# Patient Record
Sex: MALE | Race: WHITE | NOT HISPANIC OR LATINO | Employment: OTHER | URBAN - METROPOLITAN AREA
[De-identification: names, ages, dates, MRNs, and addresses within clinical notes are randomized per-mention and may not be internally consistent; named-entity substitution may affect disease eponyms.]

---

## 2020-05-22 ENCOUNTER — APPOINTMENT (OUTPATIENT)
Dept: CT IMAGING | Facility: HOSPITAL | Age: 65
End: 2020-05-22

## 2020-05-22 ENCOUNTER — HOSPITAL ENCOUNTER (INPATIENT)
Facility: HOSPITAL | Age: 65
LOS: 13 days | Discharge: HOME | End: 2020-06-04
Attending: EMERGENCY MEDICINE | Admitting: INTERNAL MEDICINE

## 2020-05-22 DIAGNOSIS — N28.9 RENAL INSUFFICIENCY: ICD-10-CM

## 2020-05-22 DIAGNOSIS — D64.9 ANEMIA, UNSPECIFIED TYPE: Primary | ICD-10-CM

## 2020-05-22 DIAGNOSIS — N17.9 AKI (ACUTE KIDNEY INJURY) (HCC): ICD-10-CM

## 2020-05-22 DIAGNOSIS — C90.00 MULTIPLE MYELOMA NOT HAVING ACHIEVED REMISSION (HCC): ICD-10-CM

## 2020-05-22 DIAGNOSIS — Z85.528 HISTORY OF RENAL CELL CANCER: ICD-10-CM

## 2020-05-22 PROBLEM — Z90.5 H/O PARTIAL NEPHRECTOMY: Status: ACTIVE | Noted: 2020-05-22

## 2020-05-22 PROBLEM — E11.9 DIABETES MELLITUS: Status: ACTIVE | Noted: 2020-05-22

## 2020-05-22 PROBLEM — N40.0 ENLARGED PROSTATE: Status: ACTIVE | Noted: 2020-05-22

## 2020-05-22 PROBLEM — N18.9 CKD (CHRONIC KIDNEY DISEASE): Status: ACTIVE | Noted: 2020-05-22

## 2020-05-22 PROBLEM — R77.8 ELEVATED TROPONIN: Status: ACTIVE | Noted: 2020-05-22

## 2020-05-22 PROBLEM — R79.89 ELEVATED TROPONIN: Status: ACTIVE | Noted: 2020-05-22

## 2020-05-22 PROBLEM — I10 HYPERTENSION: Status: ACTIVE | Noted: 2020-05-22

## 2020-05-22 LAB
ABO GROUP BLD: NORMAL
ALBUMIN SERPL-MCNC: 2.8 G/DL (ref 3.5–5.2)
ALBUMIN/GLOB SERPL: 0.4 G/DL
ALP SERPL-CCNC: 59 U/L (ref 39–117)
ALT SERPL W P-5'-P-CCNC: <5 U/L (ref 1–41)
ANION GAP SERPL CALCULATED.3IONS-SCNC: 7.3 MMOL/L (ref 5–15)
APTT PPP: 34.2 SECONDS (ref 22.7–35.4)
AST SERPL-CCNC: 8 U/L (ref 1–40)
BASOPHILS # BLD AUTO: 0.02 10*3/MM3 (ref 0–0.2)
BASOPHILS NFR BLD AUTO: 0.4 % (ref 0–1.5)
BILIRUB SERPL-MCNC: 0.3 MG/DL (ref 0.2–1.2)
BLD GP AB SCN SERPL QL: NEGATIVE
BUN BLD-MCNC: 44 MG/DL (ref 8–23)
BUN/CREAT SERPL: 14.4 (ref 7–25)
CALCIUM SPEC-SCNC: 9.1 MG/DL (ref 8.6–10.5)
CHLORIDE SERPL-SCNC: 104 MMOL/L (ref 98–107)
CO2 SERPL-SCNC: 21.7 MMOL/L (ref 22–29)
CREAT BLD-MCNC: 3.05 MG/DL (ref 0.76–1.27)
DEPRECATED RDW RBC AUTO: 49.6 FL (ref 37–54)
EOSINOPHIL # BLD AUTO: 0.06 10*3/MM3 (ref 0–0.4)
EOSINOPHIL NFR BLD AUTO: 1.1 % (ref 0.3–6.2)
ERYTHROCYTE [DISTWIDTH] IN BLOOD BY AUTOMATED COUNT: 15.3 % (ref 12.3–15.4)
FERRITIN SERPL-MCNC: 528 NG/ML (ref 30–400)
GFR SERPL CREATININE-BSD FRML MDRD: 21 ML/MIN/1.73
GLOBULIN UR ELPH-MCNC: 7.9 GM/DL
GLUCOSE BLD-MCNC: 121 MG/DL (ref 65–99)
GLUCOSE BLDC GLUCOMTR-MCNC: 122 MG/DL (ref 70–130)
HCT VFR BLD AUTO: 16.8 % (ref 37.5–51)
HEMOCCULT STL QL: NEGATIVE
HGB BLD-MCNC: 5.6 G/DL (ref 13–17.7)
IMM GRANULOCYTES # BLD AUTO: 0.05 10*3/MM3 (ref 0–0.05)
IMM GRANULOCYTES NFR BLD AUTO: 0.9 % (ref 0–0.5)
INR PPP: 1.16 (ref 0.9–1.1)
IRON 24H UR-MRATE: 68 MCG/DL (ref 59–158)
IRON 24H UR-MRATE: 68 MCG/DL (ref 59–158)
IRON SATN MFR SERPL: 29 % (ref 20–50)
LYMPHOCYTES # BLD AUTO: 1.83 10*3/MM3 (ref 0.7–3.1)
LYMPHOCYTES NFR BLD AUTO: 32.8 % (ref 19.6–45.3)
MCH RBC QN AUTO: 30.8 PG (ref 26.6–33)
MCHC RBC AUTO-ENTMCNC: 33.3 G/DL (ref 31.5–35.7)
MCV RBC AUTO: 92.3 FL (ref 79–97)
MONOCYTES # BLD AUTO: 0.43 10*3/MM3 (ref 0.1–0.9)
MONOCYTES NFR BLD AUTO: 7.7 % (ref 5–12)
NEUTROPHILS # BLD AUTO: 3.19 10*3/MM3 (ref 1.7–7)
NEUTROPHILS NFR BLD AUTO: 57.1 % (ref 42.7–76)
NRBC BLD AUTO-RTO: 0 /100 WBC (ref 0–0.2)
NT-PROBNP SERPL-MCNC: 524.7 PG/ML (ref 5–900)
PLATELET # BLD AUTO: 199 10*3/MM3 (ref 140–450)
PMV BLD AUTO: 9.4 FL (ref 6–12)
POTASSIUM BLD-SCNC: 4.1 MMOL/L (ref 3.5–5.2)
PROT SERPL-MCNC: 10.7 G/DL (ref 6–8.5)
PROTHROMBIN TIME: 14.6 SECONDS (ref 11.7–14.2)
RBC # BLD AUTO: 1.82 10*6/MM3 (ref 4.14–5.8)
RETICS # AUTO: 0.03 10*6/MM3 (ref 0.02–0.13)
RETICS/RBC NFR AUTO: 1.61 % (ref 0.7–1.9)
RH BLD: POSITIVE
SODIUM BLD-SCNC: 133 MMOL/L (ref 136–145)
T&S EXPIRATION DATE: NORMAL
TIBC SERPL-MCNC: 231 MCG/DL (ref 298–536)
TRANSFERRIN SERPL-MCNC: 155 MG/DL (ref 200–360)
TROPONIN T SERPL-MCNC: 0.07 NG/ML (ref 0–0.03)
WBC NRBC COR # BLD: 5.58 10*3/MM3 (ref 3.4–10.8)

## 2020-05-22 PROCEDURE — 85610 PROTHROMBIN TIME: CPT | Performed by: EMERGENCY MEDICINE

## 2020-05-22 PROCEDURE — 82436 ASSAY OF URINE CHLORIDE: CPT | Performed by: INTERNAL MEDICINE

## 2020-05-22 PROCEDURE — 86901 BLOOD TYPING SEROLOGIC RH(D): CPT | Performed by: EMERGENCY MEDICINE

## 2020-05-22 PROCEDURE — 85730 THROMBOPLASTIN TIME PARTIAL: CPT | Performed by: EMERGENCY MEDICINE

## 2020-05-22 PROCEDURE — P9016 RBC LEUKOCYTES REDUCED: HCPCS

## 2020-05-22 PROCEDURE — 85045 AUTOMATED RETICULOCYTE COUNT: CPT | Performed by: INTERNAL MEDICINE

## 2020-05-22 PROCEDURE — 83540 ASSAY OF IRON: CPT | Performed by: INTERNAL MEDICINE

## 2020-05-22 PROCEDURE — 99285 EMERGENCY DEPT VISIT HI MDM: CPT

## 2020-05-22 PROCEDURE — 86850 RBC ANTIBODY SCREEN: CPT | Performed by: EMERGENCY MEDICINE

## 2020-05-22 PROCEDURE — 71250 CT THORAX DX C-: CPT

## 2020-05-22 PROCEDURE — 86923 COMPATIBILITY TEST ELECTRIC: CPT

## 2020-05-22 PROCEDURE — 86900 BLOOD TYPING SEROLOGIC ABO: CPT

## 2020-05-22 PROCEDURE — 36430 TRANSFUSION BLD/BLD COMPNT: CPT

## 2020-05-22 PROCEDURE — 93010 ELECTROCARDIOGRAM REPORT: CPT | Performed by: INTERNAL MEDICINE

## 2020-05-22 PROCEDURE — 80053 COMPREHEN METABOLIC PANEL: CPT | Performed by: EMERGENCY MEDICINE

## 2020-05-22 PROCEDURE — 84466 ASSAY OF TRANSFERRIN: CPT | Performed by: INTERNAL MEDICINE

## 2020-05-22 PROCEDURE — 81001 URINALYSIS AUTO W/SCOPE: CPT | Performed by: INTERNAL MEDICINE

## 2020-05-22 PROCEDURE — 85025 COMPLETE CBC W/AUTO DIFF WBC: CPT | Performed by: EMERGENCY MEDICINE

## 2020-05-22 PROCEDURE — 82728 ASSAY OF FERRITIN: CPT | Performed by: INTERNAL MEDICINE

## 2020-05-22 PROCEDURE — 83880 ASSAY OF NATRIURETIC PEPTIDE: CPT | Performed by: EMERGENCY MEDICINE

## 2020-05-22 PROCEDURE — 82962 GLUCOSE BLOOD TEST: CPT

## 2020-05-22 PROCEDURE — 74176 CT ABD & PELVIS W/O CONTRAST: CPT

## 2020-05-22 PROCEDURE — 93005 ELECTROCARDIOGRAM TRACING: CPT | Performed by: EMERGENCY MEDICINE

## 2020-05-22 PROCEDURE — 84300 ASSAY OF URINE SODIUM: CPT | Performed by: INTERNAL MEDICINE

## 2020-05-22 PROCEDURE — 82272 OCCULT BLD FECES 1-3 TESTS: CPT | Performed by: INTERNAL MEDICINE

## 2020-05-22 PROCEDURE — 86900 BLOOD TYPING SEROLOGIC ABO: CPT | Performed by: EMERGENCY MEDICINE

## 2020-05-22 PROCEDURE — 84484 ASSAY OF TROPONIN QUANT: CPT | Performed by: EMERGENCY MEDICINE

## 2020-05-22 RX ORDER — TAMSULOSIN HYDROCHLORIDE 0.4 MG/1
0.4 CAPSULE ORAL NIGHTLY
Status: DISCONTINUED | OUTPATIENT
Start: 2020-05-22 | End: 2020-06-04 | Stop reason: HOSPADM

## 2020-05-22 RX ORDER — SODIUM CHLORIDE 0.9 % (FLUSH) 0.9 %
10 SYRINGE (ML) INJECTION EVERY 12 HOURS SCHEDULED
Status: DISCONTINUED | OUTPATIENT
Start: 2020-05-22 | End: 2020-06-04 | Stop reason: HOSPADM

## 2020-05-22 RX ORDER — ACETAMINOPHEN 650 MG/1
650 SUPPOSITORY RECTAL EVERY 4 HOURS PRN
Status: DISCONTINUED | OUTPATIENT
Start: 2020-05-22 | End: 2020-06-04 | Stop reason: HOSPADM

## 2020-05-22 RX ORDER — DEXTROSE MONOHYDRATE 25 G/50ML
25 INJECTION, SOLUTION INTRAVENOUS
Status: DISCONTINUED | OUTPATIENT
Start: 2020-05-22 | End: 2020-06-04 | Stop reason: HOSPADM

## 2020-05-22 RX ORDER — ATORVASTATIN CALCIUM 20 MG/1
20 TABLET, FILM COATED ORAL DAILY
Status: DISCONTINUED | OUTPATIENT
Start: 2020-05-22 | End: 2020-06-04 | Stop reason: HOSPADM

## 2020-05-22 RX ORDER — SODIUM CHLORIDE 0.9 % (FLUSH) 0.9 %
10 SYRINGE (ML) INJECTION AS NEEDED
Status: DISCONTINUED | OUTPATIENT
Start: 2020-05-22 | End: 2020-06-04 | Stop reason: HOSPADM

## 2020-05-22 RX ORDER — FINASTERIDE 5 MG/1
5 TABLET, FILM COATED ORAL DAILY
Status: DISCONTINUED | OUTPATIENT
Start: 2020-05-22 | End: 2020-06-04 | Stop reason: HOSPADM

## 2020-05-22 RX ORDER — ACETAMINOPHEN 160 MG/5ML
650 SOLUTION ORAL EVERY 4 HOURS PRN
Status: DISCONTINUED | OUTPATIENT
Start: 2020-05-22 | End: 2020-06-04 | Stop reason: HOSPADM

## 2020-05-22 RX ORDER — NICOTINE POLACRILEX 4 MG
15 LOZENGE BUCCAL
Status: DISCONTINUED | OUTPATIENT
Start: 2020-05-22 | End: 2020-06-04 | Stop reason: HOSPADM

## 2020-05-22 RX ORDER — NITROGLYCERIN 0.4 MG/1
0.4 TABLET SUBLINGUAL
Status: DISCONTINUED | OUTPATIENT
Start: 2020-05-22 | End: 2020-06-04 | Stop reason: HOSPADM

## 2020-05-22 RX ORDER — ACETAMINOPHEN 325 MG/1
650 TABLET ORAL EVERY 4 HOURS PRN
Status: DISCONTINUED | OUTPATIENT
Start: 2020-05-22 | End: 2020-06-04 | Stop reason: HOSPADM

## 2020-05-22 RX ORDER — SODIUM CHLORIDE 9 MG/ML
125 INJECTION, SOLUTION INTRAVENOUS CONTINUOUS
Status: DISCONTINUED | OUTPATIENT
Start: 2020-05-22 | End: 2020-05-23

## 2020-05-22 RX ADMIN — ATORVASTATIN CALCIUM 20 MG: 20 TABLET, FILM COATED ORAL at 21:06

## 2020-05-22 RX ADMIN — SODIUM CHLORIDE 125 ML/HR: 9 INJECTION, SOLUTION INTRAVENOUS at 15:24

## 2020-05-22 RX ADMIN — SODIUM CHLORIDE, PRESERVATIVE FREE 10 ML: 5 INJECTION INTRAVENOUS at 21:03

## 2020-05-22 RX ADMIN — FINASTERIDE 5 MG: 5 TABLET, FILM COATED ORAL at 21:03

## 2020-05-22 RX ADMIN — TAMSULOSIN HYDROCHLORIDE 0.4 MG: 0.4 CAPSULE ORAL at 21:03

## 2020-05-23 ENCOUNTER — APPOINTMENT (OUTPATIENT)
Dept: CT IMAGING | Facility: HOSPITAL | Age: 65
End: 2020-05-23

## 2020-05-23 ENCOUNTER — APPOINTMENT (OUTPATIENT)
Dept: ULTRASOUND IMAGING | Facility: HOSPITAL | Age: 65
End: 2020-05-23

## 2020-05-23 LAB
ALBUMIN SERPL-MCNC: 2.5 G/DL (ref 3.5–5.2)
ALBUMIN/GLOB SERPL: 0.3 G/DL
ALP SERPL-CCNC: 53 U/L (ref 39–117)
ALT SERPL W P-5'-P-CCNC: <5 U/L (ref 1–41)
ANION GAP SERPL CALCULATED.3IONS-SCNC: 7.3 MMOL/L (ref 5–15)
AST SERPL-CCNC: 7 U/L (ref 1–40)
BACTERIA UR QL AUTO: ABNORMAL /HPF
BACTERIA UR QL AUTO: ABNORMAL /HPF
BASOPHILS # BLD AUTO: 0.02 10*3/MM3 (ref 0–0.2)
BASOPHILS NFR BLD AUTO: 0.4 % (ref 0–1.5)
BH BB BLOOD EXPIRATION DATE: NORMAL
BH BB BLOOD TYPE BARCODE: 6200
BH BB DISPENSE STATUS: NORMAL
BH BB PRODUCT CODE: NORMAL
BH BB UNIT NUMBER: NORMAL
BILIRUB SERPL-MCNC: 0.9 MG/DL (ref 0.2–1.2)
BILIRUB UR QL STRIP: NEGATIVE
BILIRUB UR QL STRIP: NEGATIVE
BUN BLD-MCNC: 41 MG/DL (ref 8–23)
BUN/CREAT SERPL: 14.2 (ref 7–25)
CALCIUM SPEC-SCNC: 8.9 MG/DL (ref 8.6–10.5)
CHLORIDE SERPL-SCNC: 106 MMOL/L (ref 98–107)
CHLORIDE UR-SCNC: 60 MMOL/L
CHLORIDE UR-SCNC: 81 MMOL/L
CLARITY UR: CLEAR
CLARITY UR: CLEAR
CO2 SERPL-SCNC: 21.7 MMOL/L (ref 22–29)
COLOR UR: YELLOW
COLOR UR: YELLOW
CREAT BLD-MCNC: 2.89 MG/DL (ref 0.76–1.27)
CREAT UR-MCNC: 38.6 MG/DL
CROSSMATCH INTERPRETATION: NORMAL
DEPRECATED RDW RBC AUTO: 47.8 FL (ref 37–54)
EOSINOPHIL # BLD AUTO: 0.06 10*3/MM3 (ref 0–0.4)
EOSINOPHIL NFR BLD AUTO: 1.3 % (ref 0.3–6.2)
EOSINOPHIL SPEC QL MICRO: 0 % EOS/100 CELLS (ref 0–0)
ERYTHROCYTE [DISTWIDTH] IN BLOOD BY AUTOMATED COUNT: 14.4 % (ref 12.3–15.4)
GFR SERPL CREATININE-BSD FRML MDRD: 22 ML/MIN/1.73
GLOBULIN UR ELPH-MCNC: 7.9 GM/DL
GLUCOSE BLD-MCNC: 141 MG/DL (ref 65–99)
GLUCOSE BLDC GLUCOMTR-MCNC: 116 MG/DL (ref 70–130)
GLUCOSE BLDC GLUCOMTR-MCNC: 138 MG/DL (ref 70–130)
GLUCOSE BLDC GLUCOMTR-MCNC: 146 MG/DL (ref 70–130)
GLUCOSE BLDC GLUCOMTR-MCNC: 173 MG/DL (ref 70–130)
GLUCOSE UR STRIP-MCNC: NEGATIVE MG/DL
GLUCOSE UR STRIP-MCNC: NEGATIVE MG/DL
HBA1C MFR BLD: 6.6 % (ref 4.8–5.6)
HCT VFR BLD AUTO: 23.9 % (ref 37.5–51)
HGB BLD-MCNC: 8 G/DL (ref 13–17.7)
HGB UR QL STRIP.AUTO: ABNORMAL
HGB UR QL STRIP.AUTO: ABNORMAL
HYALINE CASTS UR QL AUTO: ABNORMAL /LPF
HYALINE CASTS UR QL AUTO: ABNORMAL /LPF
IMM GRANULOCYTES # BLD AUTO: 0.03 10*3/MM3 (ref 0–0.05)
IMM GRANULOCYTES NFR BLD AUTO: 0.6 % (ref 0–0.5)
KETONES UR QL STRIP: NEGATIVE
KETONES UR QL STRIP: NEGATIVE
LEUKOCYTE ESTERASE UR QL STRIP.AUTO: NEGATIVE
LEUKOCYTE ESTERASE UR QL STRIP.AUTO: NEGATIVE
LYMPHOCYTES # BLD AUTO: 1.88 10*3/MM3 (ref 0.7–3.1)
LYMPHOCYTES NFR BLD AUTO: 39.3 % (ref 19.6–45.3)
MCH RBC QN AUTO: 30.7 PG (ref 26.6–33)
MCHC RBC AUTO-ENTMCNC: 33.5 G/DL (ref 31.5–35.7)
MCV RBC AUTO: 91.6 FL (ref 79–97)
MONOCYTES # BLD AUTO: 0.37 10*3/MM3 (ref 0.1–0.9)
MONOCYTES NFR BLD AUTO: 7.7 % (ref 5–12)
NEUTROPHILS # BLD AUTO: 2.42 10*3/MM3 (ref 1.7–7)
NEUTROPHILS NFR BLD AUTO: 50.7 % (ref 42.7–76)
NITRITE UR QL STRIP: NEGATIVE
NITRITE UR QL STRIP: NEGATIVE
NRBC BLD AUTO-RTO: 0 /100 WBC (ref 0–0.2)
PH UR STRIP.AUTO: <=5 [PH] (ref 5–8)
PH UR STRIP.AUTO: <=5 [PH] (ref 5–8)
PLATELET # BLD AUTO: 162 10*3/MM3 (ref 140–450)
PMV BLD AUTO: 9.2 FL (ref 6–12)
POTASSIUM BLD-SCNC: 4.1 MMOL/L (ref 3.5–5.2)
PROT SERPL-MCNC: 10.4 G/DL (ref 6–8.5)
PROT UR QL STRIP: ABNORMAL
PROT UR QL STRIP: ABNORMAL
PROT UR-MCNC: 268 MG/DL
RBC # BLD AUTO: 2.61 10*6/MM3 (ref 4.14–5.8)
RBC # UR: ABNORMAL /HPF
RBC # UR: ABNORMAL /HPF
REF LAB TEST METHOD: ABNORMAL
REF LAB TEST METHOD: ABNORMAL
SODIUM BLD-SCNC: 135 MMOL/L (ref 136–145)
SODIUM UR-SCNC: 69 MMOL/L
SODIUM UR-SCNC: 85 MMOL/L
SP GR UR STRIP: 1.01 (ref 1–1.03)
SP GR UR STRIP: 1.01 (ref 1–1.03)
SQUAMOUS #/AREA URNS HPF: ABNORMAL /HPF
SQUAMOUS #/AREA URNS HPF: ABNORMAL /HPF
TROPONIN T SERPL-MCNC: 0.06 NG/ML (ref 0–0.03)
UNIT  ABO: NORMAL
UNIT  RH: NORMAL
UROBILINOGEN UR QL STRIP: ABNORMAL
UROBILINOGEN UR QL STRIP: ABNORMAL
VIT B12 BLD-MCNC: 1040 PG/ML (ref 211–946)
WBC NRBC COR # BLD: 4.78 10*3/MM3 (ref 3.4–10.8)
WBC UR QL AUTO: ABNORMAL /HPF
WBC UR QL AUTO: ABNORMAL /HPF

## 2020-05-23 PROCEDURE — 88237 TISSUE CULTURE BONE MARROW: CPT

## 2020-05-23 PROCEDURE — 25010000002 EPOETIN ALFA PER 1000 UNITS: Performed by: INTERNAL MEDICINE

## 2020-05-23 PROCEDURE — 25010000003 LIDOCAINE 1 % SOLUTION: Performed by: RADIOLOGY

## 2020-05-23 PROCEDURE — 81001 URINALYSIS AUTO W/SCOPE: CPT | Performed by: INTERNAL MEDICINE

## 2020-05-23 PROCEDURE — 87205 SMEAR GRAM STAIN: CPT | Performed by: INTERNAL MEDICINE

## 2020-05-23 PROCEDURE — 76775 US EXAM ABDO BACK WALL LIM: CPT

## 2020-05-23 PROCEDURE — 82570 ASSAY OF URINE CREATININE: CPT | Performed by: INTERNAL MEDICINE

## 2020-05-23 PROCEDURE — 83883 ASSAY NEPHELOMETRY NOT SPEC: CPT | Performed by: INTERNAL MEDICINE

## 2020-05-23 PROCEDURE — 80053 COMPREHEN METABOLIC PANEL: CPT | Performed by: INTERNAL MEDICINE

## 2020-05-23 PROCEDURE — 82962 GLUCOSE BLOOD TEST: CPT

## 2020-05-23 PROCEDURE — 88313 SPECIAL STAINS GROUP 2: CPT

## 2020-05-23 PROCEDURE — 88264 CHROMOSOME ANALYSIS 20-25: CPT

## 2020-05-23 PROCEDURE — 88182 CELL MARKER STUDY: CPT

## 2020-05-23 PROCEDURE — 88313 SPECIAL STAINS GROUP 2: CPT | Performed by: HOSPITALIST

## 2020-05-23 PROCEDURE — 85097 BONE MARROW INTERPRETATION: CPT | Performed by: HOSPITALIST

## 2020-05-23 PROCEDURE — 84484 ASSAY OF TROPONIN QUANT: CPT | Performed by: INTERNAL MEDICINE

## 2020-05-23 PROCEDURE — 88311 DECALCIFY TISSUE: CPT | Performed by: HOSPITALIST

## 2020-05-23 PROCEDURE — 88185 FLOWCYTOMETRY/TC ADD-ON: CPT

## 2020-05-23 PROCEDURE — 88342 IMHCHEM/IMCYTCHM 1ST ANTB: CPT | Performed by: HOSPITALIST

## 2020-05-23 PROCEDURE — 85025 COMPLETE CBC W/AUTO DIFF WBC: CPT | Performed by: INTERNAL MEDICINE

## 2020-05-23 PROCEDURE — 82784 ASSAY IGA/IGD/IGG/IGM EACH: CPT | Performed by: INTERNAL MEDICINE

## 2020-05-23 PROCEDURE — 88184 FLOWCYTOMETRY/ TC 1 MARKER: CPT

## 2020-05-23 PROCEDURE — 84300 ASSAY OF URINE SODIUM: CPT | Performed by: INTERNAL MEDICINE

## 2020-05-23 PROCEDURE — 83036 HEMOGLOBIN GLYCOSYLATED A1C: CPT | Performed by: INTERNAL MEDICINE

## 2020-05-23 PROCEDURE — 88305 TISSUE EXAM BY PATHOLOGIST: CPT | Performed by: HOSPITALIST

## 2020-05-23 PROCEDURE — 84156 ASSAY OF PROTEIN URINE: CPT | Performed by: INTERNAL MEDICINE

## 2020-05-23 PROCEDURE — 88300 SURGICAL PATH GROSS: CPT | Performed by: HOSPITALIST

## 2020-05-23 PROCEDURE — 82668 ASSAY OF ERYTHROPOIETIN: CPT | Performed by: INTERNAL MEDICINE

## 2020-05-23 PROCEDURE — 07DR3ZX EXTRACTION OF ILIAC BONE MARROW, PERCUTANEOUS APPROACH, DIAGNOSTIC: ICD-10-PCS | Performed by: INTERNAL MEDICINE

## 2020-05-23 PROCEDURE — 88323 CONSLTJ&REPRT MATRL PREP SLD: CPT

## 2020-05-23 PROCEDURE — 82436 ASSAY OF URINE CHLORIDE: CPT | Performed by: INTERNAL MEDICINE

## 2020-05-23 PROCEDURE — 88341 IMHCHEM/IMCYTCHM EA ADD ANTB: CPT | Performed by: HOSPITALIST

## 2020-05-23 PROCEDURE — 77012 CT SCAN FOR NEEDLE BIOPSY: CPT

## 2020-05-23 PROCEDURE — 84165 PROTEIN E-PHORESIS SERUM: CPT | Performed by: INTERNAL MEDICINE

## 2020-05-23 PROCEDURE — 86334 IMMUNOFIX E-PHORESIS SERUM: CPT | Performed by: INTERNAL MEDICINE

## 2020-05-23 PROCEDURE — 99223 1ST HOSP IP/OBS HIGH 75: CPT | Performed by: INTERNAL MEDICINE

## 2020-05-23 PROCEDURE — 82607 VITAMIN B-12: CPT | Performed by: INTERNAL MEDICINE

## 2020-05-23 RX ORDER — SODIUM CHLORIDE 9 MG/ML
125 INJECTION, SOLUTION INTRAVENOUS CONTINUOUS
Status: DISCONTINUED | OUTPATIENT
Start: 2020-05-23 | End: 2020-05-24

## 2020-05-23 RX ORDER — LIDOCAINE HYDROCHLORIDE 10 MG/ML
20 INJECTION, SOLUTION INFILTRATION; PERINEURAL ONCE
Status: COMPLETED | OUTPATIENT
Start: 2020-05-23 | End: 2020-05-23

## 2020-05-23 RX ADMIN — LIDOCAINE HYDROCHLORIDE 20 ML: 10 INJECTION, SOLUTION INFILTRATION; PERINEURAL at 14:57

## 2020-05-23 RX ADMIN — FINASTERIDE 5 MG: 5 TABLET, FILM COATED ORAL at 11:46

## 2020-05-23 RX ADMIN — SODIUM CHLORIDE, PRESERVATIVE FREE 10 ML: 5 INJECTION INTRAVENOUS at 08:27

## 2020-05-23 RX ADMIN — SODIUM CHLORIDE 125 ML/HR: 9 INJECTION, SOLUTION INTRAVENOUS at 13:26

## 2020-05-23 RX ADMIN — SODIUM CHLORIDE, PRESERVATIVE FREE 10 ML: 5 INJECTION INTRAVENOUS at 20:37

## 2020-05-23 RX ADMIN — TAMSULOSIN HYDROCHLORIDE 0.4 MG: 0.4 CAPSULE ORAL at 20:37

## 2020-05-23 RX ADMIN — ERYTHROPOIETIN 20000 UNITS: 20000 INJECTION, SOLUTION INTRAVENOUS; SUBCUTANEOUS at 17:03

## 2020-05-23 RX ADMIN — ATORVASTATIN CALCIUM 20 MG: 20 TABLET, FILM COATED ORAL at 11:46

## 2020-05-24 LAB
ALBUMIN SERPL-MCNC: 2.4 G/DL (ref 3.5–5.2)
ANION GAP SERPL CALCULATED.3IONS-SCNC: 6.3 MMOL/L (ref 5–15)
BASOPHILS # BLD AUTO: 0.02 10*3/MM3 (ref 0–0.2)
BASOPHILS NFR BLD AUTO: 0.4 % (ref 0–1.5)
BUN BLD-MCNC: 36 MG/DL (ref 8–23)
BUN/CREAT SERPL: 11 (ref 7–25)
CALCIUM SPEC-SCNC: 8.8 MG/DL (ref 8.6–10.5)
CHLORIDE SERPL-SCNC: 106 MMOL/L (ref 98–107)
CO2 SERPL-SCNC: 21.7 MMOL/L (ref 22–29)
CREAT BLD-MCNC: 3.26 MG/DL (ref 0.76–1.27)
DEPRECATED RDW RBC AUTO: 47.6 FL (ref 37–54)
EOSINOPHIL # BLD AUTO: 0.05 10*3/MM3 (ref 0–0.4)
EOSINOPHIL NFR BLD AUTO: 1 % (ref 0.3–6.2)
ERYTHROCYTE [DISTWIDTH] IN BLOOD BY AUTOMATED COUNT: 14.2 % (ref 12.3–15.4)
GFR SERPL CREATININE-BSD FRML MDRD: 19 ML/MIN/1.73
GLUCOSE BLD-MCNC: 133 MG/DL (ref 65–99)
GLUCOSE BLDC GLUCOMTR-MCNC: 133 MG/DL (ref 70–130)
GLUCOSE BLDC GLUCOMTR-MCNC: 145 MG/DL (ref 70–130)
GLUCOSE BLDC GLUCOMTR-MCNC: 150 MG/DL (ref 70–130)
GLUCOSE BLDC GLUCOMTR-MCNC: 166 MG/DL (ref 70–130)
HCT VFR BLD AUTO: 22.9 % (ref 37.5–51)
HGB BLD-MCNC: 7.8 G/DL (ref 13–17.7)
IMM GRANULOCYTES # BLD AUTO: 0.02 10*3/MM3 (ref 0–0.05)
IMM GRANULOCYTES NFR BLD AUTO: 0.4 % (ref 0–0.5)
LYMPHOCYTES # BLD AUTO: 1.53 10*3/MM3 (ref 0.7–3.1)
LYMPHOCYTES NFR BLD AUTO: 31.5 % (ref 19.6–45.3)
MCH RBC QN AUTO: 31 PG (ref 26.6–33)
MCHC RBC AUTO-ENTMCNC: 34.1 G/DL (ref 31.5–35.7)
MCV RBC AUTO: 90.9 FL (ref 79–97)
MONOCYTES # BLD AUTO: 0.4 10*3/MM3 (ref 0.1–0.9)
MONOCYTES NFR BLD AUTO: 8.2 % (ref 5–12)
NEUTROPHILS # BLD AUTO: 2.83 10*3/MM3 (ref 1.7–7)
NEUTROPHILS NFR BLD AUTO: 58.5 % (ref 42.7–76)
NRBC BLD AUTO-RTO: 0 /100 WBC (ref 0–0.2)
PHOSPHATE SERPL-MCNC: 4.1 MG/DL (ref 2.5–4.5)
PLATELET # BLD AUTO: 149 10*3/MM3 (ref 140–450)
PMV BLD AUTO: 9.4 FL (ref 6–12)
POTASSIUM BLD-SCNC: 3.6 MMOL/L (ref 3.5–5.2)
RBC # BLD AUTO: 2.52 10*6/MM3 (ref 4.14–5.8)
SODIUM BLD-SCNC: 134 MMOL/L (ref 136–145)
URATE SERPL-MCNC: 7.4 MG/DL (ref 3.4–7)
WBC NRBC COR # BLD: 4.85 10*3/MM3 (ref 3.4–10.8)

## 2020-05-24 PROCEDURE — 84156 ASSAY OF PROTEIN URINE: CPT | Performed by: INTERNAL MEDICINE

## 2020-05-24 PROCEDURE — 84166 PROTEIN E-PHORESIS/URINE/CSF: CPT | Performed by: INTERNAL MEDICINE

## 2020-05-24 PROCEDURE — P9016 RBC LEUKOCYTES REDUCED: HCPCS

## 2020-05-24 PROCEDURE — 63710000001 INSULIN LISPRO (HUMAN) PER 5 UNITS: Performed by: HOSPITALIST

## 2020-05-24 PROCEDURE — 99232 SBSQ HOSP IP/OBS MODERATE 35: CPT | Performed by: INTERNAL MEDICINE

## 2020-05-24 PROCEDURE — 86335 IMMUNFIX E-PHORSIS/URINE/CSF: CPT | Performed by: INTERNAL MEDICINE

## 2020-05-24 PROCEDURE — 84550 ASSAY OF BLOOD/URIC ACID: CPT | Performed by: INTERNAL MEDICINE

## 2020-05-24 PROCEDURE — 80069 RENAL FUNCTION PANEL: CPT | Performed by: INTERNAL MEDICINE

## 2020-05-24 PROCEDURE — 82962 GLUCOSE BLOOD TEST: CPT

## 2020-05-24 PROCEDURE — 85025 COMPLETE CBC W/AUTO DIFF WBC: CPT | Performed by: INTERNAL MEDICINE

## 2020-05-24 PROCEDURE — 25010000002 DIPHENHYDRAMINE PER 50 MG: Performed by: INTERNAL MEDICINE

## 2020-05-24 PROCEDURE — 36430 TRANSFUSION BLD/BLD COMPNT: CPT

## 2020-05-24 PROCEDURE — 83883 ASSAY NEPHELOMETRY NOT SPEC: CPT | Performed by: INTERNAL MEDICINE

## 2020-05-24 PROCEDURE — 81050 URINALYSIS VOLUME MEASURE: CPT | Performed by: INTERNAL MEDICINE

## 2020-05-24 PROCEDURE — 86900 BLOOD TYPING SEROLOGIC ABO: CPT

## 2020-05-24 RX ORDER — DIPHENHYDRAMINE HYDROCHLORIDE 50 MG/ML
25 INJECTION INTRAMUSCULAR; INTRAVENOUS ONCE
Status: COMPLETED | OUTPATIENT
Start: 2020-05-24 | End: 2020-05-24

## 2020-05-24 RX ORDER — ACETAMINOPHEN 325 MG/1
650 TABLET ORAL ONCE
Status: COMPLETED | OUTPATIENT
Start: 2020-05-24 | End: 2020-05-24

## 2020-05-24 RX ORDER — ALLOPURINOL 100 MG/1
100 TABLET ORAL DAILY
Status: DISCONTINUED | OUTPATIENT
Start: 2020-05-24 | End: 2020-06-04 | Stop reason: HOSPADM

## 2020-05-24 RX ADMIN — ATORVASTATIN CALCIUM 20 MG: 20 TABLET, FILM COATED ORAL at 09:13

## 2020-05-24 RX ADMIN — ALLOPURINOL 100 MG: 100 TABLET ORAL at 10:59

## 2020-05-24 RX ADMIN — SODIUM CHLORIDE, PRESERVATIVE FREE 10 ML: 5 INJECTION INTRAVENOUS at 20:02

## 2020-05-24 RX ADMIN — FINASTERIDE 5 MG: 5 TABLET, FILM COATED ORAL at 09:13

## 2020-05-24 RX ADMIN — INSULIN LISPRO 2 UNITS: 100 INJECTION, SOLUTION INTRAVENOUS; SUBCUTANEOUS at 12:38

## 2020-05-24 RX ADMIN — TAMSULOSIN HYDROCHLORIDE 0.4 MG: 0.4 CAPSULE ORAL at 20:02

## 2020-05-24 RX ADMIN — INSULIN LISPRO 2 UNITS: 100 INJECTION, SOLUTION INTRAVENOUS; SUBCUTANEOUS at 18:35

## 2020-05-24 RX ADMIN — ACETAMINOPHEN 650 MG: 325 TABLET, FILM COATED ORAL at 10:59

## 2020-05-24 RX ADMIN — SODIUM CHLORIDE, PRESERVATIVE FREE 10 ML: 5 INJECTION INTRAVENOUS at 09:14

## 2020-05-24 RX ADMIN — SODIUM CHLORIDE 125 ML/HR: 9 INJECTION, SOLUTION INTRAVENOUS at 09:14

## 2020-05-24 RX ADMIN — DIPHENHYDRAMINE HYDROCHLORIDE 25 MG: 50 INJECTION, SOLUTION INTRAMUSCULAR; INTRAVENOUS at 11:00

## 2020-05-25 LAB
ALBUMIN SERPL-MCNC: 2.5 G/DL (ref 3.5–5.2)
ALBUMIN/GLOB SERPL: 0.3 G/DL
ALP SERPL-CCNC: 56 U/L (ref 39–117)
ALT SERPL W P-5'-P-CCNC: <5 U/L (ref 1–41)
ANION GAP SERPL CALCULATED.3IONS-SCNC: 6.3 MMOL/L (ref 5–15)
AST SERPL-CCNC: 8 U/L (ref 1–40)
BASOPHILS # BLD AUTO: 0.03 10*3/MM3 (ref 0–0.2)
BASOPHILS NFR BLD AUTO: 0.6 % (ref 0–1.5)
BH BB BLOOD EXPIRATION DATE: NORMAL
BH BB BLOOD TYPE BARCODE: 6200
BH BB DISPENSE STATUS: NORMAL
BH BB PRODUCT CODE: NORMAL
BH BB UNIT NUMBER: NORMAL
BILIRUB SERPL-MCNC: 0.7 MG/DL (ref 0.2–1.2)
BUN BLD-MCNC: 36 MG/DL (ref 8–23)
BUN/CREAT SERPL: 12.2 (ref 7–25)
CALCIUM SPEC-SCNC: 8.9 MG/DL (ref 8.6–10.5)
CHLORIDE SERPL-SCNC: 103 MMOL/L (ref 98–107)
CO2 SERPL-SCNC: 24.7 MMOL/L (ref 22–29)
CREAT BLD-MCNC: 2.94 MG/DL (ref 0.76–1.27)
CROSSMATCH INTERPRETATION: NORMAL
DEPRECATED RDW RBC AUTO: 48.1 FL (ref 37–54)
EOSINOPHIL # BLD AUTO: 0.07 10*3/MM3 (ref 0–0.4)
EOSINOPHIL NFR BLD AUTO: 1.5 % (ref 0.3–6.2)
ERYTHROCYTE [DISTWIDTH] IN BLOOD BY AUTOMATED COUNT: 14.7 % (ref 12.3–15.4)
GFR SERPL CREATININE-BSD FRML MDRD: 22 ML/MIN/1.73
GLOBULIN UR ELPH-MCNC: 7.7 GM/DL
GLUCOSE BLD-MCNC: 109 MG/DL (ref 65–99)
GLUCOSE BLDC GLUCOMTR-MCNC: 138 MG/DL (ref 70–130)
GLUCOSE BLDC GLUCOMTR-MCNC: 165 MG/DL (ref 70–130)
GLUCOSE BLDC GLUCOMTR-MCNC: 168 MG/DL (ref 70–130)
GLUCOSE BLDC GLUCOMTR-MCNC: 188 MG/DL (ref 70–130)
HCT VFR BLD AUTO: 25.4 % (ref 37.5–51)
HGB BLD-MCNC: 8.8 G/DL (ref 13–17.7)
IMM GRANULOCYTES # BLD AUTO: 0.04 10*3/MM3 (ref 0–0.05)
IMM GRANULOCYTES NFR BLD AUTO: 0.8 % (ref 0–0.5)
LYMPHOCYTES # BLD AUTO: 1.87 10*3/MM3 (ref 0.7–3.1)
LYMPHOCYTES NFR BLD AUTO: 39.3 % (ref 19.6–45.3)
MCH RBC QN AUTO: 31 PG (ref 26.6–33)
MCHC RBC AUTO-ENTMCNC: 34.6 G/DL (ref 31.5–35.7)
MCV RBC AUTO: 89.4 FL (ref 79–97)
MONOCYTES # BLD AUTO: 0.34 10*3/MM3 (ref 0.1–0.9)
MONOCYTES NFR BLD AUTO: 7.1 % (ref 5–12)
NEUTROPHILS # BLD AUTO: 2.41 10*3/MM3 (ref 1.7–7)
NEUTROPHILS NFR BLD AUTO: 50.7 % (ref 42.7–76)
NRBC BLD AUTO-RTO: 0 /100 WBC (ref 0–0.2)
PHOSPHATE SERPL-MCNC: 4.4 MG/DL (ref 2.5–4.5)
PLATELET # BLD AUTO: 159 10*3/MM3 (ref 140–450)
PMV BLD AUTO: 9.4 FL (ref 6–12)
POTASSIUM BLD-SCNC: 3.6 MMOL/L (ref 3.5–5.2)
PROT SERPL-MCNC: 10.2 G/DL (ref 6–8.5)
RBC # BLD AUTO: 2.84 10*6/MM3 (ref 4.14–5.8)
SODIUM BLD-SCNC: 134 MMOL/L (ref 136–145)
UNIT  ABO: NORMAL
UNIT  RH: NORMAL
WBC NRBC COR # BLD: 4.76 10*3/MM3 (ref 3.4–10.8)

## 2020-05-25 PROCEDURE — 84100 ASSAY OF PHOSPHORUS: CPT | Performed by: INTERNAL MEDICINE

## 2020-05-25 PROCEDURE — 63710000001 INSULIN LISPRO (HUMAN) PER 5 UNITS: Performed by: HOSPITALIST

## 2020-05-25 PROCEDURE — 82962 GLUCOSE BLOOD TEST: CPT

## 2020-05-25 PROCEDURE — 80053 COMPREHEN METABOLIC PANEL: CPT | Performed by: INTERNAL MEDICINE

## 2020-05-25 PROCEDURE — 85025 COMPLETE CBC W/AUTO DIFF WBC: CPT | Performed by: INTERNAL MEDICINE

## 2020-05-25 PROCEDURE — 99233 SBSQ HOSP IP/OBS HIGH 50: CPT | Performed by: INTERNAL MEDICINE

## 2020-05-25 RX ORDER — AMLODIPINE BESYLATE 5 MG/1
5 TABLET ORAL
Status: DISCONTINUED | OUTPATIENT
Start: 2020-05-25 | End: 2020-05-30

## 2020-05-25 RX ADMIN — INSULIN LISPRO 2 UNITS: 100 INJECTION, SOLUTION INTRAVENOUS; SUBCUTANEOUS at 11:58

## 2020-05-25 RX ADMIN — SODIUM CHLORIDE, PRESERVATIVE FREE 10 ML: 5 INJECTION INTRAVENOUS at 21:05

## 2020-05-25 RX ADMIN — ALLOPURINOL 100 MG: 100 TABLET ORAL at 09:17

## 2020-05-25 RX ADMIN — TAMSULOSIN HYDROCHLORIDE 0.4 MG: 0.4 CAPSULE ORAL at 21:04

## 2020-05-25 RX ADMIN — FINASTERIDE 5 MG: 5 TABLET, FILM COATED ORAL at 09:17

## 2020-05-25 RX ADMIN — ATORVASTATIN CALCIUM 20 MG: 20 TABLET, FILM COATED ORAL at 09:17

## 2020-05-25 RX ADMIN — INSULIN LISPRO 2 UNITS: 100 INJECTION, SOLUTION INTRAVENOUS; SUBCUTANEOUS at 21:04

## 2020-05-25 RX ADMIN — SODIUM CHLORIDE, PRESERVATIVE FREE 10 ML: 5 INJECTION INTRAVENOUS at 09:18

## 2020-05-25 RX ADMIN — INSULIN LISPRO 2 UNITS: 100 INJECTION, SOLUTION INTRAVENOUS; SUBCUTANEOUS at 17:16

## 2020-05-25 RX ADMIN — AMLODIPINE BESYLATE 5 MG: 5 TABLET ORAL at 11:58

## 2020-05-26 ENCOUNTER — TELEPHONE (OUTPATIENT)
Dept: ONCOLOGY | Facility: CLINIC | Age: 65
End: 2020-05-26

## 2020-05-26 ENCOUNTER — APPOINTMENT (OUTPATIENT)
Dept: GENERAL RADIOLOGY | Facility: HOSPITAL | Age: 65
End: 2020-05-26

## 2020-05-26 PROBLEM — C90.00 MULTIPLE MYELOMA: Status: ACTIVE | Noted: 2020-05-26

## 2020-05-26 LAB
ALBUMIN SERPL-MCNC: 2.4 G/DL (ref 3.5–5.2)
ALBUMIN SERPL-MCNC: 3 G/DL (ref 2.9–4.4)
ALBUMIN/GLOB SERPL: 0.4 {RATIO} (ref 0.7–1.7)
ALPHA1 GLOB FLD ELPH-MCNC: 0.2 G/DL (ref 0–0.4)
ALPHA2 GLOB SERPL ELPH-MCNC: 1 G/DL (ref 0.4–1)
ANION GAP SERPL CALCULATED.3IONS-SCNC: 5.3 MMOL/L (ref 5–15)
B-GLOBULIN SERPL ELPH-MCNC: 1 G/DL (ref 0.7–1.3)
BASOPHILS # BLD AUTO: 0.03 10*3/MM3 (ref 0–0.2)
BASOPHILS NFR BLD AUTO: 0.6 % (ref 0–1.5)
BUN BLD-MCNC: 36 MG/DL (ref 8–23)
BUN/CREAT SERPL: 12.4 (ref 7–25)
CALCIUM SPEC-SCNC: 9 MG/DL (ref 8.6–10.5)
CHLORIDE SERPL-SCNC: 105 MMOL/L (ref 98–107)
CO2 SERPL-SCNC: 21.7 MMOL/L (ref 22–29)
CREAT BLD-MCNC: 2.91 MG/DL (ref 0.76–1.27)
DEPRECATED RDW RBC AUTO: 46.1 FL (ref 37–54)
EOSINOPHIL # BLD AUTO: 0.06 10*3/MM3 (ref 0–0.4)
EOSINOPHIL NFR BLD AUTO: 1.2 % (ref 0.3–6.2)
ERYTHROCYTE [DISTWIDTH] IN BLOOD BY AUTOMATED COUNT: 14.1 % (ref 12.3–15.4)
ETHNIC BACKGROUND STATED: 16.6 MIU/ML (ref 2.6–18.5)
FOLATE SERPL-MCNC: 5.06 NG/ML (ref 4.78–24.2)
GAMMA GLOB SERPL ELPH-MCNC: 5.2 G/DL (ref 0.4–1.8)
GFR SERPL CREATININE-BSD FRML MDRD: 22 ML/MIN/1.73
GLOBULIN SER CALC-MCNC: 7.4 G/DL (ref 2.2–3.9)
GLUCOSE BLD-MCNC: 118 MG/DL (ref 65–99)
GLUCOSE BLDC GLUCOMTR-MCNC: 128 MG/DL (ref 70–130)
GLUCOSE BLDC GLUCOMTR-MCNC: 157 MG/DL (ref 70–130)
GLUCOSE BLDC GLUCOMTR-MCNC: 169 MG/DL (ref 70–130)
GLUCOSE BLDC GLUCOMTR-MCNC: 174 MG/DL (ref 70–130)
HCT VFR BLD AUTO: 26.1 % (ref 37.5–51)
HGB BLD-MCNC: 8.9 G/DL (ref 13–17.7)
IGA SERPL-MCNC: 19 MG/DL (ref 61–437)
IGG SERPL-MCNC: 6629 MG/DL (ref 603–1613)
IGM SERPL-MCNC: 12 MG/DL (ref 20–172)
IMM GRANULOCYTES # BLD AUTO: 0.03 10*3/MM3 (ref 0–0.05)
IMM GRANULOCYTES NFR BLD AUTO: 0.6 % (ref 0–0.5)
LYMPHOCYTES # BLD AUTO: 1.75 10*3/MM3 (ref 0.7–3.1)
LYMPHOCYTES NFR BLD AUTO: 34.2 % (ref 19.6–45.3)
Lab: ABNORMAL
M-SPIKE: 5.1 G/DL
MCH RBC QN AUTO: 30.4 PG (ref 26.6–33)
MCHC RBC AUTO-ENTMCNC: 34.1 G/DL (ref 31.5–35.7)
MCV RBC AUTO: 89.1 FL (ref 79–97)
MONOCYTES # BLD AUTO: 0.45 10*3/MM3 (ref 0.1–0.9)
MONOCYTES NFR BLD AUTO: 8.8 % (ref 5–12)
NEUTROPHILS # BLD AUTO: 2.8 10*3/MM3 (ref 1.7–7)
NEUTROPHILS NFR BLD AUTO: 54.6 % (ref 42.7–76)
NRBC BLD AUTO-RTO: 0 /100 WBC (ref 0–0.2)
PHOSPHATE SERPL-MCNC: 4 MG/DL (ref 2.5–4.5)
PLATELET # BLD AUTO: 150 10*3/MM3 (ref 140–450)
PMV BLD AUTO: 8.7 FL (ref 6–12)
POTASSIUM BLD-SCNC: 3.6 MMOL/L (ref 3.5–5.2)
PROT PATTERN SERPL ELPH-IMP: ABNORMAL
PROT PATTERN SERPL IFE-IMP: ABNORMAL
PROT SERPL-MCNC: 10.4 G/DL (ref 6–8.5)
RBC # BLD AUTO: 2.93 10*6/MM3 (ref 4.14–5.8)
SODIUM BLD-SCNC: 132 MMOL/L (ref 136–145)
WBC NRBC COR # BLD: 5.12 10*3/MM3 (ref 3.4–10.8)

## 2020-05-26 PROCEDURE — 77075 RADEX OSSEOUS SURVEY COMPL: CPT

## 2020-05-26 PROCEDURE — 63710000001 INSULIN LISPRO (HUMAN) PER 5 UNITS: Performed by: HOSPITALIST

## 2020-05-26 PROCEDURE — 82746 ASSAY OF FOLIC ACID SERUM: CPT | Performed by: INTERNAL MEDICINE

## 2020-05-26 PROCEDURE — 85025 COMPLETE CBC W/AUTO DIFF WBC: CPT | Performed by: INTERNAL MEDICINE

## 2020-05-26 PROCEDURE — 80069 RENAL FUNCTION PANEL: CPT | Performed by: INTERNAL MEDICINE

## 2020-05-26 PROCEDURE — 99233 SBSQ HOSP IP/OBS HIGH 50: CPT | Performed by: INTERNAL MEDICINE

## 2020-05-26 PROCEDURE — 82962 GLUCOSE BLOOD TEST: CPT

## 2020-05-26 RX ORDER — ECHINACEA PURPUREA EXTRACT 125 MG
2 TABLET ORAL AS NEEDED
Status: DISCONTINUED | OUTPATIENT
Start: 2020-05-26 | End: 2020-06-04 | Stop reason: HOSPADM

## 2020-05-26 RX ADMIN — FINASTERIDE 5 MG: 5 TABLET, FILM COATED ORAL at 09:05

## 2020-05-26 RX ADMIN — ALLOPURINOL 100 MG: 100 TABLET ORAL at 09:05

## 2020-05-26 RX ADMIN — INSULIN LISPRO 2 UNITS: 100 INJECTION, SOLUTION INTRAVENOUS; SUBCUTANEOUS at 17:51

## 2020-05-26 RX ADMIN — INSULIN LISPRO 2 UNITS: 100 INJECTION, SOLUTION INTRAVENOUS; SUBCUTANEOUS at 11:43

## 2020-05-26 RX ADMIN — SALINE NASAL SPRAY 2 SPRAY: 1.5 SOLUTION NASAL at 22:26

## 2020-05-26 RX ADMIN — TAMSULOSIN HYDROCHLORIDE 0.4 MG: 0.4 CAPSULE ORAL at 22:26

## 2020-05-26 RX ADMIN — INSULIN LISPRO 2 UNITS: 100 INJECTION, SOLUTION INTRAVENOUS; SUBCUTANEOUS at 22:26

## 2020-05-26 RX ADMIN — SODIUM CHLORIDE, PRESERVATIVE FREE 10 ML: 5 INJECTION INTRAVENOUS at 22:08

## 2020-05-26 RX ADMIN — SODIUM CHLORIDE, PRESERVATIVE FREE 10 ML: 5 INJECTION INTRAVENOUS at 09:07

## 2020-05-26 RX ADMIN — ATORVASTATIN CALCIUM 20 MG: 20 TABLET, FILM COATED ORAL at 09:05

## 2020-05-26 RX ADMIN — AMLODIPINE BESYLATE 5 MG: 5 TABLET ORAL at 09:05

## 2020-05-26 NOTE — TELEPHONE ENCOUNTER
----- Message from Lopez Ortiz MD sent at 5/24/2020 10:19 AM EDT -----  Please schedule a 2 unit [facility] follow-up appointment with Dr. Ortiz or Dr. geronimo in 1 to 2 weeks.  This could be either at the [facility] or [place] office.  Patient will need labs the day of appointment including CBC and CMP.    Thanks

## 2020-05-27 LAB
ALBUMIN SERPL-MCNC: 2.2 G/DL (ref 3.5–5.2)
ANION GAP SERPL CALCULATED.3IONS-SCNC: 8.9 MMOL/L (ref 5–15)
BASOPHILS # BLD AUTO: 0.03 10*3/MM3 (ref 0–0.2)
BASOPHILS NFR BLD AUTO: 0.6 % (ref 0–1.5)
BUN BLD-MCNC: 52 MG/DL (ref 8–23)
BUN/CREAT SERPL: 16.9 (ref 7–25)
CALCIUM SPEC-SCNC: 8.5 MG/DL (ref 8.6–10.5)
CHLORIDE SERPL-SCNC: 103 MMOL/L (ref 98–107)
CO2 SERPL-SCNC: 20.1 MMOL/L (ref 22–29)
CREAT BLD-MCNC: 3.07 MG/DL (ref 0.76–1.27)
DEPRECATED RDW RBC AUTO: 45.9 FL (ref 37–54)
EOSINOPHIL # BLD AUTO: 0.07 10*3/MM3 (ref 0–0.4)
EOSINOPHIL NFR BLD AUTO: 1.5 % (ref 0.3–6.2)
ERYTHROCYTE [DISTWIDTH] IN BLOOD BY AUTOMATED COUNT: 14.3 % (ref 12.3–15.4)
GFR SERPL CREATININE-BSD FRML MDRD: 21 ML/MIN/1.73
GLUCOSE BLD-MCNC: 126 MG/DL (ref 65–99)
GLUCOSE BLDC GLUCOMTR-MCNC: 131 MG/DL (ref 70–130)
GLUCOSE BLDC GLUCOMTR-MCNC: 169 MG/DL (ref 70–130)
GLUCOSE BLDC GLUCOMTR-MCNC: 195 MG/DL (ref 70–130)
GLUCOSE BLDC GLUCOMTR-MCNC: 196 MG/DL (ref 70–130)
HCT VFR BLD AUTO: 26.7 % (ref 37.5–51)
HGB BLD-MCNC: 9.1 G/DL (ref 13–17.7)
IMM GRANULOCYTES # BLD AUTO: 0.05 10*3/MM3 (ref 0–0.05)
IMM GRANULOCYTES NFR BLD AUTO: 1 % (ref 0–0.5)
KAPPA LC SERPL-MCNC: 23.8 MG/L (ref 3.3–19.4)
KAPPA LC/LAMBDA SER: 0 {RATIO} (ref 0.26–1.65)
LAMBDA LC FREE SERPL-MCNC: 6403.8 MG/L (ref 5.7–26.3)
LYMPHOCYTES # BLD AUTO: 1.75 10*3/MM3 (ref 0.7–3.1)
LYMPHOCYTES NFR BLD AUTO: 36.3 % (ref 19.6–45.3)
MCH RBC QN AUTO: 30.5 PG (ref 26.6–33)
MCHC RBC AUTO-ENTMCNC: 34.1 G/DL (ref 31.5–35.7)
MCV RBC AUTO: 89.6 FL (ref 79–97)
MONOCYTES # BLD AUTO: 0.48 10*3/MM3 (ref 0.1–0.9)
MONOCYTES NFR BLD AUTO: 10 % (ref 5–12)
NEUTROPHILS # BLD AUTO: 2.44 10*3/MM3 (ref 1.7–7)
NEUTROPHILS NFR BLD AUTO: 50.6 % (ref 42.7–76)
NRBC BLD AUTO-RTO: 0.2 /100 WBC (ref 0–0.2)
PHOSPHATE SERPL-MCNC: 3.8 MG/DL (ref 2.5–4.5)
PLATELET # BLD AUTO: 162 10*3/MM3 (ref 140–450)
PMV BLD AUTO: 9.3 FL (ref 6–12)
POTASSIUM BLD-SCNC: 3.7 MMOL/L (ref 3.5–5.2)
RBC # BLD AUTO: 2.98 10*6/MM3 (ref 4.14–5.8)
SODIUM BLD-SCNC: 132 MMOL/L (ref 136–145)
WBC NRBC COR # BLD: 4.82 10*3/MM3 (ref 3.4–10.8)

## 2020-05-27 PROCEDURE — 99233 SBSQ HOSP IP/OBS HIGH 50: CPT | Performed by: INTERNAL MEDICINE

## 2020-05-27 PROCEDURE — 63710000001 INSULIN LISPRO (HUMAN) PER 5 UNITS: Performed by: HOSPITALIST

## 2020-05-27 PROCEDURE — 80069 RENAL FUNCTION PANEL: CPT | Performed by: INTERNAL MEDICINE

## 2020-05-27 PROCEDURE — 85025 COMPLETE CBC W/AUTO DIFF WBC: CPT | Performed by: INTERNAL MEDICINE

## 2020-05-27 PROCEDURE — 82962 GLUCOSE BLOOD TEST: CPT

## 2020-05-27 RX ADMIN — ATORVASTATIN CALCIUM 20 MG: 20 TABLET, FILM COATED ORAL at 09:46

## 2020-05-27 RX ADMIN — FINASTERIDE 5 MG: 5 TABLET, FILM COATED ORAL at 09:47

## 2020-05-27 RX ADMIN — SODIUM CHLORIDE, PRESERVATIVE FREE 10 ML: 5 INJECTION INTRAVENOUS at 20:22

## 2020-05-27 RX ADMIN — INSULIN LISPRO 2 UNITS: 100 INJECTION, SOLUTION INTRAVENOUS; SUBCUTANEOUS at 20:31

## 2020-05-27 RX ADMIN — AMLODIPINE BESYLATE 5 MG: 5 TABLET ORAL at 09:46

## 2020-05-27 RX ADMIN — INSULIN LISPRO 2 UNITS: 100 INJECTION, SOLUTION INTRAVENOUS; SUBCUTANEOUS at 12:31

## 2020-05-27 RX ADMIN — ALLOPURINOL 100 MG: 100 TABLET ORAL at 09:46

## 2020-05-27 RX ADMIN — SODIUM CHLORIDE, PRESERVATIVE FREE 10 ML: 5 INJECTION INTRAVENOUS at 09:46

## 2020-05-27 RX ADMIN — INSULIN LISPRO 2 UNITS: 100 INJECTION, SOLUTION INTRAVENOUS; SUBCUTANEOUS at 17:37

## 2020-05-27 RX ADMIN — TAMSULOSIN HYDROCHLORIDE 0.4 MG: 0.4 CAPSULE ORAL at 21:35

## 2020-05-28 ENCOUNTER — DOCUMENTATION (OUTPATIENT)
Dept: ONCOLOGY | Facility: CLINIC | Age: 65
End: 2020-05-28

## 2020-05-28 ENCOUNTER — APPOINTMENT (OUTPATIENT)
Dept: GENERAL RADIOLOGY | Facility: HOSPITAL | Age: 65
End: 2020-05-28

## 2020-05-28 LAB
ALBUMIN 24H MFR UR ELPH: 30.7 %
ALBUMIN 24H MFR UR ELPH: 31.4 %
ALBUMIN SERPL-MCNC: 2.4 G/DL (ref 3.5–5.2)
ALPHA1 GLOB 24H MFR UR ELPH: 0.8 %
ALPHA1 GLOB 24H MFR UR ELPH: 1.1 %
ALPHA2 GLOB 24H MFR UR ELPH: 3 %
ALPHA2 GLOB 24H MFR UR ELPH: 3.7 %
ANION GAP SERPL CALCULATED.3IONS-SCNC: 8.5 MMOL/L (ref 5–15)
B-GLOBULIN MFR UR ELPH: 38.3 %
B-GLOBULIN MFR UR ELPH: 38.3 %
B2 MICROGLOB SERPL-MCNC: 16.3 MG/L (ref 0.8–2.2)
BASOPHILS # BLD AUTO: 0.03 10*3/MM3 (ref 0–0.2)
BASOPHILS NFR BLD AUTO: 0.6 % (ref 0–1.5)
BUN BLD-MCNC: 53 MG/DL (ref 8–23)
BUN/CREAT SERPL: 12.1 (ref 7–25)
CALCIUM SPEC-SCNC: 8.6 MG/DL (ref 8.6–10.5)
CHLORIDE SERPL-SCNC: 101 MMOL/L (ref 98–107)
CO2 SERPL-SCNC: 20.5 MMOL/L (ref 22–29)
CREAT BLD-MCNC: 4.39 MG/DL (ref 0.76–1.27)
DEPRECATED RDW RBC AUTO: 47.4 FL (ref 37–54)
EOSINOPHIL # BLD AUTO: 0.1 10*3/MM3 (ref 0–0.4)
EOSINOPHIL NFR BLD AUTO: 2.1 % (ref 0.3–6.2)
ERYTHROCYTE [DISTWIDTH] IN BLOOD BY AUTOMATED COUNT: 14.3 % (ref 12.3–15.4)
FREE KAPPA LT CHAINS, 24HR: 211.82 MG/24 HR
FREE LAMBDA LT CHAINS, 24 HR: ABNORMAL MG/24 HR
GAMMA GLOB 24H MFR UR ELPH: 26.1 %
GAMMA GLOB 24H MFR UR ELPH: 26.5 %
GFR SERPL CREATININE-BSD FRML MDRD: 14 ML/MIN/1.73
GFR SERPL CREATININE-BSD FRML MDRD: ABNORMAL ML/MIN/{1.73_M2}
GLUCOSE BLD-MCNC: 120 MG/DL (ref 65–99)
GLUCOSE BLDC GLUCOMTR-MCNC: 116 MG/DL (ref 70–130)
GLUCOSE BLDC GLUCOMTR-MCNC: 137 MG/DL (ref 70–130)
GLUCOSE BLDC GLUCOMTR-MCNC: 182 MG/DL (ref 70–130)
GLUCOSE BLDC GLUCOMTR-MCNC: 273 MG/DL (ref 70–130)
HCT VFR BLD AUTO: 25.7 % (ref 37.5–51)
HGB BLD-MCNC: 8.7 G/DL (ref 13–17.7)
HIV 1 & 2 AB SER-IMP: ABNORMAL
IMM GRANULOCYTES # BLD AUTO: 0.05 10*3/MM3 (ref 0–0.05)
IMM GRANULOCYTES NFR BLD AUTO: 1.1 % (ref 0–0.5)
INTERPRETATION UR IFE-IMP: ABNORMAL
KAPPA LC UR-MCNC: 58.84 MG/L (ref 0.63–113.79)
KAPPA LC/LAMBDA UR: 0.01 {RATIO} (ref 1.03–31.76)
LAMBDA LC UR-MCNC: 8426.56 MG/L (ref 0.47–11.77)
LYMPHOCYTES # BLD AUTO: 1.4 10*3/MM3 (ref 0.7–3.1)
LYMPHOCYTES NFR BLD AUTO: 29.5 % (ref 19.6–45.3)
Lab: ABNORMAL
M PROTEIN 24H MFR UR ELPH: ABNORMAL %
M PROTEIN MFR UR ELPH: ABNORMAL %
MCH RBC QN AUTO: 30.6 PG (ref 26.6–33)
MCHC RBC AUTO-ENTMCNC: 33.9 G/DL (ref 31.5–35.7)
MCV RBC AUTO: 90.5 FL (ref 79–97)
MONOCYTES # BLD AUTO: 0.5 10*3/MM3 (ref 0.1–0.9)
MONOCYTES NFR BLD AUTO: 10.5 % (ref 5–12)
NEUTROPHILS # BLD AUTO: 2.67 10*3/MM3 (ref 1.7–7)
NEUTROPHILS NFR BLD AUTO: 56.2 % (ref 42.7–76)
NRBC BLD AUTO-RTO: 0 /100 WBC (ref 0–0.2)
PHOSPHATE SERPL-MCNC: 4.3 MG/DL (ref 2.5–4.5)
PLATELET # BLD AUTO: 176 10*3/MM3 (ref 140–450)
PMV BLD AUTO: 9.4 FL (ref 6–12)
POTASSIUM BLD-SCNC: 3.8 MMOL/L (ref 3.5–5.2)
PROT 24H UR-MRATE: ABNORMAL MG/24 HR (ref 30–150)
PROT 24H UR-MRATE: ABNORMAL MG/24 HR (ref 30–150)
PROT UR-MCNC: 354.8 MG/DL
PROT UR-MCNC: 357.1 MG/DL
RBC # BLD AUTO: 2.84 10*6/MM3 (ref 4.14–5.8)
SODIUM BLD-SCNC: 130 MMOL/L (ref 136–145)
WBC NRBC COR # BLD: 4.75 10*3/MM3 (ref 3.4–10.8)

## 2020-05-28 PROCEDURE — 25010000002 HEPARIN (PORCINE) PER 1000 UNITS: Performed by: SURGERY

## 2020-05-28 PROCEDURE — 25010000002 ALBUMIN HUMAN 5% PER 50 ML: Performed by: INTERNAL MEDICINE

## 2020-05-28 PROCEDURE — P9041 ALBUMIN (HUMAN),5%, 50ML: HCPCS | Performed by: INTERNAL MEDICINE

## 2020-05-28 PROCEDURE — 82962 GLUCOSE BLOOD TEST: CPT

## 2020-05-28 PROCEDURE — 85025 COMPLETE CBC W/AUTO DIFF WBC: CPT | Performed by: INTERNAL MEDICINE

## 2020-05-28 PROCEDURE — 6A551Z3 PHERESIS OF PLASMA, MULTIPLE: ICD-10-PCS | Performed by: INTERNAL MEDICINE

## 2020-05-28 PROCEDURE — 63710000001 CYCLOPHOSPHAMIDE PER 25 MG: Performed by: INTERNAL MEDICINE

## 2020-05-28 PROCEDURE — 63710000001 INSULIN LISPRO (HUMAN) PER 5 UNITS: Performed by: HOSPITALIST

## 2020-05-28 PROCEDURE — 05HM33Z INSERTION OF INFUSION DEVICE INTO RIGHT INTERNAL JUGULAR VEIN, PERCUTANEOUS APPROACH: ICD-10-PCS | Performed by: SURGERY

## 2020-05-28 PROCEDURE — 25010000002 BORTEZOMIB PER 0.1 MG: Performed by: INTERNAL MEDICINE

## 2020-05-28 PROCEDURE — C1751 CATH, INF, PER/CENT/MIDLINE: HCPCS

## 2020-05-28 PROCEDURE — 82232 ASSAY OF BETA-2 PROTEIN: CPT | Performed by: INTERNAL MEDICINE

## 2020-05-28 PROCEDURE — B543ZZA ULTRASONOGRAPHY OF RIGHT JUGULAR VEINS, GUIDANCE: ICD-10-PCS | Performed by: SURGERY

## 2020-05-28 PROCEDURE — 80069 RENAL FUNCTION PANEL: CPT | Performed by: INTERNAL MEDICINE

## 2020-05-28 PROCEDURE — 25010000002 CALCIUM GLUCONATE PER 10 ML: Performed by: INTERNAL MEDICINE

## 2020-05-28 PROCEDURE — 99233 SBSQ HOSP IP/OBS HIGH 50: CPT | Performed by: INTERNAL MEDICINE

## 2020-05-28 PROCEDURE — 36514 APHERESIS PLASMA: CPT

## 2020-05-28 PROCEDURE — 63710000001 DEXAMETHASONE PER 0.25 MG: Performed by: INTERNAL MEDICINE

## 2020-05-28 RX ORDER — SULFAMETHOXAZOLE AND TRIMETHOPRIM 800; 160 MG/1; MG/1
1 TABLET ORAL 3 TIMES WEEKLY
Qty: 12 TABLET | Refills: 7 | Status: SHIPPED | OUTPATIENT
Start: 2020-05-29 | End: 2020-06-04 | Stop reason: SDUPTHER

## 2020-05-28 RX ORDER — CYCLOPHOSPHAMIDE 50 MG/1
650 CAPSULE ORAL ONCE
Status: COMPLETED | OUTPATIENT
Start: 2020-05-28 | End: 2020-05-28

## 2020-05-28 RX ORDER — BORTEZOMIB 3.5 MG/1
1.3 INJECTION, POWDER, LYOPHILIZED, FOR SOLUTION INTRAVENOUS; SUBCUTANEOUS ONCE
Status: COMPLETED | OUTPATIENT
Start: 2020-05-28 | End: 2020-05-28

## 2020-05-28 RX ORDER — HEPARIN SODIUM 1000 [USP'U]/ML
3000 INJECTION, SOLUTION INTRAVENOUS; SUBCUTANEOUS ONCE
Status: COMPLETED | OUTPATIENT
Start: 2020-05-28 | End: 2020-05-28

## 2020-05-28 RX ORDER — BORTEZOMIB 3.5 MG/1
1.5 INJECTION, POWDER, LYOPHILIZED, FOR SOLUTION INTRAVENOUS; SUBCUTANEOUS ONCE
Status: CANCELLED | OUTPATIENT
Start: 2020-05-28

## 2020-05-28 RX ORDER — DEXAMETHASONE 4 MG/1
40 TABLET ORAL WEEKLY
Qty: 40 TABLET | Refills: 1 | Status: SHIPPED | OUTPATIENT
Start: 2020-05-28 | End: 2020-06-04 | Stop reason: SDUPTHER

## 2020-05-28 RX ORDER — ONDANSETRON 4 MG/1
4 TABLET, FILM COATED ORAL EVERY 6 HOURS PRN
Status: DISCONTINUED | OUTPATIENT
Start: 2020-05-28 | End: 2020-06-04 | Stop reason: HOSPADM

## 2020-05-28 RX ORDER — ACYCLOVIR 400 MG/1
400 TABLET ORAL 2 TIMES DAILY
Qty: 60 TABLET | Refills: 7 | Status: SHIPPED | OUTPATIENT
Start: 2020-05-28 | End: 2020-06-04 | Stop reason: SDUPTHER

## 2020-05-28 RX ORDER — ANTICOAGULANT CITRATE DEXTROSE SOLUTION FORMULA A 12.25; 11; 3.65 G/500ML; G/500ML; G/500ML
1000 SOLUTION INTRAVENOUS DAILY
Status: COMPLETED | OUTPATIENT
Start: 2020-05-28 | End: 2020-05-30

## 2020-05-28 RX ORDER — PANTOPRAZOLE SODIUM 40 MG/1
40 TABLET, DELAYED RELEASE ORAL
Status: DISCONTINUED | OUTPATIENT
Start: 2020-05-28 | End: 2020-06-04 | Stop reason: HOSPADM

## 2020-05-28 RX ORDER — ONDANSETRON HYDROCHLORIDE 8 MG/1
8 TABLET, FILM COATED ORAL 3 TIMES DAILY PRN
Qty: 30 TABLET | Refills: 5 | Status: SHIPPED | OUTPATIENT
Start: 2020-05-28 | End: 2020-06-04 | Stop reason: SDUPTHER

## 2020-05-28 RX ORDER — BORTEZOMIB 3.5 MG/1
1.3 INJECTION, POWDER, LYOPHILIZED, FOR SOLUTION INTRAVENOUS; SUBCUTANEOUS ONCE
Status: CANCELLED | OUTPATIENT
Start: 2020-06-07

## 2020-05-28 RX ORDER — CYCLOPHOSPHAMIDE 50 MG/1
650 CAPSULE ORAL WEEKLY
Qty: 52 CAPSULE | Refills: 6 | Status: SHIPPED | OUTPATIENT
Start: 2020-05-28 | End: 2020-06-25 | Stop reason: SDUPTHER

## 2020-05-28 RX ORDER — ALBUMIN, HUMAN INJ 5% 5 %
6000 SOLUTION INTRAVENOUS DAILY
Status: COMPLETED | OUTPATIENT
Start: 2020-05-28 | End: 2020-05-30

## 2020-05-28 RX ORDER — SULFAMETHOXAZOLE AND TRIMETHOPRIM 800; 160 MG/1; MG/1
1 TABLET ORAL 3 TIMES WEEKLY
Status: DISCONTINUED | OUTPATIENT
Start: 2020-05-29 | End: 2020-06-01

## 2020-05-28 RX ORDER — ACYCLOVIR 400 MG/1
400 TABLET ORAL 2 TIMES DAILY
Status: DISCONTINUED | OUTPATIENT
Start: 2020-05-28 | End: 2020-06-03

## 2020-05-28 RX ADMIN — AMLODIPINE BESYLATE 5 MG: 5 TABLET ORAL at 13:50

## 2020-05-28 RX ADMIN — SODIUM CHLORIDE, PRESERVATIVE FREE 10 ML: 5 INJECTION INTRAVENOUS at 20:14

## 2020-05-28 RX ADMIN — ACYCLOVIR 400 MG: 400 TABLET ORAL at 20:13

## 2020-05-28 RX ADMIN — INSULIN LISPRO 2 UNITS: 100 INJECTION, SOLUTION INTRAVENOUS; SUBCUTANEOUS at 17:29

## 2020-05-28 RX ADMIN — CALCIUM GLUCONATE 1 G: 98 INJECTION, SOLUTION INTRAVENOUS at 12:11

## 2020-05-28 RX ADMIN — SODIUM CHLORIDE, PRESERVATIVE FREE 10 ML: 5 INJECTION INTRAVENOUS at 13:52

## 2020-05-28 RX ADMIN — ACYCLOVIR 400 MG: 400 TABLET ORAL at 14:50

## 2020-05-28 RX ADMIN — DEXAMETHASONE 40 MG: 6 TABLET ORAL at 14:50

## 2020-05-28 RX ADMIN — CALCIUM GLUCONATE 1 G: 98 INJECTION, SOLUTION INTRAVENOUS at 11:09

## 2020-05-28 RX ADMIN — TAMSULOSIN HYDROCHLORIDE 0.4 MG: 0.4 CAPSULE ORAL at 20:13

## 2020-05-28 RX ADMIN — ALLOPURINOL 100 MG: 100 TABLET ORAL at 13:50

## 2020-05-28 RX ADMIN — CYCLOPHOSPHAMIDE 650 MG: 50 CAPSULE ORAL at 15:40

## 2020-05-28 RX ADMIN — HEPARIN SODIUM 3000 UNITS: 1000 INJECTION, SOLUTION INTRAVENOUS; SUBCUTANEOUS at 10:30

## 2020-05-28 RX ADMIN — PANTOPRAZOLE SODIUM 40 MG: 40 TABLET, DELAYED RELEASE ORAL at 14:50

## 2020-05-28 RX ADMIN — BORTEZOMIB 3 MG: 3.5 INJECTION, POWDER, LYOPHILIZED, FOR SOLUTION INTRAVENOUS; SUBCUTANEOUS at 15:40

## 2020-05-28 RX ADMIN — FINASTERIDE 5 MG: 5 TABLET, FILM COATED ORAL at 13:50

## 2020-05-28 RX ADMIN — ANTICOAGULANT CITRATE DEXTROSE SOLUTION FORMULA A 1000 ML: 12.25; 11; 3.65 SOLUTION INTRAVENOUS at 11:08

## 2020-05-28 RX ADMIN — ATORVASTATIN CALCIUM 20 MG: 20 TABLET, FILM COATED ORAL at 13:56

## 2020-05-28 RX ADMIN — INSULIN LISPRO 4 UNITS: 100 INJECTION, SOLUTION INTRAVENOUS; SUBCUTANEOUS at 20:14

## 2020-05-28 RX ADMIN — ALBUMIN HUMAN 6000 ML: 0.05 INJECTION, SOLUTION INTRAVENOUS at 11:10

## 2020-05-28 NOTE — PROGRESS NOTES
Staff message rec from Dr Chang-pt will need oral Cytoxan. See below.    Alvaro Chang MD sent to Reny Feng   Cc: Donna Cano, [facility]             Pt needs oral cytoxan 650 mg PO once weekly      Pt is currently in the hospital. I have escribed the rx to [facility] for them to process and hopefully use meds-to-beds.

## 2020-05-29 ENCOUNTER — DOCUMENTATION (OUTPATIENT)
Dept: ONCOLOGY | Facility: CLINIC | Age: 65
End: 2020-05-29

## 2020-05-29 LAB
ALBUMIN SERPL-MCNC: 3.6 G/DL (ref 3.5–5.2)
ALBUMIN/GLOB SERPL: 0.8 G/DL
ALP SERPL-CCNC: 33 U/L (ref 39–117)
ALT SERPL W P-5'-P-CCNC: <5 U/L (ref 1–41)
ANION GAP SERPL CALCULATED.3IONS-SCNC: 11.9 MMOL/L (ref 5–15)
AST SERPL-CCNC: 5 U/L (ref 1–40)
BILIRUB SERPL-MCNC: 0.8 MG/DL (ref 0.2–1.2)
BUN BLD-MCNC: 60 MG/DL (ref 8–23)
BUN/CREAT SERPL: 11.9 (ref 7–25)
CALCIUM SPEC-SCNC: 8.5 MG/DL (ref 8.6–10.5)
CHLORIDE SERPL-SCNC: 100 MMOL/L (ref 98–107)
CO2 SERPL-SCNC: 21.1 MMOL/L (ref 22–29)
CREAT BLD-MCNC: 5.03 MG/DL (ref 0.76–1.27)
DEPRECATED RDW RBC AUTO: 47.4 FL (ref 37–54)
ERYTHROCYTE [DISTWIDTH] IN BLOOD BY AUTOMATED COUNT: 14.5 % (ref 12.3–15.4)
GFR SERPL CREATININE-BSD FRML MDRD: 12 ML/MIN/1.73
GFR SERPL CREATININE-BSD FRML MDRD: ABNORMAL ML/MIN/{1.73_M2}
GLOBULIN UR ELPH-MCNC: 4.6 GM/DL
GLUCOSE BLD-MCNC: 301 MG/DL (ref 65–99)
GLUCOSE BLDC GLUCOMTR-MCNC: 318 MG/DL (ref 70–130)
GLUCOSE BLDC GLUCOMTR-MCNC: 321 MG/DL (ref 70–130)
GLUCOSE BLDC GLUCOMTR-MCNC: 332 MG/DL (ref 70–130)
HCT VFR BLD AUTO: 26.6 % (ref 37.5–51)
HGB BLD-MCNC: 9.1 G/DL (ref 13–17.7)
LYMPHOCYTES # BLD MANUAL: 0.92 10*3/MM3 (ref 0.7–3.1)
LYMPHOCYTES NFR BLD MANUAL: 15 % (ref 19.6–45.3)
LYMPHOCYTES NFR BLD MANUAL: 4 % (ref 5–12)
MAGNESIUM SERPL-MCNC: 1.6 MG/DL (ref 1.6–2.4)
MCH RBC QN AUTO: 31.1 PG (ref 26.6–33)
MCHC RBC AUTO-ENTMCNC: 34.2 G/DL (ref 31.5–35.7)
MCV RBC AUTO: 90.8 FL (ref 79–97)
MONOCYTES # BLD AUTO: 0.25 10*3/MM3 (ref 0.1–0.9)
NEUTROPHILS # BLD AUTO: 4.97 10*3/MM3 (ref 1.7–7)
NEUTROPHILS NFR BLD MANUAL: 81 % (ref 42.7–76)
PHOSPHATE SERPL-MCNC: 4.5 MG/DL (ref 2.5–4.5)
PLAT MORPH BLD: NORMAL
PLATELET # BLD AUTO: 175 10*3/MM3 (ref 140–450)
PMV BLD AUTO: 9.5 FL (ref 6–12)
POTASSIUM BLD-SCNC: 4 MMOL/L (ref 3.5–5.2)
PROT SERPL-MCNC: 8.2 G/DL (ref 6–8.5)
RBC # BLD AUTO: 2.93 10*6/MM3 (ref 4.14–5.8)
RBC MORPH BLD: NORMAL
SODIUM BLD-SCNC: 133 MMOL/L (ref 136–145)
URATE SERPL-MCNC: 7 MG/DL (ref 3.4–7)
WBC MORPH BLD: NORMAL
WBC NRBC COR # BLD: 6.13 10*3/MM3 (ref 3.4–10.8)

## 2020-05-29 PROCEDURE — 84550 ASSAY OF BLOOD/URIC ACID: CPT | Performed by: INTERNAL MEDICINE

## 2020-05-29 PROCEDURE — 25010000002 CALCIUM GLUCONATE PER 10 ML: Performed by: INTERNAL MEDICINE

## 2020-05-29 PROCEDURE — 99233 SBSQ HOSP IP/OBS HIGH 50: CPT | Performed by: INTERNAL MEDICINE

## 2020-05-29 PROCEDURE — 63710000001 INSULIN LISPRO (HUMAN) PER 5 UNITS: Performed by: SURGERY

## 2020-05-29 PROCEDURE — P9041 ALBUMIN (HUMAN),5%, 50ML: HCPCS | Performed by: INTERNAL MEDICINE

## 2020-05-29 PROCEDURE — 80053 COMPREHEN METABOLIC PANEL: CPT | Performed by: INTERNAL MEDICINE

## 2020-05-29 PROCEDURE — 63710000001 INSULIN LISPRO (HUMAN) PER 5 UNITS: Performed by: HOSPITALIST

## 2020-05-29 PROCEDURE — 36514 APHERESIS PLASMA: CPT

## 2020-05-29 PROCEDURE — 85007 BL SMEAR W/DIFF WBC COUNT: CPT | Performed by: INTERNAL MEDICINE

## 2020-05-29 PROCEDURE — 83735 ASSAY OF MAGNESIUM: CPT | Performed by: INTERNAL MEDICINE

## 2020-05-29 PROCEDURE — 25010000002 ALBUMIN HUMAN 5% PER 50 ML: Performed by: INTERNAL MEDICINE

## 2020-05-29 PROCEDURE — 82962 GLUCOSE BLOOD TEST: CPT

## 2020-05-29 PROCEDURE — 84100 ASSAY OF PHOSPHORUS: CPT | Performed by: INTERNAL MEDICINE

## 2020-05-29 PROCEDURE — 63710000001 DEXAMETHASONE PER 0.25 MG: Performed by: INTERNAL MEDICINE

## 2020-05-29 PROCEDURE — 85025 COMPLETE CBC W/AUTO DIFF WBC: CPT | Performed by: INTERNAL MEDICINE

## 2020-05-29 RX ORDER — TEMAZEPAM 15 MG/1
15 CAPSULE ORAL NIGHTLY PRN
Status: DISCONTINUED | OUTPATIENT
Start: 2020-05-29 | End: 2020-05-29

## 2020-05-29 RX ORDER — HEPARIN SODIUM 1000 [USP'U]/ML
10000 INJECTION, SOLUTION INTRAVENOUS; SUBCUTANEOUS
Status: ACTIVE | OUTPATIENT
Start: 2020-05-29 | End: 2020-05-29

## 2020-05-29 RX ORDER — TRAZODONE HYDROCHLORIDE 50 MG/1
50 TABLET ORAL NIGHTLY
Status: DISCONTINUED | OUTPATIENT
Start: 2020-05-29 | End: 2020-06-04 | Stop reason: HOSPADM

## 2020-05-29 RX ADMIN — TRAZODONE HYDROCHLORIDE 50 MG: 50 TABLET ORAL at 22:43

## 2020-05-29 RX ADMIN — FINASTERIDE 5 MG: 5 TABLET, FILM COATED ORAL at 08:56

## 2020-05-29 RX ADMIN — SODIUM CHLORIDE, PRESERVATIVE FREE 10 ML: 5 INJECTION INTRAVENOUS at 22:43

## 2020-05-29 RX ADMIN — SODIUM CHLORIDE, PRESERVATIVE FREE 10 ML: 5 INJECTION INTRAVENOUS at 08:57

## 2020-05-29 RX ADMIN — INSULIN LISPRO 5 UNITS: 100 INJECTION, SOLUTION INTRAVENOUS; SUBCUTANEOUS at 12:07

## 2020-05-29 RX ADMIN — SULFAMETHOXAZOLE AND TRIMETHOPRIM 160 MG: 800; 160 TABLET ORAL at 08:56

## 2020-05-29 RX ADMIN — ATORVASTATIN CALCIUM 20 MG: 20 TABLET, FILM COATED ORAL at 08:56

## 2020-05-29 RX ADMIN — TAMSULOSIN HYDROCHLORIDE 0.4 MG: 0.4 CAPSULE ORAL at 22:43

## 2020-05-29 RX ADMIN — AMLODIPINE BESYLATE 5 MG: 5 TABLET ORAL at 08:56

## 2020-05-29 RX ADMIN — ALLOPURINOL 100 MG: 100 TABLET ORAL at 08:56

## 2020-05-29 RX ADMIN — ACYCLOVIR 400 MG: 400 TABLET ORAL at 08:56

## 2020-05-29 RX ADMIN — ALBUMIN HUMAN 6000 ML: 0.05 INJECTION, SOLUTION INTRAVENOUS at 15:30

## 2020-05-29 RX ADMIN — ANTICOAGULANT CITRATE DEXTROSE SOLUTION FORMULA A 1000 ML: 12.25; 11; 3.65 SOLUTION INTRAVENOUS at 15:30

## 2020-05-29 RX ADMIN — INSULIN LISPRO 10 UNITS: 100 INJECTION, SOLUTION INTRAVENOUS; SUBCUTANEOUS at 18:19

## 2020-05-29 RX ADMIN — INSULIN LISPRO 5 UNITS: 100 INJECTION, SOLUTION INTRAVENOUS; SUBCUTANEOUS at 08:56

## 2020-05-29 RX ADMIN — PANTOPRAZOLE SODIUM 40 MG: 40 TABLET, DELAYED RELEASE ORAL at 05:03

## 2020-05-29 RX ADMIN — CALCIUM GLUCONATE 6 G: 98 INJECTION, SOLUTION INTRAVENOUS at 15:30

## 2020-05-29 RX ADMIN — DEXAMETHASONE 40 MG: 6 TABLET ORAL at 08:56

## 2020-05-29 NOTE — PROGRESS NOTES
Pts home supply of oral Cytoxan dispensed today from [facility]. Pt still in hospital and should have medication in hand.

## 2020-05-30 LAB
ALBUMIN SERPL-MCNC: 4 G/DL (ref 3.5–5.2)
ANION GAP SERPL CALCULATED.3IONS-SCNC: 16.1 MMOL/L (ref 5–15)
BASOPHILS # BLD AUTO: 0.01 10*3/MM3 (ref 0–0.2)
BASOPHILS NFR BLD AUTO: 0.1 % (ref 0–1.5)
BUN BLD-MCNC: 70 MG/DL (ref 8–23)
BUN/CREAT SERPL: 12.3 (ref 7–25)
CALCIUM SPEC-SCNC: 8.6 MG/DL (ref 8.6–10.5)
CHLORIDE SERPL-SCNC: 98 MMOL/L (ref 98–107)
CO2 SERPL-SCNC: 21.9 MMOL/L (ref 22–29)
CREAT BLD-MCNC: 5.67 MG/DL (ref 0.76–1.27)
DEPRECATED RDW RBC AUTO: 45.8 FL (ref 37–54)
EOSINOPHIL # BLD AUTO: 0.01 10*3/MM3 (ref 0–0.4)
EOSINOPHIL NFR BLD AUTO: 0.1 % (ref 0.3–6.2)
ERYTHROCYTE [DISTWIDTH] IN BLOOD BY AUTOMATED COUNT: 14.1 % (ref 12.3–15.4)
GFR SERPL CREATININE-BSD FRML MDRD: 10 ML/MIN/1.73
GFR SERPL CREATININE-BSD FRML MDRD: ABNORMAL ML/MIN/{1.73_M2}
GLUCOSE BLD-MCNC: 331 MG/DL (ref 65–99)
GLUCOSE BLDC GLUCOMTR-MCNC: 314 MG/DL (ref 70–130)
GLUCOSE BLDC GLUCOMTR-MCNC: 342 MG/DL (ref 70–130)
GLUCOSE BLDC GLUCOMTR-MCNC: 367 MG/DL (ref 70–130)
HCT VFR BLD AUTO: 25.9 % (ref 37.5–51)
HGB BLD-MCNC: 9.1 G/DL (ref 13–17.7)
IMM GRANULOCYTES # BLD AUTO: 0.13 10*3/MM3 (ref 0–0.05)
IMM GRANULOCYTES NFR BLD AUTO: 1.8 % (ref 0–0.5)
LYMPHOCYTES # BLD AUTO: 0.66 10*3/MM3 (ref 0.7–3.1)
LYMPHOCYTES NFR BLD AUTO: 9 % (ref 19.6–45.3)
MAGNESIUM SERPL-MCNC: 1.7 MG/DL (ref 1.6–2.4)
MCH RBC QN AUTO: 31.4 PG (ref 26.6–33)
MCHC RBC AUTO-ENTMCNC: 35.1 G/DL (ref 31.5–35.7)
MCV RBC AUTO: 89.3 FL (ref 79–97)
MONOCYTES # BLD AUTO: 0.45 10*3/MM3 (ref 0.1–0.9)
MONOCYTES NFR BLD AUTO: 6.1 % (ref 5–12)
NEUTROPHILS # BLD AUTO: 6.11 10*3/MM3 (ref 1.7–7)
NEUTROPHILS NFR BLD AUTO: 82.9 % (ref 42.7–76)
NRBC BLD AUTO-RTO: 0 /100 WBC (ref 0–0.2)
PHOSPHATE SERPL-MCNC: 6.6 MG/DL (ref 2.5–4.5)
PLATELET # BLD AUTO: 154 10*3/MM3 (ref 140–450)
PMV BLD AUTO: 9.9 FL (ref 6–12)
POTASSIUM BLD-SCNC: 4.4 MMOL/L (ref 3.5–5.2)
RBC # BLD AUTO: 2.9 10*6/MM3 (ref 4.14–5.8)
SODIUM BLD-SCNC: 136 MMOL/L (ref 136–145)
WBC NRBC COR # BLD: 7.37 10*3/MM3 (ref 3.4–10.8)

## 2020-05-30 PROCEDURE — 25010000002 CALCIUM GLUCONATE PER 10 ML: Performed by: INTERNAL MEDICINE

## 2020-05-30 PROCEDURE — P9041 ALBUMIN (HUMAN),5%, 50ML: HCPCS | Performed by: INTERNAL MEDICINE

## 2020-05-30 PROCEDURE — 25010000002 DIPHENHYDRAMINE PER 50 MG: Performed by: NURSE PRACTITIONER

## 2020-05-30 PROCEDURE — 25010000002 DIPHENHYDRAMINE PER 50 MG: Performed by: SURGERY

## 2020-05-30 PROCEDURE — 63710000001 INSULIN LISPRO (HUMAN) PER 5 UNITS: Performed by: HOSPITALIST

## 2020-05-30 PROCEDURE — 25010000002 HEPARIN (PORCINE) PER 1000 UNITS: Performed by: INTERNAL MEDICINE

## 2020-05-30 PROCEDURE — 63710000001 DEXAMETHASONE PER 0.25 MG: Performed by: INTERNAL MEDICINE

## 2020-05-30 PROCEDURE — 63710000001 INSULIN LISPRO (HUMAN) PER 5 UNITS: Performed by: SURGERY

## 2020-05-30 PROCEDURE — 99232 SBSQ HOSP IP/OBS MODERATE 35: CPT | Performed by: INTERNAL MEDICINE

## 2020-05-30 PROCEDURE — 80069 RENAL FUNCTION PANEL: CPT | Performed by: INTERNAL MEDICINE

## 2020-05-30 PROCEDURE — 83735 ASSAY OF MAGNESIUM: CPT | Performed by: INTERNAL MEDICINE

## 2020-05-30 PROCEDURE — 25010000002 HEPARIN (PORCINE) PER 1000 UNITS: Performed by: SURGERY

## 2020-05-30 PROCEDURE — 85025 COMPLETE CBC W/AUTO DIFF WBC: CPT | Performed by: INTERNAL MEDICINE

## 2020-05-30 PROCEDURE — 82962 GLUCOSE BLOOD TEST: CPT

## 2020-05-30 PROCEDURE — 63710000001 INSULIN GLARGINE PER 5 UNITS: Performed by: HOSPITALIST

## 2020-05-30 PROCEDURE — 25010000002 ALBUMIN HUMAN 5% PER 50 ML: Performed by: INTERNAL MEDICINE

## 2020-05-30 PROCEDURE — 36514 APHERESIS PLASMA: CPT

## 2020-05-30 RX ORDER — INSULIN GLARGINE 100 [IU]/ML
10 INJECTION, SOLUTION SUBCUTANEOUS NIGHTLY
Status: DISCONTINUED | OUTPATIENT
Start: 2020-05-30 | End: 2020-05-31

## 2020-05-30 RX ORDER — HEPARIN SODIUM 5000 [USP'U]/ML
5000 INJECTION, SOLUTION INTRAVENOUS; SUBCUTANEOUS EVERY 12 HOURS SCHEDULED
Status: DISCONTINUED | OUTPATIENT
Start: 2020-05-30 | End: 2020-06-04 | Stop reason: HOSPADM

## 2020-05-30 RX ORDER — DIPHENHYDRAMINE HYDROCHLORIDE 50 MG/ML
25 INJECTION INTRAMUSCULAR; INTRAVENOUS EVERY 6 HOURS PRN
Status: DISCONTINUED | OUTPATIENT
Start: 2020-05-30 | End: 2020-06-04 | Stop reason: HOSPADM

## 2020-05-30 RX ADMIN — INSULIN GLARGINE 10 UNITS: 100 INJECTION, SOLUTION SUBCUTANEOUS at 21:04

## 2020-05-30 RX ADMIN — ANTICOAGULANT CITRATE DEXTROSE SOLUTION FORMULA A 1000 ML: 12.25; 11; 3.65 SOLUTION INTRAVENOUS at 12:12

## 2020-05-30 RX ADMIN — INSULIN LISPRO 10 UNITS: 100 INJECTION, SOLUTION INTRAVENOUS; SUBCUTANEOUS at 08:28

## 2020-05-30 RX ADMIN — CALCIUM GLUCONATE 6 G: 98 INJECTION, SOLUTION INTRAVENOUS at 12:11

## 2020-05-30 RX ADMIN — CALCIUM ACETATE 667 MG: 667 CAPSULE ORAL at 17:38

## 2020-05-30 RX ADMIN — ACYCLOVIR 400 MG: 400 TABLET ORAL at 21:03

## 2020-05-30 RX ADMIN — ATORVASTATIN CALCIUM 20 MG: 20 TABLET, FILM COATED ORAL at 08:28

## 2020-05-30 RX ADMIN — PANTOPRAZOLE SODIUM 40 MG: 40 TABLET, DELAYED RELEASE ORAL at 04:57

## 2020-05-30 RX ADMIN — DEXAMETHASONE 40 MG: 6 TABLET ORAL at 11:13

## 2020-05-30 RX ADMIN — SODIUM CHLORIDE, PRESERVATIVE FREE 10 ML: 5 INJECTION INTRAVENOUS at 08:28

## 2020-05-30 RX ADMIN — ACYCLOVIR 400 MG: 400 TABLET ORAL at 08:28

## 2020-05-30 RX ADMIN — SODIUM CHLORIDE, PRESERVATIVE FREE 10 ML: 5 INJECTION INTRAVENOUS at 21:04

## 2020-05-30 RX ADMIN — DIPHENHYDRAMINE HYDROCHLORIDE 25 MG: 50 INJECTION, SOLUTION INTRAMUSCULAR; INTRAVENOUS at 21:53

## 2020-05-30 RX ADMIN — ACYCLOVIR 400 MG: 400 TABLET ORAL at 02:15

## 2020-05-30 RX ADMIN — CALCIUM ACETATE 667 MG: 667 CAPSULE ORAL at 11:13

## 2020-05-30 RX ADMIN — HEPARIN SODIUM 5000 UNITS: 5000 INJECTION INTRAVENOUS; SUBCUTANEOUS at 21:03

## 2020-05-30 RX ADMIN — HEPARIN SODIUM 5000 UNITS: 5000 INJECTION INTRAVENOUS; SUBCUTANEOUS at 12:32

## 2020-05-30 RX ADMIN — INSULIN LISPRO 10 UNITS: 100 INJECTION, SOLUTION INTRAVENOUS; SUBCUTANEOUS at 12:32

## 2020-05-30 RX ADMIN — ALBUMIN HUMAN 6000 ML: 0.05 INJECTION, SOLUTION INTRAVENOUS at 12:10

## 2020-05-30 RX ADMIN — TRAZODONE HYDROCHLORIDE 50 MG: 50 TABLET ORAL at 21:03

## 2020-05-30 RX ADMIN — SODIUM CHLORIDE, PRESERVATIVE FREE 10 ML: 5 INJECTION INTRAVENOUS at 21:09

## 2020-05-30 RX ADMIN — TAMSULOSIN HYDROCHLORIDE 0.4 MG: 0.4 CAPSULE ORAL at 21:03

## 2020-05-30 RX ADMIN — INSULIN LISPRO 12 UNITS: 100 INJECTION, SOLUTION INTRAVENOUS; SUBCUTANEOUS at 17:38

## 2020-05-30 RX ADMIN — ALLOPURINOL 100 MG: 100 TABLET ORAL at 08:28

## 2020-05-30 RX ADMIN — FINASTERIDE 5 MG: 5 TABLET, FILM COATED ORAL at 08:28

## 2020-05-30 RX ADMIN — AMLODIPINE BESYLATE 5 MG: 5 TABLET ORAL at 08:27

## 2020-05-31 ENCOUNTER — APPOINTMENT (OUTPATIENT)
Dept: ONCOLOGY | Facility: HOSPITAL | Age: 65
End: 2020-05-31

## 2020-05-31 PROBLEM — E83.42 HYPOMAGNESEMIA: Status: ACTIVE | Noted: 2020-05-31

## 2020-05-31 LAB
ACANTHOCYTES BLD QL SMEAR: ABNORMAL
ALBUMIN SERPL-MCNC: 3.9 G/DL (ref 3.5–5.2)
ANION GAP SERPL CALCULATED.3IONS-SCNC: 13.9 MMOL/L (ref 5–15)
BUN BLD-MCNC: 68 MG/DL (ref 8–23)
BUN/CREAT SERPL: 13.3 (ref 7–25)
CALCIUM SPEC-SCNC: 7.2 MG/DL (ref 8.6–10.5)
CHLORIDE SERPL-SCNC: 104 MMOL/L (ref 98–107)
CO2 SERPL-SCNC: 19.1 MMOL/L (ref 22–29)
CREAT BLD-MCNC: 5.12 MG/DL (ref 0.76–1.27)
DEPRECATED RDW RBC AUTO: 47 FL (ref 37–54)
ERYTHROCYTE [DISTWIDTH] IN BLOOD BY AUTOMATED COUNT: 14.4 % (ref 12.3–15.4)
GFR SERPL CREATININE-BSD FRML MDRD: 11 ML/MIN/1.73
GFR SERPL CREATININE-BSD FRML MDRD: ABNORMAL ML/MIN/{1.73_M2}
GLUCOSE BLD-MCNC: 308 MG/DL (ref 65–99)
GLUCOSE BLDC GLUCOMTR-MCNC: 367 MG/DL (ref 70–130)
GLUCOSE BLDC GLUCOMTR-MCNC: 383 MG/DL (ref 70–130)
GLUCOSE BLDC GLUCOMTR-MCNC: 417 MG/DL (ref 70–130)
HCT VFR BLD AUTO: 27.2 % (ref 37.5–51)
HGB BLD-MCNC: 9.1 G/DL (ref 13–17.7)
HYPOCHROMIA BLD QL: ABNORMAL
LYMPHOCYTES # BLD MANUAL: 0.36 10*3/MM3 (ref 0.7–3.1)
LYMPHOCYTES NFR BLD MANUAL: 2 % (ref 5–12)
LYMPHOCYTES NFR BLD MANUAL: 6.1 % (ref 19.6–45.3)
MAGNESIUM SERPL-MCNC: 1.5 MG/DL (ref 1.6–2.4)
MCH RBC QN AUTO: 30.1 PG (ref 26.6–33)
MCHC RBC AUTO-ENTMCNC: 33.5 G/DL (ref 31.5–35.7)
MCV RBC AUTO: 90.1 FL (ref 79–97)
MONOCYTES # BLD AUTO: 0.12 10*3/MM3 (ref 0.1–0.9)
NEUTROPHILS # BLD AUTO: 5.35 10*3/MM3 (ref 1.7–7)
NEUTROPHILS NFR BLD MANUAL: 91.9 % (ref 42.7–76)
OVALOCYTES BLD QL SMEAR: ABNORMAL
PHOSPHATE SERPL-MCNC: 5.8 MG/DL (ref 2.5–4.5)
PLAT MORPH BLD: NORMAL
PLATELET # BLD AUTO: 130 10*3/MM3 (ref 140–450)
PMV BLD AUTO: 10.2 FL (ref 6–12)
POTASSIUM BLD-SCNC: 3.7 MMOL/L (ref 3.5–5.2)
RBC # BLD AUTO: 3.02 10*6/MM3 (ref 4.14–5.8)
SODIUM BLD-SCNC: 137 MMOL/L (ref 136–145)
WBC MORPH BLD: NORMAL
WBC NRBC COR # BLD: 5.82 10*3/MM3 (ref 3.4–10.8)

## 2020-05-31 PROCEDURE — 25010000002 MAGNESIUM SULFATE 2 GM/50ML SOLUTION: Performed by: HOSPITALIST

## 2020-05-31 PROCEDURE — 85025 COMPLETE CBC W/AUTO DIFF WBC: CPT | Performed by: INTERNAL MEDICINE

## 2020-05-31 PROCEDURE — 85007 BL SMEAR W/DIFF WBC COUNT: CPT | Performed by: INTERNAL MEDICINE

## 2020-05-31 PROCEDURE — 63710000001 INSULIN LISPRO (HUMAN) PER 5 UNITS: Performed by: HOSPITALIST

## 2020-05-31 PROCEDURE — 80069 RENAL FUNCTION PANEL: CPT | Performed by: INTERNAL MEDICINE

## 2020-05-31 PROCEDURE — 63710000001 INSULIN GLARGINE PER 5 UNITS: Performed by: SURGERY

## 2020-05-31 PROCEDURE — 63710000001 DEXAMETHASONE PER 0.25 MG: Performed by: INTERNAL MEDICINE

## 2020-05-31 PROCEDURE — 25010000002 HEPARIN (PORCINE) PER 1000 UNITS: Performed by: SURGERY

## 2020-05-31 PROCEDURE — 25010000002 HEPARIN (PORCINE) PER 1000 UNITS: Performed by: INTERNAL MEDICINE

## 2020-05-31 PROCEDURE — 25010000002 BORTEZOMIB PER 0.1 MG: Performed by: INTERNAL MEDICINE

## 2020-05-31 PROCEDURE — 82962 GLUCOSE BLOOD TEST: CPT

## 2020-05-31 PROCEDURE — 63710000001 INSULIN GLARGINE PER 5 UNITS: Performed by: HOSPITALIST

## 2020-05-31 PROCEDURE — 83735 ASSAY OF MAGNESIUM: CPT | Performed by: INTERNAL MEDICINE

## 2020-05-31 PROCEDURE — 63710000001 INSULIN LISPRO (HUMAN) PER 5 UNITS: Performed by: SURGERY

## 2020-05-31 PROCEDURE — 99232 SBSQ HOSP IP/OBS MODERATE 35: CPT | Performed by: INTERNAL MEDICINE

## 2020-05-31 RX ORDER — MAGNESIUM SULFATE HEPTAHYDRATE 40 MG/ML
2 INJECTION, SOLUTION INTRAVENOUS ONCE
Status: COMPLETED | OUTPATIENT
Start: 2020-05-31 | End: 2020-05-31

## 2020-05-31 RX ORDER — INSULIN GLARGINE 100 [IU]/ML
15 INJECTION, SOLUTION SUBCUTANEOUS NIGHTLY
Status: DISCONTINUED | OUTPATIENT
Start: 2020-05-31 | End: 2020-06-04 | Stop reason: HOSPADM

## 2020-05-31 RX ORDER — BORTEZOMIB 3.5 MG/1
1.3 INJECTION, POWDER, LYOPHILIZED, FOR SOLUTION INTRAVENOUS; SUBCUTANEOUS ONCE
Status: COMPLETED | OUTPATIENT
Start: 2020-05-31 | End: 2020-05-31

## 2020-05-31 RX ADMIN — FINASTERIDE 5 MG: 5 TABLET, FILM COATED ORAL at 08:45

## 2020-05-31 RX ADMIN — PANTOPRAZOLE SODIUM 40 MG: 40 TABLET, DELAYED RELEASE ORAL at 05:11

## 2020-05-31 RX ADMIN — INSULIN GLARGINE 15 UNITS: 100 INJECTION, SOLUTION SUBCUTANEOUS at 20:21

## 2020-05-31 RX ADMIN — ACYCLOVIR 400 MG: 400 TABLET ORAL at 20:25

## 2020-05-31 RX ADMIN — CALCIUM ACETATE 667 MG: 667 CAPSULE ORAL at 08:45

## 2020-05-31 RX ADMIN — INSULIN LISPRO 12 UNITS: 100 INJECTION, SOLUTION INTRAVENOUS; SUBCUTANEOUS at 12:20

## 2020-05-31 RX ADMIN — TRAZODONE HYDROCHLORIDE 50 MG: 50 TABLET ORAL at 20:25

## 2020-05-31 RX ADMIN — ALLOPURINOL 100 MG: 100 TABLET ORAL at 08:45

## 2020-05-31 RX ADMIN — TAMSULOSIN HYDROCHLORIDE 0.4 MG: 0.4 CAPSULE ORAL at 20:25

## 2020-05-31 RX ADMIN — ACYCLOVIR 400 MG: 400 TABLET ORAL at 08:45

## 2020-05-31 RX ADMIN — CALCIUM ACETATE 667 MG: 667 CAPSULE ORAL at 12:20

## 2020-05-31 RX ADMIN — SODIUM CHLORIDE, PRESERVATIVE FREE 10 ML: 5 INJECTION INTRAVENOUS at 08:49

## 2020-05-31 RX ADMIN — DEXAMETHASONE 40 MG: 6 TABLET ORAL at 08:46

## 2020-05-31 RX ADMIN — HEPARIN SODIUM 5000 UNITS: 5000 INJECTION INTRAVENOUS; SUBCUTANEOUS at 08:45

## 2020-05-31 RX ADMIN — ATORVASTATIN CALCIUM 20 MG: 20 TABLET, FILM COATED ORAL at 08:45

## 2020-05-31 RX ADMIN — INSULIN LISPRO 12 UNITS: 100 INJECTION, SOLUTION INTRAVENOUS; SUBCUTANEOUS at 17:31

## 2020-05-31 RX ADMIN — MAGNESIUM SULFATE HEPTAHYDRATE 2 G: 40 INJECTION, SOLUTION INTRAVENOUS at 12:20

## 2020-05-31 RX ADMIN — INSULIN LISPRO 14 UNITS: 100 INJECTION, SOLUTION INTRAVENOUS; SUBCUTANEOUS at 07:54

## 2020-05-31 RX ADMIN — CALCIUM ACETATE 667 MG: 667 CAPSULE ORAL at 17:31

## 2020-05-31 RX ADMIN — BORTEZOMIB 3 MG: 3.5 INJECTION, POWDER, LYOPHILIZED, FOR SOLUTION INTRAVENOUS; SUBCUTANEOUS at 15:39

## 2020-05-31 RX ADMIN — SODIUM CHLORIDE, PRESERVATIVE FREE 10 ML: 5 INJECTION INTRAVENOUS at 20:25

## 2020-06-01 LAB
ALBUMIN SERPL-MCNC: 4.1 G/DL (ref 3.5–5.2)
ALBUMIN/GLOB SERPL: 2.2 G/DL
ALP SERPL-CCNC: 16 U/L (ref 39–117)
ALT SERPL W P-5'-P-CCNC: 6 U/L (ref 1–41)
ANION GAP SERPL CALCULATED.3IONS-SCNC: 14.4 MMOL/L (ref 5–15)
AST SERPL-CCNC: 8 U/L (ref 1–40)
BASOPHILS # BLD AUTO: 0 10*3/MM3 (ref 0–0.2)
BASOPHILS NFR BLD AUTO: 0 % (ref 0–1.5)
BILIRUB SERPL-MCNC: 0.3 MG/DL (ref 0.2–1.2)
BUN BLD-MCNC: 95 MG/DL (ref 8–23)
BUN/CREAT SERPL: 14.9 (ref 7–25)
CALCIUM SPEC-SCNC: 8.5 MG/DL (ref 8.6–10.5)
CHLORIDE SERPL-SCNC: 98 MMOL/L (ref 98–107)
CO2 SERPL-SCNC: 21.6 MMOL/L (ref 22–29)
CREAT BLD-MCNC: 6.36 MG/DL (ref 0.76–1.27)
DEPRECATED RDW RBC AUTO: 46.5 FL (ref 37–54)
EOSINOPHIL # BLD AUTO: 0 10*3/MM3 (ref 0–0.4)
EOSINOPHIL NFR BLD AUTO: 0 % (ref 0.3–6.2)
ERYTHROCYTE [DISTWIDTH] IN BLOOD BY AUTOMATED COUNT: 14.2 % (ref 12.3–15.4)
GFR SERPL CREATININE-BSD FRML MDRD: 9 ML/MIN/1.73
GFR SERPL CREATININE-BSD FRML MDRD: ABNORMAL ML/MIN/{1.73_M2}
GLOBULIN UR ELPH-MCNC: 1.9 GM/DL
GLUCOSE BLD-MCNC: 297 MG/DL (ref 65–99)
GLUCOSE BLDC GLUCOMTR-MCNC: 277 MG/DL (ref 70–130)
GLUCOSE BLDC GLUCOMTR-MCNC: 308 MG/DL (ref 70–130)
GLUCOSE BLDC GLUCOMTR-MCNC: 312 MG/DL (ref 70–130)
GLUCOSE BLDC GLUCOMTR-MCNC: 333 MG/DL (ref 70–130)
HBV SURFACE AB SER RIA-ACNC: NORMAL
HBV SURFACE AG SERPL QL IA: NORMAL
HCT VFR BLD AUTO: 25.3 % (ref 37.5–51)
HGB BLD-MCNC: 8.6 G/DL (ref 13–17.7)
IMM GRANULOCYTES # BLD AUTO: 0.05 10*3/MM3 (ref 0–0.05)
IMM GRANULOCYTES NFR BLD AUTO: 1.2 % (ref 0–0.5)
LYMPHOCYTES # BLD AUTO: 0.34 10*3/MM3 (ref 0.7–3.1)
LYMPHOCYTES NFR BLD AUTO: 7.9 % (ref 19.6–45.3)
MAGNESIUM SERPL-MCNC: 2 MG/DL (ref 1.6–2.4)
MCH RBC QN AUTO: 30.7 PG (ref 26.6–33)
MCHC RBC AUTO-ENTMCNC: 34 G/DL (ref 31.5–35.7)
MCV RBC AUTO: 90.4 FL (ref 79–97)
MONOCYTES # BLD AUTO: 0.3 10*3/MM3 (ref 0.1–0.9)
MONOCYTES NFR BLD AUTO: 7 % (ref 5–12)
NEUTROPHILS # BLD AUTO: 3.61 10*3/MM3 (ref 1.7–7)
NEUTROPHILS NFR BLD AUTO: 83.9 % (ref 42.7–76)
NRBC BLD AUTO-RTO: 0 /100 WBC (ref 0–0.2)
PHOSPHATE SERPL-MCNC: 6.5 MG/DL (ref 2.5–4.5)
PLATELET # BLD AUTO: 106 10*3/MM3 (ref 140–450)
PMV BLD AUTO: 9.6 FL (ref 6–12)
POTASSIUM BLD-SCNC: 4.6 MMOL/L (ref 3.5–5.2)
PROT SERPL-MCNC: 6 G/DL (ref 6–8.5)
RBC # BLD AUTO: 2.8 10*6/MM3 (ref 4.14–5.8)
SARS-COV-2 RNA RESP QL NAA+PROBE: NOT DETECTED
SODIUM BLD-SCNC: 134 MMOL/L (ref 136–145)
URATE SERPL-MCNC: 8.9 MG/DL (ref 3.4–7)
WBC NRBC COR # BLD: 4.3 10*3/MM3 (ref 3.4–10.8)

## 2020-06-01 PROCEDURE — 80053 COMPREHEN METABOLIC PANEL: CPT | Performed by: HOSPITALIST

## 2020-06-01 PROCEDURE — 63710000001 INSULIN GLARGINE PER 5 UNITS: Performed by: SURGERY

## 2020-06-01 PROCEDURE — 82962 GLUCOSE BLOOD TEST: CPT

## 2020-06-01 PROCEDURE — 83735 ASSAY OF MAGNESIUM: CPT | Performed by: INTERNAL MEDICINE

## 2020-06-01 PROCEDURE — 63710000001 INSULIN LISPRO (HUMAN) PER 5 UNITS: Performed by: SURGERY

## 2020-06-01 PROCEDURE — 25010000002 HEPARIN (PORCINE) PER 1000 UNITS: Performed by: SURGERY

## 2020-06-01 PROCEDURE — 85025 COMPLETE CBC W/AUTO DIFF WBC: CPT | Performed by: INTERNAL MEDICINE

## 2020-06-01 PROCEDURE — 87635 SARS-COV-2 COVID-19 AMP PRB: CPT | Performed by: SURGERY

## 2020-06-01 PROCEDURE — 63710000001 INSULIN LISPRO (HUMAN) PER 5 UNITS: Performed by: HOSPITALIST

## 2020-06-01 PROCEDURE — 84550 ASSAY OF BLOOD/URIC ACID: CPT | Performed by: INTERNAL MEDICINE

## 2020-06-01 PROCEDURE — 87340 HEPATITIS B SURFACE AG IA: CPT | Performed by: INTERNAL MEDICINE

## 2020-06-01 PROCEDURE — 84100 ASSAY OF PHOSPHORUS: CPT | Performed by: INTERNAL MEDICINE

## 2020-06-01 PROCEDURE — 25010000002 HEPARIN (PORCINE) PER 1000 UNITS: Performed by: INTERNAL MEDICINE

## 2020-06-01 PROCEDURE — 99232 SBSQ HOSP IP/OBS MODERATE 35: CPT | Performed by: INTERNAL MEDICINE

## 2020-06-01 PROCEDURE — 86706 HEP B SURFACE ANTIBODY: CPT | Performed by: INTERNAL MEDICINE

## 2020-06-01 PROCEDURE — 63710000001 INSULIN GLARGINE PER 5 UNITS: Performed by: HOSPITALIST

## 2020-06-01 RX ORDER — CEFAZOLIN SODIUM 2 G/100ML
2 INJECTION, SOLUTION INTRAVENOUS
Status: COMPLETED | OUTPATIENT
Start: 2020-06-01 | End: 2020-06-02

## 2020-06-01 RX ADMIN — TAMSULOSIN HYDROCHLORIDE 0.4 MG: 0.4 CAPSULE ORAL at 20:00

## 2020-06-01 RX ADMIN — ACYCLOVIR 400 MG: 400 TABLET ORAL at 08:43

## 2020-06-01 RX ADMIN — FINASTERIDE 5 MG: 5 TABLET, FILM COATED ORAL at 08:43

## 2020-06-01 RX ADMIN — CALCIUM ACETATE 1334 MG: 667 CAPSULE ORAL at 12:32

## 2020-06-01 RX ADMIN — CALCIUM ACETATE 667 MG: 667 CAPSULE ORAL at 08:43

## 2020-06-01 RX ADMIN — ATORVASTATIN CALCIUM 20 MG: 20 TABLET, FILM COATED ORAL at 08:43

## 2020-06-01 RX ADMIN — SODIUM CHLORIDE, PRESERVATIVE FREE 10 ML: 5 INJECTION INTRAVENOUS at 08:44

## 2020-06-01 RX ADMIN — ACYCLOVIR 400 MG: 400 TABLET ORAL at 20:00

## 2020-06-01 RX ADMIN — TRAZODONE HYDROCHLORIDE 50 MG: 50 TABLET ORAL at 20:00

## 2020-06-01 RX ADMIN — HEPARIN SODIUM 5000 UNITS: 5000 INJECTION INTRAVENOUS; SUBCUTANEOUS at 20:00

## 2020-06-01 RX ADMIN — INSULIN GLARGINE 15 UNITS: 100 INJECTION, SOLUTION SUBCUTANEOUS at 19:59

## 2020-06-01 RX ADMIN — INSULIN LISPRO 10 UNITS: 100 INJECTION, SOLUTION INTRAVENOUS; SUBCUTANEOUS at 12:33

## 2020-06-01 RX ADMIN — SODIUM CHLORIDE, PRESERVATIVE FREE 10 ML: 5 INJECTION INTRAVENOUS at 22:23

## 2020-06-01 RX ADMIN — CALCIUM ACETATE 1334 MG: 667 CAPSULE ORAL at 17:13

## 2020-06-01 RX ADMIN — INSULIN LISPRO 8 UNITS: 100 INJECTION, SOLUTION INTRAVENOUS; SUBCUTANEOUS at 08:42

## 2020-06-01 RX ADMIN — INSULIN LISPRO 10 UNITS: 100 INJECTION, SOLUTION INTRAVENOUS; SUBCUTANEOUS at 17:13

## 2020-06-01 RX ADMIN — ALLOPURINOL 100 MG: 100 TABLET ORAL at 08:43

## 2020-06-01 RX ADMIN — SULFAMETHOXAZOLE AND TRIMETHOPRIM 160 MG: 800; 160 TABLET ORAL at 08:43

## 2020-06-01 RX ADMIN — PANTOPRAZOLE SODIUM 40 MG: 40 TABLET, DELAYED RELEASE ORAL at 06:14

## 2020-06-01 RX ADMIN — HEPARIN SODIUM 5000 UNITS: 5000 INJECTION INTRAVENOUS; SUBCUTANEOUS at 08:43

## 2020-06-01 NOTE — PROGRESS NOTES
"Adult Nutrition  Assessment/PES    Patient Name:  Contreras Albert  YOB: 1955  MRN: [id number]  Admit Date:  5/22/2020    Assessment Date:  6/1/2020  Nutrition assessment. EMR reviewed. Patient admitted with multiple myeloma.  Spoke with patient via room phone regarding care. Feels better. Tolerating diet.  Provided nutrition therapy. Offered nutrition education in regards to CKD and DM. Patient reported he knows what to do, he just needs to follow it. Understands the importance of nutrition.  RD to continue to follow/monitor as needed.   Reason for Assessment     Row Name 06/01/20 0924          Reason for Assessment    Reason For Assessment  per organizational policy length of stay     Diagnosis   multiple myeloma; anemia, DM, CKD, HTN           Anthropometrics     Row Name 06/01/20 0925 06/01/20 0439       Anthropometrics    Height  193 cm (75.98\")      Weight    96.8 kg (213 lb 4.8 oz)       Admit Weight    Admit Weight  96.8 kg (213 lb 6.5 oz)         Ideal Body Weight (IBW)    Ideal Body Weight (IBW) (kg)  93.2      % of Ideal Body Weight Assessment   115%         Body Mass Index (BMI)    BMI Assessment  BMI 25-29.9: overweight BMI-25.9          Labs/Tests/Procedures/Meds     Row Name 06/01/20 0925          Labs/Procedures/Meds    Lab Results Reviewed  reviewed, pertinent     Lab Results Comments  Glu 277-417, Na, Creat, BUN, Phos, platelets        Diagnostic Tests/Procedures    Diagnostic Test/Procedure Reviewed  reviewed, pertinent        Medications    Pertinent Medications Reviewed  reviewed, pertinent     Pertinent Medications Comments  phoslo, insulin, PPI         Physical Findings     Row Name 06/01/20 0926          Physical Findings    Overall Physical Appearance  -- B=20         Estimated/Assessed Needs     Row Name 06/01/20 0925          Calculation Measurements    Height  193 cm (75.98\")         Nutrition Prescription Ordered     Row Name 06/01/20 0926          Nutrition Prescription PO    " Common Modifiers  Consistent Carbohydrate;Renal         Evaluation of Received Nutrient/Fluid Intake     Row Name 06/01/20 0927          PO Evaluation    Number of Meals  3     % PO Intake  100               Problem/Interventions:  Problem 1     Row Name 06/01/20 0927          Nutrition Diagnoses Problem 1    Problem 1  Nutrition Appropriate for Condition at this Time               Intervention Goal     Row Name 06/01/20 0927          Intervention Goal    General  Maintain nutrition;Reduce/improve symptoms;Meet nutritional needs for age/condition     PO  Tolerate PO;Maintain intake     Weight  Maintain weight         Nutrition Intervention     Row Name 06/01/20 0927          Nutrition Intervention    RD/Tech Action  Care plan reviewd;Follow Tx progress;Interview for preference;Encourage intake           Education/Evaluation     Row Name 06/01/20 0928          Education    Education  Education offered and refused        Monitor/Evaluation    Monitor  Per protocol           Electronically signed by:  Pita Lock RD  06/01/20 09:28

## 2020-06-01 NOTE — PROGRESS NOTES
Subjective     CHIEF COMPLAINT:     IgG multiple myeloma  Anemia    HISTORY OF PRESENT ILLNESS:      Patient felt poorly for several months and presented with severe anemia hemoglobin 5.6 and acute kidney failure creatinine 3.05.  Evaluation shows that significantly elevated total protein 10.4 with gammaglobulin 5.2, M spike 5.1 IgG lambda.  The free lambda light chains are dramatically elevated 6403 mg/L.  The serum calcium is not elevated and a skeletal survey showed diffuse osteoporosis and demineralization but no clear lytic lesions.  CT chest, abdomen, pelvis 5/22/2020 showed diffuse demineralization and mottled appearance of the bone marrow and mild splenomegaly.  Bone marrow biopsy on 5/23/2020 showed sheets of plasma cells representing 90% of the marrow with plasma cell dyscrasia.     The patient feels much stronger since receiving blood transfusion.  He remains somewhat anxious regarding diagnosis and treatment plan.  Nephrology has recommended 3 days of plasmapheresis given the significantly elevated light chains and renal failure.     The patient initiated plasmapheresis on 5/28/2020 along with Velcade/Cytoxan/dexamethasone.     The patient completed 3 days of plasmapheresis 5/30/2020.      Patient reports feeling better today.  Fatigue has improved.  He walked in the hallway x5 laps yesterday.       REVIEW OF SYSTEMS:  GENERAL: Fatigue.   HEME/LYMPH: easy bruisability.  RESPIRATORY:  Negative for shortness of breath.   GI:  Negative for abdominal pain.   MSK: Muscle weakness      SCHEDULED MEDS:    acyclovir 400 mg Oral BID   allopurinol 100 mg Oral Daily   atorvastatin 20 mg Oral Daily   calcium acetate 667 mg Oral TID With Meals   finasteride 5 mg Oral Daily   heparin (porcine) 5,000 Units Subcutaneous Q12H   insulin glargine 15 Units Subcutaneous Nightly   insulin lispro 0-14 Units Subcutaneous TID AC   pantoprazole 40 mg Oral Q AM   sodium chloride 10 mL Intravenous Q12H    sulfamethoxazole-trimethoprim 1 tablet Oral Once per day on Mon Wed Fri   tamsulosin 0.4 mg Oral Nightly   traZODone 50 mg Oral Nightly     INFUSIONS:     PRN MEDS:  •  acetaminophen **OR** acetaminophen **OR** acetaminophen  •  dextrose  •  dextrose  •  diphenhydrAMINE  •  glucagon (human recombinant)  •  nitroglycerin  •  ondansetron  •  [COMPLETED] Insert peripheral IV **AND** sodium chloride  •  sodium chloride  •  sodium chloride       Objective   VITAL SIGNS:  Vitals:    05/31/20 1613 05/31/20 1955 06/01/20 0439 06/01/20 0700   BP: 153/78 149/81 141/75 146/80   BP Location: Left arm Left arm Left arm Left arm   Patient Position: Lying Lying Lying Lying   Pulse: 78 85 72 72   Resp: 16 18 18 18   Temp: 96.9 °F (36.1 °C) 97.1 °F (36.2 °C) 97.1 °F (36.2 °C) 98 °F (36.7 °C)   TempSrc: Oral Oral Oral Temporal   SpO2: 98% 97% 97% 97%   Weight:   96.8 kg (213 lb 4.8 oz)    Height:           Wt Readings from Last 3 Encounters:   06/01/20 96.8 kg (213 lb 4.8 oz)   05/18/16 120 kg (264 lb 9 oz)   05/11/16 123 kg (271 lb)       PHYSICAL EXAMINATION:  GENERAL:  The patient appears in fair general condition, not in acute distress.  SKIN: Warm and dry. No skin rashes.  Old ecchymosis over legs.   HEAD:  Normocephalic.  EYES:  No Jaundice. Pallor. Pupils equal. EOMI.  NECK:  Supple. No Thyromegaly. No Masses.  Site of catheter is well-healed.  LYMPHATICS:  No cervical or supraclavicular lymphadenopathy.  CHEST: Normal respiratory effort. Lungs clear to auscultation.   CARDIAC: Tachycardic.  Normal S1 & S2. No murmurs. No edema.  ABDOMEN:  Soft. No tenderness. No Hepatomegaly. No Splenomegaly. No masses.  EXTREMITIES:  No clubbing. No Calf tenderness.  NEUROLOGICAL:  No Focal neurological deficits.        RESULT REVIEW:   Results from last 7 days   Lab Units 06/01/20  0350 05/31/20  0511 05/30/20  0456 05/29/20  0503 05/28/20  0740   WBC 10*3/mm3 4.30 5.82 7.37 6.13 4.75   NEUTROS ABS 10*3/mm3 3.61 5.35 6.11 4.97 2.67    LYMPHS ABS 10*3/mm3  --  0.36*  --  0.92  --    HEMOGLOBIN g/dL 8.6* 9.1* 9.1* 9.1* 8.7*   HEMATOCRIT % 25.3* 27.2* 25.9* 26.6* 25.7*   PLATELETS 10*3/mm3 106* 130* 154 175 176     Results from last 7 days   Lab Units 06/01/20  0350 05/31/20  0511 05/30/20  0456 05/29/20  0503   SODIUM mmol/L 134* 137 136 133*   POTASSIUM mmol/L 4.6 3.7 4.4 4.0   CHLORIDE mmol/L 98 104 98 100   CO2 mmol/L 21.6* 19.1* 21.9* 21.1*   BUN mg/dL 95* 68* 70* 60*   CREATININE mg/dL 6.36* 5.12* 5.67* 5.03*   CALCIUM mg/dL 8.5* 7.2* 8.6 8.5*   ALBUMIN g/dL 4.10 3.90 4.00 3.60   BILIRUBIN mg/dL 0.3  --   --  0.8   ALK PHOS U/L 16*  --   --  33*   ALT (SGPT) U/L 6  --   --  <5   AST (SGOT) U/L 8  --   --  5   MAGNESIUM mg/dL 2.0 1.5* 1.7 1.6             Assessment/Plan   1.  IgG lambda multiple myeloma.  · Bone marrow 5/23/2020 hypercellular marrow with 80% involvement of plasma cell myeloma; FISH studies pending  · Bone survey 5/26/2020: Diffuse osteoporosis and demineralization.  However, no discrete lytic lesions.  · SPEP 5/23/2020: M spike 5.1.  IgG 6629 lambda.  Light chain ratio 0 with free lambda light chains 6400.  Beta-2 microglobulin 16.3.  24-hour urine 12.7 g protein per 24-hour with monoclonal lambda free light chain 33.9%, monoclonal IgG lambda 23.5%  · 24-hour urine testing from 5/24/2020 free lambda light chains 8.4 g/L  · Considering renal failure, initiated CyBorD 5/28/20.  Given the significantly elevated light chains with the first cycle of treatment plan to give Velcade days 1, 4, 8, 11; Cytoxan 300 PO mg/m² weekly and dexamethasone 40 mg p.o. days 1, 2, 3, 4, 8 and 15.    · After cycle 1 treatment can likely be altered to weekly dosing of each drug.  · Plan is for referral to [facility] at discharge to discuss autologous stem cell transplant.      2.  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  · B12 folate and iron studies are normal.  · Status post PRBC transfusion on  5/24/2020.  · Hemoglobin 8.6 today.    3.  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  · Patient completed 3 sessions of pheresis on 5/30/2020.  · Creatinine was 5.12 on 5/31/2020  · Creatinine is at 6.36 today.    4.  Tumor lysis prophylaxis.  · Patient is on allopurinol 100 mg daily.    5.  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  · Status post right partial nephrectomy on 5/18/2016.    6.  DVT prophylaxis.  · Patient is on heparin.    PLAN:  - Await bone marrow FISH panel  - Discontinue Bactrim given current kidney function  - Await input by nephrology.      The patient and all the medical problems are new to this examiner today.     .  Red Del Valle MD  06/01/20

## 2020-06-01 NOTE — PLAN OF CARE
Problem: Patient Care Overview  Goal: Plan of Care Review  Outcome: Ongoing (interventions implemented as appropriate)  Flowsheets (Taken 6/1/2020 4297)  Progress: no change  Plan of Care Reviewed With: patient  Outcome Summary: A&Ox4. Up adlib with cane. No c/o pain, n/v/d. RIJ site c/d/i. DM educator seen patient today. Patient will need permanent hemodialysis cath, to be done tomorrow and then possible diaylsis. AC/HS, blood sugars within the 300s throughout the day. VSS. Will continue to monitor.

## 2020-06-01 NOTE — PLAN OF CARE
Problem: Patient Care Overview  Goal: Plan of Care Review  Outcome: Ongoing (interventions implemented as appropriate)  Flowsheets  Taken 6/1/2020 0515  Progress: no change  Outcome Summary: VSS.  A&Ox4.  Up with cane. R IJ site c/d/i.  Velcade given yesterday.  No c/o pain or nausea.  DM educator to see patient today for lantus education.  DC based on lab results.  Cont to monitor.  Taken 5/31/2020 2025  Plan of Care Reviewed With: patient

## 2020-06-01 NOTE — CONSULTS
"Diabetes Education  Assessment/Teaching    Patient Name:  Contreras Albert  YOB: 1955  MRN: [id number]  Admit Date:  5/22/2020      Assessment Date:  6/1/2020    Most Recent Value   General Information    Referral From:  MD zeng   Height  193 cm (75.98\")   Height Method  Stated   Weight  96.8 kg (213 lb 4.8 oz)   Weight Method  Standing scale   Pregnancy Assessment   Diabetes History   What type of diabetes do you have?  Type 2   Length of Diabetes Diagnosis  1 - 5 years [Pt states 4 years]   Current DM knowledge  good   Have you had diabetes education/teaching in the past?  yes   When and where was your diabetes education?  Pt states 8 months ago at    Do you test your blood sugar at home?  no [But had been in the past]   Education Preferences   Barriers to Learning  other (comment) [None noted]   Nutrition Information   Assessment Topics   Taking Medication - Assessment  Needs education   Monitoring - Assessment  Needs education   DM Goals   Taking Medication - Goal  0-7 days from discharge   Reducing Risk - Goal  30-90 days from discharge   Monitoring - Goal  0-7 days from discharge   Contact Plan  Follow-up medical care            Most Recent Value   DM Education Needs   Meter  Needs meter   Meter Type  Freestyle [Lite]   Medication  Insulin, Actions [Lantus and Novolog sliding scale]   Reducing Risks  A1C testing [A1c 6.6%]   Healthy Eating  RD consult   Healthy Coping  Appropriate   Discharge Plan  Home   Motivation  Engaged   Teaching Method  Discussion, Explanation   Patient Response  Verbalized understanding            Other Comments:  Pt  was initially diagnosed with DM 4 years ago.  Pt states in the past was on Lantus and Novolog via pen method and followed by Sylvia Knox.  Pt  was taken off his medications and most recently was diet controlled with A1c 6.6%.      Re-enforced BGM, site selection, Lantus and Novolog sliding scale.  Pt verbalize understanding.  "       Electronically signed by:  Abbe Nava RN  06/01/20 10:08

## 2020-06-01 NOTE — PROGRESS NOTES
Name: Contreras Albert ADMIT: 2020   : 1955  PCP: Nahum Portillo MD    MRN: [id number] LOS: 10 days   AGE/SEX: 64 y.o. male  ROOM: [facility]/   Subjective   Chief Complaint   Patient presents with   • Abnormal Lab   • Dizziness   • Weakness - Generalized      No CP SOA NVD.    Objective   Vital Signs  Temp:  [96.9 °F (36.1 °C)-98 °F (36.7 °C)] 98 °F (36.7 °C)  Heart Rate:  [72-85] 77  Resp:  [16-18] 18  BP: (138-153)/(75-81) 138/79  SpO2:  [97 %-98 %] 97 %  on   ;   Device (Oxygen Therapy): room air  Body mass index is 25.97 kg/m².    Physical Exam   Constitutional: He appears well-developed. No distress.   HENT:   Head: Atraumatic.   Nose: Nose normal.   Eyes: Conjunctivae and EOM are normal.   Neck: Neck supple. No tracheal deviation present.   Cardiovascular: Normal rate, regular rhythm and intact distal pulses.   Pulmonary/Chest: Effort normal. He has no wheezes. He has no rales.   Abdominal: Soft. He exhibits no distension. There is no tenderness. There is no rebound and no guarding.   Musculoskeletal: Normal range of motion. He exhibits no edema.   Neurological: He is alert. No cranial nerve deficit.   Skin: Skin is warm and dry. He is not diaphoretic.   Psychiatric: He has a normal mood and affect. His behavior is normal.   Nursing note and vitals reviewed.      Results Review:       I reviewed the patient's new clinical results.      Results from last 7 days   Lab Units 20  0350 20  0511 20  0456 20  0503   WBC 10*3/mm3 4.30 5.82 7.37 6.13   HEMOGLOBIN g/dL 8.6* 9.1* 9.1* 9.1*   PLATELETS 10*3/mm3 106* 130* 154 175     Results from last 7 days   Lab Units 20  0350 20  0511 20  0456 20  0503   SODIUM mmol/L 134* 137 136 133*   POTASSIUM mmol/L 4.6 3.7 4.4 4.0   CHLORIDE mmol/L 98 104 98 100   CO2 mmol/L 21.6* 19.1* 21.9* 21.1*   BUN mg/dL 95* 68* 70* 60*   CREATININE mg/dL 6.36* 5.12* 5.67* 5.03*   GLUCOSE mg/dL 297* 308* 331* 301*   Estimated  Creatinine Clearance: 16.5 mL/min (A) (by C-G formula based on SCr of 6.36 mg/dL (H)).  Results from last 7 days   Lab Units 06/01/20  0350 05/31/20  0511 05/30/20  0456 05/29/20  0503   CALCIUM mg/dL 8.5* 7.2* 8.6 8.5*   ALBUMIN g/dL 4.10 3.90 4.00 3.60   MAGNESIUM mg/dL 2.0 1.5* 1.7 1.6   PHOSPHORUS mg/dL 6.5* 5.8* 6.6* 4.5         acyclovir 400 mg Oral BID   allopurinol 100 mg Oral Daily   atorvastatin 20 mg Oral Daily   calcium acetate 1,334 mg Oral TID With Meals   [START ON 6/3/2020] epoetin valerie 10,000 Units Subcutaneous Once per day on Mon Wed Fri   finasteride 5 mg Oral Daily   heparin (porcine) 5,000 Units Subcutaneous Q12H   insulin glargine 15 Units Subcutaneous Nightly   insulin lispro 0-14 Units Subcutaneous TID AC   pantoprazole 40 mg Oral Q AM   sodium chloride 10 mL Intravenous Q12H   tamsulosin 0.4 mg Oral Nightly   traZODone 50 mg Oral Nightly      Diet Regular; Consistent Carbohydrate, Renal    Assessment/Plan      Active Hospital Problems    Diagnosis  POA   • **Multiple myeloma (CMS/HCC) [C90.00]  Yes   • Hypomagnesemia [E83.42]  No   • Anemia [D64.9]  Yes   • Diabetes mellitus (CMS/HCC) [E11.9]  Yes   • Enlarged prostate [N40.0]  Yes   • NATHANIEL (acute kidney injury) (CMS/HCC) [N17.9]  Yes   • CKD (chronic kidney disease) [N18.9]  Yes   • Hypertension [I10]  Yes   • History of renal cell cancer [Z85.528]  Not Applicable   • H/O partial nephrectomy - right [Z90.5]  Not Applicable   • Elevated troponin [R79.89]  Yes      Resolved Hospital Problems   No resolved problems to display.       · NATHANIEL on CKD: Light chain nephropathy. Worse Cr today. Not acutely volume overloaded. Had dc of shiley cath after completion of plasmapheresis treatments. Plan for HD cath placement tomorrow. Nephrology and Vascular surgery following.  · IgG Randolph MM: Hypercellular marrow. Planned referral to [facility] for consideration of autologous stem cell transplant. Oncology following.  · Anemia 2/2 malignancy and CKD  · BPH:  Urology following.  · DM2: Continue insulin  · HTN  · Insomnia: Improved with current regimen  · Disposition: Home/TBD    Allan Landry MD  [facility]  06/01/20  13:01    Dictated portions using Dragon dictation software.

## 2020-06-01 NOTE — PROGRESS NOTES
"   LOS: 10 days     Chief Complaint/ Reason for encounter: Acute renal failure, possible myeloma kidney  Chief Complaint   Patient presents with   • Abnormal Lab   • Dizziness   • Weakness - Generalized         Subjective   Patient is doing well today with no new complaints  He states he is feels better than he has in several months  Good appetite with no nausea or vomiting  No shortness of breath chest pain or edema  Voiding well with no dysuria  Tolerated plasmapheresis x3 treatments well, Shiley was discontinued yesterday after his last treatment  Bone marrow biopsy consistent with multiple myeloma      Medical history reviewed:  Abnormal Lab     Dizziness     Weakness - Generalized         Subjective    History taken from: Patient and chart    Vital Signs  Temp:  [96.9 °F (36.1 °C)-98 °F (36.7 °C)] 98 °F (36.7 °C)  Heart Rate:  [72-85] 77  Resp:  [16-18] 18  BP: (138-153)/(75-84) 138/79       Wt Readings from Last 1 Encounters:   06/01/20 0439 96.8 kg (213 lb 4.8 oz)   05/31/20 0506 96.4 kg (212 lb 9.6 oz)   05/30/20 0503 96.5 kg (212 lb 11.2 oz)   05/29/20 0611 94.9 kg (209 lb 3.2 oz)   05/28/20 0615 98.4 kg (216 lb 14.4 oz)   05/25/20 0623 101 kg (223 lb 9.6 oz)   05/24/20 0500 102 kg (223 lb 14.4 oz)   05/22/20 1350 105 kg (231 lb)       Objective:  Vital signs: (most recent): Blood pressure 138/79, pulse 77, temperature 98 °F (36.7 °C), temperature source Temporal, resp. rate 18, height 193 cm (75.98\"), weight 96.8 kg (213 lb 4.8 oz), SpO2 97 %.              Objective:  General Appearance:  Comfortable, mildly ill appearing, in no acute distress and not in pain.  Awake, alert, oriented  HEENT: Mucous membranes moist, no injury, oropharynx clear  Lungs:  Normal effort and normal respiratory rate.  Breath sounds clear to auscultation.  No  respiratory distress.  No rales, decreased breath sounds or rhonchi.    Heart: Normal rate.  Regular rhythm.  S1 normal.  No murmur.   Abdomen: Abdomen is soft.  Bowel " sounds are normal, no abdominal tenderness.  There is no rebound or guarding  Extremities: Normal range of motion.  Trace edema of bilateral lower extremities, distal pulses intact  Neurological: No focal motor or sensory deficits, pupils reactive  Skin:  Warm and dry.  No rash or cyanosis.       Results Review:    Intake/Output:     Intake/Output Summary (Last 24 hours) at 6/1/2020 1129  Last data filed at 6/1/2020 1100  Gross per 24 hour   Intake 560 ml   Output 2525 ml   Net -1965 ml         DATA:  Radiology and Labs:  The following labs independently reviewed by me. Additional labs ordered for tomorrow a.m.  Interval notes, chart personally reviewed by me.   Discussed with patient and family member at bedside    Risk/ complexity of medical care/ medical decision making high risk, worsening renal failure, worsening anemia, requiring transfusion.  Long discussion today about worsening renal failure, need for dialysis initiation.  Discussed plans for surgical catheter placement and dialysis initiation tomorrow, he is agreeable        Labs:   Recent Results (from the past 24 hour(s))   POC Glucose Once    Collection Time: 05/31/20 12:04 PM   Result Value Ref Range    Glucose 383 (H) 70 - 130 mg/dL   POC Glucose Once    Collection Time: 05/31/20  5:26 PM   Result Value Ref Range    Glucose 367 (H) 70 - 130 mg/dL   Magnesium    Collection Time: 06/01/20  3:50 AM   Result Value Ref Range    Magnesium 2.0 1.6 - 2.4 mg/dL   Comprehensive Metabolic Panel    Collection Time: 06/01/20  3:50 AM   Result Value Ref Range    Glucose 297 (H) 65 - 99 mg/dL    BUN 95 (H) 8 - 23 mg/dL    Creatinine 6.36 (H) 0.76 - 1.27 mg/dL    Sodium 134 (L) 136 - 145 mmol/L    Potassium 4.6 3.5 - 5.2 mmol/L    Chloride 98 98 - 107 mmol/L    CO2 21.6 (L) 22.0 - 29.0 mmol/L    Calcium 8.5 (L) 8.6 - 10.5 mg/dL    Total Protein 6.0 6.0 - 8.5 g/dL    Albumin 4.10 3.50 - 5.20 g/dL    ALT (SGPT) 6 1 - 41 U/L    AST (SGOT) 8 1 - 40 U/L    Alkaline  Phosphatase 16 (L) 39 - 117 U/L    Total Bilirubin 0.3 0.2 - 1.2 mg/dL    eGFR Non African Amer 9 (L) >60 mL/min/1.73    eGFR  African Amer      Globulin 1.9 gm/dL    A/G Ratio 2.2 g/dL    BUN/Creatinine Ratio 14.9 7.0 - 25.0    Anion Gap 14.4 5.0 - 15.0 mmol/L   CBC Auto Differential    Collection Time: 06/01/20  3:50 AM   Result Value Ref Range    WBC 4.30 3.40 - 10.80 10*3/mm3    RBC 2.80 (L) 4.14 - 5.80 10*6/mm3    Hemoglobin 8.6 (L) 13.0 - 17.7 g/dL    Hematocrit 25.3 (L) 37.5 - 51.0 %    MCV 90.4 79.0 - 97.0 fL    MCH 30.7 26.6 - 33.0 pg    MCHC 34.0 31.5 - 35.7 g/dL    RDW 14.2 12.3 - 15.4 %    RDW-SD 46.5 37.0 - 54.0 fl    MPV 9.6 6.0 - 12.0 fL    Platelets 106 (L) 140 - 450 10*3/mm3    Neutrophil % 83.9 (H) 42.7 - 76.0 %    Lymphocyte % 7.9 (L) 19.6 - 45.3 %    Monocyte % 7.0 5.0 - 12.0 %    Eosinophil % 0.0 (L) 0.3 - 6.2 %    Basophil % 0.0 0.0 - 1.5 %    Immature Grans % 1.2 (H) 0.0 - 0.5 %    Neutrophils, Absolute 3.61 1.70 - 7.00 10*3/mm3    Lymphocytes, Absolute 0.34 (L) 0.70 - 3.10 10*3/mm3    Monocytes, Absolute 0.30 0.10 - 0.90 10*3/mm3    Eosinophils, Absolute 0.00 0.00 - 0.40 10*3/mm3    Basophils, Absolute 0.00 0.00 - 0.20 10*3/mm3    Immature Grans, Absolute 0.05 0.00 - 0.05 10*3/mm3    nRBC 0.0 0.0 - 0.2 /100 WBC   Phosphorus    Collection Time: 06/01/20  3:50 AM   Result Value Ref Range    Phosphorus 6.5 (H) 2.5 - 4.5 mg/dL   POC Glucose Once    Collection Time: 06/01/20  7:32 AM   Result Value Ref Range    Glucose 277 (H) 70 - 130 mg/dL       Radiology:  Imaging Results (Last 24 Hours)     ** No results found for the last 24 hours. **             Medications have been reviewed:  Current Facility-Administered Medications   Medication Dose Route Frequency Provider Last Rate Last Dose   • acetaminophen (TYLENOL) tablet 650 mg  650 mg Oral Q4H PRN Gabriella Alegria MD        Or   • acetaminophen (TYLENOL) 160 MG/5ML solution 650 mg  650 mg Oral Q4H PRN Gabriella Alegria MD        Or   • acetaminophen  (TYLENOL) suppository 650 mg  650 mg Rectal Q4H PRN Gabriella Alegria MD       • acyclovir (ZOVIRAX) tablet 400 mg  400 mg Oral BID Alvaro Chang MD   400 mg at 06/01/20 0843   • allopurinol (ZYLOPRIM) tablet 100 mg  100 mg Oral Daily Lopez Ortiz MD   100 mg at 06/01/20 0843   • atorvastatin (LIPITOR) tablet 20 mg  20 mg Oral Daily Gabriella Alegria MD   20 mg at 06/01/20 0843   • calcium acetate (PHOSLO) capsule 667 mg  667 mg Oral TID With Meals Sumanth Marks MD   667 mg at 06/01/20 0843   • dextrose (D50W) 25 g/ 50mL Intravenous Solution 25 g  25 g Intravenous Q15 Min PRN Gabriella Alegria MD       • dextrose (GLUTOSE) oral gel 15 g  15 g Oral Q15 Min PRN Gabriella Alegria MD       • diphenhydrAMINE (BENADRYL) injection 25 mg  25 mg Intravenous Q6H PRN Katelin Millard APRN   25 mg at 05/30/20 2153   • finasteride (PROSCAR) tablet 5 mg  5 mg Oral Daily Gabriella Alegria MD   5 mg at 06/01/20 0843   • glucagon (human recombinant) (GLUCAGEN DIAGNOSTIC) injection 1 mg  1 mg Subcutaneous Q15 Min PRN Gabriella Alegria MD       • heparin (porcine) 5000 UNIT/ML injection 5,000 Units  5,000 Units Subcutaneous Q12H Alvaro Chang MD   5,000 Units at 06/01/20 0843   • insulin glargine (LANTUS) injection 15 Units  15 Units Subcutaneous Nightly Contreras Moreno MD   15 Units at 05/31/20 2021   • insulin lispro (humaLOG) injection 0-14 Units  0-14 Units Subcutaneous TID AC Contreras Moreno MD   8 Units at 06/01/20 0842   • nitroglycerin (NITROSTAT) SL tablet 0.4 mg  0.4 mg Sublingual Q5 Min PRN Gabriella Alegria MD       • ondansetron (ZOFRAN) tablet 4 mg  4 mg Oral Q6H PRN Alvaro Chang MD       • pantoprazole (PROTONIX) EC tablet 40 mg  40 mg Oral Q AM Alvaro Chang MD   40 mg at 06/01/20 0614   • sodium chloride 0.9 % flush 10 mL  10 mL Intravenous PRN Gabriella Alegria MD       • sodium chloride 0.9 % flush 10 mL  10 mL Intravenous Q12H Gabriella Alegria MD   10 mL at 06/01/20 0844   • sodium chloride 0.9 % flush 10 mL  10 mL  Intravenous PRN Gabriella Alegria MD       • sodium chloride nasal spray 2 spray  2 spray Each Nare PRN Contreras Moreno MD   2 spray at 05/26/20 2226   • tamsulosin (FLOMAX) 24 hr capsule 0.4 mg  0.4 mg Oral Nightly Gabriella Alegria MD   0.4 mg at 05/31/20 2025   • traZODone (DESYREL) tablet 50 mg  50 mg Oral Nightly Contreras Moreno MD   50 mg at 05/31/20 2025       ASSESSMENT:  acute renal failure, due to myeloma kidney/ light chain disease, needs pheresis and initiation of chemotherapy  Probable chronic kidney disease, large kidneys on ultrasound which may be due to plasma cell/immunoglobulin infiltration related to myeloma  Mild hyponatremia, stable  Multiple myeloma, IgG lambda  Proteinuria, nephrotic range per quantification but urine creatinine was low which may alter results  Elevated serum total protein concerning for myeloma    Anemia, severe requiring transfusion, no obvious blood loss, transfusion/Epogen per hematology    Diabetes mellitus (CMS/HCC)    Enlarged prostate    NATHANIEL (acute kidney injury) (CMS/HCC)    CKD (chronic kidney disease)    Hypertension    History of renal cell cancer, s/p partial nephrectomy - right    Elevated troponin  Light chain deposition disease        PLAN:   His renal function continues to worsen, starting to become fairly azotemic but no significant uremic symptoms at this time  Discussed need for dialysis initiation and patient is agreeable  We will consult vascular surgery for a tunneled catheter placement tomorrow morning with HD to follow  Short HD tomorrow then additional, longer treatment on Wednesday  Total protein improved significantly after plasmapheresis  Suspect he has significant light chain deposition disease causing significant renal impairment  Will likely need outpatient dialysis, will arrange with CCP  Chemotherapy per oncology  Transfusions as needed per oncology service  Repeat labs in a.m.  Increase phosphorus binder dose, replace electrolytes as needed  Add  Epogen with dialysis starting on Wednesday      Norm Hutchison MD   [facility]   Office phone number: [phone]  Answering service phone number: [phone]    06/01/20  11:29    Dictation performed using Dragon dictation software

## 2020-06-02 ENCOUNTER — APPOINTMENT (OUTPATIENT)
Dept: GENERAL RADIOLOGY | Facility: HOSPITAL | Age: 65
End: 2020-06-02

## 2020-06-02 ENCOUNTER — ANESTHESIA (OUTPATIENT)
Dept: PERIOP | Facility: HOSPITAL | Age: 65
End: 2020-06-02

## 2020-06-02 ENCOUNTER — ANESTHESIA EVENT (OUTPATIENT)
Dept: PERIOP | Facility: HOSPITAL | Age: 65
End: 2020-06-02

## 2020-06-02 LAB
ALBUMIN SERPL-MCNC: 3.6 G/DL (ref 3.5–5.2)
ANION GAP SERPL CALCULATED.3IONS-SCNC: 11.1 MMOL/L (ref 5–15)
BASOPHILS # BLD AUTO: 0 10*3/MM3 (ref 0–0.2)
BASOPHILS NFR BLD AUTO: 0 % (ref 0–1.5)
BUN BLD-MCNC: 97 MG/DL (ref 8–23)
BUN/CREAT SERPL: 16.8 (ref 7–25)
CALCIUM SPEC-SCNC: 8.5 MG/DL (ref 8.6–10.5)
CHLORIDE SERPL-SCNC: 98 MMOL/L (ref 98–107)
CO2 SERPL-SCNC: 20.9 MMOL/L (ref 22–29)
CREAT BLD-MCNC: 5.79 MG/DL (ref 0.76–1.27)
DEPRECATED RDW RBC AUTO: 46.4 FL (ref 37–54)
EOSINOPHIL # BLD AUTO: 0.01 10*3/MM3 (ref 0–0.4)
EOSINOPHIL NFR BLD AUTO: 0.3 % (ref 0.3–6.2)
ERYTHROCYTE [DISTWIDTH] IN BLOOD BY AUTOMATED COUNT: 14.5 % (ref 12.3–15.4)
GFR SERPL CREATININE-BSD FRML MDRD: 10 ML/MIN/1.73
GFR SERPL CREATININE-BSD FRML MDRD: ABNORMAL ML/MIN/{1.73_M2}
GLUCOSE BLD-MCNC: 157 MG/DL (ref 65–99)
GLUCOSE BLDC GLUCOMTR-MCNC: 120 MG/DL (ref 70–130)
GLUCOSE BLDC GLUCOMTR-MCNC: 170 MG/DL (ref 70–130)
GLUCOSE BLDC GLUCOMTR-MCNC: 183 MG/DL (ref 70–130)
HCT VFR BLD AUTO: 27 % (ref 37.5–51)
HGB BLD-MCNC: 9.1 G/DL (ref 13–17.7)
HOLD SPECIMEN: NORMAL
IMM GRANULOCYTES # BLD AUTO: 0.02 10*3/MM3 (ref 0–0.05)
IMM GRANULOCYTES NFR BLD AUTO: 0.6 % (ref 0–0.5)
LYMPHOCYTES # BLD AUTO: 1.15 10*3/MM3 (ref 0.7–3.1)
LYMPHOCYTES NFR BLD AUTO: 33.1 % (ref 19.6–45.3)
MAGNESIUM SERPL-MCNC: 1.9 MG/DL (ref 1.6–2.4)
MCH RBC QN AUTO: 30 PG (ref 26.6–33)
MCHC RBC AUTO-ENTMCNC: 33.7 G/DL (ref 31.5–35.7)
MCV RBC AUTO: 89.1 FL (ref 79–97)
MONOCYTES # BLD AUTO: 0.3 10*3/MM3 (ref 0.1–0.9)
MONOCYTES NFR BLD AUTO: 8.6 % (ref 5–12)
NEUTROPHILS # BLD AUTO: 1.99 10*3/MM3 (ref 1.7–7)
NEUTROPHILS NFR BLD AUTO: 57.4 % (ref 42.7–76)
NRBC BLD AUTO-RTO: 0 /100 WBC (ref 0–0.2)
PHOSPHATE SERPL-MCNC: 5.1 MG/DL (ref 2.5–4.5)
PLATELET # BLD AUTO: 98 10*3/MM3 (ref 140–450)
PMV BLD AUTO: 9.9 FL (ref 6–12)
POTASSIUM BLD-SCNC: 3.9 MMOL/L (ref 3.5–5.2)
RBC # BLD AUTO: 3.03 10*6/MM3 (ref 4.14–5.8)
SODIUM BLD-SCNC: 130 MMOL/L (ref 136–145)
URATE SERPL-MCNC: 8 MG/DL (ref 3.4–7)
WBC NRBC COR # BLD: 3.47 10*3/MM3 (ref 3.4–10.8)

## 2020-06-02 PROCEDURE — 25010000002 HEPARIN (PORCINE) PER 1000 UNITS: Performed by: SURGERY

## 2020-06-02 PROCEDURE — 63710000001 INSULIN LISPRO (HUMAN) PER 5 UNITS: Performed by: SURGERY

## 2020-06-02 PROCEDURE — 25010000002 PROPOFOL 10 MG/ML EMULSION: Performed by: NURSE ANESTHETIST, CERTIFIED REGISTERED

## 2020-06-02 PROCEDURE — 05HM33Z INSERTION OF INFUSION DEVICE INTO RIGHT INTERNAL JUGULAR VEIN, PERCUTANEOUS APPROACH: ICD-10-PCS | Performed by: SURGERY

## 2020-06-02 PROCEDURE — 25010000003 CEFAZOLIN IN DEXTROSE 2-4 GM/100ML-% SOLUTION: Performed by: SURGERY

## 2020-06-02 PROCEDURE — 84550 ASSAY OF BLOOD/URIC ACID: CPT | Performed by: SURGERY

## 2020-06-02 PROCEDURE — 5A1D70Z PERFORMANCE OF URINARY FILTRATION, INTERMITTENT, LESS THAN 6 HOURS PER DAY: ICD-10-PCS | Performed by: INTERNAL MEDICINE

## 2020-06-02 PROCEDURE — 99232 SBSQ HOSP IP/OBS MODERATE 35: CPT | Performed by: INTERNAL MEDICINE

## 2020-06-02 PROCEDURE — 63710000001 INSULIN GLARGINE PER 5 UNITS: Performed by: SURGERY

## 2020-06-02 PROCEDURE — 83735 ASSAY OF MAGNESIUM: CPT | Performed by: SURGERY

## 2020-06-02 PROCEDURE — C1750 CATH, HEMODIALYSIS,LONG-TERM: HCPCS | Performed by: SURGERY

## 2020-06-02 PROCEDURE — 82962 GLUCOSE BLOOD TEST: CPT

## 2020-06-02 PROCEDURE — 25010000002 HEPARIN (PORCINE) PER 1000 UNITS: Performed by: INTERNAL MEDICINE

## 2020-06-02 PROCEDURE — 80069 RENAL FUNCTION PANEL: CPT | Performed by: SURGERY

## 2020-06-02 PROCEDURE — 85025 COMPLETE CBC W/AUTO DIFF WBC: CPT | Performed by: INTERNAL MEDICINE

## 2020-06-02 PROCEDURE — 76000 FLUOROSCOPY <1 HR PHYS/QHP: CPT

## 2020-06-02 PROCEDURE — 25010000003 LIDOCAINE 1 % SOLUTION 20 ML VIAL: Performed by: SURGERY

## 2020-06-02 PROCEDURE — 25010000002 MIDAZOLAM PER 1 MG: Performed by: ANESTHESIOLOGY

## 2020-06-02 PROCEDURE — 0JH63XZ INSERTION OF TUNNELED VASCULAR ACCESS DEVICE INTO CHEST SUBCUTANEOUS TISSUE AND FASCIA, PERCUTANEOUS APPROACH: ICD-10-PCS | Performed by: SURGERY

## 2020-06-02 RX ORDER — PROPOFOL 10 MG/ML
VIAL (ML) INTRAVENOUS AS NEEDED
Status: DISCONTINUED | OUTPATIENT
Start: 2020-06-02 | End: 2020-06-02 | Stop reason: SURG

## 2020-06-02 RX ORDER — MIDAZOLAM HYDROCHLORIDE 1 MG/ML
2 INJECTION INTRAMUSCULAR; INTRAVENOUS
Status: DISCONTINUED | OUTPATIENT
Start: 2020-06-02 | End: 2020-06-02 | Stop reason: HOSPADM

## 2020-06-02 RX ORDER — FENTANYL CITRATE 50 UG/ML
50 INJECTION, SOLUTION INTRAMUSCULAR; INTRAVENOUS
Status: DISCONTINUED | OUTPATIENT
Start: 2020-06-02 | End: 2020-06-02 | Stop reason: HOSPADM

## 2020-06-02 RX ORDER — PROMETHAZINE HYDROCHLORIDE 25 MG/1
25 TABLET ORAL ONCE AS NEEDED
Status: DISCONTINUED | OUTPATIENT
Start: 2020-06-02 | End: 2020-06-02 | Stop reason: HOSPADM

## 2020-06-02 RX ORDER — FLUMAZENIL 0.1 MG/ML
0.2 INJECTION INTRAVENOUS AS NEEDED
Status: DISCONTINUED | OUTPATIENT
Start: 2020-06-02 | End: 2020-06-02 | Stop reason: HOSPADM

## 2020-06-02 RX ORDER — DIPHENHYDRAMINE HCL 25 MG
25 CAPSULE ORAL
Status: DISCONTINUED | OUTPATIENT
Start: 2020-06-02 | End: 2020-06-02 | Stop reason: HOSPADM

## 2020-06-02 RX ORDER — SODIUM CHLORIDE 9 MG/ML
INJECTION, SOLUTION INTRAVENOUS CONTINUOUS PRN
Status: DISCONTINUED | OUTPATIENT
Start: 2020-06-02 | End: 2020-06-02 | Stop reason: SURG

## 2020-06-02 RX ORDER — HYDRALAZINE HYDROCHLORIDE 20 MG/ML
5 INJECTION INTRAMUSCULAR; INTRAVENOUS
Status: DISCONTINUED | OUTPATIENT
Start: 2020-06-02 | End: 2020-06-02 | Stop reason: HOSPADM

## 2020-06-02 RX ORDER — HYDROMORPHONE HYDROCHLORIDE 1 MG/ML
0.5 INJECTION, SOLUTION INTRAMUSCULAR; INTRAVENOUS; SUBCUTANEOUS
Status: DISCONTINUED | OUTPATIENT
Start: 2020-06-02 | End: 2020-06-02 | Stop reason: HOSPADM

## 2020-06-02 RX ORDER — SODIUM CHLORIDE 0.9 % (FLUSH) 0.9 %
3-10 SYRINGE (ML) INJECTION AS NEEDED
Status: DISCONTINUED | OUTPATIENT
Start: 2020-06-02 | End: 2020-06-02 | Stop reason: HOSPADM

## 2020-06-02 RX ORDER — NALOXONE HCL 0.4 MG/ML
0.2 VIAL (ML) INJECTION AS NEEDED
Status: DISCONTINUED | OUTPATIENT
Start: 2020-06-02 | End: 2020-06-02 | Stop reason: HOSPADM

## 2020-06-02 RX ORDER — MIDAZOLAM HYDROCHLORIDE 1 MG/ML
1 INJECTION INTRAMUSCULAR; INTRAVENOUS
Status: DISCONTINUED | OUTPATIENT
Start: 2020-06-02 | End: 2020-06-02 | Stop reason: HOSPADM

## 2020-06-02 RX ORDER — ONDANSETRON 2 MG/ML
4 INJECTION INTRAMUSCULAR; INTRAVENOUS ONCE AS NEEDED
Status: DISCONTINUED | OUTPATIENT
Start: 2020-06-02 | End: 2020-06-02 | Stop reason: HOSPADM

## 2020-06-02 RX ORDER — ACETAMINOPHEN 325 MG/1
650 TABLET ORAL ONCE AS NEEDED
Status: DISCONTINUED | OUTPATIENT
Start: 2020-06-02 | End: 2020-06-02 | Stop reason: HOSPADM

## 2020-06-02 RX ORDER — HYDROCODONE BITARTRATE AND ACETAMINOPHEN 7.5; 325 MG/1; MG/1
1 TABLET ORAL ONCE AS NEEDED
Status: DISCONTINUED | OUTPATIENT
Start: 2020-06-02 | End: 2020-06-02 | Stop reason: HOSPADM

## 2020-06-02 RX ORDER — LABETALOL HYDROCHLORIDE 5 MG/ML
5 INJECTION, SOLUTION INTRAVENOUS
Status: DISCONTINUED | OUTPATIENT
Start: 2020-06-02 | End: 2020-06-02 | Stop reason: HOSPADM

## 2020-06-02 RX ORDER — DIPHENHYDRAMINE HYDROCHLORIDE 50 MG/ML
12.5 INJECTION INTRAMUSCULAR; INTRAVENOUS
Status: DISCONTINUED | OUTPATIENT
Start: 2020-06-02 | End: 2020-06-02 | Stop reason: HOSPADM

## 2020-06-02 RX ORDER — HEPARIN SODIUM 1000 [USP'U]/ML
4000 INJECTION, SOLUTION INTRAVENOUS; SUBCUTANEOUS ONCE
Status: COMPLETED | OUTPATIENT
Start: 2020-06-02 | End: 2020-06-02

## 2020-06-02 RX ORDER — SODIUM CHLORIDE, SODIUM LACTATE, POTASSIUM CHLORIDE, CALCIUM CHLORIDE 600; 310; 30; 20 MG/100ML; MG/100ML; MG/100ML; MG/100ML
9 INJECTION, SOLUTION INTRAVENOUS CONTINUOUS
Status: DISCONTINUED | OUTPATIENT
Start: 2020-06-02 | End: 2020-06-02

## 2020-06-02 RX ORDER — EPHEDRINE SULFATE 50 MG/ML
5 INJECTION, SOLUTION INTRAVENOUS ONCE AS NEEDED
Status: DISCONTINUED | OUTPATIENT
Start: 2020-06-02 | End: 2020-06-02 | Stop reason: HOSPADM

## 2020-06-02 RX ORDER — HEPARIN SODIUM 1000 [USP'U]/ML
INJECTION, SOLUTION INTRAVENOUS; SUBCUTANEOUS AS NEEDED
Status: DISCONTINUED | OUTPATIENT
Start: 2020-06-02 | End: 2020-06-02 | Stop reason: HOSPADM

## 2020-06-02 RX ORDER — LIDOCAINE HYDROCHLORIDE 10 MG/ML
0.5 INJECTION, SOLUTION EPIDURAL; INFILTRATION; INTRACAUDAL; PERINEURAL ONCE AS NEEDED
Status: DISCONTINUED | OUTPATIENT
Start: 2020-06-02 | End: 2020-06-02 | Stop reason: HOSPADM

## 2020-06-02 RX ORDER — FAMOTIDINE 10 MG/ML
20 INJECTION, SOLUTION INTRAVENOUS ONCE
Status: COMPLETED | OUTPATIENT
Start: 2020-06-02 | End: 2020-06-02

## 2020-06-02 RX ORDER — PROMETHAZINE HYDROCHLORIDE 25 MG/ML
12.5 INJECTION, SOLUTION INTRAMUSCULAR; INTRAVENOUS ONCE AS NEEDED
Status: DISCONTINUED | OUTPATIENT
Start: 2020-06-02 | End: 2020-06-02 | Stop reason: HOSPADM

## 2020-06-02 RX ORDER — SODIUM CHLORIDE 9 MG/ML
9 INJECTION, SOLUTION INTRAVENOUS ONCE
Status: DISCONTINUED | OUTPATIENT
Start: 2020-06-02 | End: 2020-06-02

## 2020-06-02 RX ORDER — PROMETHAZINE HYDROCHLORIDE 25 MG/1
25 SUPPOSITORY RECTAL ONCE AS NEEDED
Status: DISCONTINUED | OUTPATIENT
Start: 2020-06-02 | End: 2020-06-02 | Stop reason: HOSPADM

## 2020-06-02 RX ORDER — SODIUM CHLORIDE 0.9 % (FLUSH) 0.9 %
3 SYRINGE (ML) INJECTION EVERY 12 HOURS SCHEDULED
Status: DISCONTINUED | OUTPATIENT
Start: 2020-06-02 | End: 2020-06-02 | Stop reason: HOSPADM

## 2020-06-02 RX ORDER — TRAMADOL HYDROCHLORIDE 50 MG/1
50 TABLET ORAL EVERY 8 HOURS PRN
Status: DISCONTINUED | OUTPATIENT
Start: 2020-06-02 | End: 2020-06-04 | Stop reason: HOSPADM

## 2020-06-02 RX ORDER — OXYCODONE AND ACETAMINOPHEN 7.5; 325 MG/1; MG/1
1 TABLET ORAL ONCE AS NEEDED
Status: DISCONTINUED | OUTPATIENT
Start: 2020-06-02 | End: 2020-06-02 | Stop reason: HOSPADM

## 2020-06-02 RX ORDER — PROMETHAZINE HYDROCHLORIDE 25 MG/ML
6.25 INJECTION, SOLUTION INTRAMUSCULAR; INTRAVENOUS
Status: DISCONTINUED | OUTPATIENT
Start: 2020-06-02 | End: 2020-06-02 | Stop reason: HOSPADM

## 2020-06-02 RX ORDER — PROPOFOL 10 MG/ML
VIAL (ML) INTRAVENOUS CONTINUOUS PRN
Status: DISCONTINUED | OUTPATIENT
Start: 2020-06-02 | End: 2020-06-02 | Stop reason: SURG

## 2020-06-02 RX ORDER — LIDOCAINE HYDROCHLORIDE 20 MG/ML
INJECTION, SOLUTION INFILTRATION; PERINEURAL AS NEEDED
Status: DISCONTINUED | OUTPATIENT
Start: 2020-06-02 | End: 2020-06-02 | Stop reason: SURG

## 2020-06-02 RX ADMIN — TRAZODONE HYDROCHLORIDE 50 MG: 50 TABLET ORAL at 20:01

## 2020-06-02 RX ADMIN — HEPARIN SODIUM 4000 UNITS: 1000 INJECTION, SOLUTION INTRAVENOUS; SUBCUTANEOUS at 13:00

## 2020-06-02 RX ADMIN — MIDAZOLAM 1 MG: 1 INJECTION INTRAMUSCULAR; INTRAVENOUS at 06:53

## 2020-06-02 RX ADMIN — ACYCLOVIR 400 MG: 400 TABLET ORAL at 20:01

## 2020-06-02 RX ADMIN — CALCIUM ACETATE 1334 MG: 667 CAPSULE ORAL at 17:46

## 2020-06-02 RX ADMIN — FAMOTIDINE 20 MG: 10 INJECTION, SOLUTION INTRAVENOUS at 06:53

## 2020-06-02 RX ADMIN — HEPARIN SODIUM 5000 UNITS: 5000 INJECTION INTRAVENOUS; SUBCUTANEOUS at 20:01

## 2020-06-02 RX ADMIN — SODIUM CHLORIDE, PRESERVATIVE FREE 10 ML: 5 INJECTION INTRAVENOUS at 23:05

## 2020-06-02 RX ADMIN — PROPOFOL 70 MG: 10 INJECTION, EMULSION INTRAVENOUS at 07:35

## 2020-06-02 RX ADMIN — TAMSULOSIN HYDROCHLORIDE 0.4 MG: 0.4 CAPSULE ORAL at 20:01

## 2020-06-02 RX ADMIN — CALCIUM ACETATE 1334 MG: 667 CAPSULE ORAL at 13:58

## 2020-06-02 RX ADMIN — MIDAZOLAM 1 MG: 1 INJECTION INTRAMUSCULAR; INTRAVENOUS at 07:22

## 2020-06-02 RX ADMIN — SODIUM CHLORIDE: 9 INJECTION, SOLUTION INTRAVENOUS at 07:30

## 2020-06-02 RX ADMIN — CEFAZOLIN SODIUM 2 G: 2 INJECTION, SOLUTION INTRAVENOUS at 07:35

## 2020-06-02 RX ADMIN — INSULIN LISPRO 3 UNITS: 100 INJECTION, SOLUTION INTRAVENOUS; SUBCUTANEOUS at 17:45

## 2020-06-02 RX ADMIN — INSULIN GLARGINE 15 UNITS: 100 INJECTION, SOLUTION SUBCUTANEOUS at 20:01

## 2020-06-02 RX ADMIN — LIDOCAINE HYDROCHLORIDE 100 MG: 20 INJECTION, SOLUTION INFILTRATION; PERINEURAL at 07:35

## 2020-06-02 RX ADMIN — PROPOFOL 100 MCG/KG/MIN: 10 INJECTION, EMULSION INTRAVENOUS at 07:35

## 2020-06-02 NOTE — PLAN OF CARE
Problem: Patient Care Overview  Goal: Plan of Care Review  Outcome: Ongoing (interventions implemented as appropriate)  Flowsheets (Taken 6/2/2020 1700)  Outcome Summary: pt got hemodialysis cath placed today. first round of dialysis copleted as well. pt denies pain. dressing changed due to being saturated. walked in [place].

## 2020-06-02 NOTE — OP NOTE
Date of Admission:  5/22/2020  Today's Date:  06/02/20  Sumanth Hernandez MD  Psychiatric    Preoperative Diagnosis:   1.  Acute kidney injury  2.  Multiple myeloma    Postoperative Diagnosis:   1.  Acute kidney injury  2.  Multiple myeloma    Procedure Performed:   1.  Ultrasound-guided access to the right internal jugular vein  2.  Placement of 23 cm Palindrome catheter    Surgeon:   Sumanth Hernandez MD    Assistant:    Cayla JOHNSON , Provided critical assistance in exposure, retraction, and suction that overall decrease blood loss and operative time.    Anesthesia:   MAC with local    Estimated Blood Loss:   Minimal    Findings:    Catheter in good position.  Aspirates dark venous blood and flushes easily.    Implants:    23 cm Palindrome catheter in the right internal jugular vein    Staff:   Circulator: Vanessa Hill RN  Radiology Technologist: Donna Sapp  Scrub Person: Lawanda Green  Assistant: Cayla Gray CSA    Specimen:   none    Complications:   none    Dispo:   to PACU    Indication for procedure:   64 y.o. male with acute kidney injury associated with multiple myeloma.  He has not yet experienced recovery of renal function despite several dialysis and pheresis treatments.  He submits now for placement of a tunneled dialysis catheter.    Description of procedure:   The patient received Kefzol IV.  He was transferred to the operating room and positioned supine in the operating table.  IV sedation was administered by member the anesthesia team.  The patient's neck and chest were shaven and then washed carefully with Hibiclens.  The neck and chest were prepared with ChloraPrep and draped in a sterile fashion.  Under ultrasound guidance, the right internal jugular vein was interrogated.  The overlying skin was infiltrated with lidocaine and the vein was cannulated on the first pass.  Dark venous blood was aspirated.  A guidewire was advanced centrally under  fluoroscopic guidance, and its position in the superior vena cava was confirmed.  A subcutaneous tunnel was proposed infiltrated and created and a 23 cm palindrome catheter was positioned in the subcutaneous tunnel.  A dilator and sheath were passed over the wire, and the wire and dilator were removed.  The catheter was passed through the sheath and the sheath was removed.  Dark venous blood was aspirated from both catheter lumens without difficulty, and both were flushed first with heparinized saline and then with locking heparin.  The catheter hub was fixed to the skin with nylon sutures.  The skin puncture site was reapproximated with a Vicryl suture.  Dermabond was applied to the puncture site.  A sterile dressing was applied.  Final fluoroscopic survey documented satisfactory catheter position and careful evaluation of the lung field under magnification imaging demonstrated no evidence of pneumothorax.  The patient tolerated the procedure well and there were no immediate complications.  Sponge, needle and instrument counts were correct.  He was taken to the recovery area in stable condition.    Sumanth Hernandez MD  06/02/20     Active Hospital Problems    Diagnosis  POA   • **Multiple myeloma (CMS/HCC) [C90.00]  Yes   • Hypomagnesemia [E83.42]  No   • Anemia [D64.9]  Yes   • Diabetes mellitus (CMS/HCC) [E11.9]  Yes   • Enlarged prostate [N40.0]  Yes   • NATHANIEL (acute kidney injury) (CMS/HCC) [N17.9]  Yes   • CKD (chronic kidney disease) [N18.9]  Yes   • Hypertension [I10]  Yes   • History of renal cell cancer [Z85.528]  Not Applicable   • H/O partial nephrectomy - right [Z90.5]  Not Applicable   • Elevated troponin [R79.89]  Yes      Resolved Hospital Problems   No resolved problems to display.

## 2020-06-02 NOTE — ANESTHESIA PREPROCEDURE EVALUATION
Anesthesia Evaluation     no history of anesthetic complications:  NPO Solid Status: > 6 hours  NPO Liquid Status: > 2 hours           Airway   Mallampati: III  TM distance: >3 FB  Neck ROM: full  No difficulty expected  Dental - normal exam     Pulmonary - normal exam   (-) not a smoker  Cardiovascular - normal exam    ECG reviewed    (+) hypertension,   (-) angina      Neuro/Psych  GI/Hepatic/Renal/Endo    (+)   renal disease (no dialysis yet), diabetes mellitus,     Musculoskeletal     Abdominal    Substance History      OB/GYN          Other   blood dyscrasia (Hb 8.6 ) anemia,   history of cancer (multiple myeloma) active                    Anesthesia Plan    ASA 3     MAC       Anesthetic plan, all risks, benefits, and alternatives have been provided, discussed and informed consent has been obtained with: patient.

## 2020-06-02 NOTE — PROGRESS NOTES
"   LOS: 11 days     Chief Complaint/ Reason for encounter: Acute renal failure, possible myeloma kidney  Chief Complaint   Patient presents with   • Abnormal Lab   • Dizziness   • Weakness - Generalized         Subjective   Patient is doing well today with no new complaints  Seen in PACU, right chest tunneled catheter in place  Good appetite with no nausea or vomiting  No shortness of breath chest pain or edema  Voiding well with no dysuria  Tolerated plasmapheresis x3 treatments well  Bone marrow biopsy consistent with multiple myeloma      Medical history reviewed:  Abnormal Lab     Dizziness     Weakness - Generalized         Subjective    History taken from: Patient and chart    Vital Signs  Temp:  [97.7 °F (36.5 °C)-98.3 °F (36.8 °C)] 97.7 °F (36.5 °C)  Heart Rate:  [73-83] 80  Resp:  [12-22] 16  BP: (126-156)/(79-91) 156/90       Wt Readings from Last 1 Encounters:   06/01/20 0439 96.8 kg (213 lb 4.8 oz)   05/31/20 0506 96.4 kg (212 lb 9.6 oz)   05/30/20 0503 96.5 kg (212 lb 11.2 oz)   05/29/20 0611 94.9 kg (209 lb 3.2 oz)   05/28/20 0615 98.4 kg (216 lb 14.4 oz)   05/25/20 0623 101 kg (223 lb 9.6 oz)   05/24/20 0500 102 kg (223 lb 14.4 oz)   05/22/20 1350 105 kg (231 lb)       Objective:  Vital signs: (most recent): Blood pressure 156/90, pulse 80, temperature 97.7 °F (36.5 °C), temperature source Oral, resp. rate 16, height 193 cm (75.98\"), weight 96.8 kg (213 lb 4.8 oz), SpO2 97 %.              Objective:  General Appearance:  Comfortable, mildly ill appearing, in no acute distress and not in pain.  Awake, alert, oriented  HEENT: Mucous membranes moist, no injury, oropharynx clear  Lungs:  Normal effort and normal respiratory rate.  Breath sounds clear to auscultation.  No  respiratory distress.  No rales, decreased breath sounds or rhonchi.    Heart: Normal rate.  Regular rhythm.  S1 normal.  No murmur.   Abdomen: Abdomen is soft.  Bowel sounds are normal, no abdominal tenderness.  There is no rebound or " guarding  Extremities: Normal range of motion.  Trace edema of bilateral lower extremities, distal pulses intact  Neurological: No focal motor or sensory deficits, pupils reactive  Skin:  Warm and dry.  No rash or cyanosis.       Results Review:    Intake/Output:     Intake/Output Summary (Last 24 hours) at 6/2/2020 0913  Last data filed at 6/1/2020 1500  Gross per 24 hour   Intake 680 ml   Output --   Net 680 ml         DATA:  Radiology and Labs:  The following labs independently reviewed by me. Additional labs ordered for tomorrow a.m.  Interval notes, chart personally reviewed by me.   Discussed with patient and family member at bedside    Risk/ complexity of medical care/ medical decision making high risk, worsening renal failure, worsening anemia, requiring transfusion.  Long discussion today about worsening renal failure, need for dialysis initiation.  Discussed plans for surgical catheter placement and dialysis initiation tomorrow, he is agreeable        Labs:   Recent Results (from the past 24 hour(s))   POC Glucose Once    Collection Time: 06/01/20 11:27 AM   Result Value Ref Range    Glucose 308 (H) 70 - 130 mg/dL   Hepatitis B Surface Antibody    Collection Time: 06/01/20 11:58 AM   Result Value Ref Range    Hep B S Ab Non-Reactive Non-Reactive   Hepatitis B Surface Antigen    Collection Time: 06/01/20 11:58 AM   Result Value Ref Range    Hepatitis B Surface Ag Non-Reactive Non-Reactive   Hep B Confirmation Tube    Collection Time: 06/01/20 11:58 AM   Result Value Ref Range    Extra Tube Hold for add-ons.    Uric Acid    Collection Time: 06/01/20 11:58 AM   Result Value Ref Range    Uric Acid 8.9 (H) 3.4 - 7.0 mg/dL   POC Glucose Once    Collection Time: 06/01/20  4:25 PM   Result Value Ref Range    Glucose 333 (H) 70 - 130 mg/dL   COVID-19,[facility] IN-HOUSE, NP SWAB IN TRANSPORT MEDIA 8-12 HR TAT - Swab, Nasopharynx    Collection Time: 06/01/20  7:58 PM   Result Value Ref Range    COVID19 Not Detected Not  Detected - Ref. Range   POC Glucose Once    Collection Time: 06/01/20  9:35 PM   Result Value Ref Range    Glucose 312 (H) 70 - 130 mg/dL   POC Glucose Once    Collection Time: 06/02/20  6:00 AM   Result Value Ref Range    Glucose 183 (H) 70 - 130 mg/dL       Radiology:  Imaging Results (Last 24 Hours)     Procedure Component Value Units Date/Time    XR Chest Post CVA Port [[id number]] Collected:  06/02/20 0840     Updated:  06/02/20 0844    Narrative:       ONE VIEW PORTABLE CHEST AT 8:15 AM     HISTORY: Recent vascular catheter placement     FINDINGS: There is been recent insertion of a large gauge right jugular  catheter which ends in the SVC. There is no pneumothorax. The lungs are  clear and the heart size is normal.     This report was finalized on 6/2/2020 8:41 AM by Dr. Myron Sosa M.D.       IR PICC W Fluoro Surgery Only [[id number]] Resulted:  06/02/20 0805     Updated:  06/02/20 0805    Narrative:       This procedure was auto-finalized with no dictation required.             Medications have been reviewed:  Current Facility-Administered Medications   Medication Dose Route Frequency Provider Last Rate Last Dose   • [MAR Hold] acetaminophen (TYLENOL) tablet 650 mg  650 mg Oral Q4H PRN Gabriella Alegria MD        Or   • [MAR Hold] acetaminophen (TYLENOL) 160 MG/5ML solution 650 mg  650 mg Oral Q4H PRN Gabriella Alegria MD        Or   • [MAR Hold] acetaminophen (TYLENOL) suppository 650 mg  650 mg Rectal Q4H PRN Gabriella Alegria MD       • acetaminophen (TYLENOL) tablet 650 mg  650 mg Oral Once PRN Jasvir Payne CRNA        Or   • acetaminophen (TYLENOL) suppository 650 mg  650 mg Rectal Once PRN Jasvir Payne CRNA       • [MAR Hold] acyclovir (ZOVIRAX) tablet 400 mg  400 mg Oral BID Alvaro Chang MD   400 mg at 06/01/20 2000   • [MAR Hold] allopurinol (ZYLOPRIM) tablet 100 mg  100 mg Oral Daily Lopez Ortiz MD   100 mg at 06/01/20 0843   • [MAR Hold] atorvastatin (LIPITOR) tablet 20 mg  20 mg Oral Daily  Gabriella Alegria MD   20 mg at 06/01/20 0843   • [MAR Hold] calcium acetate (PHOSLO) capsule 1,334 mg  1,334 mg Oral TID With Meals Norm Hutchison MD   1,334 mg at 06/01/20 1713   • [MAR Hold] dextrose (D50W) 25 g/ 50mL Intravenous Solution 25 g  25 g Intravenous Q15 Min PRN Gabriella Alegria MD       • [MAR Hold] dextrose (GLUTOSE) oral gel 15 g  15 g Oral Q15 Min PRN Gabriella Alegria MD       • diphenhydrAMINE (BENADRYL) capsule 25 mg  25 mg Oral Q30 Min PRN Jasvir Payne CRNA       • diphenhydrAMINE (BENADRYL) injection 12.5 mg  12.5 mg Intravenous Q15 Min PRN Jasvir Payne CRNA       • [MAR Hold] diphenhydrAMINE (BENADRYL) injection 25 mg  25 mg Intravenous Q6H PRN Katelin Millard APRN   25 mg at 05/30/20 2153   • ePHEDrine injection 5 mg  5 mg Intravenous Once PRN Jasvir Payne CRNA       • [MAR Hold] epoetin valerie (EPOGEN,PROCRIT) injection 10,000 Units  10,000 Units Subcutaneous Once per day on Mon Wed Fri Norm Hutchison MD       • fentaNYL citrate (PF) (SUBLIMAZE) injection 50 mcg  50 mcg Intravenous Q5 Min PRN Jasvir Payne CRNA       • [MAR Hold] finasteride (PROSCAR) tablet 5 mg  5 mg Oral Daily Gabriella Alegria MD   5 mg at 06/01/20 0843   • flumazenil (ROMAZICON) injection 0.2 mg  0.2 mg Intravenous PRN Jasvir Payne CRNA       • [MAR Hold] glucagon (human recombinant) (GLUCAGEN DIAGNOSTIC) injection 1 mg  1 mg Subcutaneous Q15 Min PRN Gabriella Alegria MD       • [MAR Hold] heparin (porcine) 5000 UNIT/ML injection 5,000 Units  5,000 Units Subcutaneous Q12H Alvaro Chang MD   5,000 Units at 06/01/20 2000   • hydrALAZINE (APRESOLINE) injection 5 mg  5 mg Intravenous Q10 Min PRN Jasvir Payne CRNA       • HYDROcodone-acetaminophen (NORCO) 7.5-325 MG per tablet 1 tablet  1 tablet Oral Once PRN Jasvir Payne CRNA       • HYDROmorphone (DILAUDID) injection 0.5 mg  0.5 mg Intravenous Q5 Min PRN Jasvir Payne CRNA       • [MAR Hold] insulin glargine (LANTUS) injection 15 Units  15 Units  Subcutaneous Nightly Contreras Moreno MD   15 Units at 06/01/20 1959   • [MAR Hold] insulin lispro (humaLOG) injection 0-14 Units  0-14 Units Subcutaneous TID AC Contreras Moreno MD   10 Units at 06/01/20 1713   • labetalol (NORMODYNE,TRANDATE) injection 5 mg  5 mg Intravenous Q5 Min PRN Jasvir Payne CRNA       • lactated ringers infusion  9 mL/hr Intravenous Continuous Mike Olivas MD       • naloxone (NARCAN) injection 0.2 mg  0.2 mg Intravenous PRN Jasvir Payne CRNA       • [MAR Hold] nitroglycerin (NITROSTAT) SL tablet 0.4 mg  0.4 mg Sublingual Q5 Min PRN Gabriella Alegria MD       • ondansetron (ZOFRAN) injection 4 mg  4 mg Intravenous Once PRN Jasvir Payne CRNA       • [MAR Hold] ondansetron (ZOFRAN) tablet 4 mg  4 mg Oral Q6H PRN Alvaro Chang MD       • oxyCODONE-acetaminophen (PERCOCET) 7.5-325 MG per tablet 1 tablet  1 tablet Oral Once PRN Jasvir Payne CRNA       • [MAR Hold] pantoprazole (PROTONIX) EC tablet 40 mg  40 mg Oral Q AM Alvaro Chang MD   40 mg at 06/01/20 0614   • promethazine (PHENERGAN) injection 6.25 mg  6.25 mg Intravenous Q10 Min PRN Jasvir Payne CRNA        Or   • promethazine (PHENERGAN) injection 12.5 mg  12.5 mg Intramuscular Once PRN Jasvir Payne CRNA        Or   • promethazine (PHENERGAN) suppository 25 mg  25 mg Rectal Once PRN Jasvir Payne CRNA        Or   • promethazine (PHENERGAN) tablet 25 mg  25 mg Oral Once PRN Jasvir Payne CRNA       • [MAR Hold] sodium chloride 0.9 % flush 10 mL  10 mL Intravenous PRN Gabriella Alegria MD       • [MAR Hold] sodium chloride 0.9 % flush 10 mL  10 mL Intravenous Q12H Gabriella Alegria MD   10 mL at 06/01/20 2223   • [MAR Hold] sodium chloride 0.9 % flush 10 mL  10 mL Intravenous PRN Gabriella Alegria MD       • [MAR Hold] sodium chloride nasal spray 2 spray  2 spray Each Nare PRN Contreras Moreno MD   2 spray at 05/26/20 2226   • [MAR Hold] tamsulosin (FLOMAX) 24 hr capsule 0.4 mg  0.4 mg Oral Nightly Gabriella Alegria MD    0.4 mg at 06/01/20 2000   • [MAR Hold] traZODone (DESYREL) tablet 50 mg  50 mg Oral Nightly Contreras Moreno MD   50 mg at 06/01/20 2000       ASSESSMENT:  acute renal failure, due to myeloma kidney/ light chain disease, needs pheresis and initiation of chemotherapy, tunnel cath in place, HD initiation today  Probable chronic kidney disease, large kidneys on ultrasound which may be due to plasma cell/immunoglobulin infiltration related to myeloma  Mild hyponatremia, stable  Multiple myeloma, IgG lambda  Proteinuria, nephrotic range per quantification but urine creatinine was low which may alter results  Elevated serum total protein concerning for myeloma    Anemia, severe requiring transfusion, no obvious blood loss, transfusion/Epogen per hematology    Diabetes mellitus (CMS/HCC)    Enlarged prostate    NATHANIEL (acute kidney injury) (CMS/HCC)    CKD (chronic kidney disease)    Hypertension    History of renal cell cancer, s/p partial nephrectomy - right    Elevated troponin  Light chain deposition disease        PLAN:   Appreciate vascular assistance with tunneled dialysis catheter placement  Plan dialysis initiation today, short treatment then again tomorrow  Await outpatient dialysis arrangements per CCP  Total protein improved significantly after plasmapheresis  Suspect he has significant light chain deposition disease causing significant renal impairment  Chemotherapy per oncology  Transfusions as needed per oncology service  Repeat labs in a.m.  Epogen with dialysis starting on Wednesday      Norm Hutchison MD   [facility]   Office phone number: [phone]  Answering service phone number: [phone]    06/02/20  09:13    Dictation performed using Dragon dictation software

## 2020-06-02 NOTE — PROGRESS NOTES
Name: Contreras Albert ADMIT: 2020   : 1955  PCP: Nahum Portillo MD    MRN: [id number] LOS: 11 days   AGE/SEX: 64 y.o. male  ROOM: \[facility]\""/   Subjective   Chief Complaint   Patient presents with   • Abnormal Lab   • Dizziness   • Weakness - Generalized      No CP SOA NVD currently. I saw in HD and he was reporting feeling drowsy but easily awakened during my exam.    Objective   Vital Signs  Temp:  [97.7 °F (36.5 °C)-98.3 °F (36.8 °C)] 98.2 °F (36.8 °C)  Heart Rate:  [73-83] 76  Resp:  [12-22] 16  BP: (126-156)/(79-91) 147/82  SpO2:  [97 %-100 %] 98 %  on  Flow (L/min):  [2-3] 2;   Device (Oxygen Therapy): room air  Body mass index is 25.97 kg/m².    Physical Exam   Constitutional: He appears well-developed. No distress.   HENT:   Head: Atraumatic.   Nose: Nose normal.   Eyes: Conjunctivae and EOM are normal.   Neck: Neck supple. No tracheal deviation present.   Cardiovascular: Normal rate, regular rhythm and intact distal pulses.   Pulmonary/Chest: Effort normal. He has no wheezes. He has no rales.   Abdominal: Soft. He exhibits no distension. There is no tenderness. There is no rebound and no guarding.   Musculoskeletal: Normal range of motion. He exhibits no edema.   Neurological: He is alert. No cranial nerve deficit.   Skin: Skin is warm and dry. He is not diaphoretic.   Psychiatric: He has a normal mood and affect. His behavior is normal.   Nursing note and vitals reviewed.      Results Review:       I reviewed the patient's new clinical results.      Results from last 7 days   Lab Units 20  0951 20  0350 20  0511 20  0456   WBC 10*3/mm3 3.47 4.30 5.82 7.37   HEMOGLOBIN g/dL 9.1* 8.6* 9.1* 9.1*   PLATELETS 10*3/mm3 98* 106* 130* 154     Results from last 7 days   Lab Units 20  0951 20  0350 20  0511 20  0456   SODIUM mmol/L 130* 134* 137 136   POTASSIUM mmol/L 3.9 4.6 3.7 4.4   CHLORIDE mmol/L 98 98 104 98   CO2 mmol/L 20.9* 21.6* 19.1* 21.9*   BUN  mg/dL 97* 95* 68* 70*   CREATININE mg/dL 5.79* 6.36* 5.12* 5.67*   GLUCOSE mg/dL 157* 297* 308* 331*   Estimated Creatinine Clearance: 18.1 mL/min (A) (by C-G formula based on SCr of 5.79 mg/dL (H)).  Results from last 7 days   Lab Units 06/02/20  0951 06/01/20  0350 05/31/20  0511 05/30/20  0456   CALCIUM mg/dL 8.5* 8.5* 7.2* 8.6   ALBUMIN g/dL 3.60 4.10 3.90 4.00   MAGNESIUM mg/dL 1.9 2.0 1.5* 1.7   PHOSPHORUS mg/dL 5.1* 6.5* 5.8* 6.6*         acyclovir 400 mg Oral BID   allopurinol 100 mg Oral Daily   atorvastatin 20 mg Oral Daily   calcium acetate 1,334 mg Oral TID With Meals   [START ON 6/3/2020] epoetin valerie 10,000 Units Subcutaneous Once per day on Mon Wed Fri   finasteride 5 mg Oral Daily   heparin (porcine) 5,000 Units Subcutaneous Q12H   heparin (porcine) 4,000 Units Intracatheter Once   insulin glargine 15 Units Subcutaneous Nightly   insulin lispro 0-14 Units Subcutaneous TID AC   pantoprazole 40 mg Oral Q AM   sodium chloride 10 mL Intravenous Q12H   tamsulosin 0.4 mg Oral Nightly   traZODone 50 mg Oral Nightly      Diet Regular; Consistent Carbohydrate, Renal    Assessment/Plan      Active Hospital Problems    Diagnosis  POA   • **Multiple myeloma (CMS/HCC) [C90.00]  Yes   • Hypomagnesemia [E83.42]  No   • Anemia [D64.9]  Yes   • Diabetes mellitus (CMS/HCC) [E11.9]  Yes   • Enlarged prostate [N40.0]  Yes   • NATHANIEL (acute kidney injury) (CMS/HCC) [N17.9]  Yes   • CKD (chronic kidney disease) [N18.9]  Yes   • Hypertension [I10]  Yes   • History of renal cell cancer [Z85.528]  Not Applicable   • H/O partial nephrectomy - right [Z90.5]  Not Applicable   • Elevated troponin [R79.89]  Yes      Resolved Hospital Problems   No resolved problems to display.       · NATHANIEL on CKD: Light chain nephropathy. Tunnel HD cath in place. Undergoing HD now. Sp plasmapheresis course during this stay as well. Nephrology and Vascular surgery following.  · IgG Randolph MM: Hypercellular marrow. Planned referral to [facility] for  consideration of autologous stem cell transplant. Oncology following.  · Anemia 2/2 malignancy and CKD  · BPH: Urology following.  · DM2: Continue insulin  · HTN  · Insomnia: Improved with current regimen  · Disposition: Home/TBD    Allan Landry MD  [facility]  06/02/20  13:07    Dictated portions using Dragon dictation software.

## 2020-06-02 NOTE — ANESTHESIA POSTPROCEDURE EVALUATION
"Patient: Contreras Albert    Procedure Summary     Date:  06/02/20 Room / Location:  [facility] OR [address] [place] [address] MAIN OR    Anesthesia Start:  0730 Anesthesia Stop:  0808    Procedure:  HEMODIALYSIS CATHETER INSERTION (N/A ) Diagnosis:      Surgeon:  Sumanth Hernandez MD Provider:  Mike Olivas MD    Anesthesia Type:  MAC ASA Status:  3          Anesthesia Type: MAC    Vitals  Vitals Value Taken Time   /90 6/2/2020  9:00 AM   Temp 36.5 °C (97.7 °F) 6/2/2020  9:00 AM   Pulse 75 6/2/2020  9:13 AM   Resp 16 6/2/2020  9:00 AM   SpO2 97 % 6/2/2020  9:13 AM   Vitals shown include unvalidated device data.        Post Anesthesia Care and Evaluation    Patient location during evaluation: PHASE II  Patient participation: complete - patient participated  Level of consciousness: awake  Pain management: adequate  Airway patency: patent  Anesthetic complications: No anesthetic complications    Cardiovascular status: acceptable  Respiratory status: acceptable  Hydration status: acceptable    Comments: /90   Pulse 80   Temp 36.5 °C (97.7 °F) (Oral)   Resp 16   Ht 193 cm (75.98\")   Wt 96.8 kg (213 lb 4.8 oz)   SpO2 97%   BMI 25.97 kg/m²         "

## 2020-06-02 NOTE — PROGRESS NOTES
Subjective     CHIEF COMPLAINT:     IgG multiple myeloma  Anemia    HISTORY OF PRESENT ILLNESS:      Patient felt poorly for several months and presented with severe anemia hemoglobin 5.6 and acute kidney failure creatinine 3.05.  Evaluation shows that significantly elevated total protein 10.4 with gammaglobulin 5.2, M spike 5.1 IgG lambda.  The free lambda light chains are dramatically elevated 6403 mg/L.  The serum calcium is not elevated and a skeletal survey showed diffuse osteoporosis and demineralization but no clear lytic lesions.  CT chest, abdomen, pelvis 5/22/2020 showed diffuse demineralization and mottled appearance of the bone marrow and mild splenomegaly.  Bone marrow biopsy on 5/23/2020 showed sheets of plasma cells representing 90% of the marrow with plasma cell dyscrasia.     The patient feels much stronger since receiving blood transfusion.  He remains somewhat anxious regarding diagnosis and treatment plan.  Nephrology has recommended 3 days of plasmapheresis given the significantly elevated light chains and renal failure.     The patient initiated plasmapheresis on 5/28/2020 along with Velcade/Cytoxan/dexamethasone.     The patient completed 3 days of plasmapheresis 5/30/2020.      Due to worsening kidney function and suspected myeloma kidney, he had tunneled dialysis catheter placed and started HD.       REVIEW OF SYSTEMS:  GENERAL: Fatigue  HEME/LYMPH: Easy bruising.  RESPIRATORY:  No SOB.   GI:  Negative for abdominal pain.   MSK: Muscle weakness      SCHEDULED MEDS:    acyclovir 400 mg Oral BID   allopurinol 100 mg Oral Daily   atorvastatin 20 mg Oral Daily   calcium acetate 1,334 mg Oral TID With Meals   [START ON 6/3/2020] epoetin valerie 10,000 Units Subcutaneous Once per day on Mon Wed Fri   finasteride 5 mg Oral Daily   heparin (porcine) 5,000 Units Subcutaneous Q12H   insulin glargine 15 Units Subcutaneous Nightly   insulin lispro 0-14 Units Subcutaneous TID AC   pantoprazole 40 mg Oral Q  AM   sodium chloride 10 mL Intravenous Q12H   tamsulosin 0.4 mg Oral Nightly   traZODone 50 mg Oral Nightly     INFUSIONS:     PRN MEDS:  •  acetaminophen **OR** acetaminophen **OR** acetaminophen  •  dextrose  •  dextrose  •  diphenhydrAMINE  •  glucagon (human recombinant)  •  nitroglycerin  •  ondansetron  •  [COMPLETED] Insert peripheral IV **AND** sodium chloride  •  sodium chloride  •  sodium chloride  •  traMADol       Objective   VITAL SIGNS:  Vitals:    06/02/20 0845 06/02/20 0900 06/02/20 0915 06/02/20 0937   BP: 147/91 156/90 152/88 147/82   BP Location:    Left arm   Patient Position:    Lying   Pulse: 77 80 75 76   Resp: 16 16 16 16   Temp:  97.7 °F (36.5 °C) 97.7 °F (36.5 °C) 98.2 °F (36.8 °C)   TempSrc:  Oral Oral Temporal   SpO2: 98% 97% 97% 98%   Weight:       Height:           Wt Readings from Last 3 Encounters:   06/01/20 96.8 kg (213 lb 4.8 oz)   05/18/16 120 kg (264 lb 9 oz)   05/11/16 123 kg (271 lb)       PHYSICAL EXAMINATION:  GENERAL:  The patient appears in fair general condition, not in acute distress.  SKIN: Warm and dry. No skin rashes.  Old ecchymosis over legs.   HEAD:  Normocephalic.  EYES:  No Jaundice. Pallor. Pupils equal. EOMI.  NECK:  Supple. No Thyromegaly. No Masses.  CHEST: Normal respiratory effort. Tunneled catheter in the right upper chest.   CARDIAC: No edema.  ABDOMEN:  Non-distended.  NEUROLOGICAL:  No Focal neurological deficits.        RESULT REVIEW:   Results from last 7 days   Lab Units 06/02/20  0951 06/01/20  0350 05/31/20  0511 05/30/20  0456 05/29/20  0503   WBC 10*3/mm3 3.47 4.30 5.82 7.37 6.13   NEUTROS ABS 10*3/mm3 1.99 3.61 5.35 6.11 4.97   LYMPHS ABS 10*3/mm3  --   --  0.36*  --  0.92   HEMOGLOBIN g/dL 9.1* 8.6* 9.1* 9.1* 9.1*   HEMATOCRIT % 27.0* 25.3* 27.2* 25.9* 26.6*   PLATELETS 10*3/mm3 98* 106* 130* 154 175     Results from last 7 days   Lab Units 06/02/20  0951 06/01/20  0350 05/31/20  0511  05/29/20  0503   SODIUM mmol/L 130* 134* 137   < > 133*    POTASSIUM mmol/L 3.9 4.6 3.7   < > 4.0   CHLORIDE mmol/L 98 98 104   < > 100   CO2 mmol/L 20.9* 21.6* 19.1*   < > 21.1*   BUN mg/dL 97* 95* 68*   < > 60*   CREATININE mg/dL 5.79* 6.36* 5.12*   < > 5.03*   CALCIUM mg/dL 8.5* 8.5* 7.2*   < > 8.5*   ALBUMIN g/dL 3.60 4.10 3.90   < > 3.60   BILIRUBIN mg/dL  --  0.3  --   --  0.8   ALK PHOS U/L  --  16*  --   --  33*   ALT (SGPT) U/L  --  6  --   --  <5   AST (SGOT) U/L  --  8  --   --  5   MAGNESIUM mg/dL 1.9 2.0 1.5*   < > 1.6    < > = values in this interval not displayed.             Assessment/Plan   1.  IgG lambda multiple myeloma.  · Bone marrow 5/23/2020 hypercellular marrow with 80% involvement of plasma cell myeloma; FISH studies pending  · Bone survey 5/26/2020: Diffuse osteoporosis and demineralization.  However, no discrete lytic lesions.  · SPEP 5/23/2020: M spike 5.1.  IgG 6629 lambda.  Light chain ratio 0 with free lambda light chains 6400.  Beta-2 microglobulin 16.3.  24-hour urine 12.7 g protein per 24-hour with monoclonal lambda free light chain 33.9%, monoclonal IgG lambda 23.5%  · 24-hour urine testing from 5/24/2020 free lambda light chains 8.4 g/L  · Considering renal failure, initiated CyBorD 5/28/20.  Given the significantly elevated light chains with the first cycle of treatment plan to give Velcade days 1, 4, 8, 11; Cytoxan 300 PO mg/m² weekly and dexamethasone 40 mg p.o. days 1, 2, 3, 4, 8 and 15.    · After cycle 1 treatment can likely be altered to weekly dosing of each drug.  · Plan is for referral to [facility] at discharge to discuss autologous stem cell transplant.  · Patient is due for day #8 treatment on 6/4/2020.      2.  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  · B12 folate and iron studies are normal.  · Status post PRBC transfusion on 5/24/2020.  · Hemoglobin was 8.6 and increased to 9.1 today.    3.  Acute kidney injury secondary to multiple myeloma and light chain  nephropathy.  · Showed enlargement of the kidneys concerning for myeloma kidney and light chain deposition disease.  · Patient completed 3 sessions of pheresis on 5/30/2020.  · Creatinine was 5.12 on 5/31/2020  · Creatinine increased to 6.36 on 6/1/2020.  · Patient had HD catheter placed and started on HD.   · Creatinine is 5.79 today.    4.  Tumor lysis prophylaxis.  · Patient is on allopurinol 100 mg daily.  · Uric acid level increased to 8.9 on 6/1/2020 due to the worsening of the kidney function.    · Uric acid is down to 8.0 today.     5.  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  · Status post right partial nephrectomy on 5/18/2016.    6.  DVT prophylaxis.  · Patient is on SQ heparin.    7.  Thrombocytopenia.  Platelet count is gradually declining and this is attributed to the effect of his treatment with Velcade and Cytoxan.  The gradual declines makes HIT much less likely.     PLAN:    - Plan for next treatment with Velcade, Cytoxan and Decardon on 6/4/2020.   - Monitor platelets.       Red Del Valle MD  06/02/20

## 2020-06-02 NOTE — DISCHARGE PLACEMENT REQUEST
"Panchito Lynch (64 y.o. Male)     Date of Birth Social Security Number Address Home Phone MRN    1955  [address] [place] [id number] [phone] [id number]    [facility] Marital Status          Mandaen        Admission Date Admission Type Admitting Provider Attending Provider Department, Room/Bed    5/22/20 Emergency Gabriella Alegria MD Baumann, Patrick D, MD [address], P387/1    Discharge Date Discharge Disposition Discharge Destination                       Attending Provider:  Allan Landry MD    Allergies:  Crestor [Rosuvastatin]    Isolation:  None   Infection:  None   Code Status:  CPR    Ht:  193 cm (75.98\")   Wt:  96.8 kg (213 lb 4.8 oz)    Admission Cmt:  None   Principal Problem:  Multiple myeloma (CMS/HCC) [C90.00]                 Active Insurance as of 5/22/2020     Primary Coverage     Payor Plan Insurance Group Employer/Plan Group    [place]      Payor Plan Address Payor Plan Phone Number Payor Plan Fax Number Effective Dates    [address] [phone]  5/22/2020 - None Entered    [facility] [id number]       Subscriber Name Subscriber Birth Date Member ID       PANCHITO LYNCH 1955 [id number]                 Emergency Contacts      (Rel.) Home Phone Work Phone Mobile Phone    Gabby Lynch (Spouse) [phone] -- --            Emergency Contact Information     Name Relation Home Work Mobile    Gabby Lynch Spouse [phone]            Ins[place] Information                [place] Phone: [phone]    Subscriber: Panchito Lynch Subscriber#: [id number]    Group#:  Precert#:           "

## 2020-06-03 ENCOUNTER — APPOINTMENT (OUTPATIENT)
Dept: LAB | Facility: HOSPITAL | Age: 65
End: 2020-06-03

## 2020-06-03 LAB
ALBUMIN SERPL-MCNC: 3.4 G/DL (ref 3.5–5.2)
ANION GAP SERPL CALCULATED.3IONS-SCNC: 8.5 MMOL/L (ref 5–15)
BASOPHILS # BLD AUTO: 0 10*3/MM3 (ref 0–0.2)
BASOPHILS NFR BLD AUTO: 0 % (ref 0–1.5)
BUN BLD-MCNC: 71 MG/DL (ref 8–23)
BUN/CREAT SERPL: 15.9 (ref 7–25)
CALCIUM SPEC-SCNC: 8.6 MG/DL (ref 8.6–10.5)
CHLORIDE SERPL-SCNC: 103 MMOL/L (ref 98–107)
CO2 SERPL-SCNC: 26.5 MMOL/L (ref 22–29)
CREAT BLD-MCNC: 4.46 MG/DL (ref 0.76–1.27)
DEPRECATED RDW RBC AUTO: 48.4 FL (ref 37–54)
EOSINOPHIL # BLD AUTO: 0.03 10*3/MM3 (ref 0–0.4)
EOSINOPHIL NFR BLD AUTO: 0.8 % (ref 0.3–6.2)
ERYTHROCYTE [DISTWIDTH] IN BLOOD BY AUTOMATED COUNT: 14.6 % (ref 12.3–15.4)
GFR SERPL CREATININE-BSD FRML MDRD: 13 ML/MIN/1.73
GFR SERPL CREATININE-BSD FRML MDRD: ABNORMAL ML/MIN/{1.73_M2}
GLUCOSE BLD-MCNC: 141 MG/DL (ref 65–99)
GLUCOSE BLDC GLUCOMTR-MCNC: 135 MG/DL (ref 70–130)
GLUCOSE BLDC GLUCOMTR-MCNC: 138 MG/DL (ref 70–130)
GLUCOSE BLDC GLUCOMTR-MCNC: 198 MG/DL (ref 70–130)
HCT VFR BLD AUTO: 26.6 % (ref 37.5–51)
HGB BLD-MCNC: 9 G/DL (ref 13–17.7)
IMM GRANULOCYTES # BLD AUTO: 0.02 10*3/MM3 (ref 0–0.05)
IMM GRANULOCYTES NFR BLD AUTO: 0.6 % (ref 0–0.5)
LYMPHOCYTES # BLD AUTO: 1.2 10*3/MM3 (ref 0.7–3.1)
LYMPHOCYTES NFR BLD AUTO: 33.9 % (ref 19.6–45.3)
MAGNESIUM SERPL-MCNC: 1.8 MG/DL (ref 1.6–2.4)
MCH RBC QN AUTO: 30.6 PG (ref 26.6–33)
MCHC RBC AUTO-ENTMCNC: 33.8 G/DL (ref 31.5–35.7)
MCV RBC AUTO: 90.5 FL (ref 79–97)
MONOCYTES # BLD AUTO: 0.29 10*3/MM3 (ref 0.1–0.9)
MONOCYTES NFR BLD AUTO: 8.2 % (ref 5–12)
NEUTROPHILS # BLD AUTO: 2 10*3/MM3 (ref 1.7–7)
NEUTROPHILS NFR BLD AUTO: 56.5 % (ref 42.7–76)
NRBC BLD AUTO-RTO: 0 /100 WBC (ref 0–0.2)
PHOSPHATE SERPL-MCNC: 4.8 MG/DL (ref 2.5–4.5)
PLATELET # BLD AUTO: 106 10*3/MM3 (ref 140–450)
PMV BLD AUTO: 10.1 FL (ref 6–12)
POTASSIUM BLD-SCNC: 4.1 MMOL/L (ref 3.5–5.2)
RBC # BLD AUTO: 2.94 10*6/MM3 (ref 4.14–5.8)
SODIUM BLD-SCNC: 138 MMOL/L (ref 136–145)
URATE SERPL-MCNC: 5.8 MG/DL (ref 3.4–7)
WBC NRBC COR # BLD: 3.54 10*3/MM3 (ref 3.4–10.8)

## 2020-06-03 PROCEDURE — 25010000002 HEPARIN (PORCINE) PER 1000 UNITS: Performed by: INTERNAL MEDICINE

## 2020-06-03 PROCEDURE — 84550 ASSAY OF BLOOD/URIC ACID: CPT | Performed by: SURGERY

## 2020-06-03 PROCEDURE — 25010000002 HEPARIN (PORCINE) PER 1000 UNITS: Performed by: SURGERY

## 2020-06-03 PROCEDURE — 99232 SBSQ HOSP IP/OBS MODERATE 35: CPT | Performed by: INTERNAL MEDICINE

## 2020-06-03 PROCEDURE — 85025 COMPLETE CBC W/AUTO DIFF WBC: CPT | Performed by: SURGERY

## 2020-06-03 PROCEDURE — 63710000001 INSULIN LISPRO (HUMAN) PER 5 UNITS: Performed by: SURGERY

## 2020-06-03 PROCEDURE — 80069 RENAL FUNCTION PANEL: CPT | Performed by: SURGERY

## 2020-06-03 PROCEDURE — 5A1D70Z PERFORMANCE OF URINARY FILTRATION, INTERMITTENT, LESS THAN 6 HOURS PER DAY: ICD-10-PCS | Performed by: INTERNAL MEDICINE

## 2020-06-03 PROCEDURE — 83735 ASSAY OF MAGNESIUM: CPT | Performed by: SURGERY

## 2020-06-03 PROCEDURE — 25010000002 EPOETIN ALFA PER 1000 UNITS: Performed by: SURGERY

## 2020-06-03 PROCEDURE — 82962 GLUCOSE BLOOD TEST: CPT

## 2020-06-03 PROCEDURE — 86704 HEP B CORE ANTIBODY TOTAL: CPT | Performed by: INTERNAL MEDICINE

## 2020-06-03 PROCEDURE — 63710000001 INSULIN GLARGINE PER 5 UNITS: Performed by: SURGERY

## 2020-06-03 RX ORDER — ACYCLOVIR 400 MG/1
200 TABLET ORAL 2 TIMES DAILY
Status: DISCONTINUED | OUTPATIENT
Start: 2020-06-03 | End: 2020-06-04 | Stop reason: HOSPADM

## 2020-06-03 RX ORDER — ALLOPURINOL 100 MG/1
100 TABLET ORAL DAILY
Qty: 30 TABLET | Refills: 0 | Status: SHIPPED | OUTPATIENT
Start: 2020-06-04 | End: 2020-06-04

## 2020-06-03 RX ORDER — BLOOD-GLUCOSE METER
1 KIT MISCELLANEOUS DAILY
Qty: 1 EACH | Refills: 0 | Status: SHIPPED | OUTPATIENT
Start: 2020-06-03 | End: 2020-06-04 | Stop reason: SDUPTHER

## 2020-06-03 RX ORDER — HEPARIN SODIUM 1000 [USP'U]/ML
4000 INJECTION, SOLUTION INTRAVENOUS; SUBCUTANEOUS AS NEEDED
Status: DISCONTINUED | OUTPATIENT
Start: 2020-06-03 | End: 2020-06-03

## 2020-06-03 RX ORDER — HEPARIN SODIUM 1000 [USP'U]/ML
4000 INJECTION, SOLUTION INTRAVENOUS; SUBCUTANEOUS AS NEEDED
Status: DISCONTINUED | OUTPATIENT
Start: 2020-06-03 | End: 2020-06-04 | Stop reason: HOSPADM

## 2020-06-03 RX ORDER — TRAMADOL HYDROCHLORIDE 50 MG/1
50 TABLET ORAL EVERY 12 HOURS PRN
Qty: 6 TABLET | Refills: 0 | Status: SHIPPED | OUTPATIENT
Start: 2020-06-03 | End: 2020-06-04

## 2020-06-03 RX ORDER — LANCETS 28 GAUGE
EACH MISCELLANEOUS
Qty: 100 EACH | Refills: 0 | Status: SHIPPED | OUTPATIENT
Start: 2020-06-03 | End: 2020-06-04 | Stop reason: SDUPTHER

## 2020-06-03 RX ORDER — PANTOPRAZOLE SODIUM 40 MG/1
40 TABLET, DELAYED RELEASE ORAL DAILY
Qty: 30 TABLET | Refills: 0 | Status: SHIPPED | OUTPATIENT
Start: 2020-06-03 | End: 2020-06-04

## 2020-06-03 RX ADMIN — TAMSULOSIN HYDROCHLORIDE 0.4 MG: 0.4 CAPSULE ORAL at 20:06

## 2020-06-03 RX ADMIN — HEPARIN SODIUM 5000 UNITS: 5000 INJECTION INTRAVENOUS; SUBCUTANEOUS at 20:05

## 2020-06-03 RX ADMIN — INSULIN LISPRO 3 UNITS: 100 INJECTION, SOLUTION INTRAVENOUS; SUBCUTANEOUS at 11:42

## 2020-06-03 RX ADMIN — ACYCLOVIR 400 MG: 400 TABLET ORAL at 08:05

## 2020-06-03 RX ADMIN — SODIUM CHLORIDE, PRESERVATIVE FREE 10 ML: 5 INJECTION INTRAVENOUS at 21:00

## 2020-06-03 RX ADMIN — CALCIUM ACETATE 1334 MG: 667 CAPSULE ORAL at 17:54

## 2020-06-03 RX ADMIN — HEPARIN SODIUM 5000 UNITS: 5000 INJECTION INTRAVENOUS; SUBCUTANEOUS at 08:05

## 2020-06-03 RX ADMIN — CALCIUM ACETATE 1334 MG: 667 CAPSULE ORAL at 08:05

## 2020-06-03 RX ADMIN — ERYTHROPOIETIN 10000 UNITS: 10000 INJECTION, SOLUTION INTRAVENOUS; SUBCUTANEOUS at 16:22

## 2020-06-03 RX ADMIN — PANTOPRAZOLE SODIUM 40 MG: 40 TABLET, DELAYED RELEASE ORAL at 06:33

## 2020-06-03 RX ADMIN — ATORVASTATIN CALCIUM 20 MG: 20 TABLET, FILM COATED ORAL at 08:05

## 2020-06-03 RX ADMIN — HEPARIN SODIUM 4000 UNITS: 1000 INJECTION INTRAVENOUS; SUBCUTANEOUS at 16:46

## 2020-06-03 RX ADMIN — CALCIUM ACETATE 1334 MG: 667 CAPSULE ORAL at 11:42

## 2020-06-03 RX ADMIN — ACYCLOVIR 200 MG: 400 TABLET ORAL at 20:06

## 2020-06-03 RX ADMIN — INSULIN GLARGINE 15 UNITS: 100 INJECTION, SOLUTION SUBCUTANEOUS at 20:13

## 2020-06-03 RX ADMIN — SODIUM CHLORIDE, PRESERVATIVE FREE 10 ML: 5 INJECTION INTRAVENOUS at 08:06

## 2020-06-03 RX ADMIN — ALLOPURINOL 100 MG: 100 TABLET ORAL at 08:05

## 2020-06-03 RX ADMIN — FINASTERIDE 5 MG: 5 TABLET, FILM COATED ORAL at 08:05

## 2020-06-03 RX ADMIN — TRAZODONE HYDROCHLORIDE 50 MG: 50 TABLET ORAL at 20:06

## 2020-06-03 NOTE — PLAN OF CARE
Problem: Patient Care Overview  Goal: Plan of Care Review  Outcome: Ongoing (interventions implemented as appropriate)  Flowsheets  Taken 6/3/2020 0500  Progress: no change  Outcome Summary: New hemodialysis cath bleeding, dressing changed. Pt slept well w/ no complaints. Hypotensive this AM. Continuing to monitor.  Taken 6/2/2020 2737  Plan of Care Reviewed With: patient

## 2020-06-03 NOTE — PROGRESS NOTES
Continued Stay Note  [facility]     Patient Name: Contreras Albert  MRN: [id number]  Today's Date: 6/3/2020    Admit Date: 5/22/2020    Discharge Plan     Row Name 06/03/20 1234       Plan    Plan  Plans dc home with assist of spouse. F/U outpatient [facility] dialysis.    Patient/Family in Agreement with Plan  yes    Plan Comments  Spoke with patient and spouse at bedside regarding dc plans/needs.  Plans dc home with assist of spouse. Will receive Velcade SQ and Cytoxan PO outpatient. [facility] dialysis being arranged (M-W-F). Referral called to [facility]. States referral is in process and insurance being verified. Family to transport per private auto. Continue to follow.         Discharge Codes    No documentation.             Amber Rodrigez RN

## 2020-06-03 NOTE — PROGRESS NOTES
Name: Contreras Albert ADMIT: 2020   : 1955  PCP: Nahum Portillo MD    MRN: [id number] LOS: 12 days   AGE/SEX: 64 y.o. male  ROOM: [facility]   Subjective   Chief Complaint   Patient presents with   • Abnormal Lab   • Dizziness   • Weakness - Generalized      In good spirits today. No CP SOA NVD or pain reported.    Objective   Vital Signs  Temp:  [96.7 °F (35.9 °C)-97.8 °F (36.6 °C)] 97.6 °F (36.4 °C)  Heart Rate:  [78-84] 81  Resp:  [16-18] 18  BP: ()/(60-86) 153/79  SpO2:  [97 %-98 %] 97 %  on   ;   Device (Oxygen Therapy): room air  Body mass index is 27.52 kg/m².    Physical Exam   Constitutional: He appears well-developed. No distress.   HENT:   Head: Atraumatic.   Nose: Nose normal.   Eyes: Conjunctivae and EOM are normal.   Neck: Neck supple. No tracheal deviation present.   Cardiovascular: Normal rate, regular rhythm and intact distal pulses.   Pulmonary/Chest: Effort normal. He has no wheezes. He has no rales.   Abdominal: Soft. He exhibits no distension. There is no tenderness. There is no rebound and no guarding.   Musculoskeletal: Normal range of motion. He exhibits no edema.   Neurological: He is alert. No cranial nerve deficit.   Skin: Skin is warm and dry. He is not diaphoretic.   Psychiatric: He has a normal mood and affect. His behavior is normal.   Nursing note and vitals reviewed.      Results Review:       I reviewed the patient's new clinical results.      Results from last 7 days   Lab Units 20  0502 20  0951 20  0350 20  0511   WBC 10*3/mm3 3.54 3.47 4.30 5.82   HEMOGLOBIN g/dL 9.0* 9.1* 8.6* 9.1*   PLATELETS 10*3/mm3 106* 98* 106* 130*     Results from last 7 days   Lab Units 20  0503 20  0951 20  0350 20  0511   SODIUM mmol/L 138 130* 134* 137   POTASSIUM mmol/L 4.1 3.9 4.6 3.7   CHLORIDE mmol/L 103 98 98 104   CO2 mmol/L 26.5 20.9* 21.6* 19.1*   BUN mg/dL 71* 97* 95* 68*   CREATININE mg/dL 4.46* 5.79* 6.36* 5.12*   GLUCOSE  mg/dL 141* 157* 297* 308*   Estimated Creatinine Clearance: 25 mL/min (A) (by C-G formula based on SCr of 4.46 mg/dL (H)).  Results from last 7 days   Lab Units 06/03/20  0503 06/02/20  0951 06/01/20  0350 05/31/20  0511   CALCIUM mg/dL 8.6 8.5* 8.5* 7.2*   ALBUMIN g/dL 3.40* 3.60 4.10 3.90   MAGNESIUM mg/dL 1.8 1.9 2.0 1.5*   PHOSPHORUS mg/dL 4.8* 5.1* 6.5* 5.8*         acyclovir 400 mg Oral BID   allopurinol 100 mg Oral Daily   atorvastatin 20 mg Oral Daily   calcium acetate 1,334 mg Oral TID With Meals   epoetin valerie 10,000 Units Subcutaneous Once per day on Mon Wed Fri   finasteride 5 mg Oral Daily   heparin (porcine) 5,000 Units Subcutaneous Q12H   insulin glargine 15 Units Subcutaneous Nightly   insulin lispro 0-14 Units Subcutaneous TID AC   pantoprazole 40 mg Oral Q AM   sodium chloride 10 mL Intravenous Q12H   tamsulosin 0.4 mg Oral Nightly   traZODone 50 mg Oral Nightly      Diet Regular; Consistent Carbohydrate, Renal    Assessment/Plan      Active Hospital Problems    Diagnosis  POA   • **Multiple myeloma (CMS/HCC) [C90.00]  Yes   • Hypomagnesemia [E83.42]  No   • Anemia [D64.9]  Yes   • Diabetes mellitus (CMS/HCC) [E11.9]  Yes   • Enlarged prostate [N40.0]  Yes   • NATHANIEL (acute kidney injury) (CMS/HCC) [N17.9]  Yes   • CKD (chronic kidney disease) [N18.9]  Yes   • Hypertension [I10]  Yes   • History of renal cell cancer [Z85.528]  Not Applicable   • H/O partial nephrectomy - right [Z90.5]  Not Applicable   • Elevated troponin [R79.89]  Yes      Resolved Hospital Problems   No resolved problems to display.       · NATHANIEL on CKD: Light chain nephropathy. Tunnel cath HD access. Nephrology following.  · IgG Arndolph MM: Hypercellular marrow. Sp plasmapheresis. On Velcade, Cytoxan, Decadron. Planned referral to [facility] for consideration of autologous stem cell transplant. Oncology following.  · Anemia 2/2 malignancy and CKD  · BPH: Urology following.  · DM2: Continue insulin  · HTN  · Insomnia: Improved with current  regimen  · Disposition: Home/will follow up nephrology recommendations. He is cleared for dc from oncology standpoint. Will need outpatient HD set up.    Allan Landry MD  [facility]  06/03/20  11:41    Dictated portions using Dragon dictation software.

## 2020-06-03 NOTE — PROGRESS NOTES
Subjective     CHIEF COMPLAINT:     IgG multiple myeloma  Anemia    INTERVAL HISTORY:     Patient is feeling better today. He is not having pain at the site of the HD catheter.     HISTORY OF PRESENT ILLNESS:    Patient felt poorly for several months and presented with severe anemia hemoglobin 5.6 and acute kidney failure creatinine 3.05.  Evaluation shows that significantly elevated total protein 10.4 with gammaglobulin 5.2, M spike 5.1 IgG lambda.  The free lambda light chains are dramatically elevated 6403 mg/L.  The serum calcium is not elevated and a skeletal survey showed diffuse osteoporosis and demineralization but no clear lytic lesions.  CT chest, abdomen, pelvis 5/22/2020 showed diffuse demineralization and mottled appearance of the bone marrow and mild splenomegaly.  Bone marrow biopsy on 5/23/2020 showed sheets of plasma cells representing 90% of the marrow with plasma cell dyscrasia.     The patient feels much stronger since receiving blood transfusion.  He remains somewhat anxious regarding diagnosis and treatment plan.  Nephrology has recommended 3 days of plasmapheresis given the significantly elevated light chains and renal failure.     The patient initiated plasmapheresis on 5/28/2020 along with Velcade/Cytoxan/dexamethasone.     The patient completed 3 days of plasmapheresis 5/30/2020.      Due to worsening kidney function and suspected myeloma kidney, he had tunneled dialysis catheter placed and started HD.       REVIEW OF SYSTEMS:  GENERAL: Fatigue  RESPIRATORY:  No SOB.   CVS: No chest pain.  MSK: Muscle weakness      SCHEDULED MEDS:    acyclovir 400 mg Oral BID   allopurinol 100 mg Oral Daily   atorvastatin 20 mg Oral Daily   calcium acetate 1,334 mg Oral TID With Meals   epoetin valerie 10,000 Units Subcutaneous Once per day on Mon Wed Fri   finasteride 5 mg Oral Daily   heparin (porcine) 5,000 Units Subcutaneous Q12H   insulin glargine 15 Units Subcutaneous Nightly   insulin lispro 0-14 Units  Subcutaneous TID AC   pantoprazole 40 mg Oral Q AM   sodium chloride 10 mL Intravenous Q12H   tamsulosin 0.4 mg Oral Nightly   traZODone 50 mg Oral Nightly     INFUSIONS:     PRN MEDS:  •  acetaminophen **OR** acetaminophen **OR** acetaminophen  •  dextrose  •  dextrose  •  diphenhydrAMINE  •  glucagon (human recombinant)  •  nitroglycerin  •  ondansetron  •  [COMPLETED] Insert peripheral IV **AND** sodium chloride  •  sodium chloride  •  sodium chloride  •  traMADol       Objective   VITAL SIGNS:  Vitals:    06/1955 06/03/20 0447 06/03/20 0451 06/03/20 0746   BP: 140/76 97/60  140/86   BP Location: Left arm Left arm  Left arm   Patient Position: Lying Sitting  Lying   Pulse: 79 84  78   Resp: 16 18 18   Temp: 96.8 °F (36 °C) 96.7 °F (35.9 °C)  97.4 °F (36.3 °C)   TempSrc: Oral Oral  Oral   SpO2: 98% 97%  97%   Weight:  102 kg (225 lb 14.4 oz) 103 kg (226 lb)    Height:           Wt Readings from Last 3 Encounters:   06/03/20 103 kg (226 lb)   05/18/16 120 kg (264 lb 9 oz)   05/11/16 123 kg (271 lb)       PHYSICAL EXAMINATION:  GENERAL:  The patient appears in fair general condition, not in acute distress.  SKIN: Warm and dry. No skin rashes.  Old ecchymosis over legs.   HEAD:  Normocephalic.  EYES:  No Jaundice. Pallor. Pupils equal. EOMI.  NECK:  Supple. No Thyromegaly. No Masses.  CHEST: Normal respiratory effort. Tunneled catheter in the right upper chest. No swelling or hematoma.   CARDIAC: No edema.  NEUROLOGICAL:  No Focal neurological deficits.        RESULT REVIEW:   Results from last 7 days   Lab Units 06/03/20  0502 06/02/20  0951 06/01/20  0350 05/31/20  0511 05/30/20  0456 05/29/20  0503   WBC 10*3/mm3 3.54 3.47 4.30 5.82 7.37 6.13   NEUTROS ABS 10*3/mm3 2.00 1.99 3.61 5.35 6.11 4.97   LYMPHS ABS 10*3/mm3  --   --   --  0.36*  --  0.92   HEMOGLOBIN g/dL 9.0* 9.1* 8.6* 9.1* 9.1* 9.1*   HEMATOCRIT % 26.6* 27.0* 25.3* 27.2* 25.9* 26.6*   PLATELETS 10*3/mm3 106* 98* 106* 130* 154 175     Results from  last 7 days   Lab Units 06/03/20  0503 06/02/20  0951 06/01/20  0350  05/29/20  0503   SODIUM mmol/L 138 130* 134*   < > 133*   POTASSIUM mmol/L 4.1 3.9 4.6   < > 4.0   CHLORIDE mmol/L 103 98 98   < > 100   CO2 mmol/L 26.5 20.9* 21.6*   < > 21.1*   BUN mg/dL 71* 97* 95*   < > 60*   CREATININE mg/dL 4.46* 5.79* 6.36*   < > 5.03*   CALCIUM mg/dL 8.6 8.5* 8.5*   < > 8.5*   ALBUMIN g/dL 3.40* 3.60 4.10   < > 3.60   BILIRUBIN mg/dL  --   --  0.3  --  0.8   ALK PHOS U/L  --   --  16*  --  33*   ALT (SGPT) U/L  --   --  6  --  <5   AST (SGOT) U/L  --   --  8  --  5   MAGNESIUM mg/dL 1.8 1.9 2.0   < > 1.6    < > = values in this interval not displayed.           Assessment/Plan   1.  IgG lambda multiple myeloma.  · Bone marrow 5/23/2020 hypercellular marrow with 80% involvement of plasma cell myeloma; FISH studies pending  · Bone survey 5/26/2020: Diffuse osteoporosis and demineralization.  However, no discrete lytic lesions.  · SPEP 5/23/2020: M spike 5.1.  IgG 6629 lambda.  Light chain ratio 0 with free lambda light chains 6400.  Beta-2 microglobulin 16.3.  24-hour urine 12.7 g protein per 24-hour with monoclonal lambda free light chain 33.9%, monoclonal IgG lambda 23.5%  · 24-hour urine testing from 5/24/2020 free lambda light chains 8.4 g/L  · Considering renal failure, initiated CyBorD 5/28/20.  Given the significantly elevated light chains with the first cycle of treatment plan to give Velcade days 1, 4, 8, 11; Cytoxan 300 PO mg/m² weekly and dexamethasone 40 mg p.o. days 1, 2, 3, 4, 8 and 15.    · After cycle 1 treatment can likely be altered to weekly dosing of each drug.  · Plan is for referral to [facility] at discharge to discuss autologous stem cell transplant.  · Patient is due for day #8 treatment on 6/4/2020.      2.  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  · B12 folate and iron studies are normal.  · Status post PRBC transfusion on 5/24/2020.  · Hemoglobin is  9.0 today.  · Patient is receiving Procrit with his hemodialysis.    3.  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  · Showed enlargement of the kidneys concerning for myeloma kidney and light chain deposition disease.  · Patient completed 3 sessions of pheresis on 5/30/2020.  · Creatinine was 5.12 on 5/31/2020  · Creatinine increased to 6.36 on 6/1/2020.  · Patient had HD catheter placed and started on HD on 6/2/2020.   · Creatinine is 4.46 today.     4.  Tumor lysis prophylaxis.  · Patient is on allopurinol 100 mg daily.  · Uric acid level increased to 8.9 on 6/1/2020 due to the worsening of the kidney function.    · Uric acid is down to 8.0 today.     5.  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  · Status post right partial nephrectomy on 5/18/2016.    6.  DVT prophylaxis.  · Patient is on SQ heparin.    7.  Thrombocytopenia.   · Platelet count was gradually declining and this is attributed to the effect of his treatment with Velcade and Cytoxan.    · The gradual declines makes HIT much less likely.   · Platelet count improved to 106,000 today.    PLAN:    -  Patient is stable from Heme Onc standpoint. He is ok for discharge today. He is scheduled to see Dr. Ortiz tomorrow and to receive his next treatment with Velcade, Cytoxan and Decadron.  -  Discussed with Dr. Hutchison and with the patient's RN.       Red Del Valle MD  06/03/20

## 2020-06-03 NOTE — PROGRESS NOTES
Patient is postop day 1 placement of tunneled dialysis catheter.  Slight soreness but otherwise feeling well.  No bleeding or hematoma.  We will see again on request.

## 2020-06-04 ENCOUNTER — APPOINTMENT (OUTPATIENT)
Dept: LAB | Facility: HOSPITAL | Age: 65
End: 2020-06-04

## 2020-06-04 ENCOUNTER — APPOINTMENT (OUTPATIENT)
Dept: ONCOLOGY | Facility: HOSPITAL | Age: 65
End: 2020-06-04

## 2020-06-04 VITALS
SYSTOLIC BLOOD PRESSURE: 136 MMHG | BODY MASS INDEX: 27.22 KG/M2 | HEART RATE: 80 BPM | TEMPERATURE: 97.6 F | OXYGEN SATURATION: 99 % | HEIGHT: 76 IN | RESPIRATION RATE: 16 BRPM | DIASTOLIC BLOOD PRESSURE: 78 MMHG | WEIGHT: 223.5 LBS

## 2020-06-04 LAB
ALBUMIN SERPL-MCNC: 3.2 G/DL (ref 3.5–5.2)
ANION GAP SERPL CALCULATED.3IONS-SCNC: 10.2 MMOL/L (ref 5–15)
BASOPHILS # BLD AUTO: 0 10*3/MM3 (ref 0–0.2)
BASOPHILS NFR BLD AUTO: 0 % (ref 0–1.5)
BUN BLD-MCNC: 47 MG/DL (ref 8–23)
BUN/CREAT SERPL: 12.6 (ref 7–25)
CALCIUM SPEC-SCNC: 8.5 MG/DL (ref 8.6–10.5)
CHLORIDE SERPL-SCNC: 99 MMOL/L (ref 98–107)
CO2 SERPL-SCNC: 27.8 MMOL/L (ref 22–29)
CREAT BLD-MCNC: 3.74 MG/DL (ref 0.76–1.27)
DEPRECATED RDW RBC AUTO: 48.3 FL (ref 37–54)
EOSINOPHIL # BLD AUTO: 0.04 10*3/MM3 (ref 0–0.4)
EOSINOPHIL NFR BLD AUTO: 1.7 % (ref 0.3–6.2)
ERYTHROCYTE [DISTWIDTH] IN BLOOD BY AUTOMATED COUNT: 14.5 % (ref 12.3–15.4)
GFR SERPL CREATININE-BSD FRML MDRD: 17 ML/MIN/1.73
GLUCOSE BLD-MCNC: 142 MG/DL (ref 65–99)
GLUCOSE BLDC GLUCOMTR-MCNC: 131 MG/DL (ref 70–130)
GLUCOSE BLDC GLUCOMTR-MCNC: 192 MG/DL (ref 70–130)
HBV CORE AB SER DONR QL IA: NEGATIVE
HCT VFR BLD AUTO: 25.1 % (ref 37.5–51)
HGB BLD-MCNC: 8.4 G/DL (ref 13–17.7)
IMM GRANULOCYTES # BLD AUTO: 0.02 10*3/MM3 (ref 0–0.05)
IMM GRANULOCYTES NFR BLD AUTO: 0.8 % (ref 0–0.5)
LYMPHOCYTES # BLD AUTO: 0.65 10*3/MM3 (ref 0.7–3.1)
LYMPHOCYTES NFR BLD AUTO: 26.9 % (ref 19.6–45.3)
MAGNESIUM SERPL-MCNC: 1.8 MG/DL (ref 1.6–2.4)
MCH RBC QN AUTO: 30.5 PG (ref 26.6–33)
MCHC RBC AUTO-ENTMCNC: 33.5 G/DL (ref 31.5–35.7)
MCV RBC AUTO: 91.3 FL (ref 79–97)
MONOCYTES # BLD AUTO: 0.23 10*3/MM3 (ref 0.1–0.9)
MONOCYTES NFR BLD AUTO: 9.5 % (ref 5–12)
NEUTROPHILS # BLD AUTO: 1.48 10*3/MM3 (ref 1.7–7)
NEUTROPHILS NFR BLD AUTO: 61.1 % (ref 42.7–76)
NRBC BLD AUTO-RTO: 0.4 /100 WBC (ref 0–0.2)
PHOSPHATE SERPL-MCNC: 3.9 MG/DL (ref 2.5–4.5)
PLATELET # BLD AUTO: 89 10*3/MM3 (ref 140–450)
PMV BLD AUTO: 11.2 FL (ref 6–12)
POTASSIUM BLD-SCNC: 3.7 MMOL/L (ref 3.5–5.2)
RBC # BLD AUTO: 2.75 10*6/MM3 (ref 4.14–5.8)
SODIUM BLD-SCNC: 137 MMOL/L (ref 136–145)
URATE SERPL-MCNC: 4.4 MG/DL (ref 3.4–7)
WBC NRBC COR # BLD: 2.42 10*3/MM3 (ref 3.4–10.8)

## 2020-06-04 PROCEDURE — 83735 ASSAY OF MAGNESIUM: CPT | Performed by: SURGERY

## 2020-06-04 PROCEDURE — 99232 SBSQ HOSP IP/OBS MODERATE 35: CPT | Performed by: INTERNAL MEDICINE

## 2020-06-04 PROCEDURE — 63710000001 INSULIN LISPRO (HUMAN) PER 5 UNITS: Performed by: SURGERY

## 2020-06-04 PROCEDURE — 25010000002 HEPARIN (PORCINE) PER 1000 UNITS: Performed by: SURGERY

## 2020-06-04 PROCEDURE — 85025 COMPLETE CBC W/AUTO DIFF WBC: CPT | Performed by: SURGERY

## 2020-06-04 PROCEDURE — 63710000001 DEXAMETHASONE PER 0.25 MG: Performed by: INTERNAL MEDICINE

## 2020-06-04 PROCEDURE — 63710000001 CYCLOPHOSPHAMIDE PER 25 MG: Performed by: INTERNAL MEDICINE

## 2020-06-04 PROCEDURE — 80069 RENAL FUNCTION PANEL: CPT | Performed by: SURGERY

## 2020-06-04 PROCEDURE — 25010000002 BORTEZOMIB PER 0.1 MG: Performed by: INTERNAL MEDICINE

## 2020-06-04 PROCEDURE — 82962 GLUCOSE BLOOD TEST: CPT

## 2020-06-04 PROCEDURE — 84550 ASSAY OF BLOOD/URIC ACID: CPT | Performed by: SURGERY

## 2020-06-04 RX ORDER — TRAMADOL HYDROCHLORIDE 50 MG/1
50 TABLET ORAL EVERY 12 HOURS PRN
Qty: 6 TABLET | Refills: 0 | Status: SHIPPED | OUTPATIENT
Start: 2020-06-04 | End: 2020-07-16 | Stop reason: SDUPTHER

## 2020-06-04 RX ORDER — PANTOPRAZOLE SODIUM 40 MG/1
40 TABLET, DELAYED RELEASE ORAL DAILY
Qty: 30 TABLET | Refills: 0 | Status: SHIPPED | OUTPATIENT
Start: 2020-06-04 | End: 2021-01-15

## 2020-06-04 RX ORDER — ACYCLOVIR 400 MG/1
400 TABLET ORAL 2 TIMES DAILY
Qty: 60 TABLET | Refills: 7 | Status: SHIPPED | OUTPATIENT
Start: 2020-06-04 | End: 2021-01-15

## 2020-06-04 RX ORDER — CALCIUM ACETATE 667 MG/1
1334 TABLET ORAL
Qty: 180 TABLET | Refills: 0 | Status: SHIPPED | OUTPATIENT
Start: 2020-06-04 | End: 2021-01-15

## 2020-06-04 RX ORDER — LANCETS 28 GAUGE
EACH MISCELLANEOUS
Qty: 100 EACH | Refills: 0 | Status: SHIPPED | OUTPATIENT
Start: 2020-06-04 | End: 2022-11-29 | Stop reason: SDUPTHER

## 2020-06-04 RX ORDER — ALLOPURINOL 100 MG/1
100 TABLET ORAL DAILY
Qty: 30 TABLET | Refills: 0 | Status: SHIPPED | OUTPATIENT
Start: 2020-06-04 | End: 2020-07-23

## 2020-06-04 RX ORDER — DEXAMETHASONE 4 MG/1
40 TABLET ORAL WEEKLY
Qty: 40 TABLET | Refills: 1 | Status: SHIPPED | OUTPATIENT
Start: 2020-06-04 | End: 2020-06-25 | Stop reason: SDUPTHER

## 2020-06-04 RX ORDER — CYCLOPHOSPHAMIDE 50 MG/1
650 CAPSULE ORAL ONCE
Status: COMPLETED | OUTPATIENT
Start: 2020-06-04 | End: 2020-06-04

## 2020-06-04 RX ORDER — SULFAMETHOXAZOLE AND TRIMETHOPRIM 800; 160 MG/1; MG/1
1 TABLET ORAL 3 TIMES WEEKLY
Qty: 12 TABLET | Refills: 7 | Status: SHIPPED | OUTPATIENT
Start: 2020-06-05 | End: 2021-01-15

## 2020-06-04 RX ORDER — BLOOD-GLUCOSE METER
1 KIT MISCELLANEOUS DAILY
Qty: 1 EACH | Refills: 0 | Status: SHIPPED | OUTPATIENT
Start: 2020-06-04

## 2020-06-04 RX ORDER — BORTEZOMIB 3.5 MG/1
1.3 INJECTION, POWDER, LYOPHILIZED, FOR SOLUTION INTRAVENOUS; SUBCUTANEOUS ONCE
Status: COMPLETED | OUTPATIENT
Start: 2020-06-04 | End: 2020-06-04

## 2020-06-04 RX ORDER — ONDANSETRON HYDROCHLORIDE 8 MG/1
8 TABLET, FILM COATED ORAL 3 TIMES DAILY PRN
Qty: 30 TABLET | Refills: 5 | Status: SHIPPED | OUTPATIENT
Start: 2020-06-04

## 2020-06-04 RX ADMIN — CALCIUM ACETATE 1334 MG: 667 CAPSULE ORAL at 08:44

## 2020-06-04 RX ADMIN — FINASTERIDE 5 MG: 5 TABLET, FILM COATED ORAL at 08:43

## 2020-06-04 RX ADMIN — CALCIUM ACETATE 1334 MG: 667 CAPSULE ORAL at 11:51

## 2020-06-04 RX ADMIN — BORTEZOMIB 3 MG: 3.5 INJECTION, POWDER, LYOPHILIZED, FOR SOLUTION INTRAVENOUS; SUBCUTANEOUS at 12:10

## 2020-06-04 RX ADMIN — ATORVASTATIN CALCIUM 20 MG: 20 TABLET, FILM COATED ORAL at 08:43

## 2020-06-04 RX ADMIN — DEXAMETHASONE 40 MG: 6 TABLET ORAL at 10:34

## 2020-06-04 RX ADMIN — ACYCLOVIR 200 MG: 400 TABLET ORAL at 08:43

## 2020-06-04 RX ADMIN — SODIUM CHLORIDE, PRESERVATIVE FREE 10 ML: 5 INJECTION INTRAVENOUS at 08:44

## 2020-06-04 RX ADMIN — HEPARIN SODIUM 5000 UNITS: 5000 INJECTION INTRAVENOUS; SUBCUTANEOUS at 08:44

## 2020-06-04 RX ADMIN — ALLOPURINOL 100 MG: 100 TABLET ORAL at 08:43

## 2020-06-04 RX ADMIN — CYCLOPHOSPHAMIDE 650 MG: 50 CAPSULE ORAL at 11:48

## 2020-06-04 RX ADMIN — PANTOPRAZOLE SODIUM 40 MG: 40 TABLET, DELAYED RELEASE ORAL at 05:54

## 2020-06-04 RX ADMIN — INSULIN LISPRO 3 UNITS: 100 INJECTION, SOLUTION INTRAVENOUS; SUBCUTANEOUS at 12:00

## 2020-06-04 NOTE — PLAN OF CARE
Problem: Patient Care Overview  Goal: Plan of Care Review  Outcome: Ongoing (interventions implemented as appropriate)  Flowsheets  Taken 6/4/2020 0647  Progress: no change  Taken 6/3/2020 2003  Plan of Care Reviewed With: patient  Note:   No complaints during night, VSS, ready to go home.  Is concerned about CBC visit scheduled today and getting chemo.

## 2020-06-04 NOTE — PROGRESS NOTES
Case Management Discharge Note      Final Note: Home with assist of spouse. [facility] dialysis arranged at [place] location M-W-F @ 0600. However, first appointment is 6/6/2020 @ 0600 (Saturday) then regular scheduled time the following week. Medications and diabetes supplies delivered to bedside per [facility] retail pharmacy. No additional needs noted. Transport per private auto.    Provided Post Acute Provider List?: Yes  Post Acute Provider List: Home Health, Nursing Home  Provided Post Acute Provider Quality & Resource List?: N/A  N/A Quality & Resource List Comment: Patient does not anticipate any needs at this time  Delivered To: Patient  Method of Delivery: In person    Destination      No service has been selected for the patient.      Durable Medical Equipment      No service has been selected for the patient.      Dialysis/Infusion      Service Provider Request Status Selected Services Address Phone Number Fax Number    [facility] [place] [facility] [address] [facility] [id number] [phone]      Home Medical Care      No service has been selected for the patient.      Therapy      No service has been selected for the patient.      Community Resources      No service has been selected for the patient.             Final Discharge Disposition Code: 01 - home or self-care

## 2020-06-04 NOTE — DISCHARGE SUMMARY
Date of Admission: 5/22/2020  Date of Discharge:  6/4/2020  Primary Care Physician: Nahum Portillo MD     Discharge Diagnosis:  Active Hospital Problems    Diagnosis  POA   • **Multiple myeloma (CMS/HCC) [C90.00]  Yes   • Hypomagnesemia [E83.42]  No   • Anemia [D64.9]  Yes   • Diabetes mellitus (CMS/HCC) [E11.9]  Yes   • Enlarged prostate [N40.0]  Yes   • NATHANIEL (acute kidney injury) (CMS/HCC) [N17.9]  Yes   • CKD (chronic kidney disease) [N18.9]  Yes   • Hypertension [I10]  Yes   • History of renal cell cancer [Z85.528]  Not Applicable   • H/O partial nephrectomy - right [Z90.5]  Not Applicable   • Elevated troponin [R79.89]  Yes      Resolved Hospital Problems   No resolved problems to display.       Presenting Problem/History of Present Illness:  Renal insufficiency [N28.9]  History of renal cell cancer [Z85.528]  Anemia, unspecified type [D64.9]     Patient is a 54-year-old male who has complicated past medical history including diabetes, hypertension, enlarged prostate and history of recurrent urinary tract infections, history of right kidney renal cell carcinoma for which he underwent partial nephrectomy in 2016 has been doing reasonably well until issues with his insurance and because of the effect of latest clinically coronavirus he has been unable to take care of himself with proper medications and follow-up with physicians.  Patient has been taking care of his diabetes with diet and has lost significant amount of weight to control his blood pressure and blood sugar.  For about a year he has been noticing increasing weakness and as mentioned above because of insurance issues he could not get to have himself evaluated for this progressive weakness that is present all the time and also him.  Inability to do things physically.  About a few days ago he did reach a point where he decided whether he has insurance or not he needs to do something about it.  He went to [facility] and had  blood work done and was called that his blood work was very low and abnormal and needs to have it repeated again repeated 2 days ago and was showing hemoglobin of 6.3.  Patient was referred to the emergency room and recommended admission to [facility] but she decided to come to [facility] for further evaluation.  CBC and other blood work here revealed hemoglobin of 5.6, abnormal creatinine of 3.05 with last reported creatinine that he had in our system in 2016 was within normal limits, elevated troponin of 0.068 and total serum protein of 10.7.  Patient is being admitted for further evaluation and management of severe anemia and acute on chronic renal failure.  Patient admits to generalized body aches but denies any specific joint aches and pain or back pain.  As mentioned above since he did not have health insurance he has been managing his diabetes and high blood pressure with intentional weight loss and diet and has lost 115 pounds.  Currently patient's functional capacity is very limited minimal activity makes him out of breath with minimal activity.    Hospital Course:  The patient is a 64 y.o. 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  He was admitted and underwent evaluation by hematology and nephrology services.  He received plasmapheresis initially and had some improvement in his renal function but after completion of this it declined again.  Vascular surgery was reconsulted and he now has tunnel cath for HD access.  He has been initiated on hemodialysis.  And he is undergoing chemotherapy with Velcade Cytoxan and Decadron.  He is going to receive that today prior to discharge.  He is going to follow-up with the [facility] for continuation of his chemotherapy and also have referral to [facility] for consideration of autologous stem cell transplant.    He  has type 2 diabetes and due to his renal failure cannot take metformin anymore.  He has been transitioned to long-acting insulin therapy.    Exam Today:  Constitutional: He appears well-developed. No distress.   HENT:   Head: Atraumatic.   Nose: Nose normal.   Eyes: Conjunctivae and EOM are normal.   Neck: Neck supple. No tracheal deviation present.   Cardiovascular: Normal rate, regular rhythm and intact distal pulses.   Pulmonary/Chest: Effort normal. He has no wheezes. He has no rales.   Abdominal: Soft. He exhibits no distension. There is no tenderness. There is no rebound and no guarding.   Musculoskeletal: Normal range of motion. He exhibits no edema.   Neurological: He is alert. No cranial nerve deficit.   Skin: Skin is warm and dry. He is not diaphoretic.   Psychiatric: He has a normal mood and affect. His behavior is normal.     Results:  Tissue Pathology Exam: [id number]   Order: [id number]   Status:  Edited Result - FINAL   Visible to patient:  No (Not Released) Next appt:  06/09/2020 at 01:30 PM in Lab (LAB CHAIR 5 TIFFANY SUBRAMANIAN)   Specimen Information: 1: Iliac Crest, Right - Biopsy; Tissue    2: Iliac Crest, Right - Aspirate; Tissue    3: Iliac Crest, Right - Aspirate; Tissue        Component    Addendum   Please see the completely scanned Chromosome Analysis from [facility] below.      Addendum electronically signed by Marcus Shankar MD on 6/4/2020 at 0842   Case Report   Surgical Pathology Report                         Case: [id number]                                   Authorizing Provider:  Saul Morrow MD       Collected:           05/23/2020 03:03 PM           Ordering Location:     [facility]  Received:            05/23/2020 03:30 PM                                  5 [facility]                                                                       Pathologist:           Marcus Shankar MD                                                          Specimens:   1) - Iliac Crest, Right -  Biopsy, R iliac bone marrow bx                                             2) - Iliac Crest, Right - Aspirate, 5 smears and cell block                                          3) - Iliac Crest, Right - Aspirate, 2 green tops for cytogenetics and 1 purple top                   tube sent  to [facility] for flow cytometry                                                   Clinical Information    R/O myeloma   Final Diagnosis   1-3 Bone marrow aspirate, biopsy and flow cytometry: Bone marrow with involvement by plasma cell myeloma     SEE CONSULT BELOW   Electronically signed by Marcus Shankar MD on 5/29/2020 at 1059   Preliminary Diagnosis   1. Bone Marrow, Right Iliac Crest, Biopsy: Hematopoietic marrow and bone with               A. Sheets of plasma cells with rare additional background hematopoietic elements.( plasma cells 90% by MUM1p and )     2. Bone Marrow, Right Iliac Crest, Aspirate (Segura Giemsa and H&E Clot Section): Hematopoietic marrow with                A. Plasma cell dyscrasia (84% plasma cells on enumeration).               B. Rare megakaryocytes, neutrophils and red cell precursors.     3. Bone Marrow, Right Iliac Crest, Aspirate, Flow Cytometry:               A. Immunophenotyping reveals a monoclonal plasma cell population approximating 11.5%     Comment: There are numerous plasma cells present.  The features are those of multiple myeloma.  The case will be submitted to Dr. Beavers for his opinion.           CT Chest Abdomen and Pelvis  1. Diffuse osseous demineralization and mottled appearance of the marrow  space is suspicious for malignancy such as multiple myeloma. There is  mild splenomegaly.  2. There is no lymphadenopathy or evidence for metastatic disease within  the chest, abdomen, or pelvis.  3. Extensive prostatomegaly.    US Renal  Markedly enlarged prostate gland. Otherwise unremarkable  renal ultrasound.    Bone Survey  21 images were obtained. There is diffuse osteoporosis  and  demineralization as also described on the recent CT scans of the chest  and abdomen and pelvis performed 4 days ago. However, no discrete lytic  lesions are identified. There is no evidence of compression fracture in  the spine. The exam is otherwise unremarkable.    Procedures Performed:  Procedure(s):  HEMODIALYSIS CATHETER INSERTION       Consults:   Consults     Date and Time Order Name Status Description    6/1/2020 1127 Inpatient Vascular Surgery Consult Completed     5/28/2020 0030 Inpatient Vascular Surgery Consult Completed     5/22/2020 1936 Inpatient Urology Consult Completed     5/22/2020 1934 Inpatient Nephrology Consult Completed     5/22/2020 1934 Inpatient Hematology & Oncology Consult Completed     5/22/2020 1446 [facility] (on-call MD unless specified) Details Completed            Discharge Disposition:  Home or Self Care    Discharge Medications:     Discharge Medications      New Medications      Instructions Start Date   acyclovir 400 MG tablet  Commonly known as:  ZOVIRAX   400 mg, Oral, 2 Times Daily      allopurinol 100 MG tablet  Commonly known as:  ZYLOPRIM   100 mg, Oral, Daily      calcium acetate 667 MG capsule  Commonly known as:  PHOSLO   1,334 mg, Oral, 3 Times Daily With Meals      cyclophosphamide 50 MG capsule   650 mg, Oral, Weekly      dexamethasone 4 MG tablet  Commonly known as:  DECADRON   40 mg, Oral, Weekly      FreeStyle Freedom Lite w/Device kit   1 kit, Does not apply, Daily      freestyle lancets   Use once then discard to test blood sugar each morning      glucose blood test strip  Commonly known as:  FREESTYLE LITE   Use daily to test blood sugar      Insulin Glargine 100 UNIT/ML injection pen  Commonly known as:  LANTUS SOLOSTAR   15 Units, Subcutaneous, Nightly      Insulin Pen Needle 32G X 4 MM misc   1 application, Does not apply, Daily      ondansetron 8 MG tablet  Commonly known as:  ZOFRAN   8 mg, Oral, 3 Times Daily PRN      pantoprazole 40 MG EC tablet  Commonly  known as:  PROTONIX   40 mg, Oral, Daily      sulfamethoxazole-trimethoprim 800-160 MG per tablet  Commonly known as:  BACTRIM DS,SEPTRA DS   160 mg, Oral, 3 Times Weekly, Take 1 tablet on Mondays, Wednesdays and Fridays.   Start Date:  June 5, 2020     traMADol 50 MG tablet  Commonly known as:  ULTRAM   50 mg, Oral, Every 12 Hours PRN         Continue These Medications      Instructions Start Date   atorvastatin 20 MG tablet  Commonly known as:  LIPITOR   20 mg, Oral, Daily      finasteride 5 MG tablet  Commonly known as:  PROSCAR   5 mg, Oral, Daily      lisinopril 10 MG tablet  Commonly known as:  PRINIVIL,ZESTRIL   10 mg, Oral, Daily      tamsulosin 0.4 MG capsule 24 hr capsule  Commonly known as:  FLOMAX   1 capsule, Oral, Nightly         Stop These Medications    metFORMIN 500 MG tablet  Commonly known as:  GLUCOPHAGE            Discharge Diet:   Diet Instructions     Diet: Consistent Carbohydrate, Renal      Discharge Diet:   Consistent Carbohydrate  Renal             Activity at Discharge:   Activity Instructions     Activity as Tolerated            Follow-up Appointments:  Additional Instructions for the Follow-ups that You Need to Schedule     CBC and Differential   May 28, 2020      Manual Differential:  No         Comprehensive metabolic panel   May 28, 2020      CBC and Differential   May 31, 2020      Manual Differential:  No         CBC and Differential   Jun 04, 2020      Manual Differential:  No         CBC and Differential   Jun 07, 2020      Manual Differential:  No            Contact information for follow-up providers     Nahum Portillo MD Follow up.    Specialty:  Family Medicine  Contact information:  [address] [address] [address] [phone]             Norm Hutchison MD Follow up.    Specialty:  Nephrology  Contact information:  [address] [place], [place] [id number] [phone]             Lopez Ortiz MD Follow up on 6/4/2020.    Specialties:   Hematology and Oncology, Oncology, Hematology  Contact information:  [address]  [facility] [address]  [place] [id number] [phone]                   Contact information for after-discharge care     Dialysis/Infusion     MEGAN STOUT .    Service:  Dialysis  Contact information:  [address] [facility] [address] [phone]                             Test Results Pending at Discharge:       Allan Landry MD  06/04/20  11:43    Time Spent on Discharge Activities: >30 minutes    Dictated portions using Dragon dictation software.

## 2020-06-04 NOTE — PROGRESS NOTES
Subjective     CHIEF COMPLAINT:     IgG multiple myeloma  Anemia    INTERVAL HISTORY:     Patient is feeling better today.  He was somewhat disappointed that he was not able to be discharged yesterday.    Plans are being made for him to be discharged and start outpatient HD tomorrow 6/5/2020.      HISTORY OF PRESENT ILLNESS:    Patient felt poorly for several months and presented with severe anemia hemoglobin 5.6 and acute kidney failure creatinine 3.05.  Evaluation shows that significantly elevated total protein 10.4 with gammaglobulin 5.2, M spike 5.1 IgG lambda.  The free lambda light chains are dramatically elevated 6403 mg/L.  The serum calcium is not elevated and a skeletal survey showed diffuse osteoporosis and demineralization but no clear lytic lesions.  CT chest, abdomen, pelvis 5/22/2020 showed diffuse demineralization and mottled appearance of the bone marrow and mild splenomegaly.  Bone marrow biopsy on 5/23/2020 showed sheets of plasma cells representing 90% of the marrow with plasma cell dyscrasia.     The patient feels much stronger since receiving blood transfusion.  He remains somewhat anxious regarding diagnosis and treatment plan.  Nephrology has recommended 3 days of plasmapheresis given the significantly elevated light chains and renal failure.     The patient initiated plasmapheresis on 5/28/2020 along with Velcade/Cytoxan/dexamethasone.     The patient completed 3 days of plasmapheresis 5/30/2020.      Due to worsening kidney function and suspected myeloma kidney, he had tunneled dialysis catheter placed and started HD on 6/2/2020.      REVIEW OF SYSTEMS:  GENERAL: Fatigue has improved.  RESPIRATORY:  No SOB.   CVS: No chest pain.  MSK: Muscle weakness      SCHEDULED MEDS:    acyclovir 200 mg Oral BID   allopurinol 100 mg Oral Daily   atorvastatin 20 mg Oral Daily   calcium acetate 1,334 mg Oral TID With Meals   epoetin valerie 10,000 Units Subcutaneous Once per day on Mon Wed Fri   finasteride  5 mg Oral Daily   heparin (porcine) 5,000 Units Subcutaneous Q12H   insulin glargine 15 Units Subcutaneous Nightly   insulin lispro 0-14 Units Subcutaneous TID AC   pantoprazole 40 mg Oral Q AM   sodium chloride 10 mL Intravenous Q12H   tamsulosin 0.4 mg Oral Nightly   traZODone 50 mg Oral Nightly     INFUSIONS:     PRN MEDS:  •  acetaminophen **OR** acetaminophen **OR** acetaminophen  •  dextrose  •  dextrose  •  diphenhydrAMINE  •  glucagon (human recombinant)  •  heparin (porcine)  •  nitroglycerin  •  ondansetron  •  [COMPLETED] Insert peripheral IV **AND** sodium chloride  •  sodium chloride  •  sodium chloride  •  traMADol       Objective   VITAL SIGNS:  Vitals:    06/03/20 1730 06/03/20 1953 06/04/20 0428 06/04/20 0751   BP: 113/71 138/80 123/68 139/81   BP Location: Left arm Left arm Left arm Left arm   Patient Position: Sitting Lying Lying Lying   Pulse: 95 84 83 81   Resp: 16 18 18 18   Temp: 98.3 °F (36.8 °C) 98.4 °F (36.9 °C) 97.8 °F (36.6 °C) 97.9 °F (36.6 °C)   TempSrc: Oral Oral Oral Oral   SpO2: 98% 100% 98% 98%   Weight:   101 kg (223 lb 8 oz)    Height:           Wt Readings from Last 3 Encounters:   06/04/20 101 kg (223 lb 8 oz)   05/18/16 120 kg (264 lb 9 oz)   05/11/16 123 kg (271 lb)       PHYSICAL EXAMINATION:  GENERAL:  The patient appears in good general condition, not in acute distress.  SKIN:  No skin rashes.  Old ecchymosis over legs.   HEAD:  Normocephalic.  EYES:  No Jaundice. Pallor. Pupils equal. EOMI.  NECK:  Supple. No Thyromegaly. No Masses.  CHEST: Normal respiratory effort.   CARDIAC: No edema.  ABD: Nondistended.  NEUROLOGICAL:  No Focal neurological deficits.        RESULT REVIEW:   Results from last 7 days   Lab Units 06/04/20  0554 06/03/20  0502 06/02/20  0951 06/01/20  0350 05/31/20  0511  05/29/20  0503   WBC 10*3/mm3 2.42* 3.54 3.47 4.30 5.82   < > 6.13   NEUTROS ABS 10*3/mm3 1.48* 2.00 1.99 3.61 5.35   < > 4.97   LYMPHS ABS 10*3/mm3  --   --   --   --  0.36*  --  0.92    HEMOGLOBIN g/dL 8.4* 9.0* 9.1* 8.6* 9.1*   < > 9.1*   HEMATOCRIT % 25.1* 26.6* 27.0* 25.3* 27.2*   < > 26.6*   PLATELETS 10*3/mm3 89* 106* 98* 106* 130*   < > 175    < > = values in this interval not displayed.     Results from last 7 days   Lab Units 06/04/20  0554 06/03/20  0503 06/02/20  0951 06/01/20  0350  05/29/20  0503   SODIUM mmol/L 137 138 130* 134*   < > 133*   POTASSIUM mmol/L 3.7 4.1 3.9 4.6   < > 4.0   CHLORIDE mmol/L 99 103 98 98   < > 100   CO2 mmol/L 27.8 26.5 20.9* 21.6*   < > 21.1*   BUN mg/dL 47* 71* 97* 95*   < > 60*   CREATININE mg/dL 3.74* 4.46* 5.79* 6.36*   < > 5.03*   CALCIUM mg/dL 8.5* 8.6 8.5* 8.5*   < > 8.5*   ALBUMIN g/dL 3.20* 3.40* 3.60 4.10   < > 3.60   BILIRUBIN mg/dL  --   --   --  0.3  --  0.8   ALK PHOS U/L  --   --   --  16*  --  33*   ALT (SGPT) U/L  --   --   --  6  --  <5   AST (SGOT) U/L  --   --   --  8  --  5   MAGNESIUM mg/dL 1.8 1.8 1.9 2.0   < > 1.6    < > = values in this interval not displayed.           Assessment/Plan   1.  IgG lambda multiple myeloma.  · Bone marrow 5/23/2020 hypercellular marrow with 80% involvement of plasma cell myeloma; FISH studies pending  · Bone survey 5/26/2020: Diffuse osteoporosis and demineralization.  However, no discrete lytic lesions.  · SPEP 5/23/2020: M spike 5.1.  IgG 6629 lambda.  Light chain ratio 0 with free lambda light chains 6400.  Beta-2 microglobulin 16.3.  24-hour urine 12.7 g protein per 24-hour with monoclonal lambda free light chain 33.9%, monoclonal IgG lambda 23.5%  · 24-hour urine testing from 5/24/2020 free lambda light chains 8.4 g/L  · Considering renal failure, initiated CyBorD 5/28/20.  Given the significantly elevated light chains with the first cycle of treatment plan to give Velcade days 1, 4, 8, 11; Cytoxan 300 PO mg/m² weekly and dexamethasone 40 mg p.o. days 1, 2, 3, 4, 8 and 15.    · After cycle 1 treatment can likely be altered to weekly dosing of each drug.  · Plan is for referral to [facility] at discharge to discuss autologous stem cell transplant.  · Patient is due for day #8 of treatment today.   · Despite the development of thrombocytopenia and neutropenia, I recommend proceeding with treatment given that his bone marrow was packed with plasma cell myeloma.      2.  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  · B12 folate and iron studies are normal.  · Status post PRBC transfusion on 5/24/2020.  · Patient is receiving Procrit with his hemodialysis.  · Hemoglobin decreased to 8.4 today.    3.  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  · Showed enlargement of the kidneys concerning for myeloma kidney and light chain deposition disease.  · Patient completed 3 sessions of pheresis on 5/30/2020.  · Creatinine was 5.12 on 5/31/2020  · Creatinine increased to 6.36 on 6/1/2020.  · Patient had HD catheter placed and started on HD on 6/2/2020.   · Patient is known to have HD on Monday Wednesday Friday.    4.  Tumor lysis prophylaxis.  · Patient is on allopurinol 100 mg daily.  · Uric acid level increased to 8.9 on 6/1/2020 due to the worsening of the kidney function.    · Uric acid is down to 8.0 on 6/3/2020.     5.  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  · Status post right partial nephrectomy on 5/18/2016.    6.  DVT prophylaxis.  · Patient is on SQ heparin.    7.  Thrombocytopenia.    · Platelet count was gradually declining and this is attributed to the effect of his treatment with Velcade and Cytoxan.    · The gradual declines makes HIT much less likely.   · Platelet count improved to 106,000 on 6/3/2020.  · Platelet count decreased to 89,000 today.    PLAN:    - Proceed today with day #8 treatment with Velcade, oral Cytoxan and oral dexamethasone.  - Day #11 will be due on 6/7/2020.  We will delay it to Tuesday 6/9/2020 (due to his [facility] hemodialysis schedule).  We will schedule him to be seen at our  office and to receive Velcade on that day.    Discussed with the patient's RN.    Red Del Valle MD  06/04/20

## 2020-06-05 ENCOUNTER — READMISSION MANAGEMENT (OUTPATIENT)
Dept: CALL CENTER | Facility: HOSPITAL | Age: 65
End: 2020-06-05

## 2020-06-05 NOTE — OUTREACH NOTE
Prep Survey      Responses   [facility] facility patient discharged from?  [place]   Is LACE score < 7 ?  No   Eligibility  Readm Mgmt   Discharge diagnosis  Multiple myeloma,  severe anemia and acute on chronic renal failure   COVID-19 Test Status  Negative   Does the patient have one of the following disease processes/diagnoses(primary or secondary)?  Other   Does the patient have Home health ordered?  No   Is there a DME ordered?  No   General alerts for this patient  [facility] dialysis  MWF 0600   Prep survey completed?  Yes          Lupe Rodney RN

## 2020-06-08 ENCOUNTER — READMISSION MANAGEMENT (OUTPATIENT)
Dept: CALL CENTER | Facility: HOSPITAL | Age: 65
End: 2020-06-08

## 2020-06-08 NOTE — OUTREACH NOTE
Medical Week 1 Survey      Responses   [facility]   COVID-19 Test Status  Negative   Does the patient have one of the following disease processes/diagnoses(primary or secondary)?  Other   Is there a successful TCM telephone encounter documented?  No   Week 1 attempt successful?  Yes   Call start time  1433   Call end time  1437   Medication alerts for this patient  problems with mediacation   Meds reviewed with patient/caregiver?  Yes   Is the patient having any side effects they believe may be caused by any medication additions or changes?  No   Does the patient have all medications ordered at discharge?  No   What is keeping the patient from filling the prescriptions?  Patient desires to consult PCP   Medication comments  will be speaking with the provider tomorrow   Comments regarding appointments  appointment to judie   Has home health visited the patient within 72 hours of discharge?  N/A   Pulse Ox monitoring  None   Did the patient receive a copy of their discharge instructions?  Yes   Nursing interventions  Reviewed instructions with patient   What is the patient's perception of their health status since discharge?  Improving   Is the patient/caregiver able to teach back signs and symptoms related to disease process for when to call PCP?  Yes   Is the patient/caregiver able to teach back signs and symptoms related to disease process for when to call 911?  Yes   Is the patient/caregiver able to teach back the hierarchy of who to call/visit for symptoms/problems? PCP, Specialist, Home health nurse, Urgent Care, ED, 911  Yes   Week 1 call completed?  Yes   Wrap up additional comments  dialysis  today, able to stand which was something he could not do . trouble with medication and will be speaking with the PCp  tomorrow at his appointment          Liseth Mckeon RN

## 2020-06-09 ENCOUNTER — INFUSION (OUTPATIENT)
Dept: ONCOLOGY | Facility: HOSPITAL | Age: 65
End: 2020-06-09

## 2020-06-09 ENCOUNTER — LAB (OUTPATIENT)
Dept: LAB | Facility: HOSPITAL | Age: 65
End: 2020-06-09

## 2020-06-09 ENCOUNTER — MEDICATION THERAPY MANAGEMENT (OUTPATIENT)
Dept: PHARMACY | Facility: HOSPITAL | Age: 65
End: 2020-06-09

## 2020-06-09 ENCOUNTER — OFFICE VISIT (OUTPATIENT)
Dept: ONCOLOGY | Facility: CLINIC | Age: 65
End: 2020-06-09

## 2020-06-09 VITALS
HEIGHT: 76 IN | TEMPERATURE: 97.2 F | DIASTOLIC BLOOD PRESSURE: 82 MMHG | RESPIRATION RATE: 21 BRPM | OXYGEN SATURATION: 98 % | HEART RATE: 86 BPM | BODY MASS INDEX: 28.02 KG/M2 | SYSTOLIC BLOOD PRESSURE: 156 MMHG | WEIGHT: 230.1 LBS

## 2020-06-09 DIAGNOSIS — C90.00 MULTIPLE MYELOMA NOT HAVING ACHIEVED REMISSION (HCC): ICD-10-CM

## 2020-06-09 DIAGNOSIS — D64.9 ANEMIA, UNSPECIFIED TYPE: Primary | ICD-10-CM

## 2020-06-09 DIAGNOSIS — I10 ESSENTIAL HYPERTENSION: ICD-10-CM

## 2020-06-09 DIAGNOSIS — C90.00 MULTIPLE MYELOMA NOT HAVING ACHIEVED REMISSION (HCC): Primary | ICD-10-CM

## 2020-06-09 LAB
BASOPHILS # BLD AUTO: 0.01 10*3/MM3 (ref 0–0.2)
BASOPHILS NFR BLD AUTO: 0.2 % (ref 0–1.5)
DEPRECATED RDW RBC AUTO: 46.9 FL (ref 37–54)
EOSINOPHIL # BLD AUTO: 0.01 10*3/MM3 (ref 0–0.4)
EOSINOPHIL NFR BLD AUTO: 0.2 % (ref 0.3–6.2)
ERYTHROCYTE [DISTWIDTH] IN BLOOD BY AUTOMATED COUNT: 14.3 % (ref 12.3–15.4)
HCT VFR BLD AUTO: 27.1 % (ref 37.5–51)
HGB BLD-MCNC: 9.3 G/DL (ref 13–17.7)
IMM GRANULOCYTES # BLD AUTO: 0.02 10*3/MM3 (ref 0–0.05)
IMM GRANULOCYTES NFR BLD AUTO: 0.5 % (ref 0–0.5)
LYMPHOCYTES # BLD AUTO: 0.25 10*3/MM3 (ref 0.7–3.1)
LYMPHOCYTES NFR BLD AUTO: 6.2 % (ref 19.6–45.3)
MCH RBC QN AUTO: 31.5 PG (ref 26.6–33)
MCHC RBC AUTO-ENTMCNC: 34.3 G/DL (ref 31.5–35.7)
MCV RBC AUTO: 91.9 FL (ref 79–97)
MONOCYTES # BLD AUTO: 0.12 10*3/MM3 (ref 0.1–0.9)
MONOCYTES NFR BLD AUTO: 3 % (ref 5–12)
NEUTROPHILS # BLD AUTO: 3.61 10*3/MM3 (ref 1.7–7)
NEUTROPHILS NFR BLD AUTO: 89.9 % (ref 42.7–76)
NRBC BLD AUTO-RTO: 0 /100 WBC (ref 0–0.2)
PLATELET # BLD AUTO: 145 10*3/MM3 (ref 140–450)
PMV BLD AUTO: 10.8 FL (ref 6–12)
RBC # BLD AUTO: 2.95 10*6/MM3 (ref 4.14–5.8)
WBC NRBC COR # BLD: 4.02 10*3/MM3 (ref 3.4–10.8)

## 2020-06-09 PROCEDURE — 96401 CHEMO ANTI-NEOPL SQ/IM: CPT

## 2020-06-09 PROCEDURE — 85025 COMPLETE CBC W/AUTO DIFF WBC: CPT

## 2020-06-09 PROCEDURE — 25010000002 BORTEZOMIB PER 0.1 MG: Performed by: INTERNAL MEDICINE

## 2020-06-09 PROCEDURE — 99215 OFFICE O/P EST HI 40 MIN: CPT | Performed by: NURSE PRACTITIONER

## 2020-06-09 PROCEDURE — 36415 COLL VENOUS BLD VENIPUNCTURE: CPT

## 2020-06-09 RX ORDER — BORTEZOMIB 3.5 MG/1
1.5 INJECTION, POWDER, LYOPHILIZED, FOR SOLUTION INTRAVENOUS; SUBCUTANEOUS ONCE
Status: CANCELLED | OUTPATIENT
Start: 2020-06-18

## 2020-06-09 RX ORDER — AMLODIPINE BESYLATE 2.5 MG/1
2.5 TABLET ORAL DAILY
Qty: 30 TABLET | Refills: 1 | Status: SHIPPED | OUTPATIENT
Start: 2020-06-09 | End: 2020-06-25 | Stop reason: SDUPTHER

## 2020-06-09 RX ORDER — BORTEZOMIB 3.5 MG/1
1.3 INJECTION, POWDER, LYOPHILIZED, FOR SOLUTION INTRAVENOUS; SUBCUTANEOUS ONCE
Status: COMPLETED | OUTPATIENT
Start: 2020-06-09 | End: 2020-06-09

## 2020-06-09 RX ADMIN — BORTEZOMIB 3 MG: 3.5 INJECTION, POWDER, LYOPHILIZED, FOR SOLUTION INTRAVENOUS; SUBCUTANEOUS at 15:14

## 2020-06-09 NOTE — PROGRESS NOTES
[facility] in-person follow up- ThaddeusMichaelRADHA    Contreras is doing well and doesn't have any issues or complaints with his medication regimen at this time. Initial education was provided in the hospital. He has concerns with his discharge medication list and he is unsure if he is supposed to still be on lisinopril and atorvastatin- I will plan to follow up with him on this. Medication administration and adherence seem appropriate; Contreras verbalizes understanding of regimen. Consents and CCA were signed today. He has no additional questions at this time.

## 2020-06-09 NOTE — PROGRESS NOTES
Subjective .     REASONS FOR FOLLOWUP:    1. IgG Multiple Myeloma  2. Anemia    HISTORY OF PRESENT ILLNESS:  The patient is a 64 y.o. year old male who is here for follow-up with the above-mentioned history.    History of Present Illness   Mr. Albert is seen back today in follow-up after recent hospitalization where he was diagnosed with IgA multiple myeloma with resulting acute kidney failure.  Hemoglobin at the time of admission was 5.6.  He required repeated blood transfusion as well as 3 days of plasmapheresis beginning 5/28/2020.  He then began therapy with Velcade/Cytoxan/dexamethasone on 5/28/2020.  Due to worsening renal function he also began hemodialysis on 6/2/2020.    As he is reviewed back today he is due for cycle 1 day 11 of therapy.  He took both his dexamethasone 40 mg weekly dose as well as Cytoxan 650 mg weekly dose this morning.  We discussed plan of care going forward, specifically that Velcade will shift to once a week dosing after today.    He is very conversive and his wife notes that the steroids have been making him quite chatty.  He states overall he is feeling better and stronger.  He is ambulating with a cane presently.  He notes 1 of his biggest issues is retinopathy which is something he has been dealing with prior to recent myeloma diagnosis.  He has been receiving injections to both eyes per ophthalmology, due again next Tuesday.  He notes that the oral steroids he is now taking weekly do cause some temporary blurred vision.  He is requesting that going forward we move his treatments to Thursdays.  He also notes some mild diarrhea the night after he receives Velcade but nothing that is intolerable.    The patient notes that prior to hospital admission he was on lisinopril for hypertension per primary care.  During hospitalization he saw Dr. Hutchison, nephrology who discontinued lisinopril stating it was no longer appropriate in the setting of renal failure.  Nevertheless the patient  was discharged home on this medication but thankfully did not take it.  He is asking what he should take now.      He does also note that as expected following oral dexamethasone his blood sugars will shoot up to sometimes 300-400.  On these days he is taking insulin.  He notes that otherwise he is maintaining his blood sugar in a safe range, under 150 with diet.  He was previously taking metformin which was discontinued during hospitalization.    He denies other concerns at this time.    Past Medical History:   Diagnosis Date   • Anemia    • Diabetes mellitus (CMS/HCC)    • Enlarged prostate    • High cholesterol    • Hypertension    • Renal mass        ONCOLOGIC HISTORY:    Patient felt poorly for several months and presented 5/22/2020 with severe anemia hemoglobin 5.6 and acute kidney failure creatinine 3.05. Evaluation shows that significantly elevated total protein 10.4 with gammaglobulin 5.2, M spike 5.1 IgG lambda.  The free lambda light chains are dramatically elevated 6403 mg/L.  The serum calcium is not elevated and a skeletal survey showed diffuse osteoporosis and demineralization but no clear lytic lesions.  CT chest, abdomen, pelvis 5/22/2020 showed diffuse demineralization and mottled appearance of the bone marrow and mild splenomegaly.  Bone marrow biopsy on 5/23/2020 showed sheets of plasma cells representing 90% of the marrow with plasma cell dyscrasia.     The patient feeling much stronger since receiving blood transfusion.  He remains somewhat anxious regarding diagnosis and treatment plan.  Nephrology has recommended 3 days of plasmapheresis given the significantly elevated light chains and renal failure.     The patient initiated plasmapheresis on 5/28/2020 along with Velcade/Cytoxan/dexamethasone.     The patient completed 3 days of plasmapheresis 5/30/2020.       Due to worsening kidney function and suspected myeloma kidney, he had tunneled dialysis catheter placed and started HD on  6/2/2020.    Current Outpatient Medications on File Prior to Visit   Medication Sig Dispense Refill   • acyclovir (ZOVIRAX) 400 MG tablet Take 1 tablet by mouth 2 (Two) Times a Day. 60 tablet 7   • allopurinol (ZYLOPRIM) 100 MG tablet Take 1 tablet by mouth Daily. 30 tablet 0   • Blood Glucose Monitoring Suppl (FREESTYLE FREEDOM LITE) w/Device kit 1 kit Daily. Indications: Type 2 Diabetes 1 each 0   • calcium acetate (PHOSLO) 667 MG tablet Take 2 tablets by mouth 3 (Three) Times a Day With Meals. 180 tablet 0   • cyclophosphamide 50 MG capsule Take 13 capsules by mouth 1 (One) Time Per Week. 52 capsule 6   • dexamethasone (DECADRON) 4 MG tablet Take 10 tablets by mouth 1 (One) Time Per Week. 40 tablet 1   • finasteride (PROSCAR) 5 MG tablet Take 5 mg by mouth daily.     • glucose blood (FREESTYLE LITE) test strip Use daily to test blood sugar 100 each 0   • Insulin Glargine (LANTUS SOLOSTAR) 100 UNIT/ML injection pen Inject 15 Units under the skin into the appropriate area as directed Every Night. 15 mL 0   • Insulin Pen Needle 32G X 4 MM misc 1 application Daily. 100 each 0   • Lancets (FREESTYLE) lancets Use once then discard to test blood sugar each morning 100 each 0   • pantoprazole (PROTONIX) 40 MG EC tablet Take 1 tablet by mouth Daily. 30 tablet 0   • sulfamethoxazole-trimethoprim (BACTRIM DS,SEPTRA DS) 800-160 MG per tablet Take 1 tablet by mouth 3 (Three) Times a Week. Take 1 tablet on Mondays, Wednesdays and Fridays. 12 tablet 7   • tamsulosin (FLOMAX) 0.4 MG capsule 24 hr capsule Take 1 capsule by mouth every night.     • traMADol (ULTRAM) 50 MG tablet Take 1 tablet by mouth Every 12 (Twelve) Hours As Needed for Moderate Pain  or Severe Pain . 6 tablet 0   • lisinopril (PRINIVIL,ZESTRIL) 10 MG tablet Take 10 mg by mouth daily.     • ondansetron (ZOFRAN) 8 MG tablet Take 1 tablet by mouth 3 (Three) Times a Day As Needed for Nausea or Vomiting. 30 tablet 5   • [DISCONTINUED] atorvastatin (LIPITOR) 20 MG  "tablet Take 20 mg by mouth daily.       No current facility-administered medications on file prior to visit.        ALLERGIES:     Allergies   Allergen Reactions   • Crestor [Rosuvastatin] GI Intolerance       Social History     Socioeconomic History   • Marital status:      Spouse name: Not on file   • Number of children: Not on file   • Years of education: Not on file   • Highest education level: Not on file   Tobacco Use   • Smoking status: Never Smoker   • Smokeless tobacco: Never Used   Substance and Sexual Activity   • Alcohol use: No   • Drug use: No         Cancer-related family history is not on file.     Review of Systems   Constitutional: Positive for fatigue.   HENT: Negative.    Respiratory: Negative.    Cardiovascular: Negative.    Gastrointestinal: Positive for diarrhea. Anal bleeding: mild on night of Velcade.   Genitourinary: Negative.    Musculoskeletal: Positive for gait problem (ambulating with cane).   Neurological: Positive for weakness (improving).   Hematological: Negative.    Psychiatric/Behavioral: Negative.      A comprehensive 14 point review of systems was performed and was negative except as mentioned.    Objective      Vitals:    06/09/20 1417   BP: 156/82   Pulse: 86   Resp: 21   Temp: 97.2 °F (36.2 °C)   SpO2: 98%   Weight: 104 kg (230 lb 1.6 oz)   Height: 193 cm (75.98\")  Comment: new ht w/shoes   PainSc: 0-No pain     Current Status 6/9/2020   ECOG score 0       Physical Exam   Constitutional: He is oriented to person, place, and time. He appears well-developed and well-nourished. No distress.   HENT:   Head: Normocephalic and atraumatic.   Mouth/Throat: No oropharyngeal exudate.   Eyes: Pupils are equal, round, and reactive to light. Conjunctivae and EOM are normal.   Neck: Normal range of motion. Neck supple.   Cardiovascular: Normal rate and regular rhythm.   No murmur heard.  Pulmonary/Chest: Effort normal and breath sounds normal. No respiratory distress.   HD catheter " right chest   Abdominal: Soft. Bowel sounds are normal. He exhibits no distension.   Musculoskeletal: He exhibits edema (trace bilateral, L>R).   Neurological: He is alert and oriented to person, place, and time.   Skin: Skin is warm and dry. No rash noted. He is not diaphoretic. No erythema.   Psychiatric: He has a normal mood and affect. His behavior is normal.         RECENT LABS:  Results from last 7 days   Lab Units 06/09/20  1353 06/04/20  0554 06/03/20  0502   WBC 10*3/mm3 4.02 2.42* 3.54   NEUTROS ABS 10*3/mm3 3.61 1.48* 2.00   HEMOGLOBIN g/dL 9.3* 8.4* 9.0*   HEMATOCRIT % 27.1* 25.1* 26.6*   PLATELETS 10*3/mm3 145 89* 106*     Results from last 7 days   Lab Units 06/04/20  0554 06/03/20  0503   SODIUM mmol/L 137 138   POTASSIUM mmol/L 3.7 4.1   CHLORIDE mmol/L 99 103   CO2 mmol/L 27.8 26.5   BUN mg/dL 47* 71*   CREATININE mg/dL 3.74* 4.46*   CALCIUM mg/dL 8.5* 8.6   ALBUMIN g/dL 3.20* 3.40*   GLUCOSE mg/dL 142* 141*   MAGNESIUM mg/dL 1.8 1.8             Assessment/Plan    1.  IgG lambda multiple myeloma.  · Bone marrow 5/23/2020 hypercellular marrow with 80% involvement of plasma cell myeloma; FISH studies pending  · Bone survey 5/26/2020: Diffuse osteoporosis and demineralization. However, no discrete lytic lesions.  · SPEP 5/23/2020: M spike 5.1.  IgG 6629 lambda.  Light chain ratio 0 with free lambda light chains 6400.  Beta-2 microglobulin 16.3.  24-hour urine 12.7 g protein per 24-hour with monoclonal lambda free light chain 33.9%, monoclonal IgG lambda 23.5%  · 24-hour urine testing from 5/24/2020 free lambda light chains 8.4 g/L  · Considering renal failure, initiated CyBorD 5/28/20.  Given the significantly elevated light chains with the first cycle of treatment plan to give Velcade days 1, 4, 8, 11; Cytoxan 300 PO mg/m² weekly and dexamethasone 40 mg p.o. days 1, 2, 3, 4, 8 and 15.    · Plan is for referral to [facility] at discharge to discuss autologous stem cell  transplant.  · Patient reviewed back today, 6/9/2020, due for cycle 1 day 11 therapy.  Tolerating well overall.  After review with Dr. Del Valle we will transition to weekly Velcade therapy as detailed above.  Patient requesting to shift treatment to Thursdays going forward.  He will continue therefore weekly Velcade days 1, 8, 15, 22 along with oral dexamethasone 40 mg weekly and Cytoxan 650 mg total dose weekly.     2.  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  · B12 folate and iron studies are normal.  · Status post PRBC transfusion on 5/24/2020.  · Patient is receiving Procrit with his hemodialysis.  · Hemoglobin has improved somewhat today to 9.3.       3.  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  · Showed enlargement of the kidneys concerning for myeloma kidney and light chain deposition disease.  · Patient completed 3 sessions of pheresis on 5/30/2020.  · Creatinine was 5.12 on 5/31/2020  · Creatinine increased to 6.36 on 6/1/2020.  · Patient had HD catheter placed and started on HD on 6/2/2020.   · Patient receiving HD on Monday Wednesday Friday.     4.  Tumor lysis prophylaxis.  · Patient is on allopurinol 100 mg daily.  · Uric acid level increased to 8.9 on 6/1/2020 due to the worsening of the kidney function.    · Uric acid normalized to 4.4 as of 6/4/2020.        5.  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  · Status post right partial nephrectomy on 5/18/2016.     6.  Thrombocytopenia.    · Platelet count gradually declining and attributed to the effect of his treatment with Velcade and Cytoxan.    · The gradual declines makes HIT much less likely.   · Platelet count improved today at 145.    7.  Retinopathy.  · Patient under the care of ophthalmology undergoing bilateral injections to the eyes, due again Tuesday, 6/16/2020.  Patient requesting to move future therapy appointments in our office to Thursday  therefore.    8.  Diabetes mellitus.  · Patient previously on metformin, discontinued during hospitalization.  · Patient states he is now trying to control his blood sugar mainly with diet.  Blood sugars are of course exacerbated by weekly dexamethasone.  He knows blood sugars will increase to between 300-400 following the dose of Dex.  He requires insulin to bring this down.  He notes the rest of the week his blood sugar stays more under 150 typically.    9.  Hypertension.  · Prior to hospitalization patient was taking lisinopril as prescribed per primary care.  · Now with renal failure requiring HD, as instructed per Dr. Hutchison, nephrology, patient is no longer to take lisinopril.  He was discharged home with instructions to take this but thankfully understood not to.  He therefore is not on any blood pressure medication.  Upon review of the chart he was being given Norvasc 5 mg daily in the hospital.  Systolic blood pressures ranging anywhere from 130-150 upon chart review.  I discussed this with Dr. Del Valle.  We will start the patient back on Norvasc 2.5 mg daily and have asked the patient to make follow-up with nephrology for management of hypertension going forward.    PLAN:  1. Proceed with cycle 1, day 11 Velcade 1.3 mg/m².  2. Transition to weekly therapy going forward as outlined above.  He will return therefore as requested next Thursday, 6/18/2020 to continue with weekly Velcade/Cytoxan/dexamethasone. Velcade will transition to 1.5 mg/m² dosing.  He is taking both Cytoxan and Dex at home.  3. Continue management of diabetes and exacerbation of blood sugars with Dex as outlined.  4. Continue HD Monday, Wednesday, Friday.  5. Prescription sent in for Norvasc 2.5 mg daily.  Patient also given instructions to call Dr. Hutchison, nephrology to make follow-up for management of hypertension going forward.  6. Patient will return to see Dr. Del Valle formally in 2 weeks when he is due for cycle 2-day 8 Velcade.               Cc:  No ref. provider found

## 2020-06-11 ENCOUNTER — MEDICATION THERAPY MANAGEMENT (OUTPATIENT)
Dept: PHARMACY | Facility: HOSPITAL | Age: 65
End: 2020-06-11

## 2020-06-11 NOTE — PROGRESS NOTES
MTM follow up- Pavel    I spoke to Contreras re discharge medication questions. Contreras was instructed to continue atorvastatin at discharge; however, he does not have a current prescription for this. His discharge summaries were sent to his old PCP. I called Contreras's current PCP just to make them aware that he was recently discharged from the hospital and they said they would reach out to obtain discharge summaries.

## 2020-06-12 ENCOUNTER — MEDICATION THERAPY MANAGEMENT (OUTPATIENT)
Dept: PHARMACY | Facility: HOSPITAL | Age: 65
End: 2020-06-12

## 2020-06-12 NOTE — PROGRESS NOTES
[facility] telephone follow up- Pavel    Contreras is doing well this morning. I let him know that I spoke with Dr. Pittman's office and they would reach out to obtain his discharge summaries. Contreras is feeling well and has no new issues or concerns.

## 2020-06-16 ENCOUNTER — READMISSION MANAGEMENT (OUTPATIENT)
Dept: CALL CENTER | Facility: HOSPITAL | Age: 65
End: 2020-06-16

## 2020-06-16 NOTE — OUTREACH NOTE
Medical Week 2 Survey      Responses   [facility]   COVID-19 Test Status  Negative   Does the patient have one of the following disease processes/diagnoses(primary or secondary)?  Other   Week 2 attempt successful?  Yes   Call start time  1511   Discharge diagnosis  Multiple myeloma,  severe anemia and acute on chronic renal failure   Call end time  1521   Medication alerts for this patient  medications reconciled   Meds reviewed with patient/caregiver?  Yes   Is the patient having any side effects they believe may be caused by any medication additions or changes?  No   Does the patient have all medications ordered at discharge?  Yes   Is the patient taking all medications as directed (includes completed medication regime)?  Yes   Comments regarding appointments  had appointment with opthamologist from [facility] today, 6/16/20   Does the patient have a primary care provider?   Yes   Does the patient have an appointment with their PCP within 7 days of discharge?  Yes   Has the patient kept scheduled appointments due by today?  Yes   Psychosocial issues?  No   Comments  pt tolerating chemotherapy well   Did the patient receive a copy of their discharge instructions?  Yes   Nursing interventions  Reviewed instructions with patient   What is the patient's perception of their health status since discharge?  Improving   Is the patient/caregiver able to teach back signs and symptoms related to disease process for when to call PCP?  Yes   Is the patient/caregiver able to teach back signs and symptoms related to disease process for when to call 911?  Yes   Is the patient/caregiver able to teach back the hierarchy of who to call/visit for symptoms/problems? PCP, Specialist, Home health nurse, Urgent Care, ED, 911  Yes   Additional teach back comments  pt states is amazed and relieved at how well is feeling, dialysis going well and are indications that it can be d/c'd at some  point, and has been tolerating all aggressive treatments well, states still coordinating meds with BP's which has been low normal, glucoses has been manageable, even with with spikes caused by steroids which are taken due to chemotherapy   Week 2 Call Completed?  Yes          Cari Ca RN

## 2020-06-18 ENCOUNTER — LAB (OUTPATIENT)
Dept: LAB | Facility: HOSPITAL | Age: 65
End: 2020-06-18

## 2020-06-18 ENCOUNTER — INFUSION (OUTPATIENT)
Dept: ONCOLOGY | Facility: HOSPITAL | Age: 65
End: 2020-06-18

## 2020-06-18 VITALS
HEART RATE: 109 BPM | DIASTOLIC BLOOD PRESSURE: 82 MMHG | TEMPERATURE: 99.1 F | OXYGEN SATURATION: 95 % | WEIGHT: 230.6 LBS | SYSTOLIC BLOOD PRESSURE: 151 MMHG | BODY MASS INDEX: 28.08 KG/M2

## 2020-06-18 DIAGNOSIS — C90.00 MULTIPLE MYELOMA NOT HAVING ACHIEVED REMISSION (HCC): ICD-10-CM

## 2020-06-18 DIAGNOSIS — C90.00 MULTIPLE MYELOMA NOT HAVING ACHIEVED REMISSION (HCC): Primary | ICD-10-CM

## 2020-06-18 LAB
ALBUMIN SERPL-MCNC: 3.7 G/DL (ref 3.5–5.2)
ALBUMIN/GLOB SERPL: 0.7 G/DL (ref 1.1–2.4)
ALP SERPL-CCNC: 93 U/L (ref 38–116)
ALT SERPL W P-5'-P-CCNC: 13 U/L (ref 0–41)
ANION GAP SERPL CALCULATED.3IONS-SCNC: 10.2 MMOL/L (ref 5–15)
AST SERPL-CCNC: 12 U/L (ref 0–40)
BASOPHILS # BLD AUTO: 0.02 10*3/MM3 (ref 0–0.2)
BASOPHILS NFR BLD AUTO: 0.4 % (ref 0–1.5)
BILIRUB SERPL-MCNC: 0.5 MG/DL (ref 0.2–1.2)
BUN BLD-MCNC: 37 MG/DL (ref 6–20)
BUN/CREAT SERPL: 14.7 (ref 7.3–30)
CALCIUM SPEC-SCNC: 9.7 MG/DL (ref 8.5–10.2)
CHLORIDE SERPL-SCNC: 99 MMOL/L (ref 98–107)
CO2 SERPL-SCNC: 23.8 MMOL/L (ref 22–29)
CREAT BLD-MCNC: 2.51 MG/DL (ref 0.7–1.3)
DEPRECATED RDW RBC AUTO: 50.2 FL (ref 37–54)
EOSINOPHIL # BLD AUTO: 0.02 10*3/MM3 (ref 0–0.4)
EOSINOPHIL NFR BLD AUTO: 0.4 % (ref 0.3–6.2)
ERYTHROCYTE [DISTWIDTH] IN BLOOD BY AUTOMATED COUNT: 14.8 % (ref 12.3–15.4)
GFR SERPL CREATININE-BSD FRML MDRD: 26 ML/MIN/1.73
GLOBULIN UR ELPH-MCNC: 5 GM/DL (ref 1.8–3.5)
GLUCOSE BLD-MCNC: 242 MG/DL (ref 74–124)
HCT VFR BLD AUTO: 28.3 % (ref 37.5–51)
HGB BLD-MCNC: 9.2 G/DL (ref 13–17.7)
IMM GRANULOCYTES # BLD AUTO: 0.03 10*3/MM3 (ref 0–0.05)
IMM GRANULOCYTES NFR BLD AUTO: 0.6 % (ref 0–0.5)
LYMPHOCYTES # BLD AUTO: 0.25 10*3/MM3 (ref 0.7–3.1)
LYMPHOCYTES NFR BLD AUTO: 4.8 % (ref 19.6–45.3)
MCH RBC QN AUTO: 30.6 PG (ref 26.6–33)
MCHC RBC AUTO-ENTMCNC: 32.5 G/DL (ref 31.5–35.7)
MCV RBC AUTO: 94 FL (ref 79–97)
MONOCYTES # BLD AUTO: 0.13 10*3/MM3 (ref 0.1–0.9)
MONOCYTES NFR BLD AUTO: 2.5 % (ref 5–12)
NEUTROPHILS # BLD AUTO: 4.79 10*3/MM3 (ref 1.7–7)
NEUTROPHILS NFR BLD AUTO: 91.3 % (ref 42.7–76)
NRBC BLD AUTO-RTO: 0 /100 WBC (ref 0–0.2)
PLATELET # BLD AUTO: 213 10*3/MM3 (ref 140–450)
PMV BLD AUTO: 9.2 FL (ref 6–12)
POTASSIUM BLD-SCNC: 5 MMOL/L (ref 3.5–4.7)
PROT SERPL-MCNC: 8.7 G/DL (ref 6.3–8)
RBC # BLD AUTO: 3.01 10*6/MM3 (ref 4.14–5.8)
SODIUM BLD-SCNC: 133 MMOL/L (ref 134–145)
WBC NRBC COR # BLD: 5.24 10*3/MM3 (ref 3.4–10.8)

## 2020-06-18 PROCEDURE — 96401 CHEMO ANTI-NEOPL SQ/IM: CPT

## 2020-06-18 PROCEDURE — 36415 COLL VENOUS BLD VENIPUNCTURE: CPT

## 2020-06-18 PROCEDURE — 80053 COMPREHEN METABOLIC PANEL: CPT

## 2020-06-18 PROCEDURE — 25010000002 BORTEZOMIB PER 0.1 MG: Performed by: NURSE PRACTITIONER

## 2020-06-18 PROCEDURE — 85025 COMPLETE CBC W/AUTO DIFF WBC: CPT

## 2020-06-18 RX ORDER — BORTEZOMIB 3.5 MG/1
1.5 INJECTION, POWDER, LYOPHILIZED, FOR SOLUTION INTRAVENOUS; SUBCUTANEOUS ONCE
Status: COMPLETED | OUTPATIENT
Start: 2020-06-18 | End: 2020-06-18

## 2020-06-18 RX ADMIN — BORTEZOMIB 3.4 MG: 3.5 INJECTION, POWDER, LYOPHILIZED, FOR SOLUTION INTRAVENOUS; SUBCUTANEOUS at 14:47

## 2020-06-19 ENCOUNTER — MEDICATION THERAPY MANAGEMENT (OUTPATIENT)
Dept: PHARMACY | Facility: HOSPITAL | Age: 65
End: 2020-06-19

## 2020-06-19 NOTE — PROGRESS NOTES
"[facility] telephone follow up re adherence and side effects- Pavel Chairez is doing well this morning. He reports taking his medications on schedule thus far. Contreras's main complaint is the steroid causing him to feel wired and increasing his blood sugar. He states his blood sugar does come back down within 24hrs typically and he will continue to monitor this. I asked Contreras about if he had heard back from his PCP regarding his discharge medications, specifically atorvastatin. Contreras states that he has not heard from them and I have instructed him to call as the office was supposed to be obtaining his discharge summaries. I also asked Contreras if he had started on his amlodipine, and he has not picked it up from [facility]. The medication status in Epic is \"ready to dispense\" so I advised him to  his medication. Contreras has no additional questions or concerns today. I have reviewed his labs.         6/18/2020   WBC 3.40 - 10.80 10*3/mm3 5.24   Neutrophils Absolute 1.70 - 7.00 10*3/mm3 4.79   Hemoglobin 13.0 - 17.7 g/dL 9.2 (A)   Hematocrit 37.5 - 51.0 % 28.3 (A)   Platelets 140 - 450 10*3/mm3 213   Creatinine 0.70 - 1.30 mg/dL 2.51 (A)   eGFR Non African Am >60 mL/min/1.73 26 (A)   BUN 6 - 20 mg/dL 37 (A)   Sodium 134 - 145 mmol/L 133 (A)   Potassium 3.5 - 4.7 mmol/L 5.0 (A)   Glucose 74 - 124 mg/dL 242 (A)   Calcium 8.5 - 10.2 mg/dL 9.7   Albumin 3.50 - 5.20 g/dL 3.70   Total Protein 6.3 - 8.0 g/dL 8.7 (A)   AST (SGOT) 0 - 40 U/L 12   ALT (SGPT) 0 - 41 U/L 13   Alkaline Phosphatase 38 - 116 U/L 93   Total Bilirubin 0.2 - 1.2 mg/dL 0.5       "

## 2020-06-23 ENCOUNTER — READMISSION MANAGEMENT (OUTPATIENT)
Dept: CALL CENTER | Facility: HOSPITAL | Age: 65
End: 2020-06-23

## 2020-06-23 NOTE — OUTREACH NOTE
Medical Week 3 Survey      Responses   [facility] patient discharged from?  [place]   COVID-19 Test Status  Negative   Does the patient have one of the following disease processes/diagnoses(primary or secondary)?  Other   Week 3 attempt successful?  No   Unsuccessful attempts  Attempt 1          Conchita Jiang RN

## 2020-06-24 ENCOUNTER — READMISSION MANAGEMENT (OUTPATIENT)
Dept: CALL CENTER | Facility: HOSPITAL | Age: 65
End: 2020-06-24

## 2020-06-24 NOTE — OUTREACH NOTE
"Medical Week 3 Survey      Responses   [facility] patient discharged from?  [place]   COVID-19 Test Status  Negative   Does the patient have one of the following disease processes/diagnoses(primary or secondary)?  Other   Week 3 attempt successful?  Yes   Call start time  1033   Call end time  1037   Discharge diagnosis  Multiple myeloma,  severe anemia and acute on chronic renal failure   Meds reviewed with patient/caregiver?  Yes   Is the patient taking all medications as directed (includes completed medication regime)?  Yes   Medication comments  Chemo treatment #6 tomorrow, 06/25/2020   Has the patient kept scheduled appointments due by today?  Yes   Pulse Ox monitoring  None   Comments  \"Keeping BS where it is suppose to be, except on Steroid days\"   What is the patient's perception of their health status since discharge?  Improving   Week 3 Call Completed?  Yes          Sandi Dahl RN  "

## 2020-06-25 ENCOUNTER — INFUSION (OUTPATIENT)
Dept: ONCOLOGY | Facility: HOSPITAL | Age: 65
End: 2020-06-25

## 2020-06-25 ENCOUNTER — LAB (OUTPATIENT)
Dept: LAB | Facility: HOSPITAL | Age: 65
End: 2020-06-25

## 2020-06-25 ENCOUNTER — OFFICE VISIT (OUTPATIENT)
Dept: ONCOLOGY | Facility: CLINIC | Age: 65
End: 2020-06-25

## 2020-06-25 VITALS
HEIGHT: 76 IN | TEMPERATURE: 98 F | WEIGHT: 230.8 LBS | RESPIRATION RATE: 18 BRPM | BODY MASS INDEX: 28.11 KG/M2 | OXYGEN SATURATION: 97 % | SYSTOLIC BLOOD PRESSURE: 154 MMHG | HEART RATE: 95 BPM | DIASTOLIC BLOOD PRESSURE: 83 MMHG

## 2020-06-25 DIAGNOSIS — D64.9 ANEMIA, UNSPECIFIED TYPE: ICD-10-CM

## 2020-06-25 DIAGNOSIS — C90.00 MULTIPLE MYELOMA NOT HAVING ACHIEVED REMISSION (HCC): Primary | ICD-10-CM

## 2020-06-25 DIAGNOSIS — N28.9 RENAL INSUFFICIENCY: ICD-10-CM

## 2020-06-25 DIAGNOSIS — I10 ESSENTIAL HYPERTENSION: ICD-10-CM

## 2020-06-25 LAB
ALBUMIN SERPL-MCNC: 3.5 G/DL (ref 3.5–5.2)
ALBUMIN/GLOB SERPL: 0.7 G/DL (ref 1.1–2.4)
ALP SERPL-CCNC: 83 U/L (ref 38–116)
ALT SERPL W P-5'-P-CCNC: 6 U/L (ref 0–41)
ANION GAP SERPL CALCULATED.3IONS-SCNC: 10.5 MMOL/L (ref 5–15)
AST SERPL-CCNC: 10 U/L (ref 0–40)
BASOPHILS # BLD AUTO: 0.03 10*3/MM3 (ref 0–0.2)
BASOPHILS NFR BLD AUTO: 0.5 % (ref 0–1.5)
BILIRUB SERPL-MCNC: 0.3 MG/DL (ref 0.2–1.2)
BUN BLD-MCNC: 39 MG/DL (ref 6–20)
BUN/CREAT SERPL: 17.6 (ref 7.3–30)
CALCIUM SPEC-SCNC: 9.4 MG/DL (ref 8.5–10.2)
CHLORIDE SERPL-SCNC: 98 MMOL/L (ref 98–107)
CO2 SERPL-SCNC: 23.5 MMOL/L (ref 22–29)
CREAT BLD-MCNC: 2.22 MG/DL (ref 0.7–1.3)
DEPRECATED RDW RBC AUTO: 50 FL (ref 37–54)
EOSINOPHIL # BLD AUTO: 0.03 10*3/MM3 (ref 0–0.4)
EOSINOPHIL NFR BLD AUTO: 0.5 % (ref 0.3–6.2)
ERYTHROCYTE [DISTWIDTH] IN BLOOD BY AUTOMATED COUNT: 14.7 % (ref 12.3–15.4)
GFR SERPL CREATININE-BSD FRML MDRD: 30 ML/MIN/1.73
GLOBULIN UR ELPH-MCNC: 5 GM/DL (ref 1.8–3.5)
GLUCOSE BLD-MCNC: 347 MG/DL (ref 74–124)
HCT VFR BLD AUTO: 27.9 % (ref 37.5–51)
HGB BLD-MCNC: 9.1 G/DL (ref 13–17.7)
IMM GRANULOCYTES # BLD AUTO: 0.2 10*3/MM3 (ref 0–0.05)
IMM GRANULOCYTES NFR BLD AUTO: 3.4 % (ref 0–0.5)
LYMPHOCYTES # BLD AUTO: 0.42 10*3/MM3 (ref 0.7–3.1)
LYMPHOCYTES NFR BLD AUTO: 7.2 % (ref 19.6–45.3)
MCH RBC QN AUTO: 30.7 PG (ref 26.6–33)
MCHC RBC AUTO-ENTMCNC: 32.6 G/DL (ref 31.5–35.7)
MCV RBC AUTO: 94.3 FL (ref 79–97)
MONOCYTES # BLD AUTO: 0.14 10*3/MM3 (ref 0.1–0.9)
MONOCYTES NFR BLD AUTO: 2.4 % (ref 5–12)
NEUTROPHILS # BLD AUTO: 5.01 10*3/MM3 (ref 1.7–7)
NEUTROPHILS NFR BLD AUTO: 86 % (ref 42.7–76)
NRBC BLD AUTO-RTO: 0 /100 WBC (ref 0–0.2)
PLATELET # BLD AUTO: 309 10*3/MM3 (ref 140–450)
PMV BLD AUTO: 8.9 FL (ref 6–12)
POTASSIUM BLD-SCNC: 5.2 MMOL/L (ref 3.5–4.7)
PROT SERPL-MCNC: 8.5 G/DL (ref 6.3–8)
RBC # BLD AUTO: 2.96 10*6/MM3 (ref 4.14–5.8)
SODIUM BLD-SCNC: 132 MMOL/L (ref 134–145)
WBC NRBC COR # BLD: 5.83 10*3/MM3 (ref 3.4–10.8)

## 2020-06-25 PROCEDURE — 96401 CHEMO ANTI-NEOPL SQ/IM: CPT

## 2020-06-25 PROCEDURE — 85025 COMPLETE CBC W/AUTO DIFF WBC: CPT

## 2020-06-25 PROCEDURE — 25010000002 BORTEZOMIB PER 0.1 MG: Performed by: INTERNAL MEDICINE

## 2020-06-25 PROCEDURE — 80053 COMPREHEN METABOLIC PANEL: CPT

## 2020-06-25 PROCEDURE — 99214 OFFICE O/P EST MOD 30 MIN: CPT | Performed by: INTERNAL MEDICINE

## 2020-06-25 PROCEDURE — 36415 COLL VENOUS BLD VENIPUNCTURE: CPT

## 2020-06-25 RX ORDER — DEXAMETHASONE 4 MG/1
40 TABLET ORAL WEEKLY
Qty: 40 TABLET | Refills: 1 | Status: SHIPPED | OUTPATIENT
Start: 2020-06-25 | End: 2020-09-25 | Stop reason: SDUPTHER

## 2020-06-25 RX ORDER — CYCLOPHOSPHAMIDE 50 MG/1
650 CAPSULE ORAL WEEKLY
Qty: 52 CAPSULE | Refills: 6 | Status: SHIPPED | OUTPATIENT
Start: 2020-06-25 | End: 2020-08-20 | Stop reason: ALTCHOICE

## 2020-06-25 RX ORDER — BORTEZOMIB 3.5 MG/1
1.5 INJECTION, POWDER, LYOPHILIZED, FOR SOLUTION INTRAVENOUS; SUBCUTANEOUS ONCE
Status: COMPLETED | OUTPATIENT
Start: 2020-06-25 | End: 2020-06-25

## 2020-06-25 RX ORDER — BORTEZOMIB 3.5 MG/1
1.5 INJECTION, POWDER, LYOPHILIZED, FOR SOLUTION INTRAVENOUS; SUBCUTANEOUS ONCE
Status: CANCELLED | OUTPATIENT
Start: 2020-06-25

## 2020-06-25 RX ORDER — BORTEZOMIB 3.5 MG/1
1.5 INJECTION, POWDER, LYOPHILIZED, FOR SOLUTION INTRAVENOUS; SUBCUTANEOUS ONCE
Status: CANCELLED | OUTPATIENT
Start: 2020-07-02

## 2020-06-25 RX ORDER — BORTEZOMIB 3.5 MG/1
1.5 INJECTION, POWDER, LYOPHILIZED, FOR SOLUTION INTRAVENOUS; SUBCUTANEOUS ONCE
Status: CANCELLED | OUTPATIENT
Start: 2020-07-16

## 2020-06-25 RX ORDER — BORTEZOMIB 3.5 MG/1
1.5 INJECTION, POWDER, LYOPHILIZED, FOR SOLUTION INTRAVENOUS; SUBCUTANEOUS ONCE
Status: CANCELLED | OUTPATIENT
Start: 2020-07-09

## 2020-06-25 RX ORDER — AMLODIPINE BESYLATE 2.5 MG/1
2.5 TABLET ORAL DAILY
Qty: 30 TABLET | Refills: 1 | Status: SHIPPED | OUTPATIENT
Start: 2020-06-25 | End: 2020-07-23 | Stop reason: SDUPTHER

## 2020-06-25 RX ADMIN — BORTEZOMIB 3.4 MG: 3.5 INJECTION, POWDER, LYOPHILIZED, FOR SOLUTION INTRAVENOUS; SUBCUTANEOUS at 11:45

## 2020-06-25 NOTE — PROGRESS NOTES
Subjective     CHIEF COMPLAINT:      Chief Complaint   Patient presents with   • Follow-up     no concerns       HISTORY OF PRESENT ILLNESS:     Contreras Albert is a 64 y.o. male patient who returns today for follow up on his multiple myeloma.  He returns today for follow-up reporting that he is feeling better.  He reports improvement in his strength.  He has been able to walk more.  His appetite improved as well.    Patient reports developing generalized bone achiness after receiving each Velcade injection.  No nausea or vomiting.  No numbness in the fingers or toes.    Patient develops hyperglycemia after he takes his steroids.  He takes insulin by sliding scale according to the blood glucose level.    Patient states that his kidney function improved.  The labs obtained on 6/24/2020.  He is not scheduled for additional hemodialysis.  He states that he is being referred to have dialysis catheter removed.    REVIEW OF SYSTEMS:  Review of Systems   Constitutional: Positive for fatigue. Negative for fever and unexpected weight change.   HENT: Negative for nosebleeds and voice change.    Eyes: Negative for visual disturbance.   Respiratory: Negative for cough and shortness of breath.    Cardiovascular: Negative for chest pain and leg swelling.   Gastrointestinal: Negative for abdominal pain, blood in stool, constipation, diarrhea, nausea and vomiting.   Genitourinary: Negative for frequency and hematuria.   Musculoskeletal: Negative for back pain and joint swelling.   Skin: Negative for rash.   Neurological: Negative for dizziness and headaches.   Hematological: Negative for adenopathy. Does not bruise/bleed easily.   Psychiatric/Behavioral: Negative for dysphoric mood. The patient is not nervous/anxious.      I reviewed and verified the CC and ROS obtained by the MA.     Past Medical History:   Diagnosis Date   • Anemia    • Diabetes mellitus (CMS/HCC)    • Enlarged prostate    • High cholesterol    • Hypertension    •  Renal mass        Past Surgical History:   Procedure Laterality Date   • INSERTION HEMODIALYSIS CATHETER N/A 6/2/2020    Procedure: HEMODIALYSIS CATHETER INSERTION;  Surgeon: Sumanth Hernandez MD;  Location: [facility] MAIN OR;  Service: Vascular;  Laterality: N/A;   • NEPHRECTOMY PARTIAL Right 5/18/2016    Procedure: RT NEPHRECTOMY PARTIAL LAPAROSCOPIC WITH DAVINCI ROBOT;  Surgeon: Chad Garcia MD;  Location: [facility] OR;  Service:        Cancer-related family history is not on file.  Social History     Tobacco Use   • Smoking status: Never Smoker   • Smokeless tobacco: Never Used   Substance Use Topics   • Alcohol use: No       MEDICATIONS:    Current Outpatient Medications:   •  acyclovir (ZOVIRAX) 400 MG tablet, Take 1 tablet by mouth 2 (Two) Times a Day., Disp: 60 tablet, Rfl: 7  •  allopurinol (ZYLOPRIM) 100 MG tablet, Take 1 tablet by mouth Daily., Disp: 30 tablet, Rfl: 0  •  amLODIPine (Norvasc) 2.5 MG tablet, Take 1 tablet by mouth Daily., Disp: 30 tablet, Rfl: 1  •  Blood Glucose Monitoring Suppl (FREESTYLE FREEDOM LITE) w/Device kit, 1 kit Daily. Indications: Type 2 Diabetes, Disp: 1 each, Rfl: 0  •  calcium acetate (PHOSLO) 667 MG tablet, Take 2 tablets by mouth 3 (Three) Times a Day With Meals., Disp: 180 tablet, Rfl: 0  •  cyclophosphamide 50 MG capsule, Take 13 capsules by mouth 1 (One) Time Per Week., Disp: 52 capsule, Rfl: 6  •  dexamethasone (DECADRON) 4 MG tablet, Take 10 tablets by mouth 1 (One) Time Per Week., Disp: 40 tablet, Rfl: 1  •  finasteride (PROSCAR) 5 MG tablet, Take 5 mg by mouth daily., Disp: , Rfl:   •  glucose blood (FREESTYLE LITE) test strip, Use daily to test blood sugar, Disp: 100 each, Rfl: 0  •  Insulin Glargine (LANTUS SOLOSTAR) 100 UNIT/ML injection pen, Inject 15 Units under the skin into the appropriate area as directed Every Night., Disp: 15 mL, Rfl: 0  •  Insulin Pen Needle 32G X 4 MM misc, 1 application Daily., Disp: 100 each, Rfl: 0  •  Lancets  "(FREESTYLE) lancets, Use once then discard to test blood sugar each morning, Disp: 100 each, Rfl: 0  •  lisinopril (PRINIVIL,ZESTRIL) 10 MG tablet, Take 10 mg by mouth daily., Disp: , Rfl:   •  ondansetron (ZOFRAN) 8 MG tablet, Take 1 tablet by mouth 3 (Three) Times a Day As Needed for Nausea or Vomiting., Disp: 30 tablet, Rfl: 5  •  pantoprazole (PROTONIX) 40 MG EC tablet, Take 1 tablet by mouth Daily., Disp: 30 tablet, Rfl: 0  •  sulfamethoxazole-trimethoprim (BACTRIM DS,SEPTRA DS) 800-160 MG per tablet, Take 1 tablet by mouth 3 (Three) Times a Week. Take 1 tablet on Mondays, Wednesdays and Fridays., Disp: 12 tablet, Rfl: 7  •  tamsulosin (FLOMAX) 0.4 MG capsule 24 hr capsule, Take 1 capsule by mouth every night., Disp: , Rfl:   •  traMADol (ULTRAM) 50 MG tablet, Take 1 tablet by mouth Every 12 (Twelve) Hours As Needed for Moderate Pain  or Severe Pain ., Disp: 6 tablet, Rfl: 0  No current facility-administered medications for this visit.     ALLERGIES:  Allergies   Allergen Reactions   • Crestor [Rosuvastatin] GI Intolerance         Objective   VITAL SIGNS:     Vitals:    06/25/20 1035   BP: 154/83   Pulse: 95   Resp: 18   Temp: 98 °F (36.7 °C)   SpO2: 97%   Weight: 105 kg (230 lb 12.8 oz)   Height: 193 cm (75.98\")   PainSc: 0-No pain     Body mass index is 28.11 kg/m².     Wt Readings from Last 3 Encounters:   06/25/20 105 kg (230 lb 12.8 oz)   06/18/20 105 kg (230 lb 9.6 oz)   06/09/20 104 kg (230 lb 1.6 oz)       PHYSICAL EXAMINATION:  GENERAL:  The patient appears in good general condition, not in acute distress.  SKIN: No skin rashes. No ecchymosis.  HEAD:  Normocephalic.  EYES:  No Jaundice. Pallor. Pupils equal. EOMI.  NECK:  Supple with Good ROM. No Thyromegaly. No Masses.  LYMPHATICS:  No cervical or supraclavicular lymphadenopathy.  CHEST: Normal respiratory effort. Lungs clear to auscultation.   CARDIAC:  Normal S1 & S2. No murmur. No edema.  ABDOMEN: Nondistended.  EXTREMITIES:  No clubbing. No " deforming arthritis in the hands. No Calf tenderness.  NEUROLOGICAL:  No Focal neurological deficits.       DIAGNOSTIC DATA:     Results from last 7 days   Lab Units 06/25/20  1011 06/18/20  1356   WBC 10*3/mm3 5.83 5.24   NEUTROS ABS 10*3/mm3 5.01 4.79   HEMOGLOBIN g/dL 9.1* 9.2*   HEMATOCRIT % 27.9* 28.3*   PLATELETS 10*3/mm3 309 213     Results from last 7 days   Lab Units 06/25/20  1011 06/18/20  1356   SODIUM mmol/L 132* 133*   POTASSIUM mmol/L 5.2* 5.0*   CHLORIDE mmol/L 98 99   CO2 mmol/L 23.5 23.8   BUN mg/dL 39* 37*   CREATININE mg/dL 2.22* 2.51*   CALCIUM mg/dL 9.4 9.7   ALBUMIN g/dL 3.50 3.70   BILIRUBIN mg/dL 0.3 0.5   ALK PHOS U/L 83 93   ALT (SGPT) U/L 6 13   AST (SGOT) U/L 10 12   GLUCOSE mg/dL 347* 242*         Assessment/Plan   1.  IgG lambda multiple myeloma.  · Bone marrow 5/23/2020 hypercellular marrow with 80% involvement of plasma cell myeloma; FISH studies pending  · Bone survey 5/26/2020: Diffuse osteoporosis and demineralization.  However, no discrete lytic lesions.  · SPEP 5/23/2020: M spike 5.1.  IgG 6629 lambda.  Light chain ratio 0 with free lambda light chains 6400.  Beta-2 microglobulin 16.3.  24-hour urine 12.7 g protein per 24-hour with monoclonal lambda free light chain 33.9%, monoclonal IgG lambda 23.5%  · 24-hour urine testing from 5/24/2020 free lambda light chains 8.4 g/L  · Considering renal failure, initiated CyBorD 5/28/20.  Given the significantly elevated light chains with the first cycle of treatment plan to give Velcade days 1, 4, 8, 11; Cytoxan 300 PO mg/m² weekly and dexamethasone 40 mg p.o. days 1, 2, 3, 4, 8 and 15.    · After cycle 1 treatment can likely be altered to weekly dosing of each drug.  · Plan is for referral to discuss autologous stem cell transplant.  Patient is deciding between [facility] and [facility]  · Patient is tolerating the treatment except for generalized bone achiness after receiving Velcade.  · On 6/18/2020, Velcade  was changed to 1.5 mg/m² weekly continuously along with weekly Decadron and oral Cytoxan.  · We will repeat paraprotein studies today.      2.  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  · B12 folate and iron studies are normal.  · Status post PRBC transfusion on 5/24/2020.  · Patient was receiving Procrit with his hemodialysis.  · Hemoglobin is now in the 9 g range.  · Patient is feeling better with improvement in his energy level.    3.  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  · Showed enlargement of the kidneys concerning for myeloma kidney and light chain deposition disease.  · Patient completed 3 sessions of pheresis on 5/30/2020.  · Creatinine was 5.12 on 5/31/2020  · Creatinine increased to 6.36 on 6/1/2020.  · Patient had HD catheter placed and started on HD on 6/2/2020.   · Patient was informed by nephrology on 6/24/2020 that his kidney function has improved and he is not going to require additional hemodialysis.  · Patient is awaiting removal of the hemodialysis catheter.    4.  Tumor lysis prophylaxis.  · Uric acid level increased to 8.9 on 6/1/2020 due to the worsening of the kidney function.    · Uric acid is down to 8.0 on 6/3/2020.   · Patient is continuing allopurinol 100 mg daily.    5.  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  · Status post right partial nephrectomy on 5/18/2016.    6.  Thrombocytopenia.    · Platelet count was gradually declining and this is attributed to the effect of his treatment with Velcade and Cytoxan.    · The gradual declines makes HIT much less likely.   · Platelet count improved to 106,000 on 6/3/2020.  · Platelet count decreased to 89,000 on 6/4/2020.  · Platelet count further improved and it is now normal.    7.  Essential hypertension.  · Patient was previously on lisinopril.  · Lisinopril was held due to the acute kidney injury.  · We recommended switching to Norvasc 2.5 mg  daily.    8.  Diabetes mellitus.  · Patient has increase in his blood glucose secondary to the weekly dexamethasone.  · Patient is on sliding scale insulin.  Blood glucose is usually elevated the day of and the day following the treatment.    PLAN:    1.  Proceed with Velcade today at 1.5 mg/m² for a total dose of 3.4 mg.  2.  Continue Cytoxan 650 mg p.o. weekly and Decadron 40 mg weekly.  3.  Continue acyclovir, Bactrim and allopurinol.  4.  Return weekly for CBC and Velcade.  5.  We will obtain SPEP [facility] today and repeat in 3 weeks.  6.  I will see him in follow-up in 4 weeks.  He will inform us at that point about his decision regarding the bone marrow transplant center.        Red Del Valle MD  06/25/20

## 2020-06-26 ENCOUNTER — MEDICATION THERAPY MANAGEMENT (OUTPATIENT)
Dept: PHARMACY | Facility: HOSPITAL | Age: 65
End: 2020-06-26

## 2020-06-26 NOTE — PROGRESS NOTES
[facility] Lab Review- [facility]        6/25/2020   WBC 3.40 - 10.80 10*3/mm3 5.83   Neutrophils Absolute 1.70 - 7.00 10*3/mm3 5.01   Hemoglobin 13.0 - 17.7 g/dL 9.1 (A)   Hematocrit 37.5 - 51.0 % 27.9 (A)   Platelets 140 - 450 10*3/mm3 309   Creatinine 0.70 - 1.30 mg/dL 2.22 (A)   eGFR Non African Am >60 mL/min/1.73 30 (A)   BUN 6 - 20 mg/dL 39 (A)   Sodium 134 - 145 mmol/L 132 (A)   Potassium 3.5 - 4.7 mmol/L 5.2 (A)   Glucose 74 - 124 mg/dL 347 (A)   Calcium 8.5 - 10.2 mg/dL 9.4   Albumin 3.50 - 5.20 g/dL 3.50   Total Protein 6.3 - 8.0 g/dL 8.5 (A)   AST (SGOT) 0 - 40 U/L 10   ALT (SGPT) 0 - 41 U/L 6   Alkaline Phosphatase 38 - 116 U/L 83   Total Bilirubin 0.2 - 1.2 mg/dL 0.3     MD dictation noted. Scr is trending down.

## 2020-06-29 LAB
ALBUMIN SERPL-MCNC: 3 G/DL (ref 2.9–4.4)
ALBUMIN/GLOB SERPL: 0.7 {RATIO} (ref 0.7–1.7)
ALPHA1 GLOB FLD ELPH-MCNC: 0.3 G/DL (ref 0–0.4)
ALPHA2 GLOB SERPL ELPH-MCNC: 1.1 G/DL (ref 0.4–1)
B-GLOBULIN SERPL ELPH-MCNC: 0.8 G/DL (ref 0.7–1.3)
GAMMA GLOB SERPL ELPH-MCNC: 2.5 G/DL (ref 0.4–1.8)
GLOBULIN SER CALC-MCNC: 4.7 G/DL (ref 2.2–3.9)
IGA SERPL-MCNC: 14 MG/DL (ref 61–437)
IGG SERPL-MCNC: 2993 MG/DL (ref 603–1613)
IGM SERPL-MCNC: 9 MG/DL (ref 20–172)
INTERPRETATION SERPL IEP-IMP: ABNORMAL
KAPPA LC SERPL-MCNC: 17.4 MG/L (ref 3.3–19.4)
KAPPA LC/LAMBDA SER: 0.01 {RATIO} (ref 0.26–1.65)
LAMBDA LC FREE SERPL-MCNC: 1185.1 MG/L (ref 5.7–26.3)
Lab: ABNORMAL
M-SPIKE: ABNORMAL G/DL
PROT SERPL-MCNC: 7.7 G/DL (ref 6–8.5)

## 2020-07-02 ENCOUNTER — LAB (OUTPATIENT)
Dept: LAB | Facility: HOSPITAL | Age: 65
End: 2020-07-02

## 2020-07-02 ENCOUNTER — INFUSION (OUTPATIENT)
Dept: ONCOLOGY | Facility: HOSPITAL | Age: 65
End: 2020-07-02

## 2020-07-02 VITALS
SYSTOLIC BLOOD PRESSURE: 141 MMHG | TEMPERATURE: 98 F | DIASTOLIC BLOOD PRESSURE: 88 MMHG | OXYGEN SATURATION: 98 % | BODY MASS INDEX: 28.35 KG/M2 | WEIGHT: 232.8 LBS | RESPIRATION RATE: 16 BRPM | HEART RATE: 89 BPM

## 2020-07-02 DIAGNOSIS — C90.00 MULTIPLE MYELOMA NOT HAVING ACHIEVED REMISSION (HCC): Primary | ICD-10-CM

## 2020-07-02 DIAGNOSIS — C90.00 MULTIPLE MYELOMA NOT HAVING ACHIEVED REMISSION (HCC): ICD-10-CM

## 2020-07-02 LAB
BASOPHILS # BLD AUTO: 0.04 10*3/MM3 (ref 0–0.2)
BASOPHILS NFR BLD AUTO: 0.5 % (ref 0–1.5)
DEPRECATED RDW RBC AUTO: 47.8 FL (ref 37–54)
EOSINOPHIL # BLD AUTO: 0.02 10*3/MM3 (ref 0–0.4)
EOSINOPHIL NFR BLD AUTO: 0.2 % (ref 0.3–6.2)
ERYTHROCYTE [DISTWIDTH] IN BLOOD BY AUTOMATED COUNT: 14.7 % (ref 12.3–15.4)
HCT VFR BLD AUTO: 27.7 % (ref 37.5–51)
HGB BLD-MCNC: 9.4 G/DL (ref 13–17.7)
IMM GRANULOCYTES # BLD AUTO: 0.11 10*3/MM3 (ref 0–0.05)
IMM GRANULOCYTES NFR BLD AUTO: 1.3 % (ref 0–0.5)
LYMPHOCYTES # BLD AUTO: 0.52 10*3/MM3 (ref 0.7–3.1)
LYMPHOCYTES NFR BLD AUTO: 5.9 % (ref 19.6–45.3)
MCH RBC QN AUTO: 30.6 PG (ref 26.6–33)
MCHC RBC AUTO-ENTMCNC: 33.9 G/DL (ref 31.5–35.7)
MCV RBC AUTO: 90.2 FL (ref 79–97)
MONOCYTES # BLD AUTO: 0.12 10*3/MM3 (ref 0.1–0.9)
MONOCYTES NFR BLD AUTO: 1.4 % (ref 5–12)
NEUTROPHILS NFR BLD AUTO: 7.98 10*3/MM3 (ref 1.7–7)
NEUTROPHILS NFR BLD AUTO: 90.7 % (ref 42.7–76)
NRBC BLD AUTO-RTO: 0 /100 WBC (ref 0–0.2)
PLATELET # BLD AUTO: 285 10*3/MM3 (ref 140–450)
PMV BLD AUTO: 9.8 FL (ref 6–12)
RBC # BLD AUTO: 3.07 10*6/MM3 (ref 4.14–5.8)
WBC # BLD AUTO: 8.79 10*3/MM3 (ref 3.4–10.8)

## 2020-07-02 PROCEDURE — 96401 CHEMO ANTI-NEOPL SQ/IM: CPT

## 2020-07-02 PROCEDURE — 25010000002 BORTEZOMIB PER 0.1 MG: Performed by: INTERNAL MEDICINE

## 2020-07-02 PROCEDURE — 36415 COLL VENOUS BLD VENIPUNCTURE: CPT

## 2020-07-02 PROCEDURE — 85025 COMPLETE CBC W/AUTO DIFF WBC: CPT

## 2020-07-02 RX ORDER — BORTEZOMIB 3.5 MG/1
1.5 INJECTION, POWDER, LYOPHILIZED, FOR SOLUTION INTRAVENOUS; SUBCUTANEOUS ONCE
Status: COMPLETED | OUTPATIENT
Start: 2020-07-02 | End: 2020-07-02

## 2020-07-02 RX ADMIN — BORTEZOMIB 3.4 MG: 3.5 INJECTION, POWDER, LYOPHILIZED, FOR SOLUTION INTRAVENOUS; SUBCUTANEOUS at 15:07

## 2020-07-02 NOTE — NURSING NOTE
Pt was in for Velcade injection today. Upon exposing his abdomen for the injection this nurse noted a tick just inside the pt's navel. Was able to remove tick but small portion appeared to have been left behind. Pt was advised to monitor the site and that he may attempt to remove the small part left behind after showering which may soften the tissue surrounding it. Pt also advised that triple antibiotic could be applied to the site and that if he should notice any rash or if the area doesn't appear to start healing he should contact the office. Pt v/u.

## 2020-07-03 ENCOUNTER — READMISSION MANAGEMENT (OUTPATIENT)
Dept: CALL CENTER | Facility: HOSPITAL | Age: 65
End: 2020-07-03

## 2020-07-03 NOTE — OUTREACH NOTE
Medical Week 4 Survey      Responses   [facility] patient discharged from?  [place]   COVID-19 Test Status  Negative   Does the patient have one of the following disease processes/diagnoses(primary or secondary)?  Other   Week 4 attempt successful?  Yes   Call start time  1401   Call end time  1403   Discharge diagnosis  Multiple myeloma,  severe anemia and acute on chronic renal failure   Person spoke with today (if not patient) and relationship  Gabby-spouse and patient   Meds reviewed with patient/caregiver?  Yes   Is the patient taking all medications as directed (includes completed medication regime)?  Yes   Has the patient kept scheduled appointments due by today?  Yes   Is the patient still receiving Home Health Services?  N/A   What is the patient's perception of their health status since discharge?  Improving [Dialysis cath was removed on 7/2/20]   If the patient is a current smoker, are they able to teach back resources for cessation?  -- [Nonsmoker]   Week 4 Call Completed?  Yes   Would the patient like one additional call?  No   Graduated  Yes   Did the patient feel the follow up calls were helpful during their recovery period?  Yes   Was the number of calls appropriate?  Yes          Emily Devine RN

## 2020-07-09 ENCOUNTER — LAB (OUTPATIENT)
Dept: LAB | Facility: HOSPITAL | Age: 65
End: 2020-07-09

## 2020-07-09 ENCOUNTER — INFUSION (OUTPATIENT)
Dept: ONCOLOGY | Facility: HOSPITAL | Age: 65
End: 2020-07-09

## 2020-07-09 VITALS
OXYGEN SATURATION: 99 % | TEMPERATURE: 98.9 F | SYSTOLIC BLOOD PRESSURE: 148 MMHG | WEIGHT: 225.8 LBS | BODY MASS INDEX: 27.5 KG/M2 | RESPIRATION RATE: 16 BRPM | HEART RATE: 98 BPM | DIASTOLIC BLOOD PRESSURE: 89 MMHG

## 2020-07-09 DIAGNOSIS — C90.00 MULTIPLE MYELOMA NOT HAVING ACHIEVED REMISSION (HCC): ICD-10-CM

## 2020-07-09 DIAGNOSIS — C90.00 MULTIPLE MYELOMA NOT HAVING ACHIEVED REMISSION (HCC): Primary | ICD-10-CM

## 2020-07-09 LAB
BASOPHILS # BLD AUTO: 0.03 10*3/MM3 (ref 0–0.2)
BASOPHILS NFR BLD AUTO: 0.4 % (ref 0–1.5)
DEPRECATED RDW RBC AUTO: 46.8 FL (ref 37–54)
EOSINOPHIL # BLD AUTO: 0.02 10*3/MM3 (ref 0–0.4)
EOSINOPHIL NFR BLD AUTO: 0.2 % (ref 0.3–6.2)
ERYTHROCYTE [DISTWIDTH] IN BLOOD BY AUTOMATED COUNT: 14.6 % (ref 12.3–15.4)
HCT VFR BLD AUTO: 28.5 % (ref 37.5–51)
HGB BLD-MCNC: 9.7 G/DL (ref 13–17.7)
IMM GRANULOCYTES # BLD AUTO: 0.07 10*3/MM3 (ref 0–0.05)
IMM GRANULOCYTES NFR BLD AUTO: 0.8 % (ref 0–0.5)
LYMPHOCYTES # BLD AUTO: 0.49 10*3/MM3 (ref 0.7–3.1)
LYMPHOCYTES NFR BLD AUTO: 5.8 % (ref 19.6–45.3)
MCH RBC QN AUTO: 30.4 PG (ref 26.6–33)
MCHC RBC AUTO-ENTMCNC: 34 G/DL (ref 31.5–35.7)
MCV RBC AUTO: 89.3 FL (ref 79–97)
MONOCYTES # BLD AUTO: 0.13 10*3/MM3 (ref 0.1–0.9)
MONOCYTES NFR BLD AUTO: 1.5 % (ref 5–12)
NEUTROPHILS NFR BLD AUTO: 7.67 10*3/MM3 (ref 1.7–7)
NEUTROPHILS NFR BLD AUTO: 91.3 % (ref 42.7–76)
NRBC BLD AUTO-RTO: 0 /100 WBC (ref 0–0.2)
PLATELET # BLD AUTO: 254 10*3/MM3 (ref 140–450)
PMV BLD AUTO: 10.4 FL (ref 6–12)
RBC # BLD AUTO: 3.19 10*6/MM3 (ref 4.14–5.8)
WBC # BLD AUTO: 8.41 10*3/MM3 (ref 3.4–10.8)

## 2020-07-09 PROCEDURE — 85025 COMPLETE CBC W/AUTO DIFF WBC: CPT

## 2020-07-09 PROCEDURE — 96401 CHEMO ANTI-NEOPL SQ/IM: CPT

## 2020-07-09 PROCEDURE — 25010000002 BORTEZOMIB PER 0.1 MG: Performed by: INTERNAL MEDICINE

## 2020-07-09 PROCEDURE — 36415 COLL VENOUS BLD VENIPUNCTURE: CPT

## 2020-07-09 RX ORDER — BORTEZOMIB 3.5 MG/1
1.5 INJECTION, POWDER, LYOPHILIZED, FOR SOLUTION INTRAVENOUS; SUBCUTANEOUS ONCE
Status: COMPLETED | OUTPATIENT
Start: 2020-07-09 | End: 2020-07-09

## 2020-07-09 RX ADMIN — BORTEZOMIB 3.4 MG: 3.5 INJECTION, POWDER, LYOPHILIZED, FOR SOLUTION INTRAVENOUS; SUBCUTANEOUS at 15:51

## 2020-07-16 ENCOUNTER — INFUSION (OUTPATIENT)
Dept: ONCOLOGY | Facility: HOSPITAL | Age: 65
End: 2020-07-16

## 2020-07-16 ENCOUNTER — LAB (OUTPATIENT)
Dept: LAB | Facility: HOSPITAL | Age: 65
End: 2020-07-16

## 2020-07-16 VITALS
RESPIRATION RATE: 18 BRPM | SYSTOLIC BLOOD PRESSURE: 158 MMHG | OXYGEN SATURATION: 99 % | WEIGHT: 228.4 LBS | DIASTOLIC BLOOD PRESSURE: 89 MMHG | HEART RATE: 87 BPM | BODY MASS INDEX: 27.81 KG/M2 | TEMPERATURE: 98.1 F

## 2020-07-16 DIAGNOSIS — C90.00 MULTIPLE MYELOMA NOT HAVING ACHIEVED REMISSION (HCC): ICD-10-CM

## 2020-07-16 DIAGNOSIS — C90.00 MULTIPLE MYELOMA NOT HAVING ACHIEVED REMISSION (HCC): Primary | ICD-10-CM

## 2020-07-16 LAB
ALBUMIN SERPL-MCNC: 3.5 G/DL (ref 3.5–5.2)
ALBUMIN/GLOB SERPL: 0.7 G/DL (ref 1.1–2.4)
ALP SERPL-CCNC: 88 U/L (ref 38–116)
ALT SERPL W P-5'-P-CCNC: 14 U/L (ref 0–41)
ANION GAP SERPL CALCULATED.3IONS-SCNC: 10.2 MMOL/L (ref 5–15)
AST SERPL-CCNC: 9 U/L (ref 0–40)
BASOPHILS # BLD AUTO: 0.05 10*3/MM3 (ref 0–0.2)
BASOPHILS NFR BLD AUTO: 0.7 % (ref 0–1.5)
BILIRUB SERPL-MCNC: 0.4 MG/DL (ref 0.2–1.2)
BUN SERPL-MCNC: 47 MG/DL (ref 6–20)
BUN/CREAT SERPL: 19.9 (ref 7.3–30)
CALCIUM SPEC-SCNC: 9.4 MG/DL (ref 8.5–10.2)
CHLORIDE SERPL-SCNC: 103 MMOL/L (ref 98–107)
CO2 SERPL-SCNC: 21.8 MMOL/L (ref 22–29)
CREAT SERPL-MCNC: 2.36 MG/DL (ref 0.7–1.3)
DEPRECATED RDW RBC AUTO: 49.1 FL (ref 37–54)
EOSINOPHIL # BLD AUTO: 0.1 10*3/MM3 (ref 0–0.4)
EOSINOPHIL NFR BLD AUTO: 1.3 % (ref 0.3–6.2)
ERYTHROCYTE [DISTWIDTH] IN BLOOD BY AUTOMATED COUNT: 14.7 % (ref 12.3–15.4)
GFR SERPL CREATININE-BSD FRML MDRD: 28 ML/MIN/1.73
GLOBULIN UR ELPH-MCNC: 4.7 GM/DL (ref 1.8–3.5)
GLUCOSE SERPL-MCNC: 175 MG/DL (ref 74–124)
HCT VFR BLD AUTO: 28.6 % (ref 37.5–51)
HGB BLD-MCNC: 9.2 G/DL (ref 13–17.7)
IMM GRANULOCYTES # BLD AUTO: 0.05 10*3/MM3 (ref 0–0.05)
IMM GRANULOCYTES NFR BLD AUTO: 0.7 % (ref 0–0.5)
LYMPHOCYTES # BLD AUTO: 0.61 10*3/MM3 (ref 0.7–3.1)
LYMPHOCYTES NFR BLD AUTO: 8.1 % (ref 19.6–45.3)
MCH RBC QN AUTO: 29.9 PG (ref 26.6–33)
MCHC RBC AUTO-ENTMCNC: 32.2 G/DL (ref 31.5–35.7)
MCV RBC AUTO: 92.9 FL (ref 79–97)
MONOCYTES # BLD AUTO: 0.19 10*3/MM3 (ref 0.1–0.9)
MONOCYTES NFR BLD AUTO: 2.5 % (ref 5–12)
NEUTROPHILS NFR BLD AUTO: 6.5 10*3/MM3 (ref 1.7–7)
NEUTROPHILS NFR BLD AUTO: 86.7 % (ref 42.7–76)
NRBC BLD AUTO-RTO: 0 /100 WBC (ref 0–0.2)
PLATELET # BLD AUTO: 176 10*3/MM3 (ref 140–450)
PMV BLD AUTO: 9.5 FL (ref 6–12)
POTASSIUM SERPL-SCNC: 5.1 MMOL/L (ref 3.5–4.7)
PROT SERPL-MCNC: 8.2 G/DL (ref 6.3–8)
RBC # BLD AUTO: 3.08 10*6/MM3 (ref 4.14–5.8)
SODIUM SERPL-SCNC: 135 MMOL/L (ref 134–145)
URATE SERPL-MCNC: 4.9 MG/DL (ref 2.8–7.4)
WBC # BLD AUTO: 7.5 10*3/MM3 (ref 3.4–10.8)

## 2020-07-16 PROCEDURE — 85025 COMPLETE CBC W/AUTO DIFF WBC: CPT

## 2020-07-16 PROCEDURE — 25010000002 BORTEZOMIB PER 0.1 MG: Performed by: INTERNAL MEDICINE

## 2020-07-16 PROCEDURE — 80053 COMPREHEN METABOLIC PANEL: CPT

## 2020-07-16 PROCEDURE — 96401 CHEMO ANTI-NEOPL SQ/IM: CPT

## 2020-07-16 PROCEDURE — 84550 ASSAY OF BLOOD/URIC ACID: CPT

## 2020-07-16 PROCEDURE — 36415 COLL VENOUS BLD VENIPUNCTURE: CPT

## 2020-07-16 RX ORDER — BORTEZOMIB 3.5 MG/1
1.5 INJECTION, POWDER, LYOPHILIZED, FOR SOLUTION INTRAVENOUS; SUBCUTANEOUS ONCE
Status: COMPLETED | OUTPATIENT
Start: 2020-07-16 | End: 2020-07-16

## 2020-07-16 RX ORDER — TRAMADOL HYDROCHLORIDE 50 MG/1
50 TABLET ORAL EVERY 12 HOURS PRN
Qty: 30 TABLET | Refills: 0 | Status: SHIPPED | OUTPATIENT
Start: 2020-07-16 | End: 2021-02-19

## 2020-07-16 RX ADMIN — BORTEZOMIB 3.4 MG: 3.5 INJECTION, POWDER, LYOPHILIZED, FOR SOLUTION INTRAVENOUS; SUBCUTANEOUS at 14:00

## 2020-07-16 NOTE — NURSING NOTE
Pt requesting refill of Ultram, states he has joint pain following his velcade injections for 2-3 days.  He was given 6 tabs of Ultram at d/c by A.  Reviewed with verenice Masters to refill Ultram 50mg #30.  Routed to Dr Del Valle for signature

## 2020-07-18 LAB
ALBUMIN SERPL-MCNC: 3.1 G/DL (ref 2.9–4.4)
ALBUMIN/GLOB SERPL: 0.7 {RATIO} (ref 0.7–1.7)
ALPHA1 GLOB FLD ELPH-MCNC: 0.3 G/DL (ref 0–0.4)
ALPHA2 GLOB SERPL ELPH-MCNC: 1 G/DL (ref 0.4–1)
B-GLOBULIN SERPL ELPH-MCNC: 0.8 G/DL (ref 0.7–1.3)
GAMMA GLOB SERPL ELPH-MCNC: 2.6 G/DL (ref 0.4–1.8)
GLOBULIN SER CALC-MCNC: 4.7 G/DL (ref 2.2–3.9)
IGA SERPL-MCNC: 17 MG/DL (ref 61–437)
IGG SERPL-MCNC: 3187 MG/DL (ref 603–1613)
IGM SERPL-MCNC: 11 MG/DL (ref 20–172)
INTERPRETATION SERPL IEP-IMP: ABNORMAL
KAPPA LC SERPL-MCNC: 13.4 MG/L (ref 3.3–19.4)
KAPPA LC/LAMBDA SER: 0.02 {RATIO} (ref 0.26–1.65)
LAMBDA LC FREE SERPL-MCNC: 874 MG/L (ref 5.7–26.3)
Lab: ABNORMAL
M-SPIKE: ABNORMAL G/DL
PROT SERPL-MCNC: 7.8 G/DL (ref 6–8.5)

## 2020-07-20 ENCOUNTER — MEDICATION THERAPY MANAGEMENT (OUTPATIENT)
Dept: PHARMACY | Facility: HOSPITAL | Age: 65
End: 2020-07-20

## 2020-07-20 NOTE — PROGRESS NOTES
MTM telephone follow up re adherence and side effects- Pavel Chairez reports that he is doing well today. He is concerned about running out of his insulin that he was started on in the hospital. He reports that he mostly needs it a few days a week, especially when he takes his steroid. We discussed that he may need to see his PCP for a prescription, but I will also reach out to his oncologist per request. Contreras reports ankle/foot swelling and bone pain as well. He is wearing compression socks everyday and he says he elevates his feet when he is able. I asked him to have oncologist assess at his appointment this Thursday, 7/23. Contreras verbalizes understanding. Medication administration and adherence seem appropriate; he reports no missed doses this since starting his medications. Pharmacy will continue to follow.     Thanks,   Yolande Huitron, PharmD

## 2020-07-20 NOTE — PROGRESS NOTES
MTM telephone follow up- Pavel    No answer today.  direct line [phone].     Thanks,   Yumiko EsparzaD

## 2020-07-23 ENCOUNTER — OFFICE VISIT (OUTPATIENT)
Dept: ONCOLOGY | Facility: CLINIC | Age: 65
End: 2020-07-23

## 2020-07-23 ENCOUNTER — INFUSION (OUTPATIENT)
Dept: ONCOLOGY | Facility: HOSPITAL | Age: 65
End: 2020-07-23

## 2020-07-23 ENCOUNTER — LAB (OUTPATIENT)
Dept: LAB | Facility: HOSPITAL | Age: 65
End: 2020-07-23

## 2020-07-23 VITALS
BODY MASS INDEX: 28.24 KG/M2 | HEART RATE: 92 BPM | TEMPERATURE: 97.7 F | RESPIRATION RATE: 18 BRPM | OXYGEN SATURATION: 100 % | SYSTOLIC BLOOD PRESSURE: 142 MMHG | WEIGHT: 231.9 LBS | HEIGHT: 76 IN | DIASTOLIC BLOOD PRESSURE: 82 MMHG

## 2020-07-23 DIAGNOSIS — C90.00 MULTIPLE MYELOMA NOT HAVING ACHIEVED REMISSION (HCC): Primary | ICD-10-CM

## 2020-07-23 DIAGNOSIS — M79.604 PAIN IN BOTH LOWER EXTREMITIES: ICD-10-CM

## 2020-07-23 DIAGNOSIS — N18.30 ANEMIA DUE TO STAGE 3 CHRONIC KIDNEY DISEASE (HCC): ICD-10-CM

## 2020-07-23 DIAGNOSIS — M79.605 PAIN IN BOTH LOWER EXTREMITIES: ICD-10-CM

## 2020-07-23 DIAGNOSIS — Z85.528 HISTORY OF RENAL CELL CANCER: ICD-10-CM

## 2020-07-23 DIAGNOSIS — N18.30 CHRONIC KIDNEY DISEASE, STAGE 3 (HCC): ICD-10-CM

## 2020-07-23 DIAGNOSIS — I10 ESSENTIAL HYPERTENSION: ICD-10-CM

## 2020-07-23 DIAGNOSIS — D63.1 ANEMIA DUE TO STAGE 3 CHRONIC KIDNEY DISEASE (HCC): ICD-10-CM

## 2020-07-23 LAB
ALBUMIN SERPL-MCNC: 3.6 G/DL (ref 3.5–5.2)
ALBUMIN/GLOB SERPL: 0.8 G/DL (ref 1.1–2.4)
ALP SERPL-CCNC: 84 U/L (ref 38–116)
ALT SERPL W P-5'-P-CCNC: 9 U/L (ref 0–41)
ANION GAP SERPL CALCULATED.3IONS-SCNC: 10.5 MMOL/L (ref 5–15)
AST SERPL-CCNC: 10 U/L (ref 0–40)
BASOPHILS # BLD AUTO: 0.03 10*3/MM3 (ref 0–0.2)
BASOPHILS NFR BLD AUTO: 0.5 % (ref 0–1.5)
BILIRUB SERPL-MCNC: 0.3 MG/DL (ref 0.2–1.2)
BUN SERPL-MCNC: 48 MG/DL (ref 6–20)
BUN/CREAT SERPL: 20.4 (ref 7.3–30)
CALCIUM SPEC-SCNC: 9.2 MG/DL (ref 8.5–10.2)
CHLORIDE SERPL-SCNC: 100 MMOL/L (ref 98–107)
CO2 SERPL-SCNC: 23.5 MMOL/L (ref 22–29)
CREAT SERPL-MCNC: 2.35 MG/DL (ref 0.7–1.3)
DEPRECATED RDW RBC AUTO: 50.6 FL (ref 37–54)
EOSINOPHIL # BLD AUTO: 0.06 10*3/MM3 (ref 0–0.4)
EOSINOPHIL NFR BLD AUTO: 1.1 % (ref 0.3–6.2)
ERYTHROCYTE [DISTWIDTH] IN BLOOD BY AUTOMATED COUNT: 14.8 % (ref 12.3–15.4)
GFR SERPL CREATININE-BSD FRML MDRD: 28 ML/MIN/1.73
GLOBULIN UR ELPH-MCNC: 4.3 GM/DL (ref 1.8–3.5)
GLUCOSE SERPL-MCNC: 248 MG/DL (ref 74–124)
HCT VFR BLD AUTO: 27 % (ref 37.5–51)
HGB BLD-MCNC: 8.9 G/DL (ref 13–17.7)
IMM GRANULOCYTES # BLD AUTO: 0.05 10*3/MM3 (ref 0–0.05)
IMM GRANULOCYTES NFR BLD AUTO: 0.9 % (ref 0–0.5)
LYMPHOCYTES # BLD AUTO: 0.31 10*3/MM3 (ref 0.7–3.1)
LYMPHOCYTES NFR BLD AUTO: 5.6 % (ref 19.6–45.3)
MCH RBC QN AUTO: 30.9 PG (ref 26.6–33)
MCHC RBC AUTO-ENTMCNC: 33 G/DL (ref 31.5–35.7)
MCV RBC AUTO: 93.8 FL (ref 79–97)
MONOCYTES # BLD AUTO: 0.13 10*3/MM3 (ref 0.1–0.9)
MONOCYTES NFR BLD AUTO: 2.3 % (ref 5–12)
NEUTROPHILS NFR BLD AUTO: 4.98 10*3/MM3 (ref 1.7–7)
NEUTROPHILS NFR BLD AUTO: 89.6 % (ref 42.7–76)
NRBC BLD AUTO-RTO: 0 /100 WBC (ref 0–0.2)
PLATELET # BLD AUTO: 147 10*3/MM3 (ref 140–450)
PMV BLD AUTO: 10.2 FL (ref 6–12)
POTASSIUM SERPL-SCNC: 5.2 MMOL/L (ref 3.5–4.7)
PROT SERPL-MCNC: 7.9 G/DL (ref 6.3–8)
RBC # BLD AUTO: 2.88 10*6/MM3 (ref 4.14–5.8)
SODIUM SERPL-SCNC: 134 MMOL/L (ref 134–145)
WBC # BLD AUTO: 5.56 10*3/MM3 (ref 3.4–10.8)

## 2020-07-23 PROCEDURE — 36415 COLL VENOUS BLD VENIPUNCTURE: CPT | Performed by: INTERNAL MEDICINE

## 2020-07-23 PROCEDURE — 83540 ASSAY OF IRON: CPT | Performed by: INTERNAL MEDICINE

## 2020-07-23 PROCEDURE — 82728 ASSAY OF FERRITIN: CPT | Performed by: INTERNAL MEDICINE

## 2020-07-23 PROCEDURE — 99214 OFFICE O/P EST MOD 30 MIN: CPT | Performed by: INTERNAL MEDICINE

## 2020-07-23 PROCEDURE — 96401 CHEMO ANTI-NEOPL SQ/IM: CPT

## 2020-07-23 PROCEDURE — 84466 ASSAY OF TRANSFERRIN: CPT | Performed by: INTERNAL MEDICINE

## 2020-07-23 PROCEDURE — 85025 COMPLETE CBC W/AUTO DIFF WBC: CPT

## 2020-07-23 PROCEDURE — 80053 COMPREHEN METABOLIC PANEL: CPT

## 2020-07-23 PROCEDURE — 25010000002 BORTEZOMIB PER 0.1 MG: Performed by: INTERNAL MEDICINE

## 2020-07-23 RX ORDER — BORTEZOMIB 3.5 MG/1
1.5 INJECTION, POWDER, LYOPHILIZED, FOR SOLUTION INTRAVENOUS; SUBCUTANEOUS ONCE
Status: CANCELLED | OUTPATIENT
Start: 2020-08-13

## 2020-07-23 RX ORDER — BORTEZOMIB 3.5 MG/1
1.5 INJECTION, POWDER, LYOPHILIZED, FOR SOLUTION INTRAVENOUS; SUBCUTANEOUS ONCE
Status: CANCELLED | OUTPATIENT
Start: 2020-08-06

## 2020-07-23 RX ORDER — BORTEZOMIB 3.5 MG/1
1.5 INJECTION, POWDER, LYOPHILIZED, FOR SOLUTION INTRAVENOUS; SUBCUTANEOUS ONCE
Status: COMPLETED | OUTPATIENT
Start: 2020-07-23 | End: 2020-07-23

## 2020-07-23 RX ORDER — BORTEZOMIB 3.5 MG/1
1.5 INJECTION, POWDER, LYOPHILIZED, FOR SOLUTION INTRAVENOUS; SUBCUTANEOUS ONCE
Status: CANCELLED | OUTPATIENT
Start: 2020-07-23

## 2020-07-23 RX ORDER — BORTEZOMIB 3.5 MG/1
1.5 INJECTION, POWDER, LYOPHILIZED, FOR SOLUTION INTRAVENOUS; SUBCUTANEOUS ONCE
Status: CANCELLED | OUTPATIENT
Start: 2020-07-30

## 2020-07-23 RX ORDER — AMLODIPINE BESYLATE 2.5 MG/1
2.5 TABLET ORAL DAILY
Qty: 30 TABLET | Refills: 1 | Status: SHIPPED | OUTPATIENT
Start: 2020-07-23 | End: 2020-09-18 | Stop reason: SDUPTHER

## 2020-07-23 RX ADMIN — BORTEZOMIB 3.4 MG: 3.5 INJECTION, POWDER, LYOPHILIZED, FOR SOLUTION INTRAVENOUS; SUBCUTANEOUS at 15:00

## 2020-07-23 NOTE — PROGRESS NOTES
Subjective     CHIEF COMPLAINT:      Chief Complaint   Patient presents with   • Follow-up     no concerns       HISTORY OF PRESENT ILLNESS:     Contreras Albert is a 64 y.o. male patient who returns today for follow up on his multiple myeloma.  He returns today for follow-up and reports developing fatigue about 3 days after receiving his treatment.  The fatigue lasts for 2 days.  He is not having nausea vomiting secondary to treatment.  He denies constipation.    Patient reports pain in the lower extremities been present since the diagnosis of his multiple myeloma.  The pain has not improved.  It bothers him more when he goes to sleep and he has to take tramadol to ease the pain and be able to sleep.    Patient reports lower extremity swelling.  No calf pain.  Although the patient's medication list includes Norvasc, he has not been able to get the Norvasc filled by his pharmacyand he has not started the medicine yet.    REVIEW OF SYSTEMS:  Review of Systems   Constitutional: Negative for fatigue, fever and unexpected weight change.   HENT: Negative for nosebleeds and voice change.    Eyes: Negative for visual disturbance.   Respiratory: Negative for cough and shortness of breath.    Cardiovascular: Positive for leg swelling. Negative for chest pain.   Gastrointestinal: Negative for abdominal pain, blood in stool, constipation, diarrhea, nausea and vomiting.   Genitourinary: Negative for frequency and hematuria.   Musculoskeletal: Positive for joint swelling. Negative for back pain.   Skin: Negative for rash.   Neurological: Negative for dizziness and headaches.   Hematological: Negative for adenopathy. Does not bruise/bleed easily.   Psychiatric/Behavioral: Negative for dysphoric mood. The patient is not nervous/anxious.      I reviewed and verified the CC and ROS obtained by the MA.     Past Medical History:   Diagnosis Date   • Anemia    • Diabetes mellitus (CMS/HCC)    • Enlarged prostate    • High cholesterol    •  Hypertension    • Renal mass        Past Surgical History:   Procedure Laterality Date   • INSERTION HEMODIALYSIS CATHETER N/A 6/2/2020    Procedure: HEMODIALYSIS CATHETER INSERTION;  Surgeon: Sumanth Hernandez MD;  Location: [facility] MAIN OR;  Service: Vascular;  Laterality: N/A;   • NEPHRECTOMY PARTIAL Right 5/18/2016    Procedure: RT NEPHRECTOMY PARTIAL LAPAROSCOPIC WITH DAVINCI ROBOT;  Surgeon: Chad Garcia MD;  Location: [facility] OR;  Service:        Cancer-related family history is not on file.  Social History     Tobacco Use   • Smoking status: Never Smoker   • Smokeless tobacco: Never Used   Substance Use Topics   • Alcohol use: No       MEDICATIONS:    Current Outpatient Medications:   •  acyclovir (ZOVIRAX) 400 MG tablet, Take 1 tablet by mouth 2 (Two) Times a Day., Disp: 60 tablet, Rfl: 7  •  allopurinol (ZYLOPRIM) 100 MG tablet, Take 1 tablet by mouth Daily., Disp: 30 tablet, Rfl: 0  •  amLODIPine (Norvasc) 2.5 MG tablet, Take 1 tablet by mouth Daily., Disp: 30 tablet, Rfl: 1  •  Blood Glucose Monitoring Suppl (FREESTYLE FREEDOM LITE) w/Device kit, 1 kit Daily. Indications: Type 2 Diabetes, Disp: 1 each, Rfl: 0  •  calcium acetate (PHOSLO) 667 MG tablet, Take 2 tablets by mouth 3 (Three) Times a Day With Meals., Disp: 180 tablet, Rfl: 0  •  cyclophosphamide 50 MG capsule, Take 13 capsules by mouth 1 (One) Time Per Week., Disp: 52 capsule, Rfl: 6  •  dexamethasone (DECADRON) 4 MG tablet, Take 10 tablets by mouth 1 (One) Time Per Week., Disp: 40 tablet, Rfl: 1  •  finasteride (PROSCAR) 5 MG tablet, Take 5 mg by mouth daily., Disp: , Rfl:   •  glucose blood (FREESTYLE LITE) test strip, Use daily to test blood sugar, Disp: 100 each, Rfl: 0  •  Insulin Glargine (LANTUS SOLOSTAR) 100 UNIT/ML injection pen, Inject 15 Units under the skin into the appropriate area as directed Every Night., Disp: 15 mL, Rfl: 0  •  Insulin Pen Needle 32G X 4 MM misc, 1 application Daily., Disp: 100 each, Rfl: 0  •   "Lancets (FREESTYLE) lancets, Use once then discard to test blood sugar each morning, Disp: 100 each, Rfl: 0  •  ondansetron (ZOFRAN) 8 MG tablet, Take 1 tablet by mouth 3 (Three) Times a Day As Needed for Nausea or Vomiting., Disp: 30 tablet, Rfl: 5  •  pantoprazole (PROTONIX) 40 MG EC tablet, Take 1 tablet by mouth Daily., Disp: 30 tablet, Rfl: 0  •  sulfamethoxazole-trimethoprim (BACTRIM DS,SEPTRA DS) 800-160 MG per tablet, Take 1 tablet by mouth 3 (Three) Times a Week. Take 1 tablet on Mondays, Wednesdays and Fridays., Disp: 12 tablet, Rfl: 7  •  tamsulosin (FLOMAX) 0.4 MG capsule 24 hr capsule, Take 1 capsule by mouth every night., Disp: , Rfl:   •  traMADol (ULTRAM) 50 MG tablet, Take 1 tablet by mouth Every 12 (Twelve) Hours As Needed for Moderate Pain  or Severe Pain ., Disp: 30 tablet, Rfl: 0    ALLERGIES:  Allergies   Allergen Reactions   • Crestor [Rosuvastatin] GI Intolerance         Objective   VITAL SIGNS:     Vitals:    07/23/20 1349   BP: 142/82   Pulse: 92   Resp: 18   Temp: 97.7 °F (36.5 °C)   TempSrc: Temporal   SpO2: 100%   Weight: 105 kg (231 lb 14.4 oz)   Height: 193 cm (75.98\")   PainSc: 0-No pain     Body mass index is 28.24 kg/m².     Wt Readings from Last 3 Encounters:   07/23/20 105 kg (231 lb 14.4 oz)   07/16/20 104 kg (228 lb 6.4 oz)   07/09/20 102 kg (225 lb 12.8 oz)       PHYSICAL EXAMINATION:  GENERAL:  The patient appears in good general condition, not in acute distress.  SKIN: No skin rashes. No ecchymosis.  HEAD:  Normocephalic.  EYES:  No Jaundice. Pallor. Pupils equal. EOMI.  NECK:  Supple with Good ROM. No Thyromegaly. No Masses.  LYMPHATICS:  No cervical or supraclavicular lymphadenopathy.  CHEST: Normal respiratory effort. Lungs clear to auscultation.   CARDIAC:  Normal S1 & S2. No murmur.  Trace distal leg edema.  ABDOMEN:  Soft. No tenderness. No Hepatomegaly. No Splenomegaly. No masses.  EXTREMITIES:  No clubbing. No deforming arthritis in the hands. No Calf " tenderness.  NEUROLOGICAL:  No Focal neurological deficits.       DIAGNOSTIC DATA:     Results from last 7 days   Lab Units 07/23/20  1337   WBC 10*3/mm3 5.56   NEUTROS ABS 10*3/mm3 4.98   HEMOGLOBIN g/dL 8.9*   HEMATOCRIT % 27.0*   PLATELETS 10*3/mm3 147     Results from last 7 days   Lab Units 07/23/20  1337   SODIUM mmol/L 134   POTASSIUM mmol/L 5.2*   CHLORIDE mmol/L 100   CO2 mmol/L 23.5   BUN mg/dL 48*   CREATININE mg/dL 2.35*   CALCIUM mg/dL 9.2   ALBUMIN g/dL 3.60   BILIRUBIN mg/dL 0.3   ALK PHOS U/L 84   ALT (SGPT) U/L 9   AST (SGOT) U/L 10   GLUCOSE mg/dL 248*     Component      Latest Ref Rng & Units 5/23/2020 6/25/2020 7/16/2020   IgG      603 - 1613 mg/dL 6629 (H) 2993 (H) 3187 (H)   IgA      61 - 437 mg/dL 19 (L) 14 (L) 17 (L)   IgM      20 - 172 mg/dL 12 (L) 9 (L) 11 (L)   Total Protein      6.0 - 8.5 g/dL 10.4 (H) 7.7 7.8   Albumin      2.9 - 4.4 g/dL 3.0 3.0 3.1   Alpha-1-Globulin      0.0 - 0.4 g/dL 0.2 0.3 0.3   Alpha-2-Globulin      0.4 - 1.0 g/dL 1.0 1.1 (H) 1.0   Beta Globulin      0.7 - 1.3 g/dL 1.0 0.8 0.8   Gamma Globulin      0.4 - 1.8 g/dL 5.2 (H) 2.5 (H) 2.6 (H)   M-Jurgen      Not Observed g/dL 5.1 (H) Comment: Comment:   Globulin      2.2 - 3.9 g/dL 7.4 (H) 4.7 (H) 4.7 (H)   A/G Ratio      0.7 - 1.7 0.4 (L) 0.7 0.7   Immunofixation Reflex, Serum        Comment Comment   Please note       Comment Comment Comment   Kappa FLC      3.3 - 19.4 mg/L 23.8 (H) 17.4 13.4   Free Lambda Light Chains      5.7 - 26.3 mg/L 6403.8 (H) 1185.1 (H) 874.0 (H)   Kappa/Lambda Ratio      0.26 - 1.65 0.00 (L) 0.01 (L) 0.02 (L)       Assessment/Plan   1.  IgG lambda multiple myeloma.  · Bone marrow 5/23/2020 hypercellular marrow with 80% involvement of plasma cell myeloma; FISH studies pending  · Bone survey 5/26/2020: Diffuse osteoporosis and demineralization.  However, no discrete lytic lesions.  · SPEP 5/23/2020: M spike 5.1.  IgG 6629 lambda.  Light chain ratio 0 with free lambda light chains 6400.  Beta-2  microglobulin 16.3.  24-hour urine 12.7 g protein per 24-hour with monoclonal lambda free light chain 33.9%, monoclonal IgG lambda 23.5%  · 24-hour urine testing from 5/24/2020 free lambda light chains 8.4 g/L  · Considering renal failure, initiated CyBorD 5/28/20.  Given the significantly elevated light chains with the first cycle of treatment plan to give Velcade days 1, 4, 8, 11; Cytoxan 300 PO mg/m² weekly and dexamethasone 40 mg p.o. days 1, 2, 3, 4, 8 and 15.    · After cycle 1 treatment can likely be altered to weekly dosing of each drug.  · Plan is for referral to discuss autologous stem cell transplant.  Patient is deciding between [facility] and [facility]  · Patient is tolerating the treatment except for generalized bone achiness after receiving Velcade.  · On 6/18/2020, Velcade was changed to 1.5 mg/m² weekly continuously along with weekly Decadron and oral Cytoxan.  · M protein decreased to 2.4 and free light chain improved to 874 on 7/16/2020 indicating response to therapy.  · Patient is tolerating treatment except for fatigue and anemia.    2.  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  · B12 folate and iron studies are normal.  · Status post PRBC transfusion on 5/24/2020.  · Patient was receiving Procrit with his hemodialysis.  · Hemoglobin improved to the 9 g range.  However, it decreased to 8.9 today.    3.  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  · Showed enlargement of the kidneys concerning for myeloma kidney and light chain deposition disease.  · Patient completed 3 sessions of pheresis on 5/30/2020.  · Creatinine was 5.12 on 5/31/2020  · Creatinine increased to 6.36 on 6/1/2020.  · Patient had HD catheter placed and started on HD on 6/2/2020.   · Kidney function improved in late June 2020 and did not require additional hemodialysis.    4.  Tumor lysis prophylaxis.  · Uric acid level increased to  8.9 on 6/1/2020 due to the worsening of the kidney function.    · Uric acid decreased to 8.0 on 6/3/2020.   · Patient is continuing allopurinol 100 mg daily.  · Uric acid decreased to 4.9 on 7/16/2020.    5.  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  · Status post right partial nephrectomy on 5/18/2016.    6.  Thrombocytopenia.    · Platelet count was gradually declining and this is attributed to the effect of his treatment with Velcade and Cytoxan.    · The gradual declines makes HIT much less likely.   · Platelet count improved to 106,000 on 6/3/2020.  · Platelet count decreased to 89,000 on 6/4/2020.  · Platelets improved to 147,000 today.    7.  Essential hypertension.  · Patient was previously on lisinopril.  · Lisinopril was held due to the acute kidney injury.  · We recommended switching to Norvasc 2.5 mg daily.  Patient however was not able to have the medicine filled yet.  · Patient reports intermittent swelling in the lower extremities.  He was advised to check with his nephrologist regarding this.    8.  Diabetes mellitus.  · Patient has increase in his blood glucose secondary to the weekly dexamethasone.  · Patient is on sliding scale insulin.  Blood glucose increases on days 2 and 3 after treatment.  He asked for refill on his insulin until he is able to see his PCP.    PLAN:    1.  Continue treatment with Velcade, Cytoxan and Decadron.  He will receive Velcade today at 1.5 mg/m² for a total dose of 3.4 mg.  2.  Continue Cytoxan 650 mg weekly and Decadron 40 mg weekly.  Given the current level of the M protein, I recommended continuing full dose Decadron for now.  3.  Continue Ultram as needed for pain.  If the pain does not improve in the coming weeks, this may be indicative of neuropathic pain and we would consider adding Neurontin.  4.  Obtain ferritin iron panel to determine if he has developed iron deficiency.  5.  We will refer the patient to [facility] for bone  marrow transplantation.  6.  Discontinue allopurinol.  7.  Return weekly for CBC and Velcade injection.  He will see the nurse practitioner in 2 weeks.  CMP, uric acid and paraprotein studies will be repeated in 3 weeks.  I will see him in follow-up in 4 weeks.        Red Del Valle MD  07/23/20

## 2020-07-24 ENCOUNTER — TELEPHONE (OUTPATIENT)
Dept: ONCOLOGY | Facility: CLINIC | Age: 65
End: 2020-07-24

## 2020-07-24 ENCOUNTER — MEDICATION THERAPY MANAGEMENT (OUTPATIENT)
Dept: PHARMACY | Facility: HOSPITAL | Age: 65
End: 2020-07-24

## 2020-07-24 LAB
FERRITIN SERPL-MCNC: 835 NG/ML (ref 30–400)
IRON 24H UR-MRATE: 70 MCG/DL (ref 59–158)
IRON SATN MFR SERPL: 26 % (ref 14–48)
TIBC SERPL-MCNC: 272 MCG/DL (ref 249–505)
TRANSFERRIN SERPL-MCNC: 194 MG/DL (ref 200–360)

## 2020-07-24 NOTE — PROGRESS NOTES
[facility] Lab Review- [facility]      7/23/2020   WBC 3.40 - 10.80 10*3/mm3 5.56   Neutrophils Absolute 1.70 - 7.00 10*3/mm3 4.98   Hemoglobin 13.0 - 17.7 g/dL 8.9 (A)   Hematocrit 37.5 - 51.0 % 27.0 (A)   Platelets 140 - 450 10*3/mm3 147   Creatinine 0.70 - 1.30 mg/dL 2.35 (A)   eGFR Non African Am >60 mL/min/1.73 28 (A)   BUN 6 - 20 mg/dL 48 (A)   Sodium 134 - 145 mmol/L 134   Potassium 3.5 - 4.7 mmol/L 5.2 (A)   Glucose 74 - 124 mg/dL 248 (A)   Calcium 8.5 - 10.2 mg/dL 9.2   Albumin 3.50 - 5.20 g/dL 3.60   Total Protein 6.3 - 8.0 g/dL 7.9   AST (SGOT) 0 - 40 U/L 10   ALT (SGPT) 0 - 41 U/L 9   Alkaline Phosphatase 38 - 116 U/L 84   Total Bilirubin 0.2 - 1.2 mg/dL 0.3     MD dictation noted. Pharmacy will continue to follow.    Thank you,  Yolande Huitron, PharmD

## 2020-07-24 NOTE — TELEPHONE ENCOUNTER
Left message for the patient to call the office back.    ----- Message from Kate Hall RN sent at 7/24/2020  9:19 AM EDT -----  Regarding: Ivon      ----- Message -----  From: Red Del Valle MD  Sent: 7/24/2020   9:01 AM EDT  To: [facility]    Please inform patient that his uric acid level is normal and ask him to stop Allopurinol. Iron level is normal.    Thank you

## 2020-07-27 ENCOUNTER — TELEPHONE (OUTPATIENT)
Dept: ONCOLOGY | Facility: CLINIC | Age: 65
End: 2020-07-27

## 2020-07-27 NOTE — TELEPHONE ENCOUNTER
I contacted the patient. See note below.  Pt v/u.  ----- Message from Kate Hall RN sent at 7/24/2020  9:19 AM EDT -----  Regarding: Ivon      ----- Message -----  From: Red Del Valle MD  Sent: 7/24/2020   9:01 AM EDT  To: [facility] Clinical Pool    Please inform patient that his uric acid level is normal and ask him to stop Allopurinol. Iron level is normal.    Thank you

## 2020-07-30 ENCOUNTER — INFUSION (OUTPATIENT)
Dept: ONCOLOGY | Facility: HOSPITAL | Age: 65
End: 2020-07-30

## 2020-07-30 ENCOUNTER — LAB (OUTPATIENT)
Dept: LAB | Facility: HOSPITAL | Age: 65
End: 2020-07-30

## 2020-07-30 VITALS
HEART RATE: 83 BPM | TEMPERATURE: 98.2 F | RESPIRATION RATE: 18 BRPM | WEIGHT: 233.6 LBS | OXYGEN SATURATION: 99 % | SYSTOLIC BLOOD PRESSURE: 151 MMHG | BODY MASS INDEX: 28.45 KG/M2 | DIASTOLIC BLOOD PRESSURE: 83 MMHG

## 2020-07-30 DIAGNOSIS — C90.00 MULTIPLE MYELOMA NOT HAVING ACHIEVED REMISSION (HCC): Primary | ICD-10-CM

## 2020-07-30 DIAGNOSIS — C90.00 MULTIPLE MYELOMA NOT HAVING ACHIEVED REMISSION (HCC): ICD-10-CM

## 2020-07-30 LAB
BASOPHILS # BLD AUTO: 0.03 10*3/MM3 (ref 0–0.2)
BASOPHILS NFR BLD AUTO: 0.6 % (ref 0–1.5)
DEPRECATED RDW RBC AUTO: 49.1 FL (ref 37–54)
EOSINOPHIL # BLD AUTO: 0.04 10*3/MM3 (ref 0–0.4)
EOSINOPHIL NFR BLD AUTO: 0.8 % (ref 0.3–6.2)
ERYTHROCYTE [DISTWIDTH] IN BLOOD BY AUTOMATED COUNT: 14.7 % (ref 12.3–15.4)
HCT VFR BLD AUTO: 27.8 % (ref 37.5–51)
HGB BLD-MCNC: 9.4 G/DL (ref 13–17.7)
IMM GRANULOCYTES # BLD AUTO: 0.17 10*3/MM3 (ref 0–0.05)
IMM GRANULOCYTES NFR BLD AUTO: 3.5 % (ref 0–0.5)
LYMPHOCYTES # BLD AUTO: 0.51 10*3/MM3 (ref 0.7–3.1)
LYMPHOCYTES NFR BLD AUTO: 10.6 % (ref 19.6–45.3)
MCH RBC QN AUTO: 31.1 PG (ref 26.6–33)
MCHC RBC AUTO-ENTMCNC: 33.8 G/DL (ref 31.5–35.7)
MCV RBC AUTO: 92.1 FL (ref 79–97)
MONOCYTES # BLD AUTO: 0.15 10*3/MM3 (ref 0.1–0.9)
MONOCYTES NFR BLD AUTO: 3.1 % (ref 5–12)
NEUTROPHILS NFR BLD AUTO: 3.91 10*3/MM3 (ref 1.7–7)
NEUTROPHILS NFR BLD AUTO: 81.4 % (ref 42.7–76)
NRBC BLD AUTO-RTO: 0 /100 WBC (ref 0–0.2)
PLATELET # BLD AUTO: 151 10*3/MM3 (ref 140–450)
PMV BLD AUTO: 9.8 FL (ref 6–12)
RBC # BLD AUTO: 3.02 10*6/MM3 (ref 4.14–5.8)
WBC # BLD AUTO: 4.81 10*3/MM3 (ref 3.4–10.8)

## 2020-07-30 PROCEDURE — 25010000002 BORTEZOMIB PER 0.1 MG: Performed by: INTERNAL MEDICINE

## 2020-07-30 PROCEDURE — 36415 COLL VENOUS BLD VENIPUNCTURE: CPT

## 2020-07-30 PROCEDURE — 96401 CHEMO ANTI-NEOPL SQ/IM: CPT

## 2020-07-30 PROCEDURE — 85025 COMPLETE CBC W/AUTO DIFF WBC: CPT

## 2020-07-30 RX ORDER — BORTEZOMIB 3.5 MG/1
1.5 INJECTION, POWDER, LYOPHILIZED, FOR SOLUTION INTRAVENOUS; SUBCUTANEOUS ONCE
Status: COMPLETED | OUTPATIENT
Start: 2020-07-30 | End: 2020-07-30

## 2020-07-30 RX ADMIN — BORTEZOMIB 3.4 MG: 3.5 INJECTION, POWDER, LYOPHILIZED, FOR SOLUTION INTRAVENOUS; SUBCUTANEOUS at 15:19

## 2020-08-06 ENCOUNTER — OFFICE VISIT (OUTPATIENT)
Dept: ONCOLOGY | Facility: CLINIC | Age: 65
End: 2020-08-06

## 2020-08-06 ENCOUNTER — LAB (OUTPATIENT)
Dept: LAB | Facility: HOSPITAL | Age: 65
End: 2020-08-06

## 2020-08-06 ENCOUNTER — INFUSION (OUTPATIENT)
Dept: ONCOLOGY | Facility: HOSPITAL | Age: 65
End: 2020-08-06

## 2020-08-06 VITALS
DIASTOLIC BLOOD PRESSURE: 75 MMHG | RESPIRATION RATE: 18 BRPM | WEIGHT: 228.3 LBS | BODY MASS INDEX: 27.8 KG/M2 | HEART RATE: 97 BPM | HEIGHT: 76 IN | SYSTOLIC BLOOD PRESSURE: 127 MMHG | OXYGEN SATURATION: 99 % | TEMPERATURE: 98.2 F

## 2020-08-06 DIAGNOSIS — D64.9 ANEMIA, UNSPECIFIED TYPE: ICD-10-CM

## 2020-08-06 DIAGNOSIS — C90.00 MULTIPLE MYELOMA NOT HAVING ACHIEVED REMISSION (HCC): ICD-10-CM

## 2020-08-06 DIAGNOSIS — C90.00 MULTIPLE MYELOMA NOT HAVING ACHIEVED REMISSION (HCC): Primary | ICD-10-CM

## 2020-08-06 DIAGNOSIS — K59.01 SLOW TRANSIT CONSTIPATION: ICD-10-CM

## 2020-08-06 LAB
BASOPHILS # BLD AUTO: 0.03 10*3/MM3 (ref 0–0.2)
BASOPHILS NFR BLD AUTO: 0.6 % (ref 0–1.5)
DEPRECATED RDW RBC AUTO: 48.1 FL (ref 37–54)
EOSINOPHIL # BLD AUTO: 0.02 10*3/MM3 (ref 0–0.4)
EOSINOPHIL NFR BLD AUTO: 0.4 % (ref 0.3–6.2)
ERYTHROCYTE [DISTWIDTH] IN BLOOD BY AUTOMATED COUNT: 15 % (ref 12.3–15.4)
HCT VFR BLD AUTO: 27.2 % (ref 37.5–51)
HGB BLD-MCNC: 9.4 G/DL (ref 13–17.7)
IMM GRANULOCYTES # BLD AUTO: 0.06 10*3/MM3 (ref 0–0.05)
IMM GRANULOCYTES NFR BLD AUTO: 1.2 % (ref 0–0.5)
LYMPHOCYTES # BLD AUTO: 0.54 10*3/MM3 (ref 0.7–3.1)
LYMPHOCYTES NFR BLD AUTO: 11 % (ref 19.6–45.3)
MCH RBC QN AUTO: 31 PG (ref 26.6–33)
MCHC RBC AUTO-ENTMCNC: 34.6 G/DL (ref 31.5–35.7)
MCV RBC AUTO: 89.8 FL (ref 79–97)
MONOCYTES # BLD AUTO: 0.12 10*3/MM3 (ref 0.1–0.9)
MONOCYTES NFR BLD AUTO: 2.4 % (ref 5–12)
NEUTROPHILS NFR BLD AUTO: 4.13 10*3/MM3 (ref 1.7–7)
NEUTROPHILS NFR BLD AUTO: 84.4 % (ref 42.7–76)
NRBC BLD AUTO-RTO: 0 /100 WBC (ref 0–0.2)
PLATELET # BLD AUTO: 171 10*3/MM3 (ref 140–450)
PMV BLD AUTO: 10.7 FL (ref 6–12)
RBC # BLD AUTO: 3.03 10*6/MM3 (ref 4.14–5.8)
WBC # BLD AUTO: 4.9 10*3/MM3 (ref 3.4–10.8)

## 2020-08-06 PROCEDURE — 96401 CHEMO ANTI-NEOPL SQ/IM: CPT

## 2020-08-06 PROCEDURE — 25010000002 BORTEZOMIB PER 0.1 MG: Performed by: INTERNAL MEDICINE

## 2020-08-06 PROCEDURE — 85025 COMPLETE CBC W/AUTO DIFF WBC: CPT

## 2020-08-06 PROCEDURE — 36415 COLL VENOUS BLD VENIPUNCTURE: CPT

## 2020-08-06 PROCEDURE — 99214 OFFICE O/P EST MOD 30 MIN: CPT | Performed by: NURSE PRACTITIONER

## 2020-08-06 RX ORDER — BORTEZOMIB 3.5 MG/1
1.5 INJECTION, POWDER, LYOPHILIZED, FOR SOLUTION INTRAVENOUS; SUBCUTANEOUS ONCE
Status: COMPLETED | OUTPATIENT
Start: 2020-08-06 | End: 2020-08-06

## 2020-08-06 RX ADMIN — BORTEZOMIB 3.4 MG: 3.5 INJECTION, POWDER, LYOPHILIZED, FOR SOLUTION INTRAVENOUS; SUBCUTANEOUS at 15:30

## 2020-08-06 NOTE — PROGRESS NOTES
Subjective     CHIEF COMPLAINT:      Chief Complaint   Patient presents with   • Follow-up     Multiple Myeloma.       HISTORY OF PRESENT ILLNESS:     Contreras Albert is a 65 y.o. male with the above-mentioned history, who returns the office today in anticipation of Velcade which she receives weekly along with Cytoxan dexamethasone.  Overall, he continues to tolerate therapy very well.  He does note over the past few weeks he has developed worsening constipation.  He is currently taking MiraLAX twice daily along with a stool softener.  He reports he had a very hard bowel movement yesterday.  Thankfully, there was no blood in his stool.  He is drinking plenty of fluids.  He did recently follow-up with his nephrologist who recommended a diuretic with Lasix due to his lower extremity swelling.  Thankfully, his swelling is much improved.  He reports his energy is adequate.     He continues to utilize sliding scale insulin for 48 hours following dexamethasone administration due to hyperglycemia.  He reports the days surrounding his steroid, sugars as high as 300, though it then improves to 150 the remainder of the week.  When his blood sugar is elevated he does report more issues with his retinopathy particularly foggy vision.  Again this improves when his sugars improves.  He denies fevers or chills, signs or symptoms of infection.  He denies worsening neuropathy.  He denies any new pain.    I have reviewed the patient's medical history in detail and updated the computerized patient record.    REVIEW OF SYSTEMS:  Review of Systems   Constitutional: Negative for fatigue, fever and unexpected weight change.   HENT: Negative for nosebleeds and voice change.    Eyes: Negative for visual disturbance.   Respiratory: Negative for cough and shortness of breath.    Cardiovascular: Positive for leg swelling (improved). Negative for chest pain.   Gastrointestinal: Positive for constipation. Negative for abdominal pain, blood in stool,  diarrhea, nausea and vomiting.   Genitourinary: Negative for frequency and hematuria.   Musculoskeletal: Negative for back pain and joint swelling.   Skin: Negative for rash.   Neurological: Negative for dizziness and headaches.   Hematological: Negative for adenopathy. Does not bruise/bleed easily.   Psychiatric/Behavioral: Negative for dysphoric mood. The patient is not nervous/anxious.    Review of systems 08/06/2020  unchanged from previous office visit except as updated.      Past Medical History:   Diagnosis Date   • Anemia    • Chronic renal insufficiency    • Clear cell carcinoma of kidney, right (CMS/HCC)     Stage I   • Diabetes mellitus (CMS/HCC)    • Enlarged prostate    • High cholesterol    • Hypertension    • Multiple myeloma (CMS/HCC)    • Renal mass        Past Surgical History:   Procedure Laterality Date   • INSERTION HEMODIALYSIS CATHETER N/A 6/2/2020    Procedure: HEMODIALYSIS CATHETER INSERTION;  Surgeon: Sumanth Hernandez MD;  Location: [facility];  Service: Vascular;  Laterality: N/A;   • NEPHRECTOMY PARTIAL Right 5/18/2016    Procedure: RT NEPHRECTOMY PARTIAL LAPAROSCOPIC WITH DAVINCI ROBOT;  Surgeon: Chad Garcia MD;  Location: [facility];  Service:        Cancer-related family history is not on file.  Social History     Tobacco Use   • Smoking status: Never Smoker   • Smokeless tobacco: Never Used   Substance Use Topics   • Alcohol use: No       MEDICATIONS:    Current Outpatient Medications:   •  acyclovir (ZOVIRAX) 400 MG tablet, Take 1 tablet by mouth 2 (Two) Times a Day., Disp: 60 tablet, Rfl: 7  •  amLODIPine (Norvasc) 2.5 MG tablet, Take 1 tablet by mouth Daily., Disp: 30 tablet, Rfl: 1  •  Blood Glucose Monitoring Suppl (FREESTYLE FREEDOM LITE) w/Device kit, 1 kit Daily. Indications: Type 2 Diabetes, Disp: 1 each, Rfl: 0  •  calcium acetate (PHOSLO) 667 MG tablet, Take 2 tablets by mouth 3 (Three) Times a Day With Meals., Disp: 180 tablet, Rfl: 0  •   "cyclophosphamide 50 MG capsule, Take 13 capsules by mouth 1 (One) Time Per Week., Disp: 52 capsule, Rfl: 6  •  dexamethasone (DECADRON) 4 MG tablet, Take 10 tablets by mouth 1 (One) Time Per Week., Disp: 40 tablet, Rfl: 1  •  finasteride (PROSCAR) 5 MG tablet, Take 5 mg by mouth daily., Disp: , Rfl:   •  furosemide (LASIX) 40 MG tablet, Take 1 tablet by mouth Daily., Disp: 30 tablet, Rfl: 3  •  glucose blood (FREESTYLE LITE) test strip, Use daily to test blood sugar, Disp: 100 each, Rfl: 0  •  Insulin Glargine (LANTUS SOLOSTAR) 100 UNIT/ML injection pen, Inject 15 Units under the skin into the appropriate area as directed Every Night., Disp: 15 mL, Rfl: 0  •  Insulin Pen Needle 32G X 4 MM misc, 1 application Daily., Disp: 100 each, Rfl: 0  •  Lancets (FREESTYLE) lancets, Use once then discard to test blood sugar each morning, Disp: 100 each, Rfl: 0  •  ondansetron (ZOFRAN) 8 MG tablet, Take 1 tablet by mouth 3 (Three) Times a Day As Needed for Nausea or Vomiting., Disp: 30 tablet, Rfl: 5  •  pantoprazole (PROTONIX) 40 MG EC tablet, Take 1 tablet by mouth Daily., Disp: 30 tablet, Rfl: 0  •  sulfamethoxazole-trimethoprim (BACTRIM DS,SEPTRA DS) 800-160 MG per tablet, Take 1 tablet by mouth 3 (Three) Times a Week. Take 1 tablet on Mondays, Wednesdays and Fridays., Disp: 12 tablet, Rfl: 7  •  tamsulosin (FLOMAX) 0.4 MG capsule 24 hr capsule, Take 1 capsule by mouth every night., Disp: , Rfl:   •  traMADol (ULTRAM) 50 MG tablet, Take 1 tablet by mouth Every 12 (Twelve) Hours As Needed for Moderate Pain  or Severe Pain ., Disp: 30 tablet, Rfl: 0  No current facility-administered medications for this visit.     ALLERGIES:  Allergies   Allergen Reactions   • Crestor [Rosuvastatin] GI Intolerance         Objective   VITAL SIGNS:     Vitals:    08/06/20 1441   BP: 127/75   Pulse: 97   Resp: 18   Temp: 98.2 °F (36.8 °C)   TempSrc: Temporal   SpO2: 99%   Weight: 104 kg (228 lb 4.8 oz)   Height: 193 cm (75.98\")   PainSc: 0-No " pain     Body mass index is 27.8 kg/m².     Wt Readings from Last 3 Encounters:   08/06/20 104 kg (228 lb 4.8 oz)   07/30/20 106 kg (233 lb 9.6 oz)   07/23/20 105 kg (231 lb 14.4 oz)       PHYSICAL EXAMINATION:  GENERAL:  The patient appears in good general condition, not in acute distress.  SKIN: No skin rashes. No ecchymosis.  HEAD:  Normocephalic.  EYES:  No Jaundice. Pallor. Pupils equal. EOMI.  NECK:  Supple with Good ROM. No Thyromegaly. No Masses.  CHEST: Normal respiratory effort. Lungs clear to auscultation.   CARDIAC:  Normal S1 & S2. No murmur.  No edema on exam  ABDOMEN:  Soft. No tenderness. No Hepatomegaly. No Splenomegaly. No masses.  EXTREMITIES:  No clubbing. No deforming arthritis in the hands. No Calf tenderness.  NEUROLOGICAL:  No Focal neurological deficits.  Physical exam 08/06/2020  unchanged from previous office visit except as updated.       DIAGNOSTIC DATA:     Results from last 7 days   Lab Units 08/06/20  1431   WBC 10*3/mm3 4.90   NEUTROS ABS 10*3/mm3 4.13   HEMOGLOBIN g/dL 9.4*   HEMATOCRIT % 27.2*   PLATELETS 10*3/mm3 171         Component      Latest Ref Rng & Units 5/23/2020 6/25/2020 7/16/2020   IgG      603 - 1613 mg/dL 6629 (H) 2993 (H) 3187 (H)   IgA      61 - 437 mg/dL 19 (L) 14 (L) 17 (L)   IgM      20 - 172 mg/dL 12 (L) 9 (L) 11 (L)   Total Protein      6.0 - 8.5 g/dL 10.4 (H) 7.7 7.8   Albumin      2.9 - 4.4 g/dL 3.0 3.0 3.1   Alpha-1-Globulin      0.0 - 0.4 g/dL 0.2 0.3 0.3   Alpha-2-Globulin      0.4 - 1.0 g/dL 1.0 1.1 (H) 1.0   Beta Globulin      0.7 - 1.3 g/dL 1.0 0.8 0.8   Gamma Globulin      0.4 - 1.8 g/dL 5.2 (H) 2.5 (H) 2.6 (H)   M-Jurgen      Not Observed g/dL 5.1 (H) Comment: Comment:   Globulin      2.2 - 3.9 g/dL 7.4 (H) 4.7 (H) 4.7 (H)   A/G Ratio      0.7 - 1.7 0.4 (L) 0.7 0.7   Immunofixation Reflex, Serum        Comment Comment   Please note       Comment Comment Comment   Kappa FLC      3.3 - 19.4 mg/L 23.8 (H) 17.4 13.4   Free Lambda Light Chains      5.7 -  26.3 mg/L 6403.8 (H) 1185.1 (H) 874.0 (H)   Kappa/Lambda Ratio      0.26 - 1.65 0.00 (L) 0.01 (L) 0.02 (L)       Assessment/Plan   1.  IgG lambda multiple myeloma.  · Bone marrow 5/23/2020 hypercellular marrow with 80% involvement of plasma cell myeloma; FISH studies pending  · Bone survey 5/26/2020: Diffuse osteoporosis and demineralization.  However, no discrete lytic lesions.  · SPEP 5/23/2020: M spike 5.1.  IgG 6629 lambda.  Light chain ratio 0 with free lambda light chains 6400.  Beta-2 microglobulin 16.3.  24-hour urine 12.7 g protein per 24-hour with monoclonal lambda free light chain 33.9%, monoclonal IgG lambda 23.5%  · 24-hour urine testing from 5/24/2020 free lambda light chains 8.4 g/L  · Considering renal failure, initiated CyBorD 5/28/20.  Given the significantly elevated light chains with the first cycle of treatment plan to give Velcade days 1, 4, 8, 11; Cytoxan 300 PO mg/m² weekly and dexamethasone 40 mg p.o. days 1, 2, 3, 4, 8 and 15.    · After cycle 1 treatment can likely be altered to weekly dosing of each drug.  · Plan is for referral to discuss autologous stem cell transplant.  Patient is deciding between [facility] and [facility]  · Patient is tolerating the treatment except for generalized bone achiness after receiving Velcade.  · On 6/18/2020, Velcade was changed to 1.5 mg/m² weekly continuously along with weekly Decadron and oral Cytoxan.  · M protein decreased to 2.4 and free light chain improved to 874 on 7/16/2020 indicating response to therapy.  · Continue weekly Velcade, dexamethasone, Cytoxan.  Repeat paraprotein studies in 1 week    2.  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  · B12 folate and iron studies are normal.  · Status post PRBC transfusion on 5/24/2020.  · Patient was receiving Procrit with his hemodialysis.  · Hemoglobin improved to the 9 g range.    · 7/23/2020, ferritin 835, iron saturation 26%, TIBC  272  · Hemoglobin stable at 9.4 today, 8/6/2020    3.  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  · Showed enlargement of the kidneys concerning for myeloma kidney and light chain deposition disease.  · Patient completed 3 sessions of pheresis on 5/30/2020.  · Creatinine was 5.12 on 5/31/2020  · Creatinine increased to 6.36 on 6/1/2020.  · Patient had HD catheter placed and started on HD on 6/2/2020.   · Kidney function improved in late June 2020 and did not require additional hemodialysis.    4.  Tumor lysis prophylaxis.  · Uric acid level increased to 8.9 on 6/1/2020 due to the worsening of the kidney function.    · Uric acid decreased to 8.0 on 6/3/2020.   · Patient is continuing allopurinol 100 mg daily.  · Uric acid decreased to 4.9 on 7/16/2020.    5.  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  · Status post right partial nephrectomy on 5/18/2016.    6.  Thrombocytopenia.    · Platelet count was gradually declining and this is attributed to the effect of his treatment with Velcade and Cytoxan.    · The gradual declines makes HIT much less likely.   · Platelet count improved to 106,000 on 6/3/2020.  · Platelet count decreased to 89,000 on 6/4/2020.  · Platelets improved today at 171,000    7.  Essential hypertension.  · Patient was previously on lisinopril.  · Lisinopril was held due to the acute kidney injury.  · We recommended switching to Norvasc 2.5 mg daily.  Patient however was not able to have the medicine filled yet.  · Patient reports intermittent swelling in the lower extremities.  He did follow-up with his nephrologist who recommended Lasix with improvement in his lower extremity swelling    8.  Diabetes mellitus.  · Patient has increase in his blood glucose secondary to the weekly dexamethasone.  · Patient is on sliding scale insulin.  Blood glucose increases on days 2 and 3 after treatment.  He asked for refill on his insulin until he is able  to see his PCP.    9.  Constipation secondary to Velcade  · Currently uncontrolled with Colace and MiraLAX  · We discussed today discontinuing Colace and beginning Senokot S2 tablets 1-2 times daily along with MiraLAX  · He can also utilize milk of magnesium as needed or prune juice.    PLAN:    1. Continue treatment with Velcade, Cytoxan and Decadron.  He will receive Velcade today at 1.5 mg/m² for a total dose of 3.4 mg.  2. Senokot-S 2 tablets 1-2 times daily along with MiraLAX 1-2 times daily.  If the patient's bowels are not responsive, he can add milk of magnesium as needed  3. Continue Cytoxan 650 mg weekly  4. Continue dexamethasone 40 mg weekly  5. Continue sliding-scale insulin surrounding dexamethasone administration  6. Continue Ultram as needed  7. Return in 1 week for CBC, CMP, uric acid, paraprotein studies and weekly Velcade  8. MD follow-up with Dr. Del Valle in 2 weeks for review of paraprotein studies and determination of further plan of care  9. The patient has been referred to [facility] for bone marrow transplantation    The patient is on a high risk medication requiring close monitoring.    Beti Yun, APRN  08/06/20

## 2020-08-07 ENCOUNTER — MEDICATION THERAPY MANAGEMENT (OUTPATIENT)
Dept: PHARMACY | Facility: HOSPITAL | Age: 65
End: 2020-08-07

## 2020-08-07 NOTE — PROGRESS NOTES
[facility] Lab Review      8/6/2020   WBC 3.40 - 10.80 10*3/mm3 4.90   Neutrophils Absolute 1.70 - 7.00 10*3/mm3 4.13   Hemoglobin 13.0 - 17.7 g/dL 9.4 (A)   Hematocrit 37.5 - 51.0 % 27.2 (A)   Platelets 140 - 450 10*3/mm3 171     APRN dictation noted. Pharmacy will continue to follow.    Thanks,  Yolande Huitron, PharmD

## 2020-08-13 ENCOUNTER — INFUSION (OUTPATIENT)
Dept: ONCOLOGY | Facility: HOSPITAL | Age: 65
End: 2020-08-13

## 2020-08-13 ENCOUNTER — LAB (OUTPATIENT)
Dept: LAB | Facility: HOSPITAL | Age: 65
End: 2020-08-13

## 2020-08-13 VITALS
OXYGEN SATURATION: 100 % | WEIGHT: 234 LBS | SYSTOLIC BLOOD PRESSURE: 133 MMHG | DIASTOLIC BLOOD PRESSURE: 72 MMHG | TEMPERATURE: 98.7 F | RESPIRATION RATE: 16 BRPM | BODY MASS INDEX: 28.5 KG/M2 | HEART RATE: 98 BPM

## 2020-08-13 DIAGNOSIS — C90.00 MULTIPLE MYELOMA NOT HAVING ACHIEVED REMISSION (HCC): ICD-10-CM

## 2020-08-13 DIAGNOSIS — C90.00 MULTIPLE MYELOMA NOT HAVING ACHIEVED REMISSION (HCC): Primary | ICD-10-CM

## 2020-08-13 LAB
ALBUMIN SERPL-MCNC: 3.6 G/DL (ref 3.5–5.2)
ALBUMIN/GLOB SERPL: 0.8 G/DL (ref 1.1–2.4)
ALP SERPL-CCNC: 85 U/L (ref 38–116)
ALT SERPL W P-5'-P-CCNC: 14 U/L (ref 0–41)
ANION GAP SERPL CALCULATED.3IONS-SCNC: 10 MMOL/L (ref 5–15)
AST SERPL-CCNC: 11 U/L (ref 0–40)
BASOPHILS # BLD AUTO: 0.02 10*3/MM3 (ref 0–0.2)
BASOPHILS NFR BLD AUTO: 0.3 % (ref 0–1.5)
BILIRUB SERPL-MCNC: 0.4 MG/DL (ref 0.2–1.2)
BUN SERPL-MCNC: 48 MG/DL (ref 6–20)
BUN/CREAT SERPL: 20.1 (ref 7.3–30)
CALCIUM SPEC-SCNC: 9.1 MG/DL (ref 8.5–10.2)
CHLORIDE SERPL-SCNC: 101 MMOL/L (ref 98–107)
CO2 SERPL-SCNC: 23 MMOL/L (ref 22–29)
CREAT SERPL-MCNC: 2.39 MG/DL (ref 0.7–1.3)
DEPRECATED RDW RBC AUTO: 51.3 FL (ref 37–54)
EOSINOPHIL # BLD AUTO: 0.02 10*3/MM3 (ref 0–0.4)
EOSINOPHIL NFR BLD AUTO: 0.3 % (ref 0.3–6.2)
ERYTHROCYTE [DISTWIDTH] IN BLOOD BY AUTOMATED COUNT: 14.7 % (ref 12.3–15.4)
GFR SERPL CREATININE-BSD FRML MDRD: 28 ML/MIN/1.73
GLOBULIN UR ELPH-MCNC: 4.3 GM/DL (ref 1.8–3.5)
GLUCOSE SERPL-MCNC: 284 MG/DL (ref 74–124)
HCT VFR BLD AUTO: 26.4 % (ref 37.5–51)
HGB BLD-MCNC: 8.9 G/DL (ref 13–17.7)
IMM GRANULOCYTES # BLD AUTO: 0.06 10*3/MM3 (ref 0–0.05)
IMM GRANULOCYTES NFR BLD AUTO: 1 % (ref 0–0.5)
LYMPHOCYTES # BLD AUTO: 0.32 10*3/MM3 (ref 0.7–3.1)
LYMPHOCYTES NFR BLD AUTO: 5.2 % (ref 19.6–45.3)
MCH RBC QN AUTO: 32 PG (ref 26.6–33)
MCHC RBC AUTO-ENTMCNC: 33.7 G/DL (ref 31.5–35.7)
MCV RBC AUTO: 95 FL (ref 79–97)
MONOCYTES # BLD AUTO: 0.1 10*3/MM3 (ref 0.1–0.9)
MONOCYTES NFR BLD AUTO: 1.6 % (ref 5–12)
NEUTROPHILS NFR BLD AUTO: 5.62 10*3/MM3 (ref 1.7–7)
NEUTROPHILS NFR BLD AUTO: 91.6 % (ref 42.7–76)
NRBC BLD AUTO-RTO: 0 /100 WBC (ref 0–0.2)
PLATELET # BLD AUTO: 174 10*3/MM3 (ref 140–450)
PMV BLD AUTO: 9.3 FL (ref 6–12)
POTASSIUM SERPL-SCNC: 4.5 MMOL/L (ref 3.5–4.7)
PROT SERPL-MCNC: 7.9 G/DL (ref 6.3–8)
RBC # BLD AUTO: 2.78 10*6/MM3 (ref 4.14–5.8)
SODIUM SERPL-SCNC: 134 MMOL/L (ref 134–145)
URATE SERPL-MCNC: 6.1 MG/DL (ref 2.8–7.4)
WBC # BLD AUTO: 6.14 10*3/MM3 (ref 3.4–10.8)

## 2020-08-13 PROCEDURE — 80053 COMPREHEN METABOLIC PANEL: CPT

## 2020-08-13 PROCEDURE — 36415 COLL VENOUS BLD VENIPUNCTURE: CPT

## 2020-08-13 PROCEDURE — 96401 CHEMO ANTI-NEOPL SQ/IM: CPT

## 2020-08-13 PROCEDURE — 84550 ASSAY OF BLOOD/URIC ACID: CPT

## 2020-08-13 PROCEDURE — 85025 COMPLETE CBC W/AUTO DIFF WBC: CPT

## 2020-08-13 PROCEDURE — 25010000002 BORTEZOMIB PER 0.1 MG: Performed by: INTERNAL MEDICINE

## 2020-08-13 RX ORDER — BORTEZOMIB 3.5 MG/1
1.5 INJECTION, POWDER, LYOPHILIZED, FOR SOLUTION INTRAVENOUS; SUBCUTANEOUS ONCE
Status: COMPLETED | OUTPATIENT
Start: 2020-08-13 | End: 2020-08-13

## 2020-08-13 RX ADMIN — BORTEZOMIB 3.4 MG: 3.5 INJECTION, POWDER, LYOPHILIZED, FOR SOLUTION INTRAVENOUS; SUBCUTANEOUS at 14:45

## 2020-08-14 ENCOUNTER — MEDICATION THERAPY MANAGEMENT (OUTPATIENT)
Dept: PHARMACY | Facility: HOSPITAL | Age: 65
End: 2020-08-14

## 2020-08-14 LAB
ALBUMIN SERPL-MCNC: 3.1 G/DL (ref 2.9–4.4)
ALBUMIN/GLOB SERPL: 0.8 {RATIO} (ref 0.7–1.7)
ALPHA1 GLOB FLD ELPH-MCNC: 0.2 G/DL (ref 0–0.4)
ALPHA2 GLOB SERPL ELPH-MCNC: 0.9 G/DL (ref 0.4–1)
B-GLOBULIN SERPL ELPH-MCNC: 0.8 G/DL (ref 0.7–1.3)
GAMMA GLOB SERPL ELPH-MCNC: 2.1 G/DL (ref 0.4–1.8)
GLOBULIN SER CALC-MCNC: 4.1 G/DL (ref 2.2–3.9)
IGA SERPL-MCNC: 18 MG/DL (ref 61–437)
IGG SERPL-MCNC: 2273 MG/DL (ref 603–1613)
IGM SERPL-MCNC: 17 MG/DL (ref 20–172)
INTERPRETATION SERPL IEP-IMP: ABNORMAL
KAPPA LC SERPL-MCNC: 15.8 MG/L (ref 3.3–19.4)
KAPPA LC/LAMBDA SER: 0.02 {RATIO} (ref 0.26–1.65)
LAMBDA LC FREE SERPL-MCNC: 736.1 MG/L (ref 5.7–26.3)
Lab: ABNORMAL
M-SPIKE: ABNORMAL G/DL
PROT SERPL-MCNC: 7.2 G/DL (ref 6–8.5)

## 2020-08-14 NOTE — PROGRESS NOTES
[facility] Lab Review- [facility]      8/13/2020   WBC 3.40 - 10.80 10*3/mm3 6.14   Neutrophils Absolute 1.70 - 7.00 10*3/mm3 5.62   Hemoglobin 13.0 - 17.7 g/dL 8.9 (A)   Hematocrit 37.5 - 51.0 % 26.4 (A)   Platelets 140 - 450 10*3/mm3 174   Creatinine 0.70 - 1.30 mg/dL 2.39 (A)   eGFR Non African Am >60 mL/min/1.73 28 (A)   BUN 6 - 20 mg/dL 48 (A)   Sodium 134 - 145 mmol/L 134   Potassium 3.5 - 4.7 mmol/L 4.5   Glucose 74 - 124 mg/dL 284 (A)   Calcium 8.5 - 10.2 mg/dL 9.1   Albumin 3.50 - 5.20 g/dL 3.60   Total Protein 6.3 - 8.0 g/dL 7.9   AST (SGOT) 0 - 40 U/L 11   ALT (SGPT) 0 - 41 U/L 14   Alkaline Phosphatase 38 - 116 U/L 85   Total Bilirubin 0.2 - 1.2 mg/dL 0.4     Pharmacy will continue to follow.    Thanks,  Yolande Huitron, PharmD

## 2020-08-20 ENCOUNTER — INFUSION (OUTPATIENT)
Dept: ONCOLOGY | Facility: HOSPITAL | Age: 65
End: 2020-08-20

## 2020-08-20 ENCOUNTER — DOCUMENTATION (OUTPATIENT)
Dept: ONCOLOGY | Facility: CLINIC | Age: 65
End: 2020-08-20

## 2020-08-20 ENCOUNTER — OFFICE VISIT (OUTPATIENT)
Dept: ONCOLOGY | Facility: CLINIC | Age: 65
End: 2020-08-20

## 2020-08-20 VITALS
HEIGHT: 76 IN | HEART RATE: 93 BPM | BODY MASS INDEX: 28.04 KG/M2 | TEMPERATURE: 98.4 F | DIASTOLIC BLOOD PRESSURE: 73 MMHG | OXYGEN SATURATION: 99 % | RESPIRATION RATE: 16 BRPM | WEIGHT: 230.3 LBS | SYSTOLIC BLOOD PRESSURE: 117 MMHG

## 2020-08-20 DIAGNOSIS — C90.00 MULTIPLE MYELOMA NOT HAVING ACHIEVED REMISSION (HCC): ICD-10-CM

## 2020-08-20 DIAGNOSIS — D69.6 THROMBOCYTOPENIA (HCC): ICD-10-CM

## 2020-08-20 DIAGNOSIS — Z79.899 ENCOUNTER FOR LONG-TERM CURRENT USE OF HIGH RISK MEDICATION: ICD-10-CM

## 2020-08-20 DIAGNOSIS — D63.1 ANEMIA DUE TO STAGE 3 CHRONIC KIDNEY DISEASE (HCC): ICD-10-CM

## 2020-08-20 DIAGNOSIS — N18.30 ANEMIA IN STAGE 3 CHRONIC KIDNEY DISEASE (HCC): Primary | ICD-10-CM

## 2020-08-20 DIAGNOSIS — D63.1 ANEMIA IN STAGE 3 CHRONIC KIDNEY DISEASE (HCC): Primary | ICD-10-CM

## 2020-08-20 DIAGNOSIS — N18.30 CHRONIC KIDNEY DISEASE, STAGE 3 (HCC): ICD-10-CM

## 2020-08-20 DIAGNOSIS — N18.30 ANEMIA DUE TO STAGE 3 CHRONIC KIDNEY DISEASE (HCC): ICD-10-CM

## 2020-08-20 DIAGNOSIS — C90.00 MULTIPLE MYELOMA NOT HAVING ACHIEVED REMISSION (HCC): Primary | ICD-10-CM

## 2020-08-20 DIAGNOSIS — M79.2 NEUROPATHIC PAIN: ICD-10-CM

## 2020-08-20 LAB
ALBUMIN SERPL-MCNC: 3.5 G/DL (ref 3.5–5.2)
ALBUMIN/GLOB SERPL: 0.8 G/DL (ref 1.1–2.4)
ALP SERPL-CCNC: 67 U/L (ref 38–116)
ALT SERPL W P-5'-P-CCNC: 6 U/L (ref 0–41)
ANION GAP SERPL CALCULATED.3IONS-SCNC: 12.7 MMOL/L (ref 5–15)
AST SERPL-CCNC: 10 U/L (ref 0–40)
BASOPHILS # BLD AUTO: 0.03 10*3/MM3 (ref 0–0.2)
BASOPHILS NFR BLD AUTO: 0.7 % (ref 0–1.5)
BILIRUB SERPL-MCNC: 0.5 MG/DL (ref 0.2–1.2)
BUN SERPL-MCNC: 51 MG/DL (ref 6–20)
BUN/CREAT SERPL: 19.2 (ref 7.3–30)
CALCIUM SPEC-SCNC: 9.2 MG/DL (ref 8.5–10.2)
CHLORIDE SERPL-SCNC: 97 MMOL/L (ref 98–107)
CO2 SERPL-SCNC: 24.3 MMOL/L (ref 22–29)
CREAT SERPL-MCNC: 2.66 MG/DL (ref 0.7–1.3)
DEPRECATED RDW RBC AUTO: 51 FL (ref 37–54)
EOSINOPHIL # BLD AUTO: 0.04 10*3/MM3 (ref 0–0.4)
EOSINOPHIL NFR BLD AUTO: 0.9 % (ref 0.3–6.2)
ERYTHROCYTE [DISTWIDTH] IN BLOOD BY AUTOMATED COUNT: 14.9 % (ref 12.3–15.4)
GFR SERPL CREATININE-BSD FRML MDRD: 24 ML/MIN/1.73
GLOBULIN UR ELPH-MCNC: 4.2 GM/DL (ref 1.8–3.5)
GLUCOSE SERPL-MCNC: 265 MG/DL (ref 74–124)
HCT VFR BLD AUTO: 25.9 % (ref 37.5–51)
HGB BLD-MCNC: 8.6 G/DL (ref 13–17.7)
IMM GRANULOCYTES # BLD AUTO: 0.03 10*3/MM3 (ref 0–0.05)
IMM GRANULOCYTES NFR BLD AUTO: 0.7 % (ref 0–0.5)
LYMPHOCYTES # BLD AUTO: 0.49 10*3/MM3 (ref 0.7–3.1)
LYMPHOCYTES NFR BLD AUTO: 10.6 % (ref 19.6–45.3)
MCH RBC QN AUTO: 31.4 PG (ref 26.6–33)
MCHC RBC AUTO-ENTMCNC: 33.2 G/DL (ref 31.5–35.7)
MCV RBC AUTO: 94.5 FL (ref 79–97)
MONOCYTES # BLD AUTO: 0.18 10*3/MM3 (ref 0.1–0.9)
MONOCYTES NFR BLD AUTO: 3.9 % (ref 5–12)
NEUTROPHILS NFR BLD AUTO: 3.84 10*3/MM3 (ref 1.7–7)
NEUTROPHILS NFR BLD AUTO: 83.2 % (ref 42.7–76)
NRBC BLD AUTO-RTO: 0 /100 WBC (ref 0–0.2)
PLATELET # BLD AUTO: 165 10*3/MM3 (ref 140–450)
PMV BLD AUTO: 9.6 FL (ref 6–12)
POTASSIUM SERPL-SCNC: 4.9 MMOL/L (ref 3.5–4.7)
PROT SERPL-MCNC: 7.7 G/DL (ref 6.3–8)
RBC # BLD AUTO: 2.74 10*6/MM3 (ref 4.14–5.8)
SODIUM SERPL-SCNC: 134 MMOL/L (ref 134–145)
WBC # BLD AUTO: 4.61 10*3/MM3 (ref 3.4–10.8)

## 2020-08-20 PROCEDURE — 25010000002 EPOETIN ALFA PER 1000 UNITS: Performed by: INTERNAL MEDICINE

## 2020-08-20 PROCEDURE — 80053 COMPREHEN METABOLIC PANEL: CPT

## 2020-08-20 PROCEDURE — 85025 COMPLETE CBC W/AUTO DIFF WBC: CPT

## 2020-08-20 PROCEDURE — 96372 THER/PROPH/DIAG INJ SC/IM: CPT

## 2020-08-20 PROCEDURE — 36415 COLL VENOUS BLD VENIPUNCTURE: CPT

## 2020-08-20 PROCEDURE — 99215 OFFICE O/P EST HI 40 MIN: CPT | Performed by: INTERNAL MEDICINE

## 2020-08-20 RX ORDER — METHYLPREDNISOLONE SODIUM SUCCINATE 125 MG/2ML
60 INJECTION, POWDER, LYOPHILIZED, FOR SOLUTION INTRAMUSCULAR; INTRAVENOUS ONCE
Status: CANCELLED | OUTPATIENT
Start: 2020-09-10

## 2020-08-20 RX ORDER — SODIUM CHLORIDE 9 MG/ML
250 INJECTION, SOLUTION INTRAVENOUS ONCE
Status: CANCELLED | OUTPATIENT
Start: 2020-09-17

## 2020-08-20 RX ORDER — MEPERIDINE HYDROCHLORIDE 50 MG/ML
25 INJECTION INTRAMUSCULAR; INTRAVENOUS; SUBCUTANEOUS
Status: CANCELLED | OUTPATIENT
Start: 2020-09-17

## 2020-08-20 RX ORDER — METHYLPREDNISOLONE SODIUM SUCCINATE 125 MG/2ML
60 INJECTION, POWDER, LYOPHILIZED, FOR SOLUTION INTRAMUSCULAR; INTRAVENOUS ONCE
Status: CANCELLED | OUTPATIENT
Start: 2020-09-17

## 2020-08-20 RX ORDER — MEPERIDINE HYDROCHLORIDE 50 MG/ML
25 INJECTION INTRAMUSCULAR; INTRAVENOUS; SUBCUTANEOUS
Status: CANCELLED | OUTPATIENT
Start: 2020-09-10

## 2020-08-20 RX ORDER — ACETAMINOPHEN 500 MG
1000 TABLET ORAL ONCE
Status: CANCELLED | OUTPATIENT
Start: 2020-09-03

## 2020-08-20 RX ORDER — ACETAMINOPHEN 500 MG
1000 TABLET ORAL ONCE
Status: CANCELLED | OUTPATIENT
Start: 2020-09-17

## 2020-08-20 RX ORDER — METHYLPREDNISOLONE SODIUM SUCCINATE 125 MG/2ML
100 INJECTION, POWDER, LYOPHILIZED, FOR SOLUTION INTRAMUSCULAR; INTRAVENOUS ONCE
Status: CANCELLED | OUTPATIENT
Start: 2020-08-27

## 2020-08-20 RX ORDER — FAMOTIDINE 10 MG/ML
20 INJECTION, SOLUTION INTRAVENOUS AS NEEDED
Status: CANCELLED | OUTPATIENT
Start: 2020-09-17

## 2020-08-20 RX ORDER — FAMOTIDINE 10 MG/ML
20 INJECTION, SOLUTION INTRAVENOUS AS NEEDED
Status: CANCELLED | OUTPATIENT
Start: 2020-09-03

## 2020-08-20 RX ORDER — FAMOTIDINE 10 MG/ML
20 INJECTION, SOLUTION INTRAVENOUS AS NEEDED
Status: CANCELLED | OUTPATIENT
Start: 2020-08-27

## 2020-08-20 RX ORDER — DIPHENHYDRAMINE HYDROCHLORIDE 50 MG/ML
50 INJECTION INTRAMUSCULAR; INTRAVENOUS AS NEEDED
Status: CANCELLED | OUTPATIENT
Start: 2020-09-17

## 2020-08-20 RX ORDER — MONTELUKAST SODIUM 10 MG/1
10 TABLET ORAL ONCE
Status: CANCELLED | OUTPATIENT
Start: 2020-08-27

## 2020-08-20 RX ORDER — MEPERIDINE HYDROCHLORIDE 50 MG/ML
25 INJECTION INTRAMUSCULAR; INTRAVENOUS; SUBCUTANEOUS
Status: CANCELLED | OUTPATIENT
Start: 2020-08-27

## 2020-08-20 RX ORDER — SODIUM CHLORIDE 9 MG/ML
250 INJECTION, SOLUTION INTRAVENOUS ONCE
Status: CANCELLED | OUTPATIENT
Start: 2020-09-10

## 2020-08-20 RX ORDER — DIPHENHYDRAMINE HYDROCHLORIDE 50 MG/ML
50 INJECTION INTRAMUSCULAR; INTRAVENOUS AS NEEDED
Status: CANCELLED | OUTPATIENT
Start: 2020-08-27

## 2020-08-20 RX ORDER — FAMOTIDINE 10 MG/ML
20 INJECTION, SOLUTION INTRAVENOUS AS NEEDED
Status: CANCELLED | OUTPATIENT
Start: 2020-09-10

## 2020-08-20 RX ORDER — DIPHENHYDRAMINE HYDROCHLORIDE 50 MG/ML
50 INJECTION INTRAMUSCULAR; INTRAVENOUS AS NEEDED
Status: CANCELLED | OUTPATIENT
Start: 2020-09-10

## 2020-08-20 RX ORDER — ACETAMINOPHEN 500 MG
1000 TABLET ORAL ONCE
Status: CANCELLED | OUTPATIENT
Start: 2020-08-27

## 2020-08-20 RX ORDER — GABAPENTIN 300 MG/1
300 CAPSULE ORAL 3 TIMES DAILY
Qty: 90 CAPSULE | Refills: 2 | Status: ON HOLD | OUTPATIENT
Start: 2020-08-20 | End: 2022-09-28 | Stop reason: SDUPTHER

## 2020-08-20 RX ORDER — DIPHENHYDRAMINE HYDROCHLORIDE 50 MG/ML
50 INJECTION INTRAMUSCULAR; INTRAVENOUS AS NEEDED
Status: CANCELLED | OUTPATIENT
Start: 2020-09-03

## 2020-08-20 RX ORDER — ASPIRIN 81 MG/1
81 TABLET ORAL DAILY
Start: 2020-08-20 | End: 2021-01-15

## 2020-08-20 RX ORDER — SODIUM CHLORIDE 9 MG/ML
250 INJECTION, SOLUTION INTRAVENOUS ONCE
Status: CANCELLED | OUTPATIENT
Start: 2020-08-27

## 2020-08-20 RX ORDER — ACETAMINOPHEN 500 MG
1000 TABLET ORAL ONCE
Status: CANCELLED | OUTPATIENT
Start: 2020-09-10

## 2020-08-20 RX ORDER — MEPERIDINE HYDROCHLORIDE 50 MG/ML
25 INJECTION INTRAMUSCULAR; INTRAVENOUS; SUBCUTANEOUS
Status: CANCELLED | OUTPATIENT
Start: 2020-09-03

## 2020-08-20 RX ORDER — SODIUM CHLORIDE 9 MG/ML
250 INJECTION, SOLUTION INTRAVENOUS ONCE
Status: CANCELLED | OUTPATIENT
Start: 2020-09-03

## 2020-08-20 RX ORDER — METHYLPREDNISOLONE SODIUM SUCCINATE 125 MG/2ML
100 INJECTION, POWDER, LYOPHILIZED, FOR SOLUTION INTRAMUSCULAR; INTRAVENOUS ONCE
Status: CANCELLED | OUTPATIENT
Start: 2020-09-03

## 2020-08-20 RX ADMIN — ERYTHROPOIETIN 20000 UNITS: 20000 INJECTION, SOLUTION INTRAVENOUS; SUBCUTANEOUS at 12:30

## 2020-08-20 NOTE — NURSING NOTE
Velcade held today per , switching tx to SQ Darzalex next week. Procrit only today. msg sent to scheduling to get pt added on for chemo ed appnt for marlene. Pt sent to scheduling.

## 2020-08-20 NOTE — PROGRESS NOTES
Staff message rec from Dr eDl Valle-he would like to start pt on Revlimid 10 mg 21 out of 28 days.    I attempted to submit a PA to [facility] Medicaid through [employer] but the system could not locate him. I contacted [employer] and was able to do the PA verbally. Decision will be made within 72 hours.    Red Del Valle MD sent to Reny Feng.             I want to start the patient on Revlimid 10 mg daily x 21 out of a 28 day cycle.     Thank you

## 2020-08-20 NOTE — PROGRESS NOTES
Subjective     CHIEF COMPLAINT:      Chief Complaint   Patient presents with   • Follow-up     no concerns       HISTORY OF PRESENT ILLNESS:     Contreras Albert is a 65 y.o. male patient who returns today for follow up on his multiple myeloma.  He states that he felt poorly after the last cycle of therapy.  After he receives the Velcade injection, he starts 2-3 days later having progressive fatigue, pain and weakness in the lower extremities.  This was more profound over the past week.  He felt that his legs have become weak as well.    Patient was seen by Dr. Alvares at [facility].      REVIEW OF SYSTEMS:  Review of Systems   Constitutional: Positive for fatigue. Negative for fever and unexpected weight change.   HENT: Negative for nosebleeds and voice change.    Eyes: Negative for visual disturbance.   Respiratory: Negative for cough and shortness of breath.    Cardiovascular: Negative for chest pain and leg swelling.   Gastrointestinal: Negative for abdominal pain, blood in stool, constipation, diarrhea, nausea and vomiting.   Genitourinary: Negative for frequency and hematuria.   Musculoskeletal: Negative for back pain and joint swelling.        Pain in both lower extremities   Skin: Negative for rash.   Neurological: Positive for weakness and numbness. Negative for dizziness and headaches.   Hematological: Negative for adenopathy. Does not bruise/bleed easily.   Psychiatric/Behavioral: Negative for dysphoric mood. The patient is not nervous/anxious.      I reviewed and verified the CC and ROS obtained by the MA.     Past Medical History:   Diagnosis Date   • Anemia    • Chronic renal insufficiency    • Clear cell carcinoma of kidney, right (CMS/HCC)     Stage I   • Diabetes mellitus (CMS/HCC)    • Enlarged prostate    • High cholesterol    • Hypertension    • Multiple myeloma (CMS/HCC)    • Renal mass        Past Surgical History:   Procedure Laterality Date   • INSERTION HEMODIALYSIS CATHETER N/A  6/2/2020    Procedure: HEMODIALYSIS CATHETER INSERTION;  Surgeon: Sumanth Hernandez MD;  Location: [facility] MAIN OR;  Service: Vascular;  Laterality: N/A;   • NEPHRECTOMY PARTIAL Right 5/18/2016    Procedure: RT NEPHRECTOMY PARTIAL LAPAROSCOPIC WITH DAVINCI ROBOT;  Surgeon: Chad Garcia MD;  Location: [facility] MAIN OR;  Service:        Cancer-related family history is not on file.  Social History     Tobacco Use   • Smoking status: Never Smoker   • Smokeless tobacco: Never Used   Substance Use Topics   • Alcohol use: No       MEDICATIONS:    Current Outpatient Medications:   •  acyclovir (ZOVIRAX) 400 MG tablet, Take 1 tablet by mouth 2 (Two) Times a Day., Disp: 60 tablet, Rfl: 7  •  amLODIPine (Norvasc) 2.5 MG tablet, Take 1 tablet by mouth Daily., Disp: 30 tablet, Rfl: 1  •  Blood Glucose Monitoring Suppl (FREESTYLE FREEDOM LITE) w/Device kit, 1 kit Daily. Indications: Type 2 Diabetes, Disp: 1 each, Rfl: 0  •  calcium acetate (PHOSLO) 667 MG tablet, Take 2 tablets by mouth 3 (Three) Times a Day With Meals., Disp: 180 tablet, Rfl: 0  •  cyclophosphamide 50 MG capsule, Take 13 capsules by mouth 1 (One) Time Per Week., Disp: 52 capsule, Rfl: 6  •  dexamethasone (DECADRON) 4 MG tablet, Take 10 tablets by mouth 1 (One) Time Per Week., Disp: 40 tablet, Rfl: 1  •  finasteride (PROSCAR) 5 MG tablet, Take 5 mg by mouth daily., Disp: , Rfl:   •  furosemide (LASIX) 40 MG tablet, Take 1 tablet by mouth Daily., Disp: 30 tablet, Rfl: 3  •  glucose blood (FREESTYLE LITE) test strip, Use daily to test blood sugar, Disp: 100 each, Rfl: 0  •  Insulin Glargine (LANTUS SOLOSTAR) 100 UNIT/ML injection pen, Inject 15 Units under the skin into the appropriate area as directed Every Night., Disp: 15 mL, Rfl: 0  •  Insulin Pen Needle 32G X 4 MM misc, 1 application Daily., Disp: 100 each, Rfl: 0  •  Lancets (FREESTYLE) lancets, Use once then discard to test blood sugar each morning, Disp: 100 each, Rfl: 0  •  ondansetron  "(ZOFRAN) 8 MG tablet, Take 1 tablet by mouth 3 (Three) Times a Day As Needed for Nausea or Vomiting., Disp: 30 tablet, Rfl: 5  •  pantoprazole (PROTONIX) 40 MG EC tablet, Take 1 tablet by mouth Daily., Disp: 30 tablet, Rfl: 0  •  sulfamethoxazole-trimethoprim (BACTRIM DS,SEPTRA DS) 800-160 MG per tablet, Take 1 tablet by mouth 3 (Three) Times a Week. Take 1 tablet on Mondays, Wednesdays and Fridays., Disp: 12 tablet, Rfl: 7  •  tamsulosin (FLOMAX) 0.4 MG capsule 24 hr capsule, Take 1 capsule by mouth every night., Disp: , Rfl:   •  traMADol (ULTRAM) 50 MG tablet, Take 1 tablet by mouth Every 12 (Twelve) Hours As Needed for Moderate Pain  or Severe Pain ., Disp: 30 tablet, Rfl: 0    ALLERGIES:  Allergies   Allergen Reactions   • Crestor [Rosuvastatin] GI Intolerance         Objective   VITAL SIGNS:     Vitals:    08/20/20 1125   BP: 117/73   Pulse: 93   Resp: 16   Temp: 98.4 °F (36.9 °C)   TempSrc: Oral   SpO2: 99%   Weight: 104 kg (230 lb 4.8 oz)   Height: 193 cm (75.98\")   PainSc: 0-No pain     Body mass index is 28.04 kg/m².     Wt Readings from Last 3 Encounters:   08/20/20 104 kg (230 lb 4.8 oz)   08/13/20 106 kg (234 lb)   08/06/20 104 kg (228 lb 4.8 oz)       PHYSICAL EXAMINATION:  GENERAL:  The patient appears weak, not in acute distress.  SKIN: No skin rashes. No ecchymosis.  HEAD:  Normocephalic.  EYES:  No Jaundice. Pallor. Pupils equal. EOMI.  NECK:  Supple with Good ROM. No Thyromegaly. No Masses.  LYMPHATICS:  No cervical or supraclavicular lymphadenopathy.  CHEST: Normal respiratory effort. Lungs clear to auscultation.   CARDIAC:  Normal S1 & S2. No murmur.  Trace edema.  ABDOMEN:  Soft. No tenderness. No Hepatomegaly. No Splenomegaly. No masses.  EXTREMITIES: No Calf tenderness.  NEUROLOGICAL:  No Focal neurological deficits.       DIAGNOSTIC DATA:     Results from last 7 days   Lab Units 08/20/20  1050 08/13/20  1356   WBC 10*3/mm3 4.61 6.14   NEUTROS ABS 10*3/mm3 3.84 5.62   HEMOGLOBIN g/dL 8.6* " 8.9*   HEMATOCRIT % 25.9* 26.4*   PLATELETS 10*3/mm3 165 174     Results from last 7 days   Lab Units 08/20/20  1310   SODIUM mmol/L 134   POTASSIUM mmol/L 4.9*   CHLORIDE mmol/L 97*   CO2 mmol/L 24.3   BUN mg/dL 51*   CREATININE mg/dL 2.66*   CALCIUM mg/dL 9.2   ALBUMIN g/dL 3.50   BILIRUBIN mg/dL 0.5   ALK PHOS U/L 67   ALT (SGPT) U/L 6   AST (SGOT) U/L 10   GLUCOSE mg/dL 265*             Assessment/Plan   1.  IgG lambda multiple myeloma.  · Bone marrow 5/23/2020 hypercellular marrow with 80% involvement of plasma cell myeloma; FISH studies pending  · Bone survey 5/26/2020: Diffuse osteoporosis and demineralization.  However, no discrete lytic lesions.  · SPEP 5/23/2020: M spike 5.1.  IgG 6629 lambda.  Light chain ratio 0 with free lambda light chains 6400.  Beta-2 microglobulin 16.3.  24-hour urine 12.7 g protein per 24-hour with monoclonal lambda free light chain 33.9%, monoclonal IgG lambda 23.5%  · 24-hour urine testing from 5/24/2020 free lambda light chains 8.4 g/L  · Considering renal failure, initiated CyBorD 5/28/20.  Given the significantly elevated light chains with the first cycle of treatment plan to give Velcade days 1, 4, 8, 11; Cytoxan 300 PO mg/m² weekly and dexamethasone 40 mg p.o. days 1, 2, 3, 4, 8 and 15.    · After cycle 1 treatment can likely be altered to weekly dosing of each drug.  · Patient was referred to Dr. Alvares at [facility].  · Patient is tolerating the treatment except for generalized bone achiness after receiving Velcade.  · On 6/18/2020, Velcade was changed to 1.5 mg/m² weekly continuously along with weekly Decadron and oral Cytoxan.  · M protein decreased to 2.4 and free light chain improved to 874 on 7/16/2020 indicating response to therapy.  · M protein decreased to 2.0 and free light chain decreased to 736 on 8/13/2020.   · Although the improvement indicates response, he continues to have significant amount of monoclonal protein despite being on treatment for  3 months.  · In addition, patient is having side effects from Velcade in the form of painful neuropathy.  · I discussed the case with Dr. Alvares.  I recommended stopping Velcade and Cytoxan and changing the treatment to Daratumumab subcutaneous injection weekly along with Revlimid 10 mg daily for 21 out of 28-day cycle and Decadron 40 mg daily.  · I explained the treatment side effects of daratumumab in the form of infusion/injection reaction.  I explained the side effects of Revlimid including possibility of deep vein thrombosis, skin rash, cytopenias and diarrhea.  · Patient agreed to proceed.      2.  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  · B12 folate and iron studies are normal.  · Status post PRBC transfusion on 5/24/2020.  · Patient was receiving Procrit with his hemodialysis.  · After the hemoglobin improved to the 9 g range, it decreased to 8.6.  · Patient is symptomatic and reports fatigue.    3.  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  · Showed enlargement of the kidneys concerning for myeloma kidney and light chain deposition disease.  · Patient completed 3 sessions of pheresis on 5/30/2020.  · Creatinine was 5.12 on 5/31/2020  · Creatinine increased to 6.36 on 6/1/2020.  · Patient had HD catheter placed and started on HD on 6/2/2020.   · Kidney function improved in late June 2020 and did not require additional hemodialysis.  · Creatinine is today at 2.66.    4.  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  · Patient had mild neuropathy since the start of treatment but symptoms worsened over the past month.  · He is having pain that worsens with walking.  · I recommended treatment with Neurontin.  I explained the medication side effects including dizziness or sleepiness.  · I recommended starting at 300 mg daily with gradual dose increase as tolerated.    5.  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  · Status  post right partial nephrectomy on 5/18/2016.    6.  Thrombocytopenia.    · Platelet count was gradually declining and this is attributed to the effect of his treatment with Velcade and Cytoxan.    · The gradual declines makes HIT much less likely.   · Platelet count improved to 106,000 on 6/3/2020.  · Platelet count decreased to 89,000 on 6/4/2020.  · Platelet count is currently in the 160,000-170,000.  · Patient is not having problems with bleeding.      PLAN:    1.  Discontinue Velcade and Cytoxan.  2.  Due to the patient's current symptoms, we will give him 1 week break from treatment.  3.  I recommended starting treatment in 1 week with daratumumab subcutaneously weekly along with Decadron weekly at 40 mg and Revlimid.    4.  Revlimid will be at the reduced dose of 10 mg daily for 21 out of 28-day cycle due to the patient's reduced kidney function.  5.  I asked the patient to start aspirin 81 mg a day for DVT prophylaxis.  6.  We will give Procrit today at 20,000 units and continue this weekly for hemoglobin <10.  7.  We will start the patient on Neurontin.  We will start Neurontin 300 mg daily for 3 days.  If he tolerates the dose, he will increase to twice daily for 3 days.  If he tolerates the dose, he will increase to 3 times a day.  8.  Patient will be seen in 1 week by the nurse practitioner and will start daratumumab on that day.  He will see the nurse petitioner on day #8.  I will see him in follow-up on day #15.            Red Del Valle MD  08/20/20

## 2020-08-21 ENCOUNTER — DOCUMENTATION (OUTPATIENT)
Dept: ONCOLOGY | Facility: CLINIC | Age: 65
End: 2020-08-21

## 2020-08-21 RX ORDER — LENALIDOMIDE 10 MG/1
CAPSULE ORAL
Qty: 21 CAPSULE | Refills: 0 | Status: SHIPPED | OUTPATIENT
Start: 2020-08-21 | End: 2020-09-15

## 2020-08-21 NOTE — PROGRESS NOTES
Revlimid approved until 2/16/2021-[employer]    Revlimid Rx escribed to [facility] for processing.

## 2020-08-24 ENCOUNTER — MEDICATION THERAPY MANAGEMENT (OUTPATIENT)
Dept: PHARMACY | Facility: HOSPITAL | Age: 65
End: 2020-08-24

## 2020-08-24 NOTE — PROGRESS NOTES
Oral Chemotherapy Teaching      Patient Name/:  Contreras Albert   1955  Oral Chemotherapy Regimen:  Darzalex + Revlimid + Dexamethasone  Date Started Medication: 20    Initial Teaching Follow Up Comments     Safety     Storage instructions (away from children; away from heat/cold, sunlight, or moisture), handling - use of gloves (caregivers), washing hands after touching pills, managing waste     “How are you storing your medications?”, reminders on storage, proper handling (caregivers using gloves, washing hands, away from children, managing waste, etc.), disposal of medication with D/C or dosage change    Please store Revlimid and dexamethasone in a safe dry place, out of direct sunlight. Keep out of reach of any children or pets. Contreras will wash his hands before and after administration of Revlimid. If you need help with your medication, caregiver should wear gloves. If you have any medication left over, please bring remainder back to the office for proper disposal.     Adherence      patient and/or caregiver on how to take medication, take with/without food, assess their adherence potential, stress importance of adherence, ways to manage adherence (pill boxes, phone reminders, calendars), what to do if miss a dose   “How are you taking your medication?” “How are you remembering to take your medication?”, “How many doses have you missed?”, determine reasons for non-adherence (not remembering, side effects, etc), ways to improve, overadherence? Remind patient of ways to improve/maintain adherence   Take Revlimid 10 mg capsules- one daily for 21 days on, then 7 days off. Take dexamethasone 40 mg weekly. Come into clinic to receive Darzalex injection as scheduled- weekly for the first 8 treatments, then every other week for 8 treatments, then once monthly thereafter. If you miss a dose of Revlimid, take within 12 hrs; otherwise, skip what you missed and DO NOT double up.     Side Effects/Adverse  Reactions      patient on potential side effects, s/s, ways to manage, when to call MD/seek help     Determine if patient experiencing side effects, ways to manage  We discussed the following side effects:   - low blood counts  - injection reactions  - allergic reactions  -potential for embryofetal toxicity and associated REMS program  - blood clots  - N/V/D  - constipation  -fatigue  - increased blood sugar     Miscellaneous     Food interactions, DDIs, financial issues Determine if patient started any new medications since being placed on oral chemo (analyze for DDI) No significant DDI identified.     Additional Notes:  Education provided telephonically to Contreras prior to therapy initiation. Contreras verbalizes understanding of his medication regimen. He comes in on 8/27/20 and we will have him sign consents and CCA and hand him education materials at this time. Pharmacy will continue to follow.    Thanks,   Yolande Huitron, PharmD

## 2020-08-24 NOTE — PROGRESS NOTES
[facility] telephone encounter- New start Darzalex/Revlimid/Dexamethasone    No answer at scheduled education time.  direct line [phone].    Thanks,  Yolande Huitron, YumikoD

## 2020-08-27 ENCOUNTER — LAB (OUTPATIENT)
Dept: ONCOLOGY | Facility: HOSPITAL | Age: 65
End: 2020-08-27

## 2020-08-27 ENCOUNTER — DOCUMENTATION (OUTPATIENT)
Dept: ONCOLOGY | Facility: CLINIC | Age: 65
End: 2020-08-27

## 2020-08-27 ENCOUNTER — OFFICE VISIT (OUTPATIENT)
Dept: ONCOLOGY | Facility: CLINIC | Age: 65
End: 2020-08-27

## 2020-08-27 ENCOUNTER — INFUSION (OUTPATIENT)
Dept: ONCOLOGY | Facility: HOSPITAL | Age: 65
End: 2020-08-27

## 2020-08-27 VITALS
WEIGHT: 247.3 LBS | OXYGEN SATURATION: 98 % | TEMPERATURE: 98.4 F | HEART RATE: 98 BPM | BODY MASS INDEX: 30.11 KG/M2 | RESPIRATION RATE: 20 BRPM | HEIGHT: 76 IN | DIASTOLIC BLOOD PRESSURE: 80 MMHG | SYSTOLIC BLOOD PRESSURE: 124 MMHG

## 2020-08-27 VITALS — DIASTOLIC BLOOD PRESSURE: 92 MMHG | HEART RATE: 94 BPM | SYSTOLIC BLOOD PRESSURE: 144 MMHG

## 2020-08-27 DIAGNOSIS — C90.00 MULTIPLE MYELOMA NOT HAVING ACHIEVED REMISSION (HCC): ICD-10-CM

## 2020-08-27 DIAGNOSIS — C90.00 MULTIPLE MYELOMA NOT HAVING ACHIEVED REMISSION (HCC): Primary | ICD-10-CM

## 2020-08-27 DIAGNOSIS — N18.30 ANEMIA IN STAGE 3 CHRONIC KIDNEY DISEASE (HCC): ICD-10-CM

## 2020-08-27 DIAGNOSIS — D64.9 ANEMIA, UNSPECIFIED TYPE: ICD-10-CM

## 2020-08-27 DIAGNOSIS — D63.1 ANEMIA IN STAGE 3 CHRONIC KIDNEY DISEASE (HCC): ICD-10-CM

## 2020-08-27 LAB
ABO GROUP BLD: NORMAL
ALBUMIN SERPL-MCNC: 3.3 G/DL (ref 3.5–5.2)
ALBUMIN/GLOB SERPL: 0.8 G/DL (ref 1.1–2.4)
ALP SERPL-CCNC: 76 U/L (ref 38–116)
ALT SERPL W P-5'-P-CCNC: 11 U/L (ref 0–41)
ANION GAP SERPL CALCULATED.3IONS-SCNC: 9.1 MMOL/L (ref 5–15)
AST SERPL-CCNC: 11 U/L (ref 0–40)
BASOPHILS # BLD AUTO: 0.04 10*3/MM3 (ref 0–0.2)
BASOPHILS NFR BLD AUTO: 0.7 % (ref 0–1.5)
BILIRUB SERPL-MCNC: 0.3 MG/DL (ref 0.2–1.2)
BLD GP AB SCN SERPL QL: NEGATIVE
BUN SERPL-MCNC: 51 MG/DL (ref 6–20)
BUN/CREAT SERPL: 21.5 (ref 7.3–30)
CALCIUM SPEC-SCNC: 9 MG/DL (ref 8.5–10.2)
CHLORIDE SERPL-SCNC: 99 MMOL/L (ref 98–107)
CO2 SERPL-SCNC: 23.9 MMOL/L (ref 22–29)
CREAT SERPL-MCNC: 2.37 MG/DL (ref 0.7–1.3)
DEPRECATED RDW RBC AUTO: 52.2 FL (ref 37–54)
EOSINOPHIL # BLD AUTO: 0.04 10*3/MM3 (ref 0–0.4)
EOSINOPHIL NFR BLD AUTO: 0.7 % (ref 0.3–6.2)
ERYTHROCYTE [DISTWIDTH] IN BLOOD BY AUTOMATED COUNT: 15.3 % (ref 12.3–15.4)
GFR SERPL CREATININE-BSD FRML MDRD: 28 ML/MIN/1.73
GLOBULIN UR ELPH-MCNC: 4.1 GM/DL (ref 1.8–3.5)
GLUCOSE BLDC-MCNC: 286 MG/DL (ref 74–124)
GLUCOSE SERPL-MCNC: 312 MG/DL (ref 74–124)
HCT VFR BLD AUTO: 25.4 % (ref 37.5–51)
HGB BLD-MCNC: 8.6 G/DL (ref 13–17.7)
IMM GRANULOCYTES # BLD AUTO: 0.14 10*3/MM3 (ref 0–0.05)
IMM GRANULOCYTES NFR BLD AUTO: 2.5 % (ref 0–0.5)
KELL: NEGATIVE
LYMPHOCYTES # BLD AUTO: 0.44 10*3/MM3 (ref 0.7–3.1)
LYMPHOCYTES NFR BLD AUTO: 7.7 % (ref 19.6–45.3)
Lab: ABNORMAL
MCH RBC QN AUTO: 32.8 PG (ref 26.6–33)
MCHC RBC AUTO-ENTMCNC: 33.9 G/DL (ref 31.5–35.7)
MCV RBC AUTO: 96.9 FL (ref 79–97)
MONOCYTES # BLD AUTO: 0.23 10*3/MM3 (ref 0.1–0.9)
MONOCYTES NFR BLD AUTO: 4 % (ref 5–12)
NEUTROPHILS NFR BLD AUTO: 4.82 10*3/MM3 (ref 1.7–7)
NEUTROPHILS NFR BLD AUTO: 84.4 % (ref 42.7–76)
NRBC BLD AUTO-RTO: 0 /100 WBC (ref 0–0.2)
PLATELET # BLD AUTO: 196 10*3/MM3 (ref 140–450)
PMV BLD AUTO: 9.6 FL (ref 6–12)
POTASSIUM SERPL-SCNC: 4.9 MMOL/L (ref 3.5–4.7)
PROT SERPL-MCNC: 7.4 G/DL (ref 6.3–8)
RBC # BLD AUTO: 2.62 10*6/MM3 (ref 4.14–5.8)
RH BLD: POSITIVE
SODIUM SERPL-SCNC: 132 MMOL/L (ref 134–145)
T&S EXPIRATION DATE: NORMAL
WBC # BLD AUTO: 5.71 10*3/MM3 (ref 3.4–10.8)

## 2020-08-27 PROCEDURE — 96372 THER/PROPH/DIAG INJ SC/IM: CPT

## 2020-08-27 PROCEDURE — 25010000002 DARATUMUMAB-HYALURONIDASE-FIHJ 1800-30000 MG-UT/15ML SOLUTION: Performed by: INTERNAL MEDICINE

## 2020-08-27 PROCEDURE — 86900 BLOOD TYPING SEROLOGIC ABO: CPT | Performed by: INTERNAL MEDICINE

## 2020-08-27 PROCEDURE — 96375 TX/PRO/DX INJ NEW DRUG ADDON: CPT

## 2020-08-27 PROCEDURE — 99214 OFFICE O/P EST MOD 30 MIN: CPT | Performed by: NURSE PRACTITIONER

## 2020-08-27 PROCEDURE — 96374 THER/PROPH/DIAG INJ IV PUSH: CPT

## 2020-08-27 PROCEDURE — 86850 RBC ANTIBODY SCREEN: CPT | Performed by: INTERNAL MEDICINE

## 2020-08-27 PROCEDURE — 80053 COMPREHEN METABOLIC PANEL: CPT

## 2020-08-27 PROCEDURE — 86901 BLOOD TYPING SEROLOGIC RH(D): CPT | Performed by: INTERNAL MEDICINE

## 2020-08-27 PROCEDURE — 85025 COMPLETE CBC W/AUTO DIFF WBC: CPT

## 2020-08-27 PROCEDURE — 36415 COLL VENOUS BLD VENIPUNCTURE: CPT

## 2020-08-27 PROCEDURE — 96401 CHEMO ANTI-NEOPL SQ/IM: CPT

## 2020-08-27 PROCEDURE — 25010000002 METHYLPREDNISOLONE PER 125 MG: Performed by: INTERNAL MEDICINE

## 2020-08-27 PROCEDURE — 25010000002 DIPHENHYDRAMINE PER 50 MG: Performed by: INTERNAL MEDICINE

## 2020-08-27 PROCEDURE — 25010000002 EPOETIN ALFA PER 1000 UNITS: Performed by: INTERNAL MEDICINE

## 2020-08-27 PROCEDURE — C9399 UNCLASSIFIED DRUGS OR BIOLOG: HCPCS | Performed by: INTERNAL MEDICINE

## 2020-08-27 PROCEDURE — 86905 BLOOD TYPING RBC ANTIGENS: CPT | Performed by: INTERNAL MEDICINE

## 2020-08-27 PROCEDURE — 82962 GLUCOSE BLOOD TEST: CPT | Performed by: NURSE PRACTITIONER

## 2020-08-27 RX ORDER — SODIUM CHLORIDE 9 MG/ML
250 INJECTION, SOLUTION INTRAVENOUS ONCE
Status: COMPLETED | OUTPATIENT
Start: 2020-08-27 | End: 2020-08-27

## 2020-08-27 RX ORDER — ACETAMINOPHEN 500 MG
1000 TABLET ORAL ONCE
Status: COMPLETED | OUTPATIENT
Start: 2020-08-27 | End: 2020-08-27

## 2020-08-27 RX ORDER — MONTELUKAST SODIUM 10 MG/1
10 TABLET ORAL ONCE
Status: COMPLETED | OUTPATIENT
Start: 2020-08-27 | End: 2020-08-27

## 2020-08-27 RX ORDER — METHYLPREDNISOLONE SODIUM SUCCINATE 125 MG/2ML
100 INJECTION, POWDER, LYOPHILIZED, FOR SOLUTION INTRAMUSCULAR; INTRAVENOUS ONCE
Status: COMPLETED | OUTPATIENT
Start: 2020-08-27 | End: 2020-08-27

## 2020-08-27 RX ADMIN — DIPHENHYDRAMINE HYDROCHLORIDE 50 MG: 50 INJECTION INTRAMUSCULAR; INTRAVENOUS at 11:32

## 2020-08-27 RX ADMIN — ACETAMINOPHEN 1000 MG: 500 TABLET ORAL at 11:21

## 2020-08-27 RX ADMIN — DARATUMUMAB AND HYALURONIDASE-FIHJ (HUMAN RECOMBINANT) 1800 MG: 1800; 30000 INJECTION SUBCUTANEOUS at 12:42

## 2020-08-27 RX ADMIN — SODIUM CHLORIDE 250 ML: 9 INJECTION, SOLUTION INTRAVENOUS at 11:29

## 2020-08-27 RX ADMIN — MONTELUKAST 10 MG: 10 TABLET, FILM COATED ORAL at 11:21

## 2020-08-27 RX ADMIN — ERYTHROPOIETIN 20000 UNITS: 20000 INJECTION, SOLUTION INTRAVENOUS; SUBCUTANEOUS at 12:42

## 2020-08-27 RX ADMIN — METHYLPREDNISOLONE SODIUM SUCCINATE 100 MG: 125 INJECTION, POWDER, FOR SOLUTION INTRAMUSCULAR; INTRAVENOUS at 11:29

## 2020-08-27 NOTE — PROGRESS NOTES
Subjective     CHIEF COMPLAINT: Multiple myeloma    Chief Complaint   Patient presents with   • Follow-up       HISTORY OF PRESENT ILLNESS:     Contreras Albert is a 65 y.o. male patient turns today for initiation of daratumumab along with Revlimid 10 mg daily for 21 out of 28 days and dexamethasone 40 mg weekly.    Patient began Neurontin following the last office visit.  He initially had poor tolerance but is now taking 300 mg twice daily of the gabapentin and feels like his neuropathy has greatly improved.  He only has noticeable symptoms in his left lower extremity and foot.    He has not yet received his Revlimid.  I will reach out to Reny Jung regarding this today.    He reports his blood sugar has been well controlled however we did discuss that the dexamethasone will cause this to increase.  He reports he does not have a primary care provider and I will get him referred to someone for this purpose.      REVIEW OF SYSTEMS:  Review of Systems   Constitutional: Positive for fatigue. Negative for fever and unexpected weight change.   HENT: Negative for nosebleeds and voice change.    Eyes: Negative for visual disturbance.   Respiratory: Negative for cough and shortness of breath.    Cardiovascular: Negative for chest pain and leg swelling.   Gastrointestinal: Negative for abdominal pain, blood in stool, constipation, diarrhea, nausea and vomiting.   Genitourinary: Negative for frequency and hematuria.   Musculoskeletal: Negative for back pain and joint swelling.        Numbness and pain in his left lower extremity   Skin: Negative for rash.   Neurological: Positive for weakness and numbness (improving iwth gabapentin). Negative for dizziness and headaches.   Hematological: Negative for adenopathy. Does not bruise/bleed easily.   Psychiatric/Behavioral: Negative for dysphoric mood. The patient is not nervous/anxious.          Past Medical History:   Diagnosis Date   • Anemia    • Chronic renal insufficiency    •  Clear cell carcinoma of kidney, right (CMS/HCC)     Stage I   • Diabetes mellitus (CMS/HCC)    • Enlarged prostate    • High cholesterol    • Hypertension    • Multiple myeloma (CMS/HCC)    • Renal mass        Past Surgical History:   Procedure Laterality Date   • INSERTION HEMODIALYSIS CATHETER N/A 6/2/2020    Procedure: HEMODIALYSIS CATHETER INSERTION;  Surgeon: Sumanth Hernandez MD;  Location: [facility] OR;  Service: Vascular;  Laterality: N/A;   • NEPHRECTOMY PARTIAL Right 5/18/2016    Procedure: RT NEPHRECTOMY PARTIAL LAPAROSCOPIC WITH DAVINCI ROBOT;  Surgeon: Chad Garcia MD;  Location: [facility];  Service:        Cancer-related family history is not on file.  Social History     Tobacco Use   • Smoking status: Never Smoker   • Smokeless tobacco: Never Used   Substance Use Topics   • Alcohol use: No       MEDICATIONS:    Current Outpatient Medications:   •  acyclovir (ZOVIRAX) 400 MG tablet, Take 1 tablet by mouth 2 (Two) Times a Day., Disp: 60 tablet, Rfl: 7  •  amLODIPine (Norvasc) 2.5 MG tablet, Take 1 tablet by mouth Daily., Disp: 30 tablet, Rfl: 1  •  aspirin (aspirin) 81 MG EC tablet, Take 1 tablet by mouth Daily., Disp: , Rfl:   •  Blood Glucose Monitoring Suppl (FREESTYLE FREEDOM LITE) w/Device kit, 1 kit Daily. Indications: Type 2 Diabetes, Disp: 1 each, Rfl: 0  •  calcium acetate (PHOSLO) 667 MG tablet, Take 2 tablets by mouth 3 (Three) Times a Day With Meals., Disp: 180 tablet, Rfl: 0  •  dexamethasone (DECADRON) 4 MG tablet, Take 10 tablets by mouth 1 (One) Time Per Week., Disp: 40 tablet, Rfl: 1  •  finasteride (PROSCAR) 5 MG tablet, Take 5 mg by mouth daily., Disp: , Rfl:   •  furosemide (LASIX) 40 MG tablet, Take 1 tablet by mouth Daily., Disp: 30 tablet, Rfl: 3  •  gabapentin (NEURONTIN) 300 MG capsule, Take 1 capsule by mouth 3 (Three) Times a Day., Disp: 90 capsule, Rfl: 2  •  glucose blood (FREESTYLE LITE) test strip, Use daily to test blood sugar, Disp: 100 each, Rfl:  "0  •  Insulin Glargine (LANTUS SOLOSTAR) 100 UNIT/ML injection pen, Inject 15 Units under the skin into the appropriate area as directed Every Night., Disp: 15 mL, Rfl: 0  •  Insulin Pen Needle 32G X 4 MM misc, 1 application Daily., Disp: 100 each, Rfl: 0  •  Lancets (FREESTYLE) lancets, Use once then discard to test blood sugar each morning, Disp: 100 each, Rfl: 0  •  lenalidomide (REVLIMID) 10 MG capsule, Take 1 cap po daily for 21 days on then 7 days off., Disp: 21 capsule, Rfl: 0  •  ondansetron (ZOFRAN) 8 MG tablet, Take 1 tablet by mouth 3 (Three) Times a Day As Needed for Nausea or Vomiting., Disp: 30 tablet, Rfl: 5  •  pantoprazole (PROTONIX) 40 MG EC tablet, Take 1 tablet by mouth Daily., Disp: 30 tablet, Rfl: 0  •  sulfamethoxazole-trimethoprim (BACTRIM DS,SEPTRA DS) 800-160 MG per tablet, Take 1 tablet by mouth 3 (Three) Times a Week. Take 1 tablet on Mondays, Wednesdays and Fridays., Disp: 12 tablet, Rfl: 7  •  tamsulosin (FLOMAX) 0.4 MG capsule 24 hr capsule, Take 1 capsule by mouth every night., Disp: , Rfl:   •  traMADol (ULTRAM) 50 MG tablet, Take 1 tablet by mouth Every 12 (Twelve) Hours As Needed for Moderate Pain  or Severe Pain ., Disp: 30 tablet, Rfl: 0  No current facility-administered medications for this visit.     Facility-Administered Medications Ordered in Other Visits:   •  epoetin valerie (EPOGEN,PROCRIT) injection 20,000 Units, 20,000 Units, Subcutaneous, Weekly, Red Del Valle MD, 20,000 Units at 08/27/20 1242    ALLERGIES:  Allergies   Allergen Reactions   • Crestor [Rosuvastatin] GI Intolerance         Objective   VITAL SIGNS:     Vitals:    08/27/20 1045   BP: 124/80   Pulse: 98   Resp: 20   Temp: 98.4 °F (36.9 °C)   TempSrc: Temporal   SpO2: 98%   Weight: 112 kg (247 lb 4.8 oz)   Height: 193 cm (75.98\")   PainSc: 0-No pain     Body mass index is 30.11 kg/m².     Wt Readings from Last 3 Encounters:   08/27/20 112 kg (247 lb 4.8 oz)   08/20/20 104 kg (230 lb 4.8 oz)   08/13/20 106 " kg (234 lb)       PHYSICAL EXAMINATION:  GENERAL: Well-developed, no acute distress.  He uses a cane for assistance with ambulation.  SKIN: No skin rashes. No ecchymosis.  HEAD:  Normocephalic.  EYES:  No Jaundice. Pallor. Pupils equal. EOMI.  NECK:  Supple with Good ROM.  No Masses.  LYMPHATICS:  No cervical or supraclavicular lymphadenopathy.  CHEST: Normal respiratory effort. Lungs clear to auscultation.   CARDIAC:  Normal S1 & S2. No murmur.    ABDOMEN:  Soft. No tenderness. No Hepatomegaly. No Splenomegaly. No masses.  EXTREMITIES: No Calf tenderness.  Trace edema.  NEUROLOGICAL:  No Focal neurological deficits.       DIAGNOSTIC DATA:     Results from last 7 days   Lab Units 08/27/20  1016   WBC 10*3/mm3 5.71   NEUTROS ABS 10*3/mm3 4.82   HEMOGLOBIN g/dL 8.6*   HEMATOCRIT % 25.4*   PLATELETS 10*3/mm3 196     Results from last 7 days   Lab Units 08/27/20  1016   SODIUM mmol/L 132*   POTASSIUM mmol/L 4.9*   CHLORIDE mmol/L 99   CO2 mmol/L 23.9   BUN mg/dL 51*   CREATININE mg/dL 2.37*   CALCIUM mg/dL 9.0   ALBUMIN g/dL 3.30*   BILIRUBIN mg/dL 0.3   ALK PHOS U/L 76   ALT (SGPT) U/L 11   AST (SGOT) U/L 11   GLUCOSE mg/dL 312*             Assessment/Plan   1.  IgG lambda multiple myeloma.  · Bone marrow 5/23/2020 hypercellular marrow with 80% involvement of plasma cell myeloma; FISH studies pending  · Bone survey 5/26/2020: Diffuse osteoporosis and demineralization.  However, no discrete lytic lesions.  · SPEP 5/23/2020: M spike 5.1.  IgG 6629 lambda.  Light chain ratio 0 with free lambda light chains 6400.  Beta-2 microglobulin 16.3.  24-hour urine 12.7 g protein per 24-hour with monoclonal lambda free light chain 33.9%, monoclonal IgG lambda 23.5%  · 24-hour urine testing from 5/24/2020 free lambda light chains 8.4 g/L  · Considering renal failure, initiated CyBorD 5/28/20.  Given the significantly elevated light chains with the first cycle of treatment plan to give Velcade days 1, 4, 8, 11; Cytoxan 300 PO mg/m²  weekly and dexamethasone 40 mg p.o. days 1, 2, 3, 4, 8 and 15.    · After cycle 1 treatment can likely be altered to weekly dosing of each drug.  · Patient was referred to Dr. Alvares at [facility].  · Patient is tolerating the treatment except for generalized bone achiness after receiving Velcade.  · On 6/18/2020, Velcade was changed to 1.5 mg/m² weekly continuously along with weekly Decadron and oral Cytoxan.  · M protein decreased to 2.4 and free light chain improved to 874 on 7/16/2020 indicating response to therapy.  · M protein decreased to 2.0 and free light chain decreased to 736 on 8/13/2020.   · Although the improvement indicates response, he continues to have significant amount of monoclonal protein despite being on treatment for 3 months.  · In addition, patient is having side effects from Velcade in the form of painful neuropathy.  · I discussed the case with Dr. Alvares.  I recommended stopping Velcade and Cytoxan and changing the treatment to Daratumumab subcutaneous injection weekly along with Revlimid 10 mg daily for 21 out of 28-day cycle and Decadron 40 mg daily.  · I explained the treatment side effects of daratumumab in the form of infusion/injection reaction.  I explained the side effects of Revlimid including possibility of deep vein thrombosis, skin rash, cytopenias and diarrhea.  · Patient agreed to proceed and did undergo education.  · Patient returns today for initiation of daratumumab subcu.  He took a dexamethasone this morning.  He has not yet received his Revlimid and we will assist with this today.      2.  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  · B12 folate and iron studies are normal.  · Status post PRBC transfusion on 5/24/2020.  · Patient was receiving Procrit with his hemodialysis.  · After the hemoglobin improved to the 9 g range, it decreased to 8.6.  · Patient is symptomatic and reports fatigue.  · This is stable this week.    3.   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  · Showed enlargement of the kidneys concerning for myeloma kidney and light chain deposition disease.  · Patient completed 3 sessions of pheresis on 5/30/2020.  · Creatinine was 5.12 on 5/31/2020  · Creatinine increased to 6.36 on 6/1/2020.  · Patient had HD catheter placed and started on HD on 6/2/2020.   · Kidney function improved in late June 2020 and did not require additional hemodialysis.  · Creatinine has improved to 2.37 today.    4.  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  · Patient had mild neuropathy since the start of treatment but symptoms worsened over the past month.  · He is having pain that worsens with walking.  · I recommended treatment with Neurontin.  I explained the medication side effects including dizziness or sleepiness.  · I recommended starting at 300 mg daily with gradual dose increase as tolerated.  · He is currently taking Neurontin 300 mg twice daily improvement in symptoms.    5.  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  · Status post right partial nephrectomy on 5/18/2016.    6.  Thrombocytopenia.    · Platelet count was gradually declining and this is attributed to the effect of his treatment with Velcade and Cytoxan.    · The gradual declines makes HIT much less likely.   · Platelet count improved to 106,000 on 6/3/2020.  · Platelet count decreased to 89,000 on 6/4/2020.  · Platelet count is currently in the 160,000-170,000.  · Patient is not having problems with bleeding.      PLAN:    1.   Begin daratumumab subcutaneously weekly along with Decadron weekly at 40 mg and Revlimid.  Revlimid will be at the reduced dose of 10 mg daily for 21 out of 28-day cycle due to the patient's reduced kidney function.  5.  Continue aspirin 81 mg a day for DVT prophylaxis.  6.  We will give Procrit today at 20,000 units and continue this weekly for hemoglobin <10.  7.  Continue Neurontin 300  mg twice daily.  8.  Follow-up in 1 week with nurse practitioner and day 8 treatment.  9.  Follow-up in 2 weeks Dr. Gaucher for day 15 treatment.  10.  Referral to new primary care provider for blood glucose management            Liv Sherman, IZZY  08/27/20

## 2020-08-27 NOTE — NURSING NOTE
CMP resulted, blood glucose and sodium reviewed w/ Liv NP. Per Liv, will recheck fingerstick glucose 30-60 minutes after Solu-medrol given and review need for insulin. Pt is also to see his PCP regarding sliding scale insulin. Pt states he is looking for a new PCP and it may be a while before he can be seen.    Blood sugar recheck is 286, per Liv NP, no insulin ordered. D/w her pt wanting new PCP. Urgent order placed for new PCP and pt instructed to call our office if he doesn't receive a call within 3 or 4  Business days. Pt v/u.

## 2020-08-27 NOTE — PROGRESS NOTES
Call rec from Liv RIDLEY NP-she states pt has not heard from from pharmacy about delivery.     As of yesterday-M[employer] worked out the issue with getting the claim paid and were going to reach out to pt to schedule. They showed the Revlimid needing a PA and I already obtained an approval.     I contacted [employer] this morning and per Patricia-they reached out to pt yesterday but had to leave a message. They are going to continue to try and reach pt.

## 2020-08-28 ENCOUNTER — DOCUMENTATION (OUTPATIENT)
Dept: ONCOLOGY | Facility: CLINIC | Age: 65
End: 2020-08-28

## 2020-08-28 ENCOUNTER — MEDICATION THERAPY MANAGEMENT (OUTPATIENT)
Dept: PHARMACY | Facility: HOSPITAL | Age: 65
End: 2020-08-28

## 2020-08-28 NOTE — PROGRESS NOTES
MTM follow up- Darzalex/Revlimid/Dexamethasone  Red Del Valle MD Kaufman, Janna [facility]             Yes. We can remove methylprednisolone     Thank you    Previous Messages      ----- Message -----   From: Yolande Huitron [facility]   Sent: 8/27/2020   2:19 PM EDT   To: Red Del Valle MD   Subject: Darzalex/revlimid/dex                             Dr. Del Valle,     Mr. Albert started his new regimen today. His blood sugar was 312 and I was wondering if you would consider omitting methylprednisolone from his premedications since he has oral dexamethasone weekly?     Thanks,   Yolande Huitron, YumikoD

## 2020-08-31 ENCOUNTER — MEDICATION THERAPY MANAGEMENT (OUTPATIENT)
Dept: PHARMACY | Facility: HOSPITAL | Age: 65
End: 2020-08-31

## 2020-08-31 NOTE — PROGRESS NOTES
[facility] telephone follow up- Darzalex/Revlimid/Dexamethasone    Contreras is doing okay. He reports quite a bit of leg swelling today and he is having difficulty walking. He is trying to elevate his feet as much as possible and he reports that his feet are too swollen for compression socks. Contreras states that furosemide is has not been helpful. I suspect this could be due to the steroids as he took dexamethasone 40 mg prior to his appointment on 8/27 and he also received methylprednisolone as premedication. I will send MD an in-basket message regarding patient's swelling. Mr. Albert also states that his blood sugar shot up into the 400's the day following treatment and today they are back in the 130's. Pharmacy will continue to follow.    Thanks,   Yolande Huitron, PharmD

## 2020-09-01 ENCOUNTER — MEDICATION THERAPY MANAGEMENT (OUTPATIENT)
Dept: PHARMACY | Facility: HOSPITAL | Age: 65
End: 2020-09-01

## 2020-09-01 RX ORDER — BUMETANIDE 1 MG/1
1 TABLET ORAL DAILY
Qty: 30 TABLET | Refills: 5 | Status: SHIPPED | OUTPATIENT
Start: 2020-09-01 | End: 2021-01-15

## 2020-09-01 NOTE — PROGRESS NOTES
MTM telephone follow up- Darzalex/Revlimid/Dexamethasone    Red Del Valle MD Kaufman, Janna [facility]             The steroids are likely contributing.  However, he needs the current dose of dexamethasone.  I would recommend switching from Lasix to Bumex 1 mg daily     Thank you    Previous Messages      ----- Message -----   From: Yolande Huitron RPH   Sent: 8/31/2020   2:29 PM EDT   To: Red Del Valle MD   Subject: Swelling                                         Dr. Del Valle,     I spoke to Mr. Albert for MTM follow up and he reports quite a bit of leg swelling today and he is having difficulty walking. He is trying to elevate his feet as much as possible and he reports that his feet are too swollen for compression socks. Contreras states that furosemide is has not been helpful. Do you think this is from steroids? Do you have any recommendations for this patient?     Thank you,   Yolande Huitron, Evan         I spoke to Mr. Albert and informed him that we have sent in Bumetanide to his pharmacy and that he can try this instead of furosemide. Contreras verbalized understanding.    Thanks,  Yolande Huitron, Evan

## 2020-09-02 ENCOUNTER — OFFICE VISIT (OUTPATIENT)
Dept: FAMILY MEDICINE CLINIC | Facility: CLINIC | Age: 65
End: 2020-09-02

## 2020-09-02 VITALS
BODY MASS INDEX: 30.43 KG/M2 | HEART RATE: 88 BPM | DIASTOLIC BLOOD PRESSURE: 66 MMHG | OXYGEN SATURATION: 98 % | WEIGHT: 249.9 LBS | HEIGHT: 76 IN | SYSTOLIC BLOOD PRESSURE: 106 MMHG | TEMPERATURE: 99.2 F

## 2020-09-02 DIAGNOSIS — Z79.4 TYPE 2 DIABETES MELLITUS WITH CHRONIC KIDNEY DISEASE, WITH LONG-TERM CURRENT USE OF INSULIN, UNSPECIFIED CKD STAGE (HCC): Primary | ICD-10-CM

## 2020-09-02 DIAGNOSIS — R68.89 COLD INTOLERANCE: ICD-10-CM

## 2020-09-02 DIAGNOSIS — Z79.4 TYPE 2 DIABETES MELLITUS WITH CHRONIC KIDNEY DISEASE, WITH LONG-TERM CURRENT USE OF INSULIN, UNSPECIFIED CKD STAGE (HCC): ICD-10-CM

## 2020-09-02 DIAGNOSIS — D63.1 ANEMIA IN STAGE 3 CHRONIC KIDNEY DISEASE (HCC): ICD-10-CM

## 2020-09-02 DIAGNOSIS — E11.22 TYPE 2 DIABETES MELLITUS WITH CHRONIC KIDNEY DISEASE, WITH LONG-TERM CURRENT USE OF INSULIN, UNSPECIFIED CKD STAGE (HCC): Primary | ICD-10-CM

## 2020-09-02 DIAGNOSIS — E11.22 TYPE 2 DIABETES MELLITUS WITH CHRONIC KIDNEY DISEASE, WITH LONG-TERM CURRENT USE OF INSULIN, UNSPECIFIED CKD STAGE (HCC): ICD-10-CM

## 2020-09-02 DIAGNOSIS — C90.00 MULTIPLE MYELOMA NOT HAVING ACHIEVED REMISSION (HCC): ICD-10-CM

## 2020-09-02 DIAGNOSIS — I10 ESSENTIAL HYPERTENSION: ICD-10-CM

## 2020-09-02 DIAGNOSIS — N18.30 ANEMIA IN STAGE 3 CHRONIC KIDNEY DISEASE (HCC): ICD-10-CM

## 2020-09-02 PROBLEM — E11.319 DIABETIC RETINOPATHY ASSOCIATED WITH TYPE 2 DIABETES MELLITUS: Status: ACTIVE | Noted: 2020-09-02

## 2020-09-02 PROCEDURE — 99204 OFFICE O/P NEW MOD 45 MIN: CPT | Performed by: NURSE PRACTITIONER

## 2020-09-02 NOTE — PATIENT INSTRUCTIONS
Have labs drawn with chemo tomorrow.  Continue to monitor your blood sugar twice daily before a meal and record results.  Continue to adjust dose of Lantus based on sliding scale.  Continue to monitor your blood pressure periodically and record results.  Continue to work on healthy diet.  Follow up pending lab results.  Follow up in 3 months, or sooner if problem or concerns.

## 2020-09-02 NOTE — PROGRESS NOTES
Subjective   Contreras Albert is a 65 y.o. male.     Chief Complaint   Patient presents with   • Hypertension     follow up est care    • Diabetes       History of Present Illness   New patient, here to establish care; previous PCP Dr. Nahum Portillo, no recent PCP; was seen at [facility] at [facility] in early COVID-19 pandemic, but provider not going to that office any more, wanted PCP closer to home; patient presents for follow up DM2: takes Lantus nightly as needed when blood sugar greater than 150, has sliding scale follows, takes more on days takes steroids; some days will take Lantus twice daily; minimal insulin most days per week; previously on multiple insulins and did not need that much; stopped sodas with artificial sweetners and diabetes better controlled; had been off insulin for period of time with weight loss; takes Insulin on days when gets chemo due to steroids with treatments; blood sugar typically runs 120-130s watching diet, higher when takes steroids; really watches carbs and sugars; has seen dietician in past; has neuropathy in bilateral legs, worse in left, as well as bilateral hands; started with start of chemo; previous numbness in feet had improved after blood sugar better controlled; takes Gabapentin TID and helps; sees ophthalmology, recently diagnosed with diabetic retinopathy and cataracts; chemo affects cataracts.    F/U HTN: takes Amlodipine daily and previously taking Furosemide; changed to Bumex yesterday for swelling related to chemo; 2 days ago, feet so swollen could not get shoes and socks on; does not typically monitor BP; no headaches; some lightheadedness, particularly if room is dark, comes and goes; started about 2 years ago; seems to have times that symptoms worse than others, may have a little cognitive issue at the time of episodes.    Takes Pantoprazole daily as needed; no heartburn or JOLANTA; also has Zofran if needed with chemo; has not needed to take very much; has Rx for Tramadol if  needed, has only taking 3-4 times; does not like taking pain meds; takes Acyclovir twice daily to prevent mouth sores, no problems at this point.     Sees Dr. Vincent at [facility] for prostate problems; in past, started to get bladder infection that would not resolve; had CT scan and found small growth on right kidney about 6-7 years ago; had robotic surgery to remove and UTI symptoms resolved; was good for few years, still having problems with hyperglycemia, was 100 pounds heavier at the time; losing weight made a huge difference; then 5/22/20 kept feeling worse and worse, was stumbling and falling; saw MD at [facility] Dr. Pittman, had labs, labs abnormal, sent to ER; Hgb was less than 6, had several units of blood; felt better immediately after blood transfusion; specialist saw in hospital; had bone marrow biopsy and diagnosed with multiple myeloma; started chemo Velcade, had 16 rounds, then started having bad side effects with severe leg pain and swelling; skipped 1 week, then started on alternative chemo that is injection; has 2nd round tomorrow; saw Dr. Alvares at [facility]; will be having bone marrow/stem cell transplant in November; protein type lab over 6000, now around 600-700; tries to get protein in diet.    Takes Finasteride and Tamsulosin for prostate; takes Bactrim 3 times per week while on chemo to prevent infection.     Myeloma caused kidneys to shut down; has been on dialysis in past; sees renal; has follow up in September; sees oncology Dr. Del Valle; gets chemo every Thursday; takes Revlimid for 21 days, then skips 7 days as well as getting chemo.    The following portions of the patient's history were reviewed and updated as appropriate: allergies, current medications, past family history, past medical history, past social history, past surgical history and problem list.    Current Outpatient Medications on File Prior to Visit   Medication Sig   • acyclovir (ZOVIRAX) 400 MG tablet Take 1  tablet by mouth 2 (Two) Times a Day.   • amLODIPine (Norvasc) 2.5 MG tablet Take 1 tablet by mouth Daily.   • aspirin (aspirin) 81 MG EC tablet Take 1 tablet by mouth Daily.   • Blood Glucose Monitoring Suppl (FREESTYLE FREEDOM LITE) w/Device kit 1 kit Daily. Indications: Type 2 Diabetes   • bumetanide (BUMEX) 1 MG tablet Take 1 tablet by mouth Daily.   • dexamethasone (DECADRON) 4 MG tablet Take 10 tablets by mouth 1 (One) Time Per Week.   • finasteride (PROSCAR) 5 MG tablet Take 5 mg by mouth daily.   • gabapentin (NEURONTIN) 300 MG capsule Take 1 capsule by mouth 3 (Three) Times a Day.   • Insulin Pen Needle 32G X 4 MM misc 1 application Daily.   • Lancets (FREESTYLE) lancets Use once then discard to test blood sugar each morning   • tamsulosin (FLOMAX) 0.4 MG capsule 24 hr capsule Take 1 capsule by mouth every night.   • traMADol (ULTRAM) 50 MG tablet Take 1 tablet by mouth Every 12 (Twelve) Hours As Needed for Moderate Pain  or Severe Pain .   • calcium acetate (PHOSLO) 667 MG tablet Take 2 tablets by mouth 3 (Three) Times a Day With Meals.   • lenalidomide (REVLIMID) 10 MG capsule Take 1 cap po daily for 21 days on then 7 days off.   • ondansetron (ZOFRAN) 8 MG tablet Take 1 tablet by mouth 3 (Three) Times a Day As Needed for Nausea or Vomiting.   • pantoprazole (PROTONIX) 40 MG EC tablet Take 1 tablet by mouth Daily.   • sulfamethoxazole-trimethoprim (BACTRIM DS,SEPTRA DS) 800-160 MG per tablet Take 1 tablet by mouth 3 (Three) Times a Week. Take 1 tablet on Mondays, Wednesdays and Fridays.     No current facility-administered medications on file prior to visit.        Past Medical History:   Diagnosis Date   • Anemia    • Cataract    • Chronic renal insufficiency    • Clear cell carcinoma of kidney, right (CMS/HCC)     Stage I   • Diabetes mellitus (CMS/HCC)    • Enlarged prostate    • High cholesterol    • Hypertension    • Multiple myeloma (CMS/HCC)    • Multiple rib fractures     on right after fall from 2  story building at age of 30 years   • Renal mass        Past Surgical History:   Procedure Laterality Date   • INSERTION HEMODIALYSIS CATHETER N/A 6/2/2020    Procedure: HEMODIALYSIS CATHETER INSERTION;  Surgeon: Sumanth Hernandez MD;  Location: [facility];  Service: Vascular;  Laterality: N/A;   • NEPHRECTOMY PARTIAL Right 5/18/2016    Procedure: RT NEPHRECTOMY PARTIAL LAPAROSCOPIC WITH DAVINCI ROBOT;  Surgeon: Chad Garcia MD;  Location: [facility] OR;  Service:        History reviewed. No pertinent family history.    Social History     Socioeconomic History   • Marital status:      Spouse name: Gabby   • Number of children: Not on file   • Years of education: Not on file   • Highest education level: Not on file   Occupational History     Employer: RETIRED   Tobacco Use   • Smoking status: Never Smoker   • Smokeless tobacco: Never Used   Substance and Sexual Activity   • Alcohol use: No   • Drug use: No   • Sexual activity: Defer       Review of Systems   Constitutional: Positive for fatigue (some days worse than others). Negative for unexpected weight gain and unexpected weight loss. Appetite change: at times.   HENT: Negative for ear pain, sinus pressure (chronic sinus problems, has felt better recently), sore throat and trouble swallowing.    Eyes: Positive for blurred vision (see HPI, sees [facility] eye specialist monthly; has had injections twice).   Respiratory: Negative for cough, chest tightness and shortness of breath. Apnea: some snoring at times.    Cardiovascular: Negative for chest pain and palpitations.   Gastrointestinal: Negative for abdominal pain, blood in stool, diarrhea, GERD and indigestion. Constipation: some with last chemo med, not a problem at this point; has Senna S if needed.   Endocrine: Positive for heat intolerance. Negative for cold intolerance and polydipsia (none since stopped artificial sweetners).   Genitourinary: Negative for dysuria and frequency.  "  Musculoskeletal: Negative for arthralgias (some discomfort in knees, injured left knee years ago; no decreased ROM; left leg weakness at times) and back pain.   Skin: Negative for rash.   Neurological: Negative for syncope.   Hematological: Does not bruise/bleed easily.   Psychiatric/Behavioral: Negative for depressed mood. Sleep disturbance: has trouble sleeping, sometimes sleeps during day, and then does not sleep during night. The patient is not nervous/anxious.        Objective   Vitals:    09/02/20 1123 09/02/20 1245 09/02/20 1246   BP: 140/78 108/68 106/66   BP Location: Left arm Left arm Right arm   Patient Position: Sitting Sitting Sitting   Cuff Size: Adult     Pulse: 88     Temp: 99.2 °F (37.3 °C)     SpO2: 98%     Weight: 113 kg (249 lb 14.4 oz)     Height: 193 cm (76\")       Body mass index is 30.42 kg/m².    Physical Exam   Constitutional: He is oriented to person, place, and time. Vital signs are normal. He appears well-developed and well-nourished. No distress.   HENT:   Head: Normocephalic.   Right Ear: Tympanic membrane and external ear normal.   Left Ear: Tympanic membrane and external ear normal.   Nose: Right sinus exhibits no maxillary sinus tenderness and no frontal sinus tenderness. Left sinus exhibits no maxillary sinus tenderness and no frontal sinus tenderness.   Eyes: Conjunctivae are normal.   Neck: Normal range of motion. Neck supple. Carotid bruit is not present.   Cardiovascular: Normal rate, regular rhythm, normal heart sounds and intact distal pulses.   No murmur heard.  Pulmonary/Chest: Effort normal and breath sounds normal. No respiratory distress.   Abdominal: Soft. Bowel sounds are normal. There is no hepatosplenomegaly. There is no tenderness.   Musculoskeletal: He exhibits edema (trace to 1+ pretibial).   Lymphadenopathy:     He has no cervical adenopathy.   Neurological: He is alert and oriented to person, place, and time. Abnormal gait: guarded gait with cane.   Skin: Skin " is warm and dry. No rash noted. He is not diaphoretic.   Psychiatric: He has a normal mood and affect. His behavior is normal. Judgment and thought content normal. Cognition and memory are normal.   Nursing note and vitals reviewed.      Lab Results   Component Value Date    WBC 5.71 08/27/2020    RBC 2.62 (L) 08/27/2020    HGB 8.6 (L) 08/27/2020    HCT 25.4 (L) 08/27/2020    MCV 96.9 08/27/2020    MCH 32.8 08/27/2020    MCHC 33.9 08/27/2020    RDW 15.3 08/27/2020    RDWSD 52.2 08/27/2020    MPV 9.6 08/27/2020     08/27/2020    NEUTRORELPCT 84.4 (H) 08/27/2020    LYMPHORELPCT 7.7 (L) 08/27/2020    MONORELPCT 4.0 (L) 08/27/2020    EOSRELPCT 0.7 08/27/2020    BASORELPCT 0.7 08/27/2020    AUTOIGPER 2.5 (H) 08/27/2020    NEUTROABS 4.82 08/27/2020    LYMPHSABS 0.44 (L) 08/27/2020    MONOSABS 0.23 08/27/2020    EOSABS 0.04 08/27/2020    BASOSABS 0.04 08/27/2020    AUTOIGNUM 0.14 (H) 08/27/2020    NRBC 0.0 08/27/2020     Lab Results   Component Value Date    GLUCOSE 312 (C) 08/27/2020    BUN 51 (H) 08/27/2020    CREATININE 2.37 (C) 08/27/2020    EGFRIFNONA 28 (L) 08/27/2020    EGFRIFAFRI  06/03/2020      Comment:      <15 Indicative of kidney failure.    BCR 21.5 08/27/2020    K 4.9 (H) 08/27/2020    CO2 23.9 08/27/2020    CALCIUM 9.0 08/27/2020    PROTENTOTREF 7.2 08/13/2020    ALBUMIN 3.30 (L) 08/27/2020    LABIL2 0.8 08/13/2020    AST 11 08/27/2020    ALT 11 08/27/2020      Lab Results   Component Value Date    HGBA1C 6.60 (H) 05/23/2020       Assessment/Plan .  Problem List Items Addressed This Visit     Diabetes mellitus (CMS/[facility]) - Primary    Current Assessment & Plan     Diabetes is improving with treatment.   Continue current treatment regimen.  Diabetes will be reassessed in 3 months.  Continue to adjust dose of Lantus based on sliding scale.         Relevant Medications    Insulin Glargine (LANTUS SOLOSTAR) 100 UNIT/ML injection pen    glucose blood (FREESTYLE LITE) test strip    Other Relevant Orders     Hemoglobin A1c    Lipid Panel With LDL / HDL Ratio    Hypertension    Current Assessment & Plan     Hypertension is stable.  Continue current medications.  Ambulatory blood pressure monitoring.  Blood pressure will be reassessed at the next regular appointment.         Relevant Orders    Lipid Panel With LDL / HDL Ratio    Multiple myeloma not having achieved remission (CMS/HCC)    Current Assessment & Plan     Continue current treatments per oncology.         Anemia in stage 3 chronic kidney disease (CMS/HCC)    Current Assessment & Plan     Follow up as scheduled with renal.         Cold intolerance    Relevant Orders    TSH Rfx On Abnormal To Free T4          Return in about 3 months (around 12/2/2020) for Recheck.or sooner if problems or concern.  Order given for labs to be drawn with chemo treatment tomorrow.

## 2020-09-03 ENCOUNTER — LAB (OUTPATIENT)
Dept: LAB | Facility: HOSPITAL | Age: 65
End: 2020-09-03

## 2020-09-03 ENCOUNTER — INFUSION (OUTPATIENT)
Dept: ONCOLOGY | Facility: HOSPITAL | Age: 65
End: 2020-09-03

## 2020-09-03 ENCOUNTER — OFFICE VISIT (OUTPATIENT)
Dept: ONCOLOGY | Facility: CLINIC | Age: 65
End: 2020-09-03

## 2020-09-03 VITALS
TEMPERATURE: 98.2 F | RESPIRATION RATE: 16 BRPM | DIASTOLIC BLOOD PRESSURE: 78 MMHG | OXYGEN SATURATION: 97 % | BODY MASS INDEX: 30.1 KG/M2 | WEIGHT: 247.2 LBS | SYSTOLIC BLOOD PRESSURE: 145 MMHG | HEART RATE: 98 BPM | HEIGHT: 76 IN

## 2020-09-03 DIAGNOSIS — C90.00 MULTIPLE MYELOMA NOT HAVING ACHIEVED REMISSION (HCC): ICD-10-CM

## 2020-09-03 DIAGNOSIS — C90.00 MULTIPLE MYELOMA NOT HAVING ACHIEVED REMISSION (HCC): Primary | ICD-10-CM

## 2020-09-03 DIAGNOSIS — D63.1 ANEMIA IN STAGE 3 CHRONIC KIDNEY DISEASE (HCC): ICD-10-CM

## 2020-09-03 DIAGNOSIS — N18.30 ANEMIA IN STAGE 3 CHRONIC KIDNEY DISEASE (HCC): ICD-10-CM

## 2020-09-03 PROBLEM — R68.89 COLD INTOLERANCE: Status: ACTIVE | Noted: 2020-09-03

## 2020-09-03 LAB
BASOPHILS # BLD AUTO: 0.01 10*3/MM3 (ref 0–0.2)
BASOPHILS NFR BLD AUTO: 0.2 % (ref 0–1.5)
DEPRECATED RDW RBC AUTO: 55.1 FL (ref 37–54)
EOSINOPHIL # BLD AUTO: 0.04 10*3/MM3 (ref 0–0.4)
EOSINOPHIL NFR BLD AUTO: 0.9 % (ref 0.3–6.2)
ERYTHROCYTE [DISTWIDTH] IN BLOOD BY AUTOMATED COUNT: 15.6 % (ref 12.3–15.4)
HCT VFR BLD AUTO: 26.2 % (ref 37.5–51)
HGB BLD-MCNC: 8.4 G/DL (ref 13–17.7)
IMM GRANULOCYTES # BLD AUTO: 0.05 10*3/MM3 (ref 0–0.05)
IMM GRANULOCYTES NFR BLD AUTO: 1.2 % (ref 0–0.5)
LYMPHOCYTES # BLD AUTO: 0.19 10*3/MM3 (ref 0.7–3.1)
LYMPHOCYTES NFR BLD AUTO: 4.4 % (ref 19.6–45.3)
MCH RBC QN AUTO: 31.2 PG (ref 26.6–33)
MCHC RBC AUTO-ENTMCNC: 32.1 G/DL (ref 31.5–35.7)
MCV RBC AUTO: 97.4 FL (ref 79–97)
MONOCYTES # BLD AUTO: 0.07 10*3/MM3 (ref 0.1–0.9)
MONOCYTES NFR BLD AUTO: 1.6 % (ref 5–12)
NEUTROPHILS NFR BLD AUTO: 3.92 10*3/MM3 (ref 1.7–7)
NEUTROPHILS NFR BLD AUTO: 91.7 % (ref 42.7–76)
NRBC BLD AUTO-RTO: 0 /100 WBC (ref 0–0.2)
PLATELET # BLD AUTO: 149 10*3/MM3 (ref 140–450)
PMV BLD AUTO: 9 FL (ref 6–12)
RBC # BLD AUTO: 2.69 10*6/MM3 (ref 4.14–5.8)
WBC # BLD AUTO: 4.28 10*3/MM3 (ref 3.4–10.8)

## 2020-09-03 PROCEDURE — 99214 OFFICE O/P EST MOD 30 MIN: CPT | Performed by: NURSE PRACTITIONER

## 2020-09-03 PROCEDURE — 85025 COMPLETE CBC W/AUTO DIFF WBC: CPT

## 2020-09-03 PROCEDURE — 25010000002 DARATUMUMAB-HYALURONIDASE-FIHJ 1800-30000 MG-UT/15ML SOLUTION: Performed by: INTERNAL MEDICINE

## 2020-09-03 PROCEDURE — 96375 TX/PRO/DX INJ NEW DRUG ADDON: CPT

## 2020-09-03 PROCEDURE — 96372 THER/PROPH/DIAG INJ SC/IM: CPT

## 2020-09-03 PROCEDURE — 96401 CHEMO ANTI-NEOPL SQ/IM: CPT

## 2020-09-03 PROCEDURE — 36415 COLL VENOUS BLD VENIPUNCTURE: CPT

## 2020-09-03 PROCEDURE — 25010000002 DIPHENHYDRAMINE PER 50 MG: Performed by: INTERNAL MEDICINE

## 2020-09-03 PROCEDURE — C9399 UNCLASSIFIED DRUGS OR BIOLOG: HCPCS | Performed by: INTERNAL MEDICINE

## 2020-09-03 PROCEDURE — 25010000002 EPOETIN ALFA PER 1000 UNITS: Performed by: INTERNAL MEDICINE

## 2020-09-03 RX ORDER — ACETAMINOPHEN 500 MG
1000 TABLET ORAL ONCE
Status: COMPLETED | OUTPATIENT
Start: 2020-09-03 | End: 2020-09-03

## 2020-09-03 RX ORDER — SODIUM CHLORIDE 9 MG/ML
250 INJECTION, SOLUTION INTRAVENOUS ONCE
Status: COMPLETED | OUTPATIENT
Start: 2020-09-03 | End: 2020-09-03

## 2020-09-03 RX ADMIN — SODIUM CHLORIDE 250 ML: 9 INJECTION, SOLUTION INTRAVENOUS at 14:46

## 2020-09-03 RX ADMIN — ERYTHROPOIETIN 20000 UNITS: 20000 INJECTION, SOLUTION INTRAVENOUS; SUBCUTANEOUS at 15:42

## 2020-09-03 RX ADMIN — DIPHENHYDRAMINE HYDROCHLORIDE 50 MG: 50 INJECTION INTRAMUSCULAR; INTRAVENOUS at 14:46

## 2020-09-03 RX ADMIN — ACETAMINOPHEN 1000 MG: 500 TABLET ORAL at 14:49

## 2020-09-03 RX ADMIN — DARATUMUMAB AND HYALURONIDASE-FIHJ (HUMAN RECOMBINANT) 1800 MG: 1800; 30000 INJECTION SUBCUTANEOUS at 15:36

## 2020-09-03 NOTE — PROGRESS NOTES
Subjective     CHIEF COMPLAINT: Multiple myeloma    No chief complaint on file.      HISTORY OF PRESENT ILLNESS:     Contreras Albert is a 65 y.o. male patient turns today for scheduled daratumumab along with Revlimid 10 mg daily for 21 out of 28 days and dexamethasone 40 mg weekly.    He continues to take gabapentin 300 mg twice and feels this has helped his neuropathy somewhat.  He only has noticeable symptoms in his left lower extremity and foot.    Reports feeling reasonably well today.  He denies fever, chills, bleeding, bruising, shortness of breath.  His CBC is stable.  His vital signs are stable.    He has recently established care with a primary care physician, specifically Brianna Thompson nurse practitioner.  She has taken over management of the patient's diabetes.  He is currently on Lantus Solostar.        REVIEW OF SYSTEMS:  Review of Systems   Constitutional: Positive for fatigue. Negative for fever and unexpected weight change.   HENT: Negative for nosebleeds and voice change.    Eyes: Negative for visual disturbance.   Respiratory: Negative for cough and shortness of breath.    Cardiovascular: Negative for chest pain and leg swelling.   Gastrointestinal: Negative for abdominal pain, blood in stool, constipation, diarrhea, nausea and vomiting.   Genitourinary: Negative for frequency and hematuria.   Musculoskeletal: Negative for back pain and joint swelling.        Numbness and pain in his left lower extremity   Skin: Negative for rash.   Neurological: Positive for weakness and numbness (see hpi). Negative for dizziness and headaches.   Hematological: Negative for adenopathy. Does not bruise/bleed easily.   Psychiatric/Behavioral: Negative for dysphoric mood. The patient is not nervous/anxious.          Past Medical History:   Diagnosis Date   • Anemia    • Cataract    • Chronic renal insufficiency    • Clear cell carcinoma of kidney, right (CMS/HCC)     Stage I   • Diabetes mellitus (CMS/[facility])    • Enlarged  prostate    • High cholesterol    • Hypertension    • Multiple myeloma (CMS/HCC)    • Multiple rib fractures     on right after fall from 2 story building at age of 30 years   • Renal mass        Past Surgical History:   Procedure Laterality Date   • INSERTION HEMODIALYSIS CATHETER N/A 6/2/2020    Procedure: HEMODIALYSIS CATHETER INSERTION;  Surgeon: Sumanth Hernandez MD;  Location: [facility] MAIN OR;  Service: Vascular;  Laterality: N/A;   • NEPHRECTOMY PARTIAL Right 5/18/2016    Procedure: RT NEPHRECTOMY PARTIAL LAPAROSCOPIC WITH DAVINCI ROBOT;  Surgeon: Chad Garcia MD;  Location: [facility] OR;  Service:        Cancer-related family history is not on file.  Social History     Tobacco Use   • Smoking status: Never Smoker   • Smokeless tobacco: Never Used   Substance Use Topics   • Alcohol use: No       MEDICATIONS:    Current Outpatient Medications:   •  acyclovir (ZOVIRAX) 400 MG tablet, Take 1 tablet by mouth 2 (Two) Times a Day., Disp: 60 tablet, Rfl: 7  •  amLODIPine (Norvasc) 2.5 MG tablet, Take 1 tablet by mouth Daily., Disp: 30 tablet, Rfl: 1  •  aspirin (aspirin) 81 MG EC tablet, Take 1 tablet by mouth Daily., Disp: , Rfl:   •  Blood Glucose Monitoring Suppl (FREESTYLE FREEDOM LITE) w/Device kit, 1 kit Daily. Indications: Type 2 Diabetes, Disp: 1 each, Rfl: 0  •  bumetanide (BUMEX) 1 MG tablet, Take 1 tablet by mouth Daily., Disp: 30 tablet, Rfl: 5  •  calcium acetate (PHOSLO) 667 MG tablet, Take 2 tablets by mouth 3 (Three) Times a Day With Meals., Disp: 180 tablet, Rfl: 0  •  dexamethasone (DECADRON) 4 MG tablet, Take 10 tablets by mouth 1 (One) Time Per Week., Disp: 40 tablet, Rfl: 1  •  finasteride (PROSCAR) 5 MG tablet, Take 5 mg by mouth daily., Disp: , Rfl:   •  gabapentin (NEURONTIN) 300 MG capsule, Take 1 capsule by mouth 3 (Three) Times a Day., Disp: 90 capsule, Rfl: 2  •  glucose blood (FREESTYLE LITE) test strip, Use daily to test blood sugar, Disp: 100 each, Rfl: 2  •  Insulin  Glargine (LANTUS SOLOSTAR) 100 UNIT/ML injection pen, Inject 15 Units under the skin into the appropriate area as directed Every Night. Adjust dose as per sliding scale., Disp: 15 mL, Rfl: 1  •  Insulin Pen Needle 32G X 4 MM misc, 1 application Daily., Disp: 100 each, Rfl: 0  •  Lancets (FREESTYLE) lancets, Use once then discard to test blood sugar each morning, Disp: 100 each, Rfl: 0  •  lenalidomide (REVLIMID) 10 MG capsule, Take 1 cap po daily for 21 days on then 7 days off., Disp: 21 capsule, Rfl: 0  •  ondansetron (ZOFRAN) 8 MG tablet, Take 1 tablet by mouth 3 (Three) Times a Day As Needed for Nausea or Vomiting., Disp: 30 tablet, Rfl: 5  •  pantoprazole (PROTONIX) 40 MG EC tablet, Take 1 tablet by mouth Daily., Disp: 30 tablet, Rfl: 0  •  sulfamethoxazole-trimethoprim (BACTRIM DS,SEPTRA DS) 800-160 MG per tablet, Take 1 tablet by mouth 3 (Three) Times a Week. Take 1 tablet on Mondays, Wednesdays and Fridays., Disp: 12 tablet, Rfl: 7  •  tamsulosin (FLOMAX) 0.4 MG capsule 24 hr capsule, Take 1 capsule by mouth every night., Disp: , Rfl:   •  traMADol (ULTRAM) 50 MG tablet, Take 1 tablet by mouth Every 12 (Twelve) Hours As Needed for Moderate Pain  or Severe Pain ., Disp: 30 tablet, Rfl: 0    ALLERGIES:  Allergies   Allergen Reactions   • Crestor [Rosuvastatin] GI Intolerance   • Lisinopril Other (See Comments)     Cannot take due to kidney disease         Objective   VITAL SIGNS:     There were no vitals filed for this visit.  There is no height or weight on file to calculate BMI.     Wt Readings from Last 3 Encounters:   09/02/20 113 kg (249 lb 14.4 oz)   08/27/20 112 kg (247 lb 4.8 oz)   08/20/20 104 kg (230 lb 4.8 oz)       PHYSICAL EXAMINATION:  GENERAL: Well-developed, no acute distress.  He uses a cane for assistance with ambulation.  He is wearing a facemask  SKIN: No skin rashes. No ecchymosis.  HEAD:  Normocephalic.  EYES:  No Jaundice. Pallor. Pupils equal. EOMI.  NECK:  Supple with Good ROM.  No  Masses.  LYMPHATICS:  No cervical or supraclavicular lymphadenopathy.  CHEST: Normal respiratory effort. Lungs clear to auscultation.   CARDIAC:  Normal S1 & S2. No murmur.    ABDOMEN:  Soft. No tenderness. No Hepatomegaly. No Splenomegaly. No masses.  EXTREMITIES: No Calf tenderness.  Trace edema.  NEUROLOGICAL:  No Focal neurological deficits.       DIAGNOSTIC DATA:                     Assessment/Plan   1.  IgG lambda multiple myeloma.  · Bone marrow 5/23/2020 hypercellular marrow with 80% involvement of plasma cell myeloma; FISH studies pending  · Bone survey 5/26/2020: Diffuse osteoporosis and demineralization.  However, no discrete lytic lesions.  · SPEP 5/23/2020: M spike 5.1.  IgG 6629 lambda.  Light chain ratio 0 with free lambda light chains 6400.  Beta-2 microglobulin 16.3.  24-hour urine 12.7 g protein per 24-hour with monoclonal lambda free light chain 33.9%, monoclonal IgG lambda 23.5%  · 24-hour urine testing from 5/24/2020 free lambda light chains 8.4 g/L  · Considering renal failure, initiated CyBorD 5/28/20.  Given the significantly elevated light chains with the first cycle of treatment plan to give Velcade days 1, 4, 8, 11; Cytoxan 300 PO mg/m² weekly and dexamethasone 40 mg p.o. days 1, 2, 3, 4, 8 and 15.    · After cycle 1 treatment can likely be altered to weekly dosing of each drug.  · Patient was referred to Dr. Alvares at [facility].  · Patient is tolerating the treatment except for generalized bone achiness after receiving Velcade.  · On 6/18/2020, Velcade was changed to 1.5 mg/m² weekly continuously along with weekly Decadron and oral Cytoxan.  · M protein decreased to 2.4 and free light chain improved to 874 on 7/16/2020 indicating response to therapy.  · M protein decreased to 2.0 and free light chain decreased to 736 on 8/13/2020.   · Although the improvement indicates response, he continues to have significant amount of monoclonal protein despite being on treatment for 3  months.  · In addition, patient is having side effects from Velcade in the form of painful neuropathy.  · I discussed the case with Dr. Alvares.  I recommended stopping Velcade and Cytoxan and changing the treatment to Daratumumab subcutaneous injection weekly along with Revlimid 10 mg daily for 21 out of 28-day cycle and Decadron 40 mg daily.  · I explained the treatment side effects of daratumumab in the form of infusion/injection reaction.  I explained the side effects of Revlimid including possibility of deep vein thrombosis, skin rash, cytopenias and diarrhea.  · Patient agreed to proceed and did undergo education.  · Initiated daratumumab subcu 1800 mg on 8/27/2020 with good tolerance so far.   · 9/3/2020 he is here today for scheduled daratumumab subcu again.  He continues to to take Revlimid and Decadron as prescribed.      2.  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  · B12 folate and iron studies are normal.  · Status post PRBC transfusion on 5/24/2020.  · Patient was receiving Procrit with his hemodialysis.  · After the hemoglobin improved to the 9 g range, it decreased to 8.6.  · Patient is symptomatic and reports fatigue.  · His hemoglobin today is 8.4, he will require Procrit today.    3.  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  · Showed enlargement of the kidneys concerning for myeloma kidney and light chain deposition disease.  · Patient completed 3 sessions of pheresis on 5/30/2020.  · Creatinine was 5.12 on 5/31/2020  · Creatinine increased to 6.36 on 6/1/2020.  · Patient had HD catheter placed and started on HD on 6/2/2020.   · Kidney function improved in late June 2020 and did not require additional hemodialysis.  · Creatinine on 8/27/2020 2.37     4.  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  · Patient had mild neuropathy since the start of treatment but symptoms worsened over the past month.  · He is having pain that  worsens with walking.  · I recommended treatment with Neurontin.  I explained the medication side effects including dizziness or sleepiness.  · I recommended starting at 300 mg daily with gradual dose increase as tolerated.  · He is currently taking Neurontin 300 mg twice daily improvement in symptoms.    5.  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  · Status post right partial nephrectomy on 5/18/2016.    6.  Thrombocytopenia.    · Platelet count was gradually declining and this is attributed to the effect of his treatment with Velcade and Cytoxan.    · The gradual declines makes HIT much less likely.   · Platelet count improved to 106,000 on 6/3/2020.  · Platelet count decreased to 89,000 on 6/4/2020.  · Platelet count is currently in the 160,000-170,000.  · 9/3/2020 platelet count 149,000.  · Patient is not having problems with bleeding.      PLAN:    1.  Proceed with daratumumab subcutaneously weekly along with Decadron weekly at 40 mg and Revlimid.  Revlimid dose is 10 mg daily for 21 out of 28-day cycle due to the patient's reduced kidney function.  2.  Continue aspirin 81 mg a day for DVT prophylaxis.  3.  We will give Procrit today at 20,000 units and continue this weekly for hemoglobin <10.  4.  Continue Neurontin 300 mg twice daily.  5.  Follow-up in 1 week as already scheduled with Dr. Terry  for day 15 treatment.  6.  Follow-up with primary care as scheduled.    I have asked the patient to call the office with any new or worsening symptoms before his next scheduled visit.            Addie Potter, APRN  09/03/20

## 2020-09-03 NOTE — ASSESSMENT & PLAN NOTE
Diabetes is improving with treatment.   Continue current treatment regimen.  Diabetes will be reassessed in 3 months.  Continue to adjust dose of Lantus based on sliding scale.

## 2020-09-04 DIAGNOSIS — Z79.4 TYPE 2 DIABETES MELLITUS WITH CHRONIC KIDNEY DISEASE, WITH LONG-TERM CURRENT USE OF INSULIN, UNSPECIFIED CKD STAGE (HCC): Primary | ICD-10-CM

## 2020-09-04 DIAGNOSIS — E11.22 TYPE 2 DIABETES MELLITUS WITH CHRONIC KIDNEY DISEASE, WITH LONG-TERM CURRENT USE OF INSULIN, UNSPECIFIED CKD STAGE (HCC): Primary | ICD-10-CM

## 2020-09-04 LAB
CHOLEST SERPL-MCNC: 189 MG/DL (ref 100–199)
HBA1C MFR BLD: 7.5 % (ref 4.8–5.6)
HDLC SERPL-MCNC: 28 MG/DL
LDLC SERPL CALC-MCNC: 79 MG/DL (ref 0–99)
LDLC/HDLC SERPL: 2.8 RATIO (ref 0–3.6)
TRIGL SERPL-MCNC: 509 MG/DL (ref 0–149)
TSH SERPL-ACNC: 1.52 UIU/ML (ref 0.45–4.5)
VLDLC SERPL CALC-MCNC: 82 MG/DL (ref 5–40)

## 2020-09-04 RX ORDER — CHLORAL HYDRATE 500 MG
1000 CAPSULE ORAL 2 TIMES DAILY WITH MEALS
Start: 2020-09-04 | End: 2021-01-15

## 2020-09-10 ENCOUNTER — OFFICE VISIT (OUTPATIENT)
Dept: ONCOLOGY | Facility: CLINIC | Age: 65
End: 2020-09-10

## 2020-09-10 ENCOUNTER — INFUSION (OUTPATIENT)
Dept: ONCOLOGY | Facility: HOSPITAL | Age: 65
End: 2020-09-10

## 2020-09-10 ENCOUNTER — LAB (OUTPATIENT)
Dept: LAB | Facility: HOSPITAL | Age: 65
End: 2020-09-10

## 2020-09-10 VITALS
RESPIRATION RATE: 16 BRPM | HEIGHT: 76 IN | OXYGEN SATURATION: 99 % | SYSTOLIC BLOOD PRESSURE: 116 MMHG | DIASTOLIC BLOOD PRESSURE: 74 MMHG | WEIGHT: 248.5 LBS | BODY MASS INDEX: 30.26 KG/M2 | HEART RATE: 83 BPM | TEMPERATURE: 97.7 F

## 2020-09-10 VITALS — HEART RATE: 76 BPM | DIASTOLIC BLOOD PRESSURE: 79 MMHG | SYSTOLIC BLOOD PRESSURE: 144 MMHG

## 2020-09-10 DIAGNOSIS — M79.89 SWELLING OF BOTH LOWER EXTREMITIES: ICD-10-CM

## 2020-09-10 DIAGNOSIS — N18.30 CHRONIC KIDNEY DISEASE, STAGE 3 (HCC): ICD-10-CM

## 2020-09-10 DIAGNOSIS — D69.6 THROMBOCYTOPENIA (HCC): ICD-10-CM

## 2020-09-10 DIAGNOSIS — D63.1 ANEMIA IN STAGE 3 CHRONIC KIDNEY DISEASE (HCC): ICD-10-CM

## 2020-09-10 DIAGNOSIS — C90.00 MULTIPLE MYELOMA NOT HAVING ACHIEVED REMISSION (HCC): ICD-10-CM

## 2020-09-10 DIAGNOSIS — N18.30 ANEMIA IN STAGE 3 CHRONIC KIDNEY DISEASE (HCC): Primary | ICD-10-CM

## 2020-09-10 DIAGNOSIS — Z79.899 ENCOUNTER FOR LONG-TERM CURRENT USE OF HIGH RISK MEDICATION: ICD-10-CM

## 2020-09-10 DIAGNOSIS — D63.1 ANEMIA IN STAGE 3 CHRONIC KIDNEY DISEASE (HCC): Primary | ICD-10-CM

## 2020-09-10 DIAGNOSIS — Z85.528 HISTORY OF RENAL CELL CANCER: ICD-10-CM

## 2020-09-10 DIAGNOSIS — M79.2 NEUROPATHIC PAIN: ICD-10-CM

## 2020-09-10 DIAGNOSIS — C90.00 MULTIPLE MYELOMA NOT HAVING ACHIEVED REMISSION (HCC): Primary | ICD-10-CM

## 2020-09-10 DIAGNOSIS — N18.30 ANEMIA IN STAGE 3 CHRONIC KIDNEY DISEASE (HCC): ICD-10-CM

## 2020-09-10 LAB
BASOPHILS # BLD AUTO: 0.02 10*3/MM3 (ref 0–0.2)
BASOPHILS NFR BLD AUTO: 0.4 % (ref 0–1.5)
DEPRECATED RDW RBC AUTO: 51.5 FL (ref 37–54)
EOSINOPHIL # BLD AUTO: 0.09 10*3/MM3 (ref 0–0.4)
EOSINOPHIL NFR BLD AUTO: 1.7 % (ref 0.3–6.2)
ERYTHROCYTE [DISTWIDTH] IN BLOOD BY AUTOMATED COUNT: 14.9 % (ref 12.3–15.4)
HCT VFR BLD AUTO: 26.2 % (ref 37.5–51)
HGB BLD-MCNC: 9 G/DL (ref 13–17.7)
IMM GRANULOCYTES # BLD AUTO: 0.04 10*3/MM3 (ref 0–0.05)
IMM GRANULOCYTES NFR BLD AUTO: 0.8 % (ref 0–0.5)
LYMPHOCYTES # BLD AUTO: 0.28 10*3/MM3 (ref 0.7–3.1)
LYMPHOCYTES NFR BLD AUTO: 5.4 % (ref 19.6–45.3)
MCH RBC QN AUTO: 32 PG (ref 26.6–33)
MCHC RBC AUTO-ENTMCNC: 34.4 G/DL (ref 31.5–35.7)
MCV RBC AUTO: 93.2 FL (ref 79–97)
MONOCYTES # BLD AUTO: 0.44 10*3/MM3 (ref 0.1–0.9)
MONOCYTES NFR BLD AUTO: 8.5 % (ref 5–12)
NEUTROPHILS NFR BLD AUTO: 4.31 10*3/MM3 (ref 1.7–7)
NEUTROPHILS NFR BLD AUTO: 83.2 % (ref 42.7–76)
NRBC BLD AUTO-RTO: 0 /100 WBC (ref 0–0.2)
PLATELET # BLD AUTO: 131 10*3/MM3 (ref 140–450)
PMV BLD AUTO: 11.3 FL (ref 6–12)
RBC # BLD AUTO: 2.81 10*6/MM3 (ref 4.14–5.8)
WBC # BLD AUTO: 5.18 10*3/MM3 (ref 3.4–10.8)

## 2020-09-10 PROCEDURE — 25010000002 DARATUMUMAB-HYALURONIDASE-FIHJ 1800-30000 MG-UT/15ML SOLUTION: Performed by: INTERNAL MEDICINE

## 2020-09-10 PROCEDURE — 99214 OFFICE O/P EST MOD 30 MIN: CPT | Performed by: INTERNAL MEDICINE

## 2020-09-10 PROCEDURE — 96401 CHEMO ANTI-NEOPL SQ/IM: CPT

## 2020-09-10 PROCEDURE — 25010000002 EPOETIN ALFA PER 1000 UNITS: Performed by: INTERNAL MEDICINE

## 2020-09-10 PROCEDURE — C9399 UNCLASSIFIED DRUGS OR BIOLOG: HCPCS | Performed by: INTERNAL MEDICINE

## 2020-09-10 PROCEDURE — 63710000001 DIPHENHYDRAMINE PER 50 MG: Performed by: INTERNAL MEDICINE

## 2020-09-10 PROCEDURE — 96372 THER/PROPH/DIAG INJ SC/IM: CPT

## 2020-09-10 PROCEDURE — 85025 COMPLETE CBC W/AUTO DIFF WBC: CPT

## 2020-09-10 PROCEDURE — 36415 COLL VENOUS BLD VENIPUNCTURE: CPT

## 2020-09-10 RX ORDER — DIPHENHYDRAMINE HYDROCHLORIDE 50 MG/ML
50 INJECTION INTRAMUSCULAR; INTRAVENOUS AS NEEDED
Status: CANCELLED | OUTPATIENT
Start: 2020-09-24

## 2020-09-10 RX ORDER — DIPHENHYDRAMINE HCL 25 MG
25 CAPSULE ORAL ONCE
Status: COMPLETED | OUTPATIENT
Start: 2020-09-10 | End: 2020-09-10

## 2020-09-10 RX ORDER — FAMOTIDINE 10 MG/ML
20 INJECTION, SOLUTION INTRAVENOUS AS NEEDED
Status: CANCELLED | OUTPATIENT
Start: 2020-09-24

## 2020-09-10 RX ORDER — SODIUM CHLORIDE 9 MG/ML
250 INJECTION, SOLUTION INTRAVENOUS ONCE
Status: DISCONTINUED | OUTPATIENT
Start: 2020-09-10 | End: 2020-09-10 | Stop reason: HOSPADM

## 2020-09-10 RX ORDER — ACETAMINOPHEN 500 MG
1000 TABLET ORAL ONCE
Status: COMPLETED | OUTPATIENT
Start: 2020-09-10 | End: 2020-09-10

## 2020-09-10 RX ORDER — MEPERIDINE HYDROCHLORIDE 50 MG/ML
25 INJECTION INTRAMUSCULAR; INTRAVENOUS; SUBCUTANEOUS
Status: CANCELLED | OUTPATIENT
Start: 2020-09-24

## 2020-09-10 RX ORDER — ACETAMINOPHEN 500 MG
1000 TABLET ORAL ONCE
Status: CANCELLED | OUTPATIENT
Start: 2020-09-24

## 2020-09-10 RX ORDER — SODIUM CHLORIDE 9 MG/ML
250 INJECTION, SOLUTION INTRAVENOUS ONCE
Status: CANCELLED | OUTPATIENT
Start: 2020-09-24

## 2020-09-10 RX ORDER — DIPHENHYDRAMINE HCL 25 MG
25 CAPSULE ORAL ONCE
Status: CANCELLED | OUTPATIENT
Start: 2020-09-24

## 2020-09-10 RX ADMIN — DIPHENHYDRAMINE HYDROCHLORIDE 25 MG: 25 CAPSULE ORAL at 09:46

## 2020-09-10 RX ADMIN — ERYTHROPOIETIN 20000 UNITS: 20000 INJECTION, SOLUTION INTRAVENOUS; SUBCUTANEOUS at 10:07

## 2020-09-10 RX ADMIN — ACETAMINOPHEN 1000 MG: 500 TABLET ORAL at 09:46

## 2020-09-10 RX ADMIN — DARATUMUMAB AND HYALURONIDASE-FIHJ (HUMAN RECOMBINANT) 1800 MG: 1800; 30000 INJECTION SUBCUTANEOUS at 10:27

## 2020-09-10 NOTE — PROGRESS NOTES
Subjective     CHIEF COMPLAINT:      Chief Complaint   Patient presents with   • Follow-up       HISTORY OF PRESENT ILLNESS:     Contreras Albert is a 65 y.o. male patient who returns today for follow up on his multiple myeloma.  He is now on daratumumab subcutaneously and Decadron orally weekly along with Revlimid daily for 21/28-day cycle.  He is tolerating this regimen better than Velcade.  After he was given a week off from treatment, he started noticing improvement in the lower extremity pain and numbness.  He reports that he started to feel his legs and started noticing improvement in the pain in the legs and feet.  He is able to walk with less difficulty.  He is using the cane but he is less dependent on it.    Patient was having problem with swelling of the lower extremities.  It improved after he was switched from Lasix to Bumex.    REVIEW OF SYSTEMS:  Review of Systems   Constitutional: Positive for fatigue. Negative for fever and unexpected weight change.   HENT: Negative for nosebleeds and voice change.    Eyes: Negative for visual disturbance.   Respiratory: Negative for cough and shortness of breath.    Cardiovascular: Positive for leg swelling. Negative for chest pain.   Gastrointestinal: Negative for abdominal pain, blood in stool, constipation, diarrhea, nausea and vomiting.   Genitourinary: Negative for frequency and hematuria.   Musculoskeletal: Negative for back pain and joint swelling.   Skin: Negative for rash.   Neurological: Positive for weakness and numbness. Negative for dizziness and headaches.   Hematological: Negative for adenopathy. Does not bruise/bleed easily.   Psychiatric/Behavioral: Negative for dysphoric mood. The patient is not nervous/anxious.         Past Medical History:   Diagnosis Date   • Anemia    • Cataract    • Chronic renal insufficiency    • Clear cell carcinoma of kidney, right (CMS/HCC)     Stage I   • Diabetes mellitus (CMS/HCC)    • Enlarged prostate    • High  cholesterol    • Hypertension    • Multiple myeloma (CMS/HCC)    • Multiple rib fractures     on right after fall from 2 story building at age of 30 years   • Renal mass        Past Surgical History:   Procedure Laterality Date   • INSERTION HEMODIALYSIS CATHETER N/A 6/2/2020    Procedure: HEMODIALYSIS CATHETER INSERTION;  Surgeon: Sumanth Hernandez MD;  Location: [facility] OR;  Service: Vascular;  Laterality: N/A;   • NEPHRECTOMY PARTIAL Right 5/18/2016    Procedure: RT NEPHRECTOMY PARTIAL LAPAROSCOPIC WITH DAVINCI ROBOT;  Surgeon: Chad Garcia MD;  Location: [facility] OR;  Service:        Cancer-related family history is not on file.  Social History     Tobacco Use   • Smoking status: Never Smoker   • Smokeless tobacco: Never Used   Substance Use Topics   • Alcohol use: No       MEDICATIONS:    Current Outpatient Medications:   •  acyclovir (ZOVIRAX) 400 MG tablet, Take 1 tablet by mouth 2 (Two) Times a Day., Disp: 60 tablet, Rfl: 7  •  amLODIPine (Norvasc) 2.5 MG tablet, Take 1 tablet by mouth Daily., Disp: 30 tablet, Rfl: 1  •  aspirin (aspirin) 81 MG EC tablet, Take 1 tablet by mouth Daily., Disp: , Rfl:   •  Blood Glucose Monitoring Suppl (FREESTYLE FREEDOM LITE) w/Device kit, 1 kit Daily. Indications: Type 2 Diabetes, Disp: 1 each, Rfl: 0  •  bumetanide (BUMEX) 1 MG tablet, Take 1 tablet by mouth Daily., Disp: 30 tablet, Rfl: 5  •  calcium acetate (PHOSLO) 667 MG tablet, Take 2 tablets by mouth 3 (Three) Times a Day With Meals., Disp: 180 tablet, Rfl: 0  •  dexamethasone (DECADRON) 4 MG tablet, Take 10 tablets by mouth 1 (One) Time Per Week., Disp: 40 tablet, Rfl: 1  •  finasteride (PROSCAR) 5 MG tablet, Take 5 mg by mouth daily., Disp: , Rfl:   •  gabapentin (NEURONTIN) 300 MG capsule, Take 1 capsule by mouth 3 (Three) Times a Day., Disp: 90 capsule, Rfl: 2  •  glucose blood (FREESTYLE LITE) test strip, Use daily to test blood sugar, Disp: 100 each, Rfl: 2  •  Insulin Glargine (LANTUS  "SOLOSTAR) 100 UNIT/ML injection pen, Inject 15 Units under the skin into the appropriate area as directed Every Night. Adjust dose as per sliding scale., Disp: 15 mL, Rfl: 1  •  Insulin Pen Needle 32G X 4 MM misc, 1 application Daily., Disp: 100 each, Rfl: 0  •  Lancets (FREESTYLE) lancets, Use once then discard to test blood sugar each morning, Disp: 100 each, Rfl: 0  •  lenalidomide (REVLIMID) 10 MG capsule, Take 1 cap po daily for 21 days on then 7 days off., Disp: 21 capsule, Rfl: 0  •  Omega-3 Fatty Acids (FISH OIL) 1000 MG capsule capsule, Take 1 capsule by mouth 2 (Two) Times a Day With Meals., Disp: , Rfl:   •  ondansetron (ZOFRAN) 8 MG tablet, Take 1 tablet by mouth 3 (Three) Times a Day As Needed for Nausea or Vomiting., Disp: 30 tablet, Rfl: 5  •  pantoprazole (PROTONIX) 40 MG EC tablet, Take 1 tablet by mouth Daily., Disp: 30 tablet, Rfl: 0  •  sulfamethoxazole-trimethoprim (BACTRIM DS,SEPTRA DS) 800-160 MG per tablet, Take 1 tablet by mouth 3 (Three) Times a Week. Take 1 tablet on Mondays, Wednesdays and Fridays., Disp: 12 tablet, Rfl: 7  •  tamsulosin (FLOMAX) 0.4 MG capsule 24 hr capsule, Take 1 capsule by mouth every night., Disp: , Rfl:   •  traMADol (ULTRAM) 50 MG tablet, Take 1 tablet by mouth Every 12 (Twelve) Hours As Needed for Moderate Pain  or Severe Pain ., Disp: 30 tablet, Rfl: 0    ALLERGIES:  Allergies   Allergen Reactions   • Lisinopril Other (See Comments)     Cannot take due to kidney disease   • Crestor [Rosuvastatin] GI Intolerance         Objective   VITAL SIGNS:     Vitals:    09/10/20 0816   BP: 116/74   Pulse: 83   Resp: 16   Temp: 97.7 °F (36.5 °C)   TempSrc: Skin   SpO2: 99%   Weight: 113 kg (248 lb 8 oz)   Height: 193 cm (75.98\")   PainSc: 0-No pain     Body mass index is 30.26 kg/m².     Wt Readings from Last 3 Encounters:   09/10/20 113 kg (248 lb 8 oz)   09/03/20 112 kg (247 lb 3.2 oz)   09/02/20 113 kg (249 lb 14.4 oz)       PHYSICAL EXAMINATION:  GENERAL:  The patient " appears in fair general condition, not in acute distress.  SKIN: No skin rashes. No ecchymosis.  HEAD:  Normocephalic.  EYES:  No Jaundice.  Pallor. Pupils equal. EOMI.  NECK:  Supple with Good ROM. No Thyromegaly. No Masses.  LYMPHATICS:  No cervical or supraclavicular lymphadenopathy.  CHEST: Normal respiratory effort. Lungs clear to auscultation.   CARDIAC:  Normal S1 & S2. No murmur.  Trace edema.  ABDOMEN:  Soft. No tenderness. No Hepatomegaly. No Splenomegaly. No masses.  EXTREMITIES:  No clubbing. No deforming arthritis in the hands. No Calf tenderness.  NEUROLOGICAL:  No Focal neurological deficits.       DIAGNOSTIC DATA:     Results from last 7 days   Lab Units 09/10/20  0813 09/03/20  1349   WBC 10*3/mm3 5.18 4.28   NEUTROS ABS 10*3/mm3 4.31 3.92   HEMOGLOBIN g/dL 9.0* 8.4*   HEMATOCRIT % 26.2* 26.2*   PLATELETS 10*3/mm3 131* 149         Assessment/Plan   1.  IgG lambda multiple myeloma.  · Bone marrow 5/23/2020 hypercellular marrow with 80% involvement of plasma cell myeloma; FISH studies pending  · Bone survey 5/26/2020: Diffuse osteoporosis and demineralization.  However, no discrete lytic lesions.  · SPEP 5/23/2020: M spike 5.1.  IgG 6629 lambda.  Light chain ratio 0 with free lambda light chains 6400.  Beta-2 microglobulin 16.3.  24-hour urine 12.7 g protein per 24-hour with monoclonal lambda free light chain 33.9%, monoclonal IgG lambda 23.5%  · 24-hour urine testing from 5/24/2020 free lambda light chains 8.4 g/L  · Considering renal failure, initiated CyBorD 5/28/20.  Given the significantly elevated light chains with the first cycle of treatment plan to give Velcade days 1, 4, 8, 11; Cytoxan 300 PO mg/m² weekly and dexamethasone 40 mg p.o. days 1, 2, 3, 4, 8 and 15.    · After cycle 1 treatment can likely be altered to weekly dosing of each drug.  · Patient was referred to Dr. Alvares at [facility].  · Patient is tolerating the treatment except for generalized bone achiness after  receiving Velcade.  · On 6/18/2020, Velcade was changed to 1.5 mg/m² weekly continuously along with weekly Decadron and oral Cytoxan.  · M protein decreased to 2.4 and free light chain improved to 874 on 7/16/2020 indicating response to therapy.  · M protein decreased to 2.0 and free light chain decreased to 736 on 8/13/2020.   · Although the improvement indicates response, he continues to have significant amount of monoclonal protein despite being on treatment for 3 months.  · In addition, patient is having side effects from Velcade in the form of painful neuropathy.  · I discussed the case with Dr. Alvares and we recommended stopping Velcade and Cytoxan and changing the treatment to Daratumumab subcutaneous injection weekly along with Revlimid 10 mg daily for 21 out of 28-day cycle and Decadron 40 mg daily.  · Patient was started on daratumumab SQ on 8/27/2020.  · Patient is tolerating daratumumab SQ better than Velcade.      2.  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  · B12 folate and iron studies are normal.  · Status post PRBC transfusion on 5/24/2020.  · Patient previously received Procrit with his hemodialysis.  · Patient is currently receiving Procrit weekly.  · Hemoglobin was 8.4 on 9/3/2020 and improved to 9.0 today.    3.  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  · Showed enlargement of the kidneys concerning for myeloma kidney and light chain deposition disease.  · Patient completed 3 sessions of pheresis on 5/30/2020.  · Creatinine was 5.12 on 5/31/2020  · Creatinine increased to 6.36 on 6/1/2020.  · Patient had HD catheter placed and started on HD on 6/2/2020.   · Kidney function improved in late June 2020 and did not require additional hemodialysis.  · Creatinine is around 2.4-2.6.    4.  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  · Patient had mild neuropathy since the start of treatment but symptoms worsened over the past  month.  · He is having pain that worsens with walking.  · I recommended treatment with Neurontin.  I explained the medication side effects including dizziness or sleepiness.  · Patient was started at 300 mg daily with gradual dose increased as tolerated.  · Neurontin was increased slowly to 300 mg twice daily.  · Patient is now taking Neurontin 300 mg 3 times daily alternating with 300 mg twice daily.  He noticed more side effects if he takes this 3 times daily on a continuous basis.    5.  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  · Status post right partial nephrectomy on 5/18/2016.    6.  Thrombocytopenia.    · Platelet count was gradually declining and this is attributed to the effect of his treatment with Velcade and Cytoxan.    · The gradual declines makes HIT much less likely.   · Platelet count improved to 106,000 on 6/3/2020.  · Platelet count decreased to 89,000 on 6/4/2020.  · Platelet count is currently in the 160,000-170,000.  · Platelet count is down to 131,000 today.  · Patient is not having problems with bleeding.    7.  Lower extremity swelling.    · Patient was previously on Lasix but did not respond.  · He was switched from Lasix to Bumex 1 mg daily on 9/1/2020.  · Patient noticed improvement in the lower extremity swelling after the switch.    PLAN:    1.  Continue daratumumab SQ along with Decadron 40 mg p.o. weekly and Revlimid 10 mg daily for 21/28-day cycle.  2.  Patient received Procrit 20,000 units today.  3.  Continue Bumex 1 mg daily.  4.  Patient will continue his current dose of Neurontin 300 mg twice daily alternating with 3 times daily.  5.  Return in 1 in 2 weeks for CBC and daratumumab injection.  Paraprotein studies will be repeated in 2 weeks.  I will see him in follow-up in 3 weeks.          Red Del Valle MD  09/10/20

## 2020-09-11 ENCOUNTER — MEDICATION THERAPY MANAGEMENT (OUTPATIENT)
Dept: PHARMACY | Facility: HOSPITAL | Age: 65
End: 2020-09-11

## 2020-09-11 NOTE — PROGRESS NOTES
[facility] Lab Review- DPd       9/10/2020  Day 15   WBC 3.40 - 10.80 10*3/mm3 5.18   Neutrophils Absolute 1.70 - 7.00 10*3/mm3 4.31   Hemoglobin 13.0 - 17.7 g/dL 9.0 (A)   Hematocrit 37.5 - 51.0 % 26.2 (A)   Platelets 140 - 450 10*3/mm3 131 (A)     MD dictation noted. Pharmacy will continue to follow.     Thanks,   Yolande Huitron, PharmD

## 2020-09-13 DIAGNOSIS — C90.00 MULTIPLE MYELOMA NOT HAVING ACHIEVED REMISSION (HCC): ICD-10-CM

## 2020-09-15 RX ORDER — LENALIDOMIDE 10 MG/1
CAPSULE ORAL
Qty: 21 CAPSULE | Refills: 0 | Status: SHIPPED | OUTPATIENT
Start: 2020-09-15 | End: 2020-10-01 | Stop reason: SDUPTHER

## 2020-09-15 NOTE — TELEPHONE ENCOUNTER
Revlimid refill request rec electronically from [facility]. Per last office note from Dr Del Valle-pt is to continue 10 mg 21 days out of 28. His first cycle was delivered on 8/28/2020. Request approved.

## 2020-09-16 ENCOUNTER — MEDICATION THERAPY MANAGEMENT (OUTPATIENT)
Dept: PHARMACY | Facility: HOSPITAL | Age: 65
End: 2020-09-16

## 2020-09-16 NOTE — PROGRESS NOTES
MTM telephone follow up- Darzalex/Revlimid/Dex    Contreras is doing well today. His main issue continues to be swelling. He does note some improvement with the switch from furosemide to bumetanide. The swelling is more severe after taking his steroid. He will also try to wear his compression socks and elevate his feet when he can. Medication administration and adherence seem appropriate. Contreras has not had any other recent medication changes. Pharmacy will continue to follow.     Thanks,   Yolande Huitron, PharmD

## 2020-09-17 ENCOUNTER — LAB (OUTPATIENT)
Dept: LAB | Facility: HOSPITAL | Age: 65
End: 2020-09-17

## 2020-09-17 ENCOUNTER — INFUSION (OUTPATIENT)
Dept: ONCOLOGY | Facility: HOSPITAL | Age: 65
End: 2020-09-17

## 2020-09-17 VITALS
WEIGHT: 249.8 LBS | BODY MASS INDEX: 30.42 KG/M2 | DIASTOLIC BLOOD PRESSURE: 80 MMHG | RESPIRATION RATE: 16 BRPM | OXYGEN SATURATION: 96 % | SYSTOLIC BLOOD PRESSURE: 151 MMHG | HEART RATE: 80 BPM | TEMPERATURE: 97.1 F

## 2020-09-17 DIAGNOSIS — D63.1 ANEMIA IN STAGE 3 CHRONIC KIDNEY DISEASE (HCC): ICD-10-CM

## 2020-09-17 DIAGNOSIS — C90.00 MULTIPLE MYELOMA NOT HAVING ACHIEVED REMISSION (HCC): ICD-10-CM

## 2020-09-17 DIAGNOSIS — C90.00 MULTIPLE MYELOMA NOT HAVING ACHIEVED REMISSION (HCC): Primary | ICD-10-CM

## 2020-09-17 DIAGNOSIS — N18.30 ANEMIA IN STAGE 3 CHRONIC KIDNEY DISEASE (HCC): ICD-10-CM

## 2020-09-17 LAB
BASOPHILS # BLD AUTO: 0.04 10*3/MM3 (ref 0–0.2)
BASOPHILS NFR BLD AUTO: 1.1 % (ref 0–1.5)
DEPRECATED RDW RBC AUTO: 48.5 FL (ref 37–54)
EOSINOPHIL # BLD AUTO: 0.12 10*3/MM3 (ref 0–0.4)
EOSINOPHIL NFR BLD AUTO: 3.2 % (ref 0.3–6.2)
ERYTHROCYTE [DISTWIDTH] IN BLOOD BY AUTOMATED COUNT: 14.5 % (ref 12.3–15.4)
HCT VFR BLD AUTO: 25.4 % (ref 37.5–51)
HGB BLD-MCNC: 8.7 G/DL (ref 13–17.7)
IMM GRANULOCYTES # BLD AUTO: 0.06 10*3/MM3 (ref 0–0.05)
IMM GRANULOCYTES NFR BLD AUTO: 1.6 % (ref 0–0.5)
LYMPHOCYTES # BLD AUTO: 0.31 10*3/MM3 (ref 0.7–3.1)
LYMPHOCYTES NFR BLD AUTO: 8.4 % (ref 19.6–45.3)
MCH RBC QN AUTO: 31.2 PG (ref 26.6–33)
MCHC RBC AUTO-ENTMCNC: 34.3 G/DL (ref 31.5–35.7)
MCV RBC AUTO: 91 FL (ref 79–97)
MONOCYTES # BLD AUTO: 0.4 10*3/MM3 (ref 0.1–0.9)
MONOCYTES NFR BLD AUTO: 10.8 % (ref 5–12)
NEUTROPHILS NFR BLD AUTO: 2.77 10*3/MM3 (ref 1.7–7)
NEUTROPHILS NFR BLD AUTO: 74.9 % (ref 42.7–76)
NRBC BLD AUTO-RTO: 0.5 /100 WBC (ref 0–0.2)
PLATELET # BLD AUTO: 103 10*3/MM3 (ref 140–450)
PMV BLD AUTO: 11.3 FL (ref 6–12)
RBC # BLD AUTO: 2.79 10*6/MM3 (ref 4.14–5.8)
WBC # BLD AUTO: 3.7 10*3/MM3 (ref 3.4–10.8)

## 2020-09-17 PROCEDURE — 85025 COMPLETE CBC W/AUTO DIFF WBC: CPT

## 2020-09-17 PROCEDURE — 96401 CHEMO ANTI-NEOPL SQ/IM: CPT

## 2020-09-17 PROCEDURE — C9399 UNCLASSIFIED DRUGS OR BIOLOG: HCPCS | Performed by: INTERNAL MEDICINE

## 2020-09-17 PROCEDURE — 63710000001 DIPHENHYDRAMINE PER 50 MG: Performed by: INTERNAL MEDICINE

## 2020-09-17 PROCEDURE — 96372 THER/PROPH/DIAG INJ SC/IM: CPT

## 2020-09-17 PROCEDURE — 25010000002 EPOETIN ALFA PER 1000 UNITS: Performed by: INTERNAL MEDICINE

## 2020-09-17 PROCEDURE — 36415 COLL VENOUS BLD VENIPUNCTURE: CPT

## 2020-09-17 PROCEDURE — 25010000002 DARATUMUMAB-HYALURONIDASE-FIHJ 1800-30000 MG-UT/15ML SOLUTION: Performed by: INTERNAL MEDICINE

## 2020-09-17 RX ORDER — DIPHENHYDRAMINE HCL 25 MG
25 CAPSULE ORAL ONCE
Status: COMPLETED | OUTPATIENT
Start: 2020-09-17 | End: 2020-09-17

## 2020-09-17 RX ORDER — SODIUM CHLORIDE 9 MG/ML
250 INJECTION, SOLUTION INTRAVENOUS ONCE
Status: DISCONTINUED | OUTPATIENT
Start: 2020-09-17 | End: 2020-09-17 | Stop reason: HOSPADM

## 2020-09-17 RX ORDER — ACETAMINOPHEN 500 MG
1000 TABLET ORAL ONCE
Status: COMPLETED | OUTPATIENT
Start: 2020-09-17 | End: 2020-09-17

## 2020-09-17 RX ADMIN — ACETAMINOPHEN 1000 MG: 500 TABLET ORAL at 09:51

## 2020-09-17 RX ADMIN — ERYTHROPOIETIN 20000 UNITS: 20000 INJECTION, SOLUTION INTRAVENOUS; SUBCUTANEOUS at 10:32

## 2020-09-17 RX ADMIN — DIPHENHYDRAMINE HYDROCHLORIDE 25 MG: 25 CAPSULE ORAL at 09:51

## 2020-09-17 RX ADMIN — DARATUMUMAB AND HYALURONIDASE-FIHJ (HUMAN RECOMBINANT) 1800 MG: 1800; 30000 INJECTION SUBCUTANEOUS at 10:35

## 2020-09-18 RX ORDER — AMLODIPINE BESYLATE 2.5 MG/1
2.5 TABLET ORAL DAILY
Qty: 30 TABLET | Refills: 1 | Status: SHIPPED | OUTPATIENT
Start: 2020-09-18 | End: 2020-11-05 | Stop reason: SDUPTHER

## 2020-09-24 ENCOUNTER — LAB (OUTPATIENT)
Dept: LAB | Facility: HOSPITAL | Age: 65
End: 2020-09-24

## 2020-09-24 ENCOUNTER — TELEPHONE (OUTPATIENT)
Dept: ONCOLOGY | Facility: HOSPITAL | Age: 65
End: 2020-09-24

## 2020-09-24 ENCOUNTER — INFUSION (OUTPATIENT)
Dept: ONCOLOGY | Facility: HOSPITAL | Age: 65
End: 2020-09-24

## 2020-09-24 VITALS
SYSTOLIC BLOOD PRESSURE: 152 MMHG | OXYGEN SATURATION: 99 % | WEIGHT: 245.4 LBS | DIASTOLIC BLOOD PRESSURE: 79 MMHG | TEMPERATURE: 99 F | HEART RATE: 89 BPM | BODY MASS INDEX: 29.88 KG/M2

## 2020-09-24 DIAGNOSIS — C90.00 MULTIPLE MYELOMA NOT HAVING ACHIEVED REMISSION (HCC): Primary | ICD-10-CM

## 2020-09-24 DIAGNOSIS — D63.1 ANEMIA IN STAGE 3 CHRONIC KIDNEY DISEASE (HCC): ICD-10-CM

## 2020-09-24 DIAGNOSIS — N18.30 ANEMIA IN STAGE 3 CHRONIC KIDNEY DISEASE (HCC): ICD-10-CM

## 2020-09-24 DIAGNOSIS — C90.00 MULTIPLE MYELOMA NOT HAVING ACHIEVED REMISSION (HCC): ICD-10-CM

## 2020-09-24 LAB
ALBUMIN SERPL-MCNC: 3.8 G/DL (ref 3.5–5.2)
ALBUMIN/GLOB SERPL: 1.1 G/DL (ref 1.1–2.4)
ALP SERPL-CCNC: 75 U/L (ref 38–116)
ALT SERPL W P-5'-P-CCNC: 20 U/L (ref 0–41)
ANION GAP SERPL CALCULATED.3IONS-SCNC: 10.3 MMOL/L (ref 5–15)
AST SERPL-CCNC: 18 U/L (ref 0–40)
BASOPHILS # BLD AUTO: 0.03 10*3/MM3 (ref 0–0.2)
BASOPHILS NFR BLD AUTO: 0.9 % (ref 0–1.5)
BILIRUB SERPL-MCNC: 0.4 MG/DL (ref 0.2–1.2)
BUN SERPL-MCNC: 41 MG/DL (ref 6–20)
BUN/CREAT SERPL: 15.5 (ref 7.3–30)
CALCIUM SPEC-SCNC: 9.2 MG/DL (ref 8.5–10.2)
CHLORIDE SERPL-SCNC: 103 MMOL/L (ref 98–107)
CO2 SERPL-SCNC: 23.7 MMOL/L (ref 22–29)
CREAT SERPL-MCNC: 2.65 MG/DL (ref 0.7–1.3)
CYTO UR: NORMAL
DEPRECATED RDW RBC AUTO: 49.7 FL (ref 37–54)
DX PRELIMINARY: NORMAL
EOSINOPHIL # BLD AUTO: 0.08 10*3/MM3 (ref 0–0.4)
EOSINOPHIL NFR BLD AUTO: 2.3 % (ref 0.3–6.2)
ERYTHROCYTE [DISTWIDTH] IN BLOOD BY AUTOMATED COUNT: 14.5 % (ref 12.3–15.4)
GFR SERPL CREATININE-BSD FRML MDRD: 25 ML/MIN/1.73
GLOBULIN UR ELPH-MCNC: 3.4 GM/DL (ref 1.8–3.5)
GLUCOSE SERPL-MCNC: 243 MG/DL (ref 74–124)
HCT VFR BLD AUTO: 28.5 % (ref 37.5–51)
HGB BLD-MCNC: 9.2 G/DL (ref 13–17.7)
IMM GRANULOCYTES # BLD AUTO: 0.02 10*3/MM3 (ref 0–0.05)
IMM GRANULOCYTES NFR BLD AUTO: 0.6 % (ref 0–0.5)
LAB AP CASE REPORT: NORMAL
LAB AP CLINICAL INFORMATION: NORMAL
LAB AP SPECIAL STAINS: NORMAL
LYMPHOCYTES # BLD AUTO: 0.31 10*3/MM3 (ref 0.7–3.1)
LYMPHOCYTES NFR BLD AUTO: 8.8 % (ref 19.6–45.3)
Lab: NORMAL
MCH RBC QN AUTO: 30.6 PG (ref 26.6–33)
MCHC RBC AUTO-ENTMCNC: 32.3 G/DL (ref 31.5–35.7)
MCV RBC AUTO: 94.7 FL (ref 79–97)
MONOCYTES # BLD AUTO: 0.19 10*3/MM3 (ref 0.1–0.9)
MONOCYTES NFR BLD AUTO: 5.4 % (ref 5–12)
NEUTROPHILS NFR BLD AUTO: 2.88 10*3/MM3 (ref 1.7–7)
NEUTROPHILS NFR BLD AUTO: 82 % (ref 42.7–76)
NRBC BLD AUTO-RTO: 0 /100 WBC (ref 0–0.2)
PATH REPORT.ADDENDUM SPEC: NORMAL
PATH REPORT.FINAL DX SPEC: NORMAL
PATH REPORT.GROSS SPEC: NORMAL
PLATELET # BLD AUTO: 163 10*3/MM3 (ref 140–450)
PMV BLD AUTO: 9.7 FL (ref 6–12)
POTASSIUM SERPL-SCNC: 4.2 MMOL/L (ref 3.5–4.7)
PROT SERPL-MCNC: 7.2 G/DL (ref 6.3–8)
RBC # BLD AUTO: 3.01 10*6/MM3 (ref 4.14–5.8)
SODIUM SERPL-SCNC: 137 MMOL/L (ref 134–145)
WBC # BLD AUTO: 3.51 10*3/MM3 (ref 3.4–10.8)

## 2020-09-24 PROCEDURE — 25010000002 EPOETIN ALFA PER 1000 UNITS: Performed by: INTERNAL MEDICINE

## 2020-09-24 PROCEDURE — 36415 COLL VENOUS BLD VENIPUNCTURE: CPT

## 2020-09-24 PROCEDURE — 25010000002 DARATUMUMAB-HYALURONIDASE-FIHJ 1800-30000 MG-UT/15ML SOLUTION: Performed by: INTERNAL MEDICINE

## 2020-09-24 PROCEDURE — 80053 COMPREHEN METABOLIC PANEL: CPT

## 2020-09-24 PROCEDURE — 96372 THER/PROPH/DIAG INJ SC/IM: CPT

## 2020-09-24 PROCEDURE — 96401 CHEMO ANTI-NEOPL SQ/IM: CPT

## 2020-09-24 PROCEDURE — 63710000001 DIPHENHYDRAMINE PER 50 MG: Performed by: INTERNAL MEDICINE

## 2020-09-24 PROCEDURE — 96402 CHEMO HORMON ANTINEOPL SQ/IM: CPT

## 2020-09-24 PROCEDURE — C9399 UNCLASSIFIED DRUGS OR BIOLOG: HCPCS | Performed by: INTERNAL MEDICINE

## 2020-09-24 PROCEDURE — 85025 COMPLETE CBC W/AUTO DIFF WBC: CPT

## 2020-09-24 RX ORDER — LENALIDOMIDE 10 MG/1
10 CAPSULE ORAL DAILY
Qty: 21 CAPSULE | Refills: 0
Start: 2020-09-24 | End: 2020-10-07 | Stop reason: SDUPTHER

## 2020-09-24 RX ORDER — SODIUM CHLORIDE 9 MG/ML
250 INJECTION, SOLUTION INTRAVENOUS ONCE
Status: DISCONTINUED | OUTPATIENT
Start: 2020-09-24 | End: 2020-09-24 | Stop reason: HOSPADM

## 2020-09-24 RX ORDER — ACETAMINOPHEN 500 MG
1000 TABLET ORAL ONCE
Status: COMPLETED | OUTPATIENT
Start: 2020-09-24 | End: 2020-09-24

## 2020-09-24 RX ORDER — DIPHENHYDRAMINE HCL 25 MG
25 CAPSULE ORAL ONCE
Status: COMPLETED | OUTPATIENT
Start: 2020-09-24 | End: 2020-09-24

## 2020-09-24 RX ADMIN — DIPHENHYDRAMINE HYDROCHLORIDE 25 MG: 25 CAPSULE ORAL at 10:33

## 2020-09-24 RX ADMIN — ERYTHROPOIETIN 20000 UNITS: 20000 INJECTION, SOLUTION INTRAVENOUS; SUBCUTANEOUS at 10:46

## 2020-09-24 RX ADMIN — DARATUMUMAB AND HYALURONIDASE-FIHJ (HUMAN RECOMBINANT) 1800 MG: 1800; 30000 INJECTION SUBCUTANEOUS at 10:38

## 2020-09-24 RX ADMIN — ACETAMINOPHEN 1000 MG: 500 TABLET ORAL at 10:33

## 2020-09-24 NOTE — NURSING NOTE
Pt needing to be increased to 25,000 units of Procrit per protocol.  Discussed with Debbie SANTORO  She will work with insurance to have him approved for 25,000 next week.  Instructed to give 20,000 units today.  Orders released to pharmacy.  Pt aware and v/u.

## 2020-09-24 NOTE — TELEPHONE ENCOUNTER
PT HAD CALLED LOOKING FOR GOLD WEDDING BAND. THINKS HE LOST IT IN THE LAB. CALLED LISANDRO IN LAB AND SHE HAS LOOKED FOR IT AND HAS ASKED NURSING TO LOOK AS WELL IN THE INFUSION AREA. SO FAR NO ONE HAS SEEN IT. CALLED PT TO LET HIM KNOW WE ARE LOOKING FOR IT AND HE CAN ALWAYS CALL BACK TO CHECK.

## 2020-09-24 NOTE — TELEPHONE ENCOUNTER
----- Message from Haydee Davis sent at 9/24/2020 12:32 PM EDT -----  Regarding: Lost wedding band  Patient lost his gold wedding band. He took his jacket off when getting his lab so he thinks it's there. He then went to the infusion area.     If you can find it, please give him a call back @ [phone]    Thanks!

## 2020-09-25 DIAGNOSIS — C90.00 MULTIPLE MYELOMA NOT HAVING ACHIEVED REMISSION (HCC): ICD-10-CM

## 2020-09-25 LAB
ALBUMIN SERPL ELPH-MCNC: 3.3 G/DL (ref 2.9–4.4)
ALBUMIN/GLOB SERPL: 1 {RATIO} (ref 0.7–1.7)
ALPHA1 GLOB SERPL ELPH-MCNC: 0.2 G/DL (ref 0–0.4)
ALPHA2 GLOB SERPL ELPH-MCNC: 1 G/DL (ref 0.4–1)
B-GLOBULIN SERPL ELPH-MCNC: 0.7 G/DL (ref 0.7–1.3)
GAMMA GLOB SERPL ELPH-MCNC: 1.4 G/DL (ref 0.4–1.8)
GLOBULIN SER-MCNC: 3.4 G/DL (ref 2.2–3.9)
IGA SERPL-MCNC: 29 MG/DL (ref 61–437)
IGG SERPL-MCNC: 1448 MG/DL (ref 603–1613)
IGM SERPL-MCNC: 27 MG/DL (ref 20–172)
INTERPRETATION SERPL IEP-IMP: ABNORMAL
KAPPA LC FREE SER-MCNC: 20.3 MG/L (ref 3.3–19.4)
KAPPA LC FREE/LAMBDA FREE SER: 0.09 {RATIO} (ref 0.26–1.65)
LABORATORY COMMENT REPORT: ABNORMAL
LAMBDA LC FREE SERPL-MCNC: 234.2 MG/L (ref 5.7–26.3)
M PROTEIN SERPL ELPH-MCNC: ABNORMAL G/DL
PROT SERPL-MCNC: 6.7 G/DL (ref 6–8.5)

## 2020-09-25 RX ORDER — DEXAMETHASONE 4 MG/1
40 TABLET ORAL WEEKLY
Qty: 40 TABLET | Refills: 1 | Status: SHIPPED | OUTPATIENT
Start: 2020-09-25 | End: 2020-10-01

## 2020-10-01 ENCOUNTER — LAB (OUTPATIENT)
Dept: LAB | Facility: HOSPITAL | Age: 65
End: 2020-10-01

## 2020-10-01 ENCOUNTER — OFFICE VISIT (OUTPATIENT)
Dept: ONCOLOGY | Facility: CLINIC | Age: 65
End: 2020-10-01

## 2020-10-01 ENCOUNTER — INFUSION (OUTPATIENT)
Dept: ONCOLOGY | Facility: HOSPITAL | Age: 65
End: 2020-10-01

## 2020-10-01 VITALS
DIASTOLIC BLOOD PRESSURE: 71 MMHG | OXYGEN SATURATION: 98 % | HEART RATE: 88 BPM | RESPIRATION RATE: 16 BRPM | WEIGHT: 244.1 LBS | SYSTOLIC BLOOD PRESSURE: 123 MMHG | HEIGHT: 76 IN | BODY MASS INDEX: 29.72 KG/M2 | TEMPERATURE: 98.7 F

## 2020-10-01 DIAGNOSIS — D69.6 THROMBOCYTOPENIA (HCC): ICD-10-CM

## 2020-10-01 DIAGNOSIS — M79.89 SWELLING OF BOTH LOWER EXTREMITIES: ICD-10-CM

## 2020-10-01 DIAGNOSIS — C90.00 MULTIPLE MYELOMA NOT HAVING ACHIEVED REMISSION (HCC): Primary | ICD-10-CM

## 2020-10-01 DIAGNOSIS — K52.1 DIARRHEA DUE TO DRUG: ICD-10-CM

## 2020-10-01 DIAGNOSIS — N18.31 ANEMIA IN STAGE 3A CHRONIC KIDNEY DISEASE (HCC): ICD-10-CM

## 2020-10-01 DIAGNOSIS — M79.2 NEUROPATHIC PAIN: ICD-10-CM

## 2020-10-01 DIAGNOSIS — C90.00 MULTIPLE MYELOMA NOT HAVING ACHIEVED REMISSION (HCC): ICD-10-CM

## 2020-10-01 DIAGNOSIS — D63.1 ANEMIA IN STAGE 3A CHRONIC KIDNEY DISEASE (HCC): ICD-10-CM

## 2020-10-01 DIAGNOSIS — Z79.899 ENCOUNTER FOR LONG-TERM CURRENT USE OF HIGH RISK MEDICATION: ICD-10-CM

## 2020-10-01 LAB
BASOPHILS # BLD AUTO: 0.07 10*3/MM3 (ref 0–0.2)
BASOPHILS NFR BLD AUTO: 1.6 % (ref 0–1.5)
DEPRECATED RDW RBC AUTO: 49.6 FL (ref 37–54)
EOSINOPHIL # BLD AUTO: 0.14 10*3/MM3 (ref 0–0.4)
EOSINOPHIL NFR BLD AUTO: 3.2 % (ref 0.3–6.2)
ERYTHROCYTE [DISTWIDTH] IN BLOOD BY AUTOMATED COUNT: 14.8 % (ref 12.3–15.4)
HCT VFR BLD AUTO: 29 % (ref 37.5–51)
HGB BLD-MCNC: 9.8 G/DL (ref 13–17.7)
IMM GRANULOCYTES # BLD AUTO: 0.07 10*3/MM3 (ref 0–0.05)
IMM GRANULOCYTES NFR BLD AUTO: 1.6 % (ref 0–0.5)
LYMPHOCYTES # BLD AUTO: 0.31 10*3/MM3 (ref 0.7–3.1)
LYMPHOCYTES NFR BLD AUTO: 7 % (ref 19.6–45.3)
MCH RBC QN AUTO: 30.9 PG (ref 26.6–33)
MCHC RBC AUTO-ENTMCNC: 33.8 G/DL (ref 31.5–35.7)
MCV RBC AUTO: 91.5 FL (ref 79–97)
MONOCYTES # BLD AUTO: 0.19 10*3/MM3 (ref 0.1–0.9)
MONOCYTES NFR BLD AUTO: 4.3 % (ref 5–12)
NEUTROPHILS NFR BLD AUTO: 3.64 10*3/MM3 (ref 1.7–7)
NEUTROPHILS NFR BLD AUTO: 82.3 % (ref 42.7–76)
NRBC BLD AUTO-RTO: 0 /100 WBC (ref 0–0.2)
PLATELET # BLD AUTO: 192 10*3/MM3 (ref 140–450)
PMV BLD AUTO: 10.5 FL (ref 6–12)
RBC # BLD AUTO: 3.17 10*6/MM3 (ref 4.14–5.8)
WBC # BLD AUTO: 4.42 10*3/MM3 (ref 3.4–10.8)

## 2020-10-01 PROCEDURE — 25010000002 DARATUMUMAB-HYALURONIDASE-FIHJ 1800-30000 MG-UT/15ML SOLUTION: Performed by: INTERNAL MEDICINE

## 2020-10-01 PROCEDURE — C9062 DARATUMUMAB HYALURONIDASE: HCPCS | Performed by: INTERNAL MEDICINE

## 2020-10-01 PROCEDURE — 96401 CHEMO ANTI-NEOPL SQ/IM: CPT

## 2020-10-01 PROCEDURE — 63710000001 DIPHENHYDRAMINE PER 50 MG: Performed by: INTERNAL MEDICINE

## 2020-10-01 PROCEDURE — 25010000002 EPOETIN ALFA PER 1000 UNITS: Performed by: INTERNAL MEDICINE

## 2020-10-01 PROCEDURE — 96372 THER/PROPH/DIAG INJ SC/IM: CPT

## 2020-10-01 PROCEDURE — 85025 COMPLETE CBC W/AUTO DIFF WBC: CPT

## 2020-10-01 PROCEDURE — 99214 OFFICE O/P EST MOD 30 MIN: CPT | Performed by: INTERNAL MEDICINE

## 2020-10-01 PROCEDURE — 36415 COLL VENOUS BLD VENIPUNCTURE: CPT

## 2020-10-01 RX ORDER — DIPHENHYDRAMINE HCL 25 MG
25 CAPSULE ORAL ONCE
Status: CANCELLED | OUTPATIENT
Start: 2020-10-01

## 2020-10-01 RX ORDER — DIPHENHYDRAMINE HYDROCHLORIDE 50 MG/ML
50 INJECTION INTRAMUSCULAR; INTRAVENOUS AS NEEDED
Status: CANCELLED | OUTPATIENT
Start: 2020-10-08

## 2020-10-01 RX ORDER — FAMOTIDINE 10 MG/ML
20 INJECTION, SOLUTION INTRAVENOUS AS NEEDED
Status: CANCELLED | OUTPATIENT
Start: 2020-10-08

## 2020-10-01 RX ORDER — ACETAMINOPHEN 500 MG
1000 TABLET ORAL ONCE
Status: COMPLETED | OUTPATIENT
Start: 2020-10-01 | End: 2020-10-01

## 2020-10-01 RX ORDER — DIPHENHYDRAMINE HCL 25 MG
25 CAPSULE ORAL ONCE
Status: CANCELLED | OUTPATIENT
Start: 2020-10-15

## 2020-10-01 RX ORDER — SODIUM CHLORIDE 9 MG/ML
250 INJECTION, SOLUTION INTRAVENOUS ONCE
Status: CANCELLED | OUTPATIENT
Start: 2020-10-08

## 2020-10-01 RX ORDER — DIPHENOXYLATE HYDROCHLORIDE AND ATROPINE SULFATE 2.5; .025 MG/1; MG/1
2 TABLET ORAL EVERY 6 HOURS PRN
Qty: 120 TABLET | Refills: 3 | Status: SHIPPED | OUTPATIENT
Start: 2020-10-01 | End: 2020-10-01 | Stop reason: SDUPTHER

## 2020-10-01 RX ORDER — ACETAMINOPHEN 500 MG
1000 TABLET ORAL ONCE
Status: CANCELLED | OUTPATIENT
Start: 2020-10-01

## 2020-10-01 RX ORDER — MEPERIDINE HYDROCHLORIDE 50 MG/ML
25 INJECTION INTRAMUSCULAR; INTRAVENOUS; SUBCUTANEOUS
Status: CANCELLED | OUTPATIENT
Start: 2020-10-01

## 2020-10-01 RX ORDER — DIPHENHYDRAMINE HYDROCHLORIDE 50 MG/ML
50 INJECTION INTRAMUSCULAR; INTRAVENOUS AS NEEDED
Status: CANCELLED | OUTPATIENT
Start: 2020-10-15

## 2020-10-01 RX ORDER — FAMOTIDINE 10 MG/ML
20 INJECTION, SOLUTION INTRAVENOUS AS NEEDED
Status: CANCELLED | OUTPATIENT
Start: 2020-10-15

## 2020-10-01 RX ORDER — SODIUM CHLORIDE 9 MG/ML
250 INJECTION, SOLUTION INTRAVENOUS ONCE
Status: CANCELLED | OUTPATIENT
Start: 2020-10-15

## 2020-10-01 RX ORDER — FAMOTIDINE 10 MG/ML
20 INJECTION, SOLUTION INTRAVENOUS AS NEEDED
Status: CANCELLED | OUTPATIENT
Start: 2020-10-01

## 2020-10-01 RX ORDER — ACETAMINOPHEN 500 MG
1000 TABLET ORAL ONCE
Status: CANCELLED | OUTPATIENT
Start: 2020-10-08

## 2020-10-01 RX ORDER — DIPHENHYDRAMINE HCL 25 MG
25 CAPSULE ORAL ONCE
Status: CANCELLED | OUTPATIENT
Start: 2020-10-08

## 2020-10-01 RX ORDER — MEPERIDINE HYDROCHLORIDE 50 MG/ML
25 INJECTION INTRAMUSCULAR; INTRAVENOUS; SUBCUTANEOUS
Status: CANCELLED | OUTPATIENT
Start: 2020-10-08

## 2020-10-01 RX ORDER — ACETAMINOPHEN 500 MG
1000 TABLET ORAL ONCE
Status: CANCELLED | OUTPATIENT
Start: 2020-10-15

## 2020-10-01 RX ORDER — SODIUM CHLORIDE 9 MG/ML
250 INJECTION, SOLUTION INTRAVENOUS ONCE
Status: CANCELLED | OUTPATIENT
Start: 2020-10-01

## 2020-10-01 RX ORDER — MEPERIDINE HYDROCHLORIDE 50 MG/ML
25 INJECTION INTRAMUSCULAR; INTRAVENOUS; SUBCUTANEOUS
Status: CANCELLED | OUTPATIENT
Start: 2020-10-15

## 2020-10-01 RX ORDER — DIPHENHYDRAMINE HCL 25 MG
25 CAPSULE ORAL ONCE
Status: COMPLETED | OUTPATIENT
Start: 2020-10-01 | End: 2020-10-01

## 2020-10-01 RX ORDER — DEXAMETHASONE 4 MG/1
20 TABLET ORAL WEEKLY
Qty: 40 TABLET | Refills: 1
Start: 2020-10-01 | End: 2021-02-19

## 2020-10-01 RX ORDER — DIPHENOXYLATE HYDROCHLORIDE AND ATROPINE SULFATE 2.5; .025 MG/1; MG/1
2 TABLET ORAL EVERY 6 HOURS PRN
Qty: 120 TABLET | Refills: 3 | Status: SHIPPED | OUTPATIENT
Start: 2020-10-01 | End: 2021-01-15

## 2020-10-01 RX ORDER — SODIUM CHLORIDE 9 MG/ML
250 INJECTION, SOLUTION INTRAVENOUS ONCE
Status: DISCONTINUED | OUTPATIENT
Start: 2020-10-01 | End: 2020-10-01 | Stop reason: HOSPADM

## 2020-10-01 RX ORDER — DIPHENHYDRAMINE HYDROCHLORIDE 50 MG/ML
50 INJECTION INTRAMUSCULAR; INTRAVENOUS AS NEEDED
Status: CANCELLED | OUTPATIENT
Start: 2020-10-01

## 2020-10-01 RX ADMIN — ERYTHROPOIETIN 15000 UNITS: 20000 INJECTION, SOLUTION INTRAVENOUS; SUBCUTANEOUS at 13:07

## 2020-10-01 RX ADMIN — DARATUMUMAB AND HYALURONIDASE-FIHJ (HUMAN RECOMBINANT) 1800 MG: 1800; 30000 INJECTION SUBCUTANEOUS at 13:44

## 2020-10-01 RX ADMIN — DIPHENHYDRAMINE HYDROCHLORIDE 25 MG: 25 CAPSULE ORAL at 12:59

## 2020-10-01 RX ADMIN — ACETAMINOPHEN 1000 MG: 500 TABLET ORAL at 12:59

## 2020-10-01 NOTE — PROGRESS NOTES
Subjective     CHIEF COMPLAINT:      Chief Complaint   Patient presents with   • Follow-up     DISCUSS STEROIDS/VISION AND SWELLING IN FEET AND LEGS       HISTORY OF PRESENT ILLNESS:     Contreras Albert is a 65 y.o. male patient who returns today for follow up on his multiple myeloma.  He returns today for follow-up and reports developing blurred vision after he receives his weekly treatments.  In addition, he is retaining fluid in his legs and feet.  He is having difficulty putting on shoes as a result.    Patient previously had problem with his vision due to retinal disease.  This has improved but he is now having problem related to worsening of her cataracts.    REVIEW OF SYSTEMS:  Review of Systems   Constitutional: Positive for fatigue. Negative for fever and unexpected weight change.   HENT: Negative for nosebleeds and voice change.    Eyes: Negative for visual disturbance.   Respiratory: Negative for cough and shortness of breath.    Cardiovascular: Positive for leg swelling. Negative for chest pain.   Gastrointestinal: Positive for diarrhea. Negative for abdominal pain, blood in stool, constipation, nausea and vomiting.   Genitourinary: Negative for frequency and hematuria.   Musculoskeletal: Negative for back pain and joint swelling.   Skin: Negative for rash.   Neurological: Negative for dizziness and headaches.   Hematological: Negative for adenopathy. Does not bruise/bleed easily.   Psychiatric/Behavioral: Negative for dysphoric mood. The patient is not nervous/anxious.      I reviewed and verified the CC and ROS obtained by the MA.     Past Medical History:   Diagnosis Date   • Anemia    • Cataract    • Chronic renal insufficiency    • Clear cell carcinoma of kidney, right (CMS/HCC)     Stage I   • Diabetes mellitus (CMS/HCC)    • Enlarged prostate    • High cholesterol    • Hypertension    • Multiple myeloma (CMS/HCC)    • Multiple rib fractures     on right after fall from 2 story building at age of 30  years   • Renal mass        Past Surgical History:   Procedure Laterality Date   • INSERTION HEMODIALYSIS CATHETER N/A 6/2/2020    Procedure: HEMODIALYSIS CATHETER INSERTION;  Surgeon: Sumanth Hernandez MD;  Location: [facility] MAIN OR;  Service: Vascular;  Laterality: N/A;   • NEPHRECTOMY PARTIAL Right 5/18/2016    Procedure: RT NEPHRECTOMY PARTIAL LAPAROSCOPIC WITH DAVINCI ROBOT;  Surgeon: Chad Garcia MD;  Location: [facility] OR;  Service:        Cancer-related family history is not on file.  Social History     Tobacco Use   • Smoking status: Never Smoker   • Smokeless tobacco: Never Used   Substance Use Topics   • Alcohol use: No       MEDICATIONS:    Current Outpatient Medications:   •  acyclovir (ZOVIRAX) 400 MG tablet, Take 1 tablet by mouth 2 (Two) Times a Day., Disp: 60 tablet, Rfl: 7  •  amLODIPine (Norvasc) 2.5 MG tablet, Take 1 tablet by mouth Daily., Disp: 30 tablet, Rfl: 1  •  aspirin (aspirin) 81 MG EC tablet, Take 1 tablet by mouth Daily., Disp: , Rfl:   •  Blood Glucose Monitoring Suppl (FREESTYLE FREEDOM LITE) w/Device kit, 1 kit Daily. Indications: Type 2 Diabetes, Disp: 1 each, Rfl: 0  •  bumetanide (BUMEX) 1 MG tablet, Take 1 tablet by mouth Daily., Disp: 30 tablet, Rfl: 5  •  calcium acetate (PHOSLO) 667 MG tablet, Take 2 tablets by mouth 3 (Three) Times a Day With Meals., Disp: 180 tablet, Rfl: 0  •  dexamethasone (DECADRON) 4 MG tablet, Take 10 tablets by mouth 1 (One) Time Per Week., Disp: 40 tablet, Rfl: 1  •  finasteride (PROSCAR) 5 MG tablet, Take 5 mg by mouth daily., Disp: , Rfl:   •  gabapentin (NEURONTIN) 300 MG capsule, Take 1 capsule by mouth 3 (Three) Times a Day., Disp: 90 capsule, Rfl: 2  •  glucose blood (FREESTYLE LITE) test strip, Use daily to test blood sugar, Disp: 100 each, Rfl: 2  •  Insulin Glargine (LANTUS SOLOSTAR) 100 UNIT/ML injection pen, Inject 15 Units under the skin into the appropriate area as directed Every Night. Adjust dose as per sliding scale.,  "Disp: 15 mL, Rfl: 1  •  Insulin Pen Needle 32G X 4 MM misc, 1 application Daily., Disp: 100 each, Rfl: 0  •  Lancets (FREESTYLE) lancets, Use once then discard to test blood sugar each morning, Disp: 100 each, Rfl: 0  •  lenalidomide (Revlimid) 10 MG capsule, TAKE 1 CAPSULE BY MOUTH EVERY DAY FOR 21 DAYS ON THEN 7 DAYS OFF, Disp: 21 capsule, Rfl: 0  •  lenalidomide (REVLIMID) 10 MG capsule, Take 1 capsule by mouth Daily for 21 days. Take daily on days 1-21.  Do not crush, break or chew., Disp: 21 capsule, Rfl: 0  •  Omega-3 Fatty Acids (FISH OIL) 1000 MG capsule capsule, Take 1 capsule by mouth 2 (Two) Times a Day With Meals., Disp: , Rfl:   •  ondansetron (ZOFRAN) 8 MG tablet, Take 1 tablet by mouth 3 (Three) Times a Day As Needed for Nausea or Vomiting., Disp: 30 tablet, Rfl: 5  •  pantoprazole (PROTONIX) 40 MG EC tablet, Take 1 tablet by mouth Daily., Disp: 30 tablet, Rfl: 0  •  sulfamethoxazole-trimethoprim (BACTRIM DS,SEPTRA DS) 800-160 MG per tablet, Take 1 tablet by mouth 3 (Three) Times a Week. Take 1 tablet on Mondays, Wednesdays and Fridays., Disp: 12 tablet, Rfl: 7  •  tamsulosin (FLOMAX) 0.4 MG capsule 24 hr capsule, Take 1 capsule by mouth every night., Disp: , Rfl:   •  traMADol (ULTRAM) 50 MG tablet, Take 1 tablet by mouth Every 12 (Twelve) Hours As Needed for Moderate Pain  or Severe Pain ., Disp: 30 tablet, Rfl: 0    ALLERGIES:  Allergies   Allergen Reactions   • Lisinopril Other (See Comments)     Cannot take due to kidney disease   • Crestor [Rosuvastatin] GI Intolerance         Objective   VITAL SIGNS:     Vitals:    10/01/20 1201   BP: 123/71   Pulse: 88   Resp: 16   Temp: 98.7 °F (37.1 °C)   TempSrc: Oral   SpO2: 98%   Weight: 111 kg (244 lb 1.6 oz)   Height: 193 cm (75.98\")   PainSc: 0-No pain     Body mass index is 29.73 kg/m².     Wt Readings from Last 3 Encounters:   10/01/20 111 kg (244 lb 1.6 oz)   09/24/20 111 kg (245 lb 6.4 oz)   09/17/20 113 kg (249 lb 12.8 oz)       PHYSICAL " EXAMINATION:  GENERAL:  The patient appears in good general condition, not in acute distress.  SKIN: No skin rashes. No ecchymosis.  HEAD:  Normocephalic.  EYES:  No Jaundice. Pallor. Pupils equal. EOMI.  NECK:  Supple with Good ROM. No Thyromegaly. No Masses.  LYMPHATICS:  No cervical or supraclavicular lymphadenopathy.  CHEST: Normal respiratory effort. Lungs clear to auscultation.   CARDIAC:  Normal S1 & S2. No murmur.  +1 edema bilaterally.  ABDOMEN:  Soft. No tenderness. No Hepatomegaly. No Splenomegaly. No masses.  EXTREMITIES:  No clubbing. No deforming arthritis in the hands. No Calf tenderness.  NEUROLOGICAL:  No Focal neurological deficits.       DIAGNOSTIC DATA:     Results from last 7 days   Lab Units 10/01/20  1132   WBC 10*3/mm3 4.42   NEUTROS ABS 10*3/mm3 3.64   HEMOGLOBIN g/dL 9.8*   HEMATOCRIT % 29.0*   PLATELETS 10*3/mm3 192     Component      Latest Ref Rng & Units 6/25/2020 7/16/2020 8/13/2020 9/24/2020   IgG      603 - 1613 mg/dL 2993 (H) 3187 (H) 2273 (H) 1448   IgA      61 - 437 mg/dL 14 (L) 17 (L) 18 (L) 29 (L)   IgM      20 - 172 mg/dL 9 (L) 11 (L) 17 (L) 27   Total Protein      6.0 - 8.5 g/dL 7.7 7.8 7.2 6.7   Albumin      2.9 - 4.4 g/dL 3.0 3.1 3.1 3.3   Alpha-1-Globulin      0.0 - 0.4 g/dL 0.3 0.3 0.2 0.2   Alpha-2-Globulin      0.4 - 1.0 g/dL 1.1 (H) 1.0 0.9 1.0   Beta Globulin      0.7 - 1.3 g/dL 0.8 0.8 0.8 0.7   Gamma Globulin      0.4 - 1.8 g/dL 2.5 (H) 2.6 (H) 2.1 (H) 1.4   M-Jurgen      Not Observed g/dL Comment: Comment:  Comment: Comment:     Globulin      2.2 - 3.9 g/dL 4.7 (H) 4.7 (H) 4.1 (H) 3.4   A/G Ratio      0.7 - 1.7 0.7 0.7 0.8 1.0   Immunofixation Reflex, Serum       Comment Comment Comment Comment   Please note       Comment Comment Comment Comment   Kappa FLC      3.3 - 19.4 mg/L 17.4 13.4 15.8 20.3 (H)   Free Lambda Light Chains      5.7 - 26.3 mg/L 1185.1 (H) 874.0 (H) 736.1 (H) 234.2 (H)   Kappa/Lambda Ratio      0.26 - 1.65 0.01 (L) 0.02 (L) 0.02 (L) 0.09 (L)        Assessment/Plan   1.  IgG lambda multiple myeloma.  · Bone marrow 5/23/2020 hypercellular marrow with 80% involvement of plasma cell myeloma; FISH studies pending  · Bone survey 5/26/2020: Diffuse osteoporosis and demineralization.  However, no discrete lytic lesions.  · SPEP 5/23/2020: M spike 5.1.  IgG 6629 lambda.  Light chain ratio 0 with free lambda light chains 6400.  Beta-2 microglobulin 16.3.  24-hour urine 12.7 g protein per 24-hour with monoclonal lambda free light chain 33.9%, monoclonal IgG lambda 23.5%  · 24-hour urine testing from 5/24/2020 free lambda light chains 8.4 g/L  · Considering renal failure, initiated CyBorD 5/28/20.  Given the significantly elevated light chains with the first cycle of treatment plan to give Velcade days 1, 4, 8, 11; Cytoxan 300 PO mg/m² weekly and dexamethasone 40 mg p.o. days 1, 2, 3, 4, 8 and 15.    · After cycle 1 treatment can likely be altered to weekly dosing of each drug.  · Patient was referred to Dr. Alvares at [facility].  · Patient is tolerating the treatment except for generalized bone achiness after receiving Velcade.  · On 6/18/2020, Velcade was changed to 1.5 mg/m² weekly continuously along with weekly Decadron and oral Cytoxan.  · M protein decreased to 2.4 and free light chain improved to 874 on 7/16/2020 indicating response to therapy.  · M protein decreased to 2.0 and free light chain decreased to 736 on 8/13/2020.   · Although the improvement indicates response, he continues to have significant amount of monoclonal protein despite being on treatment for 3 months.  · In addition, patient is having side effects from Velcade in the form of painful neuropathy.  · I discussed the case with Dr. Alvares and we recommended stopping Velcade and Cytoxan and changing the treatment to Daratumumab subcutaneous injection weekly along with Revlimid 10 mg daily for 21 out of 28-day cycle and Decadron 40 mg daily.  · Patient was started on daratumumab  SQ on 8/27/2020.  · Patient is tolerating daratumumab SQ better than Velcade.  · Patient is having problems related to Decadron.  She is having visual changes likely secondary to development of steroid-induced cataracts.  In addition, neck swelling      2.  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  · B12 folate and iron studies are normal.  · Status post PRBC transfusion on 5/24/2020.  · Patient previously received Procrit with his hemodialysis.  · Patient is currently receiving Procrit weekly.  · Hemoglobin improved to 9.8 today.    3.  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  · Showed enlargement of the kidneys concerning for myeloma kidney and light chain deposition disease.  · Patient completed 3 sessions of pheresis on 5/30/2020.  · Creatinine was 5.12 on 5/31/2020  · Creatinine increased to 6.36 on 6/1/2020.  · Patient had HD catheter placed and started on HD on 6/2/2020.   · Kidney function improved in late June 2020 and did not require additional hemodialysis.  · Creatinine increased to 2.65 on 9/24/2020.    4.  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  · Patient had mild neuropathy since the start of treatment but symptoms worsened over the past month.  · He is having pain that worsens with walking.  · I recommended treatment with Neurontin.  I explained the medication side effects including dizziness or sleepiness.  · Patient was started at 300 mg daily with gradual dose increased as tolerated.  · Neurontin was increased slowly to 300 mg twice daily.  · Patient is now taking Neurontin 300 mg 3 times daily alternating with 300 mg twice daily.   · Patient symptoms are under good control with the current dose.    5.  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  · Status post right partial nephrectomy on 5/18/2016.    6.  Thrombocytopenia.    · Platelet count was gradually declining and this is attributed to the effect of  his treatment with Velcade and Cytoxan.    · The gradual declines makes HIT much less likely.   · Platelet count improved to 106,000 on 6/3/2020.  · Platelet count decreased to 89,000 on 6/4/2020.  · Platelet count is currently in the 160,000-170,000.  · Platelet count improved to 192,000.     7.  Lower extremity swelling.    · Patient was previously on Lasix but did not respond.  · He was switched from Lasix to Bumex 1 mg daily on 9/1/2020.  · Swelling of the legs improved after the switching.    8.  Diarrhea secondary to the treatment.  Secondary to Revlimid.  He usually develops this after he receives daratumumab.  He has been taking Imodium but had to take up to 6 tablets on 1 a day because it was not effective.  I recommended switching to Lomotil.    PLAN:    1.  Continue Daratumumab SQ weekly.  He will receive a dose today.   2.  Reduce Decadron to 20 mg weekly.  3.  Continue Revlimid 10 mg daily for 21/28 day cycle.  4.  We will give Procrit today but we will reduce the dose to 15,000 units SQ.  5.  Start Lomotil 2 tablets every 6 hours PRN for diarrhea.  6.  Return weekly for CBC and daratumumab.  In 3 weeks, I will repeat SPEP GUANAKITO FLC and a CMP.  Ferritin iron panel will be repeated.  7.  I will see him in follow-up in 4 weeks.        Red Del Valle MD  10/01/20

## 2020-10-02 ENCOUNTER — MEDICATION THERAPY MANAGEMENT (OUTPATIENT)
Dept: PHARMACY | Facility: HOSPITAL | Age: 65
End: 2020-10-02

## 2020-10-02 NOTE — PROGRESS NOTES
MTM Lab Review- DPd      10/1/2020  Day 8   WBC 3.40 - 10.80 10*3/mm3 4.42   Neutrophils Absolute 1.70 - 7.00 10*3/mm3 3.64   Hemoglobin 13.0 - 17.7 g/dL 9.8 (A)   Hematocrit 37.5 - 51.0 % 29.0 (A)   Platelets 140 - 450 10*3/mm3 192     MD dictation noted; dexamethasone dose reduced. Pharmacy will continue to follow.     Thanks,   Yolande Huitron, PharmD

## 2020-10-07 DIAGNOSIS — C90.00 MULTIPLE MYELOMA NOT HAVING ACHIEVED REMISSION (HCC): ICD-10-CM

## 2020-10-07 RX ORDER — LENALIDOMIDE 10 MG/1
CAPSULE ORAL
Qty: 21 CAPSULE | Refills: 0 | Status: SHIPPED | OUTPATIENT
Start: 2020-10-07 | End: 2020-10-28 | Stop reason: SDUPTHER

## 2020-10-07 NOTE — TELEPHONE ENCOUNTER
Pt on my calendar to refill Revlimid. Per last office note from Dr Del Valle-pt is to continue 10 mg 21 days out of 28. His last delivery was on 9/23/2020. Rx routed to Dr Del Valle for signature, once signed it will be sent to [facility]    Confirmation rec that Dr Del Valle has approved and signed the Revlimid rx. It was sent to [facility]

## 2020-10-08 ENCOUNTER — INFUSION (OUTPATIENT)
Dept: ONCOLOGY | Facility: HOSPITAL | Age: 65
End: 2020-10-08

## 2020-10-08 ENCOUNTER — LAB (OUTPATIENT)
Dept: LAB | Facility: HOSPITAL | Age: 65
End: 2020-10-08

## 2020-10-08 VITALS
HEART RATE: 92 BPM | TEMPERATURE: 97.5 F | RESPIRATION RATE: 18 BRPM | BODY MASS INDEX: 30.59 KG/M2 | DIASTOLIC BLOOD PRESSURE: 75 MMHG | SYSTOLIC BLOOD PRESSURE: 122 MMHG | OXYGEN SATURATION: 98 % | WEIGHT: 251.2 LBS

## 2020-10-08 DIAGNOSIS — C90.00 MULTIPLE MYELOMA NOT HAVING ACHIEVED REMISSION (HCC): Primary | ICD-10-CM

## 2020-10-08 DIAGNOSIS — C90.00 MULTIPLE MYELOMA NOT HAVING ACHIEVED REMISSION (HCC): ICD-10-CM

## 2020-10-08 DIAGNOSIS — N18.31 ANEMIA IN STAGE 3A CHRONIC KIDNEY DISEASE (HCC): ICD-10-CM

## 2020-10-08 DIAGNOSIS — D63.1 ANEMIA IN STAGE 3A CHRONIC KIDNEY DISEASE (HCC): ICD-10-CM

## 2020-10-08 LAB
BASOPHILS # BLD AUTO: 0.08 10*3/MM3 (ref 0–0.2)
BASOPHILS NFR BLD AUTO: 1.4 % (ref 0–1.5)
DEPRECATED RDW RBC AUTO: 47.7 FL (ref 37–54)
EOSINOPHIL # BLD AUTO: 0.2 10*3/MM3 (ref 0–0.4)
EOSINOPHIL NFR BLD AUTO: 3.5 % (ref 0.3–6.2)
ERYTHROCYTE [DISTWIDTH] IN BLOOD BY AUTOMATED COUNT: 14.6 % (ref 12.3–15.4)
HCT VFR BLD AUTO: 28.2 % (ref 37.5–51)
HGB BLD-MCNC: 9.4 G/DL (ref 13–17.7)
IMM GRANULOCYTES # BLD AUTO: 0.05 10*3/MM3 (ref 0–0.05)
IMM GRANULOCYTES NFR BLD AUTO: 0.9 % (ref 0–0.5)
LYMPHOCYTES # BLD AUTO: 0.38 10*3/MM3 (ref 0.7–3.1)
LYMPHOCYTES NFR BLD AUTO: 6.7 % (ref 19.6–45.3)
MCH RBC QN AUTO: 30 PG (ref 26.6–33)
MCHC RBC AUTO-ENTMCNC: 33.3 G/DL (ref 31.5–35.7)
MCV RBC AUTO: 90.1 FL (ref 79–97)
MONOCYTES # BLD AUTO: 0.22 10*3/MM3 (ref 0.1–0.9)
MONOCYTES NFR BLD AUTO: 3.9 % (ref 5–12)
NEUTROPHILS NFR BLD AUTO: 4.72 10*3/MM3 (ref 1.7–7)
NEUTROPHILS NFR BLD AUTO: 83.6 % (ref 42.7–76)
NRBC BLD AUTO-RTO: 0 /100 WBC (ref 0–0.2)
PLATELET # BLD AUTO: 179 10*3/MM3 (ref 140–450)
PMV BLD AUTO: 12.2 FL (ref 6–12)
RBC # BLD AUTO: 3.13 10*6/MM3 (ref 4.14–5.8)
WBC # BLD AUTO: 5.65 10*3/MM3 (ref 3.4–10.8)

## 2020-10-08 PROCEDURE — 96372 THER/PROPH/DIAG INJ SC/IM: CPT

## 2020-10-08 PROCEDURE — 25010000002 EPOETIN ALFA PER 1000 UNITS: Performed by: INTERNAL MEDICINE

## 2020-10-08 PROCEDURE — 96401 CHEMO ANTI-NEOPL SQ/IM: CPT

## 2020-10-08 PROCEDURE — 36415 COLL VENOUS BLD VENIPUNCTURE: CPT

## 2020-10-08 PROCEDURE — C9062 DARATUMUMAB HYALURONIDASE: HCPCS | Performed by: INTERNAL MEDICINE

## 2020-10-08 PROCEDURE — 63710000001 DIPHENHYDRAMINE PER 50 MG: Performed by: INTERNAL MEDICINE

## 2020-10-08 PROCEDURE — 25010000002 DARATUMUMAB-HYALURONIDASE-FIHJ 1800-30000 MG-UT/15ML SOLUTION: Performed by: INTERNAL MEDICINE

## 2020-10-08 PROCEDURE — 85025 COMPLETE CBC W/AUTO DIFF WBC: CPT

## 2020-10-08 RX ORDER — ACETAMINOPHEN 500 MG
1000 TABLET ORAL ONCE
Status: COMPLETED | OUTPATIENT
Start: 2020-10-08 | End: 2020-10-08

## 2020-10-08 RX ORDER — DIPHENHYDRAMINE HCL 25 MG
25 CAPSULE ORAL ONCE
Status: COMPLETED | OUTPATIENT
Start: 2020-10-08 | End: 2020-10-08

## 2020-10-08 RX ADMIN — DIPHENHYDRAMINE HYDROCHLORIDE 25 MG: 25 CAPSULE ORAL at 14:46

## 2020-10-08 RX ADMIN — DARATUMUMAB AND HYALURONIDASE-FIHJ (HUMAN RECOMBINANT) 1800 MG: 1800; 30000 INJECTION SUBCUTANEOUS at 15:21

## 2020-10-08 RX ADMIN — ACETAMINOPHEN 1000 MG: 500 TABLET ORAL at 14:46

## 2020-10-08 RX ADMIN — ERYTHROPOIETIN 20000 UNITS: 20000 INJECTION, SOLUTION INTRAVENOUS; SUBCUTANEOUS at 14:55

## 2020-10-15 ENCOUNTER — LAB (OUTPATIENT)
Dept: LAB | Facility: HOSPITAL | Age: 65
End: 2020-10-15

## 2020-10-15 ENCOUNTER — TELEPHONE (OUTPATIENT)
Dept: FAMILY MEDICINE CLINIC | Facility: CLINIC | Age: 65
End: 2020-10-15

## 2020-10-15 ENCOUNTER — INFUSION (OUTPATIENT)
Dept: ONCOLOGY | Facility: HOSPITAL | Age: 65
End: 2020-10-15

## 2020-10-15 VITALS
DIASTOLIC BLOOD PRESSURE: 76 MMHG | TEMPERATURE: 97.1 F | RESPIRATION RATE: 16 BRPM | BODY MASS INDEX: 29.76 KG/M2 | HEART RATE: 80 BPM | SYSTOLIC BLOOD PRESSURE: 134 MMHG | OXYGEN SATURATION: 97 % | WEIGHT: 244.4 LBS

## 2020-10-15 DIAGNOSIS — C90.00 MULTIPLE MYELOMA NOT HAVING ACHIEVED REMISSION (HCC): Primary | ICD-10-CM

## 2020-10-15 DIAGNOSIS — C90.00 MULTIPLE MYELOMA NOT HAVING ACHIEVED REMISSION (HCC): ICD-10-CM

## 2020-10-15 DIAGNOSIS — D63.1 ANEMIA IN STAGE 3A CHRONIC KIDNEY DISEASE (HCC): ICD-10-CM

## 2020-10-15 DIAGNOSIS — N18.31 ANEMIA IN STAGE 3A CHRONIC KIDNEY DISEASE (HCC): ICD-10-CM

## 2020-10-15 LAB
BASOPHILS # BLD AUTO: 0.12 10*3/MM3 (ref 0–0.2)
BASOPHILS NFR BLD AUTO: 3 % (ref 0–1.5)
DEPRECATED RDW RBC AUTO: 47 FL (ref 37–54)
EOSINOPHIL # BLD AUTO: 0.55 10*3/MM3 (ref 0–0.4)
EOSINOPHIL NFR BLD AUTO: 13.8 % (ref 0.3–6.2)
ERYTHROCYTE [DISTWIDTH] IN BLOOD BY AUTOMATED COUNT: 14.6 % (ref 12.3–15.4)
HCT VFR BLD AUTO: 28.4 % (ref 37.5–51)
HGB BLD-MCNC: 9.5 G/DL (ref 13–17.7)
IMM GRANULOCYTES # BLD AUTO: 0.06 10*3/MM3 (ref 0–0.05)
IMM GRANULOCYTES NFR BLD AUTO: 1.5 % (ref 0–0.5)
LYMPHOCYTES # BLD AUTO: 0.82 10*3/MM3 (ref 0.7–3.1)
LYMPHOCYTES NFR BLD AUTO: 20.6 % (ref 19.6–45.3)
MCH RBC QN AUTO: 29.9 PG (ref 26.6–33)
MCHC RBC AUTO-ENTMCNC: 33.5 G/DL (ref 31.5–35.7)
MCV RBC AUTO: 89.3 FL (ref 79–97)
MONOCYTES # BLD AUTO: 0.53 10*3/MM3 (ref 0.1–0.9)
MONOCYTES NFR BLD AUTO: 13.3 % (ref 5–12)
NEUTROPHILS NFR BLD AUTO: 1.91 10*3/MM3 (ref 1.7–7)
NEUTROPHILS NFR BLD AUTO: 47.8 % (ref 42.7–76)
NRBC BLD AUTO-RTO: 0 /100 WBC (ref 0–0.2)
PLATELET # BLD AUTO: 171 10*3/MM3 (ref 140–450)
PMV BLD AUTO: 11.9 FL (ref 6–12)
RBC # BLD AUTO: 3.18 10*6/MM3 (ref 4.14–5.8)
WBC # BLD AUTO: 3.99 10*3/MM3 (ref 3.4–10.8)

## 2020-10-15 PROCEDURE — 25010000002 EPOETIN ALFA PER 1000 UNITS: Performed by: INTERNAL MEDICINE

## 2020-10-15 PROCEDURE — 63710000001 DIPHENHYDRAMINE PER 50 MG: Performed by: INTERNAL MEDICINE

## 2020-10-15 PROCEDURE — 96372 THER/PROPH/DIAG INJ SC/IM: CPT

## 2020-10-15 PROCEDURE — 85025 COMPLETE CBC W/AUTO DIFF WBC: CPT

## 2020-10-15 PROCEDURE — 36415 COLL VENOUS BLD VENIPUNCTURE: CPT

## 2020-10-15 PROCEDURE — 96401 CHEMO ANTI-NEOPL SQ/IM: CPT

## 2020-10-15 PROCEDURE — C9062 DARATUMUMAB HYALURONIDASE: HCPCS | Performed by: INTERNAL MEDICINE

## 2020-10-15 PROCEDURE — 25010000002 DARATUMUMAB-HYALURONIDASE-FIHJ 1800-30000 MG-UT/15ML SOLUTION: Performed by: INTERNAL MEDICINE

## 2020-10-15 RX ORDER — ACETAMINOPHEN 500 MG
1000 TABLET ORAL ONCE
Status: COMPLETED | OUTPATIENT
Start: 2020-10-15 | End: 2020-10-15

## 2020-10-15 RX ORDER — DEXAMETHASONE 4 MG/1
TABLET ORAL
Qty: 44 TABLET | Refills: 3 | Status: SHIPPED | OUTPATIENT
Start: 2020-10-15 | End: 2021-01-15

## 2020-10-15 RX ORDER — DIPHENHYDRAMINE HCL 25 MG
25 CAPSULE ORAL ONCE
Status: COMPLETED | OUTPATIENT
Start: 2020-10-15 | End: 2020-10-15

## 2020-10-15 RX ADMIN — DIPHENHYDRAMINE HYDROCHLORIDE 25 MG: 25 CAPSULE ORAL at 13:46

## 2020-10-15 RX ADMIN — ERYTHROPOIETIN 20000 UNITS: 20000 INJECTION, SOLUTION INTRAVENOUS; SUBCUTANEOUS at 14:14

## 2020-10-15 RX ADMIN — DARATUMUMAB AND HYALURONIDASE-FIHJ (HUMAN RECOMBINANT) 1800 MG: 1800; 30000 INJECTION SUBCUTANEOUS at 14:31

## 2020-10-15 RX ADMIN — ACETAMINOPHEN 1000 MG: 500 TABLET ORAL at 13:46

## 2020-10-15 NOTE — TELEPHONE ENCOUNTER
Patient is having a bone marrow transplant done later this month.  He is almost out of Lantus and needs a new prescription, he says his Endocrinologist appointment that we scheduled for him for isn't until February.  He says she changed his other medications some and wants to give un update to her directly if possible.  He said he had her cell phone previously but didn't put it in his phone and has lost the number.  His phone number is [phone].

## 2020-10-16 ENCOUNTER — MEDICATION THERAPY MANAGEMENT (OUTPATIENT)
Dept: PHARMACY | Facility: HOSPITAL | Age: 65
End: 2020-10-16

## 2020-10-16 NOTE — TELEPHONE ENCOUNTER
PATIENT WOULD LIKE MAILE TO CONTACT HIM Monday MORNING. [phone] ABOUT INSULIN REFILL. HE HAS ENOUGH TO GET HIM THROUGH TILL Monday.

## 2020-10-16 NOTE — PROGRESS NOTES
MTM telephone follow up- DPd    Contreras is doing well today. He tells me that he is feeling much better being off velcade and now on a lower dose of steroids. He does have some diarrhea for a couple days following darzalex infusion; this is relieved with Lomotil. Medication administration and adherence seem appropriate; he does not miss any doses. Contreras is on schedule to begin transplant process in about 1 month. Contreras is concerned about getting his insulin refilled; he has contacted his PCP and is waiting a call back Pharmacy will continue to follow.       10/15/2020  Day 22   WBC 3.40 - 10.80 10*3/mm3 3.99   Neutrophils Absolute 1.70 - 7.00 10*3/mm3 1.91   Hemoglobin 13.0 - 17.7 g/dL 9.5 (A)   Hematocrit 37.5 - 51.0 % 28.4 (A)   Platelets 140 - 450 10*3/mm3 171     Thanks,   Yolande Huitron, PharmD

## 2020-10-19 DIAGNOSIS — E11.22 TYPE 2 DIABETES MELLITUS WITH CHRONIC KIDNEY DISEASE, WITH LONG-TERM CURRENT USE OF INSULIN, UNSPECIFIED CKD STAGE (HCC): ICD-10-CM

## 2020-10-19 DIAGNOSIS — Z79.4 TYPE 2 DIABETES MELLITUS WITH CHRONIC KIDNEY DISEASE, WITH LONG-TERM CURRENT USE OF INSULIN, UNSPECIFIED CKD STAGE (HCC): ICD-10-CM

## 2020-10-19 RX ORDER — BLOOD-GLUCOSE METER
KIT MISCELLANEOUS
Qty: 200 EACH | Refills: 2 | Status: SHIPPED | OUTPATIENT
Start: 2020-10-19 | End: 2021-02-19 | Stop reason: SDUPTHER

## 2020-10-19 NOTE — TELEPHONE ENCOUNTER
Spoke with patient over the phone; pt will be having heart and lung testing on 10/30/20 prior to bone marrow transplant on 11/16/20; oncology recently decreased the amount of steroid patient gets with treatments and has helped blood sugars; pt has upcoming appointment with endocrine, but not until February 2021; at this point, blood sugars are much better; pt checks blood sugar twice daily and needs new Rx for test strips; PA is needed for Lantus; will complete PA; sent new Rx for test strips; instructed to follow up if sugars not as well controlled.

## 2020-10-19 NOTE — TELEPHONE ENCOUNTER
CALLED PATIENT, NO ANSWER LEFT VM FOR PATIENT, WE HAVE SAMPLES HERE AT THE [facility] OFFICE THAT HE CAN

## 2020-10-21 RX ORDER — FAMOTIDINE 10 MG/ML
20 INJECTION, SOLUTION INTRAVENOUS AS NEEDED
Status: CANCELLED | OUTPATIENT
Start: 2020-10-22

## 2020-10-21 RX ORDER — DIPHENHYDRAMINE HCL 25 MG
25 CAPSULE ORAL ONCE
Status: CANCELLED | OUTPATIENT
Start: 2020-10-22

## 2020-10-21 RX ORDER — SODIUM CHLORIDE 9 MG/ML
250 INJECTION, SOLUTION INTRAVENOUS ONCE
Status: CANCELLED | OUTPATIENT
Start: 2020-10-22

## 2020-10-21 RX ORDER — ACETAMINOPHEN 500 MG
1000 TABLET ORAL ONCE
Status: CANCELLED | OUTPATIENT
Start: 2020-10-22

## 2020-10-21 RX ORDER — DIPHENHYDRAMINE HYDROCHLORIDE 50 MG/ML
50 INJECTION INTRAMUSCULAR; INTRAVENOUS AS NEEDED
Status: CANCELLED | OUTPATIENT
Start: 2020-10-22

## 2020-10-21 RX ORDER — MEPERIDINE HYDROCHLORIDE 50 MG/ML
25 INJECTION INTRAMUSCULAR; INTRAVENOUS; SUBCUTANEOUS
Status: CANCELLED | OUTPATIENT
Start: 2020-10-22

## 2020-10-22 ENCOUNTER — INFUSION (OUTPATIENT)
Dept: ONCOLOGY | Facility: HOSPITAL | Age: 65
End: 2020-10-22

## 2020-10-22 ENCOUNTER — TELEPHONE (OUTPATIENT)
Dept: FAMILY MEDICINE CLINIC | Facility: CLINIC | Age: 65
End: 2020-10-22

## 2020-10-22 ENCOUNTER — LAB (OUTPATIENT)
Dept: LAB | Facility: HOSPITAL | Age: 65
End: 2020-10-22

## 2020-10-22 VITALS
HEART RATE: 96 BPM | BODY MASS INDEX: 30.49 KG/M2 | TEMPERATURE: 97.1 F | DIASTOLIC BLOOD PRESSURE: 71 MMHG | OXYGEN SATURATION: 96 % | RESPIRATION RATE: 16 BRPM | WEIGHT: 250.4 LBS | SYSTOLIC BLOOD PRESSURE: 119 MMHG

## 2020-10-22 DIAGNOSIS — D63.1 ANEMIA IN STAGE 3A CHRONIC KIDNEY DISEASE (HCC): ICD-10-CM

## 2020-10-22 DIAGNOSIS — C90.00 MULTIPLE MYELOMA NOT HAVING ACHIEVED REMISSION (HCC): Primary | ICD-10-CM

## 2020-10-22 DIAGNOSIS — N18.31 ANEMIA IN STAGE 3A CHRONIC KIDNEY DISEASE (HCC): ICD-10-CM

## 2020-10-22 DIAGNOSIS — C90.00 MULTIPLE MYELOMA NOT HAVING ACHIEVED REMISSION (HCC): ICD-10-CM

## 2020-10-22 LAB
ALBUMIN SERPL-MCNC: 3.9 G/DL (ref 3.5–5.2)
ALBUMIN/GLOB SERPL: 1.4 G/DL (ref 1.1–2.4)
ALP SERPL-CCNC: 79 U/L (ref 38–116)
ALT SERPL W P-5'-P-CCNC: 18 U/L (ref 0–41)
ANION GAP SERPL CALCULATED.3IONS-SCNC: 12.7 MMOL/L (ref 5–15)
AST SERPL-CCNC: 11 U/L (ref 0–40)
B2 MICROGLOB SERPL-MCNC: 13 MG/L (ref 0.8–2.2)
BASOPHILS # BLD AUTO: 0.06 10*3/MM3 (ref 0–0.2)
BASOPHILS NFR BLD AUTO: 1.4 % (ref 0–1.5)
BILIRUB SERPL-MCNC: 0.4 MG/DL (ref 0.2–1.2)
BUN SERPL-MCNC: 53 MG/DL (ref 6–20)
BUN/CREAT SERPL: 18.9 (ref 7.3–30)
CALCIUM SPEC-SCNC: 8.9 MG/DL (ref 8.5–10.2)
CHLORIDE SERPL-SCNC: 102 MMOL/L (ref 98–107)
CO2 SERPL-SCNC: 22.3 MMOL/L (ref 22–29)
CREAT SERPL-MCNC: 2.8 MG/DL (ref 0.7–1.3)
DEPRECATED RDW RBC AUTO: 49.1 FL (ref 37–54)
EOSINOPHIL # BLD AUTO: 0.18 10*3/MM3 (ref 0–0.4)
EOSINOPHIL NFR BLD AUTO: 4.1 % (ref 0.3–6.2)
ERYTHROCYTE [DISTWIDTH] IN BLOOD BY AUTOMATED COUNT: 14.7 % (ref 12.3–15.4)
FERRITIN SERPL-MCNC: 402.9 NG/ML (ref 30–400)
GFR SERPL CREATININE-BSD FRML MDRD: 23 ML/MIN/1.73
GLOBULIN UR ELPH-MCNC: 2.7 GM/DL (ref 1.8–3.5)
GLUCOSE SERPL-MCNC: 291 MG/DL (ref 74–124)
HCT VFR BLD AUTO: 28.3 % (ref 37.5–51)
HGB BLD-MCNC: 9 G/DL (ref 13–17.7)
IMM GRANULOCYTES # BLD AUTO: 0.03 10*3/MM3 (ref 0–0.05)
IMM GRANULOCYTES NFR BLD AUTO: 0.7 % (ref 0–0.5)
IRON 24H UR-MRATE: 55 MCG/DL (ref 59–158)
IRON SATN MFR SERPL: 20 % (ref 14–48)
LYMPHOCYTES # BLD AUTO: 0.42 10*3/MM3 (ref 0.7–3.1)
LYMPHOCYTES NFR BLD AUTO: 9.6 % (ref 19.6–45.3)
MCH RBC QN AUTO: 29.3 PG (ref 26.6–33)
MCHC RBC AUTO-ENTMCNC: 31.8 G/DL (ref 31.5–35.7)
MCV RBC AUTO: 92.2 FL (ref 79–97)
MONOCYTES # BLD AUTO: 0.18 10*3/MM3 (ref 0.1–0.9)
MONOCYTES NFR BLD AUTO: 4.1 % (ref 5–12)
NEUTROPHILS NFR BLD AUTO: 3.5 10*3/MM3 (ref 1.7–7)
NEUTROPHILS NFR BLD AUTO: 80.1 % (ref 42.7–76)
NRBC BLD AUTO-RTO: 0 /100 WBC (ref 0–0.2)
PLATELET # BLD AUTO: 169 10*3/MM3 (ref 140–450)
PMV BLD AUTO: 10.1 FL (ref 6–12)
POTASSIUM SERPL-SCNC: 4.5 MMOL/L (ref 3.5–4.7)
PROT SERPL-MCNC: 6.6 G/DL (ref 6.3–8)
RBC # BLD AUTO: 3.07 10*6/MM3 (ref 4.14–5.8)
SODIUM SERPL-SCNC: 137 MMOL/L (ref 134–145)
TIBC SERPL-MCNC: 280 MCG/DL (ref 249–505)
TRANSFERRIN SERPL-MCNC: 200 MG/DL (ref 200–360)
WBC # BLD AUTO: 4.37 10*3/MM3 (ref 3.4–10.8)

## 2020-10-22 PROCEDURE — 85025 COMPLETE CBC W/AUTO DIFF WBC: CPT

## 2020-10-22 PROCEDURE — C9062 DARATUMUMAB HYALURONIDASE: HCPCS | Performed by: NURSE PRACTITIONER

## 2020-10-22 PROCEDURE — 96372 THER/PROPH/DIAG INJ SC/IM: CPT

## 2020-10-22 PROCEDURE — 25010000002 EPOETIN ALFA PER 1000 UNITS: Performed by: INTERNAL MEDICINE

## 2020-10-22 PROCEDURE — 82728 ASSAY OF FERRITIN: CPT

## 2020-10-22 PROCEDURE — 96401 CHEMO ANTI-NEOPL SQ/IM: CPT

## 2020-10-22 PROCEDURE — 82232 ASSAY OF BETA-2 PROTEIN: CPT | Performed by: INTERNAL MEDICINE

## 2020-10-22 PROCEDURE — 25010000002 DARATUMUMAB-HYALURONIDASE-FIHJ 1800-30000 MG-UT/15ML SOLUTION: Performed by: NURSE PRACTITIONER

## 2020-10-22 PROCEDURE — 63710000001 DIPHENHYDRAMINE PER 50 MG: Performed by: NURSE PRACTITIONER

## 2020-10-22 PROCEDURE — 36415 COLL VENOUS BLD VENIPUNCTURE: CPT

## 2020-10-22 PROCEDURE — 84466 ASSAY OF TRANSFERRIN: CPT

## 2020-10-22 PROCEDURE — 80053 COMPREHEN METABOLIC PANEL: CPT

## 2020-10-22 PROCEDURE — 83540 ASSAY OF IRON: CPT

## 2020-10-22 RX ORDER — DIPHENHYDRAMINE HCL 25 MG
25 CAPSULE ORAL ONCE
Status: COMPLETED | OUTPATIENT
Start: 2020-10-22 | End: 2020-10-22

## 2020-10-22 RX ORDER — SODIUM CHLORIDE 9 MG/ML
250 INJECTION, SOLUTION INTRAVENOUS ONCE
Status: DISCONTINUED | OUTPATIENT
Start: 2020-10-22 | End: 2020-10-22 | Stop reason: HOSPADM

## 2020-10-22 RX ORDER — ACETAMINOPHEN 500 MG
1000 TABLET ORAL ONCE
Status: COMPLETED | OUTPATIENT
Start: 2020-10-22 | End: 2020-10-22

## 2020-10-22 RX ADMIN — ERYTHROPOIETIN 20000 UNITS: 20000 INJECTION, SOLUTION INTRAVENOUS; SUBCUTANEOUS at 14:15

## 2020-10-22 RX ADMIN — DARATUMUMAB AND HYALURONIDASE-FIHJ (HUMAN RECOMBINANT) 1800 MG: 1800; 30000 INJECTION SUBCUTANEOUS at 14:07

## 2020-10-22 RX ADMIN — DIPHENHYDRAMINE HYDROCHLORIDE 25 MG: 25 CAPSULE ORAL at 13:21

## 2020-10-22 RX ADMIN — ACETAMINOPHEN 1000 MG: 500 TABLET ORAL at 13:21

## 2020-10-22 NOTE — TELEPHONE ENCOUNTER
Patient called and states the insurance won't release his Lantus, he says they first said a Doctor discontinued it.  Then they started saying they didn't know why they can't give it tohim.  He doesn't know what to do and wants to know if Brianna could check into it?  The pharmacy is [facility].  His phone number is [phone].

## 2020-10-23 DIAGNOSIS — Z79.4 TYPE 2 DIABETES MELLITUS WITH CHRONIC KIDNEY DISEASE, WITH LONG-TERM CURRENT USE OF INSULIN, UNSPECIFIED CKD STAGE (HCC): Primary | ICD-10-CM

## 2020-10-23 DIAGNOSIS — E11.22 TYPE 2 DIABETES MELLITUS WITH CHRONIC KIDNEY DISEASE, WITH LONG-TERM CURRENT USE OF INSULIN, UNSPECIFIED CKD STAGE (HCC): Primary | ICD-10-CM

## 2020-10-23 RX ORDER — INSULIN GLARGINE 100 [IU]/ML
15 INJECTION, SOLUTION SUBCUTANEOUS NIGHTLY
Qty: 15 ML | Refills: 1 | Status: SHIPPED | OUTPATIENT
Start: 2020-10-23 | End: 2021-01-15 | Stop reason: SDUPTHER

## 2020-10-23 NOTE — TELEPHONE ENCOUNTER
CALLED INSURANCE AND THEY DO NOT COVER THAT MEDICATION FOR HIM, THE ALTERNATIVE IS BASAGLAR  UNITS. AND THIS MEDICATION IS COVERED UNDER HIS INSURANCE  WITH NO COST TO HIM, PLEASE SEND TO PHARMACY

## 2020-10-23 NOTE — TELEPHONE ENCOUNTER
Spoke with patient over the phone; pt notified sent Rx for Basaglar instead of Lantus; has upcoming cardiac testing prior to upcoming bone marrow transplant; decrease in steroids has helped; emotional support provided.

## 2020-10-26 LAB
ALBUMIN SERPL ELPH-MCNC: 3.2 G/DL (ref 2.9–4.4)
ALBUMIN/GLOB SERPL: 1.2 {RATIO} (ref 0.7–1.7)
ALPHA1 GLOB SERPL ELPH-MCNC: 0.2 G/DL (ref 0–0.4)
ALPHA2 GLOB SERPL ELPH-MCNC: 1 G/DL (ref 0.4–1)
B-GLOBULIN SERPL ELPH-MCNC: 0.7 G/DL (ref 0.7–1.3)
GAMMA GLOB SERPL ELPH-MCNC: 0.9 G/DL (ref 0.4–1.8)
GLOBULIN SER-MCNC: 2.8 G/DL (ref 2.2–3.9)
IGA SERPL-MCNC: 26 MG/DL (ref 61–437)
IGG SERPL-MCNC: 929 MG/DL (ref 603–1613)
IGM SERPL-MCNC: 16 MG/DL (ref 20–172)
INTERPRETATION SERPL IEP-IMP: ABNORMAL
KAPPA LC FREE SER-MCNC: 23 MG/L (ref 3.3–19.4)
KAPPA LC FREE/LAMBDA FREE SER: 0.25 {RATIO} (ref 0.26–1.65)
LABORATORY COMMENT REPORT: ABNORMAL
LAMBDA LC FREE SERPL-MCNC: 91.9 MG/L (ref 5.7–26.3)
M PROTEIN SERPL ELPH-MCNC: 0.7 G/DL
PROT SERPL-MCNC: 6 G/DL (ref 6–8.5)

## 2020-10-27 ENCOUNTER — TELEPHONE (OUTPATIENT)
Dept: ONCOLOGY | Facility: CLINIC | Age: 65
End: 2020-10-27

## 2020-10-27 NOTE — TELEPHONE ENCOUNTER
----- Message from Beti Persaud RN sent at 10/27/2020  2:30 PM EDT -----  Pt not due for treatment this week.  He is due next week, please reschedule appts to next week 11/5.    Thank you!  ----- Message -----  From: Red Del Valle MD  Sent: 10/27/2020   2:27 PM EDT  To: Beti Persaud RN    I reviewed his chemotherapy care plan and schedule.  I agree that he is not due for treatment this week since he is now in cycle #3 with Daratumumab given every 2 weeks.    Thank you  ----- Message -----  From: Beti Persaud RN  Sent: 10/27/2020   1:31 PM EDT  To: MD Dr Ivon Whyte, I was just reviewing the infusion schedule for Thursday and I believe he is not due for treatment again until next week 11/5. He just got treated 10/22  He is on Cycle 3 which transitions to every two weeks.  Will you make sure that's correct, and i'll send a message to the appt desk to reschedule to next week.    Thank you!Beti

## 2020-10-28 DIAGNOSIS — C90.00 MULTIPLE MYELOMA NOT HAVING ACHIEVED REMISSION (HCC): ICD-10-CM

## 2020-10-28 RX ORDER — LENALIDOMIDE 10 MG/1
CAPSULE ORAL
Qty: 21 CAPSULE | Refills: 0 | Status: SHIPPED | OUTPATIENT
Start: 2020-10-28 | End: 2021-01-15

## 2020-10-28 NOTE — TELEPHONE ENCOUNTER
Pt is on my calendar to refill Revlimid. Per last office note from Dr Belle-pt is to continue 10 mg 21 days on 7 days off. Pts last delivery was on 10/13/2020.    I have routed the Rx to Dr Del Valle for signature. Once signed it will be escribed to [facility]    Confirmation that Dr Del Valle has signed the Revlimid rx. This was escribed to [facility] [facility]

## 2020-10-29 ENCOUNTER — APPOINTMENT (OUTPATIENT)
Dept: ONCOLOGY | Facility: HOSPITAL | Age: 65
End: 2020-10-29

## 2020-10-29 ENCOUNTER — APPOINTMENT (OUTPATIENT)
Dept: LAB | Facility: HOSPITAL | Age: 65
End: 2020-10-29

## 2020-10-30 PROBLEM — N40.0 BPH (BENIGN PROSTATIC HYPERPLASIA): Status: ACTIVE | Noted: 2020-10-30

## 2020-10-30 PROBLEM — E11.3413 SEVERE NONPROLIFERATIVE DIABETIC RETINOPATHY OF BOTH EYES WITH MACULAR EDEMA ASSOCIATED WITH TYPE 2 DIABETES MELLITUS: Status: ACTIVE | Noted: 2020-09-02

## 2020-10-30 PROBLEM — E78.2 MIXED HYPERLIPIDEMIA: Status: ACTIVE | Noted: 2020-10-30

## 2020-11-04 RX ORDER — AMLODIPINE BESYLATE 2.5 MG/1
2.5 TABLET ORAL DAILY
Qty: 30 TABLET | Refills: 1 | Status: CANCELLED | OUTPATIENT
Start: 2020-11-04

## 2020-11-05 ENCOUNTER — LAB (OUTPATIENT)
Dept: LAB | Facility: HOSPITAL | Age: 65
End: 2020-11-05

## 2020-11-05 ENCOUNTER — OFFICE VISIT (OUTPATIENT)
Dept: ONCOLOGY | Facility: CLINIC | Age: 65
End: 2020-11-05

## 2020-11-05 ENCOUNTER — INFUSION (OUTPATIENT)
Dept: ONCOLOGY | Facility: HOSPITAL | Age: 65
End: 2020-11-05

## 2020-11-05 VITALS
HEIGHT: 76 IN | TEMPERATURE: 97.8 F | DIASTOLIC BLOOD PRESSURE: 77 MMHG | OXYGEN SATURATION: 99 % | BODY MASS INDEX: 31.89 KG/M2 | SYSTOLIC BLOOD PRESSURE: 134 MMHG | WEIGHT: 261.9 LBS | HEART RATE: 80 BPM | RESPIRATION RATE: 18 BRPM

## 2020-11-05 DIAGNOSIS — N18.31 ANEMIA IN STAGE 3A CHRONIC KIDNEY DISEASE (HCC): ICD-10-CM

## 2020-11-05 DIAGNOSIS — M79.2 NEUROPATHIC PAIN: ICD-10-CM

## 2020-11-05 DIAGNOSIS — D69.6 THROMBOCYTOPENIA (HCC): ICD-10-CM

## 2020-11-05 DIAGNOSIS — C90.00 MULTIPLE MYELOMA NOT HAVING ACHIEVED REMISSION (HCC): Primary | ICD-10-CM

## 2020-11-05 DIAGNOSIS — C90.00 MULTIPLE MYELOMA NOT HAVING ACHIEVED REMISSION (HCC): ICD-10-CM

## 2020-11-05 DIAGNOSIS — D63.1 ANEMIA IN STAGE 3A CHRONIC KIDNEY DISEASE (HCC): ICD-10-CM

## 2020-11-05 DIAGNOSIS — Z85.528 HISTORY OF RENAL CELL CANCER: ICD-10-CM

## 2020-11-05 DIAGNOSIS — Z79.899 ENCOUNTER FOR LONG-TERM CURRENT USE OF HIGH RISK MEDICATION: ICD-10-CM

## 2020-11-05 DIAGNOSIS — M79.89 SWELLING OF BOTH LOWER EXTREMITIES: ICD-10-CM

## 2020-11-05 LAB
BASOPHILS # BLD AUTO: 0.1 10*3/MM3 (ref 0–0.2)
BASOPHILS NFR BLD AUTO: 2.2 % (ref 0–1.5)
DEPRECATED RDW RBC AUTO: 48 FL (ref 37–54)
EOSINOPHIL # BLD AUTO: 0.21 10*3/MM3 (ref 0–0.4)
EOSINOPHIL NFR BLD AUTO: 4.7 % (ref 0.3–6.2)
ERYTHROCYTE [DISTWIDTH] IN BLOOD BY AUTOMATED COUNT: 14.9 % (ref 12.3–15.4)
HCT VFR BLD AUTO: 27.2 % (ref 37.5–51)
HGB BLD-MCNC: 9.2 G/DL (ref 13–17.7)
IMM GRANULOCYTES # BLD AUTO: 0.05 10*3/MM3 (ref 0–0.05)
IMM GRANULOCYTES NFR BLD AUTO: 1.1 % (ref 0–0.5)
LYMPHOCYTES # BLD AUTO: 0.79 10*3/MM3 (ref 0.7–3.1)
LYMPHOCYTES NFR BLD AUTO: 17.7 % (ref 19.6–45.3)
MCH RBC QN AUTO: 30.2 PG (ref 26.6–33)
MCHC RBC AUTO-ENTMCNC: 33.8 G/DL (ref 31.5–35.7)
MCV RBC AUTO: 89.2 FL (ref 79–97)
MONOCYTES # BLD AUTO: 0.48 10*3/MM3 (ref 0.1–0.9)
MONOCYTES NFR BLD AUTO: 10.8 % (ref 5–12)
NEUTROPHILS NFR BLD AUTO: 2.83 10*3/MM3 (ref 1.7–7)
NEUTROPHILS NFR BLD AUTO: 63.5 % (ref 42.7–76)
NRBC BLD AUTO-RTO: 0 /100 WBC (ref 0–0.2)
PLATELET # BLD AUTO: 114 10*3/MM3 (ref 140–450)
PMV BLD AUTO: 13.2 FL (ref 6–12)
RBC # BLD AUTO: 3.05 10*6/MM3 (ref 4.14–5.8)
WBC # BLD AUTO: 4.46 10*3/MM3 (ref 3.4–10.8)

## 2020-11-05 PROCEDURE — 36415 COLL VENOUS BLD VENIPUNCTURE: CPT

## 2020-11-05 PROCEDURE — C9062 DARATUMUMAB HYALURONIDASE: HCPCS | Performed by: INTERNAL MEDICINE

## 2020-11-05 PROCEDURE — 63710000001 DIPHENHYDRAMINE PER 50 MG: Performed by: INTERNAL MEDICINE

## 2020-11-05 PROCEDURE — 99214 OFFICE O/P EST MOD 30 MIN: CPT | Performed by: INTERNAL MEDICINE

## 2020-11-05 PROCEDURE — 25010000002 EPOETIN ALFA PER 1000 UNITS: Performed by: INTERNAL MEDICINE

## 2020-11-05 PROCEDURE — 96372 THER/PROPH/DIAG INJ SC/IM: CPT

## 2020-11-05 PROCEDURE — 96401 CHEMO ANTI-NEOPL SQ/IM: CPT

## 2020-11-05 PROCEDURE — 25010000002 DARATUMUMAB-HYALURONIDASE-FIHJ 1800-30000 MG-UT/15ML SOLUTION: Performed by: INTERNAL MEDICINE

## 2020-11-05 PROCEDURE — 85025 COMPLETE CBC W/AUTO DIFF WBC: CPT

## 2020-11-05 RX ORDER — SODIUM CHLORIDE 9 MG/ML
250 INJECTION, SOLUTION INTRAVENOUS ONCE
Status: DISCONTINUED | OUTPATIENT
Start: 2020-11-05 | End: 2020-11-05 | Stop reason: HOSPADM

## 2020-11-05 RX ORDER — MEPERIDINE HYDROCHLORIDE 50 MG/ML
25 INJECTION INTRAMUSCULAR; INTRAVENOUS; SUBCUTANEOUS
Status: CANCELLED | OUTPATIENT
Start: 2020-11-05

## 2020-11-05 RX ORDER — AMLODIPINE BESYLATE 2.5 MG/1
2.5 TABLET ORAL DAILY
Qty: 30 TABLET | Refills: 1 | Status: SHIPPED | OUTPATIENT
Start: 2020-11-05 | End: 2021-02-19

## 2020-11-05 RX ORDER — FAMOTIDINE 10 MG/ML
20 INJECTION, SOLUTION INTRAVENOUS AS NEEDED
Status: CANCELLED | OUTPATIENT
Start: 2020-11-05

## 2020-11-05 RX ORDER — DIPHENHYDRAMINE HCL 25 MG
25 CAPSULE ORAL ONCE
Status: CANCELLED | OUTPATIENT
Start: 2020-11-05

## 2020-11-05 RX ORDER — DIPHENHYDRAMINE HYDROCHLORIDE 50 MG/ML
50 INJECTION INTRAMUSCULAR; INTRAVENOUS AS NEEDED
Status: CANCELLED | OUTPATIENT
Start: 2020-11-05

## 2020-11-05 RX ORDER — ACETAMINOPHEN 500 MG
1000 TABLET ORAL ONCE
Status: CANCELLED | OUTPATIENT
Start: 2020-11-05

## 2020-11-05 RX ORDER — SODIUM CHLORIDE 9 MG/ML
250 INJECTION, SOLUTION INTRAVENOUS ONCE
Status: CANCELLED | OUTPATIENT
Start: 2020-11-05

## 2020-11-05 RX ORDER — ACETAMINOPHEN 500 MG
1000 TABLET ORAL ONCE
Status: COMPLETED | OUTPATIENT
Start: 2020-11-05 | End: 2020-11-05

## 2020-11-05 RX ORDER — DIPHENHYDRAMINE HCL 25 MG
25 CAPSULE ORAL ONCE
Status: COMPLETED | OUTPATIENT
Start: 2020-11-05 | End: 2020-11-05

## 2020-11-05 RX ADMIN — DARATUMUMAB AND HYALURONIDASE-FIHJ (HUMAN RECOMBINANT) 1800 MG: 1800; 30000 INJECTION SUBCUTANEOUS at 10:30

## 2020-11-05 RX ADMIN — DIPHENHYDRAMINE HYDROCHLORIDE 25 MG: 25 CAPSULE ORAL at 09:54

## 2020-11-05 RX ADMIN — ERYTHROPOIETIN 20000 UNITS: 20000 INJECTION, SOLUTION INTRAVENOUS; SUBCUTANEOUS at 10:39

## 2020-11-05 RX ADMIN — ACETAMINOPHEN 1000 MG: 500 TABLET ORAL at 09:54

## 2020-11-05 NOTE — PROGRESS NOTES
Subjective     CHIEF COMPLAINT:      Chief Complaint   Patient presents with   • Follow-up     no concerns       HISTORY OF PRESENT ILLNESS:     Contreras Albert is a 65 y.o. male patient who returns today for follow up on his multiple myeloma.  He is on daratumumab subcutaneous injection currently every 2 weeks along with Revlimid 10 mg daily for 21 out of a 28-day cycle and weekly Decadron.  Since we reduced the dose of Decadron to 20 mg weekly, he noticed improvement in his visual changes and lower extremity swelling.  He has cataracts and vision worsened recently likely secondary to the steroid therapy but improved after the dose reduction.    Patient also reports improvement in the lower extremity swelling.    Patient was seen at the transplant center at [facility].  He is going to have bone marrow biopsy on 11/17/2020 and cardiopulmonary testing on 11/19/2020.  He will have stem cell collection the day after Thanksgiving.    REVIEW OF SYSTEMS:  Review of Systems   Constitutional: Positive for fatigue. Negative for fever and unexpected weight change.   HENT: Negative for nosebleeds and voice change.    Eyes: Positive for visual disturbance.   Respiratory: Negative for cough and shortness of breath.    Cardiovascular: Positive for leg swelling. Negative for chest pain.   Gastrointestinal: Negative for abdominal pain, blood in stool, constipation, diarrhea, nausea and vomiting.   Genitourinary: Negative for frequency and hematuria.   Musculoskeletal: Negative for back pain and joint swelling.   Skin: Negative for rash.   Neurological: Negative for dizziness and headaches.   Hematological: Negative for adenopathy. Does not bruise/bleed easily.   Psychiatric/Behavioral: Negative for dysphoric mood. The patient is not nervous/anxious.      I reviewed and verified the CC and ROS obtained by the MA.     Past Medical History:   Diagnosis Date   • Anemia    • Cataract    • Chronic renal insufficiency    • Clear cell carcinoma of  kidney, right (CMS/HCC)     Stage I   • Diabetes mellitus (CMS/HCC)    • Enlarged prostate    • Hematoma of right lower leg    • High cholesterol    • Hypertension    • Multiple myeloma (CMS/HCC)    • Multiple rib fractures     on right after fall from 2 story building at age of 30 years   • Renal mass        Past Surgical History:   Procedure Laterality Date   • INSERTION HEMODIALYSIS CATHETER N/A 6/2/2020    Procedure: HEMODIALYSIS CATHETER INSERTION;  Surgeon: Sumanth Hernandez MD;  Location: [facility] OR;  Service: Vascular;  Laterality: N/A;   • NEPHRECTOMY PARTIAL Right 5/18/2016    Procedure: RT NEPHRECTOMY PARTIAL LAPAROSCOPIC WITH DAVINCI ROBOT;  Surgeon: Chad Garcia MD;  Location: [facility] OR;  Service:        Cancer-related family history is not on file.  Social History     Tobacco Use   • Smoking status: Never Smoker   • Smokeless tobacco: Never Used   Substance Use Topics   • Alcohol use: No       MEDICATIONS:    Current Outpatient Medications:   •  acyclovir (ZOVIRAX) 400 MG tablet, Take 1 tablet by mouth 2 (Two) Times a Day., Disp: 60 tablet, Rfl: 7  •  amLODIPine (Norvasc) 2.5 MG tablet, Take 1 tablet by mouth Daily., Disp: 30 tablet, Rfl: 1  •  aspirin (aspirin) 81 MG EC tablet, Take 1 tablet by mouth Daily., Disp: , Rfl:   •  Blood Glucose Monitoring Suppl (FREESTYLE FREEDOM LITE) w/Device kit, 1 kit Daily. Indications: Type 2 Diabetes, Disp: 1 each, Rfl: 0  •  bumetanide (BUMEX) 1 MG tablet, Take 1 tablet by mouth Daily., Disp: 30 tablet, Rfl: 5  •  calcium acetate (PHOSLO) 667 MG tablet, Take 2 tablets by mouth 3 (Three) Times a Day With Meals., Disp: 180 tablet, Rfl: 0  •  dexamethasone (DECADRON) 4 MG tablet, Take 5 tablets by mouth 1 (One) Time Per Week., Disp: 40 tablet, Rfl: 1  •  dexamethasone (DECADRON) 4 MG tablet, Take 10 tablets by mouth on days 1, 8,15, 22 and take 1 tablet on days 2, 3, 16, 17.  Take in the morning with food., Disp: 44 tablet, Rfl: 3  •   diphenoxylate-atropine (Lomotil) 2.5-0.025 MG per tablet, Take 2 tablets by mouth Every 6 (Six) Hours As Needed for Diarrhea., Disp: 120 tablet, Rfl: 3  •  finasteride (PROSCAR) 5 MG tablet, Take 5 mg by mouth daily., Disp: , Rfl:   •  gabapentin (NEURONTIN) 300 MG capsule, Take 1 capsule by mouth 3 (Three) Times a Day., Disp: 90 capsule, Rfl: 2  •  glucose blood (FREESTYLE LITE) test strip, Use to test blood sugar twice daily and as needed., Disp: 200 each, Rfl: 2  •  Insulin Glargine (BASAGLAR KWIKPEN) 100 UNIT/ML injection pen, Inject 15 Units under the skin into the appropriate area as directed Every Night. Adjust dose as per sliding scale., Disp: 15 mL, Rfl: 1  •  Insulin Pen Needle 32G X 4 MM misc, 1 application Daily., Disp: 100 each, Rfl: 0  •  Lancets (FREESTYLE) lancets, Use once then discard to test blood sugar each morning, Disp: 100 each, Rfl: 0  •  lenalidomide (REVLIMID) 10 MG capsule, Take 1 cap po daily for 21 days on then 7 days off., Disp: 21 capsule, Rfl: 0  •  Omega-3 Fatty Acids (FISH OIL) 1000 MG capsule capsule, Take 1 capsule by mouth 2 (Two) Times a Day With Meals., Disp: , Rfl:   •  ondansetron (ZOFRAN) 8 MG tablet, Take 1 tablet by mouth 3 (Three) Times a Day As Needed for Nausea or Vomiting., Disp: 30 tablet, Rfl: 5  •  pantoprazole (PROTONIX) 40 MG EC tablet, Take 1 tablet by mouth Daily., Disp: 30 tablet, Rfl: 0  •  sulfamethoxazole-trimethoprim (BACTRIM DS,SEPTRA DS) 800-160 MG per tablet, Take 1 tablet by mouth 3 (Three) Times a Week. Take 1 tablet on Mondays, Wednesdays and Fridays., Disp: 12 tablet, Rfl: 7  •  tamsulosin (FLOMAX) 0.4 MG capsule 24 hr capsule, Take 1 capsule by mouth every night., Disp: , Rfl:   •  traMADol (ULTRAM) 50 MG tablet, Take 1 tablet by mouth Every 12 (Twelve) Hours As Needed for Moderate Pain  or Severe Pain ., Disp: 30 tablet, Rfl: 0    ALLERGIES:  Allergies   Allergen Reactions   • Lisinopril Unknown - High Severity     Tickle in throat, cannot take due  "to kidney disease   • Crestor [Rosuvastatin] GI Intolerance         Objective   VITAL SIGNS:     Vitals:    11/05/20 0857   BP: 134/77   Pulse: 80   Resp: 18   Temp: 97.8 °F (36.6 °C)   TempSrc: Temporal   SpO2: 99%   Weight: 119 kg (261 lb 14.4 oz)   Height: 193 cm (75.98\")   PainSc: 0-No pain     Body mass index is 31.89 kg/m².     Wt Readings from Last 3 Encounters:   11/05/20 119 kg (261 lb 14.4 oz)   10/22/20 114 kg (250 lb 6.4 oz)   10/15/20 111 kg (244 lb 6.4 oz)       PHYSICAL EXAMINATION:  GENERAL:  The patient appears in good general condition, not in acute distress.  SKIN: No skin rashes. No ecchymosis.  HEAD:  Normocephalic.  EYES:  No Jaundice. No Pallor. Pupils equal. EOMI.  NECK:  Supple with Good ROM. No Thyromegaly. No Masses.  LYMPHATICS:  No cervical or supraclavicular lymphadenopathy.  CHEST: Normal respiratory effort. Lungs clear to auscultation.   CARDIAC:  Normal S1 & S2.  Grade 1 systolic murmur.  Trace edema.  ABDOMEN:  Soft. No tenderness. No Hepatomegaly. No Splenomegaly. No masses.  EXTREMITIES:   No Calf tenderness.  NEUROLOGICAL:  No Focal neurological deficits.       DIAGNOSTIC DATA:     Results from last 7 days   Lab Units 11/05/20  0850   WBC 10*3/mm3 4.46   NEUTROS ABS 10*3/mm3 2.83   HEMOGLOBIN g/dL 9.2*   HEMATOCRIT % 27.2*   PLATELETS 10*3/mm3 114*       Component      Latest Ref Rng & Units 7/16/2020 8/13/2020 9/24/2020 10/22/2020   IgG      603 - 1613 mg/dL 3187 (H) 2273 (H) 1448 929   IgA      61 - 437 mg/dL 17 (L) 18 (L) 29 (L) 26 (L)   IgM      20 - 172 mg/dL 11 (L) 17 (L) 27 16 (L)   Total Protein      6.0 - 8.5 g/dL 7.8 7.2 6.7 6.0   Albumin      2.9 - 4.4 g/dL 3.1 3.1 3.3 3.2   Alpha-1-Globulin      0.0 - 0.4 g/dL 0.3 0.2 0.2 0.2   Alpha-2-Globulin      0.4 - 1.0 g/dL 1.0 0.9 1.0 1.0   Beta Globulin      0.7 - 1.3 g/dL 0.8 0.8 0.7 0.7   Gamma Globulin      0.4 - 1.8 g/dL 2.6 (H) 2.1 (H) 1.4 0.9   M-Jurgen      Not Observed g/dL Comment: Comment: Comment: 0.7 (H) "   Globulin      2.2 - 3.9 g/dL 4.7 (H) 4.1 (H) 3.4 2.8   A/G Ratio      0.7 - 1.7 0.7 0.8 1.0 1.2   Immunofixation Reflex, Serum       Comment Comment Comment Comment (A)   Please note       Comment Comment Comment Comment   Kappa FLC      3.3 - 19.4 mg/L 13.4 15.8 20.3 (H) 23.0 (H)   Free Lambda Light Chains      5.7 - 26.3 mg/L 874.0 (H) 736.1 (H) 234.2 (H) 91.9 (H)   Kappa/Lambda Ratio      0.26 - 1.65 0.02 (L) 0.02 (L) 0.09 (L) 0.25 (L)   Beta-2 Microglobulin      0.8 - 2.2 mg/L    13.0 (H)         Assessment/Plan   1.  IgG lambda multiple myeloma.  · Bone marrow 5/23/2020 hypercellular marrow with 80% involvement of plasma cell myeloma; FISH studies pending  · Bone survey 5/26/2020: Diffuse osteoporosis and demineralization.  However, no discrete lytic lesions.  · SPEP 5/23/2020: M spike 5.1.  IgG 6629 lambda.  Light chain ratio 0 with free lambda light chains 6400.  Beta-2 microglobulin 16.3.  24-hour urine 12.7 g protein per 24-hour with monoclonal lambda free light chain 33.9%, monoclonal IgG lambda 23.5%  · 24-hour urine testing from 5/24/2020 free lambda light chains 8.4 g/L  · Considering renal failure, initiated CyBorD 5/28/20.  Given the significantly elevated light chains with the first cycle of treatment plan to give Velcade days 1, 4, 8, 11; Cytoxan 300 PO mg/m² weekly and dexamethasone 40 mg p.o. days 1, 2, 3, 4, 8 and 15.    · After cycle 1 treatment can likely be altered to weekly dosing of each drug.  · Patient was referred to Dr. Alvares at [facility].  · Patient is tolerating the treatment except for generalized bone achiness after receiving Velcade.  · On 6/18/2020, Velcade was changed to 1.5 mg/m² weekly continuously along with weekly Decadron and oral Cytoxan.  · M protein decreased to 2.4 and free light chain improved to 874 on 7/16/2020 indicating response to therapy.  · M protein decreased to 2.0 and free light chain decreased to 736 on 8/13/2020.   · Although the improvement  indicates response, he continues to have significant amount of monoclonal protein despite being on treatment for 3 months.  · In addition, patient is having side effects from Velcade in the form of painful neuropathy.  · I discussed the case with Dr. Alvares and we recommended stopping Velcade and Cytoxan and changing the treatment to Daratumumab subcutaneous injection weekly along with Revlimid 10 mg daily for 21 out of 28-day cycle and Decadron 40 mg daily.  · Patient was started on daratumumab SQ on 8/27/2020.  · Patient is tolerating daratumumab.  He is now receiving it every 14 days.  · M protein decreased to 0.7 g.  Free lambda light chain decreased to 91 mg.  · Patient was seen by the transplant team at [facility].  He will have a bone marrow biopsy on 11/17/2020 and cardiopulmonary testing on 11/19/2020.  Stem cell collection is scheduled for 11/27/2020 to be followed by admission to the hospital and start of chemotherapy treatment.      2.  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  · B12 folate and iron studies are normal.  · Status post PRBC transfusion on 5/24/2020.  · Patient previously received Procrit with his hemodialysis.  · Patient is currently receiving Procrit.  · Hemoglobin is at 9.2 today.    3.  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  · Showed enlargement of the kidneys concerning for myeloma kidney and light chain deposition disease.  · Patient completed 3 sessions of pheresis on 5/30/2020.  · Creatinine was 5.12 on 5/31/2020  · Creatinine increased to 6.36 on 6/1/2020.  · Patient had HD catheter placed and started on HD on 6/2/2020.   · Kidney function improved in late June 2020 and did not require additional hemodialysis.  · Creatinine is at 2.8 currently.    4.  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  · Patient had mild neuropathy since the start of treatment but symptoms worsened over the past month.  · He is having pain  that worsens with walking.  · I recommended treatment with Neurontin.  I explained the medication side effects including dizziness or sleepiness.  · Patient was started at 300 mg daily with gradual dose increased as tolerated.  · Neurontin was increased slowly to 300 mg twice daily.  · Patient is on Neurontin 300 mg 3 times daily alternating with 300 mg twice daily.   · Neuropathy symptoms are stable.    5.  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  · Status post right partial nephrectomy on 5/18/2016.    6.  Thrombocytopenia.    · Platelet count was gradually declining and this is attributed to the effect of his treatment with Velcade and Cytoxan.    · The gradual declines makes HIT much less likely.   · Platelet count improved to 106,000 on 6/3/2020.  · Platelet count decreased to 89,000 on 6/4/2020.  · Platelet count is currently in the 160,000-170,000.  · Platelet count decreased to 114,000 today likely secondary to the effect of Revlimid.    7.  Lower extremity swelling.    · Patient was previously on Lasix but did not respond.  · He was switched from Lasix to Bumex 1 mg daily on 9/1/2020.  · He continues to notice improvement in the lower extremity swelling.      PLAN:    1.  Proceed with daratumumab SQ today.  2.  We will give Procrit today at 20,000 units.  3.  Continue Revlimid 10 mg daily for 21 out of 28-day cycle.  4.  Continue Decadron 20 mg weekly.  5.  Return in 1 week for CBC and Procrit.  6.  Continue Norvasc 2.5 mg daily.  7.  Will not schedule additional daratumumab since he is going to start transplant process in 2 weeks.  8.  We will see him in follow-up after transplant and we will discuss maintenance therapy.          Red Del Valle MD  11/05/20

## 2020-11-06 ENCOUNTER — MEDICATION THERAPY MANAGEMENT (OUTPATIENT)
Dept: PHARMACY | Facility: HOSPITAL | Age: 65
End: 2020-11-06

## 2020-11-06 NOTE — PROGRESS NOTES
[facility] Lab Review- Darzalex/rev/dex      11/5/2020  Day 15   WBC 3.40 - 10.80 10*3/mm3 4.46   Neutrophils Absolute 1.70 - 7.00 10*3/mm3 2.83   Hemoglobin 13.0 - 17.7 g/dL 9.2 (A)   Hematocrit 37.5 - 51.0 % 27.2 (A)   Platelets 140 - 450 10*3/mm3 114 (A)     MD dictation noted. Pharmacy will continue to follow.     Thanks,   Yolande Huitron, PharmD

## 2020-11-12 ENCOUNTER — LAB (OUTPATIENT)
Dept: LAB | Facility: HOSPITAL | Age: 65
End: 2020-11-12

## 2020-11-12 ENCOUNTER — INFUSION (OUTPATIENT)
Dept: ONCOLOGY | Facility: HOSPITAL | Age: 65
End: 2020-11-12

## 2020-11-12 DIAGNOSIS — C90.00 MULTIPLE MYELOMA NOT HAVING ACHIEVED REMISSION (HCC): ICD-10-CM

## 2020-11-12 DIAGNOSIS — D63.1 ANEMIA IN STAGE 3A CHRONIC KIDNEY DISEASE (HCC): ICD-10-CM

## 2020-11-12 DIAGNOSIS — D63.1 ANEMIA IN STAGE 3A CHRONIC KIDNEY DISEASE (HCC): Primary | ICD-10-CM

## 2020-11-12 DIAGNOSIS — N18.31 ANEMIA IN STAGE 3A CHRONIC KIDNEY DISEASE (HCC): ICD-10-CM

## 2020-11-12 DIAGNOSIS — N18.31 ANEMIA IN STAGE 3A CHRONIC KIDNEY DISEASE (HCC): Primary | ICD-10-CM

## 2020-11-12 LAB
BASOPHILS # BLD AUTO: 0.09 10*3/MM3 (ref 0–0.2)
BASOPHILS NFR BLD AUTO: 2.4 % (ref 0–1.5)
DEPRECATED RDW RBC AUTO: 47.8 FL (ref 37–54)
EOSINOPHIL # BLD AUTO: 0.34 10*3/MM3 (ref 0–0.4)
EOSINOPHIL NFR BLD AUTO: 8.9 % (ref 0.3–6.2)
ERYTHROCYTE [DISTWIDTH] IN BLOOD BY AUTOMATED COUNT: 14.9 % (ref 12.3–15.4)
HCT VFR BLD AUTO: 28.8 % (ref 37.5–51)
HGB BLD-MCNC: 9.8 G/DL (ref 13–17.7)
IMM GRANULOCYTES # BLD AUTO: 0.04 10*3/MM3 (ref 0–0.05)
IMM GRANULOCYTES NFR BLD AUTO: 1.1 % (ref 0–0.5)
LYMPHOCYTES # BLD AUTO: 0.74 10*3/MM3 (ref 0.7–3.1)
LYMPHOCYTES NFR BLD AUTO: 19.5 % (ref 19.6–45.3)
MCH RBC QN AUTO: 30.1 PG (ref 26.6–33)
MCHC RBC AUTO-ENTMCNC: 34 G/DL (ref 31.5–35.7)
MCV RBC AUTO: 88.3 FL (ref 79–97)
MONOCYTES # BLD AUTO: 0.55 10*3/MM3 (ref 0.1–0.9)
MONOCYTES NFR BLD AUTO: 14.5 % (ref 5–12)
NEUTROPHILS NFR BLD AUTO: 2.04 10*3/MM3 (ref 1.7–7)
NEUTROPHILS NFR BLD AUTO: 53.6 % (ref 42.7–76)
NRBC BLD AUTO-RTO: 0 /100 WBC (ref 0–0.2)
PLATELET # BLD AUTO: 114 10*3/MM3 (ref 140–450)
PMV BLD AUTO: 12.8 FL (ref 6–12)
RBC # BLD AUTO: 3.26 10*6/MM3 (ref 4.14–5.8)
WBC # BLD AUTO: 3.8 10*3/MM3 (ref 3.4–10.8)

## 2020-11-12 PROCEDURE — 85025 COMPLETE CBC W/AUTO DIFF WBC: CPT

## 2020-11-12 PROCEDURE — 36415 COLL VENOUS BLD VENIPUNCTURE: CPT

## 2020-11-12 PROCEDURE — 25010000002 EPOETIN ALFA PER 1000 UNITS: Performed by: INTERNAL MEDICINE

## 2020-11-12 PROCEDURE — 96372 THER/PROPH/DIAG INJ SC/IM: CPT

## 2020-11-12 RX ADMIN — ERYTHROPOIETIN 15000 UNITS: 20000 INJECTION, SOLUTION INTRAVENOUS; SUBCUTANEOUS at 09:15

## 2020-11-13 ENCOUNTER — MEDICATION THERAPY MANAGEMENT (OUTPATIENT)
Dept: PHARMACY | Facility: HOSPITAL | Age: 65
End: 2020-11-13

## 2020-11-13 NOTE — PROGRESS NOTES
[facility] telephone follow up- Revlimid    No answer today.  direct line [phone].     Thanks,   Yumiko EsparzaD

## 2020-11-18 ENCOUNTER — TELEPHONE (OUTPATIENT)
Dept: ONCOLOGY | Facility: CLINIC | Age: 65
End: 2020-11-18

## 2020-11-18 NOTE — TELEPHONE ENCOUNTER
Gracie singletary from [facility] for refill for patient on  Revlimid 10 mg    [phone] Phone  [phone] Fax

## 2020-11-18 NOTE — TELEPHONE ENCOUNTER
HUB call from Jessica from [facility] forwarded to me. She was calling for a refill for Revlimd.    I was informed by Dr Del Valle that pt is going for stem cell transplant and he will not need the Revlimid.    He is starting the stem cell collection and transplantation. I don't believe they want him at [facility] taking any medicines for the multiple myeloma now.     Thank you     I contacted [facility] back and spoke to Jannette-I let her know the above and she noted pts account.

## 2021-01-15 ENCOUNTER — OFFICE VISIT (OUTPATIENT)
Dept: FAMILY MEDICINE CLINIC | Facility: CLINIC | Age: 66
End: 2021-01-15

## 2021-01-15 VITALS
SYSTOLIC BLOOD PRESSURE: 134 MMHG | BODY MASS INDEX: 33.44 KG/M2 | WEIGHT: 274.6 LBS | HEART RATE: 93 BPM | HEIGHT: 76 IN | OXYGEN SATURATION: 95 % | TEMPERATURE: 97.4 F | DIASTOLIC BLOOD PRESSURE: 82 MMHG

## 2021-01-15 DIAGNOSIS — N18.9 CHRONIC KIDNEY DISEASE, UNSPECIFIED CKD STAGE: ICD-10-CM

## 2021-01-15 DIAGNOSIS — E78.2 MIXED HYPERLIPIDEMIA: ICD-10-CM

## 2021-01-15 DIAGNOSIS — Z85.528 HISTORY OF RENAL CELL CANCER: ICD-10-CM

## 2021-01-15 DIAGNOSIS — E11.22 TYPE 2 DIABETES MELLITUS WITH CHRONIC KIDNEY DISEASE, WITH LONG-TERM CURRENT USE OF INSULIN, UNSPECIFIED CKD STAGE (HCC): Primary | ICD-10-CM

## 2021-01-15 DIAGNOSIS — I10 ESSENTIAL HYPERTENSION: ICD-10-CM

## 2021-01-15 DIAGNOSIS — Z90.5 H/O PARTIAL NEPHRECTOMY: ICD-10-CM

## 2021-01-15 DIAGNOSIS — Z79.4 TYPE 2 DIABETES MELLITUS WITH CHRONIC KIDNEY DISEASE, WITH LONG-TERM CURRENT USE OF INSULIN, UNSPECIFIED CKD STAGE (HCC): Primary | ICD-10-CM

## 2021-01-15 PROBLEM — N28.9 KIDNEY DISORDER: Status: ACTIVE | Noted: 2021-01-15

## 2021-01-15 PROCEDURE — 99214 OFFICE O/P EST MOD 30 MIN: CPT | Performed by: NURSE PRACTITIONER

## 2021-01-15 RX ORDER — DAPSONE 100 MG/1
100 TABLET ORAL DAILY
COMMUNITY
Start: 2021-01-12 | End: 2021-08-20

## 2021-01-15 RX ORDER — ACYCLOVIR 800 MG/1
800 TABLET ORAL DAILY
COMMUNITY
Start: 2020-12-23 | End: 2022-10-17 | Stop reason: SDUPTHER

## 2021-01-15 RX ORDER — INSULIN GLARGINE 100 [IU]/ML
INJECTION, SOLUTION SUBCUTANEOUS
Qty: 15 ML | Refills: 0 | Status: SHIPPED | OUTPATIENT
Start: 2021-01-15 | End: 2021-02-19 | Stop reason: SDUPTHER

## 2021-01-15 NOTE — PROGRESS NOTES
Subjective   Contreras Albert is a 65 y.o. male.     Chief Complaint   Patient presents with   • Hypertension     follow up    • Diabetes       History of Present Illness   Patient presents for follow up HTN: takes Amlodipine 5 mg daily; does not typically monitor BP; no headaches; swelling has improved; elevation of legs and walking helps; no orthostasis; fasting today.    F/U DM2: takes Basaglar twice daily and needs refill; last A1c 7.5%; has been using Basaglar 10 units daily and 15 units nightly since appetite has improved; adjusts does according to sliding scale; blood sugar has been more consistent since has been off Decadron prior to chemo treatments; has upcoming appointment with bubba in February; watches carbs/sugars in diet; has been avoiding artificial sugars and helps; monitors blood sugar, typically runs 110-200s; has been trying to eat more protein for anemia.    F/U multiple myeloma/history of right kidney cancer: had stem cell transplant on 12/9/20; feels like stamina was set back about 2-3 months; has neuropathy and swelling in feet and legs, has improved, but taking a long time; takes Gabapentin and helps; has been able to start walking some; will have to wait 100 days after transplant before can get any vaccines; will have to get all childhood immunizations again later this year; needs to get back to nephrology; has seen Dr. Mendez in past; would like to see Dr. Hutchison instead; had seen Dr. Hutchison when in [facility] in 2020; kidney function seems to be back to baseline at this point; may get back to maintenance chemo treatment; will have a bone marrow biopsy at 100 days after stem cell transplant; sees Dr. Del Valle oncology; will be having repeat CMP and CBC next week.    Sees Dr. Vincent at [facility] for prostate problems; takes Finasteride and Tamsulosin daily.    The following portions of the patient's history were reviewed and updated as appropriate: allergies, current medications, past  family history, past medical history, past social history, past surgical history and problem list.    Current Outpatient Medications on File Prior to Visit   Medication Sig   • acyclovir (ZOVIRAX) 800 MG tablet Take 800 mg by mouth Daily.   • amLODIPine (Norvasc) 2.5 MG tablet Take 1 tablet by mouth Daily. (Patient taking differently: Take 5 mg by mouth Daily.)   • Blood Glucose Monitoring Suppl (FREESTYLE FREEDOM LITE) w/Device kit 1 kit Daily. Indications: Type 2 Diabetes   • dapsone 100 MG tablet Take 100 mg by mouth Daily.   • finasteride (PROSCAR) 5 MG tablet Take 5 mg by mouth daily.   • gabapentin (NEURONTIN) 300 MG capsule Take 1 capsule by mouth 3 (Three) Times a Day.   • glucose blood (FREESTYLE LITE) test strip Use to test blood sugar twice daily and as needed.   • Insulin Pen Needle 32G X 4 MM misc 1 application Daily.   • Lancets (FREESTYLE) lancets Use once then discard to test blood sugar each morning   • ondansetron (ZOFRAN) 8 MG tablet Take 1 tablet by mouth 3 (Three) Times a Day As Needed for Nausea or Vomiting.   • tamsulosin (FLOMAX) 0.4 MG capsule 24 hr capsule Take 1 capsule by mouth every night.   • dexamethasone (DECADRON) 4 MG tablet Take 5 tablets by mouth 1 (One) Time Per Week.   • traMADol (ULTRAM) 50 MG tablet Take 1 tablet by mouth Every 12 (Twelve) Hours As Needed for Moderate Pain  or Severe Pain .     No current facility-administered medications on file prior to visit.        Past Medical History:   Diagnosis Date   • Anemia    • Cataract    • Chronic renal insufficiency    • Clear cell carcinoma of kidney, right (CMS/HCC)     Stage I   • Diabetes mellitus (CMS/HCC)    • Enlarged prostate    • H/O stem cell transplant (CMS/HCC) 12/09/2020   • Hematoma of right lower leg    • High cholesterol    • Hypertension    • Multiple myeloma (CMS/HCC)    • Multiple rib fractures     on right after fall from 2 story building at age of 30 years   • Renal mass        Past Surgical History:    Procedure Laterality Date   • INSERTION HEMODIALYSIS CATHETER N/A 6/2/2020    Procedure: HEMODIALYSIS CATHETER INSERTION;  Surgeon: Sumanth Hernandez MD;  Location: [facility] MAIN OR;  Service: Vascular;  Laterality: N/A;   • NEPHRECTOMY PARTIAL Right 5/18/2016    Procedure: RT NEPHRECTOMY PARTIAL LAPAROSCOPIC WITH DAVINCI ROBOT;  Surgeon: Chad Garcia MD;  Location: [facility] MAIN OR;  Service:        Family History   Problem Relation Age of Onset   • Parkinsonism Mother    • Arrhythmia Father    • Heart failure Father 84   • Diabetes Maternal Uncle        Social History     Socioeconomic History   • Marital status:      Spouse name: Gabby   • Number of children: Not on file   • Years of education: Not on file   • Highest education level: Not on file   Occupational History     Employer: RETIRED   Tobacco Use   • Smoking status: Never Smoker   • Smokeless tobacco: Never Used   Substance and Sexual Activity   • Alcohol use: No   • Drug use: No   • Sexual activity: Defer       Review of Systems   Constitutional: Positive for fatigue. Negative for fever, unexpected weight gain and unexpected weight loss. Appetite change: has improved. Chills: on occasion, was told to discuss with endocrine.   HENT: Negative for ear pain, sinus pressure, sore throat and trouble swallowing.    Eyes: Positive for blurred vision (has early stages of cataracts, sees ophthalmology). Negative for pain.   Respiratory: Negative for cough, chest tightness and shortness of breath.    Cardiovascular: Negative for chest pain and palpitations.   Gastrointestinal: Negative for abdominal pain, blood in stool, constipation, diarrhea, GERD and indigestion.   Endocrine: Positive for cold intolerance.   Genitourinary: Negative for dysuria and frequency.   Skin: Negative for rash.   Neurological: Negative for syncope and light-headedness.   Hematological: Does not bruise/bleed easily.   Psychiatric/Behavioral: Negative for depressed mood.  "The patient is not nervous/anxious.        Objective   Vitals:    01/15/21 1026   BP: 134/82   BP Location: Left arm   Patient Position: Sitting   Cuff Size: Adult   Pulse: 93   Temp: 97.4 °F (36.3 °C)   SpO2: 95%   Weight: 125 kg (274 lb 9.6 oz)   Height: 193 cm (75.98\")     Body mass index is 33.44 kg/m².    Physical Exam  Vitals signs and nursing note reviewed.   Constitutional:       General: He is not in acute distress.     Appearance: He is well-developed and well-groomed. He is not diaphoretic.   HENT:      Head: Normocephalic.      Right Ear: External ear normal. No decreased hearing noted. Right ear middle ear effusion: mild fluid behind TM.      Left Ear: Tympanic membrane and external ear normal. No decreased hearing noted.      Nose: Nose normal.      Right Sinus: No maxillary sinus tenderness or frontal sinus tenderness.      Left Sinus: No maxillary sinus tenderness or frontal sinus tenderness.   Eyes:      Conjunctiva/sclera: Conjunctivae normal.   Neck:      Musculoskeletal: Normal range of motion and neck supple.      Vascular: No carotid bruit.   Cardiovascular:      Rate and Rhythm: Normal rate and regular rhythm.      Pulses:           Dorsalis pedis pulses are 1+ on the right side and 1+ on the left side.        Posterior tibial pulses are 1+ on the right side and 1+ on the left side.      Heart sounds: Normal heart sounds. No murmur.   Pulmonary:      Effort: Pulmonary effort is normal. No respiratory distress.      Breath sounds: Normal breath sounds.   Abdominal:      General: Bowel sounds are normal.      Palpations: Abdomen is soft. There is no hepatomegaly or splenomegaly.      Tenderness: There is no abdominal tenderness.   Musculoskeletal:      Right lower leg: Edema (1-2+ foot/ankle) present.      Left lower leg: Edema (1-2+ foot/ankle) present.        Feet:    Feet:      Right foot:      Protective Sensation: 10 sites tested. 6 sites sensed.      Skin integrity: Dry skin present.      " Left foot:      Protective Sensation: 10 sites tested. 7 sites sensed.      Skin integrity: Dry skin present.      Comments: Diabetic Foot Exam Performed and Monofilament Test Performed    Skin:     General: Skin is warm and dry.   Neurological:      Mental Status: He is alert and oriented to person, place, and time.      Gait: Abnormal gait: mild limping on left, guarded gait with cane    Psychiatric:         Mood and Affect: Mood normal.         Behavior: Behavior normal.         Thought Content: Thought content normal.         Judgment: Judgment normal.         Lab Results   Component Value Date    WBC 3.80 11/12/2020    RBC 3.26 (L) 11/12/2020    HGB 9.8 (L) 11/12/2020    HCT 28.8 (L) 11/12/2020    MCV 88.3 11/12/2020    MCH 30.1 11/12/2020    MCHC 34.0 11/12/2020    RDW 14.9 11/12/2020    RDWSD 47.8 11/12/2020    MPV 12.8 (H) 11/12/2020     (L) 11/12/2020    NEUTRORELPCT 53.6 11/12/2020    LYMPHORELPCT 19.5 (L) 11/12/2020    MONORELPCT 14.5 (H) 11/12/2020    EOSRELPCT 8.9 (H) 11/12/2020    BASORELPCT 2.4 (H) 11/12/2020    AUTOIGPER 1.1 (H) 11/12/2020    NEUTROABS 2.04 11/12/2020    LYMPHSABS 0.74 11/12/2020    MONOSABS 0.55 11/12/2020    EOSABS 0.34 11/12/2020    BASOSABS 0.09 11/12/2020    AUTOIGNUM 0.04 11/12/2020    NRBC 0.0 11/12/2020     Lab Results   Component Value Date    GLUCOSE 291 (H) 10/22/2020    BUN 53 (H) 10/22/2020    CREATININE 2.80 (C) 10/22/2020    EGFRIFNONA 23 (L) 10/22/2020    EGFRIFAFRI  06/03/2020      Comment:      <15 Indicative of kidney failure.    BCR 18.9 10/22/2020    K 4.5 10/22/2020    CO2 22.3 10/22/2020    CALCIUM 8.9 10/22/2020    PROTENTOTREF 6.0 10/22/2020    ALBUMIN 3.90 10/22/2020    ALBUMIN 3.2 10/22/2020    LABIL2 1.2 10/22/2020    AST 11 10/22/2020    ALT 18 10/22/2020      Lab Results   Component Value Date    CHLPL 189 09/03/2020    TRIG 509 (H) 09/03/2020    HDL 28 (L) 09/03/2020    VLDL 82 (H) 09/03/2020    LDL 79 09/03/2020     Lab Results   Component Value  Date    TSH 1.520 09/03/2020     Lab Results   Component Value Date    HGBA1C 7.5 (H) 09/03/2020       Assessment/Plan .  Problem List Items Addressed This Visit     Diabetes mellitus (CMS/[facility]) - Primary    Current Assessment & Plan     Diabetes is improving with treatment.   Continue current treatment regimen.  Discussed foot care.  Diabetes will be reassessed in 3 months.         Relevant Medications    Insulin Glargine (BASAGLAR KWIKPEN) 100 UNIT/ML injection pen    Other Relevant Orders    Hemoglobin A1c    CKD (chronic kidney disease)    Relevant Orders    Ambulatory Referral to Nephrology    Hypertension    Current Assessment & Plan     Hypertension is stable.  Continue current medications.  Ambulatory blood pressure monitoring.  Blood pressure will be reassessed at the next regular appointment.  Continue Amlodipine daily.         History of renal cell cancer    Relevant Orders    Ambulatory Referral to Nephrology    H/O partial nephrectomy - right    Relevant Orders    Ambulatory Referral to Nephrology    Mixed hyperlipidemia    Current Assessment & Plan     Continue to work on healthy diet.         Relevant Orders    Lipid Panel With LDL / HDL Ratio          Return in about 4 months (around 5/15/2021) for Annual physical, Recheck.or sooner if problems or concerns.  Patient has upcoming CMP and CBC next week.

## 2021-01-15 NOTE — PATIENT INSTRUCTIONS
Continue to monitor your blood sugar once daily before a meal and record results.  Continue to monitor your blood pressure periodically and record results.  Continue to work on healthy diet and exercise as tolerated.  Continue to elevate legs when sitting.  Follow up pending lab results.  Follow up in 3-4 months for physical, or sooner if problems or concerns.

## 2021-01-16 LAB
CHOLEST SERPL-MCNC: 173 MG/DL (ref 100–199)
HBA1C MFR BLD: 7.1 % (ref 4.8–5.6)
HDLC SERPL-MCNC: 33 MG/DL
LDLC SERPL CALC-MCNC: 94 MG/DL (ref 0–99)
LDLC/HDLC SERPL: 2.8 RATIO (ref 0–3.6)
TRIGL SERPL-MCNC: 271 MG/DL (ref 0–149)
VLDLC SERPL CALC-MCNC: 46 MG/DL (ref 5–40)

## 2021-01-16 NOTE — ASSESSMENT & PLAN NOTE
Diabetes is improving with treatment.   Continue current treatment regimen.  Discussed foot care.  Diabetes will be reassessed in 3 months.

## 2021-01-16 NOTE — ASSESSMENT & PLAN NOTE
Hypertension is stable.  Continue current medications.  Ambulatory blood pressure monitoring.  Blood pressure will be reassessed at the next regular appointment.  Continue Amlodipine daily.

## 2021-02-12 NOTE — PROGRESS NOTES
Subjective   Contreras Albert is a 65 y.o. male.     New pt ref by Brianna Thompson APRN for dm 2/ testing bs 2-3 x day / last dm eye exam jan 21 with dr Hinson  / last dm foot exam today with dr Haro     Patient is a 65-year-old male who was referred for diabetes consultation by Brianna Thompson NP.    He has known diabetes mellitus since 2001 and started on insulin in 2009.  He is on Basaglar 15 units twice a day.  He checks his blood sugar twice a day.  Fasting glucose 150-180.  Bedtime glucose in the low 200s.  He denies severe hypoglycemic episodes.  He has gained about 60 pounds since May 2020 after he completed his chemotherapy and had stem cell transplant for multiple myeloma.  Hemoglobin A1c done in January 2021 after he received blood transfusion in December 2020 is 7.1%.    His last eye examination was in January 2020.  He has retinopathy and had previous intraocular eye injections on both eyes.  He has not had a retinal laser therapy.  He has chronic kidney disease and has been following Dr. Norm Hutchison.  He has numbness in the feet with stress worsened since he was started on chemotherapy.  He is on gabapentin prescribed by his oncologist Dr. Alvares.    He has hypertension and was taken off amlodipine because of pedal edema.  He has no history of heart attack or stroke.  He denies chest pain or shortness of breath.    He has hyperlipidemia and is on diet alone.  Lipid panel done in January 2021 are as follows: Cholesterol 173.  HDL 33.  LDL 94.  Triglycerides 271.    He has multiple myeloma and had chemotherapy followed by stem cell transplant.  He is off dexamethasone.    He had a previous right partial nephrectomy for cancer.  He has no known recurrence.    He has chronic diarrhea and takes Lomotil and Imodium as needed.  He has history of C. difficile colitis which was treated.  He denies melena or hematochezia.  He has never had a colonoscopy.    The following portions of the patient's history were reviewed and  "updated as appropriate: allergies, current medications, past family history, past medical history, past social history, past surgical history and problem list.    Review of Systems   Respiratory: Negative for shortness of breath.    Cardiovascular: Negative for chest pain and palpitations.   Gastrointestinal: Negative.    Genitourinary: Negative.    Musculoskeletal: Negative for myalgias.   Neurological: Negative for numbness.     Objective      Vitals:    02/19/21 1222   BP: 142/80   BP Location: Right arm   Patient Position: Sitting   Cuff Size: Large Adult   Pulse: 87   SpO2: 98%   Weight: 129 kg (285 lb)   Height: 193 cm (75.98\")     Physical Exam  Constitutional:       General: He is not in acute distress.     Appearance: Normal appearance. He is not ill-appearing, toxic-appearing or diaphoretic.   Eyes:      General:         Right eye: No discharge.         Left eye: No discharge.      Extraocular Movements: Extraocular movements intact.      Conjunctiva/sclera: Conjunctivae normal.   Neck:      Musculoskeletal: No neck rigidity or muscular tenderness.      Vascular: No carotid bruit.   Cardiovascular:      Rate and Rhythm: Normal rate and regular rhythm.      Heart sounds: Normal heart sounds. No murmur. No friction rub. No gallop.    Pulmonary:      Effort: No respiratory distress.      Breath sounds: Normal breath sounds. No stridor. No wheezing, rhonchi or rales.   Chest:      Chest wall: No tenderness.   Abdominal:      General: Bowel sounds are normal. There is no distension.      Palpations: Abdomen is soft. There is no mass.   Musculoskeletal:      Right lower leg: Edema present.      Left lower leg: Edema present.      Comments: No calf tenderness.  No plantar ulcers   Lymphadenopathy:      Cervical: No cervical adenopathy.   Skin:     General: Skin is warm and dry.   Neurological:      General: No focal deficit present.      Mental Status: He is alert and oriented to person, place, and time.      " Comments: Decreased light touch in distal lower extremities   Psychiatric:         Mood and Affect: Mood normal.         Behavior: Behavior normal.       Office Visit on 01/15/2021   Component Date Value Ref Range Status   • Hemoglobin A1C 01/15/2021 7.1* 4.8 - 5.6 % Final    Comment:          Prediabetes: 5.7 - 6.4           Diabetes: >6.4           Glycemic control for adults with diabetes: <7.0     • Total Cholesterol 01/15/2021 173  100 - 199 mg/dL Final   • Triglycerides 01/15/2021 271* 0 - 149 mg/dL Final   • HDL Cholesterol 01/15/2021 33* >39 mg/dL Final   • VLDL Cholesterol [place]ale 01/15/2021 46* 5 - 40 mg/dL Final   • LDL Chol Calc ([facility]) 01/15/2021 94  0 - 99 mg/dL Final   • LDL/HDL RATIO 01/15/2021 2.8  0.0 - 3.6 ratio Final    Comment:                                     LDL/HDL Ratio                                              Men  Women                                1/2 Avg.Risk  1.0    1.5                                    Avg.Risk  3.6    3.2                                 2X Avg.Risk  6.2    5.0                                 3X Avg.Risk  8.0    6.1       Assessment/Plan   Diagnoses and all orders for this visit:    1. Type 2 diabetes mellitus with peripheral neuropathy (CMS/HCC) (Primary)    2. Type 2 diabetes mellitus with complications (CMS/HCC)    3. Essential hypertension    4. Multiple myeloma, remission status unspecified (CMS/[facility])    5. H/O partial nephrectomy - right    6. History of renal cell cancer    7. Mixed hyperlipidemia    8. Type 2 diabetes mellitus with chronic kidney disease, with long-term current use of insulin, unspecified CKD stage (CMS/[facility])  -     Insulin Glargine (BASAGLAR KWIKPEN) 100 UNIT/ML injection pen; 16 units twice a day  Dispense: 5 pen; Refill: 5  -     glucose blood (FREESTYLE LITE) test strip; Use to test blood sugar twice daily and as needed.  Dispense: 200 each; Refill: 5      Increase Basaglar to 16 units twice a day.  Will defer blood pressure control to PCP  and Dr. Hutchison.  Follow-up with oncologist Dr. Alvares  Continue no concentrated sweet low-fat diet.    Copy of my note sent to Brianna Thompson NP, Dr. Norm Hutchison, Dr. Alvares, Dr. Hinson and Dr. Del Valle    RTC 4 mos

## 2021-02-19 ENCOUNTER — OFFICE VISIT (OUTPATIENT)
Dept: ENDOCRINOLOGY | Age: 66
End: 2021-02-19

## 2021-02-19 VITALS
BODY MASS INDEX: 34.7 KG/M2 | HEART RATE: 87 BPM | SYSTOLIC BLOOD PRESSURE: 142 MMHG | WEIGHT: 285 LBS | DIASTOLIC BLOOD PRESSURE: 80 MMHG | HEIGHT: 76 IN | OXYGEN SATURATION: 98 %

## 2021-02-19 DIAGNOSIS — E11.42 TYPE 2 DIABETES MELLITUS WITH PERIPHERAL NEUROPATHY (HCC): Primary | ICD-10-CM

## 2021-02-19 DIAGNOSIS — Z90.5 H/O PARTIAL NEPHRECTOMY: ICD-10-CM

## 2021-02-19 DIAGNOSIS — Z79.4 TYPE 2 DIABETES MELLITUS WITH CHRONIC KIDNEY DISEASE, WITH LONG-TERM CURRENT USE OF INSULIN, UNSPECIFIED CKD STAGE (HCC): ICD-10-CM

## 2021-02-19 DIAGNOSIS — E78.2 MIXED HYPERLIPIDEMIA: ICD-10-CM

## 2021-02-19 DIAGNOSIS — I10 ESSENTIAL HYPERTENSION: ICD-10-CM

## 2021-02-19 DIAGNOSIS — C90.00 MULTIPLE MYELOMA, REMISSION STATUS UNSPECIFIED (HCC): ICD-10-CM

## 2021-02-19 DIAGNOSIS — E11.8 TYPE 2 DIABETES MELLITUS WITH COMPLICATIONS (HCC): ICD-10-CM

## 2021-02-19 DIAGNOSIS — Z85.528 HISTORY OF RENAL CELL CANCER: ICD-10-CM

## 2021-02-19 DIAGNOSIS — E11.22 TYPE 2 DIABETES MELLITUS WITH CHRONIC KIDNEY DISEASE, WITH LONG-TERM CURRENT USE OF INSULIN, UNSPECIFIED CKD STAGE (HCC): ICD-10-CM

## 2021-02-19 PROBLEM — E11.319 TYPE 2 DIABETES MELLITUS WITH RETINOPATHY, WITH LONG-TERM CURRENT USE OF INSULIN: Status: ACTIVE | Noted: 2020-05-22

## 2021-02-19 PROCEDURE — 99244 OFF/OP CNSLTJ NEW/EST MOD 40: CPT | Performed by: INTERNAL MEDICINE

## 2021-02-19 RX ORDER — BLOOD-GLUCOSE METER
KIT MISCELLANEOUS
Qty: 200 EACH | Refills: 5 | Status: SHIPPED | OUTPATIENT
Start: 2021-02-19 | End: 2022-03-04

## 2021-02-19 RX ORDER — DIPHENOXYLATE HYDROCHLORIDE AND ATROPINE SULFATE 2.5; .025 MG/1; MG/1
2 TABLET ORAL 4 TIMES DAILY PRN
COMMUNITY
Start: 2021-01-28 | End: 2022-10-17 | Stop reason: SDUPTHER

## 2021-02-19 RX ORDER — LOPERAMIDE HYDROCHLORIDE 2 MG/1
CAPSULE ORAL
COMMUNITY
Start: 2021-01-21 | End: 2022-11-28 | Stop reason: SDUPTHER

## 2021-02-19 RX ORDER — INSULIN GLARGINE 100 [IU]/ML
INJECTION, SOLUTION SUBCUTANEOUS
Qty: 5 PEN | Refills: 5 | Status: SHIPPED | OUTPATIENT
Start: 2021-02-19 | End: 2021-08-20 | Stop reason: SDUPTHER

## 2021-03-22 ENCOUNTER — BULK ORDERING (OUTPATIENT)
Dept: CASE MANAGEMENT | Facility: OTHER | Age: 66
End: 2021-03-22

## 2021-03-22 DIAGNOSIS — Z23 IMMUNIZATION DUE: ICD-10-CM

## 2021-04-01 ENCOUNTER — TELEPHONE (OUTPATIENT)
Dept: ONCOLOGY | Facility: CLINIC | Age: 66
End: 2021-04-01

## 2021-04-01 NOTE — TELEPHONE ENCOUNTER
Caller: DAVE    Relationship: WITH [facility]    Best call back number: [phone]    What form or medical record are you requesting: NEEDS PATIENT'S LABS FOR HOSPITAL STAY IN MAY, WANTS BETA 2 AND LDH    Who is requesting this form or medical record from you: PATIENT'S PROVIDER    How would you like to receive the form or medical records (pick-up, mail, fax): FAX  If fax, what is the fax number: [phone]    Timeframe paperwork needed: AS SOON AS POSSIBLE    Additional notes: NA

## 2021-04-01 NOTE — TELEPHONE ENCOUNTER
Caller: DAVE    Relationship: [facility]    Best call back number: [phone]    What form or medical record are you requesting: LDH OR LACTATE DEHYDROGENY MAY/JUNE 2020    Who is requesting this form or medical record from you: [facility]    How would you like to receive the form or medical records (pick-up, mail, fax):FAX  If fax, what is the fax number: [phone]

## 2021-05-18 ENCOUNTER — OFFICE VISIT (OUTPATIENT)
Dept: FAMILY MEDICINE CLINIC | Facility: CLINIC | Age: 66
End: 2021-05-18

## 2021-05-18 VITALS
OXYGEN SATURATION: 95 % | SYSTOLIC BLOOD PRESSURE: 140 MMHG | BODY MASS INDEX: 34.7 KG/M2 | HEART RATE: 82 BPM | TEMPERATURE: 98 F | HEIGHT: 76 IN | WEIGHT: 285 LBS | DIASTOLIC BLOOD PRESSURE: 80 MMHG

## 2021-05-18 DIAGNOSIS — D63.1 ANEMIA IN STAGE 3A CHRONIC KIDNEY DISEASE (HCC): ICD-10-CM

## 2021-05-18 DIAGNOSIS — C90.01 MULTIPLE MYELOMA IN REMISSION (HCC): ICD-10-CM

## 2021-05-18 DIAGNOSIS — E11.319 TYPE 2 DIABETES MELLITUS WITH RETINOPATHY, WITH LONG-TERM CURRENT USE OF INSULIN, MACULAR EDEMA PRESENCE UNSPECIFIED, UNSPECIFIED LATERALITY, UNSPECIFIED RETINOPATHY SEVERITY (HCC): ICD-10-CM

## 2021-05-18 DIAGNOSIS — Z79.4 TYPE 2 DIABETES MELLITUS WITH RETINOPATHY, WITH LONG-TERM CURRENT USE OF INSULIN, MACULAR EDEMA PRESENCE UNSPECIFIED, UNSPECIFIED LATERALITY, UNSPECIFIED RETINOPATHY SEVERITY (HCC): ICD-10-CM

## 2021-05-18 DIAGNOSIS — E66.9 CLASS 1 OBESITY WITH SERIOUS COMORBIDITY AND BODY MASS INDEX (BMI) OF 34.0 TO 34.9 IN ADULT, UNSPECIFIED OBESITY TYPE: ICD-10-CM

## 2021-05-18 DIAGNOSIS — N18.31 ANEMIA IN STAGE 3A CHRONIC KIDNEY DISEASE (HCC): ICD-10-CM

## 2021-05-18 DIAGNOSIS — Z00.00 ENCOUNTER FOR ANNUAL PHYSICAL EXAM: Primary | ICD-10-CM

## 2021-05-18 DIAGNOSIS — E78.2 MIXED HYPERLIPIDEMIA: ICD-10-CM

## 2021-05-18 DIAGNOSIS — I10 ESSENTIAL HYPERTENSION: ICD-10-CM

## 2021-05-18 PROBLEM — E66.811 CLASS 1 OBESITY WITH SERIOUS COMORBIDITY AND BODY MASS INDEX (BMI) OF 34.0 TO 34.9 IN ADULT: Status: ACTIVE | Noted: 2021-05-18

## 2021-05-18 PROCEDURE — 99396 PREV VISIT EST AGE 40-64: CPT | Performed by: NURSE PRACTITIONER

## 2021-05-18 RX ORDER — AMLODIPINE BESYLATE 5 MG/1
5 TABLET ORAL DAILY
COMMUNITY
Start: 2021-01-11 | End: 2021-05-18

## 2021-05-18 RX ORDER — BUMETANIDE 1 MG/1
1 TABLET ORAL DAILY
COMMUNITY
Start: 2020-11-27 | End: 2021-11-19

## 2021-05-18 RX ORDER — LENALIDOMIDE 5 MG/1
5 CAPSULE ORAL DAILY
COMMUNITY
Start: 2021-04-01 | End: 2021-11-19 | Stop reason: SINTOL

## 2021-05-18 NOTE — PATIENT INSTRUCTIONS
Continue to monitor your blood sugar once daily before a meal and record results.  Continue to monitor your blood pressure periodically and record results.  Continue to work on healthy diet and exercise.  Follow up pending lab results.  Follow up in 6 months, or sooner if problems or concerns.  Follow up as scheduled with renal.
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
## 2021-05-19 PROBLEM — Z85.528 HISTORY OF MALIGNANT NEOPLASM OF KIDNEY: Status: ACTIVE | Noted: 2020-05-22

## 2021-05-19 PROBLEM — E11.311 DIABETIC MACULAR EDEMA(362.07): Status: ACTIVE | Noted: 2020-05-26

## 2021-05-19 PROBLEM — H52.13 BILATERAL MYOPIA: Status: ACTIVE | Noted: 2021-04-27

## 2021-05-19 PROBLEM — R77.8 ELEVATED TROPONIN: Status: RESOLVED | Noted: 2020-05-22 | Resolved: 2021-05-19

## 2021-05-19 PROBLEM — N17.9 ACUTE RENAL FAILURE SYNDROME: Status: RESOLVED | Noted: 2020-05-21 | Resolved: 2021-05-19

## 2021-05-19 PROBLEM — E11.3413 SEVERE NONPROLIFERATIVE DIABETIC RETINOPATHY OF BOTH EYES WITH MACULAR EDEMA ASSOCIATED WITH TYPE 2 DIABETES MELLITUS: Status: RESOLVED | Noted: 2020-09-02 | Resolved: 2021-05-19

## 2021-05-19 PROBLEM — E11.349 SEVERE NONPROLIFERATIVE DIABETIC RETINOPATHY(362.06): Status: RESOLVED | Noted: 2020-09-02 | Resolved: 2021-05-19

## 2021-05-19 PROBLEM — E11.319 DIABETIC RETINOPATHY ASSOCIATED WITH TYPE 2 DIABETES MELLITUS: Status: ACTIVE | Noted: 2020-09-02

## 2021-05-19 PROBLEM — N17.9 ACUTE RENAL FAILURE SYNDROME: Status: ACTIVE | Noted: 2020-05-21

## 2021-05-19 PROBLEM — H53.002 AMBLYOPIA OF LEFT EYE: Status: ACTIVE | Noted: 2021-04-27

## 2021-05-19 PROBLEM — H35.033 BILATERAL HYPERTENSIVE RETINOPATHY: Status: ACTIVE | Noted: 2021-04-27

## 2021-05-19 PROBLEM — E11.3413 TYPE 2 DIABETES MELLITUS WITH SEVERE NONPROLIFERATIVE DIABETIC RETINOPATHY WITH MACULAR EDEMA, BILATERAL: Status: ACTIVE | Noted: 2021-04-27

## 2021-05-19 PROBLEM — E11.349 SEVERE NONPROLIFERATIVE DIABETIC RETINOPATHY(362.06): Status: ACTIVE | Noted: 2020-09-02

## 2021-05-19 PROBLEM — H25.13 AGE-RELATED NUCLEAR CATARACT OF BOTH EYES: Status: ACTIVE | Noted: 2021-04-27

## 2021-05-19 PROBLEM — R79.89 ELEVATED TROPONIN: Status: RESOLVED | Noted: 2020-05-22 | Resolved: 2021-05-19

## 2021-05-19 PROBLEM — H93.19 TINNITUS: Status: ACTIVE | Noted: 2021-05-14

## 2021-05-19 NOTE — ASSESSMENT & PLAN NOTE
Patient's (Body mass index is 34.71 kg/m².) indicates that they are obese (BMI >30) with obesity-related health conditions that include dyslipidemias . Obesity is stable. BMI is is above average; BMI management plan is completed. We discussed portion control and increasing exercise.

## 2021-05-19 NOTE — ASSESSMENT & PLAN NOTE
Hypertension is stable per home BP readings.  Regular aerobic exercise.  Continue current medications.  Ambulatory blood pressure monitoring.  Blood pressure will be reassessed at the next regular appointment.  Continue Bumex daily.

## 2021-05-19 NOTE — ASSESSMENT & PLAN NOTE
Diabetes is improving with treatment.   Continue current treatment regimen.  Regular aerobic exercise.  Reminded to get yearly retinal exam.  Diabetes will be reassessed in 6 months.  Continue Basaglar twice daily.

## 2021-07-28 DIAGNOSIS — I10 ESSENTIAL HYPERTENSION: ICD-10-CM

## 2021-07-28 DIAGNOSIS — R68.89 ABNORMAL ENDOCRINE LABORATORY TEST FINDING: ICD-10-CM

## 2021-07-28 DIAGNOSIS — E78.2 MIXED HYPERLIPIDEMIA: ICD-10-CM

## 2021-07-28 DIAGNOSIS — E11.42 TYPE 2 DIABETES MELLITUS WITH PERIPHERAL NEUROPATHY (HCC): Primary | ICD-10-CM

## 2021-07-29 DIAGNOSIS — E78.2 MIXED HYPERLIPIDEMIA: ICD-10-CM

## 2021-07-29 DIAGNOSIS — E11.42 TYPE 2 DIABETES MELLITUS WITH PERIPHERAL NEUROPATHY (HCC): Primary | ICD-10-CM

## 2021-07-29 DIAGNOSIS — I10 ESSENTIAL HYPERTENSION: ICD-10-CM

## 2021-07-29 DIAGNOSIS — R68.89 ABNORMAL ENDOCRINE LABORATORY TEST FINDING: ICD-10-CM

## 2021-08-11 NOTE — PROGRESS NOTES
Subjective   Contreras Albert is a 66 y.o. male.     F/u  for dm 2/ testing bs 2-3 x day / last dm eye exam aug 2021 with dr Hinson  / last dm foot exam 2/19/21 with dr Haro     Patient is a 66-year-old male who came in for follow-up     He has known diabetes mellitus since 2001 and started on insulin in 2009.  He is on Basaglar 15-30 units twice a day.  He checks his blood sugar twice a day.  Fasting glucose .  Bedtime glucose in the 110-303.  He denies severe hypoglycemic episodes.  He has lost 7 pounds since February 2021.  Hemoglobin A1c done in August 2021 is 7.1%.     His last eye examination was in 7/21.  He has retinopathy and had previous intraocular eye injections on both eyes.  He has not had a retinal laser therapy.  He has chronic kidney disease and has been following Dr. Norm Hutchison.  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  He is on gabapentin 100 mg TID  prescribed by his oncologist Dr. Alvares.     He has hypertension and is on Bumex 1 mg/day.  He was taken off amlodipine because of pedal edema.  He has no history of heart attack or stroke.  He denies chest pain or shortness of breath.     He has hyperlipidemia and is on diet alone.  Lipid panel done in August 2021 are as follows: Cholesterol 135.  HDL 23.  LDL 43.  Triglycerides 475.  TSH done in 8/21 is normal at 1.28     He has multiple myeloma and had chemotherapy followed by stem cell transplant.  He is on Revlimid.  His last transfusion was in 1/21. He is off dexamethasone.     He had a previous right partial nephrectomy for cancer.  He has no known recurrence.     He has chronic diarrhea and takes Lomotil and Imodium as needed.  He has history of C. difficile colitis which was treated.  He denies melena or hematochezia.  He has never had a colonoscopy.  He has not seen a GI before.        The following portions of the patient's history were reviewed and updated as appropriate: allergies, current medications,  "past family history, past medical history, past social history, past surgical history and problem list.    Review of Systems   Constitutional: Negative for diaphoresis.   HENT: Negative.    Eyes: Positive for visual disturbance.   Respiratory: Negative.  Negative for shortness of breath.    Cardiovascular: Negative.  Negative for chest pain and palpitations.   Gastrointestinal: Positive for diarrhea.   Endocrine: Negative for cold intolerance, heat intolerance and polyuria.   Genitourinary: Negative.    Musculoskeletal: Negative.  Negative for myalgias.   Neurological: Positive for numbness. Negative for weakness.   Hematological: Negative for adenopathy. Does not bruise/bleed easily.     Objective      Vitals:    08/20/21 1159   BP: 134/70   BP Location: Right arm   Patient Position: Sitting   Cuff Size: Large Adult   Pulse: 81   SpO2: 98%   Weight: 126 kg (278 lb 3.2 oz)   Height: 193 cm (75.98\")     Physical Exam  Constitutional:       General: He is not in acute distress.     Appearance: Normal appearance. He is not ill-appearing or toxic-appearing.   Eyes:      General: No scleral icterus.        Right eye: No discharge.         Left eye: No discharge.   Neck:      Vascular: No carotid bruit.   Cardiovascular:      Rate and Rhythm: Normal rate and regular rhythm.      Heart sounds: No murmur heard.   No gallop.    Pulmonary:      Effort: Pulmonary effort is normal.      Breath sounds: Normal breath sounds. No rales.   Abdominal:      General: There is no distension.      Palpations: Abdomen is soft. There is no mass.      Tenderness: There is no abdominal tenderness.   Musculoskeletal:      Right lower leg: No edema.      Left lower leg: No edema.   Lymphadenopathy:      Cervical: No cervical adenopathy.   Skin:     General: Skin is warm.   Neurological:      Mental Status: He is alert and oriented to person, place, and time.   Psychiatric:         Behavior: Behavior normal.       Results Encounter on " 08/03/2021   Component Date Value Ref Range Status   • Hemoglobin A1C 08/09/2021 7.1* 4.8 - 5.6 % Final    Comment:          Prediabetes: 5.7 - 6.4           Diabetes: >6.4           Glycemic control for adults with diabetes: <7.0     • Glucose 08/09/2021 218* 65 - 99 mg/dL Final   • BUN 08/09/2021 35* 8 - 27 mg/dL Final   • Creatinine 08/09/2021 2.71* 0.76 - 1.27 mg/dL Final   • eGFR Non African Am 08/09/2021 24* >59 mL/min/1.73 Final   • eGFR African Am 08/09/2021 27* >59 mL/min/1.73 Final    Comment: **[facility] currently reports eGFR in compliance with the current**    recommendations of the National Kidney Foundation. [facility] will    update reporting as new guidelines are published from the NKF-ASN    Task force.     • BUN/Creatinine Ratio 08/09/2021 13  10 - 24 Final   • Sodium 08/09/2021 141  134 - 144 mmol/L Final   • Potassium 08/09/2021 4.0  3.5 - 5.2 mmol/L Final   • Chloride 08/09/2021 105  96 - 106 mmol/L Final   • Total CO2 08/09/2021 23  20 - 29 mmol/L Final   • Calcium 08/09/2021 8.1* 8.6 - 10.2 mg/dL Final   • Total Protein 08/09/2021 6.3  6.0 - 8.5 g/dL Final   • Albumin 08/09/2021 3.8  3.8 - 4.8 g/dL Final   • Globulin 08/09/2021 2.5  1.5 - 4.5 g/dL Final   • A/G Ratio 08/09/2021 1.5  1.2 - 2.2 Final   • Total Bilirubin 08/09/2021 0.5  0.0 - 1.2 mg/dL Final   • Alkaline Phosphatase 08/09/2021 79  48 - 121 IU/L Final   • AST (SGOT) 08/09/2021 11  0 - 40 IU/L Final   • ALT (SGPT) 08/09/2021 9  0 - 44 IU/L Final   • Total Cholesterol 08/09/2021 135  100 - 199 mg/dL Final   • Triglycerides 08/09/2021 475* 0 - 149 mg/dL Final   • HDL Cholesterol 08/09/2021 23* >39 mg/dL Final   • VLDL Cholesterol Wale 08/09/2021 69* 5 - 40 mg/dL Final   • LDL Chol Calc ([facility] [place]) 08/09/2021 43  0 - 99 mg/dL Final   • Creatinine, Urine 08/09/2021 CANCELED  mg/dL Final-Edited    Comment: Test not performed. No urine specimen received.    Result canceled by the ancillary.     • Microalbumin, Urine 08/09/2021 CANCELED    Final-Edited    Comment: Test not performed    Result canceled by the ancillary.     • Interpretation 08/09/2021 Note   Final    Supplemental report is available.   • Interpretation 08/09/2021 Note   Final    Comment: -------------------------------  CHRONIC KIDNEY DISEASE:  EGFR, BLOOD PRESSURE, AND PROTEINURIA ASSESSMENT  We presume eGFR has been less than 60 mL/min/1.73mE2 on at  least two occasions spaced at least 3 months apart. Current  eGFR is 24 mL/min/1.73mE2 corresponding to CKD stage 4.  Multiply eGFR by 1.159 if patient is .  Consider referral to a nephrologist and appropriate patient  education concerning renal replacement therapies. Suggest  hepatitis B vaccination and confirmation of immunity with  serologic testing. Potassium is within goal, 4.0 mmol/L.  Glycemic control (HB A1c: 7.1 %) is above goal but may be  appropriate if patient is at risk for hypoglycemia.  EGFR, BLOOD PRESSURE, AND PROTEINURIA TREATMENT SUGGESTIONS  -  Guidelines recommend a target blood pressure of 120/80 mmHg  or less in CKD patients to reduce cardiovascular risk and  CKD progression. Assessment of albuminuria (urine  albumin:creatinine ratio or urine protein:creatinine ratio  preferred) is                            recommended at least annually in CKD patients  for staging and disease prognosis.  EGFR, BLOOD PRESSURE, AND PROTEINURIA FOLLOW-UP  -  fasting Renal Panel within 4 months; Spot Urine Panel is  recommended by KDOQI guidelines, at least yearly; Hemoglobin  A1C within 3 months;  BONE and MINERAL ASSESSMENT  Calcium is below goal, 8.1 mg/dL. Carbon Dioxide is within  goal, 23 mmol/L. Guidelines recommend the measurement of  25-hydroxy vitamin D in patients with CKD.  BONE and MINERAL TREATMENT SUGGESTIONS  -  Interpretations require simultaneous measurements of serum  calcium and phosphorus.  BONE and MINERAL FOLLOW-UP  -  fasting PTH with Renal Panel within 6 months; 25-Hydroxy  Vitamin D is  recommended by KDOQI guidelines, at least  yearly;  LIPIDS ASSESSMENT  LDL-C is optimal, 43 mg/dL. Triglyceride is high, 475 mg/dL.  Non-HDL Cholesterol is normal, 112 mg/dL. HDL-C is low, 23  mg/dL.  LIPIDS TREATMENT SUGGESTIONS  -  Therapeutic lifestyle changes are always valuable to  maintain optim                           al blood lipid status (diet, exercise, weight  management). Continue statin if in use. Consider measurement  of LDL particle number or Apo B to adjudicate need for  further LDL lowering therapy. If statin cannot be tolerated  or increased, alternatives include use of an intestinal  agent (ezetimibe or bile acid sequestrant), niacin, and/or  fish oil.  LIPIDS FOLLOW-UP  -  fasting Lipid Panel within 3 months;  ANEMIA ASSESSMENT  Most recent order does not include a CBC Panel or iron  studies.  ANEMIA FOLLOW-UP  -  CBC is recommended by KDOQI guidelines, at least yearly;  -------------------------------  DISCLAIMER  These assessments and treatment suggestions are provided as  a convenience in support of the physician-patient  relationship and are not intended to replace the physician's  clinical judgment. They are derived from national guidelines  in addition to other evidence and expert opinion. The  clinician should consider this information within the  context of clinical opinion and                            the individual patient.  SEE GUIDANCE FOR CHRONIC KIDNEY DISEASE PROGRAM: Kidney  Disease Improving Global Outcomes (KDIGO) clinical practice  guidelines are at http://kdigo.org/home/guidelines/.  National Kidney Foundation Kidney Disease Outcomes Quality  Initiative (KDOQI (TM)), with its limitations and  disclaimers, are at www.kidney.org/professionals/KDOQI. This  program is intended for patients who have been diagnosed  with stages 3, 4, or pre-dialysis 5 CKD. It is not intended  for children, pregnant patients, or transplant patients.     • TSH 08/09/2021 1.280  0.450 - 4.500  uIU/mL Final   • Specimen Status 08/09/2021 CANCELED   Final-Edited    Comment: Test not performed. No urine specimen received.        TEST:  [id number]  Albumin/Creatinine Ratio,Urine    Result canceled by the ancillary.       Assessment/Plan   Diagnoses and all orders for this visit:    1. Type 2 diabetes mellitus with chronic kidney disease, with long-term current use of insulin, unspecified CKD stage (CMS/HCC) (Primary)  -     Insulin Glargine (BASAGLAR KWIKPEN) 100 UNIT/ML injection pen; 15-30 units twice a day  Dispense: 10 pen; Refill: 5    2. Essential hypertension    3. Stage 4 chronic kidney disease (CMS/HCC)    4. H/O partial nephrectomy - right    5. Multiple myeloma in remission (CMS/HCC)    6. Anemia in stage 3a chronic kidney disease (CMS/HCC)    7. Mixed hyperlipidemia    8. History of malignant neoplasm of kidney    9. 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  Continue no concentrated sweet low-fat diet.  Suggest GI consultation.  Continue Bumex per Dr. Hutchison.    Copy my note sent to Brianna Thompson NP, Dr. Norm Hutchison, Dr. Alvares, and Dr. Hinson.    RTC 4 mos

## 2021-08-20 ENCOUNTER — OFFICE VISIT (OUTPATIENT)
Dept: ENDOCRINOLOGY | Age: 66
End: 2021-08-20

## 2021-08-20 VITALS
DIASTOLIC BLOOD PRESSURE: 70 MMHG | HEIGHT: 76 IN | HEART RATE: 81 BPM | OXYGEN SATURATION: 98 % | WEIGHT: 278.2 LBS | BODY MASS INDEX: 33.88 KG/M2 | SYSTOLIC BLOOD PRESSURE: 134 MMHG

## 2021-08-20 DIAGNOSIS — N18.4 STAGE 4 CHRONIC KIDNEY DISEASE (HCC): ICD-10-CM

## 2021-08-20 DIAGNOSIS — I10 ESSENTIAL HYPERTENSION: ICD-10-CM

## 2021-08-20 DIAGNOSIS — Z90.5 H/O PARTIAL NEPHRECTOMY: ICD-10-CM

## 2021-08-20 DIAGNOSIS — E78.2 MIXED HYPERLIPIDEMIA: ICD-10-CM

## 2021-08-20 DIAGNOSIS — Z79.4 TYPE 2 DIABETES MELLITUS WITH CHRONIC KIDNEY DISEASE, WITH LONG-TERM CURRENT USE OF INSULIN, UNSPECIFIED CKD STAGE (HCC): Primary | ICD-10-CM

## 2021-08-20 DIAGNOSIS — Z85.528 HISTORY OF MALIGNANT NEOPLASM OF KIDNEY: ICD-10-CM

## 2021-08-20 DIAGNOSIS — D63.1 ANEMIA IN STAGE 3A CHRONIC KIDNEY DISEASE (HCC): ICD-10-CM

## 2021-08-20 DIAGNOSIS — C90.01 MULTIPLE MYELOMA IN REMISSION (HCC): ICD-10-CM

## 2021-08-20 DIAGNOSIS — E11.22 TYPE 2 DIABETES MELLITUS WITH CHRONIC KIDNEY DISEASE, WITH LONG-TERM CURRENT USE OF INSULIN, UNSPECIFIED CKD STAGE (HCC): Primary | ICD-10-CM

## 2021-08-20 DIAGNOSIS — N18.31 ANEMIA IN STAGE 3A CHRONIC KIDNEY DISEASE (HCC): ICD-10-CM

## 2021-08-20 DIAGNOSIS — E11.3413 TYPE 2 DIABETES MELLITUS WITH BOTH EYES AFFECTED BY SEVERE NONPROLIFERATIVE RETINOPATHY AND MACULAR EDEMA, WITH LONG-TERM CURRENT USE OF INSULIN (HCC): ICD-10-CM

## 2021-08-20 DIAGNOSIS — Z79.4 TYPE 2 DIABETES MELLITUS WITH BOTH EYES AFFECTED BY SEVERE NONPROLIFERATIVE RETINOPATHY AND MACULAR EDEMA, WITH LONG-TERM CURRENT USE OF INSULIN (HCC): ICD-10-CM

## 2021-08-20 PROCEDURE — 99214 OFFICE O/P EST MOD 30 MIN: CPT | Performed by: INTERNAL MEDICINE

## 2021-08-20 RX ORDER — ESCITALOPRAM OXALATE 5 MG/1
5 TABLET ORAL DAILY
COMMUNITY
End: 2022-10-13 | Stop reason: SDUPTHER

## 2021-08-20 RX ORDER — INSULIN GLARGINE 100 [IU]/ML
INJECTION, SOLUTION SUBCUTANEOUS
Qty: 10 PEN | Refills: 5 | Status: SHIPPED | OUTPATIENT
Start: 2021-08-20 | End: 2022-03-03 | Stop reason: SDUPTHER

## 2021-10-01 PROBLEM — E11.3499 SEVERE NONPROLIFERATIVE DIABETIC RETINOPATHY (HCC): Status: RESOLVED | Noted: 2020-09-02 | Resolved: 2021-05-19

## 2021-11-19 ENCOUNTER — OFFICE VISIT (OUTPATIENT)
Dept: FAMILY MEDICINE CLINIC | Facility: CLINIC | Age: 66
End: 2021-11-19

## 2021-11-19 VITALS
TEMPERATURE: 97.5 F | DIASTOLIC BLOOD PRESSURE: 88 MMHG | WEIGHT: 283 LBS | OXYGEN SATURATION: 99 % | HEART RATE: 80 BPM | BODY MASS INDEX: 34.46 KG/M2 | HEIGHT: 76 IN | SYSTOLIC BLOOD PRESSURE: 138 MMHG

## 2021-11-19 DIAGNOSIS — C90.01 MULTIPLE MYELOMA IN REMISSION (HCC): ICD-10-CM

## 2021-11-19 DIAGNOSIS — Z79.4 TYPE 2 DIABETES MELLITUS WITH BOTH EYES AFFECTED BY SEVERE NONPROLIFERATIVE RETINOPATHY AND MACULAR EDEMA, WITH LONG-TERM CURRENT USE OF INSULIN (HCC): ICD-10-CM

## 2021-11-19 DIAGNOSIS — F41.9 ANXIETY: ICD-10-CM

## 2021-11-19 DIAGNOSIS — I10 PRIMARY HYPERTENSION: Primary | ICD-10-CM

## 2021-11-19 DIAGNOSIS — N18.32 ANEMIA IN STAGE 3B CHRONIC KIDNEY DISEASE (HCC): ICD-10-CM

## 2021-11-19 DIAGNOSIS — D63.1 ANEMIA IN STAGE 3B CHRONIC KIDNEY DISEASE (HCC): ICD-10-CM

## 2021-11-19 DIAGNOSIS — E11.3413 TYPE 2 DIABETES MELLITUS WITH BOTH EYES AFFECTED BY SEVERE NONPROLIFERATIVE RETINOPATHY AND MACULAR EDEMA, WITH LONG-TERM CURRENT USE OF INSULIN (HCC): ICD-10-CM

## 2021-11-19 PROBLEM — E11.3419: Status: ACTIVE | Noted: 2020-09-02

## 2021-11-19 PROBLEM — E11.311 DIABETIC MACULAR EDEMA(362.07): Status: RESOLVED | Noted: 2020-05-26 | Resolved: 2021-11-19

## 2021-11-19 PROBLEM — E11.311 TYPE 2 DIABETES MELLITUS WITH UNSPECIFIED DIABETIC RETINOPATHY WITH MACULAR EDEMA: Status: RESOLVED | Noted: 2020-05-26 | Resolved: 2021-11-19

## 2021-11-19 PROBLEM — E11.311 DIABETIC MACULAR EDEMA: Status: RESOLVED | Noted: 2020-05-26 | Resolved: 2021-11-19

## 2021-11-19 PROBLEM — E11.319 TYPE 2 DIABETES MELLITUS WITH RETINOPATHY, WITH LONG-TERM CURRENT USE OF INSULIN: Status: RESOLVED | Noted: 2020-05-22 | Resolved: 2021-11-19

## 2021-11-19 PROBLEM — E11.311 TYPE 2 DIABETES MELLITUS WITH UNSPECIFIED DIABETIC RETINOPATHY WITH MACULAR EDEMA: Status: ACTIVE | Noted: 2020-05-26

## 2021-11-19 PROBLEM — E11.311 DIABETIC MACULAR EDEMA(362.07): Status: ACTIVE | Noted: 2020-05-26

## 2021-11-19 PROCEDURE — 99214 OFFICE O/P EST MOD 30 MIN: CPT | Performed by: NURSE PRACTITIONER

## 2021-11-19 RX ORDER — ERGOCALCIFEROL 1.25 MG/1
50000 CAPSULE ORAL WEEKLY
COMMUNITY
Start: 2022-09-26 | End: 2022-11-28 | Stop reason: SDUPTHER

## 2021-11-19 RX ORDER — BUMETANIDE 2 MG/1
1 TABLET ORAL DAILY
COMMUNITY
Start: 2021-11-11 | End: 2022-09-28 | Stop reason: HOSPADM

## 2021-11-19 RX ORDER — POMALIDOMIDE 2 MG/1
4 CAPSULE ORAL DAILY
COMMUNITY
Start: 2021-11-18 | End: 2022-10-14 | Stop reason: SDUPTHER

## 2021-11-19 NOTE — PROGRESS NOTES
Subjective   Contreras Albert is a 66 y.o. male.     Chief Complaint   Patient presents with   • Diabetes     follow up    • Hypertension     follow up       History of Present Illness   Patient presents for follow up DM2: takes Basaglar twice daily; sees bubba; last A1c 7.1%; takes Gabapentin for neuropathy; taking Gabapentin 100 mg in morning and 300 mg at bedtime and helps; watches intake of carbs/sugars; has eliminated artificial sweetners and has helped; has decreased junk food and sodas; blood sugar has been running 120s or less for the most part; not much exercise due to poor vision recently; had recent eye exam per Dr Hinson at [facility].    Sees Dr. Vincent at [facility] for prostate; takes Finasteride and Tamsulosin daily; had recent follow up about 2 months ago and no changes; PSA improved with last labs.    F/U multiple myeloma: had started clinical trial for chemo med Revlimid; seemed to be doing pretty well, had 4 full 28 day cycles and then decided could not tolerate; had severe diarrhea; anything ate or drank went right through; had some dehydration and other problems; started back on Velcade had taken previously; previously had swelling in legs and neuropathy when taking; once started again, swelling and neuropathy returned after first treatment; also had some diarrhea; stopped Velcade and started Pomalyst and seems to be tolerating at this point; has been on medication for about 1 month; had some scalp itch when symptoms first started, but improved; cancer is still in remission.    F/U HTN/edema: takes Bumex daily; has been on Amlodipine in past; BP typically runs 120-130s/80s; no headaches; no orthostasis since stopped Revlimid; sees Dr. Gonzalez, renal and increased Bumex with recent swelling after Velcade and has helped; now that has been off Velcade, swelling has started to go down and sensation has improved.    Had low calcium, Vitamin D, and magnesium when had bad diarrhea; when levels low, had severe  muscle cramps; went into [facility] regularly for IV calcium replacement; also started calcium supplement that takes daily and Vitamin D weekly; renal also started magnesium supplement; has also resumed drinking glass of milk daily and has helped calcium level.    Also, vision has worsened; has cataracts and seemed worse after steroids had during chemo treatments; had eye surgery to repair bleeding in retina of right eye; this aggravated cataract in right eye; will be seeing cataract surgeon (Dr. Shaikh) 11/29/21; can see colors and shapes and feeling like looking through dense fog.    F/U anxiety: takes Escitalopram daily and works well; feels so much better since has been on medication; no SI/HI.    Had COVID-19 vaccines and flu vaccine; no more vaccines until 1 year anniversary of stem cell transplant 12/9/20; will need all childhood vaccines again.    Gets Procrit injections as needed for anemia.    Has upcoming appointment with GI on 11/30/21.    The following portions of the patient's history were reviewed and updated as appropriate: allergies, current medications, past family history, past medical history, past social history, past surgical history and problem list.    Current Outpatient Medications on File Prior to Visit   Medication Sig   • acyclovir (ZOVIRAX) 800 MG tablet Take 800 mg by mouth Daily.   • ASPIRIN 81 PO Take 81 mg by mouth Daily.   • Blood Glucose Monitoring Suppl (FREESTYLE FREEDOM LITE) w/Device kit 1 kit Daily. Indications: Type 2 Diabetes   • bumetanide (BUMEX) 2 MG tablet Take 2 mg by mouth Daily.   • escitalopram (LEXAPRO) 5 MG tablet Take 5 mg by mouth Daily.   • finasteride (PROSCAR) 5 MG tablet Take 5 mg by mouth daily.   • gabapentin (NEURONTIN) 300 MG capsule Take 1 capsule by mouth 3 (Three) Times a Day.   • glucose blood (FREESTYLE LITE) test strip Use to test blood sugar twice daily and as needed.   • Insulin Glargine (BASAGLAR KWIKPEN) 100 UNIT/ML injection pen 15-30  units twice a day   • Insulin Pen Needle 32G X 4 MM misc 1 application Daily.   • Lancets (FREESTYLE) lancets Use once then discard to test blood sugar each morning   • ondansetron (ZOFRAN) 8 MG tablet Take 1 tablet by mouth 3 (Three) Times a Day As Needed for Nausea or Vomiting.   • Pomalyst 2 MG chemo capsule Take 2 mg by mouth Daily.   • tamsulosin (FLOMAX) 0.4 MG capsule 24 hr capsule Take 1 capsule by mouth every night.   • vitamin D (ERGOCALCIFEROL) 1.25 MG (86206 UT) capsule capsule Take 50,000 Units by mouth 1 (One) Time Per Week.   • diphenoxylate-atropine (Lomotil) 2.5-0.025 MG per tablet    • loperamide (IMODIUM) 2 MG capsule      No current facility-administered medications on file prior to visit.        Past Medical History:   Diagnosis Date   • Acute renal failure syndrome (HCC) 5/21/2020   • Anemia    • Cataract    • Chronic renal insufficiency    • Clear cell carcinoma of kidney, right ([facility]) 2016    Stage I.  Right partial nephrectomy.  Dr. Garcia   • Clostridium difficile colitis    • Diabetes mellitus ([facility])    • Diabetic macular edema(362.07) 5/26/2020    No significant central fluid today OD.  Stable without significant DME OS.  Observe OU today. Possible multiple myeloma and anemia contributing.  Pt noted improvement with plasmapheresis and was gett*   • Elevated troponin 5/22/2020   • Enlarged prostate     Dr. Garcia   • H/O stem cell transplant ([facility]) 12/09/2020   • Hematoma of right lower leg    • High cholesterol    • Hypertension    • Multiple myeloma ([facility])    • Multiple rib fractures     on right after fall from 2 story building at age of 30 years   • Severe nonproliferative diabetic retinopathy of both eyes with macular edema associated with type 2 diabetes mellitus ([facility]) 9/2/2020   • Type 2 diabetes mellitus with retinopathy, with long-term current use of insulin ([facility]) 5/22/2020       Past Surgical History:   Procedure Laterality Date   • INSERTION HEMODIALYSIS CATHETER N/A 6/2/2020     "Procedure: HEMODIALYSIS CATHETER INSERTION;  Surgeon: Sumanth Hernandez MD;  Location: [facility] OR;  Service: Vascular;  Laterality: N/A;   • LIMBAL STEM CELL TRANSPLANT  12/09/2020   • NEPHRECTOMY PARTIAL Right 5/18/2016    Procedure: RT NEPHRECTOMY PARTIAL LAPAROSCOPIC WITH DAVINCI ROBOT;  Surgeon: Chad Garcia MD;  Location: [facility] MAIN OR;  Service:    • PROSTATE SURGERY         Family History   Problem Relation Age of Onset   • Parkinsonism Mother    • Arrhythmia Father    • Heart failure Father 84   • Diabetes Maternal Uncle    • Macular degeneration Sister        Social History     Socioeconomic History   • Marital status:      Spouse name: Gabby   Tobacco Use   • Smoking status: Never Smoker   • Smokeless tobacco: Never Used   Substance and Sexual Activity   • Alcohol use: No   • Drug use: No   • Sexual activity: Defer       Review of Systems   Constitutional: Positive for fatigue (some improvement). Negative for appetite change, chills, fever, unexpected weight gain and unexpected weight loss.   HENT: Negative for ear pain, sore throat and trouble swallowing.    Eyes: Positive for blurred vision (see HPI).   Respiratory: Negative for cough, chest tightness and shortness of breath.    Cardiovascular: Negative for chest pain and palpitations.   Gastrointestinal: Negative for abdominal pain and blood in stool. Diarrhea: see HPI.   Genitourinary: Negative for dysuria and frequency.   Musculoskeletal: Negative for arthralgias (has improved).   Skin: Negative for rash.   Neurological: Negative for syncope.   Psychiatric/Behavioral: Negative for suicidal ideas. Sleep disturbance: has some trouble sleeping, does not feel like does enough to help sleep.       Objective   Vitals:    11/19/21 0904   BP: 138/88   BP Location: Left arm   Patient Position: Sitting   Cuff Size: Adult   Pulse: 80   Temp: 97.5 °F (36.4 °C)   SpO2: 99%   Weight: 128 kg (283 lb)   Height: 193 cm (75.98\")     Body mass " index is 34.47 kg/m².    Physical Exam  Vitals and nursing note reviewed.   Constitutional:       General: He is not in acute distress.     Appearance: He is well-developed and well-groomed. He is not ill-appearing or diaphoretic.   HENT:      Head: Normocephalic.      Right Ear: External ear normal.      Left Ear: External ear normal.   Eyes:      Conjunctiva/sclera: Conjunctivae normal.   Neck:      Vascular: No carotid bruit.   Cardiovascular:      Rate and Rhythm: Normal rate and regular rhythm.      Pulses: Normal pulses.      Heart sounds: Normal heart sounds. No murmur heard.      Pulmonary:      Effort: Pulmonary effort is normal. No respiratory distress.      Breath sounds: Normal breath sounds.   Abdominal:      General: Bowel sounds are normal.      Palpations: Abdomen is soft.      Tenderness: There is no abdominal tenderness. There is no guarding.   Musculoskeletal:      Cervical back: Normal range of motion and neck supple.      Right lower leg: Right lower leg edema: trace pretibial.      Left lower leg: Left lower leg edema: trace pretibial.   Skin:     General: Skin is warm and dry.          Neurological:      Mental Status: He is alert and oriented to person, place, and time.      Gait: Abnormal gait: guarded gait with cane.   Psychiatric:         Mood and Affect: Mood normal.         Behavior: Behavior normal.         Thought Content: Thought content normal.         Cognition and Memory: Cognition normal.         Judgment: Judgment normal.         Lab Results   Component Value Date    WBC 3.80 11/12/2020    RBC 3.26 (L) 11/12/2020    HGB 9.8 (L) 11/12/2020    HCT 28.8 (L) 11/12/2020    MCV 88.3 11/12/2020    MCH 30.1 11/12/2020    MCHC 34.0 11/12/2020    RDW 14.9 11/12/2020    RDWSD 47.8 11/12/2020    MPV 12.8 (H) 11/12/2020     (L) 11/12/2020    NEUTRORELPCT 53.6 11/12/2020    LYMPHORELPCT 19.5 (L) 11/12/2020    MONORELPCT 14.5 (H) 11/12/2020    EOSRELPCT 8.9 (H) 11/12/2020    BASORELPCT  2.4 (H) 11/12/2020    AUTOIGPER 1.1 (H) 11/12/2020    NEUTROABS 2.04 11/12/2020    LYMPHSABS 0.74 11/12/2020    MONOSABS 0.55 11/12/2020    EOSABS 0.34 11/12/2020    BASOSABS 0.09 11/12/2020    AUTOIGNUM 0.04 11/12/2020    NRBC 0.0 11/12/2020     Lab Results   Component Value Date    GLUCOSE 218 (H) 08/09/2021    BUN 35 (H) 08/09/2021    CREATININE 2.71 (H) 08/09/2021    EGFRIFNONA 24 (L) 08/09/2021    EGFRIFAFRI 27 (L) 08/09/2021    BCR 13 08/09/2021    K 4.0 08/09/2021    CO2 23 08/09/2021    CALCIUM 8.1 (L) 08/09/2021    PROTENTOTREF 6.3 08/09/2021    ALBUMIN 3.8 08/09/2021    LABIL2 1.5 08/09/2021    AST 11 08/09/2021    ALT 9 08/09/2021      Lab Results   Component Value Date    CHLPL 135 08/09/2021    TRIG 475 (H) 08/09/2021    HDL 23 (L) 08/09/2021    VLDL 69 (H) 08/09/2021    LDL 43 08/09/2021     Lab Results   Component Value Date    TSH 1.280 08/09/2021     Lab Results   Component Value Date    HGBA1C 7.1 (H) 08/09/2021         Assessment/Plan .  Problem List Items Addressed This Visit     Hypertension - Primary    Current Assessment & Plan     Hypertension is stable.  Continue current medications.  Ambulatory blood pressure monitoring.  Blood pressure will be reassessed at the next regular appointment.  Continue Bumex daily.  Follow up as scheduled with renal.         Relevant Medications    bumetanide (BUMEX) 2 MG tablet    Multiple myeloma (HCC)    Current Assessment & Plan     Follow up as scheduled with oncology.         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   Continue current treatment regimen.  Diabetes will be reassessed in 6 months.  Continue Basaglar twice daily.  Follow up as scheduled with endocrine.  Follow up  as scheduled with ophthalmology.         Anxiety    Current Assessment & Plan     Stable on Escitalopram daily.               Return in about 6 months (around 5/19/2022) for Annual physical, Recheck.or sooner if problems or concerns.         COVID-19 Precautions - Patient was compliant in wearing a mask. When I saw the patient, I used appropriate personal protective equipment (PPE) including mask, gloves, and eye shield (standard procedure).  Hand hygiene was completed before and after seeing the patient.

## 2021-11-19 NOTE — PATIENT INSTRUCTIONS
Continue to monitor your blood sugar once daily before a meal and record results.  Continue to monitor your blood pressure periodically and record results.  Continue to work on healthy diet and exercise as tolerated.  Follow up in 6 months for physical, or sooner if problems or concerns.

## 2021-11-20 PROBLEM — E11.22 TYPE 2 DIABETES MELLITUS WITH STAGE 3 CHRONIC KIDNEY DISEASE, WITH LONG-TERM CURRENT USE OF INSULIN: Status: ACTIVE | Noted: 2021-11-20

## 2021-11-20 PROBLEM — N18.30 TYPE 2 DIABETES MELLITUS WITH STAGE 3 CHRONIC KIDNEY DISEASE, WITH LONG-TERM CURRENT USE OF INSULIN: Status: RESOLVED | Noted: 2021-11-20 | Resolved: 2021-11-20

## 2021-11-20 PROBLEM — N18.30 TYPE 2 DIABETES MELLITUS WITH STAGE 3 CHRONIC KIDNEY DISEASE, WITH LONG-TERM CURRENT USE OF INSULIN: Status: ACTIVE | Noted: 2021-11-20

## 2021-11-20 PROBLEM — Z79.4 TYPE 2 DIABETES MELLITUS WITH STAGE 3 CHRONIC KIDNEY DISEASE, WITH LONG-TERM CURRENT USE OF INSULIN: Status: ACTIVE | Noted: 2021-11-20

## 2021-11-20 PROBLEM — Z79.4 TYPE 2 DIABETES MELLITUS WITH STAGE 3 CHRONIC KIDNEY DISEASE, WITH LONG-TERM CURRENT USE OF INSULIN: Status: RESOLVED | Noted: 2021-11-20 | Resolved: 2021-11-20

## 2021-11-20 PROBLEM — E11.22 TYPE 2 DIABETES MELLITUS WITH STAGE 3 CHRONIC KIDNEY DISEASE, WITH LONG-TERM CURRENT USE OF INSULIN (HCC): Status: RESOLVED | Noted: 2021-11-20 | Resolved: 2021-11-20

## 2021-11-20 PROBLEM — F41.9 ANXIETY: Status: ACTIVE | Noted: 2021-11-20

## 2021-11-20 NOTE — ASSESSMENT & PLAN NOTE
Hypertension is stable.  Continue current medications.  Ambulatory blood pressure monitoring.  Blood pressure will be reassessed at the next regular appointment.  Continue Bumex daily.  Follow up as scheduled with renal.

## 2021-11-20 NOTE — ASSESSMENT & PLAN NOTE
Diabetes is stable.   Continue current treatment regimen.  Diabetes will be reassessed in 6 months.  Continue Basaglar twice daily.  Follow up as scheduled with endocrine.  Follow up as scheduled with ophthalmology.

## 2022-03-02 DIAGNOSIS — Z79.4 TYPE 2 DIABETES MELLITUS WITH CHRONIC KIDNEY DISEASE, WITH LONG-TERM CURRENT USE OF INSULIN, UNSPECIFIED CKD STAGE: ICD-10-CM

## 2022-03-02 DIAGNOSIS — E11.22 TYPE 2 DIABETES MELLITUS WITH CHRONIC KIDNEY DISEASE, WITH LONG-TERM CURRENT USE OF INSULIN, UNSPECIFIED CKD STAGE: ICD-10-CM

## 2022-03-02 RX ORDER — INSULIN GLARGINE 100 [IU]/ML
INJECTION, SOLUTION SUBCUTANEOUS
Qty: 45 ML | Refills: 1 | Status: CANCELLED | OUTPATIENT
Start: 2022-03-02

## 2022-03-03 DIAGNOSIS — E11.22 TYPE 2 DIABETES MELLITUS WITH CHRONIC KIDNEY DISEASE, WITH LONG-TERM CURRENT USE OF INSULIN, UNSPECIFIED CKD STAGE: ICD-10-CM

## 2022-03-03 DIAGNOSIS — Z79.4 TYPE 2 DIABETES MELLITUS WITH CHRONIC KIDNEY DISEASE, WITH LONG-TERM CURRENT USE OF INSULIN, UNSPECIFIED CKD STAGE: ICD-10-CM

## 2022-03-03 RX ORDER — INSULIN ASPART 100 [IU]/ML
10 INJECTION, SOLUTION INTRAVENOUS; SUBCUTANEOUS
Qty: 45 ML | Refills: 1 | Status: SHIPPED | OUTPATIENT
Start: 2022-03-03 | End: 2022-03-03 | Stop reason: SDUPTHER

## 2022-03-03 RX ORDER — INSULIN GLARGINE 100 [IU]/ML
INJECTION, SOLUTION SUBCUTANEOUS
Qty: 45 ML | Refills: 1 | Status: SHIPPED | OUTPATIENT
Start: 2022-03-03 | End: 2022-03-03 | Stop reason: SDUPTHER

## 2022-03-03 RX ORDER — INSULIN ASPART 100 [IU]/ML
10 INJECTION, SOLUTION INTRAVENOUS; SUBCUTANEOUS
Qty: 45 ML | Refills: 1 | Status: SHIPPED | OUTPATIENT
Start: 2022-03-03 | End: 2022-04-11 | Stop reason: SDUPTHER

## 2022-03-03 RX ORDER — INSULIN GLARGINE 100 [IU]/ML
INJECTION, SOLUTION SUBCUTANEOUS
Qty: 45 ML | Refills: 1 | Status: SHIPPED | OUTPATIENT
Start: 2022-03-03 | End: 2022-04-05

## 2022-03-04 ENCOUNTER — OFFICE VISIT (OUTPATIENT)
Dept: ENDOCRINOLOGY | Age: 67
End: 2022-03-04

## 2022-03-04 VITALS
SYSTOLIC BLOOD PRESSURE: 131 MMHG | HEIGHT: 76 IN | DIASTOLIC BLOOD PRESSURE: 78 MMHG | HEART RATE: 87 BPM | OXYGEN SATURATION: 98 % | WEIGHT: 278 LBS | BODY MASS INDEX: 33.85 KG/M2

## 2022-03-04 DIAGNOSIS — E11.22 TYPE 2 DIABETES MELLITUS WITH STAGE 3B CHRONIC KIDNEY DISEASE, WITH LONG-TERM CURRENT USE OF INSULIN: Primary | ICD-10-CM

## 2022-03-04 DIAGNOSIS — I12.9 HYPERTENSIVE KIDNEY DISEASE WITH STAGE 3B CHRONIC KIDNEY DISEASE: ICD-10-CM

## 2022-03-04 DIAGNOSIS — Z79.4 TYPE 2 DIABETES MELLITUS WITH STAGE 3B CHRONIC KIDNEY DISEASE, WITH LONG-TERM CURRENT USE OF INSULIN: Primary | ICD-10-CM

## 2022-03-04 DIAGNOSIS — E78.2 MIXED HYPERLIPIDEMIA: ICD-10-CM

## 2022-03-04 DIAGNOSIS — N18.32 TYPE 2 DIABETES MELLITUS WITH STAGE 3B CHRONIC KIDNEY DISEASE, WITH LONG-TERM CURRENT USE OF INSULIN: Primary | ICD-10-CM

## 2022-03-04 DIAGNOSIS — N18.32 HYPERTENSIVE KIDNEY DISEASE WITH STAGE 3B CHRONIC KIDNEY DISEASE: ICD-10-CM

## 2022-03-04 PROCEDURE — 99214 OFFICE O/P EST MOD 30 MIN: CPT | Performed by: NURSE PRACTITIONER

## 2022-03-04 RX ORDER — BLOOD-GLUCOSE METER
KIT MISCELLANEOUS
Qty: 200 EACH | Refills: 5 | Status: SHIPPED | OUTPATIENT
Start: 2022-03-04

## 2022-03-04 NOTE — PROGRESS NOTES
Chief Complaint  Chief Complaint   Patient presents with   • Diabetes     type 2       Subjective          History of Present Illness    Contreras Albert 66 y.o. presents for a follow-up evaluation for type 2 DM    He has been diabetic since 2001 and started insulin in 2009    Pt is on the following medications for their DM: Lantus 16 units twice a day and Novolog 10 untis TID/AC plus 1 units per every 50 over 150      Pt complains of chronic diarrhea, numbness and tingling in feet/hands, vision changes.    Denies diarrhea, constipation, difficulty breathing,chest pain, shortness of breath, vision changes, numbness and tingling in feet/hands.    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  Last eye exam was 07/21    Pt does have a history of nephropathy.  Patient is/not currently taking ACE/ARB - follows Dr. 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  He is on gabapentin 100 mg TID  prescribed by his oncologist Dr. Alvares.    Pt does not have a history of CAD or CVA.    Last A1C in 08/21 was 7.1          Blood Sugars    Pt's cancer has came out of remission.  Along with the chemo treatments, he is receiving steroids 3 times a week.  This has made his blood sugars go up into 4 and 5 hundreds    Blood glucoses are checked 4-5/day.    Fasting blood glucoses: 200-300s    Pre-meal blood glucoses: 200-500s    Pt has no episodes of hypoglycemia.          Hyperlipidemia     Pt denies any muscle/body aches, chest pain, or shortness of breath    Pt is currently taking nothing- diet controlled    Last lipid panel in 08/21 showed Total 135, Triglycerides 475, HDL 23 and LDL 43            Hypertension with CKD Stage 3b    Pt denies any chest pain, palpitations, shortness of breath, or headache    Current regimen includes Bumex 1 mg/day.    He was taken off amlodipine because of pedal edema          Other Medical History    He has  "multiple myeloma and had chemotherapy followed by stem cell transplant.  His cancer has came out of remission and he is on Pomalyst along with steriods 3 times a week.     He had a previous right partial nephrectomy for cancer.  He has no known recurrence.        I have reviewed the patient's allergies, medicines, past medical hx, family hx and social hx.    Objective   Vital Signs:   /78   Pulse 87   Ht 193 cm (75.98\")   Wt 126 kg (278 lb)   SpO2 98%   BMI 33.85 kg/m²       Physical Exam   Physical Exam  Constitutional:       General: He is not in acute distress.     Appearance: Normal appearance. He is not diaphoretic.   HENT:      Head: Normocephalic and atraumatic.   Eyes:      General:         Right eye: No discharge.         Left eye: No discharge.   Cardiovascular:      Rate and Rhythm: Normal rate and regular rhythm.      Heart sounds: Normal heart sounds. No murmur heard.  No friction rub. No gallop.    Pulmonary:      Effort: Pulmonary effort is normal. No respiratory distress.      Breath sounds: Normal breath sounds. No wheezing.   Skin:     General: Skin is warm and dry.   Neurological:      Mental Status: He is alert.   Psychiatric:         Mood and Affect: Mood normal.         Behavior: Behavior normal.                    Results Review:   Hemoglobin A1C   Date Value Ref Range Status   08/09/2021 7.1 (H) 4.8 - 5.6 % Final     Comment:              Prediabetes: 5.7 - 6.4           Diabetes: >6.4           Glycemic control for adults with diabetes: <7.0     05/23/2020 6.60 (H) 4.80 - 5.60 % Final     Triglycerides   Date Value Ref Range Status   08/09/2021 475 (H) 0 - 149 mg/dL Final     HDL Cholesterol   Date Value Ref Range Status   08/09/2021 23 (L) >39 mg/dL Final     LDL Chol Calc (NIH)   Date Value Ref Range Status   08/09/2021 43 0 - 99 mg/dL Final     VLDL Cholesterol Wale   Date Value Ref Range Status   08/09/2021 69 (H) 5 - 40 mg/dL Final     LDL/HDL RATIO   Date Value Ref Range " Status   01/15/2021 2.8 0.0 - 3.6 ratio Final     Comment:                                         LDL/HDL Ratio                                              Men  Women                                1/2 Avg.Risk  1.0    1.5                                    Avg.Risk  3.6    3.2                                 2X Avg.Risk  6.2    5.0                                 3X Avg.Risk  8.0    6.1           Assessment and Plan {CC Problem List  Visit Diagnosis  ROS  Review (Popup)  Health Maintenance  Quality  BestPractice  Medications  SmartSets  SnapShot Encounters  Media :23  Diagnoses and all orders for this visit:    1. Type 2 diabetes mellitus with stage 3b chronic kidney disease, with long-term current use of insulin (HCC) (Primary)  -     glucose blood (FREESTYLE LITE) test strip; Use to test blood sugar 4-5 times daily.  Dispense: 200 each; Refill: 5      Increase Lantus to 18 units twice a day and start SS noted below  Sliding scale was printed out, reviewed with patient, questions answered and patient verbalized understanding.  Increase Novolog 15 untis three times daily with meals plus 1 units per every 50 over 150  Talked about starting CGM, but not interested at this time - advised to really monitor BG levels.  Reviewed how to treat hypoglycemia and the rules of 15.  Keep appointment with Dr. Haro at the end of this month      Long Acting Insulin        (once a day)    Lantus    Start with:    18   Units in the Morning                   and    18  Units at Night    Increase every 3 days by 2 until fasting blood sugar is below 140    If fasting blood sugar is below 70 then decrease long acting insulin by 20%        2. Mixed hyperlipidemia    Diet controlled  Has blood work in a couple of weeks      3. Hypertensive kidney disease with stage 3b chronic kidney disease (HCC)    Stable  Continue with current medication regimen  Defer management to Nehphrology          Refills sent to pharmacy      RTC on  03/30/2022 with Dr. Haro      Follow Up     Patient was given instructions and counseling regarding her condition or for health maintenance advice. Please see specific information pulled into the AVS if appropriate.              IZZY Gordillo  03/04/22

## 2022-03-04 NOTE — PATIENT INSTRUCTIONS
Increase Lantus to 18 units twice a day   Increase Novolog 15 untis three times daily with meals plus 1 units per every 50 over 150

## 2022-03-16 RX ORDER — INSULIN ASPART 100 [IU]/ML
10 INJECTION, SOLUTION INTRAVENOUS; SUBCUTANEOUS
Qty: 45 ML | Refills: 1 | Status: SHIPPED | OUTPATIENT
Start: 2022-03-16 | End: 2022-04-11 | Stop reason: SDUPTHER

## 2022-03-29 NOTE — PROGRESS NOTES
Subjective   Contreras Albert is a 66 y.o. male.      F/u  for dm 2/ testing bs 2-3 x day / last dm eye exam 1/21/22 with dr Hinson  / last dm foot exam today  with dr Haro     Patient is a 66-year-old male who came in for follow-up     He has known diabetes mellitus since 2001 and started on insulin in 2009.  He is on Lantus 20 units twice a day, Novolog 3-5 units with each meal + 1 unit per 50 > 150.  Blood sugars have been running higher for at least 24 hours after he received oral dexamethasone for chemotherapy and he has been taking more Novolog on those days.  He has not filled the prescription for freestyle charlotte sensor.  FBS this AM was > 400.      His last eye examination was in 3/22.  He has retinopathy and had previous intraocular eye injections on both eyes.  He has not had a retinal laser therapy.  He has chronic kidney disease and has been following Dr. Norm Hutchison.  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  He is on gabapentin 100 mg TID  prescribed by his oncologist Dr. Romero.     He has hypertension and is on Bumex 1-2 mg/day.  He was taken off amlodipine because of pedal edema.  He has no history of heart attack or stroke.  He denies chest pain or shortness of breath.     He has hyperlipidemia and is on diet alone.  He has used Lipitor in the past without side effects.  He had abdominal cramps while on Crestor before.  He has gained 32 lbs since 8/21. Lipid panel done in in 3/22 are as follows: Cholesterol 242.  HDL 46.  .  .  His 10-year risk of heart disease or stroke using American Heart Association calculator is elevated at 31.1%.     He has multiple myeloma and had chemotherapy followed by stem cell transplant in 12/20.  He has recurrence and was started Pomalyst and chemotherapy.  He takes oral dexamethasone 1 day a week while on chemotherapy which started in February 2022.     He had a previous right partial nephrectomy for cancer.  He has no known  "recurrence.     He has intermittent diarrhea and takes Lomotil and Imodium as needed.  He has history of C. difficile colitis which was treated.  He denies melena or hematochezia.  He has never had a colonoscopy.  He has not seen a GI before.       The following portions of the patient's history were reviewed and updated as appropriate: allergies, current medications, past family history, past medical history, past social history, past surgical history and problem list.    Review of Systems   Eyes: Positive for visual disturbance.   Respiratory: Negative for shortness of breath.    Cardiovascular: Negative.  Negative for chest pain.   Gastrointestinal: Negative for anal bleeding, blood in stool and diarrhea.   Genitourinary: Negative.    Musculoskeletal: Negative for myalgias.   Neurological: Positive for numbness.     Objective      Vitals:    03/30/22 1005   BP: 133/68   Pulse: 89   SpO2: 98%   Weight: (!) 141 kg (310 lb)   Height: 193 cm (75.98\")     Physical Exam  Constitutional:       Appearance: Normal appearance. He is obese.   Eyes:      General: No scleral icterus.        Right eye: No discharge.         Left eye: No discharge.      Extraocular Movements: Extraocular movements intact.      Conjunctiva/sclera: Conjunctivae normal.   Cardiovascular:      Rate and Rhythm: Normal rate and regular rhythm.   Pulmonary:      Effort: Pulmonary effort is normal.      Breath sounds: Normal breath sounds. No rales.   Chest:      Chest wall: No tenderness.   Abdominal:      General: Bowel sounds are normal. There is no distension.      Palpations: Abdomen is soft. There is no mass.      Tenderness: There is no right CVA tenderness or left CVA tenderness.   Musculoskeletal:      Right lower leg: Edema present.      Left lower leg: Edema present.   Neurological:      General: No focal deficit present.      Mental Status: He is alert and oriented to person, place, and time.       Results Encounter on 09/09/2021 "   Component Date Value Ref Range Status   • Hemoglobin A1C 03/16/2022 8.8 (A) 4.8 - 5.6 % Final    Comment:          Prediabetes: 5.7 - 6.4           Diabetes: >6.4           Glycemic control for adults with diabetes: <7.0     • Glucose 03/16/2022 184 (A) 65 - 99 mg/dL Final   • BUN 03/16/2022 41 (A) 8 - 27 mg/dL Final   • Creatinine 03/16/2022 2.63 (A) 0.76 - 1.27 mg/dL Final   • EGFR Result 03/16/2022 26 (A) >59 mL/min/1.73 Final   • BUN/Creatinine Ratio 03/16/2022 16  10 - 24 Final   • Sodium 03/16/2022 140  134 - 144 mmol/L Final   • Potassium 03/16/2022 5.0  3.5 - 5.2 mmol/L Final   • Chloride 03/16/2022 101  96 - 106 mmol/L Final   • Total CO2 03/16/2022 21  20 - 29 mmol/L Final   • Calcium 03/16/2022 8.6  8.6 - 10.2 mg/dL Final   • Total Protein 03/16/2022 5.8 (A) 6.0 - 8.5 g/dL Final   • Albumin 03/16/2022 3.8  3.8 - 4.8 g/dL Final   • Globulin 03/16/2022 2.0  1.5 - 4.5 g/dL Final   • A/G Ratio 03/16/2022 1.9  1.2 - 2.2 Final   • Total Bilirubin 03/16/2022 0.5  0.0 - 1.2 mg/dL Final   • Alkaline Phosphatase 03/16/2022 54  44 - 121 IU/L Final   • AST (SGOT) 03/16/2022 9  0 - 40 IU/L Final   • ALT (SGPT) 03/16/2022 11  0 - 44 IU/L Final   • Total Cholesterol 03/16/2022 242 (A) 100 - 199 mg/dL Final   • Triglycerides 03/16/2022 269 (A) 0 - 149 mg/dL Final   • HDL Cholesterol 03/16/2022 46  >39 mg/dL Final   • VLDL Cholesterol Wale 03/16/2022 49 (A) 5 - 40 mg/dL Final   • LDL Chol Calc (NIH) 03/16/2022 147 (A) 0 - 99 mg/dL Final   • Creatinine, Urine 03/16/2022 CANCELED  mg/dL Final-Edited    Comment: Test not performed. No urine specimen received.    Result canceled by the ancillary.     • Microalbumin, Urine 03/16/2022 CANCELED   Final-Edited    Comment: Test not performed    Result canceled by the ancillary.     • Interpretation 03/16/2022 Note   Final    Supplemental report is available.   • Specimen Status 03/16/2022 CANCELED   Final-Edited    Comment: Test not performed. No urine specimen received.         TEST:  [id number]  Albumin/Creatinine Ratio,Urine    Result canceled by the ancillary.       Assessment/Plan   Diagnoses and all orders for this visit:    1. Type 2 diabetes mellitus with stage 3b chronic kidney disease, with long-term current use of insulin ([facility]) (Primary)    2. Stage 4 chronic kidney disease (HCC)    3. History of renal cell cancer    4. H/O partial nephrectomy - right    5. Multiple myeloma in remission ([facility])    6. Mixed hyperlipidemia    7. 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 Follow package directions for low blood sugar.  Dispense: 1 kit; Refill: 3      Increase Lantus to 30 units twice a day on the day of chemotherapy and on the morning after chemotherapy.  Increase NovoLog to 15 units 3 times daily with meals +2 units per 50 greater than 150 on the day of chemotherapy.  Start freestyle charlotte 2 sensor.  Sensor download in 2 to 3 weeks.  Will defer blood pressure control to Dr. Hutchison and PCP.  Start Lipitor 10 mg/day.    Copy my note sent to Brianna Thompson NP, Dr. Norm Hutchison, Dr. Romero and Dr. Hinson.    RTC 3 mos with ETELVINA Haskins NP

## 2022-03-30 ENCOUNTER — TELEPHONE (OUTPATIENT)
Dept: ENDOCRINOLOGY | Age: 67
End: 2022-03-30

## 2022-03-30 ENCOUNTER — OFFICE VISIT (OUTPATIENT)
Dept: ENDOCRINOLOGY | Age: 67
End: 2022-03-30

## 2022-03-30 VITALS
SYSTOLIC BLOOD PRESSURE: 133 MMHG | DIASTOLIC BLOOD PRESSURE: 68 MMHG | HEIGHT: 76 IN | HEART RATE: 89 BPM | WEIGHT: 310 LBS | BODY MASS INDEX: 37.75 KG/M2 | OXYGEN SATURATION: 98 %

## 2022-03-30 DIAGNOSIS — E11.22 TYPE 2 DIABETES MELLITUS WITH STAGE 3B CHRONIC KIDNEY DISEASE, WITH LONG-TERM CURRENT USE OF INSULIN: Primary | ICD-10-CM

## 2022-03-30 DIAGNOSIS — N18.4 STAGE 4 CHRONIC KIDNEY DISEASE: ICD-10-CM

## 2022-03-30 DIAGNOSIS — Z79.4 TYPE 2 DIABETES MELLITUS WITH BOTH EYES AFFECTED BY SEVERE NONPROLIFERATIVE RETINOPATHY AND MACULAR EDEMA, WITH LONG-TERM CURRENT USE OF INSULIN: ICD-10-CM

## 2022-03-30 DIAGNOSIS — Z79.4 TYPE 2 DIABETES MELLITUS WITH STAGE 3B CHRONIC KIDNEY DISEASE, WITH LONG-TERM CURRENT USE OF INSULIN: Primary | ICD-10-CM

## 2022-03-30 DIAGNOSIS — N18.32 TYPE 2 DIABETES MELLITUS WITH STAGE 3B CHRONIC KIDNEY DISEASE, WITH LONG-TERM CURRENT USE OF INSULIN: Primary | ICD-10-CM

## 2022-03-30 DIAGNOSIS — Z90.5 H/O PARTIAL NEPHRECTOMY: ICD-10-CM

## 2022-03-30 DIAGNOSIS — E11.3413 TYPE 2 DIABETES MELLITUS WITH BOTH EYES AFFECTED BY SEVERE NONPROLIFERATIVE RETINOPATHY AND MACULAR EDEMA, WITH LONG-TERM CURRENT USE OF INSULIN: ICD-10-CM

## 2022-03-30 DIAGNOSIS — C90.01 MULTIPLE MYELOMA IN REMISSION: ICD-10-CM

## 2022-03-30 DIAGNOSIS — Z85.528 HISTORY OF RENAL CELL CANCER: ICD-10-CM

## 2022-03-30 DIAGNOSIS — E78.2 MIXED HYPERLIPIDEMIA: ICD-10-CM

## 2022-03-30 PROCEDURE — 99214 OFFICE O/P EST MOD 30 MIN: CPT | Performed by: INTERNAL MEDICINE

## 2022-03-31 ENCOUNTER — TELEPHONE (OUTPATIENT)
Dept: ENDOCRINOLOGY | Age: 67
End: 2022-03-31

## 2022-04-01 ENCOUNTER — PRIOR AUTHORIZATION (OUTPATIENT)
Dept: ENDOCRINOLOGY | Age: 67
End: 2022-04-01

## 2022-04-01 ENCOUNTER — TREATMENT (OUTPATIENT)
Dept: ENDOCRINOLOGY | Age: 67
End: 2022-04-01

## 2022-04-05 ENCOUNTER — TELEPHONE (OUTPATIENT)
Dept: ENDOCRINOLOGY | Age: 67
End: 2022-04-05

## 2022-04-05 DIAGNOSIS — Z79.4 TYPE 2 DIABETES MELLITUS WITH CHRONIC KIDNEY DISEASE, WITH LONG-TERM CURRENT USE OF INSULIN, UNSPECIFIED CKD STAGE: ICD-10-CM

## 2022-04-05 DIAGNOSIS — E11.22 TYPE 2 DIABETES MELLITUS WITH CHRONIC KIDNEY DISEASE, WITH LONG-TERM CURRENT USE OF INSULIN, UNSPECIFIED CKD STAGE: ICD-10-CM

## 2022-04-05 RX ORDER — INSULIN GLARGINE 100 [IU]/ML
INJECTION, SOLUTION SUBCUTANEOUS
Qty: 60 ML | Refills: 1 | Status: SHIPPED | OUTPATIENT
Start: 2022-04-05 | End: 2022-04-11 | Stop reason: SDUPTHER

## 2022-04-11 ENCOUNTER — TELEPHONE (OUTPATIENT)
Dept: ENDOCRINOLOGY | Age: 67
End: 2022-04-11

## 2022-04-11 DIAGNOSIS — E11.22 TYPE 2 DIABETES MELLITUS WITH CHRONIC KIDNEY DISEASE, WITH LONG-TERM CURRENT USE OF INSULIN, UNSPECIFIED CKD STAGE: ICD-10-CM

## 2022-04-11 DIAGNOSIS — Z79.4 TYPE 2 DIABETES MELLITUS WITH CHRONIC KIDNEY DISEASE, WITH LONG-TERM CURRENT USE OF INSULIN, UNSPECIFIED CKD STAGE: ICD-10-CM

## 2022-04-11 RX ORDER — INSULIN ASPART 100 [IU]/ML
10 INJECTION, SOLUTION INTRAVENOUS; SUBCUTANEOUS
Qty: 60 ML | Refills: 1 | Status: SHIPPED | OUTPATIENT
Start: 2022-04-11 | End: 2022-05-16 | Stop reason: SDUPTHER

## 2022-04-11 RX ORDER — INSULIN GLARGINE 100 [IU]/ML
INJECTION, SOLUTION SUBCUTANEOUS
Qty: 60 ML | Refills: 1 | Status: SHIPPED | OUTPATIENT
Start: 2022-04-11 | End: 2022-04-14

## 2022-04-12 ENCOUNTER — TELEPHONE (OUTPATIENT)
Dept: ENDOCRINOLOGY | Age: 67
End: 2022-04-12

## 2022-04-12 NOTE — TELEPHONE ENCOUNTER
RECD FAX FROM [employer] NEED ADDITIONAL INFO FOR THE PRIOR AUTH FOR PATIENT FREEBitWaveE 2 READER

## 2022-04-13 ENCOUNTER — TELEPHONE (OUTPATIENT)
Dept: ENDOCRINOLOGY | Age: 67
End: 2022-04-13

## 2022-04-14 ENCOUNTER — TELEPHONE (OUTPATIENT)
Dept: ENDOCRINOLOGY | Age: 67
End: 2022-04-14

## 2022-04-14 DIAGNOSIS — E11.22 TYPE 2 DIABETES MELLITUS WITH CHRONIC KIDNEY DISEASE, WITH LONG-TERM CURRENT USE OF INSULIN, UNSPECIFIED CKD STAGE: ICD-10-CM

## 2022-04-14 DIAGNOSIS — Z79.4 TYPE 2 DIABETES MELLITUS WITH CHRONIC KIDNEY DISEASE, WITH LONG-TERM CURRENT USE OF INSULIN, UNSPECIFIED CKD STAGE: ICD-10-CM

## 2022-04-14 RX ORDER — INSULIN GLARGINE 100 [IU]/ML
INJECTION, SOLUTION SUBCUTANEOUS
Qty: 60 ML | Refills: 1 | Status: ON HOLD | OUTPATIENT
Start: 2022-04-14 | End: 2022-09-28 | Stop reason: SDUPTHER

## 2022-04-18 ENCOUNTER — TELEPHONE (OUTPATIENT)
Dept: ENDOCRINOLOGY | Age: 67
End: 2022-04-18

## 2022-05-13 ENCOUNTER — TELEPHONE (OUTPATIENT)
Dept: ENDOCRINOLOGY | Age: 67
End: 2022-05-13

## 2022-05-16 ENCOUNTER — TELEPHONE (OUTPATIENT)
Dept: ENDOCRINOLOGY | Age: 67
End: 2022-05-16

## 2022-05-16 DIAGNOSIS — Z79.4 TYPE 2 DIABETES MELLITUS WITH STAGE 3B CHRONIC KIDNEY DISEASE, WITH LONG-TERM CURRENT USE OF INSULIN: ICD-10-CM

## 2022-05-16 DIAGNOSIS — N18.32 TYPE 2 DIABETES MELLITUS WITH STAGE 3B CHRONIC KIDNEY DISEASE, WITH LONG-TERM CURRENT USE OF INSULIN: ICD-10-CM

## 2022-05-16 DIAGNOSIS — E78.2 MIXED HYPERLIPIDEMIA: Primary | ICD-10-CM

## 2022-05-16 DIAGNOSIS — E11.22 TYPE 2 DIABETES MELLITUS WITH STAGE 3B CHRONIC KIDNEY DISEASE, WITH LONG-TERM CURRENT USE OF INSULIN: ICD-10-CM

## 2022-05-16 RX ORDER — INSULIN ASPART 100 [IU]/ML
10-15 INJECTION, SOLUTION INTRAVENOUS; SUBCUTANEOUS
Qty: 60 ML | Refills: 1 | Status: SHIPPED | OUTPATIENT
Start: 2022-05-16 | End: 2022-07-19

## 2022-05-16 NOTE — TELEPHONE ENCOUNTER
Spoke with pt wife nicole she stated that patient has been out of insulin for 2 weeks and [facility] would not refill it due to being to soon  spoke with Chelsea cooper she stated that patient can do 10-15 units  with each meal with sliding scale   prescription sent to [facility] pharmacy

## 2022-05-16 NOTE — TELEPHONE ENCOUNTER
PATIENT'S WIFE CALLED PATIENT IS ALMOST OUT OF NOVOLOG AS HE HAS HAD TO TAKE MORE, HE IS ON DAY 70 OF A 90 DAY SCRIPT.    INSURANCE WILL NOT PAY CAN YOU SENT IN A NEW SCRIPT TO   [facility] [address] [place] [address] [address] - [phone]  - [phone] FX Phone:  [phone]   Fax:  [phone]          CALL PATTIE [phone]

## 2022-05-19 NOTE — PROGRESS NOTES
Encounter addended by: Yen Villanueva, PT on: 9/12/2018  2:33 PM<BR>     Actions taken: Flowsheet data copied forward, Flowsheet accepted Subjective   Contreras Albert is a 66 y.o. male.     Chief Complaint   Patient presents with   • Annual Exam     Blood work          History of Present Illness   Patient presents for CPE with fasting labs; pretty healthy diet, could do better watching saturated fats; regular exercise, has started walking more, will go to park and walk a couple of hours 4-5 days per week, will walk for 10 minutes and then stop; has to be careful walking due to neuropathy affects balance; walks on level ground; regular dental visits; has had recent eye exam; has trouble hearing; has had recent hearing test and steroids have affected auditory nerve, has had tinnitus for 2 years; immunizations UTD; colonoscopy never performed; has upcoming colonoscopy; last PET scan about 1 year ago; no family history of colon cancer.    F/U DM2: takes Basaglar twice daily; sees bubba Germain; last A1c 8.8%; also takes Gabapentin for neuropathy; blood sugar has been increased with steroids once weekly with cancer treatment; has changed to also taking Novolog on Mondays and Tuesdays and as needed to get blood sugar down after taking steroids; blood sugar has been as high as 500; will have blurry vision when blood sugar gets that high; has had 3 eye surgeries recently; has changed dose of Novolog and has seemed to help; bubba was considering Lipitor, but renal declined; has neuropathy of hands and feet; watching intake of carbs/sugars; tries to eat more protein.    F/U HTN/edema: takes Bumex daily; has been on Amlodipine in past and resumed for short period of time, but had bad swelling so stopped taking and started on Carvedilol twice daily; sees Dr. Gonzalez renal; kidneys are working, but not working real well; does not monitor BP at home, but has about 4 MD appointments per week and will check BP; no swelling; no headaches.     F/U multiple myeloma: takes Pomalyst daily for 21 days and then off 7 days; also takes weekly injection of Daratumumab with high dose  steroids; sees Dr. Alvares, oncology; has upcoming repeat bone marrow biopsy in couple of months; no longer in full remission as of last biopsy, but multiple myeloma is still manageable; also gets Procrit injections weekly for anemia; has still had some diarrhea since was on Revlimid, monitoring this.    F/U anxiety: takes Escitalopram daily and works well; has helped him feel so much better since started taking; occasional trouble falling asleep, but Gabapentin helps; has tried to have consistent bedtime and avoid naps during the day; no SI/HI.    Takes Tamsulosin daily; sees urology, Dr. Garcia and monitors PSA; PSA has been stable.    F/U Vitamin D deficiency: takes Vitamin D weekly.    The following portions of the patient's history were reviewed and updated as appropriate: allergies, current medications, past family history, past medical history, past social history, past surgical history and problem list.    Current Outpatient Medications on File Prior to Visit   Medication Sig   • acyclovir (ZOVIRAX) 800 MG tablet Take 800 mg by mouth Daily.   • ASPIRIN 81 PO Take 81 mg by mouth Daily.   • Blood Glucose Monitoring Suppl (FREESTYLE FREEDOM LITE) w/Device kit 1 kit Daily. Indications: Type 2 Diabetes   • bumetanide (BUMEX) 2 MG tablet Take 1 mg by mouth Daily.   • carvedilol (COREG) 12.5 MG tablet Take 12.5 mg by mouth 2 (Two) Times a Day With Meals.   • Continuous Blood Gluc Sensor (FreeStyle Jin 2 Sensor) misc 1 Device Every 14 (Fourteen) Days.   • diphenoxylate-atropine (LOMOTIL) 2.5-0.025 MG per tablet    • escitalopram (LEXAPRO) 5 MG tablet Take 5 mg by mouth Daily.   • finasteride (PROSCAR) 5 MG tablet Take 5 mg by mouth daily.   • gabapentin (NEURONTIN) 300 MG capsule Take 1 capsule by mouth 3 (Three) Times a Day. (Patient taking differently: Take 300 mg by mouth 2 (Two) Times a Day As Needed. 300 mg in morning and 600 mg at bedtime)   • glucagon (GLUCAGEN) 1 MG injection Inject 1 mg into the  appropriate muscle as directed by prescriber 1 (One) Time As Needed for Low Blood Sugar for up to 1 dose. Follow package directions for low blood sugar.   • glucose blood (FREESTYLE LITE) test strip Use to test blood sugar 4-5 times daily.   • insulin aspart (NovoLOG FlexPen) 100 UNIT/ML solution pen-injector sc pen Inject 10-15 Units under the skin into the appropriate area as directed 3 (Three) Times a Day With Meals. + sliding scale add 1 units per every 50 over 150. Max daily dose of 60 units daily.while on the steroid   • Insulin Glargine (BASAGLAR KWIKPEN) 100 UNIT/ML injection pen 30 units twice daily on the days that he takes dexamethasone and on the morning after.  20 units twice daily on other days.  This is a substitute for Lantus.   • Insulin Pen Needle 32G X 4 MM misc USING 4 PEN NEEDLES DAILY   • Lancets (FREESTYLE) lancets Use once then discard to test blood sugar each morning   • loperamide (IMODIUM) 2 MG capsule    • ondansetron (ZOFRAN) 8 MG tablet Take 1 tablet by mouth 3 (Three) Times a Day As Needed for Nausea or Vomiting.   • Pomalyst 2 MG chemo capsule Take 4 mg by mouth Daily.   • tamsulosin (FLOMAX) 0.4 MG capsule 24 hr capsule Take 1 capsule by mouth every night.   • vitamin D (ERGOCALCIFEROL) 1.25 MG (03700 UT) capsule capsule Take 50,000 Units by mouth 1 (One) Time Per Week.     No current facility-administered medications on file prior to visit.        Past Medical History:   Diagnosis Date   • Acute renal failure syndrome (HCC) 5/21/2020   • Anemia    • Cataract    • Chronic renal insufficiency    • Clear cell carcinoma of kidney, right (HCC) 2016    Stage I.  Right partial nephrectomy.  Dr. Garcia   • Clostridium difficile colitis    • Diabetes mellitus (HCC)    • Diabetic macular edema(362.07) 5/26/2020    No significant central fluid today OD.  Stable without significant DME OS.  Observe OU today. Possible multiple myeloma and anemia contributing.  Pt noted improvement with  plasmapheresis and was gett*   • Elevated troponin 5/22/2020   • Enlarged prostate     Dr. Garcia   • H/O stem cell transplant ([facility]) 12/09/2020   • Hematoma of right lower leg    • High cholesterol    • Hypertension    • Multiple myeloma ([facility])    • Multiple rib fractures     on right after fall from 2 story building at age of 30 years   • Severe nonproliferative diabetic retinopathy of both eyes with macular edema associated with type 2 diabetes mellitus ([facility]) 9/2/2020   • Type 2 diabetes mellitus with retinopathy, with long-term current use of insulin ([facility]) 5/22/2020       Past Surgical History:   Procedure Laterality Date   • CATARACT EXTRACTION Left 12/17/2021   • CATARACT EXTRACTION Right 01/21/2022   • INSERTION HEMODIALYSIS CATHETER N/A 6/2/2020    Procedure: HEMODIALYSIS CATHETER INSERTION;  Surgeon: Sumanth Hernandez MD;  Location: [facility];  Service: Vascular;  Laterality: N/A;   • LIMBAL STEM CELL TRANSPLANT  12/09/2020   • NEPHRECTOMY PARTIAL Right 5/18/2016    Procedure: RT NEPHRECTOMY PARTIAL LAPAROSCOPIC WITH DAVINCI ROBOT;  Surgeon: Chad Garcia MD;  Location: [facility];  Service:    • PROSTATE SURGERY         Family History   Problem Relation Age of Onset   • Parkinsonism Mother    • Arrhythmia Father    • Heart failure Father 84   • Diabetes Maternal Uncle    • Macular degeneration Sister        Social History     Socioeconomic History   • Marital status:      Spouse name: Gabby   Tobacco Use   • Smoking status: Never Smoker   • Smokeless tobacco: Never Used   Substance and Sexual Activity   • Alcohol use: No   • Drug use: No   • Sexual activity: Defer       Review of Systems   Constitutional: Positive for fatigue (some, has improved overall). Negative for appetite change, chills, fever, unexpected weight gain and unexpected weight loss.   HENT: Positive for postnasal drip. Negative for ear pain and trouble swallowing. Rhinorrhea: runny/stuffy nose; no OTC treatment,  "has improved. Sore throat: some few days ago with allergies, but resolved.    Eyes: Blurred vision: see HPI.   Respiratory: Negative for cough, chest tightness and shortness of breath.    Cardiovascular: Negative for chest pain and palpitations.   Gastrointestinal: Negative for blood in stool, GERD and indigestion. Abdominal pain: some mild stomach cramps at times. Diarrhea: see HPI.   Endocrine: Positive for cold intolerance (only if Hgb less than 10). Polydipsia: some.   Genitourinary: Positive for frequency (no change; drinks a lot of water daily) and nocturia (gets up 1-2 times per night to urinate). Negative for dysuria.   Musculoskeletal: Negative for arthralgias and back pain.   Skin: Negative for rash and skin lesions.   Neurological: Negative for syncope. Weakness: some in general at times, particularly in legs due to lack of activity for so long.   Hematological: Does not bruise/bleed easily.   Psychiatric/Behavioral: Negative for suicidal ideas.       Objective   Vitals:    05/20/22 0947   BP: 120/68   BP Location: Left arm   Patient Position: Sitting   Cuff Size: Adult   Pulse: 74   Temp: 97.7 °F (36.5 °C)   SpO2: 99%   Weight: 134 kg (294 lb 6.4 oz)   Height: 193 cm (75.98\")     Body mass index is 35.85 kg/m².    Physical Exam  Vitals and nursing note reviewed.   Constitutional:       General: He is not in acute distress.     Appearance: He is well-developed and well-groomed. He is not diaphoretic.   HENT:      Head: Normocephalic and atraumatic.      Jaw: No tenderness or pain on movement.      Right Ear: External ear normal. Right ear decreased hearing: able to hear finger rub. Right ear middle ear effusion: TM dull. Tympanic membrane is not erythematous.      Left Ear: External ear normal. Left ear decreased hearing: able to hear finger rub. Left ear middle ear effusion: TM dull. Tympanic membrane is not erythematous.      Nose: Nose normal.      Right Sinus: No maxillary sinus tenderness or frontal " sinus tenderness.      Left Sinus: No maxillary sinus tenderness or frontal sinus tenderness.      Mouth/Throat:      Mouth: Mucous membranes are moist.      Pharynx: No oropharyngeal exudate or posterior oropharyngeal erythema.   Eyes:      Extraocular Movements: Extraocular movements intact.      Conjunctiva/sclera: Conjunctivae normal.      Pupils: Pupils are equal, round, and reactive to light.   Neck:      Thyroid: No thyromegaly.      Vascular: No carotid bruit.      Trachea: No tracheal deviation.   Cardiovascular:      Rate and Rhythm: Normal rate and regular rhythm.      Pulses:           Dorsalis pedis pulses are 2+ on the right side and 2+ on the left side.        Posterior tibial pulses are 1+ on the right side and 1+ on the left side.      Heart sounds: Normal heart sounds. No murmur heard.  Pulmonary:      Effort: Pulmonary effort is normal. No respiratory distress.      Breath sounds: Normal breath sounds.   Abdominal:      General: Bowel sounds are normal.      Palpations: Abdomen is soft. There is no hepatomegaly or splenomegaly.      Tenderness: There is no abdominal tenderness. There is no guarding.   Musculoskeletal:         General: Normal range of motion.      Cervical back: Normal range of motion and neck supple. No bony tenderness.      Thoracic back: No bony tenderness.      Lumbar back: No bony tenderness.      Right lower leg: Edema (1+ pretibial and ankle) present.      Left lower leg: Edema (1+ pretibial and ankle) present.        Feet:    Feet:      Right foot:      Protective Sensation: 10 sites tested. 2 sites sensed.      Skin integrity: Skin integrity normal.      Left foot:      Protective Sensation: 10 sites tested. 4 sites sensed.      Skin integrity: Skin integrity normal.      Comments:     Lymphadenopathy:      Cervical: No cervical adenopathy.   Skin:     General: Skin is warm and dry.      Findings: No rash.          Neurological:      Mental Status: He is alert and oriented  to person, place, and time.      Cranial Nerves: No cranial nerve deficit.      Coordination: Coordination normal.      Gait: Abnormal gait: guarded gait with cane.      Deep Tendon Reflexes: Reflexes are normal and symmetric.   Psychiatric:         Mood and Affect: Mood normal.         Behavior: Behavior normal.         Thought Content: Thought content normal.         Cognition and Memory: Cognition normal.         Judgment: Judgment normal.         Lab Results   Component Value Date    WBC 3.80 11/12/2020    RBC 3.26 (L) 11/12/2020    HGB 9.8 (L) 11/12/2020    HCT 28.8 (L) 11/12/2020    MCV 88.3 11/12/2020    MCH 30.1 11/12/2020    MCHC 34.0 11/12/2020    RDW 14.9 11/12/2020    RDWSD 47.8 11/12/2020    MPV 12.8 (H) 11/12/2020     (L) 11/12/2020    NEUTRORELPCT 53.6 11/12/2020    LYMPHORELPCT 19.5 (L) 11/12/2020    MONORELPCT 14.5 (H) 11/12/2020    EOSRELPCT 8.9 (H) 11/12/2020    BASORELPCT 2.4 (H) 11/12/2020    AUTOIGPER 1.1 (H) 11/12/2020    NEUTROABS 2.04 11/12/2020    LYMPHSABS 0.74 11/12/2020    MONOSABS 0.55 11/12/2020    EOSABS 0.34 11/12/2020    BASOSABS 0.09 11/12/2020    AUTOIGNUM 0.04 11/12/2020    NRBC 0.0 11/12/2020     Lab Results   Component Value Date    GLUCOSE 184 (H) 03/16/2022    BUN 41 (H) 03/16/2022    CREATININE 2.63 (H) 03/16/2022    EGFRIFNONA 24 (L) 08/09/2021    EGFRIFAFRI 27 (L) 08/09/2021    BCR 16 03/16/2022    K 5.0 03/16/2022    CO2 21 03/16/2022    CALCIUM 8.6 03/16/2022    PROTENTOTREF 5.8 (L) 03/16/2022    ALBUMIN 3.8 03/16/2022    LABIL2 1.9 03/16/2022    AST 9 03/16/2022    ALT 11 03/16/2022      Lab Results   Component Value Date    CHLPL 242 (H) 03/16/2022    TRIG 269 (H) 03/16/2022    HDL 46 03/16/2022    VLDL 49 (H) 03/16/2022     (H) 03/16/2022     Lab Results   Component Value Date    TSH 1.280 08/09/2021     Lab Results   Component Value Date    HGBA1C 8.8 (H) 03/16/2022         Assessment    Problem List Items Addressed This Visit     Multiple myeloma (HCC)     Current Assessment & Plan     Follow up as scheduled with oncology.           Mixed hyperlipidemia    Current Assessment & Plan     Continue to work on healthy diet and exercise.           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, bilateral (HCC)    Current Assessment & Plan     Diabetes is improving with treatment.   Continue current treatment regimen.  Regular aerobic exercise.  Diabetes will be reassessed in 6 months.  Continue Basaglar twice daily and Novolog on Mondays and Tuesdays as needed after taking steroids.  Follow up as scheduled with endocrine.           Anxiety    Current Assessment & Plan     Stable.  Continue Escitalopram daily.           Type 2 diabetes mellitus with chronic kidney disease, with long-term current use of insulin (HCC)    Current Assessment & Plan     Follow up as scheduled with renal.           Hypertension    Current Assessment & Plan     Hypertension is stable.  Regular aerobic exercise.  Continue current medications.  Ambulatory blood pressure monitoring.  Blood pressure will be reassessed at the next regular appointment.  Continue Carvedilol twice daily and Bumex daily.           Relevant Medications    carvedilol (COREG) 12.5 MG tablet      Other Visit Diagnoses     Encounter for annual physical exam    -  Primary          Return in about 6 months (around 11/20/2022) for Recheck.or sooner if problems or concerns.  Impression: Health maintenance visit.  Currently, eats a pretty healthy diet and has a regular exercise routine.  Colorectal cancer screening: upcoming colonoscopy.  Prostate cancer screening: per urology.  Immunizations: UTD; risks and benefits of immunizations were discussed with patient.  Advice and education were given regarding nutrition and aerobic exercise.         COVID-19 Precautions - Patient was compliant in wearing a mask. When I saw the patient, I used appropriate personal protective equipment (PPE)  including mask, gloves, and eye shield (standard procedure).  Hand hygiene was completed before and after seeing the patient.

## 2022-05-20 ENCOUNTER — OFFICE VISIT (OUTPATIENT)
Dept: FAMILY MEDICINE CLINIC | Facility: CLINIC | Age: 67
End: 2022-05-20

## 2022-05-20 VITALS
SYSTOLIC BLOOD PRESSURE: 120 MMHG | TEMPERATURE: 97.7 F | OXYGEN SATURATION: 99 % | WEIGHT: 294.4 LBS | BODY MASS INDEX: 35.85 KG/M2 | HEIGHT: 76 IN | HEART RATE: 74 BPM | DIASTOLIC BLOOD PRESSURE: 68 MMHG

## 2022-05-20 DIAGNOSIS — E11.3413 TYPE 2 DIABETES MELLITUS WITH BOTH EYES AFFECTED BY SEVERE NONPROLIFERATIVE RETINOPATHY AND MACULAR EDEMA, WITH LONG-TERM CURRENT USE OF INSULIN: ICD-10-CM

## 2022-05-20 DIAGNOSIS — N18.32 TYPE 2 DIABETES MELLITUS WITH STAGE 3B CHRONIC KIDNEY DISEASE, WITH LONG-TERM CURRENT USE OF INSULIN: ICD-10-CM

## 2022-05-20 DIAGNOSIS — E78.2 MIXED HYPERLIPIDEMIA: ICD-10-CM

## 2022-05-20 DIAGNOSIS — Z79.4 TYPE 2 DIABETES MELLITUS WITH BOTH EYES AFFECTED BY SEVERE NONPROLIFERATIVE RETINOPATHY AND MACULAR EDEMA, WITH LONG-TERM CURRENT USE OF INSULIN: ICD-10-CM

## 2022-05-20 DIAGNOSIS — F41.9 ANXIETY: ICD-10-CM

## 2022-05-20 DIAGNOSIS — Z79.4 TYPE 2 DIABETES MELLITUS WITH STAGE 3B CHRONIC KIDNEY DISEASE, WITH LONG-TERM CURRENT USE OF INSULIN: ICD-10-CM

## 2022-05-20 DIAGNOSIS — C90.00 MULTIPLE MYELOMA, REMISSION STATUS UNSPECIFIED: ICD-10-CM

## 2022-05-20 DIAGNOSIS — E11.22 TYPE 2 DIABETES MELLITUS WITH STAGE 3B CHRONIC KIDNEY DISEASE, WITH LONG-TERM CURRENT USE OF INSULIN: ICD-10-CM

## 2022-05-20 DIAGNOSIS — Z00.00 ENCOUNTER FOR ANNUAL PHYSICAL EXAM: Primary | ICD-10-CM

## 2022-05-20 DIAGNOSIS — I10 PRIMARY HYPERTENSION: ICD-10-CM

## 2022-05-20 PROBLEM — H43.11 VITREOUS HEMORRHAGE OF RIGHT EYE: Status: ACTIVE | Noted: 2021-07-27

## 2022-05-20 PROBLEM — K52.9 CHRONIC DIARRHEA: Status: ACTIVE | Noted: 2021-11-29

## 2022-05-20 PROBLEM — H26.9 CATARACT: Status: ACTIVE | Noted: 2022-03-02

## 2022-05-20 PROBLEM — Z98.890 OTHER SPECIFIED POSTPROCEDURAL STATES: Status: ACTIVE | Noted: 2021-09-17

## 2022-05-20 PROCEDURE — 99396 PREV VISIT EST AGE 40-64: CPT | Performed by: NURSE PRACTITIONER

## 2022-05-20 RX ORDER — GABAPENTIN 100 MG/1
CAPSULE ORAL
COMMUNITY
Start: 2022-01-17 | End: 2022-05-20

## 2022-05-20 RX ORDER — CARVEDILOL 12.5 MG/1
12.5 TABLET ORAL 2 TIMES DAILY WITH MEALS
COMMUNITY

## 2022-05-20 NOTE — PATIENT INSTRUCTIONS
Continue to monitor your blood sugar once daily before a meal and record results.  Continue to monitor your blood pressure periodically and record results.  Continue to work on healthy diet and exercise as tolerated  Follow up in 6 months, or sooner if problems or concerns.

## 2022-05-21 NOTE — ASSESSMENT & PLAN NOTE
Diabetes is improving with treatment.   Continue current treatment regimen.  Regular aerobic exercise.  Diabetes will be reassessed in 6 months.  Continue Basaglar twice daily and Novolog on Mondays and Tuesdays as needed after taking steroids.  Follow up as scheduled with endocrine.

## 2022-05-21 NOTE — ASSESSMENT & PLAN NOTE
Hypertension is stable.  Regular aerobic exercise.  Continue current medications.  Ambulatory blood pressure monitoring.  Blood pressure will be reassessed at the next regular appointment.  Continue Carvedilol twice daily and Bumex daily.

## 2022-06-28 PROBLEM — N18.4 BENIGN HYPERTENSION WITH CKD (CHRONIC KIDNEY DISEASE) STAGE IV: Status: ACTIVE | Noted: 2020-05-22

## 2022-07-06 ENCOUNTER — OFFICE VISIT (OUTPATIENT)
Dept: ENDOCRINOLOGY | Age: 67
End: 2022-07-06

## 2022-07-06 VITALS
WEIGHT: 306 LBS | SYSTOLIC BLOOD PRESSURE: 120 MMHG | TEMPERATURE: 97.3 F | HEART RATE: 74 BPM | HEIGHT: 76 IN | BODY MASS INDEX: 37.26 KG/M2 | DIASTOLIC BLOOD PRESSURE: 70 MMHG | OXYGEN SATURATION: 98 %

## 2022-07-06 DIAGNOSIS — I12.9 BENIGN HYPERTENSION WITH CKD (CHRONIC KIDNEY DISEASE) STAGE IV: ICD-10-CM

## 2022-07-06 DIAGNOSIS — E11.22 TYPE 2 DIABETES MELLITUS WITH STAGE 4 CHRONIC KIDNEY DISEASE, WITH LONG-TERM CURRENT USE OF INSULIN: Primary | ICD-10-CM

## 2022-07-06 DIAGNOSIS — Z79.4 TYPE 2 DIABETES MELLITUS WITH STAGE 4 CHRONIC KIDNEY DISEASE, WITH LONG-TERM CURRENT USE OF INSULIN: Primary | ICD-10-CM

## 2022-07-06 DIAGNOSIS — N18.4 BENIGN HYPERTENSION WITH CKD (CHRONIC KIDNEY DISEASE) STAGE IV: ICD-10-CM

## 2022-07-06 DIAGNOSIS — E78.2 MIXED HYPERLIPIDEMIA: ICD-10-CM

## 2022-07-06 DIAGNOSIS — N18.4 TYPE 2 DIABETES MELLITUS WITH STAGE 4 CHRONIC KIDNEY DISEASE, WITH LONG-TERM CURRENT USE OF INSULIN: Primary | ICD-10-CM

## 2022-07-06 PROCEDURE — 99214 OFFICE O/P EST MOD 30 MIN: CPT | Performed by: NURSE PRACTITIONER

## 2022-07-06 RX ORDER — DEXAMETHASONE 4 MG/1
4 TABLET ORAL SEE ADMIN INSTRUCTIONS
COMMUNITY
Start: 2022-06-20 | End: 2022-10-24 | Stop reason: SDUPTHER

## 2022-07-06 RX ORDER — PROCHLORPERAZINE 25 MG/1
1 SUPPOSITORY RECTAL
Qty: 1 EACH | Refills: 1 | Status: SHIPPED | OUTPATIENT
Start: 2022-07-06 | End: 2022-12-07 | Stop reason: SDUPTHER

## 2022-07-06 RX ORDER — PROCHLORPERAZINE 25 MG/1
SUPPOSITORY RECTAL
Qty: 9 EACH | Refills: 1 | Status: SHIPPED | OUTPATIENT
Start: 2022-07-06 | End: 2022-12-07 | Stop reason: SDUPTHER

## 2022-07-06 RX ORDER — PROCHLORPERAZINE 25 MG/1
1 SUPPOSITORY RECTAL ONCE
Qty: 1 EACH | Refills: 0 | Status: SHIPPED | OUTPATIENT
Start: 2022-07-06 | End: 2022-07-06

## 2022-07-06 NOTE — PROGRESS NOTES
Chief Complaint  Chief Complaint   Patient presents with   • Diabetes     TYPE 2       Subjective          History of Present Illness    Contreras Albert 66 y.o. presents for a follow-up evaluation for type 2 DM     He has been diabetic since 2001 and started insulin in 2009     Pt is on the following medications for their DM:        Non-Chemo Days     Lantus 16 units twice a day and Novolog 10 untis TID/AC plus 1 units per every 50 over 150     Chemo Days     Lantus to 30 units twice a day on the day of chemotherapy and on the morning after chemotherapy.  NovoLog to 15 units 3 times daily with meals +2 units per 50 greater than 150 on the day of chemotherapy           Pt complains of chronic diarrhea, shortness of breath with activity, numbness and tingling in feet/hands, vision changes when takes steroids/high BGs    Denies constipation and chest pain    Weight loss of 4 lbs since last visit.    Pt does have a history of DM retinopathy and had previous intraocular eye injections on both eyes.  Last eye exam was 07/21     Pt does have a history of nephropathy.  Patient is not currently taking ACE/ARB - follows Dr. 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  He is on gabapentin 300 mg 1 in am and 2 in pm  prescribed by his oncologist Dr. Alvares     Last Fructosamine in 06/22 was 220 (~5.5)     Last microalbumin in 06/22 was positive        Blood Sugars    Pt's cancer has came out of remission.  Along with the chemo treatments, he is receiving steroids 3 times a week.  This has made his blood sugars go up into 4 and 5 hundreds    Blood glucoses are checked 3-4/day.    Fasting blood glucoses:  depending on whether or not he took steriods    Pre-meal blood glucoses: 100s - 500s, again depending on steriods    Pt has no episodes of hypoglycemia.            Hyperlipidemia     Pt denies any muscle/body aches or chest pain    Pt is currently taking atorvastatin 10 mg  "HS     Last lipid panel in 06/22 showed Total 182, Triglycerides 471, HDL 28 and LDL 79              Hypertension with CKD Stage 4     Pt denies any chest pain, palpitations or headache     Current regimen includes Bumex 1 mg/day and carvedilol 12.5 mg BID  He was taken off amlodipine because of pedal edema            Other Medical History     He has multiple myeloma and had chemotherapy followed by stem cell transplant.  His cancer has came out of remission and he is on Pomalyst along with steriods 3 times a week.     He had a previous right partial nephrectomy for cancer.  He has no known recurrence.          I have reviewed the patient's allergies, medicines, past medical hx, family hx and social hx.    Objective   Vital Signs:   /70   Pulse 74   Temp 97.3 °F (36.3 °C)   Ht 193 cm (75.98\")   Wt (!) 139 kg (306 lb)   SpO2 98%   BMI 37.26 kg/m²       Physical Exam   Physical Exam  Constitutional:       General: He is not in acute distress.     Appearance: Normal appearance. He is obese. He is not diaphoretic.   HENT:      Head: Normocephalic and atraumatic.   Eyes:      General:         Right eye: No discharge.         Left eye: No discharge.   Skin:     General: Skin is warm and dry.   Neurological:      Mental Status: He is alert.   Psychiatric:         Mood and Affect: Mood normal.         Behavior: Behavior normal.                    Results Review:   Hemoglobin A1C   Date Value Ref Range Status   03/16/2022 8.8 (H) 4.8 - 5.6 % Final     Comment:              Prediabetes: 5.7 - 6.4           Diabetes: >6.4           Glycemic control for adults with diabetes: <7.0     05/23/2020 6.60 (H) 4.80 - 5.60 % Final     Triglycerides   Date Value Ref Range Status   06/15/2022 471 (H) 0 - 149 mg/dL Final     HDL Cholesterol   Date Value Ref Range Status   06/15/2022 28 (L) >39 mg/dL Final     LDL Chol Calc (NIH)   Date Value Ref Range Status   06/15/2022 79 0 - 99 mg/dL Final     VLDL Cholesterol Wale   Date " Value Ref Range Status   06/15/2022 75 (H) 5 - 40 mg/dL Final     LDL/HDL RATIO   Date Value Ref Range Status   01/15/2021 2.8 0.0 - 3.6 ratio Final     Comment:                                         LDL/HDL Ratio                                              Men  Women                                1/2 Avg.Risk  1.0    1.5                                    Avg.Risk  3.6    3.2                                 2X Avg.Risk  6.2    5.0                                 3X Avg.Risk  8.0    6.1           Assessment and Plan {CC Problem List  Visit Diagnosis  ROS  Review (Popup)  Health Maintenance  Quality  BestPractice  Medications  SmartSets  SnapShot Encounters  Media :23  Diagnoses and all orders for this visit:    1. Type 2 diabetes mellitus with stage 4 chronic kidney disease, with long-term current use of insulin (HCC) (Primary)  -     Continuous Blood Gluc  (Dexcom G6 ) device; 1 Device 1 (One) Time for 1 dose. Dx: E 11.22  Dispense: 1 each; Refill: 0  -     Continuous Blood Gluc Sensor (Dexcom G6 Sensor); Replace sensor every 10 days.  Dx: E 11.22  Dispense: 9 each; Refill: 1  -     Continuous Blood Gluc Transmit (Dexcom G6 Transmitter) misc; 1 each Every 3 (Three) Months. Dx: E 11.22  Dispense: 1 each; Refill: 1  -     Fructosamine; Future  -     Comprehensive Metabolic Panel; Future  -     Lipid Panel; Future    Fructosamine is normal at 220  Continue with current insulin regimen  Try to get Dexcom - insurance wouldn't pay for [place]  Continue with BG checks until gets CGM  Check labs prior to next visit      2. Mixed hyperlipidemia  -     Comprehensive Metabolic Panel; Future  -     Lipid Panel; Future    Total cholesterol and LDL are normal, continue with statin.    Triglycerides are elevated, decrease dietary fat. This may be related to swinging of BGs due to steroid use.  Could use fish oil, but advised to check with oncologist before doing so.  Check labs prior to next  visit      3. Benign hypertension with CKD (chronic kidney disease) stage IV (HCC)  -     Comprehensive Metabolic Panel; Future       Stable  Continue with current medication regimen  Defer management to PCP/nephrology          No refills needed at this time      RTC in 3 months with me, labs prior and 6 months with Dr. Haro      Follow Up     Patient was given instructions and counseling regarding her condition or for health maintenance advice. Please see specific information pulled into the AVS if appropriate.              IZZY Gordillo  07/06/22

## 2022-07-19 DIAGNOSIS — N18.4 TYPE 2 DIABETES MELLITUS WITH STAGE 4 CHRONIC KIDNEY DISEASE, WITH LONG-TERM CURRENT USE OF INSULIN: Primary | ICD-10-CM

## 2022-07-19 DIAGNOSIS — E11.22 TYPE 2 DIABETES MELLITUS WITH STAGE 4 CHRONIC KIDNEY DISEASE, WITH LONG-TERM CURRENT USE OF INSULIN: Primary | ICD-10-CM

## 2022-07-19 DIAGNOSIS — Z79.4 TYPE 2 DIABETES MELLITUS WITH STAGE 4 CHRONIC KIDNEY DISEASE, WITH LONG-TERM CURRENT USE OF INSULIN: Primary | ICD-10-CM

## 2022-07-19 RX ORDER — INSULIN ASPART 100 [IU]/ML
40-50 INJECTION, SOLUTION INTRAVENOUS; SUBCUTANEOUS
Qty: 150 ML | Refills: 1 | Status: ON HOLD | OUTPATIENT
Start: 2022-07-19 | End: 2022-09-28 | Stop reason: SDUPTHER

## 2022-09-22 ENCOUNTER — LAB (OUTPATIENT)
Dept: ENDOCRINOLOGY | Age: 67
End: 2022-09-22

## 2022-09-22 DIAGNOSIS — Z79.4 TYPE 2 DIABETES MELLITUS WITH STAGE 4 CHRONIC KIDNEY DISEASE, WITH LONG-TERM CURRENT USE OF INSULIN: ICD-10-CM

## 2022-09-22 DIAGNOSIS — E78.2 MIXED HYPERLIPIDEMIA: ICD-10-CM

## 2022-09-22 DIAGNOSIS — I12.9 BENIGN HYPERTENSION WITH CKD (CHRONIC KIDNEY DISEASE) STAGE IV: ICD-10-CM

## 2022-09-22 DIAGNOSIS — N18.4 BENIGN HYPERTENSION WITH CKD (CHRONIC KIDNEY DISEASE) STAGE IV: ICD-10-CM

## 2022-09-22 DIAGNOSIS — E11.22 TYPE 2 DIABETES MELLITUS WITH STAGE 4 CHRONIC KIDNEY DISEASE, WITH LONG-TERM CURRENT USE OF INSULIN: ICD-10-CM

## 2022-09-22 DIAGNOSIS — N18.4 TYPE 2 DIABETES MELLITUS WITH STAGE 4 CHRONIC KIDNEY DISEASE, WITH LONG-TERM CURRENT USE OF INSULIN: ICD-10-CM

## 2022-09-23 ENCOUNTER — HOSPITAL ENCOUNTER (INPATIENT)
Facility: HOSPITAL | Age: 67
LOS: 5 days | Discharge: HOME HEALTH | End: 2022-09-28
Attending: EMERGENCY MEDICINE | Admitting: HOSPITALIST

## 2022-09-23 ENCOUNTER — LAB (OUTPATIENT)
Dept: LAB | Facility: HOSPITAL | Age: 67
End: 2022-09-23

## 2022-09-23 ENCOUNTER — TELEPHONE (OUTPATIENT)
Dept: ENDOCRINOLOGY | Age: 67
End: 2022-09-23

## 2022-09-23 DIAGNOSIS — E11.22 TYPE 2 DIABETES MELLITUS WITH STAGE 4 CHRONIC KIDNEY DISEASE, WITH LONG-TERM CURRENT USE OF INSULIN: ICD-10-CM

## 2022-09-23 DIAGNOSIS — E11.3419: ICD-10-CM

## 2022-09-23 DIAGNOSIS — D72.819 LEUKOPENIA, UNSPECIFIED TYPE: ICD-10-CM

## 2022-09-23 DIAGNOSIS — C90.00 MULTIPLE MYELOMA, REMISSION STATUS UNSPECIFIED: ICD-10-CM

## 2022-09-23 DIAGNOSIS — E83.51 HYPOCALCEMIA: Primary | ICD-10-CM

## 2022-09-23 DIAGNOSIS — E11.22 TYPE 2 DIABETES MELLITUS WITH CHRONIC KIDNEY DISEASE, WITH LONG-TERM CURRENT USE OF INSULIN, UNSPECIFIED CKD STAGE: ICD-10-CM

## 2022-09-23 DIAGNOSIS — N18.4 TYPE 2 DIABETES MELLITUS WITH STAGE 4 CHRONIC KIDNEY DISEASE, WITH LONG-TERM CURRENT USE OF INSULIN: ICD-10-CM

## 2022-09-23 DIAGNOSIS — D64.9 ANEMIA, UNSPECIFIED TYPE: ICD-10-CM

## 2022-09-23 DIAGNOSIS — N17.9 AKI (ACUTE KIDNEY INJURY): ICD-10-CM

## 2022-09-23 DIAGNOSIS — Z79.4 TYPE 2 DIABETES MELLITUS WITH STAGE 4 CHRONIC KIDNEY DISEASE, WITH LONG-TERM CURRENT USE OF INSULIN: ICD-10-CM

## 2022-09-23 DIAGNOSIS — E83.51 HYPOCALCEMIA: ICD-10-CM

## 2022-09-23 DIAGNOSIS — D61.818 PANCYTOPENIA: ICD-10-CM

## 2022-09-23 DIAGNOSIS — Z85.528 HISTORY OF MALIGNANT NEOPLASM OF KIDNEY: ICD-10-CM

## 2022-09-23 DIAGNOSIS — Z79.4 TYPE 2 DIABETES MELLITUS WITH CHRONIC KIDNEY DISEASE, WITH LONG-TERM CURRENT USE OF INSULIN, UNSPECIFIED CKD STAGE: ICD-10-CM

## 2022-09-23 LAB
25(OH)D3 SERPL-MCNC: 26.5 NG/ML (ref 30–100)
ALBUMIN SERPL-MCNC: 3.4 G/DL (ref 3.5–5.2)
ALBUMIN/GLOB SERPL: 3.1 G/DL
ALP SERPL-CCNC: 44 U/L (ref 39–117)
ALT SERPL-CCNC: 8 U/L (ref 1–41)
ANION GAP SERPL CALCULATED.3IONS-SCNC: 13.4 MMOL/L (ref 5–15)
AST SERPL-CCNC: 8 U/L (ref 1–40)
BILIRUB SERPL-MCNC: 0.4 MG/DL (ref 0–1.2)
BUN SERPL-MCNC: 38 MG/DL (ref 8–23)
BUN SERPL-MCNC: 42 MG/DL (ref 8–23)
BUN/CREAT SERPL: 11 (ref 7–25)
BUN/CREAT SERPL: 11.9 (ref 7–25)
CALCIUM SERPL-MCNC: 5.9 MG/DL (ref 8.6–10.5)
CALCIUM SPEC-SCNC: 6 MG/DL (ref 8.6–10.5)
CALCIUM SPEC-SCNC: 6.8 MG/DL (ref 8.6–10.5)
CHLORIDE SERPL-SCNC: 111 MMOL/L (ref 98–107)
CHLORIDE SERPL-SCNC: 111 MMOL/L (ref 98–107)
CHOLEST SERPL-MCNC: 120 MG/DL (ref 0–200)
CO2 SERPL-SCNC: 16 MMOL/L (ref 22–29)
CO2 SERPL-SCNC: 16.6 MMOL/L (ref 22–29)
CREAT SERPL-MCNC: 3.44 MG/DL (ref 0.76–1.27)
CREAT SERPL-MCNC: 3.52 MG/DL (ref 0.76–1.27)
DEPRECATED RDW RBC AUTO: 60.2 FL (ref 37–54)
EGFRCR SERPLBLD CKD-EPI 2021: 18.4 ML/MIN/1.73
EGFRCR SERPLBLD CKD-EPI 2021: 19 ML/MIN/1.73
EOSINOPHIL # BLD MANUAL: 0.09 10*3/MM3 (ref 0–0.4)
EOSINOPHIL NFR BLD MANUAL: 8.8 % (ref 0.3–6.2)
ERYTHROCYTE [DISTWIDTH] IN BLOOD BY AUTOMATED COUNT: 18.6 % (ref 12.3–15.4)
FRUCTOSAMINE SERPL-SCNC: 182 UMOL/L (ref 0–285)
GLOBULIN SER CALC-MCNC: 1.1 GM/DL
GLUCOSE BLDC GLUCOMTR-MCNC: 77 MG/DL (ref 70–130)
GLUCOSE SERPL-MCNC: 126 MG/DL (ref 65–99)
GLUCOSE SERPL-MCNC: 76 MG/DL (ref 65–99)
HCT VFR BLD AUTO: 23.8 % (ref 37.5–51)
HDLC SERPL-MCNC: 29 MG/DL (ref 40–60)
HGB BLD-MCNC: 7.7 G/DL (ref 13–17.7)
IMP & REVIEW OF LAB RESULTS: NORMAL
LDLC SERPL CALC-MCNC: 65 MG/DL (ref 0–100)
LYMPHOCYTES # BLD MANUAL: 0.44 10*3/MM3 (ref 0.7–3.1)
LYMPHOCYTES NFR BLD MANUAL: 15 % (ref 5–12)
MCH RBC QN AUTO: 29.1 PG (ref 26.6–33)
MCHC RBC AUTO-ENTMCNC: 32.4 G/DL (ref 31.5–35.7)
MCV RBC AUTO: 89.8 FL (ref 79–97)
MONOCYTES # BLD: 0.16 10*3/MM3 (ref 0.1–0.9)
NEUTROPHILS # BLD AUTO: 0.35 10*3/MM3 (ref 1.7–7)
NEUTROPHILS NFR BLD MANUAL: 33.8 % (ref 42.7–76)
PLAT MORPH BLD: NORMAL
PLATELET # BLD AUTO: 83 10*3/MM3 (ref 140–450)
PMV BLD AUTO: 9.9 FL (ref 6–12)
POTASSIUM SERPL-SCNC: 3.5 MMOL/L (ref 3.5–5.2)
POTASSIUM SERPL-SCNC: 3.7 MMOL/L (ref 3.5–5.2)
PROT SERPL-MCNC: 4.5 G/DL (ref 6–8.5)
PTH-INTACT SERPL-MCNC: 271 PG/ML (ref 15–65)
RBC # BLD AUTO: 2.65 10*6/MM3 (ref 4.14–5.8)
RBC MORPH BLD: NORMAL
REPORT: NORMAL
SODIUM SERPL-SCNC: 141 MMOL/L (ref 136–145)
SODIUM SERPL-SCNC: 141 MMOL/L (ref 136–145)
TRIGL SERPL-MCNC: 146 MG/DL (ref 0–150)
VARIANT LYMPHS NFR BLD MANUAL: 42.5 % (ref 19.6–45.3)
VLDLC SERPL CALC-MCNC: 26 MG/DL (ref 5–40)
WBC MORPH BLD: NORMAL
WBC NRBC COR # BLD: 1.04 10*3/MM3 (ref 3.4–10.8)

## 2022-09-23 PROCEDURE — 99284 EMERGENCY DEPT VISIT MOD MDM: CPT

## 2022-09-23 PROCEDURE — 82306 VITAMIN D 25 HYDROXY: CPT

## 2022-09-23 PROCEDURE — 82310 ASSAY OF CALCIUM: CPT

## 2022-09-23 PROCEDURE — 82607 VITAMIN B-12: CPT | Performed by: INTERNAL MEDICINE

## 2022-09-23 PROCEDURE — 82330 ASSAY OF CALCIUM: CPT

## 2022-09-23 PROCEDURE — 36415 COLL VENOUS BLD VENIPUNCTURE: CPT

## 2022-09-23 PROCEDURE — 93010 ELECTROCARDIOGRAM REPORT: CPT | Performed by: INTERNAL MEDICINE

## 2022-09-23 PROCEDURE — 85025 COMPLETE CBC W/AUTO DIFF WBC: CPT | Performed by: PHYSICIAN ASSISTANT

## 2022-09-23 PROCEDURE — 25010000002 CALCIUM GLUCONATE-NACL 1-0.675 GM/50ML-% SOLUTION: Performed by: PHYSICIAN ASSISTANT

## 2022-09-23 PROCEDURE — 83970 ASSAY OF PARATHORMONE: CPT

## 2022-09-23 PROCEDURE — 93005 ELECTROCARDIOGRAM TRACING: CPT | Performed by: PHYSICIAN ASSISTANT

## 2022-09-23 PROCEDURE — 85007 BL SMEAR W/DIFF WBC COUNT: CPT | Performed by: PHYSICIAN ASSISTANT

## 2022-09-23 PROCEDURE — 80048 BASIC METABOLIC PNL TOTAL CA: CPT | Performed by: PHYSICIAN ASSISTANT

## 2022-09-23 PROCEDURE — 82746 ASSAY OF FOLIC ACID SERUM: CPT | Performed by: INTERNAL MEDICINE

## 2022-09-23 PROCEDURE — 82962 GLUCOSE BLOOD TEST: CPT

## 2022-09-23 PROCEDURE — 83615 LACTATE (LD) (LDH) ENZYME: CPT | Performed by: INTERNAL MEDICINE

## 2022-09-23 PROCEDURE — 82652 VIT D 1 25-DIHYDROXY: CPT

## 2022-09-23 RX ORDER — GABAPENTIN 600 MG/1
600 TABLET ORAL NIGHTLY
Status: ON HOLD | COMMUNITY
End: 2022-09-28 | Stop reason: SDUPTHER

## 2022-09-23 RX ORDER — SODIUM CHLORIDE 0.9 % (FLUSH) 0.9 %
10 SYRINGE (ML) INJECTION AS NEEDED
Status: DISCONTINUED | OUTPATIENT
Start: 2022-09-23 | End: 2022-09-28 | Stop reason: HOSPADM

## 2022-09-23 RX ORDER — GABAPENTIN 300 MG/1
600 CAPSULE ORAL NIGHTLY
Status: DISCONTINUED | OUTPATIENT
Start: 2022-09-23 | End: 2022-09-28 | Stop reason: HOSPADM

## 2022-09-23 RX ORDER — CALCIUM GLUCONATE 20 MG/ML
1 INJECTION, SOLUTION INTRAVENOUS ONCE
Status: COMPLETED | OUTPATIENT
Start: 2022-09-23 | End: 2022-09-23

## 2022-09-23 RX ORDER — DIPHENOXYLATE HYDROCHLORIDE AND ATROPINE SULFATE 2.5; .025 MG/1; MG/1
2 TABLET ORAL 4 TIMES DAILY PRN
Status: DISCONTINUED | OUTPATIENT
Start: 2022-09-23 | End: 2022-09-28 | Stop reason: HOSPADM

## 2022-09-23 RX ORDER — GABAPENTIN 300 MG/1
300 CAPSULE ORAL DAILY
Status: DISCONTINUED | OUTPATIENT
Start: 2022-09-24 | End: 2022-09-28 | Stop reason: HOSPADM

## 2022-09-23 RX ADMIN — Medication 10 ML: at 23:21

## 2022-09-23 RX ADMIN — CALCIUM GLUCONATE 1 G: 20 INJECTION, SOLUTION INTRAVENOUS at 19:33

## 2022-09-23 RX ADMIN — DIPHENOXYLATE HYDROCHLORIDE AND ATROPINE SULFATE 2 TABLET: 2.5; .025 TABLET ORAL at 23:21

## 2022-09-23 RX ADMIN — GABAPENTIN 600 MG: 300 CAPSULE ORAL at 23:17

## 2022-09-23 NOTE — PROGRESS NOTES
Received a call from on call service for a critical calcium of 5.9.  Call patient this morning.  Pt states that his nephrologist nor his oncologist is treating his low calcium.  Pt had a calcium of 6.6 that was drawn at the [facility] and pt states that they told him to take one tums daily for this.  Advised patient that he needed to go to hospital to get labs redrawn.  Pt asked if this could wait until Monday and I told him it could not wait until Monday and reviewed the risk this could pose to his health.  Pt's wife got on phone and will get him to the hospital to recheck labs.    Labs placed stat

## 2022-09-23 NOTE — TELEPHONE ENCOUNTER
Pt went to ER and got labs redrawn.  At this time calcium is still low at 6.0, PTH is elevated at 271 and Vitamin D is slightly low at 26.5.  Vitamin D 1-25 is still pending, as is ionized calcium.  I reviewed these labs with Dr. Bryant, who said that patient needs to go to ER for treatment.  I called and patient's wife, Martine, answered the phone (she is listed on verbal release form).  I informed Martine that patient's calcium was still really low and needs to go to ER for treatment.  I told wife that patient is not to drive himself and if he doesn't get his levels corrected that he is at risk for seizures and/or cardiac arrhythmias.  Martine verbalized understanding.

## 2022-09-24 PROBLEM — D61.818 PANCYTOPENIA: Status: ACTIVE | Noted: 2022-09-24

## 2022-09-24 LAB
ABO GROUP BLD: NORMAL
ALBUMIN SERPL-MCNC: 3.1 G/DL (ref 3.5–5.2)
ALBUMIN/GLOB SERPL: 1.7 G/DL
ALP SERPL-CCNC: 42 U/L (ref 39–117)
ALT SERPL W P-5'-P-CCNC: 8 U/L (ref 1–41)
ANION GAP SERPL CALCULATED.3IONS-SCNC: 15.7 MMOL/L (ref 5–15)
AST SERPL-CCNC: 7 U/L (ref 1–40)
BACTERIA UR QL AUTO: ABNORMAL /HPF
BILIRUB SERPL-MCNC: 0.4 MG/DL (ref 0–1.2)
BILIRUB UR QL STRIP: NEGATIVE
BLD GP AB SCN SERPL QL: NEGATIVE
BLD GP AB SCN SERPL QL: POSITIVE
BUN SERPL-MCNC: 43 MG/DL (ref 8–23)
BUN/CREAT SERPL: 12.7 (ref 7–25)
CA-I SERPL ISE-MCNC: 3.7 MG/DL (ref 4.5–5.6)
CALCIUM SPEC-SCNC: 6.6 MG/DL (ref 8.6–10.5)
CHLORIDE SERPL-SCNC: 110 MMOL/L (ref 98–107)
CLARITY UR: ABNORMAL
CO2 SERPL-SCNC: 15.3 MMOL/L (ref 22–29)
COLOR UR: YELLOW
CREAT SERPL-MCNC: 3.39 MG/DL (ref 0.76–1.27)
DAT POLY-SP REAG RBC QL: NEGATIVE
DEPRECATED RDW RBC AUTO: 58.6 FL (ref 37–54)
EGFRCR SERPLBLD CKD-EPI 2021: 19.3 ML/MIN/1.73
ERYTHROCYTE [DISTWIDTH] IN BLOOD BY AUTOMATED COUNT: 18.2 % (ref 12.3–15.4)
ERYTHROCYTE [SEDIMENTATION RATE] IN BLOOD: 37 MM/HR (ref 0–20)
FERRITIN SERPL-MCNC: 305 NG/ML (ref 30–400)
FOLATE SERPL-MCNC: 14.9 NG/ML (ref 4.78–24.2)
GLOBULIN UR ELPH-MCNC: 1.8 GM/DL
GLUCOSE BLDC GLUCOMTR-MCNC: 176 MG/DL (ref 70–130)
GLUCOSE BLDC GLUCOMTR-MCNC: 179 MG/DL (ref 70–130)
GLUCOSE BLDC GLUCOMTR-MCNC: 209 MG/DL (ref 70–130)
GLUCOSE BLDC GLUCOMTR-MCNC: 219 MG/DL (ref 70–130)
GLUCOSE SERPL-MCNC: 129 MG/DL (ref 65–99)
GLUCOSE UR STRIP-MCNC: ABNORMAL MG/DL
GRAN CASTS URNS QL MICRO: ABNORMAL /LPF
HCT VFR BLD AUTO: 21.2 % (ref 37.5–51)
HCT VFR BLD AUTO: 23 % (ref 37.5–51)
HGB BLD-MCNC: 6.9 G/DL (ref 13–17.7)
HGB BLD-MCNC: 7.3 G/DL (ref 13–17.7)
HGB RETIC QN AUTO: 22.9 PG (ref 29.8–36.1)
HGB UR QL STRIP.AUTO: ABNORMAL
HYALINE CASTS UR QL AUTO: ABNORMAL /LPF
IGA1 MFR SER: <50 MG/DL (ref 70–400)
IGG1 SER-MCNC: 314 MG/DL (ref 700–1600)
IGM SERPL-MCNC: <25 MG/DL (ref 40–230)
IMM RETICS NFR: 22.1 % (ref 3–15.8)
IRON 24H UR-MRATE: 22 MCG/DL (ref 59–158)
IRON SATN MFR SERPL: 11 % (ref 20–50)
KETONES UR QL STRIP: NEGATIVE
LDH SERPL-CCNC: 127 U/L (ref 135–225)
LDH SERPL-CCNC: 150 U/L (ref 135–225)
LEUKOCYTE ESTERASE UR QL STRIP.AUTO: NEGATIVE
MAGNESIUM SERPL-MCNC: 1.5 MG/DL (ref 1.6–2.4)
MCH RBC QN AUTO: 28.4 PG (ref 26.6–33)
MCHC RBC AUTO-ENTMCNC: 32.5 G/DL (ref 31.5–35.7)
MCV RBC AUTO: 87.2 FL (ref 79–97)
NITRITE UR QL STRIP: NEGATIVE
PASSIVE ANTI-CD-38: NORMAL
PH UR STRIP.AUTO: <=5 [PH] (ref 5–8)
PHOSPHATE SERPL-MCNC: 4.8 MG/DL (ref 2.5–4.5)
PLATELET # BLD AUTO: 78 10*3/MM3 (ref 140–450)
PLATELET # BLD AUTO: 78 10*3/MM3 (ref 140–450)
PLATELETS.RETICULATED NFR BLD AUTO: 2.7 % (ref 0.9–6.5)
PMV BLD AUTO: 10.2 FL (ref 6–12)
POTASSIUM SERPL-SCNC: 3.3 MMOL/L (ref 3.5–5.2)
POTASSIUM SERPL-SCNC: 3.8 MMOL/L (ref 3.5–5.2)
PROT SERPL-MCNC: 4.9 G/DL (ref 6–8.5)
PROT UR QL STRIP: ABNORMAL
QT INTERVAL: 403 MS
RBC # BLD AUTO: 2.43 10*6/MM3 (ref 4.14–5.8)
RBC # UR STRIP: ABNORMAL /HPF
REF LAB TEST METHOD: ABNORMAL
RETICS # AUTO: 0.04 10*6/MM3 (ref 0.02–0.13)
RETICS/RBC NFR AUTO: 1.62 % (ref 0.7–1.9)
RH BLD: POSITIVE
SODIUM SERPL-SCNC: 141 MMOL/L (ref 136–145)
SP GR UR STRIP: 1.01 (ref 1–1.03)
SQUAMOUS #/AREA URNS HPF: ABNORMAL /HPF
T&S EXPIRATION DATE: NORMAL
TIBC SERPL-MCNC: 204 MCG/DL (ref 298–536)
TRANSFERRIN SERPL-MCNC: 137 MG/DL (ref 200–360)
TSH SERPL DL<=0.05 MIU/L-ACNC: 2.9 UIU/ML (ref 0.27–4.2)
UROBILINOGEN UR QL STRIP: ABNORMAL
VIT B12 BLD-MCNC: 1159 PG/ML (ref 211–946)
WBC # UR STRIP: ABNORMAL /HPF
WBC NRBC COR # BLD: 0.92 10*3/MM3 (ref 3.4–10.8)

## 2022-09-24 PROCEDURE — 86922 COMPATIBILITY TEST ANTIGLOB: CPT

## 2022-09-24 PROCEDURE — 36430 TRANSFUSION BLD/BLD COMPNT: CPT

## 2022-09-24 PROCEDURE — 86870 RBC ANTIBODY IDENTIFICATION: CPT | Performed by: NURSE PRACTITIONER

## 2022-09-24 PROCEDURE — 85027 COMPLETE CBC AUTOMATED: CPT | Performed by: NURSE PRACTITIONER

## 2022-09-24 PROCEDURE — 81001 URINALYSIS AUTO W/SCOPE: CPT | Performed by: INTERNAL MEDICINE

## 2022-09-24 PROCEDURE — 85652 RBC SED RATE AUTOMATED: CPT | Performed by: INTERNAL MEDICINE

## 2022-09-24 PROCEDURE — 85055 RETICULATED PLATELET ASSAY: CPT | Performed by: INTERNAL MEDICINE

## 2022-09-24 PROCEDURE — 99223 1ST HOSP IP/OBS HIGH 75: CPT | Performed by: INTERNAL MEDICINE

## 2022-09-24 PROCEDURE — 86880 COOMBS TEST DIRECT: CPT | Performed by: INTERNAL MEDICINE

## 2022-09-24 PROCEDURE — 84100 ASSAY OF PHOSPHORUS: CPT | Performed by: HOSPITALIST

## 2022-09-24 PROCEDURE — 86334 IMMUNOFIX E-PHORESIS SERUM: CPT | Performed by: INTERNAL MEDICINE

## 2022-09-24 PROCEDURE — 82728 ASSAY OF FERRITIN: CPT | Performed by: INTERNAL MEDICINE

## 2022-09-24 PROCEDURE — 86900 BLOOD TYPING SEROLOGIC ABO: CPT

## 2022-09-24 PROCEDURE — 30233N1 TRANSFUSION OF NONAUTOLOGOUS RED BLOOD CELLS INTO PERIPHERAL VEIN, PERCUTANEOUS APPROACH: ICD-10-PCS | Performed by: INTERNAL MEDICINE

## 2022-09-24 PROCEDURE — 83615 LACTATE (LD) (LDH) ENZYME: CPT | Performed by: INTERNAL MEDICINE

## 2022-09-24 PROCEDURE — 25010000002 CALCIUM GLUCONATE-NACL 1-0.675 GM/50ML-% SOLUTION: Performed by: HOSPITALIST

## 2022-09-24 PROCEDURE — P9016 RBC LEUKOCYTES REDUCED: HCPCS

## 2022-09-24 PROCEDURE — 82962 GLUCOSE BLOOD TEST: CPT

## 2022-09-24 PROCEDURE — 86920 COMPATIBILITY TEST SPIN: CPT

## 2022-09-24 PROCEDURE — 84443 ASSAY THYROID STIM HORMONE: CPT | Performed by: HOSPITALIST

## 2022-09-24 PROCEDURE — 86900 BLOOD TYPING SEROLOGIC ABO: CPT | Performed by: NURSE PRACTITIONER

## 2022-09-24 PROCEDURE — 86901 BLOOD TYPING SEROLOGIC RH(D): CPT | Performed by: NURSE PRACTITIONER

## 2022-09-24 PROCEDURE — 86850 RBC ANTIBODY SCREEN: CPT | Performed by: NURSE PRACTITIONER

## 2022-09-24 PROCEDURE — 84466 ASSAY OF TRANSFERRIN: CPT | Performed by: INTERNAL MEDICINE

## 2022-09-24 PROCEDURE — 83540 ASSAY OF IRON: CPT | Performed by: INTERNAL MEDICINE

## 2022-09-24 PROCEDURE — 25010000002 MAGNESIUM SULFATE IN D5W 1G/100ML (PREMIX) 1-5 GM/100ML-% SOLUTION: Performed by: HOSPITALIST

## 2022-09-24 PROCEDURE — 80053 COMPREHEN METABOLIC PANEL: CPT | Performed by: NURSE PRACTITIONER

## 2022-09-24 PROCEDURE — 25010000002 FUROSEMIDE PER 20 MG: Performed by: INTERNAL MEDICINE

## 2022-09-24 PROCEDURE — 82784 ASSAY IGA/IGD/IGG/IGM EACH: CPT | Performed by: INTERNAL MEDICINE

## 2022-09-24 PROCEDURE — 82330 ASSAY OF CALCIUM: CPT | Performed by: NURSE PRACTITIONER

## 2022-09-24 PROCEDURE — 84132 ASSAY OF SERUM POTASSIUM: CPT | Performed by: HOSPITALIST

## 2022-09-24 PROCEDURE — 63710000001 INSULIN LISPRO (HUMAN) PER 5 UNITS: Performed by: HOSPITALIST

## 2022-09-24 PROCEDURE — 85018 HEMOGLOBIN: CPT | Performed by: NURSE PRACTITIONER

## 2022-09-24 PROCEDURE — 83735 ASSAY OF MAGNESIUM: CPT | Performed by: HOSPITALIST

## 2022-09-24 PROCEDURE — 85014 HEMATOCRIT: CPT | Performed by: NURSE PRACTITIONER

## 2022-09-24 PROCEDURE — 84165 PROTEIN E-PHORESIS SERUM: CPT | Performed by: INTERNAL MEDICINE

## 2022-09-24 PROCEDURE — 85046 RETICYTE/HGB CONCENTRATE: CPT | Performed by: INTERNAL MEDICINE

## 2022-09-24 RX ORDER — FINASTERIDE 5 MG/1
5 TABLET, FILM COATED ORAL DAILY
Status: DISCONTINUED | OUTPATIENT
Start: 2022-09-24 | End: 2022-09-28 | Stop reason: HOSPADM

## 2022-09-24 RX ORDER — ESCITALOPRAM OXALATE 5 MG/1
5 TABLET ORAL DAILY
Status: DISCONTINUED | OUTPATIENT
Start: 2022-09-24 | End: 2022-09-28 | Stop reason: HOSPADM

## 2022-09-24 RX ORDER — FUROSEMIDE 10 MG/ML
40 INJECTION INTRAMUSCULAR; INTRAVENOUS EVERY 12 HOURS
Status: DISCONTINUED | OUTPATIENT
Start: 2022-09-24 | End: 2022-09-25

## 2022-09-24 RX ORDER — POTASSIUM CHLORIDE 7.45 MG/ML
10 INJECTION INTRAVENOUS
Status: DISCONTINUED | OUTPATIENT
Start: 2022-09-24 | End: 2022-09-24

## 2022-09-24 RX ORDER — MAGNESIUM SULFATE HEPTAHYDRATE 40 MG/ML
4 INJECTION, SOLUTION INTRAVENOUS AS NEEDED
Status: DISCONTINUED | OUTPATIENT
Start: 2022-09-24 | End: 2022-09-28 | Stop reason: HOSPADM

## 2022-09-24 RX ORDER — NICOTINE POLACRILEX 4 MG
15 LOZENGE BUCCAL
Status: DISCONTINUED | OUTPATIENT
Start: 2022-09-24 | End: 2022-09-28 | Stop reason: HOSPADM

## 2022-09-24 RX ORDER — ACYCLOVIR 400 MG/1
800 TABLET ORAL DAILY
Status: DISCONTINUED | OUTPATIENT
Start: 2022-09-24 | End: 2022-09-28 | Stop reason: HOSPADM

## 2022-09-24 RX ORDER — CALCIUM CARBONATE 200(500)MG
2 TABLET,CHEWABLE ORAL 2 TIMES DAILY PRN
Status: DISCONTINUED | OUTPATIENT
Start: 2022-09-24 | End: 2022-09-24

## 2022-09-24 RX ORDER — ACETAMINOPHEN 160 MG/5ML
650 SOLUTION ORAL EVERY 4 HOURS PRN
Status: DISCONTINUED | OUTPATIENT
Start: 2022-09-24 | End: 2022-09-28 | Stop reason: HOSPADM

## 2022-09-24 RX ORDER — ONDANSETRON 4 MG/1
4 TABLET, FILM COATED ORAL EVERY 6 HOURS PRN
Status: DISCONTINUED | OUTPATIENT
Start: 2022-09-24 | End: 2022-09-28 | Stop reason: HOSPADM

## 2022-09-24 RX ORDER — ONDANSETRON 2 MG/ML
4 INJECTION INTRAMUSCULAR; INTRAVENOUS EVERY 6 HOURS PRN
Status: DISCONTINUED | OUTPATIENT
Start: 2022-09-24 | End: 2022-09-28 | Stop reason: HOSPADM

## 2022-09-24 RX ORDER — SODIUM CHLORIDE 9 MG/ML
100 INJECTION, SOLUTION INTRAVENOUS CONTINUOUS
Status: DISCONTINUED | OUTPATIENT
Start: 2022-09-24 | End: 2022-09-24

## 2022-09-24 RX ORDER — INSULIN GLARGINE 100 [IU]/ML
20 INJECTION, SOLUTION SUBCUTANEOUS 2 TIMES DAILY
Status: DISCONTINUED | OUTPATIENT
Start: 2022-09-24 | End: 2022-09-24 | Stop reason: CLARIF

## 2022-09-24 RX ORDER — ERGOCALCIFEROL 1.25 MG/1
50000 CAPSULE ORAL
Status: DISCONTINUED | OUTPATIENT
Start: 2022-09-24 | End: 2022-09-28 | Stop reason: HOSPADM

## 2022-09-24 RX ORDER — POTASSIUM CHLORIDE 1.5 G/1.77G
40 POWDER, FOR SOLUTION ORAL AS NEEDED
Status: DISCONTINUED | OUTPATIENT
Start: 2022-09-24 | End: 2022-09-24

## 2022-09-24 RX ORDER — POTASSIUM CHLORIDE 750 MG/1
40 TABLET, FILM COATED, EXTENDED RELEASE ORAL AS NEEDED
Status: DISCONTINUED | OUTPATIENT
Start: 2022-09-24 | End: 2022-09-24

## 2022-09-24 RX ORDER — ACETAMINOPHEN 325 MG/1
650 TABLET ORAL EVERY 4 HOURS PRN
Status: DISCONTINUED | OUTPATIENT
Start: 2022-09-24 | End: 2022-09-28 | Stop reason: HOSPADM

## 2022-09-24 RX ORDER — NITROGLYCERIN 0.4 MG/1
0.4 TABLET SUBLINGUAL
Status: DISCONTINUED | OUTPATIENT
Start: 2022-09-24 | End: 2022-09-27

## 2022-09-24 RX ORDER — CARVEDILOL 12.5 MG/1
12.5 TABLET ORAL DAILY
Status: DISCONTINUED | OUTPATIENT
Start: 2022-09-24 | End: 2022-09-28 | Stop reason: HOSPADM

## 2022-09-24 RX ORDER — TAMSULOSIN HYDROCHLORIDE 0.4 MG/1
0.4 CAPSULE ORAL DAILY
Status: DISCONTINUED | OUTPATIENT
Start: 2022-09-24 | End: 2022-09-28 | Stop reason: HOSPADM

## 2022-09-24 RX ORDER — BUMETANIDE 1 MG/1
1 TABLET ORAL DAILY
Status: DISCONTINUED | OUTPATIENT
Start: 2022-09-24 | End: 2022-09-24

## 2022-09-24 RX ORDER — ASPIRIN 81 MG/1
81 TABLET, CHEWABLE ORAL DAILY
Status: DISCONTINUED | OUTPATIENT
Start: 2022-09-24 | End: 2022-09-28 | Stop reason: HOSPADM

## 2022-09-24 RX ORDER — CALCIUM GLUCONATE 20 MG/ML
1 INJECTION, SOLUTION INTRAVENOUS ONCE
Status: COMPLETED | OUTPATIENT
Start: 2022-09-24 | End: 2022-09-24

## 2022-09-24 RX ORDER — SODIUM CHLORIDE 0.9 % (FLUSH) 0.9 %
10 SYRINGE (ML) INJECTION AS NEEDED
Status: DISCONTINUED | OUTPATIENT
Start: 2022-09-24 | End: 2022-09-28 | Stop reason: HOSPADM

## 2022-09-24 RX ORDER — ACETAMINOPHEN 650 MG/1
650 SUPPOSITORY RECTAL EVERY 4 HOURS PRN
Status: DISCONTINUED | OUTPATIENT
Start: 2022-09-24 | End: 2022-09-28 | Stop reason: HOSPADM

## 2022-09-24 RX ORDER — LOPERAMIDE HYDROCHLORIDE 2 MG/1
2 CAPSULE ORAL 4 TIMES DAILY PRN
Status: DISCONTINUED | OUTPATIENT
Start: 2022-09-24 | End: 2022-09-28 | Stop reason: HOSPADM

## 2022-09-24 RX ORDER — INSULIN LISPRO 100 [IU]/ML
0-14 INJECTION, SOLUTION INTRAVENOUS; SUBCUTANEOUS
Status: DISCONTINUED | OUTPATIENT
Start: 2022-09-24 | End: 2022-09-28 | Stop reason: HOSPADM

## 2022-09-24 RX ORDER — DEXTROSE MONOHYDRATE 25 G/50ML
25 INJECTION, SOLUTION INTRAVENOUS
Status: DISCONTINUED | OUTPATIENT
Start: 2022-09-24 | End: 2022-09-28 | Stop reason: HOSPADM

## 2022-09-24 RX ORDER — SODIUM CHLORIDE 0.9 % (FLUSH) 0.9 %
10 SYRINGE (ML) INJECTION EVERY 12 HOURS SCHEDULED
Status: DISCONTINUED | OUTPATIENT
Start: 2022-09-24 | End: 2022-09-28 | Stop reason: HOSPADM

## 2022-09-24 RX ORDER — MAGNESIUM SULFATE HEPTAHYDRATE 40 MG/ML
2 INJECTION, SOLUTION INTRAVENOUS AS NEEDED
Status: DISCONTINUED | OUTPATIENT
Start: 2022-09-24 | End: 2022-09-28 | Stop reason: HOSPADM

## 2022-09-24 RX ORDER — MAGNESIUM SULFATE 1 G/100ML
1 INJECTION INTRAVENOUS AS NEEDED
Status: DISCONTINUED | OUTPATIENT
Start: 2022-09-24 | End: 2022-09-28 | Stop reason: HOSPADM

## 2022-09-24 RX ADMIN — INSULIN GLARGINE-YFGN 20 UNITS: 100 INJECTION, SOLUTION SUBCUTANEOUS at 08:21

## 2022-09-24 RX ADMIN — INSULIN LISPRO 5 UNITS: 100 INJECTION, SOLUTION INTRAVENOUS; SUBCUTANEOUS at 16:49

## 2022-09-24 RX ADMIN — ERGOCALCIFEROL 50000 UNITS: 1.25 CAPSULE ORAL at 08:21

## 2022-09-24 RX ADMIN — ESCITALOPRAM 5 MG: 5 TABLET, FILM COATED ORAL at 08:21

## 2022-09-24 RX ADMIN — POTASSIUM CHLORIDE 40 MEQ: 750 TABLET, EXTENDED RELEASE ORAL at 08:21

## 2022-09-24 RX ADMIN — Medication 10 ML: at 08:22

## 2022-09-24 RX ADMIN — DIPHENOXYLATE HYDROCHLORIDE AND ATROPINE SULFATE 2 TABLET: 2.5; .025 TABLET ORAL at 15:44

## 2022-09-24 RX ADMIN — Medication 10 ML: at 20:55

## 2022-09-24 RX ADMIN — INSULIN LISPRO 3 UNITS: 100 INJECTION, SOLUTION INTRAVENOUS; SUBCUTANEOUS at 08:22

## 2022-09-24 RX ADMIN — GABAPENTIN 300 MG: 300 CAPSULE ORAL at 08:21

## 2022-09-24 RX ADMIN — MAGNESIUM SULFATE 1 G: 1 INJECTION INTRAVENOUS at 14:32

## 2022-09-24 RX ADMIN — POTASSIUM CHLORIDE 40 MEQ: 750 TABLET, EXTENDED RELEASE ORAL at 13:44

## 2022-09-24 RX ADMIN — TAMSULOSIN HYDROCHLORIDE 0.4 MG: 0.4 CAPSULE ORAL at 08:21

## 2022-09-24 RX ADMIN — GABAPENTIN 600 MG: 300 CAPSULE ORAL at 20:55

## 2022-09-24 RX ADMIN — ASPIRIN 81 MG: 81 TABLET, CHEWABLE ORAL at 08:21

## 2022-09-24 RX ADMIN — INSULIN LISPRO 3 UNITS: 100 INJECTION, SOLUTION INTRAVENOUS; SUBCUTANEOUS at 11:26

## 2022-09-24 RX ADMIN — ACYCLOVIR 800 MG: 400 TABLET ORAL at 08:21

## 2022-09-24 RX ADMIN — MAGNESIUM SULFATE 1 G: 1 INJECTION INTRAVENOUS at 15:25

## 2022-09-24 RX ADMIN — INSULIN GLARGINE-YFGN 20 UNITS: 100 INJECTION, SOLUTION SUBCUTANEOUS at 20:55

## 2022-09-24 RX ADMIN — SODIUM CHLORIDE 100 ML/HR: 9 INJECTION, SOLUTION INTRAVENOUS at 04:06

## 2022-09-24 RX ADMIN — CALCIUM GLUCONATE 1 G: 20 INJECTION, SOLUTION INTRAVENOUS at 12:35

## 2022-09-24 RX ADMIN — FINASTERIDE 5 MG: 5 TABLET, FILM COATED ORAL at 08:21

## 2022-09-24 RX ADMIN — CARVEDILOL 12.5 MG: 12.5 TABLET, FILM COATED ORAL at 08:21

## 2022-09-24 RX ADMIN — FUROSEMIDE 40 MG: 10 INJECTION, SOLUTION INTRAMUSCULAR; INTRAVENOUS at 12:35

## 2022-09-25 ENCOUNTER — APPOINTMENT (OUTPATIENT)
Dept: ULTRASOUND IMAGING | Facility: HOSPITAL | Age: 67
End: 2022-09-25

## 2022-09-25 LAB
1,25(OH)2D SERPL-MCNC: 30.2 PG/ML (ref 24.8–81.5)
ALBUMIN SERPL-MCNC: 3 G/DL (ref 3.5–5.2)
ALBUMIN/GLOB SERPL: 1.7 G/DL
ALP SERPL-CCNC: 41 U/L (ref 39–117)
ALT SERPL W P-5'-P-CCNC: 7 U/L (ref 1–41)
ANION GAP SERPL CALCULATED.3IONS-SCNC: 12.9 MMOL/L (ref 5–15)
AST SERPL-CCNC: 5 U/L (ref 1–40)
BILIRUB SERPL-MCNC: 0.4 MG/DL (ref 0–1.2)
BUN SERPL-MCNC: 45 MG/DL (ref 8–23)
BUN/CREAT SERPL: 11.7 (ref 7–25)
CALCIUM SPEC-SCNC: 6.2 MG/DL (ref 8.6–10.5)
CHLORIDE SERPL-SCNC: 116 MMOL/L (ref 98–107)
CK SERPL-CCNC: 84 U/L (ref 20–200)
CO2 SERPL-SCNC: 14.1 MMOL/L (ref 22–29)
CREAT SERPL-MCNC: 3.85 MG/DL (ref 0.76–1.27)
CREAT UR-MCNC: 56.4 MG/DL
DEPRECATED RDW RBC AUTO: 63.3 FL (ref 37–54)
EGFRCR SERPLBLD CKD-EPI 2021: 16.6 ML/MIN/1.73
EOSINOPHIL # BLD MANUAL: 0.18 10*3/MM3 (ref 0–0.4)
EOSINOPHIL NFR BLD MANUAL: 20 % (ref 0.3–6.2)
ERYTHROCYTE [DISTWIDTH] IN BLOOD BY AUTOMATED COUNT: 18.9 % (ref 12.3–15.4)
GLOBULIN UR ELPH-MCNC: 1.8 GM/DL
GLUCOSE BLDC GLUCOMTR-MCNC: 196 MG/DL (ref 70–130)
GLUCOSE BLDC GLUCOMTR-MCNC: 272 MG/DL (ref 70–130)
GLUCOSE BLDC GLUCOMTR-MCNC: 317 MG/DL (ref 70–130)
GLUCOSE SERPL-MCNC: 159 MG/DL (ref 65–99)
HCT VFR BLD AUTO: 24 % (ref 37.5–51)
HCT VFR BLD AUTO: 24.4 % (ref 37.5–51)
HGB BLD-MCNC: 7.4 G/DL (ref 13–17.7)
HGB BLD-MCNC: 7.8 G/DL (ref 13–17.7)
LYMPHOCYTES # BLD MANUAL: 0.4 10*3/MM3 (ref 0.7–3.1)
LYMPHOCYTES NFR BLD MANUAL: 4 % (ref 5–12)
MAGNESIUM SERPL-MCNC: 1.5 MG/DL (ref 1.6–2.4)
MCH RBC QN AUTO: 27.7 PG (ref 26.6–33)
MCHC RBC AUTO-ENTMCNC: 30.2 G/DL (ref 31.5–35.7)
MCV RBC AUTO: 91.8 FL (ref 79–97)
MONOCYTES # BLD: 0.04 10*3/MM3 (ref 0.1–0.9)
NEUTROPHILS # BLD AUTO: 0.29 10*3/MM3 (ref 1.7–7)
NEUTROPHILS NFR BLD MANUAL: 32 % (ref 42.7–76)
PLAT MORPH BLD: NORMAL
PLATELET # BLD AUTO: 75 10*3/MM3 (ref 140–450)
PMV BLD AUTO: 10.6 FL (ref 6–12)
POTASSIUM SERPL-SCNC: 3.8 MMOL/L (ref 3.5–5.2)
PROT ?TM UR-MCNC: 82.8 MG/DL
PROT SERPL-MCNC: 4.8 G/DL (ref 6–8.5)
PROT/CREAT UR: 1468.1 MG/G CREA (ref 0–200)
RBC # BLD AUTO: 2.67 10*6/MM3 (ref 4.14–5.8)
RBC MORPH BLD: NORMAL
SODIUM SERPL-SCNC: 143 MMOL/L (ref 136–145)
VARIANT LYMPHS NFR BLD MANUAL: 4 % (ref 0–5)
VARIANT LYMPHS NFR BLD MANUAL: 40 % (ref 19.6–45.3)
WBC MORPH BLD: NORMAL
WBC NRBC COR # BLD: 0.91 10*3/MM3 (ref 3.4–10.8)

## 2022-09-25 PROCEDURE — 84156 ASSAY OF PROTEIN URINE: CPT | Performed by: INTERNAL MEDICINE

## 2022-09-25 PROCEDURE — 85018 HEMOGLOBIN: CPT | Performed by: NURSE PRACTITIONER

## 2022-09-25 PROCEDURE — 82962 GLUCOSE BLOOD TEST: CPT

## 2022-09-25 PROCEDURE — 25010000002 FILGRASTIM 480 MCG/0.8ML SOLUTION PREFILLED SYRINGE: Performed by: INTERNAL MEDICINE

## 2022-09-25 PROCEDURE — 99232 SBSQ HOSP IP/OBS MODERATE 35: CPT | Performed by: INTERNAL MEDICINE

## 2022-09-25 PROCEDURE — 25010000002 FUROSEMIDE PER 20 MG: Performed by: INTERNAL MEDICINE

## 2022-09-25 PROCEDURE — 80053 COMPREHEN METABOLIC PANEL: CPT | Performed by: INTERNAL MEDICINE

## 2022-09-25 PROCEDURE — 25010000002 CALCIUM GLUCONATE-NACL 1-0.675 GM/50ML-% SOLUTION: Performed by: INTERNAL MEDICINE

## 2022-09-25 PROCEDURE — 82550 ASSAY OF CK (CPK): CPT | Performed by: INTERNAL MEDICINE

## 2022-09-25 PROCEDURE — 81050 URINALYSIS VOLUME MEASURE: CPT | Performed by: INTERNAL MEDICINE

## 2022-09-25 PROCEDURE — 82330 ASSAY OF CALCIUM: CPT | Performed by: INTERNAL MEDICINE

## 2022-09-25 PROCEDURE — 83521 IG LIGHT CHAINS FREE EACH: CPT | Performed by: INTERNAL MEDICINE

## 2022-09-25 PROCEDURE — 82570 ASSAY OF URINE CREATININE: CPT | Performed by: INTERNAL MEDICINE

## 2022-09-25 PROCEDURE — 76775 US EXAM ABDO BACK WALL LIM: CPT

## 2022-09-25 PROCEDURE — 85014 HEMATOCRIT: CPT | Performed by: NURSE PRACTITIONER

## 2022-09-25 PROCEDURE — 25010000002 MAGNESIUM SULFATE IN D5W 1G/100ML (PREMIX) 1-5 GM/100ML-% SOLUTION: Performed by: HOSPITALIST

## 2022-09-25 PROCEDURE — 85025 COMPLETE CBC W/AUTO DIFF WBC: CPT | Performed by: INTERNAL MEDICINE

## 2022-09-25 PROCEDURE — 85007 BL SMEAR W/DIFF WBC COUNT: CPT | Performed by: INTERNAL MEDICINE

## 2022-09-25 PROCEDURE — 83735 ASSAY OF MAGNESIUM: CPT | Performed by: HOSPITALIST

## 2022-09-25 PROCEDURE — 63710000001 INSULIN LISPRO (HUMAN) PER 5 UNITS: Performed by: HOSPITALIST

## 2022-09-25 RX ORDER — CALCITRIOL 0.25 UG/1
0.25 CAPSULE, LIQUID FILLED ORAL DAILY
Status: DISCONTINUED | OUTPATIENT
Start: 2022-09-25 | End: 2022-09-28 | Stop reason: HOSPADM

## 2022-09-25 RX ORDER — MAGNESIUM SULFATE HEPTAHYDRATE 40 MG/ML
2 INJECTION, SOLUTION INTRAVENOUS ONCE
Status: DISCONTINUED | OUTPATIENT
Start: 2022-09-25 | End: 2022-09-28 | Stop reason: HOSPADM

## 2022-09-25 RX ORDER — CALCIUM CARBONATE 200(500)MG
2 TABLET,CHEWABLE ORAL 3 TIMES DAILY
Status: DISCONTINUED | OUTPATIENT
Start: 2022-09-25 | End: 2022-09-28 | Stop reason: HOSPADM

## 2022-09-25 RX ORDER — CALCIUM GLUCONATE 20 MG/ML
2 INJECTION, SOLUTION INTRAVENOUS 2 TIMES DAILY
Status: COMPLETED | OUTPATIENT
Start: 2022-09-25 | End: 2022-09-25

## 2022-09-25 RX ORDER — CEPHALEXIN 500 MG/1
500 CAPSULE ORAL EVERY 12 HOURS SCHEDULED
Status: DISCONTINUED | OUTPATIENT
Start: 2022-09-25 | End: 2022-09-28 | Stop reason: HOSPADM

## 2022-09-25 RX ADMIN — ESCITALOPRAM 5 MG: 5 TABLET, FILM COATED ORAL at 08:37

## 2022-09-25 RX ADMIN — Medication 10 ML: at 08:57

## 2022-09-25 RX ADMIN — ASPIRIN 81 MG: 81 TABLET, CHEWABLE ORAL at 08:37

## 2022-09-25 RX ADMIN — ACYCLOVIR 800 MG: 400 TABLET ORAL at 08:36

## 2022-09-25 RX ADMIN — MAGNESIUM SULFATE 1 G: 1 INJECTION INTRAVENOUS at 04:50

## 2022-09-25 RX ADMIN — CALCITRIOL 0.25 MCG: 0.25 CAPSULE ORAL at 11:56

## 2022-09-25 RX ADMIN — FINASTERIDE 5 MG: 5 TABLET, FILM COATED ORAL at 08:37

## 2022-09-25 RX ADMIN — TAMSULOSIN HYDROCHLORIDE 0.4 MG: 0.4 CAPSULE ORAL at 08:36

## 2022-09-25 RX ADMIN — CEPHALEXIN 500 MG: 500 CAPSULE ORAL at 21:23

## 2022-09-25 RX ADMIN — CALCIUM GLUCONATE 2 G: 20 INJECTION, SOLUTION INTRAVENOUS at 21:30

## 2022-09-25 RX ADMIN — ANTACID TABLETS 2 TABLET: 500 TABLET, CHEWABLE ORAL at 21:24

## 2022-09-25 RX ADMIN — INSULIN GLARGINE-YFGN 20 UNITS: 100 INJECTION, SOLUTION SUBCUTANEOUS at 08:47

## 2022-09-25 RX ADMIN — FILGRASTIM 480 MCG: 480 INJECTION, SOLUTION INTRAVENOUS; SUBCUTANEOUS at 21:31

## 2022-09-25 RX ADMIN — INSULIN LISPRO 2 UNITS: 100 INJECTION, SOLUTION INTRAVENOUS; SUBCUTANEOUS at 08:36

## 2022-09-25 RX ADMIN — INSULIN LISPRO 8 UNITS: 100 INJECTION, SOLUTION INTRAVENOUS; SUBCUTANEOUS at 11:54

## 2022-09-25 RX ADMIN — ANTACID TABLETS 2 TABLET: 500 TABLET, CHEWABLE ORAL at 17:45

## 2022-09-25 RX ADMIN — FUROSEMIDE 40 MG: 10 INJECTION, SOLUTION INTRAMUSCULAR; INTRAVENOUS at 03:39

## 2022-09-25 RX ADMIN — INSULIN GLARGINE-YFGN 30 UNITS: 100 INJECTION, SOLUTION SUBCUTANEOUS at 21:31

## 2022-09-25 RX ADMIN — MAGNESIUM SULFATE 1 G: 1 INJECTION INTRAVENOUS at 11:55

## 2022-09-25 RX ADMIN — GABAPENTIN 600 MG: 300 CAPSULE ORAL at 21:23

## 2022-09-25 RX ADMIN — INSULIN LISPRO 10 UNITS: 100 INJECTION, SOLUTION INTRAVENOUS; SUBCUTANEOUS at 17:45

## 2022-09-25 RX ADMIN — CALCIUM GLUCONATE 1 G: 20 INJECTION, SOLUTION INTRAVENOUS at 15:12

## 2022-09-25 RX ADMIN — GABAPENTIN 300 MG: 300 CAPSULE ORAL at 08:37

## 2022-09-25 RX ADMIN — ANTACID TABLETS 2 TABLET: 500 TABLET, CHEWABLE ORAL at 11:54

## 2022-09-25 RX ADMIN — DIPHENOXYLATE HYDROCHLORIDE AND ATROPINE SULFATE 2 TABLET: 2.5; .025 TABLET ORAL at 04:51

## 2022-09-25 RX ADMIN — Medication 10 ML: at 21:24

## 2022-09-25 RX ADMIN — CALCIUM GLUCONATE 1 G: 20 INJECTION, SOLUTION INTRAVENOUS at 14:35

## 2022-09-25 RX ADMIN — CARVEDILOL 12.5 MG: 12.5 TABLET, FILM COATED ORAL at 08:36

## 2022-09-25 RX ADMIN — MAGNESIUM SULFATE 1 G: 1 INJECTION INTRAVENOUS at 05:47

## 2022-09-26 ENCOUNTER — TELEPHONE (OUTPATIENT)
Dept: FAMILY MEDICINE CLINIC | Facility: CLINIC | Age: 67
End: 2022-09-26

## 2022-09-26 LAB
ALBUMIN SERPL-MCNC: 2.9 G/DL (ref 3.5–5.2)
ALBUMIN/GLOB SERPL: 1.6 G/DL
ALP SERPL-CCNC: 41 U/L (ref 39–117)
ALT SERPL W P-5'-P-CCNC: 5 U/L (ref 1–41)
ANION GAP SERPL CALCULATED.3IONS-SCNC: 12.8 MMOL/L (ref 5–15)
AST SERPL-CCNC: 6 U/L (ref 1–40)
BILIRUB SERPL-MCNC: 0.6 MG/DL (ref 0–1.2)
BUN SERPL-MCNC: 40 MG/DL (ref 8–23)
BUN/CREAT SERPL: 11.1 (ref 7–25)
CA-I SERPL ISE-MCNC: 3.8 MG/DL (ref 4.5–5.6)
CALCIUM SPEC-SCNC: 7.3 MG/DL (ref 8.6–10.5)
CHLORIDE SERPL-SCNC: 115 MMOL/L (ref 98–107)
CO2 SERPL-SCNC: 15.2 MMOL/L (ref 22–29)
CREAT SERPL-MCNC: 3.59 MG/DL (ref 0.76–1.27)
DEPRECATED RDW RBC AUTO: 59.8 FL (ref 37–54)
EGFRCR SERPLBLD CKD-EPI 2021: 18 ML/MIN/1.73
ERYTHROCYTE [DISTWIDTH] IN BLOOD BY AUTOMATED COUNT: 18.5 % (ref 12.3–15.4)
GLOBULIN UR ELPH-MCNC: 1.8 GM/DL
GLUCOSE BLDC GLUCOMTR-MCNC: 135 MG/DL (ref 70–130)
GLUCOSE BLDC GLUCOMTR-MCNC: 139 MG/DL (ref 70–130)
GLUCOSE BLDC GLUCOMTR-MCNC: 163 MG/DL (ref 70–130)
GLUCOSE BLDC GLUCOMTR-MCNC: 221 MG/DL (ref 70–130)
GLUCOSE SERPL-MCNC: 112 MG/DL (ref 65–99)
HCT VFR BLD AUTO: 23.1 % (ref 37.5–51)
HGB BLD-MCNC: 7.4 G/DL (ref 13–17.7)
MCH RBC QN AUTO: 28.2 PG (ref 26.6–33)
MCHC RBC AUTO-ENTMCNC: 32 G/DL (ref 31.5–35.7)
MCV RBC AUTO: 88.2 FL (ref 79–97)
PLATELET # BLD AUTO: 70 10*3/MM3 (ref 140–450)
PMV BLD AUTO: 9.9 FL (ref 6–12)
POTASSIUM SERPL-SCNC: 3.8 MMOL/L (ref 3.5–5.2)
PROT SERPL-MCNC: 4.7 G/DL (ref 6–8.5)
RBC # BLD AUTO: 2.62 10*6/MM3 (ref 4.14–5.8)
SODIUM SERPL-SCNC: 143 MMOL/L (ref 136–145)
WBC NRBC COR # BLD: 1.03 10*3/MM3 (ref 3.4–10.8)

## 2022-09-26 PROCEDURE — 25010000002 FILGRASTIM 480 MCG/0.8ML SOLUTION PREFILLED SYRINGE: Performed by: INTERNAL MEDICINE

## 2022-09-26 PROCEDURE — 80053 COMPREHEN METABOLIC PANEL: CPT | Performed by: INTERNAL MEDICINE

## 2022-09-26 PROCEDURE — 83521 IG LIGHT CHAINS FREE EACH: CPT | Performed by: INTERNAL MEDICINE

## 2022-09-26 PROCEDURE — 82962 GLUCOSE BLOOD TEST: CPT

## 2022-09-26 PROCEDURE — 63710000001 INSULIN LISPRO (HUMAN) PER 5 UNITS: Performed by: HOSPITALIST

## 2022-09-26 PROCEDURE — 99232 SBSQ HOSP IP/OBS MODERATE 35: CPT | Performed by: INTERNAL MEDICINE

## 2022-09-26 PROCEDURE — 25010000002 EPOETIN ALFA-EPBX 20000 UNIT/ML SOLUTION: Performed by: INTERNAL MEDICINE

## 2022-09-26 PROCEDURE — 85025 COMPLETE CBC W/AUTO DIFF WBC: CPT | Performed by: INTERNAL MEDICINE

## 2022-09-26 RX ORDER — SODIUM BICARBONATE 650 MG/1
650 TABLET ORAL 4 TIMES DAILY
Status: DISCONTINUED | OUTPATIENT
Start: 2022-09-26 | End: 2022-09-27

## 2022-09-26 RX ORDER — BUMETANIDE 2 MG/1
2 TABLET ORAL DAILY
Status: DISCONTINUED | OUTPATIENT
Start: 2022-09-27 | End: 2022-09-27

## 2022-09-26 RX ADMIN — ASPIRIN 81 MG: 81 TABLET, CHEWABLE ORAL at 09:06

## 2022-09-26 RX ADMIN — INSULIN GLARGINE-YFGN 30 UNITS: 100 INJECTION, SOLUTION SUBCUTANEOUS at 21:58

## 2022-09-26 RX ADMIN — CALCITRIOL 0.25 MCG: 0.25 CAPSULE ORAL at 09:06

## 2022-09-26 RX ADMIN — FILGRASTIM 480 MCG: 480 INJECTION, SOLUTION INTRAVENOUS; SUBCUTANEOUS at 17:53

## 2022-09-26 RX ADMIN — SODIUM BICARBONATE 650 MG: 650 TABLET ORAL at 14:11

## 2022-09-26 RX ADMIN — CARVEDILOL 12.5 MG: 12.5 TABLET, FILM COATED ORAL at 09:06

## 2022-09-26 RX ADMIN — CEPHALEXIN 500 MG: 500 CAPSULE ORAL at 09:06

## 2022-09-26 RX ADMIN — SODIUM BICARBONATE 650 MG: 650 TABLET ORAL at 21:57

## 2022-09-26 RX ADMIN — EPOETIN ALFA-EPBX 70000 UNITS: 20000 INJECTION, SOLUTION INTRAVENOUS; SUBCUTANEOUS at 17:54

## 2022-09-26 RX ADMIN — Medication 10 ML: at 21:58

## 2022-09-26 RX ADMIN — ACYCLOVIR 800 MG: 400 TABLET ORAL at 09:06

## 2022-09-26 RX ADMIN — ESCITALOPRAM 5 MG: 5 TABLET, FILM COATED ORAL at 09:06

## 2022-09-26 RX ADMIN — INSULIN LISPRO 5 UNITS: 100 INJECTION, SOLUTION INTRAVENOUS; SUBCUTANEOUS at 17:53

## 2022-09-26 RX ADMIN — ANTACID TABLETS 2 TABLET: 500 TABLET, CHEWABLE ORAL at 17:53

## 2022-09-26 RX ADMIN — TAMSULOSIN HYDROCHLORIDE 0.4 MG: 0.4 CAPSULE ORAL at 09:06

## 2022-09-26 RX ADMIN — SODIUM BICARBONATE 650 MG: 650 TABLET ORAL at 17:53

## 2022-09-26 RX ADMIN — FINASTERIDE 5 MG: 5 TABLET, FILM COATED ORAL at 09:06

## 2022-09-26 RX ADMIN — GABAPENTIN 600 MG: 300 CAPSULE ORAL at 21:57

## 2022-09-26 RX ADMIN — GABAPENTIN 300 MG: 300 CAPSULE ORAL at 09:06

## 2022-09-26 RX ADMIN — INSULIN GLARGINE-YFGN 30 UNITS: 100 INJECTION, SOLUTION SUBCUTANEOUS at 09:07

## 2022-09-26 RX ADMIN — ANTACID TABLETS 2 TABLET: 500 TABLET, CHEWABLE ORAL at 21:57

## 2022-09-26 RX ADMIN — Medication 10 ML: at 09:06

## 2022-09-26 RX ADMIN — CEPHALEXIN 500 MG: 500 CAPSULE ORAL at 21:57

## 2022-09-26 RX ADMIN — ANTACID TABLETS 2 TABLET: 500 TABLET, CHEWABLE ORAL at 09:06

## 2022-09-26 NOTE — TELEPHONE ENCOUNTER
Caller: [facility]    Relationship: Other    Best call back number: [phone]    What is the best time to reach you: BEFORE 4:30 PM    Who are you requesting to speak with (clinical staff, provider,  specific staff member): CLINICAL    Do you know the name of the person who called: BLESSING    What was the call regarding: BLESSING OF [facility] STATED THAT THE PATIENT IS CURRENTLY IN THE HOSPITAL, AND IT WAS RECCOMENED HE HAVE HOME HEALTH VISITS.    BLESSING IS REQUESTING APPROVAL FOR [facility] FOR HOME HEALTH VISITS.    Do you require a callback: PLEASE CALL WITH VERBAL AUTHORIZATION.

## 2022-09-27 PROBLEM — E66.01 CLASS 2 SEVERE OBESITY DUE TO EXCESS CALORIES WITH SERIOUS COMORBIDITY IN ADULT: Status: ACTIVE | Noted: 2021-05-18

## 2022-09-27 PROBLEM — E87.20 METABOLIC ACIDOSIS: Status: ACTIVE | Noted: 2022-09-27

## 2022-09-27 PROBLEM — E66.812 CLASS 2 SEVERE OBESITY DUE TO EXCESS CALORIES WITH SERIOUS COMORBIDITY IN ADULT: Status: ACTIVE | Noted: 2021-05-18

## 2022-09-27 LAB
ALBUMIN SERPL ELPH-MCNC: 2.6 G/DL (ref 2.9–4.4)
ALBUMIN SERPL-MCNC: 2.8 G/DL (ref 3.5–5.2)
ALBUMIN/GLOB SERPL: 1.3 {RATIO} (ref 0.7–1.7)
ALPHA1 GLOB SERPL ELPH-MCNC: 0.3 G/DL (ref 0–0.4)
ALPHA2 GLOB SERPL ELPH-MCNC: 0.8 G/DL (ref 0.4–1)
ANION GAP SERPL CALCULATED.3IONS-SCNC: 11.8 MMOL/L (ref 5–15)
ANISOCYTOSIS BLD QL: ABNORMAL
B-GLOBULIN SERPL ELPH-MCNC: 0.7 G/DL (ref 0.7–1.3)
BASOPHILS # BLD MANUAL: 0.04 10*3/MM3 (ref 0–0.2)
BASOPHILS NFR BLD MANUAL: 2 % (ref 0–1.5)
BUN SERPL-MCNC: 38 MG/DL (ref 8–23)
BUN/CREAT SERPL: 11.3 (ref 7–25)
CA-I SERPL ISE-MCNC: 4 MG/DL (ref 4.5–5.6)
CALCIUM SPEC-SCNC: 8.1 MG/DL (ref 8.6–10.5)
CHLORIDE SERPL-SCNC: 114 MMOL/L (ref 98–107)
CO2 SERPL-SCNC: 17.2 MMOL/L (ref 22–29)
CREAT SERPL-MCNC: 3.35 MG/DL (ref 0.76–1.27)
DEPRECATED RDW RBC AUTO: 58.8 FL (ref 37–54)
EGFRCR SERPLBLD CKD-EPI 2021: 19.6 ML/MIN/1.73
EOSINOPHIL # BLD MANUAL: 0.15 10*3/MM3 (ref 0–0.4)
EOSINOPHIL NFR BLD MANUAL: 8.2 % (ref 0.3–6.2)
ERYTHROCYTE [DISTWIDTH] IN BLOOD BY AUTOMATED COUNT: 18.4 % (ref 12.3–15.4)
GAMMA GLOB SERPL ELPH-MCNC: 0.3 G/DL (ref 0.4–1.8)
GLOBULIN SER-MCNC: 2.1 G/DL (ref 2.2–3.9)
GLUCOSE BLDC GLUCOMTR-MCNC: 142 MG/DL (ref 70–130)
GLUCOSE BLDC GLUCOMTR-MCNC: 179 MG/DL (ref 70–130)
GLUCOSE BLDC GLUCOMTR-MCNC: 199 MG/DL (ref 70–130)
GLUCOSE SERPL-MCNC: 147 MG/DL (ref 65–99)
HCT VFR BLD AUTO: 24.5 % (ref 37.5–51)
HGB BLD-MCNC: 7.7 G/DL (ref 13–17.7)
HYPOCHROMIA BLD QL: ABNORMAL
IGA SERPL-MCNC: 43 MG/DL (ref 61–437)
IGG SERPL-MCNC: 311 MG/DL (ref 603–1613)
IGM SERPL-MCNC: 7 MG/DL (ref 20–172)
INTERPRETATION SERPL IEP-IMP: ABNORMAL
KAPPA LC FREE SER-MCNC: 57.4 MG/L (ref 3.3–19.4)
KAPPA LC FREE/LAMBDA FREE SER: 1.34 {RATIO} (ref 0.26–1.65)
LABORATORY COMMENT REPORT: ABNORMAL
LAMBDA LC FREE SERPL-MCNC: 42.8 MG/L (ref 5.7–26.3)
LYMPHOCYTES # BLD MANUAL: 0.39 10*3/MM3 (ref 0.7–3.1)
LYMPHOCYTES NFR BLD MANUAL: 15.3 % (ref 5–12)
M PROTEIN SERPL ELPH-MCNC: 0.1 G/DL
MAGNESIUM SERPL-MCNC: 1.7 MG/DL (ref 1.6–2.4)
MAGNESIUM SERPL-MCNC: 1.7 MG/DL (ref 1.6–2.4)
MCH RBC QN AUTO: 27.9 PG (ref 26.6–33)
MCHC RBC AUTO-ENTMCNC: 31.4 G/DL (ref 31.5–35.7)
MCV RBC AUTO: 88.8 FL (ref 79–97)
METAMYELOCYTES NFR BLD MANUAL: 3.1 % (ref 0–0)
MICROCYTES BLD QL: ABNORMAL
MONOCYTES # BLD: 0.28 10*3/MM3 (ref 0.1–0.9)
NEUTROPHILS # BLD AUTO: 0.92 10*3/MM3 (ref 1.7–7)
NEUTROPHILS NFR BLD MANUAL: 50 % (ref 42.7–76)
NRBC SPEC MANUAL: 3.1 /100 WBC (ref 0–0.2)
PHOSPHATE SERPL-MCNC: 3.7 MG/DL (ref 2.5–4.5)
PLAT MORPH BLD: NORMAL
PLATELET # BLD AUTO: 66 10*3/MM3 (ref 140–450)
PMV BLD AUTO: 10 FL (ref 6–12)
POIKILOCYTOSIS BLD QL SMEAR: ABNORMAL
POLYCHROMASIA BLD QL SMEAR: ABNORMAL
POTASSIUM SERPL-SCNC: 3.3 MMOL/L (ref 3.5–5.2)
PROT SERPL-MCNC: 4.7 G/DL (ref 6–8.5)
RBC # BLD AUTO: 2.76 10*6/MM3 (ref 4.14–5.8)
SODIUM SERPL-SCNC: 143 MMOL/L (ref 136–145)
VARIANT LYMPHS NFR BLD MANUAL: 21.4 % (ref 19.6–45.3)
WBC MORPH BLD: NORMAL
WBC NRBC COR # BLD: 1.83 10*3/MM3 (ref 3.4–10.8)

## 2022-09-27 PROCEDURE — 63710000001 INSULIN LISPRO (HUMAN) PER 5 UNITS: Performed by: INTERNAL MEDICINE

## 2022-09-27 PROCEDURE — 99232 SBSQ HOSP IP/OBS MODERATE 35: CPT | Performed by: INTERNAL MEDICINE

## 2022-09-27 PROCEDURE — 25010000002 FILGRASTIM 480 MCG/0.8ML SOLUTION PREFILLED SYRINGE: Performed by: INTERNAL MEDICINE

## 2022-09-27 PROCEDURE — 85007 BL SMEAR W/DIFF WBC COUNT: CPT | Performed by: INTERNAL MEDICINE

## 2022-09-27 PROCEDURE — 25010000002 EPOETIN ALFA-EPBX 20000 UNIT/ML SOLUTION: Performed by: UROLOGY

## 2022-09-27 PROCEDURE — 82962 GLUCOSE BLOOD TEST: CPT

## 2022-09-27 PROCEDURE — 83735 ASSAY OF MAGNESIUM: CPT | Performed by: INTERNAL MEDICINE

## 2022-09-27 PROCEDURE — 85025 COMPLETE CBC W/AUTO DIFF WBC: CPT | Performed by: INTERNAL MEDICINE

## 2022-09-27 PROCEDURE — 63710000001 INSULIN LISPRO (HUMAN) PER 5 UNITS: Performed by: HOSPITALIST

## 2022-09-27 PROCEDURE — 80069 RENAL FUNCTION PANEL: CPT | Performed by: INTERNAL MEDICINE

## 2022-09-27 RX ORDER — SODIUM BICARBONATE 650 MG/1
1300 TABLET ORAL 4 TIMES DAILY
Status: DISCONTINUED | OUTPATIENT
Start: 2022-09-27 | End: 2022-09-28 | Stop reason: HOSPADM

## 2022-09-27 RX ORDER — INSULIN LISPRO 100 [IU]/ML
2 INJECTION, SOLUTION INTRAVENOUS; SUBCUTANEOUS
Status: DISCONTINUED | OUTPATIENT
Start: 2022-09-27 | End: 2022-09-28 | Stop reason: HOSPADM

## 2022-09-27 RX ORDER — BUMETANIDE 2 MG/1
2 TABLET ORAL 2 TIMES DAILY
Status: DISCONTINUED | OUTPATIENT
Start: 2022-09-27 | End: 2022-09-28 | Stop reason: HOSPADM

## 2022-09-27 RX ORDER — POTASSIUM CHLORIDE 750 MG/1
40 TABLET, FILM COATED, EXTENDED RELEASE ORAL ONCE
Status: COMPLETED | OUTPATIENT
Start: 2022-09-27 | End: 2022-09-27

## 2022-09-27 RX ADMIN — ESCITALOPRAM 5 MG: 5 TABLET, FILM COATED ORAL at 08:53

## 2022-09-27 RX ADMIN — INSULIN LISPRO 3 UNITS: 100 INJECTION, SOLUTION INTRAVENOUS; SUBCUTANEOUS at 12:17

## 2022-09-27 RX ADMIN — INSULIN LISPRO 3 UNITS: 100 INJECTION, SOLUTION INTRAVENOUS; SUBCUTANEOUS at 17:05

## 2022-09-27 RX ADMIN — POTASSIUM CHLORIDE 40 MEQ: 750 TABLET, EXTENDED RELEASE ORAL at 15:37

## 2022-09-27 RX ADMIN — SODIUM BICARBONATE 650 MG: 650 TABLET ORAL at 12:17

## 2022-09-27 RX ADMIN — Medication 10 ML: at 08:54

## 2022-09-27 RX ADMIN — BUMETANIDE 2 MG: 2 TABLET ORAL at 08:54

## 2022-09-27 RX ADMIN — GABAPENTIN 600 MG: 300 CAPSULE ORAL at 20:57

## 2022-09-27 RX ADMIN — FILGRASTIM 480 MCG: 480 INJECTION, SOLUTION INTRAVENOUS; SUBCUTANEOUS at 17:05

## 2022-09-27 RX ADMIN — INSULIN GLARGINE-YFGN 30 UNITS: 100 INJECTION, SOLUTION SUBCUTANEOUS at 21:04

## 2022-09-27 RX ADMIN — INSULIN GLARGINE-YFGN 30 UNITS: 100 INJECTION, SOLUTION SUBCUTANEOUS at 08:55

## 2022-09-27 RX ADMIN — CALCITRIOL 0.25 MCG: 0.25 CAPSULE ORAL at 08:54

## 2022-09-27 RX ADMIN — ACYCLOVIR 800 MG: 400 TABLET ORAL at 08:54

## 2022-09-27 RX ADMIN — SODIUM BICARBONATE 1300 MG: 650 TABLET ORAL at 22:10

## 2022-09-27 RX ADMIN — ANTACID TABLETS 2 TABLET: 500 TABLET, CHEWABLE ORAL at 15:37

## 2022-09-27 RX ADMIN — CEPHALEXIN 500 MG: 500 CAPSULE ORAL at 08:53

## 2022-09-27 RX ADMIN — ANTACID TABLETS 2 TABLET: 500 TABLET, CHEWABLE ORAL at 08:53

## 2022-09-27 RX ADMIN — INSULIN LISPRO 2 UNITS: 100 INJECTION, SOLUTION INTRAVENOUS; SUBCUTANEOUS at 12:18

## 2022-09-27 RX ADMIN — SODIUM BICARBONATE 1300 MG: 650 TABLET ORAL at 17:05

## 2022-09-27 RX ADMIN — TAMSULOSIN HYDROCHLORIDE 0.4 MG: 0.4 CAPSULE ORAL at 08:53

## 2022-09-27 RX ADMIN — ANTACID TABLETS 2 TABLET: 500 TABLET, CHEWABLE ORAL at 20:58

## 2022-09-27 RX ADMIN — CEPHALEXIN 500 MG: 500 CAPSULE ORAL at 20:57

## 2022-09-27 RX ADMIN — GABAPENTIN 300 MG: 300 CAPSULE ORAL at 08:53

## 2022-09-27 RX ADMIN — ASPIRIN 81 MG: 81 TABLET, CHEWABLE ORAL at 08:54

## 2022-09-27 RX ADMIN — SODIUM BICARBONATE 650 MG: 650 TABLET ORAL at 08:54

## 2022-09-27 RX ADMIN — FINASTERIDE 5 MG: 5 TABLET, FILM COATED ORAL at 08:53

## 2022-09-27 RX ADMIN — EPOETIN ALFA-EPBX 70000 UNITS: 20000 INJECTION, SOLUTION INTRAVENOUS; SUBCUTANEOUS at 15:37

## 2022-09-27 RX ADMIN — INSULIN LISPRO 2 UNITS: 100 INJECTION, SOLUTION INTRAVENOUS; SUBCUTANEOUS at 17:05

## 2022-09-27 RX ADMIN — Medication 10 ML: at 21:00

## 2022-09-27 RX ADMIN — BUMETANIDE 2 MG: 2 TABLET ORAL at 20:57

## 2022-09-27 RX ADMIN — CARVEDILOL 12.5 MG: 12.5 TABLET, FILM COATED ORAL at 08:54

## 2022-09-28 ENCOUNTER — READMISSION MANAGEMENT (OUTPATIENT)
Dept: CALL CENTER | Facility: HOSPITAL | Age: 67
End: 2022-09-28

## 2022-09-28 ENCOUNTER — HOME HEALTH ADMISSION (OUTPATIENT)
Dept: HOME HEALTH SERVICES | Facility: HOME HEALTHCARE | Age: 67
End: 2022-09-28

## 2022-09-28 ENCOUNTER — TRANSCRIBE ORDERS (OUTPATIENT)
Dept: HOME HEALTH SERVICES | Facility: HOME HEALTHCARE | Age: 67
End: 2022-09-28

## 2022-09-28 VITALS
BODY MASS INDEX: 37.95 KG/M2 | SYSTOLIC BLOOD PRESSURE: 163 MMHG | HEART RATE: 62 BPM | WEIGHT: 305.25 LBS | OXYGEN SATURATION: 99 % | RESPIRATION RATE: 18 BRPM | HEIGHT: 75 IN | DIASTOLIC BLOOD PRESSURE: 81 MMHG | TEMPERATURE: 96.8 F

## 2022-09-28 DIAGNOSIS — C90.00 MULTIPLE MYELOMA NOT HAVING ACHIEVED REMISSION: Primary | ICD-10-CM

## 2022-09-28 DIAGNOSIS — N18.4 STAGE 4 CHRONIC KIDNEY DISEASE: ICD-10-CM

## 2022-09-28 DIAGNOSIS — N17.9 AKI (ACUTE KIDNEY INJURY): ICD-10-CM

## 2022-09-28 PROBLEM — E87.70 HYPERVOLEMIA: Status: ACTIVE | Noted: 2022-09-28

## 2022-09-28 PROBLEM — K52.9 CHRONIC DIARRHEA: Status: RESOLVED | Noted: 2021-11-29 | Resolved: 2022-09-28

## 2022-09-28 LAB
ALBUMIN SERPL-MCNC: 3 G/DL (ref 3.5–5.2)
ANION GAP SERPL CALCULATED.3IONS-SCNC: 12 MMOL/L (ref 5–15)
ANISOCYTOSIS BLD QL: ABNORMAL
BH BB BLOOD EXPIRATION DATE: NORMAL
BH BB BLOOD EXPIRATION DATE: NORMAL
BH BB BLOOD TYPE BARCODE: 6200
BH BB BLOOD TYPE BARCODE: 6200
BH BB DISPENSE STATUS: NORMAL
BH BB DISPENSE STATUS: NORMAL
BH BB PRODUCT CODE: NORMAL
BH BB PRODUCT CODE: NORMAL
BH BB UNIT NUMBER: NORMAL
BH BB UNIT NUMBER: NORMAL
BUN SERPL-MCNC: 36 MG/DL (ref 8–23)
BUN/CREAT SERPL: 11.5 (ref 7–25)
CALCIUM SPEC-SCNC: 8.1 MG/DL (ref 8.6–10.5)
CHLORIDE SERPL-SCNC: 115 MMOL/L (ref 98–107)
CO2 SERPL-SCNC: 18 MMOL/L (ref 22–29)
CREAT SERPL-MCNC: 3.14 MG/DL (ref 0.76–1.27)
CROSSMATCH INTERPRETATION: NORMAL
CROSSMATCH INTERPRETATION: NORMAL
DEPRECATED RDW RBC AUTO: 60.1 FL (ref 37–54)
EGFRCR SERPLBLD CKD-EPI 2021: 21.2 ML/MIN/1.73
EOSINOPHIL # BLD MANUAL: 0.27 10*3/MM3 (ref 0–0.4)
EOSINOPHIL NFR BLD MANUAL: 9.2 % (ref 0.3–6.2)
ERYTHROCYTE [DISTWIDTH] IN BLOOD BY AUTOMATED COUNT: 18.4 % (ref 12.3–15.4)
GLUCOSE BLDC GLUCOMTR-MCNC: 185 MG/DL (ref 70–130)
GLUCOSE BLDC GLUCOMTR-MCNC: 99 MG/DL (ref 70–130)
GLUCOSE SERPL-MCNC: 99 MG/DL (ref 65–99)
HCT VFR BLD AUTO: 26.7 % (ref 37.5–51)
HGB BLD-MCNC: 8.3 G/DL (ref 13–17.7)
KAPPA LC FREE 24H UR-MRATE: 351.06 MG/24 HR
KAPPA LC FREE UR-MCNC: 117.02 MG/L (ref 1.17–86.46)
KAPPA LC FREE/LAMBDA FREE UR: 3.37 (ref 1.83–14.26)
LAMBDA LC FREE 24H UR-MRATE: 104.1 MG/24 HR
LAMBDA LC FREE UR-MCNC: 34.7 MG/L (ref 0.27–15.21)
LYMPHOCYTES # BLD MANUAL: 0.46 10*3/MM3 (ref 0.7–3.1)
LYMPHOCYTES NFR BLD MANUAL: 15.3 % (ref 5–12)
MAGNESIUM SERPL-MCNC: 1.6 MG/DL (ref 1.6–2.4)
MCH RBC QN AUTO: 27.9 PG (ref 26.6–33)
MCHC RBC AUTO-ENTMCNC: 31.1 G/DL (ref 31.5–35.7)
MCV RBC AUTO: 89.6 FL (ref 79–97)
METAMYELOCYTES NFR BLD MANUAL: 1 % (ref 0–0)
MONOCYTES # BLD: 0.46 10*3/MM3 (ref 0.1–0.9)
NEUTROPHILS # BLD AUTO: 1.76 10*3/MM3 (ref 1.7–7)
NEUTROPHILS NFR BLD MANUAL: 58.2 % (ref 42.7–76)
NEUTS BAND NFR BLD MANUAL: 1 % (ref 0–5)
NRBC SPEC MANUAL: 1 /100 WBC (ref 0–0.2)
PHOSPHATE SERPL-MCNC: 3.8 MG/DL (ref 2.5–4.5)
PLAT MORPH BLD: NORMAL
PLATELET # BLD AUTO: 72 10*3/MM3 (ref 140–450)
PMV BLD AUTO: 10.6 FL (ref 6–12)
POIKILOCYTOSIS BLD QL SMEAR: ABNORMAL
POTASSIUM SERPL-SCNC: 3.4 MMOL/L (ref 3.5–5.2)
RBC # BLD AUTO: 2.98 10*6/MM3 (ref 4.14–5.8)
SODIUM SERPL-SCNC: 145 MMOL/L (ref 136–145)
UNIT  ABO: NORMAL
UNIT  ABO: NORMAL
UNIT  RH: NORMAL
UNIT  RH: NORMAL
VARIANT LYMPHS NFR BLD MANUAL: 15.3 % (ref 19.6–45.3)
WBC MORPH BLD: NORMAL
WBC NRBC COR # BLD: 2.98 10*3/MM3 (ref 3.4–10.8)

## 2022-09-28 PROCEDURE — 83735 ASSAY OF MAGNESIUM: CPT | Performed by: INTERNAL MEDICINE

## 2022-09-28 PROCEDURE — 80069 RENAL FUNCTION PANEL: CPT | Performed by: INTERNAL MEDICINE

## 2022-09-28 PROCEDURE — 82962 GLUCOSE BLOOD TEST: CPT

## 2022-09-28 PROCEDURE — 63710000001 INSULIN LISPRO (HUMAN) PER 5 UNITS: Performed by: HOSPITALIST

## 2022-09-28 PROCEDURE — 85007 BL SMEAR W/DIFF WBC COUNT: CPT | Performed by: INTERNAL MEDICINE

## 2022-09-28 PROCEDURE — 99232 SBSQ HOSP IP/OBS MODERATE 35: CPT | Performed by: INTERNAL MEDICINE

## 2022-09-28 PROCEDURE — 85025 COMPLETE CBC W/AUTO DIFF WBC: CPT | Performed by: INTERNAL MEDICINE

## 2022-09-28 PROCEDURE — 63710000001 INSULIN LISPRO (HUMAN) PER 5 UNITS: Performed by: INTERNAL MEDICINE

## 2022-09-28 RX ORDER — POTASSIUM CHLORIDE 1.5 G/1.77G
40 POWDER, FOR SOLUTION ORAL AS NEEDED
Status: DISCONTINUED | OUTPATIENT
Start: 2022-09-28 | End: 2022-09-28 | Stop reason: HOSPADM

## 2022-09-28 RX ORDER — POTASSIUM CHLORIDE 7.45 MG/ML
10 INJECTION INTRAVENOUS
Status: DISCONTINUED | OUTPATIENT
Start: 2022-09-28 | End: 2022-09-28 | Stop reason: HOSPADM

## 2022-09-28 RX ORDER — POTASSIUM CHLORIDE 750 MG/1
40 TABLET, FILM COATED, EXTENDED RELEASE ORAL AS NEEDED
Status: DISCONTINUED | OUTPATIENT
Start: 2022-09-28 | End: 2022-09-28 | Stop reason: HOSPADM

## 2022-09-28 RX ORDER — BUMETANIDE 2 MG/1
2 TABLET ORAL 2 TIMES DAILY
Start: 2022-09-28 | End: 2022-09-30 | Stop reason: SDUPTHER

## 2022-09-28 RX ORDER — INSULIN ASPART 100 [IU]/ML
8 INJECTION, SOLUTION INTRAVENOUS; SUBCUTANEOUS
Start: 2022-09-28 | End: 2022-11-29 | Stop reason: SDUPTHER

## 2022-09-28 RX ORDER — CEPHALEXIN 500 MG/1
500 CAPSULE ORAL EVERY 12 HOURS SCHEDULED
Qty: 8 CAPSULE | Refills: 0 | Status: SHIPPED | OUTPATIENT
Start: 2022-09-28 | End: 2022-09-30 | Stop reason: HOSPADM

## 2022-09-28 RX ORDER — SODIUM BICARBONATE 650 MG/1
1300 TABLET ORAL 4 TIMES DAILY
Qty: 240 TABLET | Refills: 0 | Status: SHIPPED | OUTPATIENT
Start: 2022-09-28 | End: 2022-10-28

## 2022-09-28 RX ORDER — GABAPENTIN 600 MG/1
300 TABLET ORAL NIGHTLY
Start: 2022-09-28 | End: 2022-10-13 | Stop reason: SDUPTHER

## 2022-09-28 RX ORDER — INSULIN GLARGINE 100 [IU]/ML
INJECTION, SOLUTION SUBCUTANEOUS
Qty: 60 ML | Refills: 1
Start: 2022-09-28 | End: 2022-11-30 | Stop reason: SDUPTHER

## 2022-09-28 RX ORDER — ACETAMINOPHEN 325 MG/1
650 TABLET ORAL EVERY 6 HOURS PRN
Start: 2022-09-28

## 2022-09-28 RX ORDER — GABAPENTIN 300 MG/1
300 CAPSULE ORAL DAILY
Start: 2022-09-28 | End: 2022-10-13 | Stop reason: SDUPTHER

## 2022-09-28 RX ADMIN — CARVEDILOL 12.5 MG: 12.5 TABLET, FILM COATED ORAL at 08:27

## 2022-09-28 RX ADMIN — CEPHALEXIN 500 MG: 500 CAPSULE ORAL at 08:26

## 2022-09-28 RX ADMIN — INSULIN GLARGINE-YFGN 30 UNITS: 100 INJECTION, SOLUTION SUBCUTANEOUS at 08:28

## 2022-09-28 RX ADMIN — INSULIN LISPRO 2 UNITS: 100 INJECTION, SOLUTION INTRAVENOUS; SUBCUTANEOUS at 11:38

## 2022-09-28 RX ADMIN — ACYCLOVIR 800 MG: 400 TABLET ORAL at 08:27

## 2022-09-28 RX ADMIN — SODIUM BICARBONATE 1300 MG: 650 TABLET ORAL at 08:26

## 2022-09-28 RX ADMIN — FINASTERIDE 5 MG: 5 TABLET, FILM COATED ORAL at 08:27

## 2022-09-28 RX ADMIN — ANTACID TABLETS 2 TABLET: 500 TABLET, CHEWABLE ORAL at 08:26

## 2022-09-28 RX ADMIN — TAMSULOSIN HYDROCHLORIDE 0.4 MG: 0.4 CAPSULE ORAL at 08:26

## 2022-09-28 RX ADMIN — POTASSIUM CHLORIDE 40 MEQ: 750 TABLET, EXTENDED RELEASE ORAL at 11:37

## 2022-09-28 RX ADMIN — GABAPENTIN 300 MG: 300 CAPSULE ORAL at 08:26

## 2022-09-28 RX ADMIN — ESCITALOPRAM 5 MG: 5 TABLET, FILM COATED ORAL at 08:27

## 2022-09-28 RX ADMIN — CALCITRIOL 0.25 MCG: 0.25 CAPSULE ORAL at 08:27

## 2022-09-28 RX ADMIN — SODIUM BICARBONATE 1300 MG: 650 TABLET ORAL at 11:39

## 2022-09-28 RX ADMIN — ASPIRIN 81 MG: 81 TABLET, CHEWABLE ORAL at 08:26

## 2022-09-28 RX ADMIN — INSULIN LISPRO 3 UNITS: 100 INJECTION, SOLUTION INTRAVENOUS; SUBCUTANEOUS at 11:38

## 2022-09-28 RX ADMIN — Medication 10 ML: at 08:27

## 2022-09-28 RX ADMIN — BUMETANIDE 2 MG: 2 TABLET ORAL at 08:27

## 2022-09-28 NOTE — OUTREACH NOTE
Prep Survey    Flowsheet Row Responses   [facility] facility patient discharged from? [place]   Is LACE score < 7 ? No   Emergency Room discharge w/ pulse ox? No   Eligibility [facility]   Date of Admission 09/23/22   Date of Discharge 09/28/22   Discharge Disposition Home or Self Care   Discharge diagnosis Hypocalcemia   Does the patient have one of the following disease processes/diagnoses(primary or secondary)? Other   Does the patient have Home health ordered? Yes   What is the Home health agency?   [facility]   Is there a DME ordered? Yes   What DME was ordered? [employer] for DME   Prep survey completed? Yes          ALIN KOROMA - Registered Nurse

## 2022-09-29 ENCOUNTER — TRANSITIONAL CARE MANAGEMENT TELEPHONE ENCOUNTER (OUTPATIENT)
Dept: CALL CENTER | Facility: HOSPITAL | Age: 67
End: 2022-09-29

## 2022-09-29 ENCOUNTER — HOME CARE VISIT (OUTPATIENT)
Dept: HOME HEALTH SERVICES | Facility: HOME HEALTHCARE | Age: 67
End: 2022-09-29

## 2022-09-29 PROCEDURE — G0299 HHS/HOSPICE OF RN EA 15 MIN: HCPCS

## 2022-09-29 NOTE — OUTREACH NOTE
Call Center TCM Note    Flowsheet Row Responses   [facility] patient discharged from? [place]   Does the patient have one of the following disease processes/diagnoses(primary or secondary)? Other   TCM attempt successful? Yes   Discharge diagnosis Hypocalcemia   Is patient permission given to speak with other caregiver? Yes   Person spoke with today (if not patient) and relationship Pt and wife Gabby on [place]   Meds reviewed with patient/caregiver? Yes   Is the patient having any side effects they believe may be caused by any medication additions or changes? No   Does the patient have all medications ordered at discharge? Yes   Is the patient taking all medications as directed (includes completed medication regime)? Yes   Comments Due to multiple upcoming Oncology, Endocrinology appts pt declines TCM APPT with PCP IZZY Thompson but will keep sched ov on 11/22/2022.   What is the Home health agency?  [facility]   Has home health visited the patient within 72 hours of discharge? Yes   What DME was ordered? [employer] for DME   Has all DME been delivered? Yes   Psychosocial issues? No   Did the patient receive a copy of their discharge instructions? Yes   Nursing interventions Reviewed instructions with patient   What is the patient's perception of their health status since discharge? Improving   Is the patient/caregiver able to teach back signs and symptoms related to disease process for when to call PCP? Yes   Is the patient/caregiver able to teach back signs and symptoms related to disease process for when to call 911? Yes   Is the patient/caregiver able to teach back the hierarchy of who to call/visit for symptoms/problems? PCP, Specialist, Home health nurse, Urgent Care, ED, 911 Yes   If the patient is a current smoker, are they able to teach back resources for cessation? Not a smoker   TCM call completed? Yes   Wrap up additional comments D/C DX: Hypocalcemia ** Pt doing pretty well. New rx's Cephalexin,  Sodium bicarbonate, other rx changes in place. DME Commode chair, walker in place. No questions at this time. Due to multiple upcoming Oncology, Endocrinology appts pt declines TCM APPT with PCP IZZY Thompson but will keep sched ov on 11/22/2022.          Yvrose Qiu MA    9/29/2022, 13:58 EDT

## 2022-09-30 RX ORDER — BUMETANIDE 2 MG/1
1 TABLET ORAL 2 TIMES DAILY
Start: 2022-09-30

## 2022-10-03 ENCOUNTER — HOME CARE VISIT (OUTPATIENT)
Dept: HOME HEALTH SERVICES | Facility: HOME HEALTHCARE | Age: 67
End: 2022-10-03

## 2022-10-03 VITALS
SYSTOLIC BLOOD PRESSURE: 128 MMHG | BODY MASS INDEX: 36.53 KG/M2 | HEIGHT: 76 IN | OXYGEN SATURATION: 97 % | WEIGHT: 300 LBS | TEMPERATURE: 97.5 F | RESPIRATION RATE: 18 BRPM | HEART RATE: 81 BPM | DIASTOLIC BLOOD PRESSURE: 86 MMHG

## 2022-10-04 ENCOUNTER — TELEPHONE (OUTPATIENT)
Dept: ONCOLOGY | Facility: CLINIC | Age: 67
End: 2022-10-04

## 2022-10-04 ENCOUNTER — HOME CARE VISIT (OUTPATIENT)
Dept: HOME HEALTH SERVICES | Facility: HOME HEALTHCARE | Age: 67
End: 2022-10-04

## 2022-10-04 VITALS
HEART RATE: 69 BPM | DIASTOLIC BLOOD PRESSURE: 90 MMHG | OXYGEN SATURATION: 99 % | SYSTOLIC BLOOD PRESSURE: 144 MMHG | TEMPERATURE: 96.9 F

## 2022-10-04 PROCEDURE — G0299 HHS/HOSPICE OF RN EA 15 MIN: HCPCS

## 2022-10-04 PROCEDURE — G0151 HHCP-SERV OF PT,EA 15 MIN: HCPCS

## 2022-10-04 RX ORDER — PANTOPRAZOLE SODIUM 40 MG/1
40 TABLET, DELAYED RELEASE ORAL DAILY
Qty: 30 TABLET | Refills: 1 | Status: SHIPPED | OUTPATIENT
Start: 2022-10-04 | End: 2022-12-05

## 2022-10-04 NOTE — TELEPHONE ENCOUNTER
Per Dr. Del Valle pt will take 40 mg of Protonix daily for his stomach along with his steroid. E-scribed to pts pharmacy and pt informed.

## 2022-10-04 NOTE — HOME HEALTH
Reason for referral Patient has decreased strength, activity tolerance, difficulty with transfers, abnormal gait, decreased balance due to recent hospitalization     Diagnosis Patient was admitted to [facility] 9/23 to 9/28/22 with weakness and falls due to hypocalcemia,hypervolemia, metabolic acidosis, panctopenia, hypomagnesemia, NATHANIEL    surgical procedure na    PMHx multiple myeloma, HTN, CKD, DM, diabetic retinopathy, neuropathy    Prior level of function Patient was progressively getting weaker, would require rollator walker or cane, required assist with ADLs.    Home social environment Patient resides with spouse, has 1 step inside the home, has 3 steps to enter the home without rail.     Next MD appointment 10/6/22    Plan for next visit  Progress with therapeutic exercise, transfers, gait, balance

## 2022-10-04 NOTE — TELEPHONE ENCOUNTER
Returned call to Home Health RN, who is reporting that patient is experiencing large amounts of diarrhea.  Patient told her that the Imodium and the Lomotil is not helping.  He is also concerned that the Decadron causes stomach burning when he takes it.  Should he take a PPI with this to decrease he stomach burning?  I asked the Home Health Nurse to verify that he is taking both the Imodium and Lomotil as prescribed.  She will check and get back with me.

## 2022-10-04 NOTE — PROGRESS NOTES
Chief Complaint  Chief Complaint   Patient presents with   • Diabetes     Type 2: Patient doesn't have meter set up, hasn't been used, is up to date on eye exam, does have hx of retinopathy, moderate to sever neuropathy.        Subjective          History of Present Illness    Contreras Albert 67 y.o. presents for a follow-up evaluation for type 2 DM     He has been diabetic since 2001 and started insulin in 2009     Pt is on the following medications for their DM:         Non-Chemo Days     Lantus 16 units twice a day and Novolog 10 untis TID/AC plus 1 units per every 50 over 150       Chemo Days     Lantus to 30 units twice a day on the day of chemotherapy and on the morning after chemotherapy.  NovoLog to 15 units 3 times daily with meals +2 units per 50 greater than 150 on the day of chemotherapy           Pt complains of chronic diarrhea, which is better, shortness of breath with activity (better), numbness and tingling in feet/hands, vision changes when takes steroids/high BGs    Denies constipation and chest pain.    Weight stable since last visit.    Pt does have a history of DM retinopathy and had previous intraocular eye injections on both eyes.  Last eye exam was 07/21     Pt does have a history of nephropathy.  Patient is not currently taking ACE/ARB - follows Dr. 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  He is on gabapentin 300 mg 1 in am and 2 in pm  prescribed by his oncologist Dr. Alvares    Last Fructomaine in 09/22 was 182    Last microalbumin in 09/22 was positive          Blood Sugars    Pt's cancer has came out of remission.  Along with the chemo treatments, he is receiving steroids once a month.    Blood glucoses are checked 3-4/day.    Fasting blood glucoses: 100s-120s    Pre-meal blood glucoses: mid 100s    Pt has had 2-3 episodes of hypoglycemia a month            Hyperlipidemia     Pt denies any muscle/body aches, chest pain, or shortness of  "breath    Pt is currently taking atorvastatin 10 mg HS    Last lipid panel in 09/22 showed Total 120, Triglycerides 146, HDL 29 and LDL 65            Hypertension with CKD Stage 4    Pt denies any chest pain, palpitations, shortness of breath, or headache    Current regimen includes Bumex 1 mg BID and carvedilol 12.5 mg daily  He was taken off amlodipine because of pedal edema            Other Medical History     He has multiple myeloma and had chemotherapy followed by stem cell transplant.  His cancer has came out of remission and he is on Pomalyst along with steriods monthly.  He transferred his care back to [facility] from [facility].     He had a previous right partial nephrectomy for cancer.  He has no known recurrence.        Pt was hospitalized 09/23/22-09/28/22 for hypocalcemia, hypomagnesemia and pancytopenia.  Pt taking OTC Caltrate and Vitamin D 50,000 units weekly        I have reviewed the patient's allergies, medicines, past medical hx, family hx and social hx.    Objective   Vital Signs:   /78   Pulse 72   Temp 97.8 °F (36.6 °C) (Temporal)   Ht 190.5 cm (75\")   Wt (!) 140 kg (307 lb 9.6 oz)   SpO2 95%   BMI 38.45 kg/m²       Physical Exam   Physical Exam  Constitutional:       General: He is not in acute distress.     Appearance: Normal appearance. He is not diaphoretic.   HENT:      Head: Normocephalic and atraumatic.   Eyes:      General:         Right eye: No discharge.         Left eye: No discharge.   Skin:     General: Skin is warm and dry.   Neurological:      Mental Status: He is alert.   Psychiatric:         Mood and Affect: Mood normal.         Behavior: Behavior normal.                    Results Review:   Hemoglobin A1C   Date Value Ref Range Status   03/16/2022 8.8 (H) 4.8 - 5.6 % Final     Comment:              Prediabetes: 5.7 - 6.4           Diabetes: >6.4           Glycemic control for adults with diabetes: <7.0     05/23/2020 6.60 (H) 4.80 - 5.60 % Final "     Triglycerides   Date Value Ref Range Status   09/22/2022 146 0 - 150 mg/dL Final     HDL Cholesterol   Date Value Ref Range Status   09/22/2022 29 (L) 40 - 60 mg/dL Final     LDL Chol Calc (NIH)   Date Value Ref Range Status   09/22/2022 65 0 - 100 mg/dL Final     VLDL Cholesterol Wale   Date Value Ref Range Status   09/22/2022 26 5 - 40 mg/dL Final     LDL/HDL RATIO   Date Value Ref Range Status   01/15/2021 2.8 0.0 - 3.6 ratio Final     Comment:                                         LDL/HDL Ratio                                              Men  Women                                1/2 Avg.Risk  1.0    1.5                                    Avg.Risk  3.6    3.2                                 2X Avg.Risk  6.2    5.0                                 3X Avg.Risk  8.0    6.1           Assessment and Plan {CC Problem List  Visit Diagnosis  ROS  Review (Popup)  Health Maintenance  Quality  BestPractice  Medications  SmartSets  SnapShot Encounters  Media :23  Diagnoses and all orders for this visit:    1. Type 2 diabetes mellitus with stage 4 chronic kidney disease, with long-term current use of insulin (HCC) (Primary)    Fructosamine good at 182 - which would put A1C around 5  Urine positive for protein - unable to take ACE/ARB  Continue with current insulin doses - chem and non chemo daily  Start Dexcom in office today      2. Mixed hyperlipidemia    Lipid panel is good  Continue with statin        3. Benign hypertension with CKD (chronic kidney disease) stage IV (HCC)    Stable  Continue with current medication regimen  Defer management to PCP/Nephrology        No refills needed at this time      RTC on 01/17/23 with Dr. Haro and labs on 01/03/23      Follow Up     Patient was given instructions and counseling regarding her condition or for health maintenance advice. Please see specific information pulled into the AVS if appropriate.              Chelsea Haskins, IZZY  10/06/22

## 2022-10-05 NOTE — HOME HEALTH
Patient is getting chemo with dexamethasone as treatment for multiple myeloma. He has been having severe diarrhea as a side effect of the chemo. SN instructed pt to take lamotal and immodium as instructed by md.                                                                               PATIENT HISTORY:     Discussed the importance of blood sugar control in the prevention of ocular complications.   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  Malignant neoplasm of kidney   Anxiety   Type 2 diabetes mellitus with stage 4 chronic kidney disease, with long-term current use of insulin  Chronic diarrhea

## 2022-10-06 ENCOUNTER — OFFICE VISIT (OUTPATIENT)
Dept: ENDOCRINOLOGY | Age: 67
End: 2022-10-06

## 2022-10-06 VITALS
TEMPERATURE: 97.8 F | OXYGEN SATURATION: 98 % | HEART RATE: 78 BPM | DIASTOLIC BLOOD PRESSURE: 78 MMHG | SYSTOLIC BLOOD PRESSURE: 122 MMHG | RESPIRATION RATE: 18 BRPM

## 2022-10-06 VITALS
SYSTOLIC BLOOD PRESSURE: 140 MMHG | DIASTOLIC BLOOD PRESSURE: 78 MMHG | HEIGHT: 75 IN | TEMPERATURE: 97.8 F | BODY MASS INDEX: 38.24 KG/M2 | HEART RATE: 72 BPM | WEIGHT: 307.6 LBS | OXYGEN SATURATION: 95 %

## 2022-10-06 DIAGNOSIS — N18.4 TYPE 2 DIABETES MELLITUS WITH STAGE 4 CHRONIC KIDNEY DISEASE, WITH LONG-TERM CURRENT USE OF INSULIN: Primary | ICD-10-CM

## 2022-10-06 DIAGNOSIS — I12.9 BENIGN HYPERTENSION WITH CKD (CHRONIC KIDNEY DISEASE) STAGE IV: ICD-10-CM

## 2022-10-06 DIAGNOSIS — E78.2 MIXED HYPERLIPIDEMIA: ICD-10-CM

## 2022-10-06 DIAGNOSIS — Z79.4 TYPE 2 DIABETES MELLITUS WITH STAGE 4 CHRONIC KIDNEY DISEASE, WITH LONG-TERM CURRENT USE OF INSULIN: Primary | ICD-10-CM

## 2022-10-06 DIAGNOSIS — E11.22 TYPE 2 DIABETES MELLITUS WITH STAGE 4 CHRONIC KIDNEY DISEASE, WITH LONG-TERM CURRENT USE OF INSULIN: Primary | ICD-10-CM

## 2022-10-06 DIAGNOSIS — N18.4 BENIGN HYPERTENSION WITH CKD (CHRONIC KIDNEY DISEASE) STAGE IV: ICD-10-CM

## 2022-10-06 PROCEDURE — 99214 OFFICE O/P EST MOD 30 MIN: CPT | Performed by: NURSE PRACTITIONER

## 2022-10-06 NOTE — HOME HEALTH
PATIENT STATED THAT HE HAS HAD SEVERE DIARRHEA. SN T/I PT THAT HIS CHEMO TREATMENT WILL CAUSE THIS AND HE NEEDS TO TAKE HIS LAMOTAL AND IMMODIUM AS ORDERED BY MD TO HELP PREVENT IT. SN ALSO INSTRUCTED PT TO DRINK PLENTY OF FLUIDS SO HE DOESNT GET DEHYDRATED. PATIENT VERBALIZED UNDERSTANDING    PLANS FOR NEXT VISIT: CANCER EDUCATION

## 2022-10-07 ENCOUNTER — HOME CARE VISIT (OUTPATIENT)
Dept: HOME HEALTH SERVICES | Facility: HOME HEALTHCARE | Age: 67
End: 2022-10-07

## 2022-10-07 VITALS
HEART RATE: 68 BPM | SYSTOLIC BLOOD PRESSURE: 132 MMHG | TEMPERATURE: 97.9 F | RESPIRATION RATE: 18 BRPM | OXYGEN SATURATION: 97 % | DIASTOLIC BLOOD PRESSURE: 82 MMHG

## 2022-10-07 VITALS
HEART RATE: 78 BPM | SYSTOLIC BLOOD PRESSURE: 146 MMHG | DIASTOLIC BLOOD PRESSURE: 94 MMHG | RESPIRATION RATE: 18 BRPM | OXYGEN SATURATION: 100 % | TEMPERATURE: 97.9 F

## 2022-10-07 PROCEDURE — G0299 HHS/HOSPICE OF RN EA 15 MIN: HCPCS

## 2022-10-07 PROCEDURE — G0151 HHCP-SERV OF PT,EA 15 MIN: HCPCS

## 2022-10-08 NOTE — HOME HEALTH
PATIENS DIARRHEA HAS IMPROVED SINCE HE STARTED TAKING HIS LAMICTAL AND IMMODIUM THE WAY THE DOCTORS ORDERED IT. SN T/I PT ON POSSIBLE SIDE EFFECTS OF CHEMO: DIARRHEA, FATIGUE, LOSS OF APPETITE AND POSSIBLE SIDE EFFECTS OF STEROIDS SUCH AS: INCREASED BP AND BS, INCREASED APPETITE, FLUSHING, DIFFICULTY SLEEPING. PATIENT AND WIFE VERBALIZED UNDERSTANDING.     PLANS FOR NEXT VISIT: DIABETES EDUCATION

## 2022-10-08 NOTE — HOME HEALTH
Patient sitting on couch upon arrival. Reported doing his exercises.     ASSESSMENT: Patient with good participation this date. Need to continue working to improve muscle weakness and activity tolerance. Need to work to improve balance    PLAN FOR NEXT VISIT:  --Continue gait training  --Continue therapeutic exercises and balance exercises

## 2022-10-10 ENCOUNTER — HOME CARE VISIT (OUTPATIENT)
Dept: HOME HEALTH SERVICES | Facility: HOME HEALTHCARE | Age: 67
End: 2022-10-10

## 2022-10-10 VITALS
RESPIRATION RATE: 18 BRPM | TEMPERATURE: 96.2 F | OXYGEN SATURATION: 100 % | HEART RATE: 73 BPM | DIASTOLIC BLOOD PRESSURE: 68 MMHG | SYSTOLIC BLOOD PRESSURE: 124 MMHG

## 2022-10-10 PROCEDURE — G0151 HHCP-SERV OF PT,EA 15 MIN: HCPCS

## 2022-10-10 NOTE — HOME HEALTH
Patient walking in from bathroom upon arrival. Reports feeling like he is getting some mobility back. No falls reported, minimal swelling noted bilateral lower extremities.    ASSESSMENT: Need to continue working to improve balance to decrease fall risk    PLAN FOR NEXT VISIT:  --Continue therapeutic exercises  --Continue gait training  --Continue balance exercises

## 2022-10-12 ENCOUNTER — HOME CARE VISIT (OUTPATIENT)
Dept: HOME HEALTH SERVICES | Facility: HOME HEALTHCARE | Age: 67
End: 2022-10-12

## 2022-10-12 ENCOUNTER — READMISSION MANAGEMENT (OUTPATIENT)
Dept: CALL CENTER | Facility: HOSPITAL | Age: 67
End: 2022-10-12

## 2022-10-12 PROCEDURE — G0300 HHS/HOSPICE OF LPN EA 15 MIN: HCPCS

## 2022-10-12 NOTE — OUTREACH NOTE
Medical Week 2 Survey    Flowsheet Row Responses   [facility] patient discharged from? [place]   Does the patient have one of the following disease processes/diagnoses(primary or secondary)? Other   Week 2 attempt successful? No   Unsuccessful attempts Attempt 1          NICOLE Marroquin Registered Nurse

## 2022-10-13 ENCOUNTER — LAB (OUTPATIENT)
Dept: LAB | Facility: HOSPITAL | Age: 67
End: 2022-10-13

## 2022-10-13 ENCOUNTER — APPOINTMENT (OUTPATIENT)
Dept: ONCOLOGY | Facility: HOSPITAL | Age: 67
End: 2022-10-13

## 2022-10-13 ENCOUNTER — OFFICE VISIT (OUTPATIENT)
Dept: ONCOLOGY | Facility: CLINIC | Age: 67
End: 2022-10-13

## 2022-10-13 VITALS
SYSTOLIC BLOOD PRESSURE: 110 MMHG | OXYGEN SATURATION: 99 % | TEMPERATURE: 97.9 F | RESPIRATION RATE: 18 BRPM | DIASTOLIC BLOOD PRESSURE: 72 MMHG | HEART RATE: 82 BPM

## 2022-10-13 VITALS
TEMPERATURE: 97.1 F | OXYGEN SATURATION: 98 % | HEART RATE: 75 BPM | DIASTOLIC BLOOD PRESSURE: 84 MMHG | BODY MASS INDEX: 37.35 KG/M2 | WEIGHT: 300.4 LBS | SYSTOLIC BLOOD PRESSURE: 154 MMHG | RESPIRATION RATE: 16 BRPM | HEIGHT: 75 IN

## 2022-10-13 DIAGNOSIS — G62.0 NEUROPATHY DUE TO CHEMOTHERAPEUTIC DRUG: ICD-10-CM

## 2022-10-13 DIAGNOSIS — D69.6 THROMBOCYTOPENIA: ICD-10-CM

## 2022-10-13 DIAGNOSIS — N18.32 ANEMIA IN STAGE 3B CHRONIC KIDNEY DISEASE: ICD-10-CM

## 2022-10-13 DIAGNOSIS — C90.00 MULTIPLE MYELOMA, REMISSION STATUS UNSPECIFIED: Primary | ICD-10-CM

## 2022-10-13 DIAGNOSIS — D64.9 ANEMIA, UNSPECIFIED TYPE: ICD-10-CM

## 2022-10-13 DIAGNOSIS — E83.51 HYPOCALCEMIA: ICD-10-CM

## 2022-10-13 DIAGNOSIS — D63.1 ANEMIA IN STAGE 3B CHRONIC KIDNEY DISEASE: ICD-10-CM

## 2022-10-13 DIAGNOSIS — T45.1X5A NEUROPATHY DUE TO CHEMOTHERAPEUTIC DRUG: ICD-10-CM

## 2022-10-13 DIAGNOSIS — C90.00 MULTIPLE MYELOMA, REMISSION STATUS UNSPECIFIED: ICD-10-CM

## 2022-10-13 DIAGNOSIS — Z79.899 ENCOUNTER FOR LONG-TERM CURRENT USE OF HIGH RISK MEDICATION: ICD-10-CM

## 2022-10-13 LAB
ALBUMIN SERPL-MCNC: 4 G/DL (ref 3.5–5.2)
ALBUMIN/GLOB SERPL: 1.8 G/DL (ref 1.1–2.4)
ALP SERPL-CCNC: 60 U/L (ref 38–116)
ALT SERPL W P-5'-P-CCNC: 13 U/L (ref 0–41)
ANION GAP SERPL CALCULATED.3IONS-SCNC: 11.9 MMOL/L (ref 5–15)
AST SERPL-CCNC: 10 U/L (ref 0–40)
BASOPHILS # BLD AUTO: 0.13 10*3/MM3 (ref 0–0.2)
BASOPHILS NFR BLD AUTO: 2.2 % (ref 0–1.5)
BILIRUB SERPL-MCNC: 0.5 MG/DL (ref 0.2–1.2)
BUN SERPL-MCNC: 41 MG/DL (ref 6–20)
BUN/CREAT SERPL: 13.4 (ref 7.3–30)
CALCIUM SPEC-SCNC: 9.6 MG/DL (ref 8.5–10.2)
CHLORIDE SERPL-SCNC: 104 MMOL/L (ref 98–107)
CO2 SERPL-SCNC: 25.1 MMOL/L (ref 22–29)
CREAT SERPL-MCNC: 3.06 MG/DL (ref 0.7–1.3)
DEPRECATED RDW RBC AUTO: 67.9 FL (ref 37–54)
EGFRCR SERPLBLD CKD-EPI 2021: 21.8 ML/MIN/1.73
EOSINOPHIL # BLD AUTO: 0.07 10*3/MM3 (ref 0–0.4)
EOSINOPHIL NFR BLD AUTO: 1.2 % (ref 0.3–6.2)
ERYTHROCYTE [DISTWIDTH] IN BLOOD BY AUTOMATED COUNT: 19.1 % (ref 12.3–15.4)
GLOBULIN UR ELPH-MCNC: 2.2 GM/DL (ref 1.8–3.5)
GLUCOSE SERPL-MCNC: 200 MG/DL (ref 74–124)
HCT VFR BLD AUTO: 33.4 % (ref 37.5–51)
HGB BLD-MCNC: 10 G/DL (ref 13–17.7)
IMM GRANULOCYTES # BLD AUTO: 0.02 10*3/MM3 (ref 0–0.05)
IMM GRANULOCYTES NFR BLD AUTO: 0.3 % (ref 0–0.5)
LYMPHOCYTES # BLD AUTO: 0.65 10*3/MM3 (ref 0.7–3.1)
LYMPHOCYTES NFR BLD AUTO: 10.8 % (ref 19.6–45.3)
MCH RBC QN AUTO: 29.2 PG (ref 26.6–33)
MCHC RBC AUTO-ENTMCNC: 29.9 G/DL (ref 31.5–35.7)
MCV RBC AUTO: 97.7 FL (ref 79–97)
MONOCYTES # BLD AUTO: 0.31 10*3/MM3 (ref 0.1–0.9)
MONOCYTES NFR BLD AUTO: 5.1 % (ref 5–12)
NEUTROPHILS NFR BLD AUTO: 4.84 10*3/MM3 (ref 1.7–7)
NEUTROPHILS NFR BLD AUTO: 80.4 % (ref 42.7–76)
NRBC BLD AUTO-RTO: 0 /100 WBC (ref 0–0.2)
PLATELET # BLD AUTO: 266 10*3/MM3 (ref 140–450)
PMV BLD AUTO: 10.5 FL (ref 6–12)
POTASSIUM SERPL-SCNC: 4.6 MMOL/L (ref 3.5–4.7)
PROT SERPL-MCNC: 6.2 G/DL (ref 6.3–8)
RBC # BLD AUTO: 3.42 10*6/MM3 (ref 4.14–5.8)
SODIUM SERPL-SCNC: 141 MMOL/L (ref 134–145)
WBC NRBC COR # BLD: 6.02 10*3/MM3 (ref 3.4–10.8)

## 2022-10-13 PROCEDURE — 36415 COLL VENOUS BLD VENIPUNCTURE: CPT | Performed by: INTERNAL MEDICINE

## 2022-10-13 PROCEDURE — 85025 COMPLETE CBC W/AUTO DIFF WBC: CPT

## 2022-10-13 PROCEDURE — G2212 PROLONG OUTPT/OFFICE VIS: HCPCS | Performed by: INTERNAL MEDICINE

## 2022-10-13 PROCEDURE — 80053 COMPREHEN METABOLIC PANEL: CPT

## 2022-10-13 PROCEDURE — 99215 OFFICE O/P EST HI 40 MIN: CPT | Performed by: INTERNAL MEDICINE

## 2022-10-13 RX ORDER — ESCITALOPRAM OXALATE 5 MG/1
5 TABLET ORAL DAILY
Qty: 30 TABLET | Refills: 5 | Status: SHIPPED | OUTPATIENT
Start: 2022-10-13

## 2022-10-13 RX ORDER — GABAPENTIN 300 MG/1
CAPSULE ORAL
Qty: 90 CAPSULE | Refills: 5 | Status: SHIPPED | OUTPATIENT
Start: 2022-10-13 | End: 2023-02-07 | Stop reason: SDUPTHER

## 2022-10-13 NOTE — HOME HEALTH
LPN educated patient on checking CBG per MD orders and lodding. LPN educated on diabetic diet. Patient reports improvement in diahrrea    Plans for next visit- T/I DM

## 2022-10-14 ENCOUNTER — HOME CARE VISIT (OUTPATIENT)
Dept: HOME HEALTH SERVICES | Facility: HOME HEALTHCARE | Age: 67
End: 2022-10-14

## 2022-10-14 ENCOUNTER — DOCUMENTATION (OUTPATIENT)
Dept: PHARMACY | Facility: HOSPITAL | Age: 67
End: 2022-10-14

## 2022-10-14 ENCOUNTER — SPECIALTY PHARMACY (OUTPATIENT)
Dept: PHARMACY | Facility: HOSPITAL | Age: 67
End: 2022-10-14

## 2022-10-14 VITALS
RESPIRATION RATE: 18 BRPM | OXYGEN SATURATION: 97 % | TEMPERATURE: 97.1 F | HEART RATE: 88 BPM | DIASTOLIC BLOOD PRESSURE: 80 MMHG | SYSTOLIC BLOOD PRESSURE: 128 MMHG

## 2022-10-14 LAB
ALBUMIN SERPL ELPH-MCNC: 3.5 G/DL (ref 2.9–4.4)
ALBUMIN/GLOB SERPL: 1.6 {RATIO} (ref 0.7–1.7)
ALPHA1 GLOB SERPL ELPH-MCNC: 0.2 G/DL (ref 0–0.4)
ALPHA2 GLOB SERPL ELPH-MCNC: 0.8 G/DL (ref 0.4–1)
B-GLOBULIN SERPL ELPH-MCNC: 0.8 G/DL (ref 0.7–1.3)
GAMMA GLOB SERPL ELPH-MCNC: 0.4 G/DL (ref 0.4–1.8)
GLOBULIN SER-MCNC: 2.3 G/DL (ref 2.2–3.9)
IGA SERPL-MCNC: 44 MG/DL (ref 61–437)
IGG SERPL-MCNC: 431 MG/DL (ref 603–1613)
IGM SERPL-MCNC: 11 MG/DL (ref 20–172)
INTERPRETATION SERPL IEP-IMP: ABNORMAL
KAPPA LC FREE SER-MCNC: 32.6 MG/L (ref 3.3–19.4)
KAPPA LC FREE/LAMBDA FREE SER: 1.34 {RATIO} (ref 0.26–1.65)
LABORATORY COMMENT REPORT: ABNORMAL
LAMBDA LC FREE SERPL-MCNC: 24.3 MG/L (ref 5.7–26.3)
M PROTEIN SERPL ELPH-MCNC: ABNORMAL G/DL
PROT SERPL-MCNC: 5.8 G/DL (ref 6–8.5)

## 2022-10-14 PROCEDURE — G0299 HHS/HOSPICE OF RN EA 15 MIN: HCPCS

## 2022-10-14 PROCEDURE — G0151 HHCP-SERV OF PT,EA 15 MIN: HCPCS

## 2022-10-14 NOTE — PROGRESS NOTES
The following staff message was forwarded to my attention:    ----- Message -----   From: Red Del Valle MD   Sent: 10/13/2022   9:33 PM EDT   To: [facility] Oral Onc Pool     Patient is transferring his care back to our service. He was taking Pomalyst 4 mg daily for 21 out of a 28 day cycle. Due to neutropenia, I recommended reducing the dose to 3 mg daily for 21 out of a 28 day cycle starting on 10/31/2022.     FYI:   He has 4 mg supply. I asked him to take the 4 mg dose every other day (on 10/13, 10/15, 10/17, 10/19, 10/21 and 10/23/2022).     Thank you        I have emailed Anne Westfall with [facility] and asked that she keep me updated on the status of the prescription.     Svetlana Zayas - Care Coordinator   10/14/2022  10:17 EDT

## 2022-10-14 NOTE — PROGRESS NOTES
Specialty Note    Red Del Valle MD  P [facility] Oral Onc Pool  Patient is transferring his care back to our service. He was taking Pomalyst 4 mg daily for 21 out of a 28 day cycle. Due to neutropenia, I recommended reducing the dose to 3 mg daily for 21 out of a 28 day cycle starting on 10/31/2022.     FYI:   He has 4 mg supply. I asked him to take the 4 mg dose every other day (on 10/13, 10/15, 10/17, 10/19, 10/21 and 10/23/2022).     Thank you       Call placed to [employer] and they are working to cancel [employer] auth under another provider and will call [facility] back.        Jose from the call center called [facility] office back and had canceled the auth # under previous provider and gave our office a new auth number under Dr Del Valle.  New rx for Pomalyst 3 mg to begin 10/31 has been escribed to [facility].

## 2022-10-15 NOTE — HOME HEALTH
Patient sitting at kitchen table upon arrival. Reported having a full day of doctor's appointments yesterday. No falls reported.     ASSESSMENT: Need to continue working to improve balance and activity tolerance    PLAN FOR NEXT VISIT:  --Continue therapeutic and balance exercises  --Continue gait training

## 2022-10-17 ENCOUNTER — LAB (OUTPATIENT)
Dept: LAB | Facility: HOSPITAL | Age: 67
End: 2022-10-17

## 2022-10-17 ENCOUNTER — INFUSION (OUTPATIENT)
Dept: ONCOLOGY | Facility: HOSPITAL | Age: 67
End: 2022-10-17

## 2022-10-17 DIAGNOSIS — D63.1 ANEMIA IN STAGE 3B CHRONIC KIDNEY DISEASE: ICD-10-CM

## 2022-10-17 DIAGNOSIS — D63.1 ANEMIA IN STAGE 3B CHRONIC KIDNEY DISEASE: Primary | ICD-10-CM

## 2022-10-17 DIAGNOSIS — D64.9 ANEMIA, UNSPECIFIED TYPE: ICD-10-CM

## 2022-10-17 DIAGNOSIS — N18.32 ANEMIA IN STAGE 3B CHRONIC KIDNEY DISEASE: Primary | ICD-10-CM

## 2022-10-17 DIAGNOSIS — C90.00 MULTIPLE MYELOMA, REMISSION STATUS UNSPECIFIED: ICD-10-CM

## 2022-10-17 DIAGNOSIS — N18.32 ANEMIA IN STAGE 3B CHRONIC KIDNEY DISEASE: ICD-10-CM

## 2022-10-17 LAB
BASOPHILS # BLD AUTO: 0.09 10*3/MM3 (ref 0–0.2)
BASOPHILS NFR BLD AUTO: 2.2 % (ref 0–1.5)
DEPRECATED RDW RBC AUTO: 64 FL (ref 37–54)
EOSINOPHIL # BLD AUTO: 0.17 10*3/MM3 (ref 0–0.4)
EOSINOPHIL NFR BLD AUTO: 4.1 % (ref 0.3–6.2)
ERYTHROCYTE [DISTWIDTH] IN BLOOD BY AUTOMATED COUNT: 18.7 % (ref 12.3–15.4)
HCT VFR BLD AUTO: 30.1 % (ref 37.5–51)
HGB BLD-MCNC: 9.9 G/DL (ref 13–17.7)
IMM GRANULOCYTES # BLD AUTO: 0.04 10*3/MM3 (ref 0–0.05)
IMM GRANULOCYTES NFR BLD AUTO: 1 % (ref 0–0.5)
LYMPHOCYTES # BLD AUTO: 0.7 10*3/MM3 (ref 0.7–3.1)
LYMPHOCYTES NFR BLD AUTO: 17 % (ref 19.6–45.3)
MCH RBC QN AUTO: 30.5 PG (ref 26.6–33)
MCHC RBC AUTO-ENTMCNC: 32.9 G/DL (ref 31.5–35.7)
MCV RBC AUTO: 92.6 FL (ref 79–97)
MONOCYTES # BLD AUTO: 0.35 10*3/MM3 (ref 0.1–0.9)
MONOCYTES NFR BLD AUTO: 8.5 % (ref 5–12)
NEUTROPHILS NFR BLD AUTO: 2.76 10*3/MM3 (ref 1.7–7)
NEUTROPHILS NFR BLD AUTO: 67.2 % (ref 42.7–76)
NRBC BLD AUTO-RTO: 0 /100 WBC (ref 0–0.2)
PLATELET # BLD AUTO: 196 10*3/MM3 (ref 140–450)
PMV BLD AUTO: 10.4 FL (ref 6–12)
RBC # BLD AUTO: 3.25 10*6/MM3 (ref 4.14–5.8)
WBC NRBC COR # BLD: 4.11 10*3/MM3 (ref 3.4–10.8)

## 2022-10-17 PROCEDURE — 85025 COMPLETE CBC W/AUTO DIFF WBC: CPT

## 2022-10-17 PROCEDURE — 25010000002 EPOETIN ALFA PER 1000 UNITS: Performed by: INTERNAL MEDICINE

## 2022-10-17 PROCEDURE — 96372 THER/PROPH/DIAG INJ SC/IM: CPT

## 2022-10-17 PROCEDURE — 36415 COLL VENOUS BLD VENIPUNCTURE: CPT

## 2022-10-17 RX ORDER — DIPHENOXYLATE HYDROCHLORIDE AND ATROPINE SULFATE 2.5; .025 MG/1; MG/1
2 TABLET ORAL 4 TIMES DAILY PRN
Qty: 120 TABLET | Refills: 0 | Status: SHIPPED | OUTPATIENT
Start: 2022-10-17

## 2022-10-17 RX ORDER — ACYCLOVIR 800 MG/1
800 TABLET ORAL DAILY
Qty: 90 TABLET | Refills: 2 | Status: SHIPPED | OUTPATIENT
Start: 2022-10-17

## 2022-10-17 RX ADMIN — ERYTHROPOIETIN 60000 UNITS: 40000 INJECTION, SOLUTION INTRAVENOUS; SUBCUTANEOUS at 14:07

## 2022-10-17 NOTE — HOME HEALTH
PATIENT IS DOING WELL TODAY. SN T/I PT TO TAKE ALL MEDS AS ORDERED TO KEEP SYMPTOMS FROM CHEMO CONTROLLED. OT VERBALIZED UNDERSTANDING    PLANS FOR NEXT VISIT: FALLS PREVENTION EDUCATION

## 2022-10-18 ENCOUNTER — HOME CARE VISIT (OUTPATIENT)
Dept: HOME HEALTH SERVICES | Facility: HOME HEALTHCARE | Age: 67
End: 2022-10-18

## 2022-10-18 ENCOUNTER — SPECIALTY PHARMACY (OUTPATIENT)
Dept: PHARMACY | Facility: HOSPITAL | Age: 67
End: 2022-10-18

## 2022-10-18 VITALS
HEART RATE: 77 BPM | OXYGEN SATURATION: 98 % | SYSTOLIC BLOOD PRESSURE: 128 MMHG | RESPIRATION RATE: 18 BRPM | DIASTOLIC BLOOD PRESSURE: 68 MMHG | TEMPERATURE: 97.9 F

## 2022-10-18 VITALS
OXYGEN SATURATION: 98 % | DIASTOLIC BLOOD PRESSURE: 82 MMHG | HEART RATE: 78 BPM | SYSTOLIC BLOOD PRESSURE: 128 MMHG | TEMPERATURE: 97.8 F | RESPIRATION RATE: 18 BRPM

## 2022-10-18 PROCEDURE — G0151 HHCP-SERV OF PT,EA 15 MIN: HCPCS

## 2022-10-18 NOTE — HOME HEALTH
Patient sitting on couch upon arrival. Reported feeling good today but having a rough day yesterday with diarrhea.     ASSESSMENT: Patient's balance more impaired this date due to complaints of weakness from having diarrhea yesterday. Will reassess next visit, possible request more visits for safety    PLAN FOR NEXT VISIT:  --Continue therapeutic exercises to improve muscle weakness  --Continue balance training  --Continue gait training

## 2022-10-19 ENCOUNTER — SPECIALTY PHARMACY (OUTPATIENT)
Dept: ONCOLOGY | Facility: CLINIC | Age: 67
End: 2022-10-19

## 2022-10-19 ENCOUNTER — HOME CARE VISIT (OUTPATIENT)
Dept: HOME HEALTH SERVICES | Facility: HOME HEALTHCARE | Age: 67
End: 2022-10-19

## 2022-10-19 PROCEDURE — G0299 HHS/HOSPICE OF RN EA 15 MIN: HCPCS

## 2022-10-19 NOTE — PROGRESS NOTES
Filling in for Svetlana HIGGINS, Care Coordinator. She has a task for today to check with [facility] on pts Pomalyst rx. This should be $0 copay for pt and he needs prior to 10/31/22.    I contacted Anne Westfall at [facility] pharmacy and she confirmed pts copay is $0 and Pomalyst is to be delivered to pts home address on 10/20/22.     Reny Feng  Specialty Pharmacy Technician

## 2022-10-20 ENCOUNTER — SPECIALTY PHARMACY (OUTPATIENT)
Dept: PHARMACY | Facility: HOSPITAL | Age: 67
End: 2022-10-20

## 2022-10-21 ENCOUNTER — HOME CARE VISIT (OUTPATIENT)
Dept: HOME HEALTH SERVICES | Facility: HOME HEALTHCARE | Age: 67
End: 2022-10-21

## 2022-10-21 VITALS
RESPIRATION RATE: 18 BRPM | DIASTOLIC BLOOD PRESSURE: 76 MMHG | OXYGEN SATURATION: 94 % | HEART RATE: 79 BPM | SYSTOLIC BLOOD PRESSURE: 130 MMHG | TEMPERATURE: 97.8 F

## 2022-10-21 PROCEDURE — G0151 HHCP-SERV OF PT,EA 15 MIN: HCPCS

## 2022-10-21 NOTE — HOME HEALTH
PATIENT STATED THAT HIS DIARRHEA HAS IMPROVED THIS WEEK BECAUSE HE HAS BEEN TAKING ANTIDIARRHEALS AS ORDERED BY ONCOLOGIST. PATIENT STATED THAT HIS ONCOLOGIST ALSO STATED THAT HIS CANCER SEEMS TO BE CONTROLLED WITH HIS TREATMENTS AND THEY WILL BE DECREASING HIS MEDICATIONS THAT ARE USED AS TREATMENTS. SN EDUCATED PT ON DIABETES. HE RECEVIED HIS DEXCOM THIS WEEK AND IS UTILIZING ALL OF ITS CAPABILITIES SUCH AS DIET. SN EDUCATED PT ON DIABETIC FOOT CARE AND KEEPING FEET CLEAN, DRY, AND FREE FROM CRACKS AND DRY FRAGILE SKIN. PATIENT VERBALIZED UNDERSTANDING    PLANS FOR NEXT VISIT: DM EDUCATION

## 2022-10-22 VITALS
RESPIRATION RATE: 18 BRPM | SYSTOLIC BLOOD PRESSURE: 126 MMHG | DIASTOLIC BLOOD PRESSURE: 80 MMHG | HEART RATE: 76 BPM | TEMPERATURE: 97.7 F | OXYGEN SATURATION: 99 %

## 2022-10-23 NOTE — HOME HEALTH
Patient up answering door upon arrival. Reported taking his pill that makes him off balance and his neuropathy worse. No falls reported, but complaints of feeling stiff in legs.     ASSESSMENT: Patient at increased risk for falls this date. Patient retropulsed in standing this date requiring assist by this therapist to regain balance. Skilled physical therapy still indicated. WIll obtain verbal order for more visits.    PLAN FOR NEXT VISIT:  --Continue balance training  --Continue therapeutic exercises  --Continue gait training

## 2022-10-24 ENCOUNTER — LAB (OUTPATIENT)
Dept: LAB | Facility: HOSPITAL | Age: 67
End: 2022-10-24

## 2022-10-24 ENCOUNTER — SPECIALTY PHARMACY (OUTPATIENT)
Dept: ONCOLOGY | Facility: HOSPITAL | Age: 67
End: 2022-10-24

## 2022-10-24 ENCOUNTER — INFUSION (OUTPATIENT)
Dept: ONCOLOGY | Facility: HOSPITAL | Age: 67
End: 2022-10-24

## 2022-10-24 ENCOUNTER — APPOINTMENT (OUTPATIENT)
Dept: ONCOLOGY | Facility: HOSPITAL | Age: 67
End: 2022-10-24

## 2022-10-24 ENCOUNTER — OFFICE VISIT (OUTPATIENT)
Dept: ONCOLOGY | Facility: CLINIC | Age: 67
End: 2022-10-24

## 2022-10-24 ENCOUNTER — APPOINTMENT (OUTPATIENT)
Dept: LAB | Facility: HOSPITAL | Age: 67
End: 2022-10-24

## 2022-10-24 ENCOUNTER — TELEPHONE (OUTPATIENT)
Dept: ONCOLOGY | Facility: CLINIC | Age: 67
End: 2022-10-24

## 2022-10-24 VITALS
TEMPERATURE: 97.5 F | HEART RATE: 79 BPM | WEIGHT: 310.5 LBS | RESPIRATION RATE: 20 BRPM | SYSTOLIC BLOOD PRESSURE: 128 MMHG | DIASTOLIC BLOOD PRESSURE: 75 MMHG | OXYGEN SATURATION: 94 % | HEIGHT: 75 IN | BODY MASS INDEX: 38.61 KG/M2

## 2022-10-24 VITALS — BODY MASS INDEX: 39.05 KG/M2 | WEIGHT: 310.8 LBS

## 2022-10-24 DIAGNOSIS — C90.00 MULTIPLE MYELOMA, REMISSION STATUS UNSPECIFIED: ICD-10-CM

## 2022-10-24 DIAGNOSIS — D64.9 ANEMIA, UNSPECIFIED TYPE: ICD-10-CM

## 2022-10-24 DIAGNOSIS — G62.0 NEUROPATHY DUE TO CHEMOTHERAPEUTIC DRUG: ICD-10-CM

## 2022-10-24 DIAGNOSIS — E83.51 HYPOCALCEMIA: ICD-10-CM

## 2022-10-24 DIAGNOSIS — D84.9 IMMUNOCOMPROMISED STATE: ICD-10-CM

## 2022-10-24 DIAGNOSIS — C90.00 MULTIPLE MYELOMA NOT HAVING ACHIEVED REMISSION: Primary | ICD-10-CM

## 2022-10-24 DIAGNOSIS — E83.42 HYPOMAGNESEMIA: ICD-10-CM

## 2022-10-24 DIAGNOSIS — K52.9 CHRONIC DIARRHEA: ICD-10-CM

## 2022-10-24 DIAGNOSIS — D63.1 ANEMIA IN STAGE 3B CHRONIC KIDNEY DISEASE: ICD-10-CM

## 2022-10-24 DIAGNOSIS — N18.32 ANEMIA IN STAGE 3B CHRONIC KIDNEY DISEASE: Primary | ICD-10-CM

## 2022-10-24 DIAGNOSIS — D69.6 THROMBOCYTOPENIA: ICD-10-CM

## 2022-10-24 DIAGNOSIS — D63.1 ANEMIA IN STAGE 3B CHRONIC KIDNEY DISEASE: Primary | ICD-10-CM

## 2022-10-24 DIAGNOSIS — T45.1X5A NEUROPATHY DUE TO CHEMOTHERAPEUTIC DRUG: ICD-10-CM

## 2022-10-24 DIAGNOSIS — N18.32 ANEMIA IN STAGE 3B CHRONIC KIDNEY DISEASE: ICD-10-CM

## 2022-10-24 LAB
ALBUMIN SERPL-MCNC: 3.8 G/DL (ref 3.5–5.2)
ALBUMIN/GLOB SERPL: 1.9 G/DL (ref 1.1–2.4)
ALP SERPL-CCNC: 44 U/L (ref 38–116)
ALT SERPL W P-5'-P-CCNC: 7 U/L (ref 0–41)
ANION GAP SERPL CALCULATED.3IONS-SCNC: 11.1 MMOL/L (ref 5–15)
AST SERPL-CCNC: 9 U/L (ref 0–40)
BASOPHILS # BLD AUTO: 0.04 10*3/MM3 (ref 0–0.2)
BASOPHILS NFR BLD AUTO: 1.2 % (ref 0–1.5)
BILIRUB SERPL-MCNC: 0.5 MG/DL (ref 0.2–1.2)
BUN SERPL-MCNC: 38 MG/DL (ref 6–20)
BUN/CREAT SERPL: 11.8 (ref 7.3–30)
CALCIUM SPEC-SCNC: 7.7 MG/DL (ref 8.5–10.2)
CHLORIDE SERPL-SCNC: 107 MMOL/L (ref 98–107)
CO2 SERPL-SCNC: 26.9 MMOL/L (ref 22–29)
CREAT SERPL-MCNC: 3.23 MG/DL (ref 0.7–1.3)
DEPRECATED RDW RBC AUTO: 65.9 FL (ref 37–54)
EGFRCR SERPLBLD CKD-EPI 2021: 20.5 ML/MIN/1.73
EOSINOPHIL # BLD AUTO: 0.37 10*3/MM3 (ref 0–0.4)
EOSINOPHIL NFR BLD AUTO: 10.7 % (ref 0.3–6.2)
ERYTHROCYTE [DISTWIDTH] IN BLOOD BY AUTOMATED COUNT: 18.4 % (ref 12.3–15.4)
GLOBULIN UR ELPH-MCNC: 2 GM/DL (ref 1.8–3.5)
GLUCOSE SERPL-MCNC: 92 MG/DL (ref 74–124)
HCT VFR BLD AUTO: 30.9 % (ref 37.5–51)
HGB BLD-MCNC: 9.2 G/DL (ref 13–17.7)
IMM GRANULOCYTES # BLD AUTO: 0.01 10*3/MM3 (ref 0–0.05)
IMM GRANULOCYTES NFR BLD AUTO: 0.3 % (ref 0–0.5)
LYMPHOCYTES # BLD AUTO: 0.66 10*3/MM3 (ref 0.7–3.1)
LYMPHOCYTES NFR BLD AUTO: 19.1 % (ref 19.6–45.3)
MAGNESIUM SERPL-MCNC: 1.1 MG/DL (ref 1.8–2.5)
MCH RBC QN AUTO: 29.1 PG (ref 26.6–33)
MCHC RBC AUTO-ENTMCNC: 29.8 G/DL (ref 31.5–35.7)
MCV RBC AUTO: 97.8 FL (ref 79–97)
MONOCYTES # BLD AUTO: 0.54 10*3/MM3 (ref 0.1–0.9)
MONOCYTES NFR BLD AUTO: 15.7 % (ref 5–12)
NEUTROPHILS NFR BLD AUTO: 1.83 10*3/MM3 (ref 1.7–7)
NEUTROPHILS NFR BLD AUTO: 53 % (ref 42.7–76)
NRBC BLD AUTO-RTO: 0 /100 WBC (ref 0–0.2)
PHOSPHATE SERPL-MCNC: 2.4 MG/DL (ref 2.5–4.5)
PLATELET # BLD AUTO: 144 10*3/MM3 (ref 140–450)
PMV BLD AUTO: 8.6 FL (ref 6–12)
POTASSIUM SERPL-SCNC: 3.8 MMOL/L (ref 3.5–4.7)
PROT SERPL-MCNC: 5.8 G/DL (ref 6.3–8)
RBC # BLD AUTO: 3.16 10*6/MM3 (ref 4.14–5.8)
SODIUM SERPL-SCNC: 145 MMOL/L (ref 134–145)
WBC NRBC COR # BLD: 3.45 10*3/MM3 (ref 3.4–10.8)

## 2022-10-24 PROCEDURE — 99215 OFFICE O/P EST HI 40 MIN: CPT | Performed by: INTERNAL MEDICINE

## 2022-10-24 PROCEDURE — 84100 ASSAY OF PHOSPHORUS: CPT

## 2022-10-24 PROCEDURE — 25010000002 EPOETIN ALFA PER 1000 UNITS: Performed by: INTERNAL MEDICINE

## 2022-10-24 PROCEDURE — 80053 COMPREHEN METABOLIC PANEL: CPT

## 2022-10-24 PROCEDURE — 96372 THER/PROPH/DIAG INJ SC/IM: CPT

## 2022-10-24 PROCEDURE — 85025 COMPLETE CBC W/AUTO DIFF WBC: CPT

## 2022-10-24 PROCEDURE — 36415 COLL VENOUS BLD VENIPUNCTURE: CPT

## 2022-10-24 PROCEDURE — 83735 ASSAY OF MAGNESIUM: CPT

## 2022-10-24 PROCEDURE — G0180 MD CERTIFICATION HHA PATIENT: HCPCS | Performed by: NURSE PRACTITIONER

## 2022-10-24 RX ORDER — ANTACID TABLETS 500 MG/1
600 TABLET, CHEWABLE ORAL 2 TIMES DAILY
Qty: 60 EACH | Refills: 2 | Status: SHIPPED | OUTPATIENT
Start: 2022-10-24 | End: 2022-10-26 | Stop reason: SDUPTHER

## 2022-10-24 RX ORDER — LOPERAMIDE HYDROCHLORIDE 2 MG/1
2 CAPSULE ORAL 4 TIMES DAILY PRN
COMMUNITY

## 2022-10-24 RX ORDER — DEXAMETHASONE 4 MG/1
TABLET ORAL
Qty: 15 TABLET | Refills: 1 | Status: SHIPPED | OUTPATIENT
Start: 2022-10-24

## 2022-10-24 RX ORDER — SODIUM CHLORIDE 9 MG/ML
250 INJECTION, SOLUTION INTRAVENOUS ONCE
Status: CANCELLED | OUTPATIENT
Start: 2022-10-31

## 2022-10-24 RX ORDER — METHYLPREDNISOLONE SODIUM SUCCINATE 125 MG/2ML
100 INJECTION, POWDER, LYOPHILIZED, FOR SOLUTION INTRAMUSCULAR; INTRAVENOUS ONCE
Status: CANCELLED | OUTPATIENT
Start: 2022-10-31

## 2022-10-24 RX ORDER — FAMOTIDINE 10 MG/ML
20 INJECTION, SOLUTION INTRAVENOUS AS NEEDED
Status: CANCELLED | OUTPATIENT
Start: 2022-10-31

## 2022-10-24 RX ORDER — ACETAMINOPHEN 500 MG
1000 TABLET ORAL ONCE
Status: CANCELLED | OUTPATIENT
Start: 2022-10-31

## 2022-10-24 RX ORDER — MONTELUKAST SODIUM 10 MG/1
10 TABLET ORAL ONCE
Status: CANCELLED | OUTPATIENT
Start: 2022-10-31

## 2022-10-24 RX ORDER — MEPERIDINE HYDROCHLORIDE 25 MG/ML
25 INJECTION INTRAMUSCULAR; INTRAVENOUS; SUBCUTANEOUS
Status: CANCELLED | OUTPATIENT
Start: 2022-10-31

## 2022-10-24 RX ORDER — DIPHENHYDRAMINE HYDROCHLORIDE 50 MG/ML
50 INJECTION INTRAMUSCULAR; INTRAVENOUS AS NEEDED
Status: CANCELLED | OUTPATIENT
Start: 2022-10-31

## 2022-10-24 RX ORDER — MAGNESIUM OXIDE 400 MG/1
400 TABLET ORAL 2 TIMES DAILY
Qty: 60 TABLET | Refills: 2 | Status: SHIPPED | OUTPATIENT
Start: 2022-10-24 | End: 2022-10-26 | Stop reason: SDUPTHER

## 2022-10-24 RX ORDER — FAMOTIDINE 10 MG/ML
20 INJECTION, SOLUTION INTRAVENOUS ONCE
Status: CANCELLED | OUTPATIENT
Start: 2022-10-31

## 2022-10-24 RX ADMIN — ERYTHROPOIETIN 60000 UNITS: 40000 INJECTION, SOLUTION INTRAVENOUS; SUBCUTANEOUS at 09:26

## 2022-10-24 NOTE — PROGRESS NOTES
Subjective     CHIEF COMPLAINT:      Chief Complaint   Patient presents with   • Follow-up       HISTORY OF PRESENT ILLNESS:     Contreras Albert is a 67 y.o. male patient who returns today for follow up on his multiple myeloma.  He returns today for follow-up reporting that his diarrhea is overall better than when he was here 2 weeks ago.  He restarted Pomalyst 4 mg every other day as instructed.  He is taking Imodium and Lomotil as needed.    Patient reports that his neuropathy is currently stable.  He feels that the current dose of gabapentin is controlling the symptoms and he is not having problem with side effects.    ROS:  Pertinent ROS is in the HPI.     Past medical, surgical, social and family history were reviewed.     MEDICATIONS:    Current Outpatient Medications:   •  acetaminophen (TYLENOL) 325 MG tablet, Take 2 tablets by mouth Every 6 (Six) Hours As Needed for Mild Pain., Disp: , Rfl:   •  acyclovir (ZOVIRAX) 800 MG tablet, Take 1 tablet by mouth Daily., Disp: 90 tablet, Rfl: 2  •  ASPIRIN 81 PO, Take 81 mg by mouth Daily., Disp: , Rfl:   •  Blood Glucose Monitoring Suppl (FREESTYLE FREEDOM LITE) w/Device kit, 1 kit Daily. Indications: Type 2 Diabetes, Disp: 1 each, Rfl: 0  •  bumetanide (BUMEX) 2 MG tablet, Take 0.5 tablets by mouth 2 (Two) Times a Day., Disp: , Rfl:   •  carvedilol (COREG) 12.5 MG tablet, Take 12.5 mg by mouth Daily., Disp: , Rfl:   •  Continuous Blood Gluc Sensor (Dexcom G6 Sensor), Replace sensor every 10 days.  Dx: E 11.22, Disp: 9 each, Rfl: 1  •  Continuous Blood Gluc Transmit (Dexcom G6 Transmitter) misc, 1 each Every 3 (Three) Months. Dx: E 11.22, Disp: 1 each, Rfl: 1  •  dexamethasone (DECADRON) 4 MG tablet, Take 4 mg by mouth See Admin Instructions. Monthly with chemo infusion, Disp: , Rfl:   •  diphenoxylate-atropine (LOMOTIL) 2.5-0.025 MG per tablet, Take 2 tablets by mouth 4 (Four) Times a Day As Needed for Diarrhea., Disp: 120 tablet, Rfl: 0  •  escitalopram (LEXAPRO) 5 MG  tablet, Take 1 tablet by mouth Daily., Disp: 30 tablet, Rfl: 5  •  finasteride (PROSCAR) 5 MG tablet, Take 5 mg by mouth daily., Disp: , Rfl:   •  gabapentin (NEURONTIN) 300 MG capsule, Take 1 capsule in AM and take 2 capsules at bedtime., Disp: 90 capsule, Rfl: 5  •  glucagon (GLUCAGEN) 1 MG injection, Inject 1 mg into the appropriate muscle as directed by prescriber 1 (One) Time As Needed for Low Blood Sugar for up to 1 dose. Follow package directions for low blood sugar., Disp: 1 kit, Rfl: 3  •  glucose blood (FREESTYLE LITE) test strip, Use to test blood sugar 4-5 times daily., Disp: 200 each, Rfl: 5  •  insulin aspart (NovoLOG FlexPen) 100 UNIT/ML solution pen-injector sc pen, Inject 8 Units under the skin into the appropriate area as directed 3 (Three) Times a Day With Meals. Pt reports he takes a SSI type dosing 4 times a day, Disp: , Rfl:   •  Insulin Glargine (BASAGLAR KWIKPEN) 100 UNIT/ML injection pen, , Disp: 60 mL, Rfl: 1  •  Insulin Pen Needle 32G X 4 MM misc, USING 4 PEN NEEDLES DAILY, Disp: 400 each, Rfl: 3  •  Lancets (FREESTYLE) lancets, Use once then discard to test blood sugar each morning, Disp: 100 each, Rfl: 0  •  loperamide (IMODIUM) 2 MG capsule, , Disp: , Rfl:   •  ondansetron (ZOFRAN) 8 MG tablet, Take 1 tablet by mouth 3 (Three) Times a Day As Needed for Nausea or Vomiting., Disp: 30 tablet, Rfl: 5  •  pantoprazole (Protonix) 40 MG EC tablet, Take 1 tablet by mouth Daily., Disp: 30 tablet, Rfl: 1  •  pomalidomide (Pomalyst) 3 MG chemo capsule, Take 1 capsule by mouth Daily. Take for 21 days on, then 7 days off, Disp: 21 capsule, Rfl: 0  •  sodium bicarbonate 650 MG tablet, Take 2 tablets by mouth 4 (Four) Times a Day for 30 days., Disp: 240 tablet, Rfl: 0  •  tamsulosin (FLOMAX) 0.4 MG capsule 24 hr capsule, Take 1 capsule by mouth Daily., Disp: , Rfl:   •  vitamin D (ERGOCALCIFEROL) 1.25 MG (65822 UT) capsule capsule, Take 50,000 Units by mouth 1 (One) Time Per Week. Mondays, Disp: ,  Rfl:   Objective     VITAL SIGNS:     There were no vitals filed for this visit.  There is no height or weight on file to calculate BMI.     Wt Readings from Last 5 Encounters:   10/13/22 (!) 136 kg (300 lb 6.4 oz)   10/06/22 (!) 140 kg (307 lb 9.6 oz)   09/29/22 136 kg (300 lb)   09/26/22 (!) 138 kg (305 lb 4 oz)   07/06/22 (!) 139 kg (306 lb)     PHYSICAL EXAMINATION:   GENERAL: The patient appears in fair general condition, not in acute distress.   SKIN: No ecchymosis.  EYES: No jaundice. No pallor.  LYMPHATICS: No cervical lymphadenopathy.  CHEST: Normal respiratory effort.  Lungs clear bilaterally.  No added sounds.  CVS: Normal S1-S2.  No murmurs.  ABDOMEN: Soft. No tenderness. No Hepatomegaly. No Splenomegaly. No masses.  EXTREMITIES: Bilateral edema.  Mild erythema of the distal aspect of the legs, stable.  No calf tenderness.    DIAGNOSTIC DATA:     Results from last 7 days   Lab Units 10/24/22  0830 10/17/22  1332   WBC 10*3/mm3 3.45 4.11   NEUTROS ABS 10*3/mm3 1.83 2.76   HEMOGLOBIN g/dL 9.2* 9.9*   HEMATOCRIT % 30.9* 30.1*   PLATELETS 10*3/mm3 144 196     Results from last 7 days   Lab Units 10/24/22  0830   SODIUM mmol/L 145   POTASSIUM mmol/L 3.8   CHLORIDE mmol/L 107   CO2 mmol/L 26.9   BUN mg/dL 38*   CREATININE mg/dL 3.23*   CALCIUM mg/dL 7.7*   ALBUMIN g/dL 3.80   BILIRUBIN mg/dL 0.5   ALK PHOS U/L 44   ALT (SGPT) U/L 7   AST (SGOT) U/L 9   GLUCOSE mg/dL 92   MAGNESIUM mg/dL 1.1*     Component      Latest Ref Rng & Units 10/13/2022             IgG      603 - 1613 mg/dL 431 (L)   IgA      61 - 437 mg/dL 44 (L)   IgM      20 - 172 mg/dL 11 (L)   Total Protein      6.0 - 8.5 g/dL 5.8 (L)   Albumin      2.9 - 4.4 g/dL 3.5   Alpha-1-Globulin      0.0 - 0.4 g/dL 0.2   Alpha-2-Globulin      0.4 - 1.0 g/dL 0.8   Beta Globulin      0.7 - 1.3 g/dL 0.8   Gamma Globulin      0.4 - 1.8 g/dL 0.4   M-Jurgen      Not Observed g/dL Comment:   Globulin      2.2 - 3.9 g/dL 2.3   A/G Ratio      0.7 - 1.7 1.6    Immunofixation Reflex, Serum       Comment (A)   Please note       Comment   Kappa FLC      3.3 - 19.4 mg/L 32.6 (H)   Free Lambda Light Chains      5.7 - 26.3 mg/L 24.3   Kappa/Lambda Ratio      0.26 - 1.65 1.34       Assessment & Plan    *IgG lambda multiple myeloma.  · Bone marrow 5/23/2020 hypercellular marrow with 80% involvement of plasma cell myeloma; FISH studies pending  · Bone survey 5/26/2020: Diffuse osteoporosis and demineralization.  However, no discrete lytic lesions.  · SPEP 5/23/2020: M spike 5.1.  IgG 6629 lambda.  Light chain ratio 0 with free lambda light chains 6400.  Beta-2 microglobulin 16.3.  24-hour urine 12.7 g protein per 24-hour with monoclonal lambda free light chain 33.9%, monoclonal IgG lambda 23.5%  · 24-hour urine testing from 5/24/2020 free lambda light chains 8.4 g/L  · Considering renal failure, initiated CyBorD 5/28/20.  Given the significantly elevated light chains with the first cycle of treatment plan to give Velcade days 1, 4, 8, 11; Cytoxan 300 PO mg/m² weekly and dexamethasone 40 mg p.o. days 1, 2, 3, 4, 8 and 15.    · After cycle 1 treatment can likely be altered to weekly dosing of each drug.  · Patient was referred to Dr. Alvares at [facility].  · Patient is tolerating the treatment except for generalized bone achiness after receiving Velcade.  · On 6/18/2020, Velcade was changed to 1.5 mg/m² weekly continuously along with weekly Decadron and oral Cytoxan.  · M protein decreased to 2.4 and free light chain improved to 874 on 7/16/2020 indicating response to therapy.  · M protein decreased to 2.0 and free light chain decreased to 736 on 8/13/2020.   · Although the improvement indicates response, he continues to have significant amount of monoclonal protein despite being on treatment for 3 months.  · In addition, patient is having side effects from Velcade in the form of painful neuropathy.  · I discussed the case with Dr. Alvares and we recommended stopping  Velcade and Cytoxan and changing the treatment to Daratumumab subcutaneous injection weekly along with Revlimid 10 mg daily for 21 out of 28-day cycle and Decadron 40 mg daily.  · Patient was started on daratumumab SQ on 8/27/2020.   · S/p stem cell transplant on 12/9/2020 at [facility].  · Day 100 bone marrow on 3/16/2021 revealed minute  plasma cell population.  PET scan on 3/29/2021 was negative.  • Patient enrolled in the  clinical trial randomizing to maintenance lenalidomide versus lenalidomide and daratumumab.  Patient was randomized to the Revlimid arm initiated 4/13/2021 dosed at 5 mg daily (due to renal function).  Patient developed neutropenia requiring dose adjustment to 5 mg daily for 21/28 days.  Patient withdrew from clinical trial after 5 cycles on 8/31/2021 due to worsening diarrhea.    • He was switched to Velcade maintenance every other week 9/14/2021 which he discontinued 10/26/2021 due to neuropathy and lower extremity edema.    • He was subsequently changed to pomalidomide 2 mg daily 21/28 days on 11/8/2021.    • 1 year post transplant bone marrow biopsy showed 5 to 7% plasma cells and due to the residual disease he was changed to DPd (daratumumab, pomalidomide, dexamethasone).  This was initiated 2/21/2022.    • Patient was most recently seen by Dr. Romero on 9/12/2022.  At that time patient was cycle 8-day 7 DPd.  It appears that he had been receiving pomalidomide at a dose of 4 mg daily 21/28 days, dexamethasone at 20 mg weekly.  Due to chronic side effects, dexamethasone was changed from weekly to monthly.  Labs checked at that time with paraprotein studies however we do not have access to those results.    • The patient had been continuing on monthly Xgeva, reports most recent 4-week dose administered on 9/19/2022.  This is certainly a contributing factor to the patient's hypocalcemia and may need to be held in the future.  • Patient did continue pomalidomide on his  own after admission to the hospital, received 1 dose 4 mg p.o. on 9/24/2022 before was subsequently discontinued due to cytopenias.  Further pomalidomide on hold during hospitalization.  Dexamethasone is only administered once per month and is not currently due.  • Labs on 9/24/2022 with IgG 314, IgM less than 25, IgA less than 50, hypogammaglobulinemic related to his ongoing treatment.  • 9/25/2022 24-hour urine free light chains with free kappa 351, free lambda 104, ratio 3.37  • On 9/24/2022, IgG kappa M protein was 0.1 g.  IgG 311, IgA 43, IgM 7.   • Low level M spike of IgG kappa likely represents daratumumab.   • No IgG lambda M protein.   • Serum free light chains on 9/26/2022 with kappa 57.4, lambda 42.8, ratio normal at 1.34.  • He received Darzlex on 10/3/2022 at [facility]. He decided to transfer his care back to our office.   • On 10/13/2022, M protein was too small to quantitate. Kappa/Lambda ratio was 1.34.  • Next dose of Darzalex will be on 10/31/2022.  • Dexamethasone was changed to 12 mg on Mondays, 4 mg on Tuesday and 4 mg on Wednesday.  • Pomalyst was restarted at 4 mg every other day to be taken on 10/13/2022, 10/15/2022, 10/17/2022, 10/19/2022, 10/21/2022 and 10/23/2022.  • Patient tolerated his dose of Pomalyst.  • WBC and platelet counts from today are normal.  • Plan is to start Pomalyst at 3 mg daily for 21 out of 28-day cycle starting on 10/31/2022.    *Bone health.  · He is on Xgeva.   · Last dose was on 9/19/2022.  · Patient developed severe hypocalcemia following the Xgeva injection.    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  · Patient was receiving Procrit at [facility].  · Records from [facility] showed that the patient was receiving 70,000 units weekly and received the last dose on 9/27/2022.  · Patient was given Procrit 70,000 units on 9/27/2022.  · Hemoglobin was 9.9 on 10/17/2022.  He was given Procrit  60,000 units.  · Hemoglobin is 9.2 today.  · He reports fatigue.    *Thrombocytopenia.  · This is attributed to multiple myeloma, prior stem cell transplant and chemotherapy.  · Platelet count is 144,000 today.    *Neutropenia.  · This was attributed to Pomalyst.  · Neutrophil count was down to 290 on 9/25/2022.  · He was given Neupogen during his hospital stay in September 2022.  · Neutrophil count is 1830 today.    *Hypocalcemia.  · Patient developed severe hypocalcemia due to Xgeva.  · Calcium was 5.9 on 9/23/2022.  · Calcium improved to 8.1 on 9/28/2022  · Calcium improved to 9.6    · Calcium decreased to 7.7 today.  Albumin is 3.8.    *Hypomagnesemia.  · Magnesium is 1.1 today.    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  · He is on gabapentin 300 mg in the morning and 600 mg in the evening.  · Symptoms are under good control with the current regimen.    *Prophylaxis.  · He is on acyclovir 800 mg daily.  · He is on ASA 81 mg daily.  · No recent infections.  No problem with bruising.    *Chronic diarrhea.  · He is on Lomotil 2 tablets every 6 hours as needed.  · He also takes Imodium PRN.  · No improvement with Probiotic.  · Diarrhea improved after reducing the dose of Pomalyst.  · I recommended having lactose-free diet and monitoring for symptoms but he has not implemented those changes.    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  · S/p right partial nephrectomy on 5/18/2016.    PLAN:    1.  We will give Procrit today at 60,000 units.   2.  We will schedule the patient to receive Darzalex in 1 week.   3.  We will start in 1 week a new cycle of Pomalyst at 3 mg daily for 21 days of a 28-day cycle (start date 10/31/2022).  4.  Decadron will be 12 mg on day of Darzalex, 4 mg on day #2 and 4 mg on day #3.  5.  Continue Neurontin 300 mg in a.m. and 600 mg at bedtime.    6.  Start magnesium oxide 400 mg twice daily.  If he does not tolerate it due to the pre-existing diarrhea, we will  consider IV magnesium replacement.  7.  Continue acyclovir, aspirin and Protonix.    8.  Return weekly for CBC + Procrit.  9.  I will see him in follow-up in 5 weeks.  He will be scheduled to receive Darzalex.    10.  Due to the severe electrolyte abnormality, we will keep Xgeva on hold for now.    I spent 60 minutes caring for Contreras on this date of service. This time includes time spent by me in the following activities: preparing for the visit, reviewing tests, obtaining and/or reviewing a separately obtained history, performing a medically appropriate examination and/or evaluation, counseling and educating the patient/family/caregiver, ordering medications, tests, or procedures, documenting information in the medical record, independently interpreting results and communicating that information with the patient/family/caregiver and care coordination       Red Del Valle MD  10/24/22

## 2022-10-24 NOTE — PROGRESS NOTES
MTM Encounter-Re: Adherence and side effects (Pomalyst)    Today's encounter was conducted in person, face-to-face. Patient was introduced to MTM program today, and given our contact information.    Medication:  Pomalyst 3 mg po 21/28 days- starting 10/31/22 ( has been taking 4 mg po every other day due to diarrhea)  - Reason for outreach: Routine medication check-in .  - Administration: as prescribed .  - Missed doses: Patient reports missing 0 doses in the last 30 days.  - Self-administration: Patient demonstrates ability to self-administer medication. No barriers to adherence identified.   - Diagnosis/Indication: none. Progress toward achieving therapeutic goals reviewed.   - Patient diagnoses- multiple myeloma   - Medication availability/affordability: Patient has had no issues obtaining medication from pharmacy.   - Questions/concerns about medications: none       Completed medication reconciliation today to assess for drug interactions.   Reviewed allergies, medical history, labs, quality of life( diarrhea had been an issue on 4 mg dose, he is now taking it every other day and will drop to 3 mg po daily 21/28 on 10/31/22), and medication history with patient.   Patient denies starting or stopping any medications.  Assessed medication list for interactions, level c ddi with Gabapentin and Pomalyst as well as Lomotil and Pomalyst ( can increase CNS depression)- he has no reports of CNS depression.   Advised pt to call the clinic if any new medications are started so we can assess for drug-drug interactions.     All questions addressed. Patient had no additional concerns for MTM office.     Donna Cano RPH  10/24/2022  10:49 EDT

## 2022-10-24 NOTE — TELEPHONE ENCOUNTER
Reviewed Dr. Del Valle's note and instructions with pt, he v/u and requested the medications be sent to his pharmacy.

## 2022-10-24 NOTE — TELEPHONE ENCOUNTER
----- Message from Red Del Valle MD sent at 10/24/2022  9:37 AM EDT -----  Please inform the patient that labs today showed magnesium to be low at 1.1.  I recommend starting magnesium oxide 400 mg twice daily.  Please explain to the patient that this may result in worsening of the diarrhea.  If he cannot tolerate it, we can IV magnesium replacement.    Calcium level decreased to 7.7.  I recommend adding calcium carbonate 600 mg twice daily    Please add CMP + magnesium level to his weekly labs.    Thank you

## 2022-10-25 ENCOUNTER — SPECIALTY PHARMACY (OUTPATIENT)
Dept: PHARMACY | Facility: HOSPITAL | Age: 67
End: 2022-10-25

## 2022-10-25 ENCOUNTER — TELEPHONE (OUTPATIENT)
Dept: ONCOLOGY | Facility: CLINIC | Age: 67
End: 2022-10-25

## 2022-10-25 ENCOUNTER — HOME CARE VISIT (OUTPATIENT)
Dept: HOME HEALTH SERVICES | Facility: HOME HEALTHCARE | Age: 67
End: 2022-10-25

## 2022-10-25 VITALS
DIASTOLIC BLOOD PRESSURE: 90 MMHG | RESPIRATION RATE: 18 BRPM | OXYGEN SATURATION: 99 % | TEMPERATURE: 97.4 F | HEART RATE: 75 BPM | SYSTOLIC BLOOD PRESSURE: 140 MMHG

## 2022-10-25 LAB
ALBUMIN SERPL ELPH-MCNC: 3.3 G/DL (ref 2.9–4.4)
ALBUMIN/GLOB SERPL: 1.6 {RATIO} (ref 0.7–1.7)
ALPHA1 GLOB SERPL ELPH-MCNC: 0.2 G/DL (ref 0–0.4)
ALPHA2 GLOB SERPL ELPH-MCNC: 0.7 G/DL (ref 0.4–1)
B-GLOBULIN SERPL ELPH-MCNC: 0.8 G/DL (ref 0.7–1.3)
GAMMA GLOB SERPL ELPH-MCNC: 0.4 G/DL (ref 0.4–1.8)
GLOBULIN SER-MCNC: 2.2 G/DL (ref 2.2–3.9)
IGA SERPL-MCNC: 37 MG/DL (ref 61–437)
IGG SERPL-MCNC: 315 MG/DL (ref 603–1613)
IGM SERPL-MCNC: 7 MG/DL (ref 20–172)
INTERPRETATION SERPL IEP-IMP: ABNORMAL
KAPPA LC FREE SER-MCNC: 43.5 MG/L (ref 3.3–19.4)
KAPPA LC FREE/LAMBDA FREE SER: 1.49 {RATIO} (ref 0.26–1.65)
LABORATORY COMMENT REPORT: ABNORMAL
LAMBDA LC FREE SERPL-MCNC: 29.1 MG/L (ref 5.7–26.3)
M PROTEIN SERPL ELPH-MCNC: ABNORMAL G/DL
PROT SERPL-MCNC: 5.5 G/DL (ref 6–8.5)

## 2022-10-25 PROCEDURE — G0151 HHCP-SERV OF PT,EA 15 MIN: HCPCS

## 2022-10-25 NOTE — PROGRESS NOTES
Specialty Note ( DPd regimen)      Labs reviewed        10/24/2022   WBC 3.40 - 10.80 10*3/mm3 3.45   Neutrophils Absolute 1.70 - 7.00 10*3/mm3 1.83   Hemoglobin 13.0 - 17.7 g/dL 9.2 (A)   Hematocrit 37.5 - 51.0 % 30.9 (A)   Platelets 140 - 450 10*3/mm3 144   Creatinine 0.70 - 1.30 mg/dL 3.23 (A)   BUN 6 - 20 mg/dL 38 (A)   Sodium 134 - 145 mmol/L 145   Potassium 3.5 - 4.7 mmol/L 3.8   Glucose 74 - 124 mg/dL 92   Magnesium 1.8 - 2.5 mg/dL 1.1 (A)   Calcium 8.5 - 10.2 mg/dL 7.7 (A)   Albumin 3.50 - 5.20 g/dL 3.80   Total Protein 6.3 - 8.0 g/dL 5.8 (A)   AST (SGOT) 0 - 40 U/L 9   ALT (SGPT) 0 - 41 U/L 7   Alkaline Phosphatase 38 - 116 U/L 44   Total Bilirubin 0.2 - 1.2 mg/dL 0.5     Dr Del Valle's dictation is noted    We will schedule the patient to receive Darzalex in 1 week.   3.  We will start in 1 week a new cycle of Pomalyst at 3 mg daily for 21 days of a 28-day cycle (start date 10/31/2022).  4.  Decadron will be 12 mg on day of Darzalex, 4 mg on day #2 and 4 mg on day #3.  5.  Continue Neurontin 300 mg in a.m. and 600 mg at bedtime.    6.  Start magnesium oxide 400 mg twice daily.  If he does not tolerate it due to the pre-existing diarrhea, we will consider IV magnesium replacement.  7.  Continue acyclovir, aspirin and Protonix.

## 2022-10-25 NOTE — HOME HEALTH
Patient sitting on couch upon arrival. Reported doing pretty good, and his one med that causes diarrhea was cut down and he is doing better.     ASSESSMENT: Need to continue working to improve dynamic balance and activity tolerance. When fatigued, patient more at risk for falls due to retropulsing    PLAN FOR NEXT VISIT:  --Continue therapeutic exercises  --COntinue balance training  --Continue gait training

## 2022-10-26 RX ORDER — ANTACID TABLETS 500 MG/1
600 TABLET, CHEWABLE ORAL 2 TIMES DAILY
Qty: 60 EACH | Refills: 2 | Status: SHIPPED | OUTPATIENT
Start: 2022-10-26 | End: 2022-10-31

## 2022-10-26 RX ORDER — MAGNESIUM OXIDE 400 MG/1
400 TABLET ORAL 2 TIMES DAILY
Qty: 60 TABLET | Refills: 2 | Status: SHIPPED | OUTPATIENT
Start: 2022-10-26 | End: 2022-11-28

## 2022-10-27 ENCOUNTER — HOME CARE VISIT (OUTPATIENT)
Dept: HOME HEALTH SERVICES | Facility: HOME HEALTHCARE | Age: 67
End: 2022-10-27

## 2022-10-27 PROCEDURE — G0157 HHC PT ASSISTANT EA 15: HCPCS

## 2022-10-27 PROCEDURE — G0299 HHS/HOSPICE OF RN EA 15 MIN: HCPCS

## 2022-10-27 NOTE — HOME HEALTH
Subjective: I went to the kidney doctor and got a good report. I may have to get an IV on monday if my number don't change    Falls- none  Medication changes- On Calcium and magnesium- addressed by nursing     Assessment: Patient had one episode of unsteadiness with mobility however steadied self with counter on the turn. He was able to review his HEP and balance with multiple rest breaks to recover from fatigue.     Plan for next visit/Communication    gait training  balance  HEP  transfers

## 2022-10-31 ENCOUNTER — INFUSION (OUTPATIENT)
Dept: ONCOLOGY | Facility: HOSPITAL | Age: 67
End: 2022-10-31

## 2022-10-31 ENCOUNTER — LAB (OUTPATIENT)
Dept: LAB | Facility: HOSPITAL | Age: 67
End: 2022-10-31

## 2022-10-31 ENCOUNTER — TELEPHONE (OUTPATIENT)
Dept: ONCOLOGY | Facility: CLINIC | Age: 67
End: 2022-10-31

## 2022-10-31 ENCOUNTER — APPOINTMENT (OUTPATIENT)
Dept: ONCOLOGY | Facility: HOSPITAL | Age: 67
End: 2022-10-31

## 2022-10-31 ENCOUNTER — APPOINTMENT (OUTPATIENT)
Dept: LAB | Facility: HOSPITAL | Age: 67
End: 2022-10-31

## 2022-10-31 VITALS
RESPIRATION RATE: 18 BRPM | TEMPERATURE: 97.3 F | DIASTOLIC BLOOD PRESSURE: 65 MMHG | SYSTOLIC BLOOD PRESSURE: 106 MMHG | WEIGHT: 307.6 LBS | HEART RATE: 72 BPM | BODY MASS INDEX: 38.65 KG/M2 | OXYGEN SATURATION: 97 %

## 2022-10-31 DIAGNOSIS — N18.32 ANEMIA IN STAGE 3B CHRONIC KIDNEY DISEASE: ICD-10-CM

## 2022-10-31 DIAGNOSIS — D63.1 ANEMIA IN STAGE 3B CHRONIC KIDNEY DISEASE: ICD-10-CM

## 2022-10-31 DIAGNOSIS — C90.00 MULTIPLE MYELOMA NOT HAVING ACHIEVED REMISSION: ICD-10-CM

## 2022-10-31 DIAGNOSIS — C90.00 MULTIPLE MYELOMA, REMISSION STATUS UNSPECIFIED: ICD-10-CM

## 2022-10-31 DIAGNOSIS — E83.42 HYPOMAGNESEMIA: ICD-10-CM

## 2022-10-31 DIAGNOSIS — T45.1X5A NEUROPATHY DUE TO CHEMOTHERAPEUTIC DRUG: ICD-10-CM

## 2022-10-31 DIAGNOSIS — D64.9 ANEMIA, UNSPECIFIED TYPE: ICD-10-CM

## 2022-10-31 DIAGNOSIS — D69.6 THROMBOCYTOPENIA: ICD-10-CM

## 2022-10-31 DIAGNOSIS — G62.0 NEUROPATHY DUE TO CHEMOTHERAPEUTIC DRUG: ICD-10-CM

## 2022-10-31 DIAGNOSIS — E83.51 HYPOCALCEMIA: ICD-10-CM

## 2022-10-31 DIAGNOSIS — C90.00 MULTIPLE MYELOMA NOT HAVING ACHIEVED REMISSION: Primary | ICD-10-CM

## 2022-10-31 LAB
ALBUMIN SERPL-MCNC: 3.9 G/DL (ref 3.5–5.2)
ALBUMIN/GLOB SERPL: 1.6 G/DL (ref 1.1–2.4)
ALP SERPL-CCNC: 54 U/L (ref 38–116)
ALT SERPL W P-5'-P-CCNC: 8 U/L (ref 0–41)
ANION GAP SERPL CALCULATED.3IONS-SCNC: 13.3 MMOL/L (ref 5–15)
AST SERPL-CCNC: 9 U/L (ref 0–40)
BASOPHILS # BLD AUTO: 0.04 10*3/MM3 (ref 0–0.2)
BASOPHILS NFR BLD AUTO: 1 % (ref 0–1.5)
BILIRUB SERPL-MCNC: 0.5 MG/DL (ref 0.2–1.2)
BUN SERPL-MCNC: 30 MG/DL (ref 6–20)
BUN/CREAT SERPL: 8.8 (ref 7.3–30)
CALCIUM SPEC-SCNC: 7.2 MG/DL (ref 8.5–10.2)
CHLORIDE SERPL-SCNC: 107 MMOL/L (ref 98–107)
CO2 SERPL-SCNC: 22.7 MMOL/L (ref 22–29)
CREAT SERPL-MCNC: 3.4 MG/DL (ref 0.7–1.3)
DEPRECATED RDW RBC AUTO: 66.9 FL (ref 37–54)
EGFRCR SERPLBLD CKD-EPI 2021: 19.2 ML/MIN/1.73
EOSINOPHIL # BLD AUTO: 0.19 10*3/MM3 (ref 0–0.4)
EOSINOPHIL NFR BLD AUTO: 4.7 % (ref 0.3–6.2)
ERYTHROCYTE [DISTWIDTH] IN BLOOD BY AUTOMATED COUNT: 18.5 % (ref 12.3–15.4)
GLOBULIN UR ELPH-MCNC: 2.4 GM/DL (ref 1.8–3.5)
GLUCOSE SERPL-MCNC: 233 MG/DL (ref 74–124)
HCT VFR BLD AUTO: 31.4 % (ref 37.5–51)
HGB BLD-MCNC: 9.5 G/DL (ref 13–17.7)
IMM GRANULOCYTES # BLD AUTO: 0.01 10*3/MM3 (ref 0–0.05)
IMM GRANULOCYTES NFR BLD AUTO: 0.2 % (ref 0–0.5)
LYMPHOCYTES # BLD AUTO: 0.53 10*3/MM3 (ref 0.7–3.1)
LYMPHOCYTES NFR BLD AUTO: 13.2 % (ref 19.6–45.3)
MAGNESIUM SERPL-MCNC: 1.5 MG/DL (ref 1.8–2.5)
MCH RBC QN AUTO: 29.8 PG (ref 26.6–33)
MCHC RBC AUTO-ENTMCNC: 30.3 G/DL (ref 31.5–35.7)
MCV RBC AUTO: 98.4 FL (ref 79–97)
MONOCYTES # BLD AUTO: 0.57 10*3/MM3 (ref 0.1–0.9)
MONOCYTES NFR BLD AUTO: 14.1 % (ref 5–12)
NEUTROPHILS NFR BLD AUTO: 2.69 10*3/MM3 (ref 1.7–7)
NEUTROPHILS NFR BLD AUTO: 66.8 % (ref 42.7–76)
NRBC BLD AUTO-RTO: 0 /100 WBC (ref 0–0.2)
PLATELET # BLD AUTO: 118 10*3/MM3 (ref 140–450)
PMV BLD AUTO: 8.7 FL (ref 6–12)
POTASSIUM SERPL-SCNC: 5.1 MMOL/L (ref 3.5–4.7)
PROT SERPL-MCNC: 6.3 G/DL (ref 6.3–8)
RBC # BLD AUTO: 3.19 10*6/MM3 (ref 4.14–5.8)
SODIUM SERPL-SCNC: 143 MMOL/L (ref 134–145)
WBC NRBC COR # BLD: 4.03 10*3/MM3 (ref 3.4–10.8)

## 2022-10-31 PROCEDURE — 96401 CHEMO ANTI-NEOPL SQ/IM: CPT

## 2022-10-31 PROCEDURE — 83735 ASSAY OF MAGNESIUM: CPT

## 2022-10-31 PROCEDURE — 96365 THER/PROPH/DIAG IV INF INIT: CPT

## 2022-10-31 PROCEDURE — 25010000002 DARATUMUMAB-HYALURONIDASE-FIHJ 1800-30000 MG-UT/15ML SOLUTION: Performed by: INTERNAL MEDICINE

## 2022-10-31 PROCEDURE — 36415 COLL VENOUS BLD VENIPUNCTURE: CPT

## 2022-10-31 PROCEDURE — 80053 COMPREHEN METABOLIC PANEL: CPT

## 2022-10-31 PROCEDURE — 25010000002 MAGNESIUM SULFATE IN D5W 1G/100ML (PREMIX) 1-5 GM/100ML-% SOLUTION: Performed by: INTERNAL MEDICINE

## 2022-10-31 PROCEDURE — 25010000002 EPOETIN ALFA PER 1000 UNITS: Performed by: INTERNAL MEDICINE

## 2022-10-31 PROCEDURE — 85025 COMPLETE CBC W/AUTO DIFF WBC: CPT

## 2022-10-31 PROCEDURE — 96372 THER/PROPH/DIAG INJ SC/IM: CPT

## 2022-10-31 RX ORDER — MAGNESIUM SULFATE 1 G/100ML
1 INJECTION INTRAVENOUS ONCE
Status: COMPLETED | OUTPATIENT
Start: 2022-10-31 | End: 2022-10-31

## 2022-10-31 RX ADMIN — MAGNESIUM SULFATE IN DEXTROSE 1 G: 10 INJECTION, SOLUTION INTRAVENOUS at 10:52

## 2022-10-31 RX ADMIN — DARATUMUMAB AND HYALURONIDASE-FIHJ (HUMAN RECOMBINANT) 1800 MG: 1800; 30000 INJECTION SUBCUTANEOUS at 11:49

## 2022-10-31 RX ADMIN — ERYTHROPOIETIN 60000 UNITS: 20000 INJECTION, SOLUTION INTRAVENOUS; SUBCUTANEOUS at 11:49

## 2022-10-31 RX ADMIN — MAGNESIUM SULFATE IN DEXTROSE 1 G: 10 INJECTION, SOLUTION INTRAVENOUS at 11:25

## 2022-10-31 NOTE — NURSING NOTE
Pt presents for Darzalex Faspro and has IV premeds ordered. Pt has been on this medication in the past without issue and reports having had taken his premeds prior to arrival. D/w Dr. Del Valle who states this is ok and no additional premeds needed.    Pt also stated Mag Ox on Friday and reports diarrhea over the weekend. Per Dr. Del Valle, pt to stop taking oral magnesium and will replace in office today per protocol. Pt also to increase Tums to 3 daily for hypocalcemia.    Also noted slight elevation in potassium and creatinine. Labs routed to pt's nephrologist, Dr. Hutchison. Pt also provided magnesium rich food list and a list of foods w/ potassium content with written notes of which to limit or avoid. Pt and spouse v/u and will return as scheduled.

## 2022-11-01 ENCOUNTER — HOME CARE VISIT (OUTPATIENT)
Dept: HOME HEALTH SERVICES | Facility: HOME HEALTHCARE | Age: 67
End: 2022-11-01

## 2022-11-01 VITALS
DIASTOLIC BLOOD PRESSURE: 78 MMHG | HEART RATE: 82 BPM | SYSTOLIC BLOOD PRESSURE: 144 MMHG | OXYGEN SATURATION: 100 % | TEMPERATURE: 97.2 F

## 2022-11-01 VITALS
OXYGEN SATURATION: 98 % | RESPIRATION RATE: 19 BRPM | DIASTOLIC BLOOD PRESSURE: 78 MMHG | HEART RATE: 89 BPM | TEMPERATURE: 98.7 F | SYSTOLIC BLOOD PRESSURE: 130 MMHG

## 2022-11-01 PROCEDURE — G0157 HHC PT ASSISTANT EA 15: HCPCS

## 2022-11-01 RX ORDER — CALCIUM CARBONATE 200(500)MG
3 TABLET,CHEWABLE ORAL DAILY
COMMUNITY
Start: 2022-10-31

## 2022-11-01 NOTE — HOME HEALTH
PATIENT HAD MD APPOINTMENT AND HAD A FEW MED CHANGES WHICH WERE NOTED IN MAR. PATIENT STATED THAT THEY ARE DECREASING HIS CHEMO PILL REGIMEN TO TRY AND MINIMIZE SIDE EFFECTS INCLUDING DIARRHEA. SN T/I PATIENT ABOUT DIABETES AND EATING A BALANCED DIET WITH REDUCED CARBS. PATIENT HAS A DEXCOM AND HE STATED THAT IT HAS HELPED HIM LEARN WHAT FOODS HE SHOULD AND SHOULD NOT EAT BECAUSE IT HAS DIAGRAMS TO SHOW HIM WHEN HIS SUGAR SPIKES AND WHAT FOOD HE AT THAT CAUSED IT.     PLANS FOR NEXT VISIT: ADDITIONAL DM EDUCATION

## 2022-11-01 NOTE — HOME HEALTH
Subjective: I feel like the actviity is helping me to be outside ( he was outside racking leaves)     Falls- slipped getting off of toilet and hit his left elbow on the armrails and did not fall. He has a bruise on his elbow and appears not to have any other concens    Medication- discontinue magnesium and they are giving him at infusion by IV to reduce the diarrhea he was having    Assessment: Patient was outside blowing leaves upon arrival. He states he slipped while getting off of toilet and sat back down and hit his elbow on the railing and appears not to have any concerns beside the bruising. Patient continues to fatigue easy and takes multiple rest breaks to recover during session.     Plan for next visit/Communication  gait training  balance  review HEP

## 2022-11-03 ENCOUNTER — HOME CARE VISIT (OUTPATIENT)
Dept: HOME HEALTH SERVICES | Facility: HOME HEALTHCARE | Age: 67
End: 2022-11-03

## 2022-11-03 VITALS
TEMPERATURE: 97.3 F | HEART RATE: 77 BPM | RESPIRATION RATE: 18 BRPM | SYSTOLIC BLOOD PRESSURE: 160 MMHG | DIASTOLIC BLOOD PRESSURE: 92 MMHG | OXYGEN SATURATION: 99 %

## 2022-11-03 PROCEDURE — G0151 HHCP-SERV OF PT,EA 15 MIN: HCPCS

## 2022-11-04 ENCOUNTER — HOME CARE VISIT (OUTPATIENT)
Dept: HOME HEALTH SERVICES | Facility: HOME HEALTHCARE | Age: 67
End: 2022-11-04

## 2022-11-04 PROCEDURE — G0299 HHS/HOSPICE OF RN EA 15 MIN: HCPCS

## 2022-11-04 NOTE — HOME HEALTH
Patient sitting on couch upon arrival. Reported wearing himself out blowing leaves outside. Discussed discharge with patient due to meeting goals. Patient agreeable.

## 2022-11-07 ENCOUNTER — INFUSION (OUTPATIENT)
Dept: ONCOLOGY | Facility: HOSPITAL | Age: 67
End: 2022-11-07

## 2022-11-07 ENCOUNTER — SPECIALTY PHARMACY (OUTPATIENT)
Dept: PHARMACY | Facility: HOSPITAL | Age: 67
End: 2022-11-07

## 2022-11-07 ENCOUNTER — LAB (OUTPATIENT)
Dept: LAB | Facility: HOSPITAL | Age: 67
End: 2022-11-07

## 2022-11-07 VITALS
SYSTOLIC BLOOD PRESSURE: 142 MMHG | RESPIRATION RATE: 18 BRPM | OXYGEN SATURATION: 98 % | TEMPERATURE: 98.7 F | HEART RATE: 77 BPM | DIASTOLIC BLOOD PRESSURE: 92 MMHG

## 2022-11-07 DIAGNOSIS — E83.51 HYPOCALCEMIA: ICD-10-CM

## 2022-11-07 DIAGNOSIS — C90.00 MULTIPLE MYELOMA, REMISSION STATUS UNSPECIFIED: ICD-10-CM

## 2022-11-07 DIAGNOSIS — T45.1X5A NEUROPATHY DUE TO CHEMOTHERAPEUTIC DRUG: ICD-10-CM

## 2022-11-07 DIAGNOSIS — G62.0 NEUROPATHY DUE TO CHEMOTHERAPEUTIC DRUG: ICD-10-CM

## 2022-11-07 DIAGNOSIS — E83.42 HYPOMAGNESEMIA: ICD-10-CM

## 2022-11-07 DIAGNOSIS — D63.1 ANEMIA IN STAGE 3B CHRONIC KIDNEY DISEASE: ICD-10-CM

## 2022-11-07 DIAGNOSIS — N18.32 ANEMIA IN STAGE 3B CHRONIC KIDNEY DISEASE: ICD-10-CM

## 2022-11-07 DIAGNOSIS — C90.00 MULTIPLE MYELOMA NOT HAVING ACHIEVED REMISSION: ICD-10-CM

## 2022-11-07 DIAGNOSIS — D69.6 THROMBOCYTOPENIA: ICD-10-CM

## 2022-11-07 LAB
ALBUMIN SERPL-MCNC: 4.1 G/DL (ref 3.5–5.2)
ALBUMIN/GLOB SERPL: 2 G/DL (ref 1.1–2.4)
ALP SERPL-CCNC: 51 U/L (ref 38–116)
ALT SERPL W P-5'-P-CCNC: 11 U/L (ref 0–41)
ANION GAP SERPL CALCULATED.3IONS-SCNC: 12.8 MMOL/L (ref 5–15)
AST SERPL-CCNC: 9 U/L (ref 0–40)
BASOPHILS # BLD AUTO: 0.03 10*3/MM3 (ref 0–0.2)
BASOPHILS NFR BLD AUTO: 1.2 % (ref 0–1.5)
BILIRUB SERPL-MCNC: 0.6 MG/DL (ref 0.2–1.2)
BUN SERPL-MCNC: 48 MG/DL (ref 6–20)
BUN/CREAT SERPL: 15 (ref 7.3–30)
CALCIUM SPEC-SCNC: 10.8 MG/DL (ref 8.5–10.2)
CHLORIDE SERPL-SCNC: 99 MMOL/L (ref 98–107)
CO2 SERPL-SCNC: 30.2 MMOL/L (ref 22–29)
CREAT SERPL-MCNC: 3.21 MG/DL (ref 0.7–1.3)
DEPRECATED RDW RBC AUTO: 63.7 FL (ref 37–54)
EGFRCR SERPLBLD CKD-EPI 2021: 20.6 ML/MIN/1.73
EOSINOPHIL # BLD AUTO: 0.11 10*3/MM3 (ref 0–0.4)
EOSINOPHIL NFR BLD AUTO: 4.2 % (ref 0.3–6.2)
ERYTHROCYTE [DISTWIDTH] IN BLOOD BY AUTOMATED COUNT: 18 % (ref 12.3–15.4)
GLOBULIN UR ELPH-MCNC: 2.1 GM/DL (ref 1.8–3.5)
GLUCOSE SERPL-MCNC: 146 MG/DL (ref 74–124)
HCT VFR BLD AUTO: 35.6 % (ref 37.5–51)
HGB BLD-MCNC: 10.9 G/DL (ref 13–17.7)
IMM GRANULOCYTES # BLD AUTO: 0.01 10*3/MM3 (ref 0–0.05)
IMM GRANULOCYTES NFR BLD AUTO: 0.4 % (ref 0–0.5)
LYMPHOCYTES # BLD AUTO: 0.5 10*3/MM3 (ref 0.7–3.1)
LYMPHOCYTES NFR BLD AUTO: 19.2 % (ref 19.6–45.3)
MAGNESIUM SERPL-MCNC: 1.4 MG/DL (ref 1.8–2.5)
MCH RBC QN AUTO: 29.4 PG (ref 26.6–33)
MCHC RBC AUTO-ENTMCNC: 30.6 G/DL (ref 31.5–35.7)
MCV RBC AUTO: 96 FL (ref 79–97)
MONOCYTES # BLD AUTO: 0.18 10*3/MM3 (ref 0.1–0.9)
MONOCYTES NFR BLD AUTO: 6.9 % (ref 5–12)
NEUTROPHILS NFR BLD AUTO: 1.77 10*3/MM3 (ref 1.7–7)
NEUTROPHILS NFR BLD AUTO: 68.1 % (ref 42.7–76)
NRBC BLD AUTO-RTO: 0 /100 WBC (ref 0–0.2)
PLATELET # BLD AUTO: 160 10*3/MM3 (ref 140–450)
PMV BLD AUTO: 10.4 FL (ref 6–12)
POTASSIUM SERPL-SCNC: 4.2 MMOL/L (ref 3.5–4.7)
PROT SERPL-MCNC: 6.2 G/DL (ref 6.3–8)
RBC # BLD AUTO: 3.71 10*6/MM3 (ref 4.14–5.8)
SODIUM SERPL-SCNC: 142 MMOL/L (ref 134–145)
WBC NRBC COR # BLD: 2.6 10*3/MM3 (ref 3.4–10.8)

## 2022-11-07 PROCEDURE — 85025 COMPLETE CBC W/AUTO DIFF WBC: CPT

## 2022-11-07 PROCEDURE — 83735 ASSAY OF MAGNESIUM: CPT

## 2022-11-07 PROCEDURE — 80053 COMPREHEN METABOLIC PANEL: CPT

## 2022-11-07 PROCEDURE — 36415 COLL VENOUS BLD VENIPUNCTURE: CPT

## 2022-11-07 NOTE — NURSING NOTE
No procrit today, hgb 10.9.    Lab Results   Component Value Date    WBC 2.60 (L) 11/07/2022    HGB 10.9 (L) 11/07/2022    HCT 35.6 (L) 11/07/2022    MCV 96.0 11/07/2022     11/07/2022

## 2022-11-08 ENCOUNTER — SPECIALTY PHARMACY (OUTPATIENT)
Dept: PHARMACY | Facility: HOSPITAL | Age: 67
End: 2022-11-08

## 2022-11-08 ENCOUNTER — TELEPHONE (OUTPATIENT)
Dept: ONCOLOGY | Facility: CLINIC | Age: 67
End: 2022-11-08

## 2022-11-08 NOTE — TELEPHONE ENCOUNTER
Reviewed Dr. Del Valle's note and instructions with pt, he stated that he would be unable to restart the oral magnesium d/t the diarrhea. Patient is scheduled to come back to the office on Monday 11/14. If the magnesium infusion needs to be done sooner we will have scheduling contact them.

## 2022-11-08 NOTE — TELEPHONE ENCOUNTER
----- Message from Red Del Valle MD sent at 11/7/2022 10:18 PM EST -----  Please inform the patient that Magnesium level decreased to 1.4. I recommend restarting magnesium oxide 400 mg daily. If is unable to take it due to the diarrhea, I recommend giving magnesium sulfate 3 gm IV.     Calcium level is now high. I recommend reducing tums to 2 tablets a day.    Thank you

## 2022-11-08 NOTE — HOME HEALTH
PATIENT WENT TO ONCOLOGIST YESTERDAY AND THEY LOWERED HIS DOSE OF HIS CHEMO PILL THAT HE TAKES TO SEE IF HIS CANCER STAYS STABLE AND BE ABLE TO REDUCE THE SIDE EFFECTS OF THE TREATMENT SUCH AS DIARRHEA. PT STATED THAT HE WILL LET SN KNOW IF IT IMPROVES. SN T/I PATIENT ON TAKING ALL MEDS AS ORDERED. PATIENT VERBALIZED UNDERSTANDING. SN T/I ON KEEPING LEGS ELEVATED TO REDUCE EDEMA IN BLE. PATIENTS MD INCREASED HIS BUMEX TO 2MG DAILY LAST WEEK TO ALSO HELP REDUCE EDEMA IN BLE.     PLANS FOR NEXT VISIT: MEDICATION EDUCATION, ASSESS EDEMA IN BLE

## 2022-11-08 NOTE — PROGRESS NOTES
Specialty Note ( DPd regimen)    Called for one week adherence and toxicity check since starting Pomalyst 3 mg po daily 21/28 days.  He reports that he is tolerating the reduced dose of 3 mg much better than the 4 mg dose, and reported no missing doses.   He does have diarrhea for 7 days post Darzalex, but is managing it with alternating doses of Lomotil and Imodium.  He had no concerns for the MTM pffice today.

## 2022-11-09 ENCOUNTER — TELEPHONE (OUTPATIENT)
Dept: ONCOLOGY | Facility: CLINIC | Age: 67
End: 2022-11-09

## 2022-11-09 LAB
ALBUMIN SERPL ELPH-MCNC: 3.5 G/DL (ref 2.9–4.4)
ALBUMIN/GLOB SERPL: 1.8 {RATIO} (ref 0.7–1.7)
ALPHA1 GLOB SERPL ELPH-MCNC: 0.2 G/DL (ref 0–0.4)
ALPHA2 GLOB SERPL ELPH-MCNC: 0.6 G/DL (ref 0.4–1)
B-GLOBULIN SERPL ELPH-MCNC: 0.7 G/DL (ref 0.7–1.3)
GAMMA GLOB SERPL ELPH-MCNC: 0.4 G/DL (ref 0.4–1.8)
GLOBULIN SER-MCNC: 2 G/DL (ref 2.2–3.9)
IGA SERPL-MCNC: 45 MG/DL (ref 61–437)
IGG SERPL-MCNC: 443 MG/DL (ref 603–1613)
IGM SERPL-MCNC: 10 MG/DL (ref 20–172)
INTERPRETATION SERPL IEP-IMP: ABNORMAL
KAPPA LC FREE SER-MCNC: 54.5 MG/L (ref 3.3–19.4)
KAPPA LC FREE/LAMBDA FREE SER: 1.71 {RATIO} (ref 0.26–1.65)
LABORATORY COMMENT REPORT: ABNORMAL
LAMBDA LC FREE SERPL-MCNC: 31.9 MG/L (ref 5.7–26.3)
M PROTEIN SERPL ELPH-MCNC: ABNORMAL G/DL
PROT SERPL-MCNC: 5.5 G/DL (ref 6–8.5)

## 2022-11-09 NOTE — TELEPHONE ENCOUNTER
----- Message from Jacque Barclay RN sent at 11/8/2022  4:01 PM EST -----  Janice, (pt is aware), please call and schedule pt for a magnesium infusion. He will need this sometime this week.     Thanks!    ----- Message -----  From: Red Del Valle MD  Sent: 11/8/2022   3:54 PM EST  To: Jacque Barclay RN    Please see if the patient can come in before 11/14/2022 and received IV magnesium.    Thank you    ----- Message -----  From: Jacque Barclay RN  Sent: 11/8/2022   1:42 PM EST  To: Red Del Valle MD    Patient is unable to take oral mag. He has an appt on 11/14, would you like the mag infusion on that day or earlier?    ----- Message -----  From: Red Del Valle MD  Sent: 11/7/2022  10:21 PM EST  To: Jacque Barclay RN    Please inform the patient that Magnesium level decreased to 1.4. I recommend restarting magnesium oxide 400 mg daily. If is unable to take it due to the diarrhea, I recommend giving magnesium sulfate 3 gm IV.     Calcium level is now high. I recommend reducing tums to 2 tablets a day.    Thank you

## 2022-11-10 ENCOUNTER — APPOINTMENT (OUTPATIENT)
Dept: ONCOLOGY | Facility: HOSPITAL | Age: 67
End: 2022-11-10

## 2022-11-10 ENCOUNTER — INFUSION (OUTPATIENT)
Dept: ONCOLOGY | Facility: HOSPITAL | Age: 67
End: 2022-11-10

## 2022-11-10 ENCOUNTER — APPOINTMENT (OUTPATIENT)
Dept: LAB | Facility: HOSPITAL | Age: 67
End: 2022-11-10

## 2022-11-10 VITALS
RESPIRATION RATE: 18 BRPM | SYSTOLIC BLOOD PRESSURE: 120 MMHG | WEIGHT: 306 LBS | BODY MASS INDEX: 38.45 KG/M2 | HEART RATE: 78 BPM | OXYGEN SATURATION: 94 % | TEMPERATURE: 98.6 F | DIASTOLIC BLOOD PRESSURE: 70 MMHG

## 2022-11-10 DIAGNOSIS — E83.42 HYPOMAGNESEMIA: Primary | ICD-10-CM

## 2022-11-10 PROCEDURE — 96365 THER/PROPH/DIAG IV INF INIT: CPT

## 2022-11-10 PROCEDURE — 96366 THER/PROPH/DIAG IV INF ADDON: CPT

## 2022-11-10 PROCEDURE — 25010000002 MAGNESIUM SULFATE IN D5W 1G/100ML (PREMIX) 1-5 GM/100ML-% SOLUTION: Performed by: INTERNAL MEDICINE

## 2022-11-10 RX ORDER — MAGNESIUM SULFATE 1 G/100ML
3 INJECTION INTRAVENOUS ONCE
Status: COMPLETED | OUTPATIENT
Start: 2022-11-10 | End: 2022-11-10

## 2022-11-10 RX ORDER — SODIUM CHLORIDE 9 MG/ML
250 INJECTION, SOLUTION INTRAVENOUS ONCE
Status: COMPLETED | OUTPATIENT
Start: 2022-11-10 | End: 2022-11-10

## 2022-11-10 RX ADMIN — SODIUM CHLORIDE 250 ML: 9 INJECTION, SOLUTION INTRAVENOUS at 13:04

## 2022-11-10 RX ADMIN — MAGNESIUM SULFATE IN DEXTROSE 3 G: 10 INJECTION, SOLUTION INTRAVENOUS at 13:04

## 2022-11-11 ENCOUNTER — HOME CARE VISIT (OUTPATIENT)
Dept: HOME HEALTH SERVICES | Facility: HOME HEALTHCARE | Age: 67
End: 2022-11-11

## 2022-11-11 PROCEDURE — G0300 HHS/HOSPICE OF LPN EA 15 MIN: HCPCS

## 2022-11-14 ENCOUNTER — LAB (OUTPATIENT)
Dept: LAB | Facility: HOSPITAL | Age: 67
End: 2022-11-14

## 2022-11-14 ENCOUNTER — INFUSION (OUTPATIENT)
Dept: ONCOLOGY | Facility: HOSPITAL | Age: 67
End: 2022-11-14

## 2022-11-14 DIAGNOSIS — C90.00 MULTIPLE MYELOMA NOT HAVING ACHIEVED REMISSION: ICD-10-CM

## 2022-11-14 DIAGNOSIS — N18.32 ANEMIA IN STAGE 3B CHRONIC KIDNEY DISEASE: Primary | ICD-10-CM

## 2022-11-14 DIAGNOSIS — D64.9 ANEMIA, UNSPECIFIED TYPE: ICD-10-CM

## 2022-11-14 DIAGNOSIS — D63.1 ANEMIA IN STAGE 3B CHRONIC KIDNEY DISEASE: Primary | ICD-10-CM

## 2022-11-14 DIAGNOSIS — D69.6 THROMBOCYTOPENIA: ICD-10-CM

## 2022-11-14 DIAGNOSIS — C90.00 MULTIPLE MYELOMA, REMISSION STATUS UNSPECIFIED: ICD-10-CM

## 2022-11-14 DIAGNOSIS — N18.32 ANEMIA IN STAGE 3B CHRONIC KIDNEY DISEASE: ICD-10-CM

## 2022-11-14 DIAGNOSIS — T45.1X5A NEUROPATHY DUE TO CHEMOTHERAPEUTIC DRUG: ICD-10-CM

## 2022-11-14 DIAGNOSIS — E83.42 HYPOMAGNESEMIA: ICD-10-CM

## 2022-11-14 DIAGNOSIS — E83.51 HYPOCALCEMIA: ICD-10-CM

## 2022-11-14 DIAGNOSIS — D63.1 ANEMIA IN STAGE 3B CHRONIC KIDNEY DISEASE: ICD-10-CM

## 2022-11-14 DIAGNOSIS — G62.0 NEUROPATHY DUE TO CHEMOTHERAPEUTIC DRUG: ICD-10-CM

## 2022-11-14 LAB
ALBUMIN SERPL-MCNC: 3.9 G/DL (ref 3.5–5.2)
ALBUMIN/GLOB SERPL: 2.1 G/DL (ref 1.1–2.4)
ALP SERPL-CCNC: 51 U/L (ref 38–116)
ALT SERPL W P-5'-P-CCNC: 9 U/L (ref 0–41)
ANION GAP SERPL CALCULATED.3IONS-SCNC: 10.7 MMOL/L (ref 5–15)
AST SERPL-CCNC: 8 U/L (ref 0–40)
BASOPHILS # BLD AUTO: 0.05 10*3/MM3 (ref 0–0.2)
BASOPHILS NFR BLD AUTO: 2 % (ref 0–1.5)
BILIRUB SERPL-MCNC: 0.5 MG/DL (ref 0.2–1.2)
BUN SERPL-MCNC: 51 MG/DL (ref 6–20)
BUN/CREAT SERPL: 13.7 (ref 7.3–30)
CALCIUM SPEC-SCNC: 9.7 MG/DL (ref 8.5–10.2)
CHLORIDE SERPL-SCNC: 105 MMOL/L (ref 98–107)
CO2 SERPL-SCNC: 27.3 MMOL/L (ref 22–29)
CREAT SERPL-MCNC: 3.73 MG/DL (ref 0.7–1.3)
DEPRECATED RDW RBC AUTO: 60.3 FL (ref 37–54)
EGFRCR SERPLBLD CKD-EPI 2021: 17.2 ML/MIN/1.73
EOSINOPHIL # BLD AUTO: 0.1 10*3/MM3 (ref 0–0.4)
EOSINOPHIL NFR BLD AUTO: 3.9 % (ref 0.3–6.2)
ERYTHROCYTE [DISTWIDTH] IN BLOOD BY AUTOMATED COUNT: 17.2 % (ref 12.3–15.4)
GLOBULIN UR ELPH-MCNC: 1.9 GM/DL (ref 1.8–3.5)
GLUCOSE SERPL-MCNC: 141 MG/DL (ref 74–124)
HCT VFR BLD AUTO: 31.9 % (ref 37.5–51)
HGB BLD-MCNC: 9.8 G/DL (ref 13–17.7)
IMM GRANULOCYTES # BLD AUTO: 0.02 10*3/MM3 (ref 0–0.05)
IMM GRANULOCYTES NFR BLD AUTO: 0.8 % (ref 0–0.5)
LYMPHOCYTES # BLD AUTO: 0.57 10*3/MM3 (ref 0.7–3.1)
LYMPHOCYTES NFR BLD AUTO: 22.3 % (ref 19.6–45.3)
MAGNESIUM SERPL-MCNC: 1.6 MG/DL (ref 1.8–2.5)
MCH RBC QN AUTO: 29.3 PG (ref 26.6–33)
MCHC RBC AUTO-ENTMCNC: 30.7 G/DL (ref 31.5–35.7)
MCV RBC AUTO: 95.2 FL (ref 79–97)
MONOCYTES # BLD AUTO: 0.35 10*3/MM3 (ref 0.1–0.9)
MONOCYTES NFR BLD AUTO: 13.7 % (ref 5–12)
NEUTROPHILS NFR BLD AUTO: 1.47 10*3/MM3 (ref 1.7–7)
NEUTROPHILS NFR BLD AUTO: 57.3 % (ref 42.7–76)
NRBC BLD AUTO-RTO: 0 /100 WBC (ref 0–0.2)
PLATELET # BLD AUTO: 104 10*3/MM3 (ref 140–450)
PMV BLD AUTO: 10.1 FL (ref 6–12)
POTASSIUM SERPL-SCNC: 4.3 MMOL/L (ref 3.5–4.7)
PROT SERPL-MCNC: 5.8 G/DL (ref 6.3–8)
RBC # BLD AUTO: 3.35 10*6/MM3 (ref 4.14–5.8)
SODIUM SERPL-SCNC: 143 MMOL/L (ref 134–145)
WBC NRBC COR # BLD: 2.56 10*3/MM3 (ref 3.4–10.8)

## 2022-11-14 PROCEDURE — 83735 ASSAY OF MAGNESIUM: CPT

## 2022-11-14 PROCEDURE — 36415 COLL VENOUS BLD VENIPUNCTURE: CPT

## 2022-11-14 PROCEDURE — 25010000002 EPOETIN ALFA PER 1000 UNITS: Performed by: INTERNAL MEDICINE

## 2022-11-14 PROCEDURE — 80053 COMPREHEN METABOLIC PANEL: CPT

## 2022-11-14 PROCEDURE — 85025 COMPLETE CBC W/AUTO DIFF WBC: CPT

## 2022-11-14 PROCEDURE — 96372 THER/PROPH/DIAG INJ SC/IM: CPT

## 2022-11-14 RX ADMIN — ERYTHROPOIETIN 60000 UNITS: 40000 INJECTION, SOLUTION INTRAVENOUS; SUBCUTANEOUS at 12:01

## 2022-11-15 VITALS
OXYGEN SATURATION: 90 % | SYSTOLIC BLOOD PRESSURE: 160 MMHG | RESPIRATION RATE: 18 BRPM | DIASTOLIC BLOOD PRESSURE: 86 MMHG | HEART RATE: 82 BPM | TEMPERATURE: 97.6 F

## 2022-11-15 NOTE — HOME HEALTH
Patient reports no new symptoms related to chemo treatments. Patient states his engery is much better. Patient still has frequent bowel movements but ensures enough fluid intake and diet to prevent dehydration

## 2022-11-18 ENCOUNTER — SPECIALTY PHARMACY (OUTPATIENT)
Dept: PHARMACY | Facility: HOSPITAL | Age: 67
End: 2022-11-18

## 2022-11-18 ENCOUNTER — HOME CARE VISIT (OUTPATIENT)
Dept: HOME HEALTH SERVICES | Facility: HOME HEALTHCARE | Age: 67
End: 2022-11-18

## 2022-11-18 ENCOUNTER — TELEPHONE (OUTPATIENT)
Dept: ONCOLOGY | Facility: CLINIC | Age: 67
End: 2022-11-18

## 2022-11-18 PROCEDURE — G0299 HHS/HOSPICE OF RN EA 15 MIN: HCPCS

## 2022-11-18 NOTE — TELEPHONE ENCOUNTER
Pharmacy Name:  [facility]     Pharmacy representative name: ANNE MARIE    Pharmacy representative phone number: [phone]    What medication are you calling in regards to: POMALYST 3 MG    What question does the pharmacy have: UPS HAS LOST PATIENT SHIPMENT.    REQUESTING NEW RX AND [employer] AUTH # IN ORDER TO RE-SHIP MEDICATION

## 2022-11-18 NOTE — PROGRESS NOTES
Specialty Note ( Pomalyst)    Received message from [facility] that his shipment of Pomalyst was lost by [employer].  Call was placed to [employer] and a new auth # obtained and new rx sent to [facility].

## 2022-11-21 ENCOUNTER — LAB (OUTPATIENT)
Dept: LAB | Facility: HOSPITAL | Age: 67
End: 2022-11-21

## 2022-11-21 ENCOUNTER — TELEPHONE (OUTPATIENT)
Dept: ONCOLOGY | Facility: CLINIC | Age: 67
End: 2022-11-21

## 2022-11-21 ENCOUNTER — INFUSION (OUTPATIENT)
Dept: ONCOLOGY | Facility: HOSPITAL | Age: 67
End: 2022-11-21

## 2022-11-21 VITALS
RESPIRATION RATE: 18 BRPM | SYSTOLIC BLOOD PRESSURE: 118 MMHG | OXYGEN SATURATION: 96 % | DIASTOLIC BLOOD PRESSURE: 80 MMHG | HEART RATE: 90 BPM | TEMPERATURE: 98.3 F

## 2022-11-21 DIAGNOSIS — C90.00 MULTIPLE MYELOMA NOT HAVING ACHIEVED REMISSION: ICD-10-CM

## 2022-11-21 DIAGNOSIS — C90.00 MULTIPLE MYELOMA, REMISSION STATUS UNSPECIFIED: ICD-10-CM

## 2022-11-21 DIAGNOSIS — D63.1 ANEMIA IN STAGE 3B CHRONIC KIDNEY DISEASE: ICD-10-CM

## 2022-11-21 DIAGNOSIS — E83.42 HYPOMAGNESEMIA: ICD-10-CM

## 2022-11-21 DIAGNOSIS — G62.0 NEUROPATHY DUE TO CHEMOTHERAPEUTIC DRUG: ICD-10-CM

## 2022-11-21 DIAGNOSIS — D69.6 THROMBOCYTOPENIA: ICD-10-CM

## 2022-11-21 DIAGNOSIS — N18.32 ANEMIA IN STAGE 3B CHRONIC KIDNEY DISEASE: ICD-10-CM

## 2022-11-21 DIAGNOSIS — T45.1X5A NEUROPATHY DUE TO CHEMOTHERAPEUTIC DRUG: ICD-10-CM

## 2022-11-21 DIAGNOSIS — E83.51 HYPOCALCEMIA: ICD-10-CM

## 2022-11-21 LAB
ALBUMIN SERPL-MCNC: 4 G/DL (ref 3.5–5.2)
ALBUMIN/GLOB SERPL: 1.8 G/DL (ref 1.1–2.4)
ALP SERPL-CCNC: 56 U/L (ref 38–116)
ALT SERPL W P-5'-P-CCNC: 9 U/L (ref 0–41)
ANION GAP SERPL CALCULATED.3IONS-SCNC: 14.5 MMOL/L (ref 5–15)
AST SERPL-CCNC: 11 U/L (ref 0–40)
BASOPHILS # BLD AUTO: 0.04 10*3/MM3 (ref 0–0.2)
BASOPHILS NFR BLD AUTO: 2.4 % (ref 0–1.5)
BILIRUB SERPL-MCNC: 0.7 MG/DL (ref 0.2–1.2)
BUN SERPL-MCNC: 37 MG/DL (ref 6–20)
BUN/CREAT SERPL: 13.4 (ref 7.3–30)
CALCIUM SPEC-SCNC: 8.7 MG/DL (ref 8.5–10.2)
CHLORIDE SERPL-SCNC: 103 MMOL/L (ref 98–107)
CO2 SERPL-SCNC: 22.5 MMOL/L (ref 22–29)
CREAT SERPL-MCNC: 2.77 MG/DL (ref 0.7–1.3)
DEPRECATED RDW RBC AUTO: 60.8 FL (ref 37–54)
EGFRCR SERPLBLD CKD-EPI 2021: 24.6 ML/MIN/1.73
EOSINOPHIL # BLD AUTO: 0.11 10*3/MM3 (ref 0–0.4)
EOSINOPHIL NFR BLD AUTO: 6.5 % (ref 0.3–6.2)
ERYTHROCYTE [DISTWIDTH] IN BLOOD BY AUTOMATED COUNT: 17.2 % (ref 12.3–15.4)
GLOBULIN UR ELPH-MCNC: 2.2 GM/DL (ref 1.8–3.5)
GLUCOSE SERPL-MCNC: 140 MG/DL (ref 74–124)
HCT VFR BLD AUTO: 33.7 % (ref 37.5–51)
HGB BLD-MCNC: 10.3 G/DL (ref 13–17.7)
IMM GRANULOCYTES # BLD AUTO: 0.01 10*3/MM3 (ref 0–0.05)
IMM GRANULOCYTES NFR BLD AUTO: 0.6 % (ref 0–0.5)
LYMPHOCYTES # BLD AUTO: 0.54 10*3/MM3 (ref 0.7–3.1)
LYMPHOCYTES NFR BLD AUTO: 31.8 % (ref 19.6–45.3)
MAGNESIUM SERPL-MCNC: 1.5 MG/DL (ref 1.8–2.5)
MCH RBC QN AUTO: 29.3 PG (ref 26.6–33)
MCHC RBC AUTO-ENTMCNC: 30.6 G/DL (ref 31.5–35.7)
MCV RBC AUTO: 96 FL (ref 79–97)
MONOCYTES # BLD AUTO: 0.31 10*3/MM3 (ref 0.1–0.9)
MONOCYTES NFR BLD AUTO: 18.2 % (ref 5–12)
NEUTROPHILS NFR BLD AUTO: 0.69 10*3/MM3 (ref 1.7–7)
NEUTROPHILS NFR BLD AUTO: 40.5 % (ref 42.7–76)
NRBC BLD AUTO-RTO: 0 /100 WBC (ref 0–0.2)
PLATELET # BLD AUTO: 105 10*3/MM3 (ref 140–450)
PMV BLD AUTO: 9.9 FL (ref 6–12)
POTASSIUM SERPL-SCNC: 4 MMOL/L (ref 3.5–4.7)
PROT SERPL-MCNC: 6.2 G/DL (ref 6.3–8)
RBC # BLD AUTO: 3.51 10*6/MM3 (ref 4.14–5.8)
SODIUM SERPL-SCNC: 140 MMOL/L (ref 134–145)
WBC NRBC COR # BLD: 1.7 10*3/MM3 (ref 3.4–10.8)

## 2022-11-21 PROCEDURE — 83735 ASSAY OF MAGNESIUM: CPT

## 2022-11-21 PROCEDURE — 85025 COMPLETE CBC W/AUTO DIFF WBC: CPT

## 2022-11-21 PROCEDURE — 80053 COMPREHEN METABOLIC PANEL: CPT

## 2022-11-21 PROCEDURE — 36415 COLL VENOUS BLD VENIPUNCTURE: CPT

## 2022-11-21 PROCEDURE — G0463 HOSPITAL OUTPT CLINIC VISIT: HCPCS

## 2022-11-21 NOTE — TELEPHONE ENCOUNTER
----- Message from Portia Brown RN sent at 11/21/2022 12:39 PM EST -----  Please call and schedule patient for 2 grams IV mag on Wednesday, per Liv.

## 2022-11-21 NOTE — PROGRESS NOTES
Lab Results   Component Value Date    WBC 1.70 (L) 11/21/2022    HGB 10.3 (L) 11/21/2022    HCT 33.7 (L) 11/21/2022    MCV 96.0 11/21/2022     (L) 11/21/2022     Reviewed CBC with Liv NP, and no new orders at tis time.   Discussed neutropenic precautions with patient.  This is patient's week off Pomalyst.  Return appt in one week.  CMP and Mag pending today.     Mag today is 1.5.  Reviewed with Liv ALEJANDRA and patient will need 2 grams IV mag.  Message sent to scheduling.

## 2022-11-22 ENCOUNTER — OFFICE VISIT (OUTPATIENT)
Dept: FAMILY MEDICINE CLINIC | Facility: CLINIC | Age: 67
End: 2022-11-22

## 2022-11-22 VITALS
DIASTOLIC BLOOD PRESSURE: 70 MMHG | TEMPERATURE: 97.8 F | HEART RATE: 71 BPM | SYSTOLIC BLOOD PRESSURE: 130 MMHG | BODY MASS INDEX: 38.94 KG/M2 | OXYGEN SATURATION: 96 % | WEIGHT: 313.2 LBS | HEIGHT: 75 IN

## 2022-11-22 DIAGNOSIS — I12.9 BENIGN HYPERTENSION WITH CKD (CHRONIC KIDNEY DISEASE) STAGE IV: Primary | ICD-10-CM

## 2022-11-22 DIAGNOSIS — E11.3413 TYPE 2 DIABETES MELLITUS WITH BOTH EYES AFFECTED BY SEVERE NONPROLIFERATIVE RETINOPATHY AND MACULAR EDEMA, WITH LONG-TERM CURRENT USE OF INSULIN: ICD-10-CM

## 2022-11-22 DIAGNOSIS — E78.2 MIXED HYPERLIPIDEMIA: ICD-10-CM

## 2022-11-22 DIAGNOSIS — Z23 ENCOUNTER FOR IMMUNIZATION: ICD-10-CM

## 2022-11-22 DIAGNOSIS — N18.4 BENIGN HYPERTENSION WITH CKD (CHRONIC KIDNEY DISEASE) STAGE IV: Primary | ICD-10-CM

## 2022-11-22 DIAGNOSIS — Z79.4 TYPE 2 DIABETES MELLITUS WITH BOTH EYES AFFECTED BY SEVERE NONPROLIFERATIVE RETINOPATHY AND MACULAR EDEMA, WITH LONG-TERM CURRENT USE OF INSULIN: ICD-10-CM

## 2022-11-22 DIAGNOSIS — C90.00 MULTIPLE MYELOMA NOT HAVING ACHIEVED REMISSION: ICD-10-CM

## 2022-11-22 DIAGNOSIS — F41.9 ANXIETY: ICD-10-CM

## 2022-11-22 PROBLEM — H35.373 EPIRETINAL MEMBRANE (ERM) OF BOTH EYES: Status: ACTIVE | Noted: 2021-12-16

## 2022-11-22 PROBLEM — Z98.890 OTHER SPECIFIED POSTPROCEDURAL STATES: Status: ACTIVE | Noted: 2021-09-17

## 2022-11-22 PROCEDURE — 99215 OFFICE O/P EST HI 40 MIN: CPT | Performed by: NURSE PRACTITIONER

## 2022-11-22 PROCEDURE — 90662 IIV NO PRSV INCREASED AG IM: CPT | Performed by: NURSE PRACTITIONER

## 2022-11-22 PROCEDURE — G0008 ADMIN INFLUENZA VIRUS VAC: HCPCS | Performed by: NURSE PRACTITIONER

## 2022-11-22 NOTE — PROGRESS NOTES
Subjective   Contreras Albert is a 67 y.o. male.     Chief Complaint   Patient presents with   • Diabetes     6 mo Follow up      • Hypertension   • Heartburn       History of Present Illness   Patient presents for follow up DM2: takes Basaglar twice daily; sees bubba Germain; calculated A1c was much better at around 5%; blood sugar will increase with steroids with cancer treatment once weekly; has changed to also taking Novolog TID with meals as needed to get blood sugar down after taking steroids; has continuous glucose monitor and has really helped; has been able to cut things out of diet that were increasing blood sugar; bubba was considering Lipitor, but renal declined; has neuropathy in 2 little fingers bilaterally, otherwise other parts of hands have improved; in general doing much better; takes Gabapentin twice daily and helps.     F/U HTN/edema: takes Bumex daily and Carvedilol twice daily; sees Dr. Gonzalez renal and increased Bumex dose and has helped swelling; still some swelling in legs, but has improved; sensation in legs has also improved as swelling has decreased; monitors BP, typically runs 120-130s/70-80s; no headaches; no orthostasis.    F/U heartburn: takes Pantoprazole daily and works well.     F/U anxiety: takes Escitalopram daily and works well; happy with current dose; some trouble staying asleep more than 4-5 hours; not very active during the day most days; on days is more active will sleep for 8-9 hours at night; no SI/HI.     Takes Tamsulosin daily; sees urology, Dr. Garcia and monitors PSA.     F/U Vitamin D deficiency: takes Vitamin D weekly; needs to get refill.    F/U multiple myeloma: takes Pomalyst daily for 21 days and then off 7 days; also takes injection of Daratumumab with high dose steroids every 4 weeks; has split up doses of steroids and blood sugar has been better; sees Dr. Del Valle, oncology now; also gets Procrit injections weekly for anemia.    Recently had treatment one day and  then next day saw endo and had labs; was sent to ER due to low calcium, blood counts, etc; was treated in ER and then admitted; was still having chronic diarrhea; stopped Pomalyst for 1 month and then resumed at lower dose and has really helped diarrhea; will go weeks at a time without diarrhea at this point; has had home health nursing and PT; PT really helped balance; had been having trouble with falls, but mobility is much better; still having problems with magnesium being low; magnesium supplements cause bad diarrhea; has upcoming labs on 11/28/22 with Procrit injection if needed as well as IV magnesium if needed; takes Imodium or Lomotil as needed; rarely at this point.    Had first couple of rounds of immunizations from childhood after stem cell transplant; due for next round immunizations; has follow up with Dr. Del Valle in 2 weeks.  Would like flu vaccine today.      The following portions of the patient's history were reviewed and updated as appropriate: allergies, current medications, past family history, past medical history, past social history, past surgical history and problem list.    Current Outpatient Medications on File Prior to Visit   Medication Sig   • acetaminophen (TYLENOL) 325 MG tablet Take 2 tablets by mouth Every 6 (Six) Hours As Needed for Mild Pain.   • acyclovir (ZOVIRAX) 800 MG tablet Take 1 tablet by mouth Daily.   • ASPIRIN 81 PO Take 81 mg by mouth Daily.   • Blood Glucose Monitoring Suppl (FREESTYLE FREEDOM LITE) w/Device kit 1 kit Daily. Indications: Type 2 Diabetes   • bumetanide (BUMEX) 2 MG tablet Take 0.5 tablets by mouth 2 (Two) Times a Day. (Patient taking differently: Take 2 mg by mouth Daily.)   • calcium carbonate (TUMS) 500 MG chewable tablet Chew 2 tablets Daily.   • carvedilol (COREG) 12.5 MG tablet Take 12.5 mg by mouth 2 (Two) Times a Day With Meals.   • Continuous Blood Gluc Sensor (Dexcom G6 Sensor) Replace sensor every 10 days.  Dx: E 11.22   • Continuous Blood  Gluc Transmit (Dexcom G6 Transmitter) misc 1 each Every 3 (Three) Months. Dx: E 11.22   • dexamethasone (DECADRON) 4 MG tablet Take 3 tablets on day of chemo, 1 tablet on day #2 and 1 tablet on day #3 and repeat monthly.   • diphenoxylate-atropine (LOMOTIL) 2.5-0.025 MG per tablet Take 2 tablets by mouth 4 (Four) Times a Day As Needed for Diarrhea.   • escitalopram (LEXAPRO) 5 MG tablet Take 1 tablet by mouth Daily.   • finasteride (PROSCAR) 5 MG tablet Take 5 mg by mouth daily.   • gabapentin (NEURONTIN) 300 MG capsule Take 1 capsule in AM and take 2 capsules at bedtime.   • glucagon (GLUCAGEN) 1 MG injection Inject 1 mg into the appropriate muscle as directed by prescriber 1 (One) Time As Needed for Low Blood Sugar for up to 1 dose. Follow package directions for low blood sugar.   • glucose blood (FREESTYLE LITE) test strip Use to test blood sugar 4-5 times daily.   • insulin aspart (NovoLOG FlexPen) 100 UNIT/ML solution pen-injector sc pen Inject 8 Units under the skin into the appropriate area as directed 3 (Three) Times a Day With Meals. Pt reports he takes a SSI type dosing 4 times a day   • Insulin Glargine (BASAGLAR KWIKPEN) 100 UNIT/ML injection pen    • Insulin Pen Needle 32G X 4 MM misc USING 4 PEN NEEDLES DAILY   • Lancets (FREESTYLE) lancets Use once then discard to test blood sugar each morning   • loperamide (IMODIUM) 2 MG capsule    • loperamide (IMODIUM) 2 MG capsule Take 1 capsule by mouth 4 (Four) Times a Day As Needed for Diarrhea.   • magnesium oxide (MAG-OX) 400 MG tablet Take 1 tablet by mouth 2 (Two) Times a Day.   • ondansetron (ZOFRAN) 8 MG tablet Take 1 tablet by mouth 3 (Three) Times a Day As Needed for Nausea or Vomiting.   • pantoprazole (Protonix) 40 MG EC tablet Take 1 tablet by mouth Daily.   • pomalidomide (Pomalyst) 3 MG chemo capsule Take 1 capsule by mouth Daily. Take for 21 days on, then 7 days off   • tamsulosin (FLOMAX) 0.4 MG capsule 24 hr capsule Take 1 capsule by mouth  Daily.   • vitamin D (ERGOCALCIFEROL) 1.25 MG (06549 UT) capsule capsule Take 50,000 Units by mouth 1 (One) Time Per Week. Mondays     No current facility-administered medications on file prior to visit.        Past Medical History:   Diagnosis Date   • Acute renal failure syndrome (HCC) 05/21/2020   • Anemia    • Cataract    • Chronic renal insufficiency    • Clear cell carcinoma of kidney, right ([facility]) 2016    Stage I.  Right partial nephrectomy.  Dr. Garcia   • Clostridium difficile colitis    • Diabetes mellitus ([facility])    • Diabetic macular edema(362.07) 05/26/2020    No significant central fluid today OD.  Stable without significant DME OS.  Observe OU today. Possible multiple myeloma and anemia contributing.  Pt noted improvement with plasmapheresis and was gett*   • Elevated troponin 05/22/2020   • Enlarged prostate     Dr. Garcia   • H/O stem cell transplant ([facility]) 12/09/2020   • Hematoma of right lower leg    • High cholesterol    • Hypertension    • Multiple myeloma ([facility])    • Multiple rib fractures     on right after fall from 2 story building at age of 30 years   • Severe nonproliferative diabetic retinopathy of both eyes with macular edema associated with type 2 diabetes mellitus ([facility]) 09/02/2020   • Testosterone deficiency    • Type 2 diabetes mellitus with retinopathy, with long-term current use of insulin ([facility]) 05/22/2020   • Vitamin D deficiency        Past Surgical History:   Procedure Laterality Date   • CATARACT EXTRACTION Left 12/17/2021   • CATARACT EXTRACTION Right 01/21/2022   • INSERTION HEMODIALYSIS CATHETER N/A 06/02/2020    Procedure: HEMODIALYSIS CATHETER INSERTION;  Surgeon: Sumanth Hernandez MD;  Location: [facility] OR;  Service: Vascular;  Laterality: N/A;   • LIMBAL STEM CELL TRANSPLANT  12/09/2020   • NEPHRECTOMY PARTIAL Right 05/18/2016    Procedure: RT NEPHRECTOMY PARTIAL LAPAROSCOPIC WITH DAVINCI ROBOT;  Surgeon: Chad Garcia MD;  Location: [facility] OR;  Service:  "   • PROSTATE SURGERY         Family History   Problem Relation Age of Onset   • Parkinsonism Mother    • Arrhythmia Father    • Heart failure Father 84   • Diabetes Maternal Uncle    • Macular degeneration Sister        Social History     Socioeconomic History   • Marital status:      Spouse name: Gabby   Tobacco Use   • Smoking status: Never   • Smokeless tobacco: Never   Substance and Sexual Activity   • Alcohol use: No   • Drug use: No   • Sexual activity: Not Currently     Partners: Female     Birth control/protection: None       Review of Systems   Constitutional: Positive for fatigue. Negative for appetite change, chills, fever, unexpected weight gain and unexpected weight loss.   HENT: Negative for ear pain and sore throat. Rhinorrhea: some with allergies, improving.    Respiratory: Negative for cough, chest tightness and shortness of breath.    Cardiovascular: Negative for chest pain and palpitations.   Gastrointestinal: Negative for abdominal pain and blood in stool.   Genitourinary: Negative for dysuria and frequency.   Skin: Negative for rash.   Neurological: Negative for syncope and weakness.   Psychiatric/Behavioral: Negative for suicidal ideas.       Objective   Vitals:    11/22/22 1007   BP: 130/70   BP Location: Left arm   Patient Position: Sitting   Cuff Size: Adult   Pulse: 71   Temp: 97.8 °F (36.6 °C)   SpO2: 96%   Weight: (!) 142 kg (313 lb 3.2 oz)   Height: 190 cm (74.8\")     Body mass index is 39.36 kg/m².    Physical Exam  Vitals and nursing note reviewed.   Constitutional:       General: He is not in acute distress.     Appearance: He is well-developed and well-groomed. He is not diaphoretic.   HENT:      Head: Normocephalic.      Right Ear: External ear normal. No decreased hearing noted. Right ear middle ear effusion: mild. Tympanic membrane is not erythematous.      Left Ear: External ear normal. No decreased hearing noted. Left ear middle ear effusion: mild. Tympanic membrane is " not erythematous.      Nose: Nose normal.      Right Sinus: No maxillary sinus tenderness or frontal sinus tenderness.      Left Sinus: No maxillary sinus tenderness or frontal sinus tenderness.      Mouth/Throat:      Mouth: Mucous membranes are moist.      Pharynx: No oropharyngeal exudate or posterior oropharyngeal erythema.   Eyes:      Conjunctiva/sclera: Conjunctivae normal.   Neck:      Vascular: No carotid bruit.   Cardiovascular:      Rate and Rhythm: Normal rate and regular rhythm.      Pulses: Normal pulses.      Heart sounds: Normal heart sounds. No murmur heard.     Comments: Mild venous stasis bilaterally  Pulmonary:      Effort: Pulmonary effort is normal. No respiratory distress.      Breath sounds: Normal breath sounds.   Abdominal:      General: Bowel sounds are normal.      Palpations: Abdomen is soft. There is no hepatomegaly or splenomegaly.      Tenderness: There is no abdominal tenderness. There is no guarding.   Musculoskeletal:      Cervical back: Normal range of motion and neck supple.      Right lower leg: Edema (1-2+ nonpitting pretibial and ankle) present.      Left lower leg: Edema (1-2+ nonpitting pretibial and ankle) present.   Lymphadenopathy:      Cervical: No cervical adenopathy.   Skin:     General: Skin is warm and dry.      Findings: No rash.   Neurological:      Mental Status: He is alert and oriented to person, place, and time.      Gait: Abnormal gait: guarded gait with cane.   Psychiatric:         Mood and Affect: Mood normal.         Behavior: Behavior normal.         Thought Content: Thought content normal.         Cognition and Memory: Cognition normal.         Judgment: Judgment normal.         Lab Results   Component Value Date    WBC 1.70 (L) 11/21/2022    RBC 3.51 (L) 11/21/2022    HGB 10.3 (L) 11/21/2022    HCT 33.7 (L) 11/21/2022    MCV 96.0 11/21/2022    MCH 29.3 11/21/2022    MCHC 30.6 (L) 11/21/2022    RDW 17.2 (H) 11/21/2022    RDWSD 60.8 (H) 11/21/2022    MPV  9.9 11/21/2022     (L) 11/21/2022    NEUTRORELPCT 40.5 (L) 11/21/2022    LYMPHORELPCT 31.8 11/21/2022    MONORELPCT 18.2 (H) 11/21/2022    EOSRELPCT 6.5 (H) 11/21/2022    BASORELPCT 2.4 (H) 11/21/2022    AUTOIGPER 0.6 (H) 11/21/2022    NEUTROABS 0.69 (L) 11/21/2022    LYMPHSABS 0.54 (L) 11/21/2022    MONOSABS 0.31 11/21/2022    EOSABS 0.11 11/21/2022    BASOSABS 0.04 11/21/2022    AUTOIGNUM 0.01 11/21/2022    NRBC 0.0 11/21/2022     Lab Results   Component Value Date    GLUCOSE 140 (H) 11/21/2022    BUN 37 (H) 11/21/2022    CREATININE 2.77 (C) 11/21/2022    EGFRIFNONA 24 (L) 08/09/2021    EGFRIFAFRI 27 (L) 08/09/2021    BCR 13.4 11/21/2022    K 4.0 11/21/2022    CO2 22.5 11/21/2022    CALCIUM 8.7 11/21/2022    PROTENTOTREF 5.5 (L) 11/07/2022    ALBUMIN 4.00 11/21/2022    LABIL2 1.8 (H) 11/07/2022    AST 11 11/21/2022    ALT 9 11/21/2022      Lab Results   Component Value Date    CHLPL 120 09/22/2022    TRIG 146 09/22/2022    HDL 29 (L) 09/22/2022    VLDL 26 09/22/2022    LDL 65 09/22/2022     Lab Results   Component Value Date    TSH 2.900 09/24/2022     Lab Results   Component Value Date    HGBA1C 8.8 (H) 03/16/2022         Assessment      Diagnoses and all orders for this visit:    1. Benign hypertension with CKD (chronic kidney disease) stage IV (HCC) (Primary)  Assessment & Plan:  Hypertension is stable.  Continue current medications.  Ambulatory blood pressure monitoring.  Blood pressure will be reassessed at the next regular appointment.  Continue Bumex daily and Carvedilol twice daily.  Follow up as scheduled with Dr Hutchison, renal.      2. Encounter for immunization  -     Fluzone High-Dose 65+yrs    3. Mixed hyperlipidemia  Assessment & Plan:  Stable.  Diet controlled.      4. Type 2 diabetes mellitus with both eyes affected by severe nonproliferative retinopathy and macular edema, with long-term current use of insulin (HCC)  Assessment & Plan:  Diabetes is improving with treatment.   Continue current  treatment regimen.  Reminded to get yearly retinal exam.  Diabetes will be reassessed in 6 months.  Follow up as scheduled with endocrine.      5. Multiple myeloma not having achieved remission (HCC)  Assessment & Plan:  Follow up as scheduled with oncology.      6. Anxiety  Assessment & Plan:  Stable.  Continue Escitalopram daily.         Return in about 6 months (around 5/22/2023) for Medicare Wellness, Recheck.or sooner if problems or concerns.         I spent 40 minutes caring for Contreras on this date of service. This time includes time spent by me in the following activities:reviewing tests, obtaining and/or reviewing a separately obtained history, performing a medically appropriate examination and/or evaluation , counseling and educating the patient/family/caregiver, ordering medications, tests, or procedures and documenting information in the medical record    COVID-19 Precautions - Patient was compliant in wearing a mask. When I saw the patient, I used appropriate personal protective equipment (PPE) including mask, gloves, and eye shield (standard procedure).  Hand hygiene was completed before and after seeing the patient.

## 2022-11-22 NOTE — PATIENT INSTRUCTIONS
Continue to monitor your blood sugar once daily before a meal and record results.  Continue to monitor your blood pressure periodically and record results.  Continue to work on healthy diet and exercise as tolerated.  Follow up in 6 months for Medicare Wellness, or sooner if problems or concerns.

## 2022-11-23 ENCOUNTER — INFUSION (OUTPATIENT)
Dept: ONCOLOGY | Facility: HOSPITAL | Age: 67
End: 2022-11-23

## 2022-11-23 VITALS
TEMPERATURE: 97.5 F | OXYGEN SATURATION: 96 % | WEIGHT: 312 LBS | BODY MASS INDEX: 39.21 KG/M2 | HEART RATE: 77 BPM | SYSTOLIC BLOOD PRESSURE: 144 MMHG | DIASTOLIC BLOOD PRESSURE: 78 MMHG | RESPIRATION RATE: 16 BRPM

## 2022-11-23 DIAGNOSIS — E83.42 HYPOMAGNESEMIA: ICD-10-CM

## 2022-11-23 PROCEDURE — 25010000002 MAGNESIUM SULFATE 2 GM/50ML SOLUTION: Performed by: INTERNAL MEDICINE

## 2022-11-23 PROCEDURE — 96365 THER/PROPH/DIAG IV INF INIT: CPT

## 2022-11-23 RX ORDER — MAGNESIUM SULFATE HEPTAHYDRATE 40 MG/ML
2 INJECTION, SOLUTION INTRAVENOUS ONCE
Status: COMPLETED | OUTPATIENT
Start: 2022-11-23 | End: 2022-11-23

## 2022-11-23 RX ADMIN — MAGNESIUM SULFATE IN WATER 2 G: 40 INJECTION, SOLUTION INTRAVENOUS at 08:01

## 2022-11-23 NOTE — ASSESSMENT & PLAN NOTE
Hypertension is stable.  Continue current medications.  Ambulatory blood pressure monitoring.  Blood pressure will be reassessed at the next regular appointment.  Continue Bumex daily and Carvedilol twice daily.

## 2022-11-23 NOTE — ASSESSMENT & PLAN NOTE
Diabetes is improving with treatment.   Continue current treatment regimen.  Reminded to get yearly retinal exam.  Diabetes will be reassessed in 6 months.  Follow up as scheduled with endocrine.

## 2022-11-23 NOTE — ASSESSMENT & PLAN NOTE
Hypertension is stable.  Continue current medications.  Ambulatory blood pressure monitoring.  Blood pressure will be reassessed at the next regular appointment.  Continue Bumex daily and Carvedilol twice daily.  Follow up as scheduled with dru Maddox.

## 2022-11-28 ENCOUNTER — LAB (OUTPATIENT)
Dept: LAB | Facility: HOSPITAL | Age: 67
End: 2022-11-28

## 2022-11-28 ENCOUNTER — INFUSION (OUTPATIENT)
Dept: ONCOLOGY | Facility: HOSPITAL | Age: 67
End: 2022-11-28

## 2022-11-28 ENCOUNTER — OFFICE VISIT (OUTPATIENT)
Dept: ONCOLOGY | Facility: CLINIC | Age: 67
End: 2022-11-28

## 2022-11-28 VITALS
SYSTOLIC BLOOD PRESSURE: 158 MMHG | BODY MASS INDEX: 37.77 KG/M2 | OXYGEN SATURATION: 97 % | WEIGHT: 303.8 LBS | HEART RATE: 76 BPM | DIASTOLIC BLOOD PRESSURE: 83 MMHG | HEIGHT: 75 IN | TEMPERATURE: 96.8 F | RESPIRATION RATE: 18 BRPM

## 2022-11-28 DIAGNOSIS — Z79.899 ENCOUNTER FOR LONG-TERM CURRENT USE OF HIGH RISK MEDICATION: ICD-10-CM

## 2022-11-28 DIAGNOSIS — D69.6 THROMBOCYTOPENIA: ICD-10-CM

## 2022-11-28 DIAGNOSIS — N18.32 ANEMIA IN STAGE 3B CHRONIC KIDNEY DISEASE: ICD-10-CM

## 2022-11-28 DIAGNOSIS — T45.1X5A NEUROPATHY DUE TO CHEMOTHERAPEUTIC DRUG: ICD-10-CM

## 2022-11-28 DIAGNOSIS — E83.39 HYPOPHOSPHATEMIA: ICD-10-CM

## 2022-11-28 DIAGNOSIS — E83.42 HYPOMAGNESEMIA: ICD-10-CM

## 2022-11-28 DIAGNOSIS — C90.00 MULTIPLE MYELOMA NOT HAVING ACHIEVED REMISSION: ICD-10-CM

## 2022-11-28 DIAGNOSIS — K52.9 CHRONIC DIARRHEA: ICD-10-CM

## 2022-11-28 DIAGNOSIS — G62.0 NEUROPATHY DUE TO CHEMOTHERAPEUTIC DRUG: ICD-10-CM

## 2022-11-28 DIAGNOSIS — E83.51 HYPOCALCEMIA: ICD-10-CM

## 2022-11-28 DIAGNOSIS — C90.00 MULTIPLE MYELOMA NOT HAVING ACHIEVED REMISSION: Primary | ICD-10-CM

## 2022-11-28 DIAGNOSIS — D84.9 IMMUNOCOMPROMISED STATE: ICD-10-CM

## 2022-11-28 DIAGNOSIS — E55.9 VITAMIN D DEFICIENCY: ICD-10-CM

## 2022-11-28 DIAGNOSIS — D63.1 ANEMIA IN STAGE 3B CHRONIC KIDNEY DISEASE: ICD-10-CM

## 2022-11-28 LAB
ALBUMIN SERPL-MCNC: 3.9 G/DL (ref 3.5–5.2)
ALBUMIN/GLOB SERPL: 1.9 G/DL (ref 1.1–2.4)
ALP SERPL-CCNC: 51 U/L (ref 38–116)
ALT SERPL W P-5'-P-CCNC: 9 U/L (ref 0–41)
ANION GAP SERPL CALCULATED.3IONS-SCNC: 12.4 MMOL/L (ref 5–15)
AST SERPL-CCNC: 8 U/L (ref 0–40)
BASOPHILS # BLD AUTO: 0.07 10*3/MM3 (ref 0–0.2)
BASOPHILS NFR BLD AUTO: 2.1 % (ref 0–1.5)
BILIRUB SERPL-MCNC: 0.4 MG/DL (ref 0.2–1.2)
BUN SERPL-MCNC: 34 MG/DL (ref 6–20)
BUN/CREAT SERPL: 11.4 (ref 7.3–30)
CALCIUM SPEC-SCNC: 8.5 MG/DL (ref 8.5–10.2)
CHLORIDE SERPL-SCNC: 105 MMOL/L (ref 98–107)
CO2 SERPL-SCNC: 24.6 MMOL/L (ref 22–29)
CREAT SERPL-MCNC: 2.97 MG/DL (ref 0.7–1.3)
DEPRECATED RDW RBC AUTO: 59.3 FL (ref 37–54)
EGFRCR SERPLBLD CKD-EPI 2021: 22.6 ML/MIN/1.73
EOSINOPHIL # BLD AUTO: 0.13 10*3/MM3 (ref 0–0.4)
EOSINOPHIL NFR BLD AUTO: 3.8 % (ref 0.3–6.2)
ERYTHROCYTE [DISTWIDTH] IN BLOOD BY AUTOMATED COUNT: 16.9 % (ref 12.3–15.4)
GLOBULIN UR ELPH-MCNC: 2.1 GM/DL (ref 1.8–3.5)
GLUCOSE SERPL-MCNC: 190 MG/DL (ref 74–124)
HCT VFR BLD AUTO: 32.9 % (ref 37.5–51)
HGB BLD-MCNC: 10.1 G/DL (ref 13–17.7)
IMM GRANULOCYTES # BLD AUTO: 0.02 10*3/MM3 (ref 0–0.05)
IMM GRANULOCYTES NFR BLD AUTO: 0.6 % (ref 0–0.5)
LYMPHOCYTES # BLD AUTO: 0.81 10*3/MM3 (ref 0.7–3.1)
LYMPHOCYTES NFR BLD AUTO: 23.8 % (ref 19.6–45.3)
MAGNESIUM SERPL-MCNC: 1.5 MG/DL (ref 1.8–2.5)
MCH RBC QN AUTO: 29.4 PG (ref 26.6–33)
MCHC RBC AUTO-ENTMCNC: 30.7 G/DL (ref 31.5–35.7)
MCV RBC AUTO: 95.6 FL (ref 79–97)
MONOCYTES # BLD AUTO: 0.54 10*3/MM3 (ref 0.1–0.9)
MONOCYTES NFR BLD AUTO: 15.8 % (ref 5–12)
NEUTROPHILS NFR BLD AUTO: 1.84 10*3/MM3 (ref 1.7–7)
NEUTROPHILS NFR BLD AUTO: 53.9 % (ref 42.7–76)
NRBC BLD AUTO-RTO: 0 /100 WBC (ref 0–0.2)
PHOSPHATE SERPL-MCNC: 2.8 MG/DL (ref 2.5–4.5)
PLATELET # BLD AUTO: 151 10*3/MM3 (ref 140–450)
PMV BLD AUTO: 8.9 FL (ref 6–12)
POTASSIUM SERPL-SCNC: 4.5 MMOL/L (ref 3.5–4.7)
PROT SERPL-MCNC: 6 G/DL (ref 6.3–8)
RBC # BLD AUTO: 3.44 10*6/MM3 (ref 4.14–5.8)
SODIUM SERPL-SCNC: 142 MMOL/L (ref 134–145)
WBC NRBC COR # BLD: 3.41 10*3/MM3 (ref 3.4–10.8)

## 2022-11-28 PROCEDURE — 36415 COLL VENOUS BLD VENIPUNCTURE: CPT

## 2022-11-28 PROCEDURE — 85025 COMPLETE CBC W/AUTO DIFF WBC: CPT

## 2022-11-28 PROCEDURE — 96401 CHEMO ANTI-NEOPL SQ/IM: CPT

## 2022-11-28 PROCEDURE — 99215 OFFICE O/P EST HI 40 MIN: CPT | Performed by: INTERNAL MEDICINE

## 2022-11-28 PROCEDURE — 83735 ASSAY OF MAGNESIUM: CPT

## 2022-11-28 PROCEDURE — 25010000002 MAGNESIUM SULFATE 2 GM/50ML SOLUTION: Performed by: INTERNAL MEDICINE

## 2022-11-28 PROCEDURE — 80053 COMPREHEN METABOLIC PANEL: CPT

## 2022-11-28 PROCEDURE — 25010000002 DARATUMUMAB-HYALURONIDASE-FIHJ 1800-30000 MG-UT/15ML SOLUTION: Performed by: INTERNAL MEDICINE

## 2022-11-28 PROCEDURE — 84100 ASSAY OF PHOSPHORUS: CPT | Performed by: INTERNAL MEDICINE

## 2022-11-28 PROCEDURE — 96366 THER/PROPH/DIAG IV INF ADDON: CPT

## 2022-11-28 PROCEDURE — 96365 THER/PROPH/DIAG IV INF INIT: CPT

## 2022-11-28 RX ORDER — FAMOTIDINE 20 MG/1
20 TABLET, FILM COATED ORAL ONCE
Status: CANCELLED
Start: 2022-11-28 | End: 2022-11-28

## 2022-11-28 RX ORDER — MAGNESIUM SULFATE HEPTAHYDRATE 40 MG/ML
2 INJECTION, SOLUTION INTRAVENOUS ONCE
Status: COMPLETED | OUTPATIENT
Start: 2022-11-28 | End: 2022-11-28

## 2022-11-28 RX ORDER — DIPHENHYDRAMINE HCL 25 MG
25 CAPSULE ORAL ONCE
Status: CANCELLED | OUTPATIENT
Start: 2022-11-28

## 2022-11-28 RX ORDER — DIPHENHYDRAMINE HYDROCHLORIDE 50 MG/ML
50 INJECTION INTRAMUSCULAR; INTRAVENOUS AS NEEDED
Status: CANCELLED | OUTPATIENT
Start: 2022-11-28

## 2022-11-28 RX ORDER — MAGNESIUM OXIDE 400 MG/1
400 TABLET ORAL DAILY
Qty: 30 TABLET | Refills: 2
Start: 2022-11-28 | End: 2022-12-27

## 2022-11-28 RX ORDER — FAMOTIDINE 20 MG/1
20 TABLET, FILM COATED ORAL ONCE
Status: DISCONTINUED | OUTPATIENT
Start: 2022-11-28 | End: 2022-11-28 | Stop reason: HOSPADM

## 2022-11-28 RX ORDER — ERGOCALCIFEROL 1.25 MG/1
50000 CAPSULE ORAL WEEKLY
Qty: 13 CAPSULE | Refills: 1 | Status: SHIPPED | OUTPATIENT
Start: 2022-11-28 | End: 2023-03-20

## 2022-11-28 RX ORDER — DIPHENHYDRAMINE HCL 25 MG
25 CAPSULE ORAL ONCE
Status: DISCONTINUED | OUTPATIENT
Start: 2022-11-28 | End: 2022-11-28 | Stop reason: HOSPADM

## 2022-11-28 RX ORDER — ACETAMINOPHEN 500 MG
1000 TABLET ORAL ONCE
Status: DISCONTINUED | OUTPATIENT
Start: 2022-11-28 | End: 2022-11-28 | Stop reason: HOSPADM

## 2022-11-28 RX ORDER — FAMOTIDINE 10 MG/ML
20 INJECTION, SOLUTION INTRAVENOUS AS NEEDED
Status: CANCELLED | OUTPATIENT
Start: 2022-11-28

## 2022-11-28 RX ORDER — ACETAMINOPHEN 500 MG
1000 TABLET ORAL ONCE
Status: CANCELLED | OUTPATIENT
Start: 2022-11-28

## 2022-11-28 RX ORDER — MEPERIDINE HYDROCHLORIDE 25 MG/ML
25 INJECTION INTRAMUSCULAR; INTRAVENOUS; SUBCUTANEOUS
Status: CANCELLED | OUTPATIENT
Start: 2022-11-28

## 2022-11-28 RX ADMIN — DARATUMUMAB AND HYALURONIDASE-FIHJ (HUMAN RECOMBINANT) 1800 MG: 1800; 30000 INJECTION SUBCUTANEOUS at 12:04

## 2022-11-28 RX ADMIN — MAGNESIUM SULFATE IN WATER 2 G: 40 INJECTION, SOLUTION INTRAVENOUS at 12:21

## 2022-11-28 NOTE — NURSING NOTE
Pt to infusion room for SQ darzalex.  Labs resulted and noted.   Mag 1.5.  Discussed with Dr Del Valle. Order received for IV mag  2gm IV magnesium given per protocol.  Pt petra well  Procrit not given per parameters.  Hgb 10.3

## 2022-11-28 NOTE — PROGRESS NOTES
Subjective     CHIEF COMPLAINT:      Chief Complaint   Patient presents with   • Follow-up     DISCUSS IMMUNIZATIONS AND STEM CELL TRANSPLANT/DIARRHEA        HISTORY OF PRESENT ILLNESS:     Contreras Albert is a 67 y.o. male patient who returns today for follow up on his multiple myeloma.  He returns today for follow-up reporting improvement in the diarrhea.  He reports having the diarrhea for 3-4 days which usually starts after he receives his Darzalex.  He feels stronger.  He feels that his leg muscles are stronger than before.  He uses his cane but feels that he is needing it less than before.    Patient did not develop any recent infections.  He is due soon to receive the final part of the recommended post transplant vaccines.    ROS:  Pertinent ROS is in the HPI.     Past medical, surgical, social and family history were reviewed.     MEDICATIONS:    Current Outpatient Medications:   •  acetaminophen (TYLENOL) 325 MG tablet, Take 2 tablets by mouth Every 6 (Six) Hours As Needed for Mild Pain., Disp: , Rfl:   •  acyclovir (ZOVIRAX) 800 MG tablet, Take 1 tablet by mouth Daily., Disp: 90 tablet, Rfl: 2  •  ASPIRIN 81 PO, Take 81 mg by mouth Daily., Disp: , Rfl:   •  Blood Glucose Monitoring Suppl (FREESTYLE FREEDOM LITE) w/Device kit, 1 kit Daily. Indications: Type 2 Diabetes, Disp: 1 each, Rfl: 0  •  bumetanide (BUMEX) 2 MG tablet, Take 0.5 tablets by mouth 2 (Two) Times a Day. (Patient taking differently: Take 2 mg by mouth Daily.), Disp: , Rfl:   •  calcium carbonate (TUMS) 500 MG chewable tablet, Chew 2 tablets Daily., Disp: , Rfl:   •  carvedilol (COREG) 12.5 MG tablet, Take 12.5 mg by mouth 2 (Two) Times a Day With Meals., Disp: , Rfl:   •  Continuous Blood Gluc Sensor (Dexcom G6 Sensor), Replace sensor every 10 days.  Dx: E 11.22, Disp: 9 each, Rfl: 1  •  Continuous Blood Gluc Transmit (Dexcom G6 Transmitter) misc, 1 each Every 3 (Three) Months. Dx: E 11.22, Disp: 1 each, Rfl: 1  •  dexamethasone (DECADRON) 4 MG  tablet, Take 3 tablets on day of chemo, 1 tablet on day #2 and 1 tablet on day #3 and repeat monthly., Disp: 15 tablet, Rfl: 1  •  diphenoxylate-atropine (LOMOTIL) 2.5-0.025 MG per tablet, Take 2 tablets by mouth 4 (Four) Times a Day As Needed for Diarrhea., Disp: 120 tablet, Rfl: 0  •  escitalopram (LEXAPRO) 5 MG tablet, Take 1 tablet by mouth Daily., Disp: 30 tablet, Rfl: 5  •  finasteride (PROSCAR) 5 MG tablet, Take 5 mg by mouth daily., Disp: , Rfl:   •  gabapentin (NEURONTIN) 300 MG capsule, Take 1 capsule in AM and take 2 capsules at bedtime., Disp: 90 capsule, Rfl: 5  •  glucagon (GLUCAGEN) 1 MG injection, Inject 1 mg into the appropriate muscle as directed by prescriber 1 (One) Time As Needed for Low Blood Sugar for up to 1 dose. Follow package directions for low blood sugar., Disp: 1 kit, Rfl: 3  •  glucose blood (FREESTYLE LITE) test strip, Use to test blood sugar 4-5 times daily., Disp: 200 each, Rfl: 5  •  insulin aspart (NovoLOG FlexPen) 100 UNIT/ML solution pen-injector sc pen, Inject 8 Units under the skin into the appropriate area as directed 3 (Three) Times a Day With Meals. Pt reports he takes a SSI type dosing 4 times a day, Disp: , Rfl:   •  Insulin Glargine (BASAGLAR KWIKPEN) 100 UNIT/ML injection pen, , Disp: 60 mL, Rfl: 1  •  Insulin Pen Needle 32G X 4 MM misc, USING 4 PEN NEEDLES DAILY, Disp: 400 each, Rfl: 3  •  Lancets (FREESTYLE) lancets, Use once then discard to test blood sugar each morning, Disp: 100 each, Rfl: 0  •  loperamide (IMODIUM) 2 MG capsule, Take 1 capsule by mouth 4 (Four) Times a Day As Needed for Diarrhea., Disp: , Rfl:   •  magnesium oxide (MAG-OX) 400 MG tablet, Take 1 tablet by mouth 2 (Two) Times a Day., Disp: 60 tablet, Rfl: 2  •  ondansetron (ZOFRAN) 8 MG tablet, Take 1 tablet by mouth 3 (Three) Times a Day As Needed for Nausea or Vomiting., Disp: 30 tablet, Rfl: 5  •  pantoprazole (Protonix) 40 MG EC tablet, Take 1 tablet by mouth Daily., Disp: 30 tablet, Rfl: 1  •   "pomalidomide (Pomalyst) 3 MG chemo capsule, Take 1 capsule by mouth Daily. Take for 21 days on, then 7 days off, Disp: 21 capsule, Rfl: 0  •  tamsulosin (FLOMAX) 0.4 MG capsule 24 hr capsule, Take 1 capsule by mouth Daily., Disp: , Rfl:   •  vitamin D (ERGOCALCIFEROL) 1.25 MG (33880 UT) capsule capsule, Take 50,000 Units by mouth 1 (One) Time Per Week. Mondays, Disp: , Rfl:   Objective     VITAL SIGNS:     Vitals:    11/28/22 1003   BP: 158/83   Pulse: 76   Resp: 18   Temp: 96.8 °F (36 °C)   TempSrc: Temporal   SpO2: 97%   Weight: (!) 138 kg (303 lb 12.8 oz)   Height: 190 cm (74.8\")   PainSc: 0-No pain     Body mass index is 38.17 kg/m².     Wt Readings from Last 5 Encounters:   11/28/22 (!) 138 kg (303 lb 12.8 oz)   11/23/22 (!) 142 kg (312 lb)   11/22/22 (!) 142 kg (313 lb 3.2 oz)   11/10/22 (!) 139 kg (306 lb)   10/31/22 (!) 140 kg (307 lb 9.6 oz)     PHYSICAL EXAMINATION:   GENERAL: The patient appears in fair general condition, not in acute distress.   SKIN: No ecchymosis.  EYES: No jaundice. Pallor.  LYMPHATICS: No cervical lymphadenopathy.  CHEST: Normal respiratory effort.  Lungs clear bilaterally.  No added sounds.  CVS: Normal S1-S2.  No murmurs.  ABDOMEN: Soft. No tenderness. No Hepatomegaly. No Splenomegaly. No masses.  EXTREMITIES: +1 edema bilaterally.  Leg erythema in the distal aspect of the legs.  No warmth.  No calf tenderness.     DIAGNOSTIC DATA:     Results from last 7 days   Lab Units 11/28/22  0952 11/21/22  1121   WBC 10*3/mm3 3.41 1.70*   NEUTROS ABS 10*3/mm3 1.84 0.69*   HEMOGLOBIN g/dL 10.1* 10.3*   HEMATOCRIT % 32.9* 33.7*   PLATELETS 10*3/mm3 151 105*     Results from last 7 days   Lab Units 11/28/22  0952   SODIUM mmol/L 142   POTASSIUM mmol/L 4.5   CHLORIDE mmol/L 105   CO2 mmol/L 24.6   BUN mg/dL 34*   CREATININE mg/dL 2.97*   CALCIUM mg/dL 8.5   ALBUMIN g/dL 3.90   BILIRUBIN mg/dL 0.4   ALK PHOS U/L 51   ALT (SGPT) U/L 9   AST (SGOT) U/L 8   GLUCOSE mg/dL 190*   MAGNESIUM mg/dL 1.5* "      11/28/2022  Day 1   Phosphorus 2.5 - 4.5 mg/dL 2.8     Component      Latest Ref Rng & Units 10/13/2022 10/24/2022 11/7/2022   IgG      603 - 1613 mg/dL 431 (L) 315 (L) 443 (L)   IgA      61 - 437 mg/dL 44 (L) 37 (L) 45 (L)   IgM      20 - 172 mg/dL 11 (L) 7 (L) 10 (L)   Total Protein      6.0 - 8.5 g/dL 5.8 (L) 5.5 (L) 5.5 (L)   Albumin      2.9 - 4.4 g/dL 3.5 3.3 3.5   Alpha-1-Globulin      0.0 - 0.4 g/dL 0.2 0.2 0.2   Alpha-2-Globulin      0.4 - 1.0 g/dL 0.8 0.7 0.6   Beta Globulin      0.7 - 1.3 g/dL 0.8 0.8 0.7   Gamma Globulin      0.4 - 1.8 g/dL 0.4 0.4 0.4   M-Jurgen      Not Observed g/dL Comment: Comment: Comment:   Globulin      2.2 - 3.9 g/dL 2.3 2.2 2.0 (L)   A/G Ratio      0.7 - 1.7 1.6 1.6 1.8 (H)   Immunofixation Reflex, Serum       Comment (A) Comment (A) Comment (A)   Please note       Comment Comment Comment   Kappa FLC      3.3 - 19.4 mg/L 32.6 (H) 43.5 (H) 54.5 (H)   Free Lambda Light Chains      5.7 - 26.3 mg/L 24.3 29.1 (H) 31.9 (H)   Kappa/Lambda Ratio      0.26 - 1.65 1.34 1.49 1.71 (H)     Assessment & Plan    *IgG lambda multiple myeloma.  · Bone marrow 5/23/2020 hypercellular marrow with 80% involvement of plasma cell myeloma; FISH studies pending  · Bone survey 5/26/2020: Diffuse osteoporosis and demineralization.  However, no discrete lytic lesions.  · SPEP 5/23/2020: M spike 5.1.  IgG 6629 lambda.  Light chain ratio 0 with free lambda light chains 6400.  Beta-2 microglobulin 16.3.  24-hour urine 12.7 g protein per 24-hour with monoclonal lambda free light chain 33.9%, monoclonal IgG lambda 23.5%  · 24-hour urine testing from 5/24/2020 free lambda light chains 8.4 g/L  · Considering renal failure, initiated CyBorD 5/28/20.  Given the significantly elevated light chains with the first cycle of treatment plan to give Velcade days 1, 4, 8, 11; Cytoxan 300 PO mg/m² weekly and dexamethasone 40 mg p.o. days 1, 2, 3, 4, 8 and 15.    · After cycle 1 treatment can likely be altered to weekly  dosing of each drug.  · Patient was referred to Dr. Alvares at [facility].  · Patient is tolerating the treatment except for generalized bone achiness after receiving Velcade.  · On 6/18/2020, Velcade was changed to 1.5 mg/m² weekly continuously along with weekly Decadron and oral Cytoxan.  · M protein decreased to 2.4 and free light chain improved to 874 on 7/16/2020 indicating response to therapy.  · M protein decreased to 2.0 and free light chain decreased to 736 on 8/13/2020.   · Although the improvement indicates response, he continues to have significant amount of monoclonal protein despite being on treatment for 3 months.  · In addition, patient is having side effects from Velcade in the form of painful neuropathy.  · I discussed the case with Dr. Alvares and we recommended stopping Velcade and Cytoxan and changing the treatment to Daratumumab subcutaneous injection weekly along with Revlimid 10 mg daily for 21 out of 28-day cycle and Decadron 40 mg daily.  · Patient was started on daratumumab SQ on 8/27/2020.   · S/p stem cell transplant on 12/9/2020 at [facility].  · Day 100 bone marrow on 3/16/2021 revealed minute  plasma cell population.  PET scan on 3/29/2021 was negative.  • Patient enrolled in the  clinical trial randomizing to maintenance lenalidomide versus lenalidomide and daratumumab.  Patient was randomized to the Revlimid arm initiated 4/13/2021 dosed at 5 mg daily (due to renal function).  Patient developed neutropenia requiring dose adjustment to 5 mg daily for 21/28 days.  Patient withdrew from clinical trial after 5 cycles on 8/31/2021 due to worsening diarrhea.    • He was switched to Velcade maintenance every other week 9/14/2021 which he discontinued 10/26/2021 due to neuropathy and lower extremity edema.    • He was subsequently changed to pomalidomide 2 mg daily 21/28 days on 11/8/2021.    • 1 year post transplant, bone marrow biopsy showed 5-7% plasma  cells and due to the residual disease he was started on DPd (daratumumab, pomalidomide, dexamethasone) on 2/21/2022.    • Patient was seen by Dr. Romero on 9/12/2022.  At that time patient was cycle 8-day 7 DPd. Due to chronic side effects, dexamethasone was changed from weekly to monthly.    • Patient did continue pomalidomide on his own during admission to the hospital, received 1 dose 4 mg p.o. on 9/24/2022 before was subsequently discontinued due to cytopenias.   • Labs on 9/24/2022 with IgG 314, IgM less than 25, IgA less than 50.  • 9/25/2022 24-hour urine free light chains with free kappa 351, free lambda 104, ratio 3.37  • On 9/24/2022, IgG kappa M protein was 0.1 g.  IgG 311, IgA 43, IgM 7.   • Low level M spike of IgG kappa likely represents daratumumab.   • No IgG lambda M protein.   • Serum free light chains on 9/26/2022 with kappa 57.4, lambda 42.8, ratio normal at 1.34.  • He received Darzlex on 10/3/2022 at [facility]. He decided to transfer his treatment back to our office.   • On 10/13/2022, M protein was too small to quantitate. Kappa/Lambda ratio was 1.34.  • Dexamethasone was changed to 12 mg on Mondays, 4 mg on Tuesday and 4 mg on Wednesday.  • Pomalyst was restarted at 4 mg every other day to be taken on 10/13/2022, 10/15/2022, 10/17/2022, 10/19/2022, 10/21/2022 and 10/23/2022.  • Pomalyst dose was subsequently reduced to 3 mg daily for 21 out of a 28-day cycle starting on 10/31/2022.  • He tolerated the reduced dose better with improvement in the diarrhea.  • Although he developed cytopenias secondary to treatment, counts have improved compared to September 2022.   • He is due to receive Darzalex today.    *Bone health.  · He is on Xgeva.   · Last dose was on 9/19/2022.  · Patient developed severe hypocalcemia following the Xgeva injection.  He required inpatient hospital stay.  · Given the duration of Xgeva injections, recommended changing treatment to every 3 months.  The next dose will be due  in late December 2022.    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  · Patient was receiving Procrit at [facility].  · Records from [facility] showed that the patient was receiving 70,000 units weekly and received the last dose on 9/27/2022.  · Patient was given Procrit 70,000 units on 9/27/2022.  · Hemoglobin was 9.9 on 10/17/2022.  He was given Procrit 60,000 units.  · Hemoglobin was 9.8 on 11/4/2022.  He was given Procrit 60,000 units.  · Hemoglobin is 10.1 today.    *Thrombocytopenia.  · This is attributed to multiple myeloma, prior stem cell transplant and chemotherapy.  · Platelet count is 151,000 today.    *Neutropenia.  · This was attributed to Pomalyst.  · Neutrophil count was down to 290 on 9/25/2022.  · He was given Neupogen during his hospital stay in September 2022.  · Neutrophil count is 1830 today.    *Hypocalcemia.  · Patient developed severe hypocalcemia due to Xgeva.  · Calcium was 5.9 on 9/23/2022.  · Calcium improved to 8.1 on 9/28/2022  · Calcium improved to 9.6 on 10/13/2022  · Calcium decreased to 7.7 on 10/24/2022.  Albumin was 3.8.  · Calcium is 8.5 today.    *Hypomagnesemia.  · Magnesium was 1.1 on 10/24/2022.  · He was placed on oral magnesium 400 mg twice daily.  · He developed diarrhea and it was stopped.  · Magnesium was 1.5 on 11/23/2022.  · He was given IV magnesium 2 g on 11/23/2022.  · Magnesium is 1.5 today.  · I recommended IV magnesium today.  I also recommended restarting magnesium oxide at a reduced dose of 1 tablet daily which can possibly be reduced to every other day if he develops diarrhea.    *Vitamin D deficiency.  · He is on vitamin D 50,000 units weekly.  · Vitamin D level was 18 on 10/3/2022.  · I recommended continuing vitamin D weekly.  New prescription was sent to his pharmacy.    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  · He is on gabapentin 300 mg in the morning and 600 mg in the  evening.  · He reports improvement in the neuropathy of the feet?  Secondary to decreased swelling.    *Immunocompromised host.  · He is on acyclovir 800 mg daily.  · He is on ASA 81 mg daily.  · He is not having problem with bleeding or infections.    *Chronic diarrhea.  · He is on Lomotil 2 tablets every 6 hours as needed.  · He also takes Imodium PRN.  · No improvement with Probiotic.  · Diarrhea improved after reducing the dose of Pomalyst.  · No change in the diarrhea after avoiding dairy products.    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  · S/p right partial nephrectomy on 5/18/2016.    PLAN:    1.  We will give Darzalex today.   2.  Start a new cycle of Pomalyst at 3 mg daily for 21 out of 28-day cycle.   3.  He will take Decadron 12 mg today.  He will take 4 mg tomorrow and 4 mg on Wednesday, 11/30/2022.   4.  We will give magnesium sulfate 2 g IV today.  5.  Restart magnesium oxide 1 tablet daily.  If diarrhea recurs, I asked him to reduce it to every other day.  6.  Continue Neurontin 300 mg in a.m. and 600 mg in p.m.  7.  Continue acyclovir aspirin and Protonix.  8.  Return weekly for CBC CMP magnesium phosphorus + Procrit injection.  9.  Follow-up in 4 weeks.  We will schedule him to receive Darzalex and Xgeva.  CBC CMP magnesium phosphorus SPEP [place] FLC will be obtained.  10.  I asked him to contact the transplant clinic at the [facility] so he can receive the posttransplant vaccinations.    I spent 50 minutes caring for Contreras on this date of service. This time includes time spent by me in the following activities: preparing for the visit, reviewing tests, obtaining and/or reviewing a separately obtained history, performing a medically appropriate examination and/or evaluation, counseling and educating the patient/family/caregiver, ordering medications, tests, or procedures, documenting information in the medical record, independently interpreting results and communicating  that information with the patient/family/caregiver and care coordination     Red Del Valle MD  11/28/22

## 2022-11-29 ENCOUNTER — SPECIALTY PHARMACY (OUTPATIENT)
Dept: PHARMACY | Facility: HOSPITAL | Age: 67
End: 2022-11-29

## 2022-11-29 DIAGNOSIS — E11.22 TYPE 2 DIABETES MELLITUS WITH STAGE 4 CHRONIC KIDNEY DISEASE, WITH LONG-TERM CURRENT USE OF INSULIN: ICD-10-CM

## 2022-11-29 DIAGNOSIS — N18.4 TYPE 2 DIABETES MELLITUS WITH STAGE 4 CHRONIC KIDNEY DISEASE, WITH LONG-TERM CURRENT USE OF INSULIN: ICD-10-CM

## 2022-11-29 DIAGNOSIS — Z79.4 TYPE 2 DIABETES MELLITUS WITH CHRONIC KIDNEY DISEASE, WITH LONG-TERM CURRENT USE OF INSULIN, UNSPECIFIED CKD STAGE: ICD-10-CM

## 2022-11-29 DIAGNOSIS — E11.22 TYPE 2 DIABETES MELLITUS WITH CHRONIC KIDNEY DISEASE, WITH LONG-TERM CURRENT USE OF INSULIN, UNSPECIFIED CKD STAGE: ICD-10-CM

## 2022-11-29 DIAGNOSIS — Z79.4 TYPE 2 DIABETES MELLITUS WITH STAGE 4 CHRONIC KIDNEY DISEASE, WITH LONG-TERM CURRENT USE OF INSULIN: ICD-10-CM

## 2022-11-29 LAB
ALBUMIN SERPL ELPH-MCNC: 3.6 G/DL (ref 2.9–4.4)
ALBUMIN/GLOB SERPL: 1.8 {RATIO} (ref 0.7–1.7)
ALPHA1 GLOB SERPL ELPH-MCNC: 0.2 G/DL (ref 0–0.4)
ALPHA2 GLOB SERPL ELPH-MCNC: 0.7 G/DL (ref 0.4–1)
B-GLOBULIN SERPL ELPH-MCNC: 0.8 G/DL (ref 0.7–1.3)
GAMMA GLOB SERPL ELPH-MCNC: 0.5 G/DL (ref 0.4–1.8)
GLOBULIN SER-MCNC: 2.1 G/DL (ref 2.2–3.9)
IGA SERPL-MCNC: 51 MG/DL (ref 61–437)
IGG SERPL-MCNC: 492 MG/DL (ref 603–1613)
IGM SERPL-MCNC: 10 MG/DL (ref 20–172)
INTERPRETATION SERPL IEP-IMP: ABNORMAL
KAPPA LC FREE SER-MCNC: 47.7 MG/L (ref 3.3–19.4)
KAPPA LC FREE/LAMBDA FREE SER: 1.27 {RATIO} (ref 0.26–1.65)
LABORATORY COMMENT REPORT: ABNORMAL
LAMBDA LC FREE SERPL-MCNC: 37.5 MG/L (ref 5.7–26.3)
M PROTEIN SERPL ELPH-MCNC: ABNORMAL G/DL
PROT SERPL-MCNC: 5.7 G/DL (ref 6–8.5)

## 2022-11-29 NOTE — PROGRESS NOTES
Specialty Note ( DPd regimen)      Labs reviewed        11/28/2022   WBC 3.40 - 10.80 10*3/mm3 3.41   Neutrophils Absolute 1.70 - 7.00 10*3/mm3 1.84   Hemoglobin 13.0 - 17.7 g/dL 10.1 (A)   Hematocrit 37.5 - 51.0 % 32.9 (A)   Platelets 140 - 450 10*3/mm3 151   Creatinine 0.70 - 1.30 mg/dL 2.97 (A)   BUN 6 - 20 mg/dL 34 (A)   Sodium 134 - 145 mmol/L 142   Potassium 3.5 - 4.7 mmol/L 4.5   Glucose 74 - 124 mg/dL 190 (A)   Magnesium 1.8 - 2.5 mg/dL 1.5 (A)   Calcium 8.5 - 10.2 mg/dL 8.5   Albumin 3.50 - 5.20 g/dL 3.90   Total Protein 6.3 - 8.0 g/dL 6.0 (A)   AST (SGOT) 0 - 40 U/L 8   ALT (SGPT) 0 - 41 U/L 9   Alkaline Phosphatase 38 - 116 U/L 51   Total Bilirubin 0.2 - 1.2 mg/dL 0.4     Dr Del Valle's dictation is noted    We will give Darzalex today.   2.  Start a new cycle of Pomalyst at 3 mg daily for 21 out of 28-day cycle.   3.  He will take Decadron 12 mg today.  He will take 4 mg tomorrow and 4 mg on Wednesday, 11/30/2022.

## 2022-11-30 PROBLEM — C90.00 MULTIPLE MYELOMA NOT HAVING ACHIEVED REMISSION: Status: ACTIVE | Noted: 2022-11-30

## 2022-11-30 PROBLEM — E55.9 VITAMIN D DEFICIENCY: Status: ACTIVE | Noted: 2022-11-30

## 2022-12-01 RX ORDER — INSULIN GLARGINE 100 [IU]/ML
INJECTION, SOLUTION SUBCUTANEOUS
Qty: 63 ML | Refills: 1 | Status: SHIPPED | OUTPATIENT
Start: 2022-12-01 | End: 2022-12-13 | Stop reason: SDUPTHER

## 2022-12-01 RX ORDER — LANCETS 28 GAUGE
EACH MISCELLANEOUS
Qty: 400 EACH | Refills: 2 | Status: SHIPPED | OUTPATIENT
Start: 2022-12-01

## 2022-12-01 RX ORDER — INSULIN ASPART 100 [IU]/ML
INJECTION, SOLUTION INTRAVENOUS; SUBCUTANEOUS
Qty: 81 ML | Refills: 1 | Status: SHIPPED | OUTPATIENT
Start: 2022-12-01 | End: 2022-12-13 | Stop reason: SDUPTHER

## 2022-12-05 ENCOUNTER — LAB (OUTPATIENT)
Dept: LAB | Facility: HOSPITAL | Age: 67
End: 2022-12-05

## 2022-12-05 ENCOUNTER — TELEPHONE (OUTPATIENT)
Dept: ENDOCRINOLOGY | Age: 67
End: 2022-12-05

## 2022-12-05 ENCOUNTER — INFUSION (OUTPATIENT)
Dept: ONCOLOGY | Facility: HOSPITAL | Age: 67
End: 2022-12-05

## 2022-12-05 VITALS
DIASTOLIC BLOOD PRESSURE: 69 MMHG | BODY MASS INDEX: 38.63 KG/M2 | SYSTOLIC BLOOD PRESSURE: 117 MMHG | TEMPERATURE: 98.6 F | RESPIRATION RATE: 18 BRPM | HEART RATE: 77 BPM | OXYGEN SATURATION: 94 % | WEIGHT: 307.4 LBS

## 2022-12-05 DIAGNOSIS — N18.32 ANEMIA IN STAGE 3B CHRONIC KIDNEY DISEASE: ICD-10-CM

## 2022-12-05 DIAGNOSIS — C90.00 MULTIPLE MYELOMA, REMISSION STATUS UNSPECIFIED: ICD-10-CM

## 2022-12-05 DIAGNOSIS — D69.6 THROMBOCYTOPENIA: ICD-10-CM

## 2022-12-05 DIAGNOSIS — E83.51 HYPOCALCEMIA: ICD-10-CM

## 2022-12-05 DIAGNOSIS — T45.1X5A NEUROPATHY DUE TO CHEMOTHERAPEUTIC DRUG: ICD-10-CM

## 2022-12-05 DIAGNOSIS — E83.42 HYPOMAGNESEMIA: Primary | ICD-10-CM

## 2022-12-05 DIAGNOSIS — G62.0 NEUROPATHY DUE TO CHEMOTHERAPEUTIC DRUG: ICD-10-CM

## 2022-12-05 DIAGNOSIS — D63.1 ANEMIA IN STAGE 3B CHRONIC KIDNEY DISEASE: ICD-10-CM

## 2022-12-05 DIAGNOSIS — C90.00 MULTIPLE MYELOMA NOT HAVING ACHIEVED REMISSION: ICD-10-CM

## 2022-12-05 DIAGNOSIS — D64.9 ANEMIA, UNSPECIFIED TYPE: ICD-10-CM

## 2022-12-05 DIAGNOSIS — E83.42 HYPOMAGNESEMIA: ICD-10-CM

## 2022-12-05 LAB
ALBUMIN SERPL-MCNC: 3.7 G/DL (ref 3.5–5.2)
ALBUMIN/GLOB SERPL: 1.6 G/DL (ref 1.1–2.4)
ALP SERPL-CCNC: 63 U/L (ref 38–116)
ALT SERPL W P-5'-P-CCNC: 9 U/L (ref 0–41)
ANION GAP SERPL CALCULATED.3IONS-SCNC: 14.7 MMOL/L (ref 5–15)
AST SERPL-CCNC: 8 U/L (ref 0–40)
BASOPHILS # BLD AUTO: 0.1 10*3/MM3 (ref 0–0.2)
BASOPHILS NFR BLD AUTO: 2.8 % (ref 0–1.5)
BILIRUB SERPL-MCNC: 0.4 MG/DL (ref 0.2–1.2)
BUN SERPL-MCNC: 47 MG/DL (ref 6–20)
BUN/CREAT SERPL: 15.4 (ref 7.3–30)
CALCIUM SPEC-SCNC: 8.2 MG/DL (ref 8.5–10.2)
CHLORIDE SERPL-SCNC: 105 MMOL/L (ref 98–107)
CO2 SERPL-SCNC: 21.3 MMOL/L (ref 22–29)
CREAT SERPL-MCNC: 3.05 MG/DL (ref 0.7–1.3)
DEPRECATED RDW RBC AUTO: 58.8 FL (ref 37–54)
EGFRCR SERPLBLD CKD-EPI 2021: 21.9 ML/MIN/1.73
EOSINOPHIL # BLD AUTO: 0.12 10*3/MM3 (ref 0–0.4)
EOSINOPHIL NFR BLD AUTO: 3.3 % (ref 0.3–6.2)
ERYTHROCYTE [DISTWIDTH] IN BLOOD BY AUTOMATED COUNT: 17 % (ref 12.3–15.4)
GLOBULIN UR ELPH-MCNC: 2.3 GM/DL (ref 1.8–3.5)
GLUCOSE SERPL-MCNC: 160 MG/DL (ref 74–124)
HCT VFR BLD AUTO: 31.7 % (ref 37.5–51)
HGB BLD-MCNC: 10 G/DL (ref 13–17.7)
IMM GRANULOCYTES # BLD AUTO: 0.02 10*3/MM3 (ref 0–0.05)
IMM GRANULOCYTES NFR BLD AUTO: 0.6 % (ref 0–0.5)
LYMPHOCYTES # BLD AUTO: 0.59 10*3/MM3 (ref 0.7–3.1)
LYMPHOCYTES NFR BLD AUTO: 16.3 % (ref 19.6–45.3)
MAGNESIUM SERPL-MCNC: 1.4 MG/DL (ref 1.8–2.5)
MCH RBC QN AUTO: 29.8 PG (ref 26.6–33)
MCHC RBC AUTO-ENTMCNC: 31.5 G/DL (ref 31.5–35.7)
MCV RBC AUTO: 94.3 FL (ref 79–97)
MONOCYTES # BLD AUTO: 0.18 10*3/MM3 (ref 0.1–0.9)
MONOCYTES NFR BLD AUTO: 5 % (ref 5–12)
NEUTROPHILS NFR BLD AUTO: 2.62 10*3/MM3 (ref 1.7–7)
NEUTROPHILS NFR BLD AUTO: 72 % (ref 42.7–76)
NRBC BLD AUTO-RTO: 0 /100 WBC (ref 0–0.2)
PHOSPHATE SERPL-MCNC: 3.8 MG/DL (ref 2.5–4.5)
PLATELET # BLD AUTO: 131 10*3/MM3 (ref 140–450)
PMV BLD AUTO: 10.2 FL (ref 6–12)
POTASSIUM SERPL-SCNC: 4 MMOL/L (ref 3.5–4.7)
PROT SERPL-MCNC: 6 G/DL (ref 6.3–8)
RBC # BLD AUTO: 3.36 10*6/MM3 (ref 4.14–5.8)
SODIUM SERPL-SCNC: 141 MMOL/L (ref 134–145)
WBC NRBC COR # BLD: 3.63 10*3/MM3 (ref 3.4–10.8)

## 2022-12-05 PROCEDURE — 96365 THER/PROPH/DIAG IV INF INIT: CPT

## 2022-12-05 PROCEDURE — 85025 COMPLETE CBC W/AUTO DIFF WBC: CPT

## 2022-12-05 PROCEDURE — 25010000002 MAGNESIUM SULFATE IN D5W 1G/100ML (PREMIX) 1-5 GM/100ML-% SOLUTION: Performed by: INTERNAL MEDICINE

## 2022-12-05 PROCEDURE — 96366 THER/PROPH/DIAG IV INF ADDON: CPT

## 2022-12-05 PROCEDURE — 83735 ASSAY OF MAGNESIUM: CPT

## 2022-12-05 PROCEDURE — 25010000002 MAGNESIUM SULFATE 2 GM/50ML SOLUTION: Performed by: INTERNAL MEDICINE

## 2022-12-05 PROCEDURE — 84100 ASSAY OF PHOSPHORUS: CPT

## 2022-12-05 PROCEDURE — 36415 COLL VENOUS BLD VENIPUNCTURE: CPT

## 2022-12-05 PROCEDURE — 96372 THER/PROPH/DIAG INJ SC/IM: CPT

## 2022-12-05 PROCEDURE — 80053 COMPREHEN METABOLIC PANEL: CPT

## 2022-12-05 RX ORDER — PANTOPRAZOLE SODIUM 40 MG/1
TABLET, DELAYED RELEASE ORAL
Qty: 30 TABLET | Refills: 1 | Status: SHIPPED | OUTPATIENT
Start: 2022-12-05

## 2022-12-05 RX ORDER — SODIUM CHLORIDE 9 MG/ML
250 INJECTION, SOLUTION INTRAVENOUS ONCE
Status: COMPLETED | OUTPATIENT
Start: 2022-12-05 | End: 2022-12-05

## 2022-12-05 RX ORDER — MAGNESIUM SULFATE HEPTAHYDRATE 40 MG/ML
2 INJECTION, SOLUTION INTRAVENOUS ONCE
Status: COMPLETED | OUTPATIENT
Start: 2022-12-05 | End: 2022-12-05

## 2022-12-05 RX ORDER — MAGNESIUM SULFATE 1 G/100ML
1 INJECTION INTRAVENOUS ONCE
Status: COMPLETED | OUTPATIENT
Start: 2022-12-05 | End: 2022-12-05

## 2022-12-05 RX ADMIN — SODIUM CHLORIDE 250 ML: 9 INJECTION, SOLUTION INTRAVENOUS at 13:06

## 2022-12-05 RX ADMIN — MAGNESIUM SULFATE IN DEXTROSE 1 G: 10 INJECTION, SOLUTION INTRAVENOUS at 14:07

## 2022-12-05 RX ADMIN — MAGNESIUM SULFATE IN WATER 2 G: 40 INJECTION, SOLUTION INTRAVENOUS at 13:06

## 2022-12-05 NOTE — TELEPHONE ENCOUNTER
PT CAME IN NEEDING A REFILL ON HIS DEXCOM TRANSMITTER AND HIS SENSOR.     PLEASE SEND TO [facility]     IF YOU HAVE ANY QUESTIONS CALL HIM -[phone]

## 2022-12-05 NOTE — PROGRESS NOTES
Hgb today is 10.0.  No procrit today per guidelines.   Mag today is 1.4. patient has been getting IV replacement. Reviewed with Jacque henry RN and she will place order for IV mag.   Scheduling called to place on infusion schedule.

## 2022-12-06 ENCOUNTER — TELEPHONE (OUTPATIENT)
Dept: ONCOLOGY | Facility: CLINIC | Age: 67
End: 2022-12-06

## 2022-12-06 NOTE — TELEPHONE ENCOUNTER
Reviewed Dr. Del Valle's note and instructions on Gabby's identified voicemail. Left my direct number to call back if they have any questions.

## 2022-12-06 NOTE — TELEPHONE ENCOUNTER
----- Message from Red Del Valle MD sent at 12/5/2022 10:46 PM EST -----  Please inform the patient that his calcium was low. I recommend increasing the calcium (Tums) to 3 a day.    Thank you

## 2022-12-07 DIAGNOSIS — Z79.4 TYPE 2 DIABETES MELLITUS WITH STAGE 4 CHRONIC KIDNEY DISEASE, WITH LONG-TERM CURRENT USE OF INSULIN: ICD-10-CM

## 2022-12-07 DIAGNOSIS — N18.4 TYPE 2 DIABETES MELLITUS WITH STAGE 4 CHRONIC KIDNEY DISEASE, WITH LONG-TERM CURRENT USE OF INSULIN: ICD-10-CM

## 2022-12-07 DIAGNOSIS — E11.22 TYPE 2 DIABETES MELLITUS WITH STAGE 4 CHRONIC KIDNEY DISEASE, WITH LONG-TERM CURRENT USE OF INSULIN: ICD-10-CM

## 2022-12-07 RX ORDER — PROCHLORPERAZINE 25 MG/1
1 SUPPOSITORY RECTAL
Qty: 1 EACH | Refills: 1 | Status: SHIPPED | OUTPATIENT
Start: 2022-12-07 | End: 2023-01-06 | Stop reason: SDUPTHER

## 2022-12-07 RX ORDER — PROCHLORPERAZINE 25 MG/1
SUPPOSITORY RECTAL
Qty: 9 EACH | Refills: 1 | Status: SHIPPED | OUTPATIENT
Start: 2022-12-07 | End: 2022-12-13 | Stop reason: SDUPTHER

## 2022-12-09 DIAGNOSIS — C90.00 MULTIPLE MYELOMA NOT HAVING ACHIEVED REMISSION: ICD-10-CM

## 2022-12-12 ENCOUNTER — TELEPHONE (OUTPATIENT)
Dept: ONCOLOGY | Facility: CLINIC | Age: 67
End: 2022-12-12

## 2022-12-12 ENCOUNTER — LAB (OUTPATIENT)
Dept: LAB | Facility: HOSPITAL | Age: 67
End: 2022-12-12

## 2022-12-12 ENCOUNTER — INFUSION (OUTPATIENT)
Dept: ONCOLOGY | Facility: HOSPITAL | Age: 67
End: 2022-12-12

## 2022-12-12 ENCOUNTER — TELEPHONE (OUTPATIENT)
Dept: ENDOCRINOLOGY | Age: 67
End: 2022-12-12

## 2022-12-12 DIAGNOSIS — D69.6 THROMBOCYTOPENIA: ICD-10-CM

## 2022-12-12 DIAGNOSIS — D63.1 ANEMIA IN STAGE 3B CHRONIC KIDNEY DISEASE: ICD-10-CM

## 2022-12-12 DIAGNOSIS — T45.1X5A NEUROPATHY DUE TO CHEMOTHERAPEUTIC DRUG: ICD-10-CM

## 2022-12-12 DIAGNOSIS — C90.00 MULTIPLE MYELOMA, REMISSION STATUS UNSPECIFIED: ICD-10-CM

## 2022-12-12 DIAGNOSIS — N18.32 ANEMIA IN STAGE 3B CHRONIC KIDNEY DISEASE: ICD-10-CM

## 2022-12-12 DIAGNOSIS — E83.42 HYPOMAGNESEMIA: ICD-10-CM

## 2022-12-12 DIAGNOSIS — E83.51 HYPOCALCEMIA: ICD-10-CM

## 2022-12-12 DIAGNOSIS — G62.0 NEUROPATHY DUE TO CHEMOTHERAPEUTIC DRUG: ICD-10-CM

## 2022-12-12 DIAGNOSIS — C90.00 MULTIPLE MYELOMA NOT HAVING ACHIEVED REMISSION: ICD-10-CM

## 2022-12-12 LAB
ALBUMIN SERPL-MCNC: 4 G/DL (ref 3.5–5.2)
ALBUMIN/GLOB SERPL: 1.9 G/DL (ref 1.1–2.4)
ALP SERPL-CCNC: 45 U/L (ref 38–116)
ALT SERPL W P-5'-P-CCNC: 20 U/L (ref 0–41)
ANION GAP SERPL CALCULATED.3IONS-SCNC: 11.2 MMOL/L (ref 5–15)
AST SERPL-CCNC: 14 U/L (ref 0–40)
BASOPHILS # BLD AUTO: 0.1 10*3/MM3 (ref 0–0.2)
BASOPHILS NFR BLD AUTO: 3.2 % (ref 0–1.5)
BILIRUB SERPL-MCNC: 0.5 MG/DL (ref 0.2–1.2)
BUN SERPL-MCNC: 64 MG/DL (ref 6–20)
BUN/CREAT SERPL: 14.8 (ref 7.3–30)
CALCIUM SPEC-SCNC: 9.8 MG/DL (ref 8.5–10.2)
CHLORIDE SERPL-SCNC: 101 MMOL/L (ref 98–107)
CO2 SERPL-SCNC: 27.8 MMOL/L (ref 22–29)
CREAT SERPL-MCNC: 4.32 MG/DL (ref 0.7–1.3)
DEPRECATED RDW RBC AUTO: 56.1 FL (ref 37–54)
EGFRCR SERPLBLD CKD-EPI 2021: 14.4 ML/MIN/1.73
EOSINOPHIL # BLD AUTO: 0.17 10*3/MM3 (ref 0–0.4)
EOSINOPHIL NFR BLD AUTO: 5.5 % (ref 0.3–6.2)
ERYTHROCYTE [DISTWIDTH] IN BLOOD BY AUTOMATED COUNT: 16.4 % (ref 12.3–15.4)
GLOBULIN UR ELPH-MCNC: 2.1 GM/DL (ref 1.8–3.5)
GLUCOSE SERPL-MCNC: 199 MG/DL (ref 74–124)
HCT VFR BLD AUTO: 32 % (ref 37.5–51)
HGB BLD-MCNC: 10.2 G/DL (ref 13–17.7)
IMM GRANULOCYTES # BLD AUTO: 0.02 10*3/MM3 (ref 0–0.05)
IMM GRANULOCYTES NFR BLD AUTO: 0.6 % (ref 0–0.5)
LYMPHOCYTES # BLD AUTO: 0.53 10*3/MM3 (ref 0.7–3.1)
LYMPHOCYTES NFR BLD AUTO: 17.2 % (ref 19.6–45.3)
MAGNESIUM SERPL-MCNC: 1.4 MG/DL (ref 1.8–2.5)
MCH RBC QN AUTO: 29.7 PG (ref 26.6–33)
MCHC RBC AUTO-ENTMCNC: 31.9 G/DL (ref 31.5–35.7)
MCV RBC AUTO: 93.3 FL (ref 79–97)
MONOCYTES # BLD AUTO: 0.4 10*3/MM3 (ref 0.1–0.9)
MONOCYTES NFR BLD AUTO: 13 % (ref 5–12)
NEUTROPHILS NFR BLD AUTO: 1.86 10*3/MM3 (ref 1.7–7)
NEUTROPHILS NFR BLD AUTO: 60.5 % (ref 42.7–76)
NRBC BLD AUTO-RTO: 0 /100 WBC (ref 0–0.2)
PHOSPHATE SERPL-MCNC: 4.7 MG/DL (ref 2.5–4.5)
PLATELET # BLD AUTO: 122 10*3/MM3 (ref 140–450)
PMV BLD AUTO: 10.8 FL (ref 6–12)
POTASSIUM SERPL-SCNC: 4.4 MMOL/L (ref 3.5–4.7)
PROT SERPL-MCNC: 6.1 G/DL (ref 6.3–8)
RBC # BLD AUTO: 3.43 10*6/MM3 (ref 4.14–5.8)
SODIUM SERPL-SCNC: 140 MMOL/L (ref 134–145)
WBC NRBC COR # BLD: 3.08 10*3/MM3 (ref 3.4–10.8)

## 2022-12-12 PROCEDURE — 36415 COLL VENOUS BLD VENIPUNCTURE: CPT

## 2022-12-12 PROCEDURE — G0463 HOSPITAL OUTPT CLINIC VISIT: HCPCS

## 2022-12-12 PROCEDURE — 83735 ASSAY OF MAGNESIUM: CPT

## 2022-12-12 PROCEDURE — 80053 COMPREHEN METABOLIC PANEL: CPT

## 2022-12-12 PROCEDURE — 84100 ASSAY OF PHOSPHORUS: CPT

## 2022-12-12 PROCEDURE — 85025 COMPLETE CBC W/AUTO DIFF WBC: CPT

## 2022-12-12 NOTE — TELEPHONE ENCOUNTER
Spoke with pts wife, Gabby, and informed her of patients elevated creatinine. Dr. Del Valle recommends contact pts nephrologist, pts wife v/u. Lab results have been faxed to Dr. Hutchison.

## 2022-12-12 NOTE — TELEPHONE ENCOUNTER
Pt is almost out of insulin. He said that he currently has both medicare and medicaid. His medicade runs out in January but has had medicare has had it since August. Pt has been in contact with both insurances that are saying the other one is responsible for the medication. He is requesting information on how to fix the issue. He is also in need of Dexcom sensors refilled. Pt is requesting a call back because he only has 2 days left on the insulin.     Lantus and Novolog-Currently has been using [facility]

## 2022-12-12 NOTE — PROGRESS NOTES
Hgb today is 10.2.  No procrit today per guidelines.   Mag today is 1.4 and patient will require IV mag. Reviewed with Pippaharper henry RN and patient to be scheduled Thursday this week for IV mag.

## 2022-12-13 ENCOUNTER — TELEPHONE (OUTPATIENT)
Dept: ENDOCRINOLOGY | Age: 67
End: 2022-12-13

## 2022-12-13 DIAGNOSIS — N18.4 TYPE 2 DIABETES MELLITUS WITH STAGE 4 CHRONIC KIDNEY DISEASE, WITH LONG-TERM CURRENT USE OF INSULIN: ICD-10-CM

## 2022-12-13 DIAGNOSIS — Z79.4 TYPE 2 DIABETES MELLITUS WITH STAGE 4 CHRONIC KIDNEY DISEASE, WITH LONG-TERM CURRENT USE OF INSULIN: ICD-10-CM

## 2022-12-13 DIAGNOSIS — Z79.4 TYPE 2 DIABETES MELLITUS WITH CHRONIC KIDNEY DISEASE, WITH LONG-TERM CURRENT USE OF INSULIN, UNSPECIFIED CKD STAGE: ICD-10-CM

## 2022-12-13 DIAGNOSIS — E11.22 TYPE 2 DIABETES MELLITUS WITH CHRONIC KIDNEY DISEASE, WITH LONG-TERM CURRENT USE OF INSULIN, UNSPECIFIED CKD STAGE: ICD-10-CM

## 2022-12-13 DIAGNOSIS — E11.22 TYPE 2 DIABETES MELLITUS WITH STAGE 4 CHRONIC KIDNEY DISEASE, WITH LONG-TERM CURRENT USE OF INSULIN: ICD-10-CM

## 2022-12-13 RX ORDER — PROCHLORPERAZINE 25 MG/1
SUPPOSITORY RECTAL
Qty: 9 EACH | Refills: 1 | Status: SHIPPED | OUTPATIENT
Start: 2022-12-13 | End: 2023-01-06 | Stop reason: SDUPTHER

## 2022-12-13 RX ORDER — INSULIN GLARGINE 100 [IU]/ML
INJECTION, SOLUTION SUBCUTANEOUS
Qty: 63 ML | Refills: 1 | Status: SHIPPED | OUTPATIENT
Start: 2022-12-13 | End: 2022-12-21 | Stop reason: ALTCHOICE

## 2022-12-13 RX ORDER — INSULIN ASPART 100 [IU]/ML
INJECTION, SOLUTION INTRAVENOUS; SUBCUTANEOUS
Qty: 81 ML | Refills: 1 | Status: SHIPPED | OUTPATIENT
Start: 2022-12-13 | End: 2023-01-03 | Stop reason: CLARIF

## 2022-12-13 NOTE — TELEPHONE ENCOUNTER
PT'S WIFE CALLED IN AND IS NEEDING A NEED REFILL OF THE BELOW MEDS. PT WOULD LIKE A CALL ONCE THE RX HAVE BEEN SENT OUT. [phone]      insulin aspart (NovoLOG FlexPen) 100 UNIT/ML solution pen-injector sc pen [[id number]]    Insulin Glargine (BASAGLAR KWIKPEN) 100 UNIT/ML injection pen [[id number]]    GOING TO:    [facility]

## 2022-12-13 NOTE — TELEPHONE ENCOUNTER
Called and spoke to patient about what location it was going to and stated he would like to send his dexcom sensors there

## 2022-12-13 NOTE — TELEPHONE ENCOUNTER
Pt called again today needing a refill on his insulin. He stated he has not been contacted back and would like a call back at [phone]

## 2022-12-15 ENCOUNTER — INFUSION (OUTPATIENT)
Dept: ONCOLOGY | Facility: HOSPITAL | Age: 67
End: 2022-12-15

## 2022-12-15 VITALS
OXYGEN SATURATION: 95 % | SYSTOLIC BLOOD PRESSURE: 114 MMHG | RESPIRATION RATE: 16 BRPM | BODY MASS INDEX: 38.35 KG/M2 | WEIGHT: 305.2 LBS | HEART RATE: 72 BPM | TEMPERATURE: 97.8 F | DIASTOLIC BLOOD PRESSURE: 53 MMHG

## 2022-12-15 DIAGNOSIS — E83.42 HYPOMAGNESEMIA: ICD-10-CM

## 2022-12-15 PROCEDURE — 25010000002 MAGNESIUM SULFATE IN D5W 1G/100ML (PREMIX) 1-5 GM/100ML-% SOLUTION: Performed by: INTERNAL MEDICINE

## 2022-12-15 PROCEDURE — 25010000002 MAGNESIUM SULFATE 2 GM/50ML SOLUTION: Performed by: INTERNAL MEDICINE

## 2022-12-15 PROCEDURE — 96365 THER/PROPH/DIAG IV INF INIT: CPT

## 2022-12-15 PROCEDURE — 96366 THER/PROPH/DIAG IV INF ADDON: CPT

## 2022-12-15 RX ORDER — MAGNESIUM SULFATE HEPTAHYDRATE 40 MG/ML
2 INJECTION, SOLUTION INTRAVENOUS ONCE
Status: COMPLETED | OUTPATIENT
Start: 2022-12-15 | End: 2022-12-15

## 2022-12-15 RX ORDER — MAGNESIUM SULFATE 1 G/100ML
1 INJECTION INTRAVENOUS ONCE
Status: COMPLETED | OUTPATIENT
Start: 2022-12-15 | End: 2022-12-15

## 2022-12-15 RX ADMIN — MAGNESIUM SULFATE IN DEXTROSE 1 G: 10 INJECTION, SOLUTION INTRAVENOUS at 08:01

## 2022-12-15 RX ADMIN — MAGNESIUM SULFATE IN WATER 2 G: 40 INJECTION, SOLUTION INTRAVENOUS at 08:33

## 2022-12-19 ENCOUNTER — LAB (OUTPATIENT)
Dept: LAB | Facility: HOSPITAL | Age: 67
End: 2022-12-19

## 2022-12-19 ENCOUNTER — INFUSION (OUTPATIENT)
Dept: ONCOLOGY | Facility: HOSPITAL | Age: 67
End: 2022-12-19

## 2022-12-19 DIAGNOSIS — D63.1 ANEMIA IN STAGE 3B CHRONIC KIDNEY DISEASE: ICD-10-CM

## 2022-12-19 DIAGNOSIS — E83.51 HYPOCALCEMIA: ICD-10-CM

## 2022-12-19 DIAGNOSIS — C90.00 MULTIPLE MYELOMA NOT HAVING ACHIEVED REMISSION: ICD-10-CM

## 2022-12-19 DIAGNOSIS — T45.1X5A NEUROPATHY DUE TO CHEMOTHERAPEUTIC DRUG: ICD-10-CM

## 2022-12-19 DIAGNOSIS — G62.0 NEUROPATHY DUE TO CHEMOTHERAPEUTIC DRUG: ICD-10-CM

## 2022-12-19 DIAGNOSIS — E83.42 HYPOMAGNESEMIA: ICD-10-CM

## 2022-12-19 DIAGNOSIS — N18.32 ANEMIA IN STAGE 3B CHRONIC KIDNEY DISEASE: ICD-10-CM

## 2022-12-19 DIAGNOSIS — D69.6 THROMBOCYTOPENIA: ICD-10-CM

## 2022-12-19 DIAGNOSIS — C90.00 MULTIPLE MYELOMA, REMISSION STATUS UNSPECIFIED: ICD-10-CM

## 2022-12-19 LAB
ALBUMIN SERPL-MCNC: 4.1 G/DL (ref 3.5–5.2)
ALBUMIN/GLOB SERPL: 1.6 G/DL (ref 1.1–2.4)
ALP SERPL-CCNC: 66 U/L (ref 38–116)
ALT SERPL W P-5'-P-CCNC: 9 U/L (ref 0–41)
ANION GAP SERPL CALCULATED.3IONS-SCNC: 12.3 MMOL/L (ref 5–15)
AST SERPL-CCNC: 6 U/L (ref 0–40)
BASOPHILS # BLD AUTO: 0.11 10*3/MM3 (ref 0–0.2)
BASOPHILS NFR BLD AUTO: 6.4 % (ref 0–1.5)
BILIRUB SERPL-MCNC: 0.4 MG/DL (ref 0.2–1.2)
BUN SERPL-MCNC: 50 MG/DL (ref 6–20)
BUN/CREAT SERPL: 13.9 (ref 7.3–30)
CALCIUM SPEC-SCNC: 10.3 MG/DL (ref 8.5–10.2)
CHLORIDE SERPL-SCNC: 103 MMOL/L (ref 98–107)
CO2 SERPL-SCNC: 28.7 MMOL/L (ref 22–29)
CREAT SERPL-MCNC: 3.6 MG/DL (ref 0.7–1.3)
DEPRECATED RDW RBC AUTO: 55.1 FL (ref 37–54)
EGFRCR SERPLBLD CKD-EPI 2021: 18 ML/MIN/1.73
EOSINOPHIL # BLD AUTO: 0.23 10*3/MM3 (ref 0–0.4)
EOSINOPHIL NFR BLD AUTO: 13.3 % (ref 0.3–6.2)
ERYTHROCYTE [DISTWIDTH] IN BLOOD BY AUTOMATED COUNT: 15.9 % (ref 12.3–15.4)
GLOBULIN UR ELPH-MCNC: 2.5 GM/DL (ref 1.8–3.5)
GLUCOSE SERPL-MCNC: 164 MG/DL (ref 74–124)
HCT VFR BLD AUTO: 33.4 % (ref 37.5–51)
HGB BLD-MCNC: 10.6 G/DL (ref 13–17.7)
IMM GRANULOCYTES # BLD AUTO: 0.01 10*3/MM3 (ref 0–0.05)
IMM GRANULOCYTES NFR BLD AUTO: 0.6 % (ref 0–0.5)
LYMPHOCYTES # BLD AUTO: 0.49 10*3/MM3 (ref 0.7–3.1)
LYMPHOCYTES NFR BLD AUTO: 28.3 % (ref 19.6–45.3)
MAGNESIUM SERPL-MCNC: 1.8 MG/DL (ref 1.8–2.5)
MCH RBC QN AUTO: 29.9 PG (ref 26.6–33)
MCHC RBC AUTO-ENTMCNC: 31.7 G/DL (ref 31.5–35.7)
MCV RBC AUTO: 94.4 FL (ref 79–97)
MONOCYTES # BLD AUTO: 0.29 10*3/MM3 (ref 0.1–0.9)
MONOCYTES NFR BLD AUTO: 16.8 % (ref 5–12)
NEUTROPHILS NFR BLD AUTO: 0.6 10*3/MM3 (ref 1.7–7)
NEUTROPHILS NFR BLD AUTO: 34.6 % (ref 42.7–76)
NRBC BLD AUTO-RTO: 0 /100 WBC (ref 0–0.2)
PHOSPHATE SERPL-MCNC: 4.5 MG/DL (ref 2.5–4.5)
PLATELET # BLD AUTO: 96 10*3/MM3 (ref 140–450)
PMV BLD AUTO: 10.3 FL (ref 6–12)
POTASSIUM SERPL-SCNC: 4.5 MMOL/L (ref 3.5–4.7)
PROT SERPL-MCNC: 6.6 G/DL (ref 6.3–8)
RBC # BLD AUTO: 3.54 10*6/MM3 (ref 4.14–5.8)
SODIUM SERPL-SCNC: 144 MMOL/L (ref 134–145)
WBC NRBC COR # BLD: 1.73 10*3/MM3 (ref 3.4–10.8)

## 2022-12-19 PROCEDURE — 36415 COLL VENOUS BLD VENIPUNCTURE: CPT

## 2022-12-19 PROCEDURE — 84100 ASSAY OF PHOSPHORUS: CPT

## 2022-12-19 PROCEDURE — 83735 ASSAY OF MAGNESIUM: CPT

## 2022-12-19 PROCEDURE — 85025 COMPLETE CBC W/AUTO DIFF WBC: CPT

## 2022-12-19 PROCEDURE — 80053 COMPREHEN METABOLIC PANEL: CPT

## 2022-12-19 NOTE — PROGRESS NOTES
Hgb today is 10.6.  No procrit today per guidelines.   Lab Results   Component Value Date    GLUCOSE 164 (H) 12/19/2022    BUN 50 (H) 12/19/2022    CREATININE 3.60 (C) 12/19/2022    EGFRIFNONA 24 (L) 08/09/2021    EGFRIFAFRI 27 (L) 08/09/2021    BCR 13.9 12/19/2022    K 4.5 12/19/2022    CO2 28.7 12/19/2022    CALCIUM 10.3 (H) 12/19/2022    PROTENTOTREF 5.7 (L) 11/28/2022    ALBUMIN 4.10 12/19/2022    LABIL2 1.8 (H) 11/28/2022    AST 6 12/19/2022    ALT 9 12/19/2022     Mag level today is 1.8.  BUN and CR improved from last visit.   Reviewed with Jacque, clinic RN for Dr Del Valle, to pass on to

## 2022-12-20 LAB
ALBUMIN SERPL ELPH-MCNC: 3.6 G/DL (ref 2.9–4.4)
ALBUMIN/GLOB SERPL: 1.6 {RATIO} (ref 0.7–1.7)
ALPHA1 GLOB SERPL ELPH-MCNC: 0.2 G/DL (ref 0–0.4)
ALPHA2 GLOB SERPL ELPH-MCNC: 0.8 G/DL (ref 0.4–1)
B-GLOBULIN SERPL ELPH-MCNC: 0.8 G/DL (ref 0.7–1.3)
GAMMA GLOB SERPL ELPH-MCNC: 0.6 G/DL (ref 0.4–1.8)
GLOBULIN SER-MCNC: 2.4 G/DL (ref 2.2–3.9)
IGA SERPL-MCNC: 73 MG/DL (ref 61–437)
IGG SERPL-MCNC: 620 MG/DL (ref 603–1613)
IGM SERPL-MCNC: 15 MG/DL (ref 20–172)
INTERPRETATION SERPL IEP-IMP: ABNORMAL
KAPPA LC FREE SER-MCNC: 80.8 MG/L (ref 3.3–19.4)
KAPPA LC FREE/LAMBDA FREE SER: 1.46 {RATIO} (ref 0.26–1.65)
LABORATORY COMMENT REPORT: ABNORMAL
LAMBDA LC FREE SERPL-MCNC: 55.3 MG/L (ref 5.7–26.3)
M PROTEIN SERPL ELPH-MCNC: ABNORMAL G/DL
PROT SERPL-MCNC: 6 G/DL (ref 6–8.5)

## 2022-12-27 ENCOUNTER — OFFICE VISIT (OUTPATIENT)
Dept: ONCOLOGY | Facility: CLINIC | Age: 67
End: 2022-12-27

## 2022-12-27 ENCOUNTER — TELEPHONE (OUTPATIENT)
Dept: ENDOCRINOLOGY | Age: 67
End: 2022-12-27

## 2022-12-27 ENCOUNTER — LAB (OUTPATIENT)
Dept: LAB | Facility: HOSPITAL | Age: 67
End: 2022-12-27

## 2022-12-27 ENCOUNTER — INFUSION (OUTPATIENT)
Dept: ONCOLOGY | Facility: HOSPITAL | Age: 67
End: 2022-12-27

## 2022-12-27 VITALS
HEART RATE: 69 BPM | TEMPERATURE: 97.8 F | HEIGHT: 75 IN | SYSTOLIC BLOOD PRESSURE: 132 MMHG | OXYGEN SATURATION: 96 % | BODY MASS INDEX: 37.74 KG/M2 | RESPIRATION RATE: 18 BRPM | DIASTOLIC BLOOD PRESSURE: 75 MMHG | WEIGHT: 303.5 LBS

## 2022-12-27 DIAGNOSIS — N18.4 TYPE 2 DIABETES MELLITUS WITH STAGE 4 CHRONIC KIDNEY DISEASE, WITH LONG-TERM CURRENT USE OF INSULIN: Primary | ICD-10-CM

## 2022-12-27 DIAGNOSIS — C90.00 MULTIPLE MYELOMA NOT HAVING ACHIEVED REMISSION: Primary | ICD-10-CM

## 2022-12-27 DIAGNOSIS — D64.9 ANEMIA, UNSPECIFIED TYPE: ICD-10-CM

## 2022-12-27 DIAGNOSIS — D70.1 CHEMOTHERAPY INDUCED NEUTROPENIA: ICD-10-CM

## 2022-12-27 DIAGNOSIS — T45.1X5A CHEMOTHERAPY INDUCED NEUTROPENIA: ICD-10-CM

## 2022-12-27 DIAGNOSIS — C90.00 MULTIPLE MYELOMA, REMISSION STATUS UNSPECIFIED: ICD-10-CM

## 2022-12-27 DIAGNOSIS — Z79.899 ENCOUNTER FOR LONG-TERM CURRENT USE OF HIGH RISK MEDICATION: ICD-10-CM

## 2022-12-27 DIAGNOSIS — K52.9 CHRONIC DIARRHEA: ICD-10-CM

## 2022-12-27 DIAGNOSIS — G62.0 NEUROPATHY DUE TO CHEMOTHERAPEUTIC DRUG: ICD-10-CM

## 2022-12-27 DIAGNOSIS — D84.9 IMMUNOCOMPROMISED STATE: ICD-10-CM

## 2022-12-27 DIAGNOSIS — D63.1 ANEMIA IN STAGE 3B CHRONIC KIDNEY DISEASE: ICD-10-CM

## 2022-12-27 DIAGNOSIS — E78.2 MIXED HYPERLIPIDEMIA: ICD-10-CM

## 2022-12-27 DIAGNOSIS — E83.42 HYPOMAGNESEMIA: ICD-10-CM

## 2022-12-27 DIAGNOSIS — E83.51 HYPOCALCEMIA: ICD-10-CM

## 2022-12-27 DIAGNOSIS — T45.1X5A NEUROPATHY DUE TO CHEMOTHERAPEUTIC DRUG: ICD-10-CM

## 2022-12-27 DIAGNOSIS — N18.32 ANEMIA IN STAGE 3B CHRONIC KIDNEY DISEASE: ICD-10-CM

## 2022-12-27 DIAGNOSIS — Z79.4 TYPE 2 DIABETES MELLITUS WITH STAGE 4 CHRONIC KIDNEY DISEASE, WITH LONG-TERM CURRENT USE OF INSULIN: Primary | ICD-10-CM

## 2022-12-27 DIAGNOSIS — E11.22 TYPE 2 DIABETES MELLITUS WITH STAGE 4 CHRONIC KIDNEY DISEASE, WITH LONG-TERM CURRENT USE OF INSULIN: Primary | ICD-10-CM

## 2022-12-27 DIAGNOSIS — D69.6 THROMBOCYTOPENIA: ICD-10-CM

## 2022-12-27 LAB
ALBUMIN SERPL-MCNC: 3.9 G/DL (ref 3.5–5.2)
ALBUMIN/GLOB SERPL: 1.7 G/DL (ref 1.1–2.4)
ALP SERPL-CCNC: 52 U/L (ref 38–116)
ALT SERPL W P-5'-P-CCNC: 8 U/L (ref 0–41)
ANION GAP SERPL CALCULATED.3IONS-SCNC: 11.7 MMOL/L (ref 5–15)
AST SERPL-CCNC: 8 U/L (ref 0–40)
BASOPHILS # BLD AUTO: 0.05 10*3/MM3 (ref 0–0.2)
BASOPHILS NFR BLD AUTO: 1.5 % (ref 0–1.5)
BILIRUB SERPL-MCNC: 0.4 MG/DL (ref 0.2–1.2)
BUN SERPL-MCNC: 43 MG/DL (ref 6–20)
BUN/CREAT SERPL: 11.7 (ref 7.3–30)
CALCIUM SPEC-SCNC: 8.4 MG/DL (ref 8.5–10.2)
CHLORIDE SERPL-SCNC: 105 MMOL/L (ref 98–107)
CO2 SERPL-SCNC: 27.3 MMOL/L (ref 22–29)
CREAT SERPL-MCNC: 3.68 MG/DL (ref 0.7–1.3)
DEPRECATED RDW RBC AUTO: 56.1 FL (ref 37–54)
EGFRCR SERPLBLD CKD-EPI 2021: 17.5 ML/MIN/1.73
EOSINOPHIL # BLD AUTO: 0.19 10*3/MM3 (ref 0–0.4)
EOSINOPHIL NFR BLD AUTO: 5.8 % (ref 0.3–6.2)
ERYTHROCYTE [DISTWIDTH] IN BLOOD BY AUTOMATED COUNT: 15.9 % (ref 12.3–15.4)
GLOBULIN UR ELPH-MCNC: 2.3 GM/DL (ref 1.8–3.5)
GLUCOSE SERPL-MCNC: 173 MG/DL (ref 74–124)
HCT VFR BLD AUTO: 31.1 % (ref 37.5–51)
HGB BLD-MCNC: 9.8 G/DL (ref 13–17.7)
IMM GRANULOCYTES # BLD AUTO: 0.01 10*3/MM3 (ref 0–0.05)
IMM GRANULOCYTES NFR BLD AUTO: 0.3 % (ref 0–0.5)
LYMPHOCYTES # BLD AUTO: 0.78 10*3/MM3 (ref 0.7–3.1)
LYMPHOCYTES NFR BLD AUTO: 23.7 % (ref 19.6–45.3)
MAGNESIUM SERPL-MCNC: 1.5 MG/DL (ref 1.8–2.5)
MCH RBC QN AUTO: 30.3 PG (ref 26.6–33)
MCHC RBC AUTO-ENTMCNC: 31.5 G/DL (ref 31.5–35.7)
MCV RBC AUTO: 96.3 FL (ref 79–97)
MONOCYTES # BLD AUTO: 0.55 10*3/MM3 (ref 0.1–0.9)
MONOCYTES NFR BLD AUTO: 16.7 % (ref 5–12)
NEUTROPHILS NFR BLD AUTO: 1.71 10*3/MM3 (ref 1.7–7)
NEUTROPHILS NFR BLD AUTO: 52 % (ref 42.7–76)
NRBC BLD AUTO-RTO: 0 /100 WBC (ref 0–0.2)
PLATELET # BLD AUTO: 118 10*3/MM3 (ref 140–450)
PMV BLD AUTO: 8.8 FL (ref 6–12)
POTASSIUM SERPL-SCNC: 4.2 MMOL/L (ref 3.5–4.7)
PROT SERPL-MCNC: 6.2 G/DL (ref 6.3–8)
RBC # BLD AUTO: 3.23 10*6/MM3 (ref 4.14–5.8)
SODIUM SERPL-SCNC: 144 MMOL/L (ref 134–145)
WBC NRBC COR # BLD: 3.29 10*3/MM3 (ref 3.4–10.8)

## 2022-12-27 PROCEDURE — 80053 COMPREHEN METABOLIC PANEL: CPT

## 2022-12-27 PROCEDURE — 99215 OFFICE O/P EST HI 40 MIN: CPT | Performed by: INTERNAL MEDICINE

## 2022-12-27 PROCEDURE — 96372 THER/PROPH/DIAG INJ SC/IM: CPT

## 2022-12-27 PROCEDURE — 25010000002 MAGNESIUM SULFATE 2 GM/50ML SOLUTION: Performed by: INTERNAL MEDICINE

## 2022-12-27 PROCEDURE — 25010000002 DARATUMUMAB-HYALURONIDASE-FIHJ 1800-30000 MG-UT/15ML SOLUTION: Performed by: INTERNAL MEDICINE

## 2022-12-27 PROCEDURE — 83735 ASSAY OF MAGNESIUM: CPT

## 2022-12-27 PROCEDURE — 85025 COMPLETE CBC W/AUTO DIFF WBC: CPT

## 2022-12-27 PROCEDURE — 25010000002 DENOSUMAB 120 MG/1.7ML SOLUTION: Performed by: INTERNAL MEDICINE

## 2022-12-27 PROCEDURE — 96401 CHEMO ANTI-NEOPL SQ/IM: CPT

## 2022-12-27 PROCEDURE — 36415 COLL VENOUS BLD VENIPUNCTURE: CPT

## 2022-12-27 PROCEDURE — 96365 THER/PROPH/DIAG IV INF INIT: CPT

## 2022-12-27 RX ORDER — DIPHENHYDRAMINE HCL 25 MG
25 CAPSULE ORAL ONCE
Status: CANCELLED | OUTPATIENT
Start: 2022-12-27

## 2022-12-27 RX ORDER — DIPHENHYDRAMINE HCL 25 MG
25 CAPSULE ORAL ONCE
Status: DISCONTINUED | OUTPATIENT
Start: 2022-12-27 | End: 2022-12-27

## 2022-12-27 RX ORDER — ACETAMINOPHEN 500 MG
1000 TABLET ORAL ONCE
Status: DISCONTINUED | OUTPATIENT
Start: 2022-12-27 | End: 2022-12-27

## 2022-12-27 RX ORDER — ERGOCALCIFEROL 1.25 MG/1
50000 CAPSULE ORAL
COMMUNITY
Start: 2022-11-28 | End: 2023-02-20 | Stop reason: SDDI

## 2022-12-27 RX ORDER — FAMOTIDINE 20 MG/1
20 TABLET, FILM COATED ORAL ONCE
Status: DISCONTINUED | OUTPATIENT
Start: 2022-12-27 | End: 2022-12-27

## 2022-12-27 RX ORDER — MAGNESIUM SULFATE HEPTAHYDRATE 40 MG/ML
2 INJECTION, SOLUTION INTRAVENOUS ONCE
Status: COMPLETED | OUTPATIENT
Start: 2022-12-27 | End: 2022-12-27

## 2022-12-27 RX ORDER — ACETAMINOPHEN 500 MG
1000 TABLET ORAL ONCE
Status: CANCELLED | OUTPATIENT
Start: 2022-12-27

## 2022-12-27 RX ORDER — MEPERIDINE HYDROCHLORIDE 25 MG/ML
25 INJECTION INTRAMUSCULAR; INTRAVENOUS; SUBCUTANEOUS
Status: CANCELLED | OUTPATIENT
Start: 2022-12-27

## 2022-12-27 RX ORDER — FAMOTIDINE 20 MG/1
20 TABLET, FILM COATED ORAL ONCE
Status: CANCELLED
Start: 2022-12-27 | End: 2022-12-27

## 2022-12-27 RX ORDER — MAGNESIUM OXIDE 400 MG/1
400 TABLET ORAL EVERY OTHER DAY
Start: 2022-12-27 | End: 2023-01-17 | Stop reason: ALTCHOICE

## 2022-12-27 RX ORDER — DIPHENHYDRAMINE HYDROCHLORIDE 50 MG/ML
50 INJECTION INTRAMUSCULAR; INTRAVENOUS AS NEEDED
Status: CANCELLED | OUTPATIENT
Start: 2022-12-27

## 2022-12-27 RX ORDER — FAMOTIDINE 10 MG/ML
20 INJECTION, SOLUTION INTRAVENOUS AS NEEDED
Status: CANCELLED | OUTPATIENT
Start: 2022-12-27

## 2022-12-27 RX ADMIN — DARATUMUMAB AND HYALURONIDASE-FIHJ (HUMAN RECOMBINANT) 1800 MG: 1800; 30000 INJECTION SUBCUTANEOUS at 10:15

## 2022-12-27 RX ADMIN — DENOSUMAB 120 MG: 120 INJECTION SUBCUTANEOUS at 10:14

## 2022-12-27 RX ADMIN — MAGNESIUM SULFATE IN WATER 2 G: 40 INJECTION, SOLUTION INTRAVENOUS at 09:45

## 2022-12-27 NOTE — PROGRESS NOTES
Subjective     CHIEF COMPLAINT:      Chief Complaint   Patient presents with   • Follow-up     HISTORY OF PRESENT ILLNESS:     Contreras Albert is a 67 y.o. male patient who returns today for follow up on his multiple myeloma, anemia, neutropenia, thrombocytopenia and electrolyte abnormality.    Patient reports that he felt more tired as he approached day #18/19 of his Pomalyst treatment cycle earlier this month.  It is correlated with the time that his neutrophil count decreased to 600.  No fever or chills.    Patient reports that he was not taking a total of 3 calcium tablets as he was previously doing.  In addition, he was not consuming milk recently.    Patient reports some improvement in the neuropathy symptoms in his feet due to decrease in the swelling.    Patient reports that the diarrhea is currently mild.  He took magnesium yesterday and did not have problem with diarrhea.    ROS:  Pertinent ROS is in the HPI.     Past medical, surgical, social and family history were reviewed.     MEDICATIONS:    Current Outpatient Medications:   •  acetaminophen (TYLENOL) 325 MG tablet, Take 2 tablets by mouth Every 6 (Six) Hours As Needed for Mild Pain., Disp: , Rfl:   •  acyclovir (ZOVIRAX) 800 MG tablet, Take 1 tablet by mouth Daily., Disp: 90 tablet, Rfl: 2  •  ASPIRIN 81 PO, Take 81 mg by mouth Daily., Disp: , Rfl:   •  Blood Glucose Monitoring Suppl (FREESTYLE FREEDOM LITE) w/Device kit, 1 kit Daily. Indications: Type 2 Diabetes, Disp: 1 each, Rfl: 0  •  bumetanide (BUMEX) 2 MG tablet, Take 0.5 tablets by mouth 2 (Two) Times a Day. (Patient taking differently: Take 2 mg by mouth Daily.), Disp: , Rfl:   •  calcium carbonate (TUMS) 500 MG chewable tablet, Chew 3 tablets Daily., Disp: , Rfl:   •  carvedilol (COREG) 12.5 MG tablet, Take 12.5 mg by mouth 2 (Two) Times a Day With Meals., Disp: , Rfl:   •  Continuous Blood Gluc Sensor (Dexcom G6 Sensor), Replace sensor every 10 days.  Dx: E 11.22, Disp: 9 each, Rfl: 1  •   Continuous Blood Gluc Transmit (Dexcom G6 Transmitter) misc, 1 each Every 3 (Three) Months. Dx: E 11.22, Disp: 1 each, Rfl: 1  •  dexamethasone (DECADRON) 4 MG tablet, Take 3 tablets on day of chemo, 1 tablet on day #2 and 1 tablet on day #3 and repeat monthly., Disp: 15 tablet, Rfl: 1  •  diphenoxylate-atropine (LOMOTIL) 2.5-0.025 MG per tablet, Take 2 tablets by mouth 4 (Four) Times a Day As Needed for Diarrhea., Disp: 120 tablet, Rfl: 0  •  ergocalciferol (ERGOCALCIFEROL) 1.25 MG (50396 UT) capsule, Take 50,000 Units by mouth., Disp: , Rfl:   •  escitalopram (LEXAPRO) 5 MG tablet, Take 1 tablet by mouth Daily., Disp: 30 tablet, Rfl: 5  •  finasteride (PROSCAR) 5 MG tablet, Take 5 mg by mouth daily., Disp: , Rfl:   •  gabapentin (NEURONTIN) 300 MG capsule, Take 1 capsule in AM and take 2 capsules at bedtime., Disp: 90 capsule, Rfl: 5  •  glucagon (GLUCAGEN) 1 MG injection, Inject 1 mg into the appropriate muscle as directed by prescriber 1 (One) Time As Needed for Low Blood Sugar for up to 1 dose. Follow package directions for low blood sugar., Disp: 1 kit, Rfl: 3  •  glucose blood (FREESTYLE LITE) test strip, Use to test blood sugar 4-5 times daily., Disp: 200 each, Rfl: 5  •  insulin aspart (NovoLOG FlexPen) 100 UNIT/ML solution pen-injector sc pen, Non-chemo days: 10 units three times daily before meals plus 1 unit per every 50 over 150.  Chemo days: 15 units three times daily before meals plus 2 units per 50 greater than 150.  Max daily dose 90 units daily, Disp: 81 mL, Rfl: 1  •  Insulin Glargine (LANTUS SOLOSTAR) 100 UNIT/ML injection pen, Inject 60 Units under the skin into the appropriate area as directed Daily for 90 days. : Non-Chemo Days take 16 units twice daily. Chemo Days take 30 units twice daily. 16-30 units twice daily for max daily dose of 70 units daily., Disp: 54 mL, Rfl: 1  •  Insulin Pen Needle 32G X 4 MM misc, USING 4 PEN NEEDLES DAILY, Disp: 400 each, Rfl: 3  •  Lancets (freestyle) lancets,  "Use to check blood sugar 4 times daily, Disp: 400 each, Rfl: 2  •  loperamide (IMODIUM) 2 MG capsule, Take 1 capsule by mouth 4 (Four) Times a Day As Needed for Diarrhea., Disp: , Rfl:   •  magnesium oxide (MAG-OX) 400 MG tablet, Take 1 tablet by mouth Daily., Disp: 30 tablet, Rfl: 2  •  ondansetron (ZOFRAN) 8 MG tablet, Take 1 tablet by mouth 3 (Three) Times a Day As Needed for Nausea or Vomiting., Disp: 30 tablet, Rfl: 5  •  pantoprazole (PROTONIX) 40 MG EC tablet, TAKE ONE TABLET BY MOUTH DAILY, Disp: 30 tablet, Rfl: 1  •  pomalidomide (Pomalyst) 3 MG chemo capsule, TAKE 1 CAPSULE BY MOUTH EVERY DAY FOR 21 DAYS ON THEN 7 DAYS OFF, Disp: 21 capsule, Rfl: 0  •  tamsulosin (FLOMAX) 0.4 MG capsule 24 hr capsule, Take 1 capsule by mouth Daily., Disp: , Rfl:   •  vitamin D (ERGOCALCIFEROL) 1.25 MG (43336 UT) capsule capsule, Take 1 capsule by mouth 1 (One) Time Per Week. Mondays, Disp: 13 capsule, Rfl: 1  Objective     VITAL SIGNS:     Vitals:    12/27/22 0817   BP: 132/75   Pulse: 69   Resp: 18   Temp: 97.8 °F (36.6 °C)   TempSrc: Temporal   SpO2: 96%   Weight: (!) 138 kg (303 lb 8 oz)   Height: 190 cm (74.8\")   PainSc: 0-No pain     Body mass index is 38.13 kg/m².     Wt Readings from Last 5 Encounters:   12/27/22 (!) 138 kg (303 lb 8 oz)   12/15/22 (!) 138 kg (305 lb 3.2 oz)   12/05/22 (!) 139 kg (307 lb 6.4 oz)   11/28/22 (!) 138 kg (303 lb 12.8 oz)   11/23/22 (!) 142 kg (312 lb)     PHYSICAL EXAMINATION:   GENERAL: The patient appears in fair general condition, not in acute distress.   SKIN: No ecchymosis.  EYES: No jaundice. Pallor.  LYMPHATICS: No cervical lymphadenopathy.  CHEST: Normal respiratory effort.  Lungs clear bilaterally.  No added sounds.  CVS: Normal S1-S2.  No murmurs..  ABDOMEN: Soft. No tenderness. No Hepatomegaly. No Splenomegaly. No masses.  EXTREMITIES: Bilateral edema.  No calf tenderness.  He ambulates with a cane.    DIAGNOSTIC DATA:     Results from last 7 days   Lab Units 12/27/22  0811 "   WBC 10*3/mm3 3.29*   NEUTROS ABS 10*3/mm3 1.71   HEMOGLOBIN g/dL 9.8*   HEMATOCRIT % 31.1*   PLATELETS 10*3/mm3 118*     Results from last 7 days   Lab Units 12/27/22  0811   SODIUM mmol/L 144   POTASSIUM mmol/L 4.2   CHLORIDE mmol/L 105   CO2 mmol/L 27.3   BUN mg/dL 43*   CREATININE mg/dL 3.68*   CALCIUM mg/dL 8.4*   ALBUMIN g/dL 3.9   BILIRUBIN mg/dL 0.4   ALK PHOS U/L 52   ALT (SGPT) U/L 8   AST (SGOT) U/L 8   GLUCOSE mg/dL 173*   MAGNESIUM mg/dL 1.5*      12/19/2022   Phosphorus 2.5 - 4.5 mg/dL 4.5     Component      Latest Ref Rng & Units 11/7/2022 11/28/2022 12/19/2022   IgG      603 - 1613 mg/dL 443 (L) 492 (L) 620   IgA      61 - 437 mg/dL 45 (L) 51 (L) 73   IgM      20 - 172 mg/dL 10 (L) 10 (L) 15 (L)   Total Protein      6.0 - 8.5 g/dL 5.5 (L) 5.7 (L) 6.0   Albumin      2.9 - 4.4 g/dL 3.5 3.6 3.6   Alpha-1-Globulin      0.0 - 0.4 g/dL 0.2 0.2 0.2   Alpha-2-Globulin      0.4 - 1.0 g/dL 0.6 0.7 0.8   Beta Globulin      0.7 - 1.3 g/dL 0.7 0.8 0.8   Gamma Globulin      0.4 - 1.8 g/dL 0.4 0.5 0.6   M-Jurgen      Not Observed g/dL Comment: Comment: Comment:   Globulin      2.2 - 3.9 g/dL 2.0 (L) 2.1 (L) 2.4   A/G Ratio      0.7 - 1.7 1.8 (H) 1.8 (H) 1.6   Immunofixation Reflex, Serum       Comment (A) Comment (A) Comment (A)   Please note       Comment Comment Comment   Kappa FLC      3.3 - 19.4 mg/L 54.5 (H) 47.7 (H) 80.8 (H)   Free Lambda Light Chains      5.7 - 26.3 mg/L 31.9 (H) 37.5 (H) 55.3 (H)   Kappa/Lambda Ratio      0.26 - 1.65 1.71 (H) 1.27 1.46     Assessment & Plan    *IgG lambda multiple myeloma.  · Bone marrow 5/23/2020 hypercellular marrow with 80% involvement of plasma cell myeloma; FISH studies pending  · Bone survey 5/26/2020: Diffuse osteoporosis and demineralization.  However, no discrete lytic lesions.  · SPEP 5/23/2020: M spike 5.1.  IgG 6629 lambda.  Light chain ratio 0 with free lambda light chains 6400.  Beta-2 microglobulin 16.3.  24-hour urine 12.7 g protein per 24-hour with  monoclonal lambda free light chain 33.9%, monoclonal IgG lambda 23.5%  · 24-hour urine testing from 5/24/2020 free lambda light chains 8.4 g/L  · Considering renal failure, initiated CyBorD 5/28/20.  Given the significantly elevated light chains with the first cycle of treatment plan to give Velcade days 1, 4, 8, 11; Cytoxan 300 PO mg/m² weekly and dexamethasone 40 mg p.o. days 1, 2, 3, 4, 8 and 15.    · After cycle 1 treatment can likely be altered to weekly dosing of each drug.  · Patient was referred to Dr. Alvares at [facility].  · Patient is tolerating the treatment except for generalized bone achiness after receiving Velcade.  · On 6/18/2020, Velcade was changed to 1.5 mg/m² weekly continuously along with weekly Decadron and oral Cytoxan.  · M protein decreased to 2.4 and free light chain improved to 874 on 7/16/2020 indicating response to therapy.  · M protein decreased to 2.0 and free light chain decreased to 736 on 8/13/2020.   · Although the improvement indicates response, he continues to have significant amount of monoclonal protein despite being on treatment for 3 months.  · In addition, patient is having side effects from Velcade in the form of painful neuropathy.  · I discussed the case with Dr. Alvares and we recommended stopping Velcade and Cytoxan and changing the treatment to Daratumumab subcutaneous injection weekly along with Revlimid 10 mg daily for 21 out of 28-day cycle and Decadron 40 mg daily.  · Patient was started on daratumumab SQ on 8/27/2020.   · S/p stem cell transplant on 12/9/2020 at [facility].  · Day 100 bone marrow on 3/16/2021 revealed minute  plasma cell population.  PET scan on 3/29/2021 was negative.  • Patient enrolled in the  clinical trial randomizing to maintenance lenalidomide versus lenalidomide and daratumumab.  Patient was randomized to the Revlimid arm initiated 4/13/2021 dosed at 5 mg daily (due to renal function).  Patient  developed neutropenia requiring dose adjustment to 5 mg daily for 21/28 days.  Patient withdrew from clinical trial after 5 cycles on 8/31/2021 due to worsening diarrhea.    • He was switched to Velcade maintenance every other week 9/14/2021 which he discontinued 10/26/2021 due to neuropathy and lower extremity edema.    • He was subsequently changed to pomalidomide 2 mg daily 21/28 days on 11/8/2021.    • 1 year post transplant, bone marrow biopsy showed 5-7% plasma cells and due to the residual disease he was started on DPd (daratumumab, pomalidomide, dexamethasone) on 2/21/2022.    • Patient was seen by Dr. Romero on 9/12/2022.  At that time patient was cycle 8-day 7 DPd. Due to chronic side effects, dexamethasone was changed from weekly to monthly.    • Patient did continue pomalidomide on his own during admission to the hospital, received 1 dose 4 mg p.o. on 9/24/2022 before was subsequently discontinued due to cytopenias.   • Labs on 9/24/2022 with IgG 314, IgM less than 25, IgA less than 50.  • 9/25/2022 24-hour urine free light chains with free kappa 351, free lambda 104, ratio 3.37  • On 9/24/2022, IgG kappa M protein was 0.1 g.  IgG 311, IgA 43, IgM 7.   • Low level M spike of IgG kappa likely represents daratumumab.   • No IgG lambda M protein.   • Serum free light chains on 9/26/2022 with kappa 57.4, lambda 42.8, ratio normal at 1.34.  • He received Darzlex on 10/3/2022 at [facility]. He decided to transfer his treatment back to our office.   • On 10/13/2022, M protein was too small to quantitate. Kappa/Lambda ratio was 1.34.  • Dexamethasone was changed to 12 mg on Mondays, 4 mg on Tuesday and 4 mg on Wednesday.  • Pomalyst was restarted at 4 mg every other day to be taken on 10/13/2022, 10/15/2022, 10/17/2022, 10/19/2022, 10/21/2022 and 10/23/2022.  • Pomalyst dose was subsequently reduced to 3 mg daily for 21 out of a 28-day cycle starting on 10/31/2022.  • He tolerated the reduced dose better with  improvement in the diarrhea.  • SPEP GUANAKITO FLC on 12/19/2022 showed no definite M protein.  K/L ratio was 1.46-normal.  • He is tolerating the treatment except for neutropenia secondary to Pomalyst as below.    *Bone health.  · He is on Xgeva.   · Last dose was on 9/19/2022.  · Patient developed severe hypocalcemia following the Xgeva injection.  He required inpatient hospital stay.  · Given the duration of Xgeva injections, recommended changing treatment to every 3 months starting December 2022.   · I recommended giving Xgeva today and continuing every 3 months..    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  · Patient was receiving Procrit at [facility].  · Records from [facility] showed that the patient was receiving 70,000 units weekly and received the last dose on 9/27/2022.  · Patient was given Procrit 70,000 units on 9/27/2022.  · Hemoglobin was 9.9 on 10/17/2022.  He was given Procrit 60,000 units.  · Hemoglobin was 9.8 on 11/4/2022.  He was given Procrit 60,000 units.  · Hemoglobin is 9.8 today.  · He reports fatigue but energy level overall has improved.    *Thrombocytopenia.  · This is attributed to multiple myeloma, prior stem cell transplant and chemotherapy.  · Platelet count is 118,000 today.  · He is not having problem with bleeding.    *Neutropenia.  · This was attributed to Pomalyst.  · Neutrophil count was down to 290 on 9/25/2022.  · He was given Neupogen during his hospital stay in September 2022.  · Neutrophil count improved afterwards.  · However, neutrophil count decreased to 600 on 12/19/2022.  · He reports that he was feeling tired and achy at that time.  · Neutrophil count is 1710 today.    *Hypocalcemia.  · Patient developed severe hypocalcemia due to Xgeva.  · Calcium was 5.9 on 9/23/2022.  · Calcium improved to 8.1 on 9/28/2022  · Calcium improved to 9.6 on 10/13/2022  · Calcium decreased to 7.7 on 10/24/2022.   Albumin was 3.8.  · Calcium improved to 10.3 on 12/19/2022.  · Calcium decreased to 8.4 today.  · The drop in the calcium level today is attributed to the patient not taking the full dose of the calcium supplement and due to him not consuming milk recently.    *Hypomagnesemia.  · Magnesium was 1.1 on 10/24/2022.  · He was placed on oral magnesium 400 mg twice daily.  · He developed diarrhea and it was stopped.  · He has been receiving magnesium IV intermittently depending on his magnesium level.  · Magnesium level is 1.5 today.    *Vitamin D deficiency.  · He is on vitamin D 50,000 units weekly.  · Vitamin D level was 18 on 10/3/2022.  · He was continued on vitamin D weekly.    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  · He is on gabapentin 300 mg in the morning and 600 mg in the evening.  · Neuropathy symptoms in the feet are better as the swelling improved.     *Immunocompromised host.  · He is on acyclovir 800 mg daily.  · He is on ASA 81 mg daily.  · He is not having problem with bleeding or infections.  · No recent infections.  · There was advised to continue follow-up with the transplant team for posttransplant immunizations.    *Chronic diarrhea.  · He is on Lomotil 2 tablets every 6 hours as needed.  · He also takes Imodium PRN.  · No improvement with Probiotic. No improvement with low lactose diet.  · Diarrhea improved after reducing the dose of Pomalyst.  · Patient reports that diarrhea is somewhat better.    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  · S/p right partial nephrectomy on 5/18/2016.    PLAN:    1.  We will give Darzalex today.   2.  We will give Xgeva today and continue every 3 months.   3.  We will give Procrit today at 60,000 units.   4.  I asked him to take calcium 3 tablets daily x3 days followed by 2 tablets daily.   5.  We will give IV magnesium 2 g today.  6.  I asked him to take magnesium every other day.   7.  Return weekly for CBC CMP MG and he will receive  Procrit if his hemoglobin <10.  8.  If his neutropenia recurs, we will reduce the dose of Pomalyst to 2 mg for 21/28-day cycle.   9.  Follow-up in 4 weeks.  We will schedule him to receive the next dose of Darzalex.    I spent 45 minutes caring for Contreras on this date of service. This time includes time spent by me in the following activities: preparing for the visit, reviewing tests, obtaining and/or reviewing a separately obtained history, performing a medically appropriate examination and/or evaluation, counseling and educating the patient/family/caregiver, ordering medications, tests, or procedures, documenting information in the medical record, independently interpreting results and communicating that information with the patient/family/caregiver and care coordination       Red Del Valle MD  12/27/22

## 2022-12-27 NOTE — NURSING NOTE
Per  pt to get IV mag 2g replacement for serum level of 1.5.  OK to proceed with xgeva today and use phosphorus lab from 12/19.  Pt also to receive darzalex faspro, pt took premeds at home so those were cancelled on MAR.  Informed pharmacy as well.

## 2022-12-28 ENCOUNTER — TELEPHONE (OUTPATIENT)
Dept: ENDOCRINOLOGY | Age: 67
End: 2022-12-28

## 2022-12-28 NOTE — TELEPHONE ENCOUNTER
PTs wife called stating john paul now needs a prior authorization for the Dexcom. Been without for a week now

## 2022-12-29 ENCOUNTER — PRIOR AUTHORIZATION (OUTPATIENT)
Dept: ENDOCRINOLOGY | Age: 67
End: 2022-12-29

## 2023-01-03 ENCOUNTER — LAB (OUTPATIENT)
Dept: LAB | Facility: HOSPITAL | Age: 68
End: 2023-01-03
Payer: MEDICARE

## 2023-01-03 ENCOUNTER — INFUSION (OUTPATIENT)
Dept: ONCOLOGY | Facility: HOSPITAL | Age: 68
End: 2023-01-03
Payer: MEDICARE

## 2023-01-03 DIAGNOSIS — N18.4 TYPE 2 DIABETES MELLITUS WITH STAGE 4 CHRONIC KIDNEY DISEASE, WITH LONG-TERM CURRENT USE OF INSULIN: Primary | ICD-10-CM

## 2023-01-03 DIAGNOSIS — D63.1 ANEMIA IN STAGE 3B CHRONIC KIDNEY DISEASE: ICD-10-CM

## 2023-01-03 DIAGNOSIS — E11.22 TYPE 2 DIABETES MELLITUS WITH STAGE 4 CHRONIC KIDNEY DISEASE, WITH LONG-TERM CURRENT USE OF INSULIN: Primary | ICD-10-CM

## 2023-01-03 DIAGNOSIS — Z79.4 TYPE 2 DIABETES MELLITUS WITH STAGE 4 CHRONIC KIDNEY DISEASE, WITH LONG-TERM CURRENT USE OF INSULIN: Primary | ICD-10-CM

## 2023-01-03 DIAGNOSIS — D64.9 ANEMIA, UNSPECIFIED TYPE: ICD-10-CM

## 2023-01-03 DIAGNOSIS — N18.32 ANEMIA IN STAGE 3B CHRONIC KIDNEY DISEASE: ICD-10-CM

## 2023-01-03 DIAGNOSIS — C90.00 MULTIPLE MYELOMA, REMISSION STATUS UNSPECIFIED: ICD-10-CM

## 2023-01-03 LAB
ALBUMIN SERPL-MCNC: 3.9 G/DL (ref 3.5–5.2)
ALBUMIN/GLOB SERPL: 1.6 G/DL (ref 1.1–2.4)
ALP SERPL-CCNC: 51 U/L (ref 38–116)
ALT SERPL W P-5'-P-CCNC: 9 U/L (ref 0–41)
ANION GAP SERPL CALCULATED.3IONS-SCNC: 12.4 MMOL/L (ref 5–15)
AST SERPL-CCNC: 9 U/L (ref 0–40)
BASOPHILS # BLD AUTO: 0.09 10*3/MM3 (ref 0–0.2)
BASOPHILS NFR BLD AUTO: 2.3 % (ref 0–1.5)
BILIRUB SERPL-MCNC: 0.6 MG/DL (ref 0.2–1.2)
BUN SERPL-MCNC: 40 MG/DL (ref 6–20)
BUN/CREAT SERPL: 13.1 (ref 7.3–30)
CALCIUM SPEC-SCNC: 8.8 MG/DL (ref 8.5–10.2)
CHLORIDE SERPL-SCNC: 102 MMOL/L (ref 98–107)
CO2 SERPL-SCNC: 25.6 MMOL/L (ref 22–29)
CREAT SERPL-MCNC: 3.05 MG/DL (ref 0.7–1.3)
DEPRECATED RDW RBC AUTO: 57.2 FL (ref 37–54)
EGFRCR SERPLBLD CKD-EPI 2021: 21.9 ML/MIN/1.73
EOSINOPHIL # BLD AUTO: 0.15 10*3/MM3 (ref 0–0.4)
EOSINOPHIL NFR BLD AUTO: 3.8 % (ref 0.3–6.2)
ERYTHROCYTE [DISTWIDTH] IN BLOOD BY AUTOMATED COUNT: 16.8 % (ref 12.3–15.4)
GLOBULIN UR ELPH-MCNC: 2.5 GM/DL (ref 1.8–3.5)
GLUCOSE SERPL-MCNC: 156 MG/DL (ref 74–124)
HCT VFR BLD AUTO: 31.7 % (ref 37.5–51)
HGB BLD-MCNC: 10.1 G/DL (ref 13–17.7)
IMM GRANULOCYTES # BLD AUTO: 0.02 10*3/MM3 (ref 0–0.05)
IMM GRANULOCYTES NFR BLD AUTO: 0.5 % (ref 0–0.5)
LYMPHOCYTES # BLD AUTO: 0.61 10*3/MM3 (ref 0.7–3.1)
LYMPHOCYTES NFR BLD AUTO: 15.3 % (ref 19.6–45.3)
MAGNESIUM SERPL-MCNC: 1.3 MG/DL (ref 1.8–2.5)
MCH RBC QN AUTO: 30.1 PG (ref 26.6–33)
MCHC RBC AUTO-ENTMCNC: 31.9 G/DL (ref 31.5–35.7)
MCV RBC AUTO: 94.3 FL (ref 79–97)
MONOCYTES # BLD AUTO: 0.34 10*3/MM3 (ref 0.1–0.9)
MONOCYTES NFR BLD AUTO: 8.5 % (ref 5–12)
NEUTROPHILS NFR BLD AUTO: 2.78 10*3/MM3 (ref 1.7–7)
NEUTROPHILS NFR BLD AUTO: 69.6 % (ref 42.7–76)
NRBC BLD AUTO-RTO: 0 /100 WBC (ref 0–0.2)
PLATELET # BLD AUTO: 130 10*3/MM3 (ref 140–450)
PMV BLD AUTO: 9.3 FL (ref 6–12)
POTASSIUM SERPL-SCNC: 4.4 MMOL/L (ref 3.5–4.7)
PROT SERPL-MCNC: 6.4 G/DL (ref 6.3–8)
RBC # BLD AUTO: 3.36 10*6/MM3 (ref 4.14–5.8)
SODIUM SERPL-SCNC: 140 MMOL/L (ref 134–145)
WBC NRBC COR # BLD: 3.99 10*3/MM3 (ref 3.4–10.8)

## 2023-01-03 PROCEDURE — 80053 COMPREHEN METABOLIC PANEL: CPT

## 2023-01-03 PROCEDURE — 83735 ASSAY OF MAGNESIUM: CPT

## 2023-01-03 PROCEDURE — 85025 COMPLETE CBC W/AUTO DIFF WBC: CPT

## 2023-01-03 PROCEDURE — 36415 COLL VENOUS BLD VENIPUNCTURE: CPT

## 2023-01-03 RX ORDER — INSULIN LISPRO 100 [IU]/ML
INJECTION, SOLUTION INTRAVENOUS; SUBCUTANEOUS
Qty: 27 ML | Refills: 3 | Status: SHIPPED | OUTPATIENT
Start: 2023-01-03

## 2023-01-03 NOTE — PROGRESS NOTES
Hgb today is 10.1.  No procrit today per guidelines.   Reviewed Mag of 1.3 with Dr Del Valle and patient will need appt on Thursday for IV mag per protocol.   Spoke to Jacque henry RN and she will place orders for Mag.   Message sent to scheduling.

## 2023-01-04 ENCOUNTER — TELEPHONE (OUTPATIENT)
Dept: ENDOCRINOLOGY | Age: 68
End: 2023-01-04
Payer: MEDICARE

## 2023-01-05 ENCOUNTER — INFUSION (OUTPATIENT)
Dept: ONCOLOGY | Facility: HOSPITAL | Age: 68
End: 2023-01-05
Payer: MEDICARE

## 2023-01-05 VITALS
HEART RATE: 80 BPM | DIASTOLIC BLOOD PRESSURE: 69 MMHG | OXYGEN SATURATION: 94 % | WEIGHT: 302.2 LBS | BODY MASS INDEX: 37.97 KG/M2 | TEMPERATURE: 98.2 F | SYSTOLIC BLOOD PRESSURE: 120 MMHG | RESPIRATION RATE: 16 BRPM

## 2023-01-05 DIAGNOSIS — E83.42 HYPOMAGNESEMIA: ICD-10-CM

## 2023-01-05 PROCEDURE — 25010000002 MAGNESIUM SULFATE 2 GM/50ML SOLUTION: Performed by: INTERNAL MEDICINE

## 2023-01-05 PROCEDURE — 96366 THER/PROPH/DIAG IV INF ADDON: CPT

## 2023-01-05 PROCEDURE — 25010000002 MAGNESIUM SULFATE IN D5W 1G/100ML (PREMIX) 1-5 GM/100ML-% SOLUTION: Performed by: INTERNAL MEDICINE

## 2023-01-05 PROCEDURE — 96365 THER/PROPH/DIAG IV INF INIT: CPT

## 2023-01-05 RX ORDER — MAGNESIUM SULFATE 1 G/100ML
1 INJECTION INTRAVENOUS ONCE
Status: COMPLETED | OUTPATIENT
Start: 2023-01-05 | End: 2023-01-05

## 2023-01-05 RX ORDER — MAGNESIUM SULFATE HEPTAHYDRATE 40 MG/ML
2 INJECTION, SOLUTION INTRAVENOUS ONCE
Status: COMPLETED | OUTPATIENT
Start: 2023-01-05 | End: 2023-01-05

## 2023-01-05 RX ADMIN — MAGNESIUM SULFATE HEPTAHYDRATE 2 G: 40 INJECTION, SOLUTION INTRAVENOUS at 14:45

## 2023-01-05 RX ADMIN — MAGNESIUM SULFATE IN DEXTROSE 1 G: 10 INJECTION, SOLUTION INTRAVENOUS at 14:12

## 2023-01-06 DIAGNOSIS — E11.22 TYPE 2 DIABETES MELLITUS WITH STAGE 4 CHRONIC KIDNEY DISEASE, WITH LONG-TERM CURRENT USE OF INSULIN: Primary | ICD-10-CM

## 2023-01-06 DIAGNOSIS — N18.4 TYPE 2 DIABETES MELLITUS WITH STAGE 4 CHRONIC KIDNEY DISEASE, WITH LONG-TERM CURRENT USE OF INSULIN: Primary | ICD-10-CM

## 2023-01-06 DIAGNOSIS — E11.22 TYPE 2 DIABETES MELLITUS WITH STAGE 4 CHRONIC KIDNEY DISEASE, WITH LONG-TERM CURRENT USE OF INSULIN: ICD-10-CM

## 2023-01-06 DIAGNOSIS — Z79.4 TYPE 2 DIABETES MELLITUS WITH STAGE 4 CHRONIC KIDNEY DISEASE, WITH LONG-TERM CURRENT USE OF INSULIN: ICD-10-CM

## 2023-01-06 DIAGNOSIS — Z79.4 TYPE 2 DIABETES MELLITUS WITH STAGE 4 CHRONIC KIDNEY DISEASE, WITH LONG-TERM CURRENT USE OF INSULIN: Primary | ICD-10-CM

## 2023-01-06 DIAGNOSIS — N18.4 TYPE 2 DIABETES MELLITUS WITH STAGE 4 CHRONIC KIDNEY DISEASE, WITH LONG-TERM CURRENT USE OF INSULIN: ICD-10-CM

## 2023-01-06 RX ORDER — PROCHLORPERAZINE 25 MG/1
1 SUPPOSITORY RECTAL ONCE
Qty: 1 EACH | Refills: 0 | Status: SHIPPED | OUTPATIENT
Start: 2023-01-06 | End: 2023-01-06

## 2023-01-06 RX ORDER — PROCHLORPERAZINE 25 MG/1
1 SUPPOSITORY RECTAL
Qty: 1 EACH | Refills: 1 | Status: SHIPPED | OUTPATIENT
Start: 2023-01-06

## 2023-01-06 RX ORDER — PROCHLORPERAZINE 25 MG/1
SUPPOSITORY RECTAL
Qty: 9 EACH | Refills: 1 | Status: SHIPPED | OUTPATIENT
Start: 2023-01-06

## 2023-01-10 ENCOUNTER — APPOINTMENT (OUTPATIENT)
Dept: ONCOLOGY | Facility: HOSPITAL | Age: 68
End: 2023-01-10
Payer: MEDICARE

## 2023-01-10 ENCOUNTER — INFUSION (OUTPATIENT)
Dept: ONCOLOGY | Facility: HOSPITAL | Age: 68
End: 2023-01-10
Payer: MEDICARE

## 2023-01-10 ENCOUNTER — LAB (OUTPATIENT)
Dept: LAB | Facility: HOSPITAL | Age: 68
End: 2023-01-10
Payer: MEDICARE

## 2023-01-10 VITALS
RESPIRATION RATE: 16 BRPM | BODY MASS INDEX: 38.78 KG/M2 | TEMPERATURE: 97.8 F | SYSTOLIC BLOOD PRESSURE: 165 MMHG | OXYGEN SATURATION: 94 % | WEIGHT: 308.6 LBS | DIASTOLIC BLOOD PRESSURE: 78 MMHG | HEART RATE: 80 BPM

## 2023-01-10 DIAGNOSIS — E83.42 HYPOMAGNESEMIA: ICD-10-CM

## 2023-01-10 DIAGNOSIS — D63.1 ANEMIA IN STAGE 3B CHRONIC KIDNEY DISEASE: ICD-10-CM

## 2023-01-10 DIAGNOSIS — N18.32 ANEMIA IN STAGE 3B CHRONIC KIDNEY DISEASE: ICD-10-CM

## 2023-01-10 DIAGNOSIS — E83.42 HYPOMAGNESEMIA: Primary | ICD-10-CM

## 2023-01-10 DIAGNOSIS — C90.00 MULTIPLE MYELOMA, REMISSION STATUS UNSPECIFIED: ICD-10-CM

## 2023-01-10 DIAGNOSIS — D64.9 ANEMIA, UNSPECIFIED TYPE: ICD-10-CM

## 2023-01-10 LAB
BASOPHILS # BLD AUTO: 0.08 10*3/MM3 (ref 0–0.2)
BASOPHILS NFR BLD AUTO: 2.6 % (ref 0–1.5)
DEPRECATED RDW RBC AUTO: 57.7 FL (ref 37–54)
EOSINOPHIL # BLD AUTO: 0.15 10*3/MM3 (ref 0–0.4)
EOSINOPHIL NFR BLD AUTO: 4.8 % (ref 0.3–6.2)
ERYTHROCYTE [DISTWIDTH] IN BLOOD BY AUTOMATED COUNT: 16.5 % (ref 12.3–15.4)
HCT VFR BLD AUTO: 30 % (ref 37.5–51)
HGB BLD-MCNC: 9.7 G/DL (ref 13–17.7)
IMM GRANULOCYTES # BLD AUTO: 0.02 10*3/MM3 (ref 0–0.05)
IMM GRANULOCYTES NFR BLD AUTO: 0.6 % (ref 0–0.5)
LYMPHOCYTES # BLD AUTO: 0.58 10*3/MM3 (ref 0.7–3.1)
LYMPHOCYTES NFR BLD AUTO: 18.5 % (ref 19.6–45.3)
MAGNESIUM SERPL-MCNC: 1.6 MG/DL (ref 1.8–2.5)
MCH RBC QN AUTO: 30.7 PG (ref 26.6–33)
MCHC RBC AUTO-ENTMCNC: 32.3 G/DL (ref 31.5–35.7)
MCV RBC AUTO: 94.9 FL (ref 79–97)
MONOCYTES # BLD AUTO: 0.47 10*3/MM3 (ref 0.1–0.9)
MONOCYTES NFR BLD AUTO: 15 % (ref 5–12)
NEUTROPHILS NFR BLD AUTO: 1.83 10*3/MM3 (ref 1.7–7)
NEUTROPHILS NFR BLD AUTO: 58.5 % (ref 42.7–76)
NRBC BLD AUTO-RTO: 0 /100 WBC (ref 0–0.2)
PLATELET # BLD AUTO: 124 10*3/MM3 (ref 140–450)
PMV BLD AUTO: 9.2 FL (ref 6–12)
RBC # BLD AUTO: 3.16 10*6/MM3 (ref 4.14–5.8)
WBC NRBC COR # BLD: 3.13 10*3/MM3 (ref 3.4–10.8)

## 2023-01-10 PROCEDURE — 96365 THER/PROPH/DIAG IV INF INIT: CPT

## 2023-01-10 PROCEDURE — 85025 COMPLETE CBC W/AUTO DIFF WBC: CPT

## 2023-01-10 PROCEDURE — 83735 ASSAY OF MAGNESIUM: CPT

## 2023-01-10 PROCEDURE — 96372 THER/PROPH/DIAG INJ SC/IM: CPT

## 2023-01-10 PROCEDURE — 25010000002 EPOETIN ALFA PER 1000 UNITS: Performed by: INTERNAL MEDICINE

## 2023-01-10 PROCEDURE — 36415 COLL VENOUS BLD VENIPUNCTURE: CPT

## 2023-01-10 PROCEDURE — 25010000002 MAGNESIUM SULFATE 2 GM/50ML SOLUTION: Performed by: INTERNAL MEDICINE

## 2023-01-10 RX ORDER — MAGNESIUM SULFATE HEPTAHYDRATE 40 MG/ML
2 INJECTION, SOLUTION INTRAVENOUS ONCE
Status: COMPLETED | OUTPATIENT
Start: 2023-01-10 | End: 2023-01-10

## 2023-01-10 RX ADMIN — MAGNESIUM SULFATE HEPTAHYDRATE 2 G: 40 INJECTION, SOLUTION INTRAVENOUS at 10:20

## 2023-01-10 RX ADMIN — ERYTHROPOIETIN 60000 UNITS: 40000 INJECTION, SOLUTION INTRAVENOUS; SUBCUTANEOUS at 11:00

## 2023-01-10 NOTE — PROGRESS NOTES
Per discussion with Dr Del Valle patient will need a stat Mag weekly and Mag infusion as needed per protocol.  Per Dr Del Valle would be best if patient had stat Mag level and infusion if needed on same day.

## 2023-01-10 NOTE — NURSING NOTE
Pt here for Procrit/Mag replacement. Per Dr. Del Valle, pt is to receive 60,000 units of Procrit when hgb drops below 10.     Lab Results   Component Value Date    WBC 3.13 (L) 01/10/2023    HGB 9.7 (L) 01/10/2023    HCT 30.0 (L) 01/10/2023    MCV 94.9 01/10/2023     (L) 01/10/2023

## 2023-01-10 NOTE — PROGRESS NOTES
Per discussion with Dr Del Valle, patient is to have a stat Mg level with each visit and IV mag replacement per protocol.  Per Dr Del Valle would be best for patient to receive IV mag replacement on same visit  As lab and procrit.   Also, per Dr Del Valle, patient is at max dose of procrit 84737 units and this needs to continue with each visit when Hgb less than 10.0, do not decrease dose as patient's Hgb will drop.

## 2023-01-12 ENCOUNTER — SPECIALTY PHARMACY (OUTPATIENT)
Dept: PHARMACY | Facility: HOSPITAL | Age: 68
End: 2023-01-12
Payer: MEDICARE

## 2023-01-12 NOTE — PROGRESS NOTES
Specialty Note    Red Del Valle MD [place]  I recommend reducing the dose to 2 mg.     Thank you           Previous Messages       ----- Message -----   From: Donna Cano RPH   Sent: 1/12/2023   8:44 AM EST   To: Red Del Valle MD     HI Dr Del Valle,     His specialty pharmacy is ready for a new Pomalyst rx to be sent.  Would you like to to send in 3 mg again or do you think better to send 2 mg?  I saw in the dictation that possible dose reduction if he continues having neutropenia.         Thanks,   Pia        Called to share this information with the patient- no answer.   direct line [phone]

## 2023-01-13 ENCOUNTER — SPECIALTY PHARMACY (OUTPATIENT)
Dept: PHARMACY | Facility: HOSPITAL | Age: 68
End: 2023-01-13
Payer: MEDICARE

## 2023-01-13 NOTE — PROGRESS NOTES
Specialty Note ( Pomalyst)    Called Mr Albert to inform him of dose reduction to 2 mg.  No answer.   direct line [phone].    Wife returned call on 1/16/23 and verbalized understanding of the above directives.

## 2023-01-16 ENCOUNTER — LAB (OUTPATIENT)
Dept: LAB | Facility: HOSPITAL | Age: 68
End: 2023-01-16
Payer: MEDICARE

## 2023-01-16 ENCOUNTER — INFUSION (OUTPATIENT)
Dept: ONCOLOGY | Facility: HOSPITAL | Age: 68
End: 2023-01-16
Payer: MEDICARE

## 2023-01-16 VITALS
BODY MASS INDEX: 38.52 KG/M2 | HEART RATE: 74 BPM | SYSTOLIC BLOOD PRESSURE: 126 MMHG | OXYGEN SATURATION: 94 % | WEIGHT: 306.6 LBS | TEMPERATURE: 98.7 F | DIASTOLIC BLOOD PRESSURE: 64 MMHG | RESPIRATION RATE: 16 BRPM

## 2023-01-16 DIAGNOSIS — N18.32 ANEMIA IN STAGE 3B CHRONIC KIDNEY DISEASE: Primary | ICD-10-CM

## 2023-01-16 DIAGNOSIS — D63.1 ANEMIA IN STAGE 3B CHRONIC KIDNEY DISEASE: Primary | ICD-10-CM

## 2023-01-16 DIAGNOSIS — D64.9 ANEMIA, UNSPECIFIED TYPE: ICD-10-CM

## 2023-01-16 DIAGNOSIS — C90.00 MULTIPLE MYELOMA, REMISSION STATUS UNSPECIFIED: ICD-10-CM

## 2023-01-16 LAB
ALBUMIN SERPL-MCNC: 3.8 G/DL (ref 3.5–5.2)
ALBUMIN/GLOB SERPL: 1.7 G/DL (ref 1.1–2.4)
ALP SERPL-CCNC: 46 U/L (ref 38–116)
ALT SERPL W P-5'-P-CCNC: 11 U/L (ref 0–41)
ANION GAP SERPL CALCULATED.3IONS-SCNC: 15.9 MMOL/L (ref 5–15)
AST SERPL-CCNC: 11 U/L (ref 0–40)
BASOPHILS # BLD AUTO: 0.07 10*3/MM3 (ref 0–0.2)
BASOPHILS NFR BLD AUTO: 3.3 % (ref 0–1.5)
BILIRUB SERPL-MCNC: 0.4 MG/DL (ref 0.2–1.2)
BUN SERPL-MCNC: 48 MG/DL (ref 6–20)
BUN/CREAT SERPL: 13.9 (ref 7.3–30)
CALCIUM SPEC-SCNC: 9.3 MG/DL (ref 8.5–10.2)
CHLORIDE SERPL-SCNC: 102 MMOL/L (ref 98–107)
CO2 SERPL-SCNC: 22.1 MMOL/L (ref 22–29)
CREAT SERPL-MCNC: 3.45 MG/DL (ref 0.7–1.3)
DEPRECATED RDW RBC AUTO: 58 FL (ref 37–54)
EGFRCR SERPLBLD CKD-EPI 2021: 18.9 ML/MIN/1.73
EOSINOPHIL # BLD AUTO: 0.21 10*3/MM3 (ref 0–0.4)
EOSINOPHIL NFR BLD AUTO: 9.8 % (ref 0.3–6.2)
ERYTHROCYTE [DISTWIDTH] IN BLOOD BY AUTOMATED COUNT: 16.9 % (ref 12.3–15.4)
GLOBULIN UR ELPH-MCNC: 2.3 GM/DL (ref 1.8–3.5)
GLUCOSE SERPL-MCNC: 177 MG/DL (ref 74–124)
HCT VFR BLD AUTO: 31.1 % (ref 37.5–51)
HGB BLD-MCNC: 9.8 G/DL (ref 13–17.7)
IMM GRANULOCYTES # BLD AUTO: 0.01 10*3/MM3 (ref 0–0.05)
IMM GRANULOCYTES NFR BLD AUTO: 0.5 % (ref 0–0.5)
LYMPHOCYTES # BLD AUTO: 0.56 10*3/MM3 (ref 0.7–3.1)
LYMPHOCYTES NFR BLD AUTO: 26.2 % (ref 19.6–45.3)
MAGNESIUM SERPL-MCNC: 1.5 MG/DL (ref 1.8–2.5)
MCH RBC QN AUTO: 30 PG (ref 26.6–33)
MCHC RBC AUTO-ENTMCNC: 31.5 G/DL (ref 31.5–35.7)
MCV RBC AUTO: 95.1 FL (ref 79–97)
MONOCYTES # BLD AUTO: 0.52 10*3/MM3 (ref 0.1–0.9)
MONOCYTES NFR BLD AUTO: 24.3 % (ref 5–12)
NEUTROPHILS NFR BLD AUTO: 0.77 10*3/MM3 (ref 1.7–7)
NEUTROPHILS NFR BLD AUTO: 35.9 % (ref 42.7–76)
NRBC BLD AUTO-RTO: 0 /100 WBC (ref 0–0.2)
PLATELET # BLD AUTO: 105 10*3/MM3 (ref 140–450)
PMV BLD AUTO: 10.3 FL (ref 6–12)
POTASSIUM SERPL-SCNC: 4.4 MMOL/L (ref 3.5–4.7)
PROT SERPL-MCNC: 6.1 G/DL (ref 6.3–8)
RBC # BLD AUTO: 3.27 10*6/MM3 (ref 4.14–5.8)
SODIUM SERPL-SCNC: 140 MMOL/L (ref 134–145)
WBC NRBC COR # BLD: 2.14 10*3/MM3 (ref 3.4–10.8)

## 2023-01-16 PROCEDURE — 85025 COMPLETE CBC W/AUTO DIFF WBC: CPT

## 2023-01-16 PROCEDURE — 25010000002 EPOETIN ALFA PER 1000 UNITS: Performed by: INTERNAL MEDICINE

## 2023-01-16 PROCEDURE — 83735 ASSAY OF MAGNESIUM: CPT

## 2023-01-16 PROCEDURE — 80053 COMPREHEN METABOLIC PANEL: CPT

## 2023-01-16 PROCEDURE — 96365 THER/PROPH/DIAG IV INF INIT: CPT

## 2023-01-16 PROCEDURE — 25010000002 MAGNESIUM SULFATE IN D5W 1G/100ML (PREMIX) 1-5 GM/100ML-% SOLUTION: Performed by: INTERNAL MEDICINE

## 2023-01-16 PROCEDURE — 96372 THER/PROPH/DIAG INJ SC/IM: CPT

## 2023-01-16 RX ORDER — MAGNESIUM SULFATE 1 G/100ML
1 INJECTION INTRAVENOUS ONCE
Status: COMPLETED | OUTPATIENT
Start: 2023-01-16 | End: 2023-01-16

## 2023-01-16 RX ADMIN — ERYTHROPOIETIN 60000 UNITS: 40000 INJECTION, SOLUTION INTRAVENOUS; SUBCUTANEOUS at 14:43

## 2023-01-16 RX ADMIN — MAGNESIUM SULFATE IN DEXTROSE 1 G: 10 INJECTION, SOLUTION INTRAVENOUS at 12:58

## 2023-01-16 RX ADMIN — MAGNESIUM SULFATE IN DEXTROSE 1 G: 10 INJECTION, SOLUTION INTRAVENOUS at 14:08

## 2023-01-17 ENCOUNTER — OFFICE VISIT (OUTPATIENT)
Dept: ENDOCRINOLOGY | Age: 68
End: 2023-01-17
Payer: MEDICARE

## 2023-01-17 ENCOUNTER — SPECIALTY PHARMACY (OUTPATIENT)
Dept: PHARMACY | Facility: HOSPITAL | Age: 68
End: 2023-01-17
Payer: MEDICARE

## 2023-01-17 ENCOUNTER — APPOINTMENT (OUTPATIENT)
Dept: LAB | Facility: HOSPITAL | Age: 68
End: 2023-01-17
Payer: MEDICARE

## 2023-01-17 ENCOUNTER — APPOINTMENT (OUTPATIENT)
Dept: ONCOLOGY | Facility: HOSPITAL | Age: 68
End: 2023-01-17
Payer: MEDICARE

## 2023-01-17 VITALS
SYSTOLIC BLOOD PRESSURE: 130 MMHG | OXYGEN SATURATION: 94 % | TEMPERATURE: 97.3 F | DIASTOLIC BLOOD PRESSURE: 70 MMHG | HEART RATE: 73 BPM | BODY MASS INDEX: 38.57 KG/M2 | WEIGHT: 310.2 LBS | HEIGHT: 75 IN

## 2023-01-17 DIAGNOSIS — N18.4 STAGE 4 CHRONIC KIDNEY DISEASE: ICD-10-CM

## 2023-01-17 DIAGNOSIS — C90.00 MULTIPLE MYELOMA NOT HAVING ACHIEVED REMISSION: ICD-10-CM

## 2023-01-17 DIAGNOSIS — Z85.528 HISTORY OF RENAL CELL CANCER: ICD-10-CM

## 2023-01-17 DIAGNOSIS — N18.4 TYPE 2 DIABETES MELLITUS WITH STAGE 4 CHRONIC KIDNEY DISEASE, WITH LONG-TERM CURRENT USE OF INSULIN: Primary | ICD-10-CM

## 2023-01-17 DIAGNOSIS — E78.2 MIXED HYPERLIPIDEMIA: ICD-10-CM

## 2023-01-17 DIAGNOSIS — Z90.5 H/O PARTIAL NEPHRECTOMY: ICD-10-CM

## 2023-01-17 DIAGNOSIS — Z79.4 TYPE 2 DIABETES MELLITUS WITH STAGE 4 CHRONIC KIDNEY DISEASE, WITH LONG-TERM CURRENT USE OF INSULIN: Primary | ICD-10-CM

## 2023-01-17 DIAGNOSIS — E55.9 VITAMIN D DEFICIENCY: ICD-10-CM

## 2023-01-17 DIAGNOSIS — E11.22 TYPE 2 DIABETES MELLITUS WITH STAGE 4 CHRONIC KIDNEY DISEASE, WITH LONG-TERM CURRENT USE OF INSULIN: Primary | ICD-10-CM

## 2023-01-17 PROCEDURE — 99215 OFFICE O/P EST HI 40 MIN: CPT | Performed by: INTERNAL MEDICINE

## 2023-01-17 RX ORDER — ATORVASTATIN CALCIUM 10 MG/1
TABLET, FILM COATED ORAL
Qty: 30 TABLET | Refills: 6 | Status: SHIPPED | OUTPATIENT
Start: 2023-01-17

## 2023-01-17 NOTE — PROGRESS NOTES
Subjective   Contreras Albert is a 67 y.o. male.     History of Present Illness        Patient is a 67-year-old male who came in for follow-up     He has known diabetes mellitus since 2001 and started on insulin in 2009.  He is on Lantus 20 units every morning and 10 to 20 units every evening and NovoLog 20 units with each meal during days that he is off chemotherapy.  On chemotherapy days, he is on Lantus 30 units twice a day and NovoLog 30 to 35 units +2 units per 50 greater than 150 with each meal.  He is using a Dexcom 6 sensor.   Blood sugars have been running higher for at least 24 hours after he received oral dexamethasone for chemotherapy and he has been taking more Novolog on those days.  His last chemotherapy was 3 weeks ago.  Hemoglobin A1c done in January 2023 is 8.2% and fructosamine was normal at 260 µmol/L.    He has been off Dexcom 6 sensor for several weeks last month.  Sensor data reviewed.  Average glucose 203 mg per DL.  Time in target 31%.  Time above target 69%.  Time below target 0%.  He had hypoglycemia at 3 AM earlier today.  He took Lantus 20 units last night and NovoLog 20 units at 9:30 PM.     His last eye examination was in 10/22.  He has retinopathy and had previous intraocular eye injections on both eyes.  He has not had a retinal laser therapy.  He has chronic kidney disease and has been following Dr. Norm Hutchison.  He has less numbness in hands and feet thought to be due to Velcade.  He is on gabapentin 100 mg TID  prescribed by his oncologist Dr. Del Valle.     He has hypertension and is on Bumex 1 milligram twice daily and Coreg 12.5 mg twice daily prescribed by Dr. Hutchison.  He was taken off amlodipine because of pedal edema.  He has no history of heart attack or stroke.  He denies chest pain or shortness of breath.     He has hyperlipidemia but has not filled prescription for atorvastatin 10 mg/day.   He had abdominal cramping when he took Crestor in the past.  He has gained 3 pounds  since October 2022.  Lipid panel done in January 2023 are as follows: 173.  HDL 26.  LDL 54.  Triglycerides 632.  His 10-year risk of heart disease or stroke using American Heart Association calculator is elevated at 34.5%.     He has multiple myeloma and had chemotherapy followed by stem cell transplant in 12/20.  He has recurrence and was started Pomalyst and Darzalex.  He takes oral dexamethasone for 2 days every 4 week while on chemotherapy which started in February 2022.  He follows with Dr. Del Valle.     He had a previous right partial nephrectomy for cancer in 2014.  He has no known recurrence.    He has vitamin D deficiency and is on ergocalciferol 50,000 units weekly prescriber Dr. Del Valle.  He has hypomagnesemia and received IV magnesium intermittently from Dr. Del Valle.  He was taken off magnesium oxide because of diarrhea.     He has less diarrhea and takes Lomotil and Imodium as needed.  He has history of C. difficile colitis which was treated.  He denies melena or hematochezia.  He has never had a colonoscopy.  He has not seen a GI MD before.    He had flu vaccine last fall.    The following portions of the patient's history were reviewed and updated as appropriate: allergies, current medications, past family history, past medical history, past social history, past surgical history and problem list.    Review of Systems   HENT: Negative.    Eyes: Negative.  Negative for visual disturbance.   Respiratory: Negative for shortness of breath.    Cardiovascular: Negative for chest pain and palpitations.   Gastrointestinal: Positive for anal bleeding and blood in stool. Negative for diarrhea.   Endocrine: Negative for cold intolerance and heat intolerance.   Genitourinary: Negative.    Musculoskeletal: Negative for myalgias.   Neurological: Positive for numbness.     Vitals:    01/17/23 1116   BP: 130/70   Pulse: 73   Temp: 97.3 °F (36.3 °C)   TempSrc: Temporal   SpO2: 94%   Weight: (!) 141 kg (310 lb 3.2 oz)  "  Height: 190 cm (74.8\")      Objective   Physical Exam  Constitutional:       General: He is not in acute distress.     Appearance: Normal appearance. He is obese. He is not ill-appearing, toxic-appearing or diaphoretic.   Eyes:      General: No scleral icterus.        Right eye: No discharge.         Left eye: No discharge.   Neck:      Vascular: No carotid bruit.   Cardiovascular:      Rate and Rhythm: Normal rate and regular rhythm.      Heart sounds: Normal heart sounds.   Pulmonary:      Breath sounds: Normal breath sounds. No rales.   Chest:      Chest wall: No tenderness.   Abdominal:      General: Bowel sounds are normal.      Palpations: Abdomen is soft.      Tenderness: There is no right CVA tenderness or left CVA tenderness.   Musculoskeletal:      Right lower leg: Edema present.      Left lower leg: Edema present.   Lymphadenopathy:      Cervical: No cervical adenopathy.   Neurological:      Mental Status: He is alert and oriented to person, place, and time.      Comments: Intact light touch on lower ext       Infusion on 01/16/2023   Component Date Value Ref Range Status   • Glucose 01/16/2023 177 (H)  74 - 124 mg/dL Final   • BUN 01/16/2023 48 (H)  6 - 20 mg/dL Final   • Creatinine 01/16/2023 3.45 (C)  0.70 - 1.30 mg/dL Final   • Sodium 01/16/2023 140  134 - 145 mmol/L Final   • Potassium 01/16/2023 4.4  3.5 - 4.7 mmol/L Final   • Chloride 01/16/2023 102  98 - 107 mmol/L Final   • CO2 01/16/2023 22.1  22.0 - 29.0 mmol/L Final   • Calcium 01/16/2023 9.3  8.5 - 10.2 mg/dL Final   • Total Protein 01/16/2023 6.1 (L)  6.3 - 8.0 g/dL Final   • Albumin 01/16/2023 3.8  3.5 - 5.2 g/dL Final   • ALT (SGPT) 01/16/2023 11  0 - 41 U/L Final   • AST (SGOT) 01/16/2023 11  0 - 40 U/L Final   • Alkaline Phosphatase 01/16/2023 46  38 - 116 U/L Final   • Total Bilirubin 01/16/2023 0.4  0.2 - 1.2 mg/dL Final   • Globulin 01/16/2023 2.3  1.8 - 3.5 gm/dL Final   • A/G Ratio 01/16/2023 1.7  1.1 - 2.4 g/dL Final   • " BUN/Creatinine Ratio 01/16/2023 13.9  7.3 - 30.0 Final   • Anion Gap 01/16/2023 15.9 (H)  5.0 - 15.0 mmol/L Final   • eGFR 01/16/2023 18.9 (L)  >60.0 mL/min/1.73 Final    National Kidney Foundation and American Society of Nephrology (ASN) Task Force recommended calculation based on the Chronic Kidney Disease Epidemiology Collaboration (CKD-EPI) equation refit without adjustment for race.   • Magnesium 01/16/2023 1.5 (C)  1.8 - 2.5 mg/dL Final   • WBC 01/16/2023 2.14 (L)  3.40 - 10.80 10*3/mm3 Final   • RBC 01/16/2023 3.27 (L)  4.14 - 5.80 10*6/mm3 Final   • Hemoglobin 01/16/2023 9.8 (L)  13.0 - 17.7 g/dL Final   • Hematocrit 01/16/2023 31.1 (L)  37.5 - 51.0 % Final   • MCV 01/16/2023 95.1  79.0 - 97.0 fL Final   • MCH 01/16/2023 30.0  26.6 - 33.0 pg Final   • MCHC 01/16/2023 31.5  31.5 - 35.7 g/dL Final   • RDW 01/16/2023 16.9 (H)  12.3 - 15.4 % Final   • RDW-SD 01/16/2023 58.0 (H)  37.0 - 54.0 fl Final   • MPV 01/16/2023 10.3  6.0 - 12.0 fL Final   • Platelets 01/16/2023 105 (L)  140 - 450 10*3/mm3 Final   • Neutrophil % 01/16/2023 35.9 (L)  42.7 - 76.0 % Final   • Lymphocyte % 01/16/2023 26.2  19.6 - 45.3 % Final   • Monocyte % 01/16/2023 24.3 (H)  5.0 - 12.0 % Final   • Eosinophil % 01/16/2023 9.8 (H)  0.3 - 6.2 % Final   • Basophil % 01/16/2023 3.3 (H)  0.0 - 1.5 % Final   • Immature Grans % 01/16/2023 0.5  0.0 - 0.5 % Final   • Neutrophils, Absolute 01/16/2023 0.77 (L)  1.70 - 7.00 10*3/mm3 Final   • Lymphocytes, Absolute 01/16/2023 0.56 (L)  0.70 - 3.10 10*3/mm3 Final   • Monocytes, Absolute 01/16/2023 0.52  0.10 - 0.90 10*3/mm3 Final   • Eosinophils, Absolute 01/16/2023 0.21  0.00 - 0.40 10*3/mm3 Final   • Basophils, Absolute 01/16/2023 0.07  0.00 - 0.20 10*3/mm3 Final   • Immature Grans, Absolute 01/16/2023 0.01  0.00 - 0.05 10*3/mm3 Final   • nRBC 01/16/2023 0.0  0.0 - 0.2 /100 WBC Final     Assessment & Plan   Diagnoses and all orders for this visit:    1. Type 2 diabetes mellitus with stage 4 chronic  kidney disease, with long-term current use of insulin (HCC) (Primary)    2. History of renal cell cancer    3. H/O partial nephrectomy - right    4. Multiple myeloma not having achieved remission (HCC)    5. Mixed hyperlipidemia    6. Stage 4 chronic kidney disease (HCC)    7. Vitamin D deficiency    Other orders  -     atorvastatin (LIPITOR) 10 MG tablet; By mouth take 1 tab daily before bed or after evening meal.  Dispense: 30 tablet; Refill: 6        Decrease Lantus to 20 units every morning and 16 units every evening.  Increase NovoLog to 22 units with each meal 3 times daily.  Continue same dose of Lantus and NovoLog on chemotherapy days.  Start Lipitor 10 mg/day.  Continue ergocalciferol per Dr. Del Valle.  Will defer blood pressure control to Dr. Hutchison.    Copy of my note sent to Brianna Thompson NP, Dr. Del Valle and Dr. Norm Hutchison.    RTC 3 mos with ETELVINA Haskins NP.  Total time 58 minutes

## 2023-01-17 NOTE — LETTER
January 17, 2023     IZZY Montes  [address]  [facility] [address]    Patient: Contreras Albert   YOB: 1955   Date of Visit: 1/17/2023       Dear IZZY Kumar:    Thank you for referring Contreras Albert to me for evaluation. Below are the relevant portions of my assessment and plan of care.    If you have questions, please do not hesitate to call me. I look forward to following Contreras along with you.         Sincerely,        Cisco Haro MD        CC: MD Red Booth MD  [facility], [place]  01/17/23 1123  Addendum  Subjective    Contreras Albert is a 67 y.o. male.     History of Present Illness        Patient is a 67-year-old male who came in for follow-up     He has known diabetes mellitus since 2001 and started on insulin in 2009.  He is on Lantus 20 units every morning and 10 to 20 units every evening and NovoLog 20 units with each meal during days that he is off chemotherapy.  On chemotherapy days, he is on Lantus 30 units twice a day and NovoLog 30 to 35 units +2 units per 50 greater than 150 with each meal.  He is using a Dexcom 6 sensor.   Blood sugars have been running higher for at least 24 hours after he received oral dexamethasone for chemotherapy and he has been taking more Novolog on those days.  His last chemotherapy was 3 weeks ago.  Hemoglobin A1c done in January 2023 is 8.2% and fructosamine was normal at 260 µmol/L.    He has been off Dexcom 6 sensor for several weeks last month.  Sensor data reviewed.  Average glucose 203 mg per DL.  Time in target 31%.  Time above target 69%.  Time below target 0%.  He had hypoglycemia at 3 AM earlier today.  He took Lantus 20 units last night and NovoLog 20 units at 9:30 PM.     His last eye examination was in 10/22.  He has retinopathy and had previous intraocular eye injections on both eyes.  He has not had a retinal laser therapy.  He has chronic kidney disease and has been following Dr. Norm Hutchison.  He  has less numbness in hands and feet thought to be due to Velcade.  He is on gabapentin 100 mg TID  prescribed by his oncologist Dr. Del Valle.     He has hypertension and is on Bumex 1 milligram twice daily and Coreg 12.5 mg twice daily prescribed by Dr. Hutchison.  He was taken off amlodipine because of pedal edema.  He has no history of heart attack or stroke.  He denies chest pain or shortness of breath.     He has hyperlipidemia but has not filled prescription for atorvastatin 10 mg/day.   He had abdominal cramping when he took Crestor in the past.  He has gained 3 pounds since October 2022.  Lipid panel done in January 2023 are as follows: 173.  HDL 26.  LDL 54.  Triglycerides 632.  His 10-year risk of heart disease or stroke using American Heart Association calculator is elevated at 34.5%.     He has multiple myeloma and had chemotherapy followed by stem cell transplant in 12/20.  He has recurrence and was started Pomalyst and Darzalex.  He takes oral dexamethasone for 2 days every 4 week while on chemotherapy which started in February 2022.  He follows with Dr. Del Valle.     He had a previous right partial nephrectomy for cancer in 2014.  He has no known recurrence.    He has vitamin D deficiency and is on ergocalciferol 50,000 units weekly prescriber Dr. Del Valle.  He has hypomagnesemia and received IV magnesium intermittently from Dr. Del Valle.  He was taken off magnesium oxide because of diarrhea.     He has less diarrhea and takes Lomotil and Imodium as needed.  He has history of C. difficile colitis which was treated.  He denies melena or hematochezia.  He has never had a colonoscopy.  He has not seen a GI MD before.    He had flu vaccine last fall.    The following portions of the patient's history were reviewed and updated as appropriate: allergies, current medications, past family history, past medical history, past social history, past surgical history and problem list.    Review of Systems   HENT: Negative.  "   Eyes: Negative.  Negative for visual disturbance.   Respiratory: Negative for shortness of breath.    Cardiovascular: Negative for chest pain and palpitations.   Gastrointestinal: Positive for anal bleeding and blood in stool. Negative for diarrhea.   Endocrine: Negative for cold intolerance and heat intolerance.   Genitourinary: Negative.    Musculoskeletal: Negative for myalgias.   Neurological: Positive for numbness.     Vitals:    01/17/23 1116   BP: 130/70   Pulse: 73   Temp: 97.3 °F (36.3 °C)   TempSrc: Temporal   SpO2: 94%   Weight: (!) 141 kg (310 lb 3.2 oz)   Height: 190 cm (74.8\")      Objective    Physical Exam  Constitutional:       General: He is not in acute distress.     Appearance: Normal appearance. He is obese. He is not ill-appearing, toxic-appearing or diaphoretic.   Eyes:      General: No scleral icterus.        Right eye: No discharge.         Left eye: No discharge.   Neck:      Vascular: No carotid bruit.   Cardiovascular:      Rate and Rhythm: Normal rate and regular rhythm.      Heart sounds: Normal heart sounds.   Pulmonary:      Breath sounds: Normal breath sounds. No rales.   Chest:      Chest wall: No tenderness.   Abdominal:      General: Bowel sounds are normal.      Palpations: Abdomen is soft.      Tenderness: There is no right CVA tenderness or left CVA tenderness.   Musculoskeletal:      Right lower leg: Edema present.      Left lower leg: Edema present.   Lymphadenopathy:      Cervical: No cervical adenopathy.   Neurological:      Mental Status: He is alert and oriented to person, place, and time.      Comments: Intact light touch on lower ext       Infusion on 01/16/2023   Component Date Value Ref Range Status   • Glucose 01/16/2023 177 (H)  74 - 124 mg/dL Final   • BUN 01/16/2023 48 (H)  6 - 20 mg/dL Final   • Creatinine 01/16/2023 3.45 (C)  0.70 - 1.30 mg/dL Final   • Sodium 01/16/2023 140  134 - 145 mmol/L Final   • Potassium 01/16/2023 4.4  3.5 - 4.7 mmol/L Final   • " Chloride 01/16/2023 102  98 - 107 mmol/L Final   • CO2 01/16/2023 22.1  22.0 - 29.0 mmol/L Final   • Calcium 01/16/2023 9.3  8.5 - 10.2 mg/dL Final   • Total Protein 01/16/2023 6.1 (L)  6.3 - 8.0 g/dL Final   • Albumin 01/16/2023 3.8  3.5 - 5.2 g/dL Final   • ALT (SGPT) 01/16/2023 11  0 - 41 U/L Final   • AST (SGOT) 01/16/2023 11  0 - 40 U/L Final   • Alkaline Phosphatase 01/16/2023 46  38 - 116 U/L Final   • Total Bilirubin 01/16/2023 0.4  0.2 - 1.2 mg/dL Final   • Globulin 01/16/2023 2.3  1.8 - 3.5 gm/dL Final   • A/G Ratio 01/16/2023 1.7  1.1 - 2.4 g/dL Final   • BUN/Creatinine Ratio 01/16/2023 13.9  7.3 - 30.0 Final   • Anion Gap 01/16/2023 15.9 (H)  5.0 - 15.0 mmol/L Final   • eGFR 01/16/2023 18.9 (L)  >60.0 mL/min/1.73 Final    National Kidney Foundation and American Society of Nephrology (ASN) Task Force recommended calculation based on the Chronic Kidney Disease Epidemiology Collaboration (CKD-EPI) equation refit without adjustment for race.   • Magnesium 01/16/2023 1.5 (C)  1.8 - 2.5 mg/dL Final   • WBC 01/16/2023 2.14 (L)  3.40 - 10.80 10*3/mm3 Final   • RBC 01/16/2023 3.27 (L)  4.14 - 5.80 10*6/mm3 Final   • Hemoglobin 01/16/2023 9.8 (L)  13.0 - 17.7 g/dL Final   • Hematocrit 01/16/2023 31.1 (L)  37.5 - 51.0 % Final   • MCV 01/16/2023 95.1  79.0 - 97.0 fL Final   • MCH 01/16/2023 30.0  26.6 - 33.0 pg Final   • MCHC 01/16/2023 31.5  31.5 - 35.7 g/dL Final   • RDW 01/16/2023 16.9 (H)  12.3 - 15.4 % Final   • RDW-SD 01/16/2023 58.0 (H)  37.0 - 54.0 fl Final   • MPV 01/16/2023 10.3  6.0 - 12.0 fL Final   • Platelets 01/16/2023 105 (L)  140 - 450 10*3/mm3 Final   • Neutrophil % 01/16/2023 35.9 (L)  42.7 - 76.0 % Final   • Lymphocyte % 01/16/2023 26.2  19.6 - 45.3 % Final   • Monocyte % 01/16/2023 24.3 (H)  5.0 - 12.0 % Final   • Eosinophil % 01/16/2023 9.8 (H)  0.3 - 6.2 % Final   • Basophil % 01/16/2023 3.3 (H)  0.0 - 1.5 % Final   • Immature Grans % 01/16/2023 0.5  0.0 - 0.5 % Final   • Neutrophils, Absolute  01/16/2023 0.77 (L)  1.70 - 7.00 10*3/mm3 Final   • Lymphocytes, Absolute 01/16/2023 0.56 (L)  0.70 - 3.10 10*3/mm3 Final   • Monocytes, Absolute 01/16/2023 0.52  0.10 - 0.90 10*3/mm3 Final   • Eosinophils, Absolute 01/16/2023 0.21  0.00 - 0.40 10*3/mm3 Final   • Basophils, Absolute 01/16/2023 0.07  0.00 - 0.20 10*3/mm3 Final   • Immature Grans, Absolute 01/16/2023 0.01  0.00 - 0.05 10*3/mm3 Final   • nRBC 01/16/2023 0.0  0.0 - 0.2 /100 WBC Final     Assessment & Plan   Diagnoses and all orders for this visit:    1. Type 2 diabetes mellitus with stage 4 chronic kidney disease, with long-term current use of insulin ([facility]) (Primary)    2. History of renal cell cancer    3. H/O partial nephrectomy - right    4. Multiple myeloma not having achieved remission (HCC)    5. Mixed hyperlipidemia    6. Stage 4 chronic kidney disease (HCC)    7. Vitamin D deficiency    Other orders  -     atorvastatin (LIPITOR) 10 MG tablet; By mouth take 1 tab daily before bed or after evening meal.  Dispense: 30 tablet; Refill: 6        Decrease Lantus to 20 units every morning and 16 units every evening.  Increase NovoLog to 22 units with each meal 3 times daily.  Continue same dose of Lantus and NovoLog on chemotherapy days.  Start Lipitor 10 mg/day.  Continue ergocalciferol per Dr. Del Valle.  Will defer blood pressure control to Dr. Hutchison.    Copy of my note sent to Brianna Thompson NP, Dr. Del Valle and Dr. Norm Hutchison.    RTC 3 mos with ETELVINA Haskins NP.  Total time 58 minutes

## 2023-01-23 ENCOUNTER — INFUSION (OUTPATIENT)
Dept: ONCOLOGY | Facility: HOSPITAL | Age: 68
End: 2023-01-23
Payer: MEDICARE

## 2023-01-23 ENCOUNTER — OFFICE VISIT (OUTPATIENT)
Dept: ONCOLOGY | Facility: CLINIC | Age: 68
End: 2023-01-23
Payer: MEDICARE

## 2023-01-23 ENCOUNTER — LAB (OUTPATIENT)
Dept: LAB | Facility: HOSPITAL | Age: 68
End: 2023-01-23
Payer: MEDICARE

## 2023-01-23 ENCOUNTER — TELEPHONE (OUTPATIENT)
Dept: ONCOLOGY | Facility: CLINIC | Age: 68
End: 2023-01-23
Payer: MEDICARE

## 2023-01-23 VITALS
RESPIRATION RATE: 18 BRPM | OXYGEN SATURATION: 98 % | HEART RATE: 70 BPM | SYSTOLIC BLOOD PRESSURE: 145 MMHG | TEMPERATURE: 96.6 F | DIASTOLIC BLOOD PRESSURE: 87 MMHG | BODY MASS INDEX: 38.11 KG/M2 | HEIGHT: 75 IN | WEIGHT: 306.5 LBS

## 2023-01-23 DIAGNOSIS — C90.00 MULTIPLE MYELOMA NOT HAVING ACHIEVED REMISSION: ICD-10-CM

## 2023-01-23 DIAGNOSIS — E83.42 HYPOMAGNESEMIA: ICD-10-CM

## 2023-01-23 DIAGNOSIS — D63.1 ANEMIA IN STAGE 3B CHRONIC KIDNEY DISEASE: ICD-10-CM

## 2023-01-23 DIAGNOSIS — T45.1X5A CHEMOTHERAPY INDUCED NEUTROPENIA: ICD-10-CM

## 2023-01-23 DIAGNOSIS — D64.9 ANEMIA, UNSPECIFIED TYPE: ICD-10-CM

## 2023-01-23 DIAGNOSIS — D70.1 CHEMOTHERAPY INDUCED NEUTROPENIA: ICD-10-CM

## 2023-01-23 DIAGNOSIS — Z79.899 ENCOUNTER FOR LONG-TERM CURRENT USE OF HIGH RISK MEDICATION: ICD-10-CM

## 2023-01-23 DIAGNOSIS — C90.00 MULTIPLE MYELOMA NOT HAVING ACHIEVED REMISSION: Primary | ICD-10-CM

## 2023-01-23 DIAGNOSIS — G62.0 NEUROPATHY DUE TO CHEMOTHERAPEUTIC DRUG: ICD-10-CM

## 2023-01-23 DIAGNOSIS — E83.51 HYPOCALCEMIA: ICD-10-CM

## 2023-01-23 DIAGNOSIS — C90.00 MULTIPLE MYELOMA, REMISSION STATUS UNSPECIFIED: ICD-10-CM

## 2023-01-23 DIAGNOSIS — D69.6 THROMBOCYTOPENIA: ICD-10-CM

## 2023-01-23 DIAGNOSIS — K52.9 CHRONIC DIARRHEA: ICD-10-CM

## 2023-01-23 DIAGNOSIS — T45.1X5A NEUROPATHY DUE TO CHEMOTHERAPEUTIC DRUG: ICD-10-CM

## 2023-01-23 DIAGNOSIS — N18.32 ANEMIA IN STAGE 3B CHRONIC KIDNEY DISEASE: ICD-10-CM

## 2023-01-23 LAB
ALBUMIN SERPL-MCNC: 4 G/DL (ref 3.5–5.2)
ALBUMIN/GLOB SERPL: 1.7 G/DL (ref 1.1–2.4)
ALP SERPL-CCNC: 50 U/L (ref 38–116)
ALT SERPL W P-5'-P-CCNC: 11 U/L (ref 0–41)
ANION GAP SERPL CALCULATED.3IONS-SCNC: 11.2 MMOL/L (ref 5–15)
AST SERPL-CCNC: 11 U/L (ref 0–40)
BASOPHILS # BLD AUTO: 0.07 10*3/MM3 (ref 0–0.2)
BASOPHILS NFR BLD AUTO: 1.8 % (ref 0–1.5)
BILIRUB SERPL-MCNC: 0.4 MG/DL (ref 0.2–1.2)
BUN SERPL-MCNC: 43 MG/DL (ref 6–20)
BUN/CREAT SERPL: 13.4 (ref 7.3–30)
CALCIUM SPEC-SCNC: 8.2 MG/DL (ref 8.5–10.2)
CHLORIDE SERPL-SCNC: 106 MMOL/L (ref 98–107)
CO2 SERPL-SCNC: 23.8 MMOL/L (ref 22–29)
CREAT SERPL-MCNC: 3.22 MG/DL (ref 0.7–1.3)
DEPRECATED RDW RBC AUTO: 61.3 FL (ref 37–54)
EGFRCR SERPLBLD CKD-EPI 2021: 20.5 ML/MIN/1.73
EOSINOPHIL # BLD AUTO: 0.15 10*3/MM3 (ref 0–0.4)
EOSINOPHIL NFR BLD AUTO: 3.9 % (ref 0.3–6.2)
ERYTHROCYTE [DISTWIDTH] IN BLOOD BY AUTOMATED COUNT: 17.4 % (ref 12.3–15.4)
GLOBULIN UR ELPH-MCNC: 2.3 GM/DL (ref 1.8–3.5)
GLUCOSE SERPL-MCNC: 230 MG/DL (ref 74–124)
HCT VFR BLD AUTO: 32.8 % (ref 37.5–51)
HGB BLD-MCNC: 10.2 G/DL (ref 13–17.7)
IMM GRANULOCYTES # BLD AUTO: 0.03 10*3/MM3 (ref 0–0.05)
IMM GRANULOCYTES NFR BLD AUTO: 0.8 % (ref 0–0.5)
LYMPHOCYTES # BLD AUTO: 0.61 10*3/MM3 (ref 0.7–3.1)
LYMPHOCYTES NFR BLD AUTO: 16.1 % (ref 19.6–45.3)
MAGNESIUM SERPL-MCNC: 1.6 MG/DL (ref 1.8–2.5)
MCH RBC QN AUTO: 30.4 PG (ref 26.6–33)
MCHC RBC AUTO-ENTMCNC: 31.1 G/DL (ref 31.5–35.7)
MCV RBC AUTO: 97.6 FL (ref 79–97)
MONOCYTES # BLD AUTO: 0.51 10*3/MM3 (ref 0.1–0.9)
MONOCYTES NFR BLD AUTO: 13.4 % (ref 5–12)
NEUTROPHILS NFR BLD AUTO: 2.43 10*3/MM3 (ref 1.7–7)
NEUTROPHILS NFR BLD AUTO: 64 % (ref 42.7–76)
NRBC BLD AUTO-RTO: 0 /100 WBC (ref 0–0.2)
PHOSPHATE SERPL-MCNC: 3.4 MG/DL (ref 2.5–4.5)
PLATELET # BLD AUTO: 141 10*3/MM3 (ref 140–450)
PMV BLD AUTO: 9.5 FL (ref 6–12)
POTASSIUM SERPL-SCNC: 4.5 MMOL/L (ref 3.5–4.7)
PROT SERPL-MCNC: 6.3 G/DL (ref 6.3–8)
RBC # BLD AUTO: 3.36 10*6/MM3 (ref 4.14–5.8)
SODIUM SERPL-SCNC: 141 MMOL/L (ref 134–145)
WBC NRBC COR # BLD: 3.8 10*3/MM3 (ref 3.4–10.8)

## 2023-01-23 PROCEDURE — 36415 COLL VENOUS BLD VENIPUNCTURE: CPT

## 2023-01-23 PROCEDURE — 85025 COMPLETE CBC W/AUTO DIFF WBC: CPT

## 2023-01-23 PROCEDURE — 83735 ASSAY OF MAGNESIUM: CPT

## 2023-01-23 PROCEDURE — 99215 OFFICE O/P EST HI 40 MIN: CPT | Performed by: INTERNAL MEDICINE

## 2023-01-23 PROCEDURE — 96365 THER/PROPH/DIAG IV INF INIT: CPT

## 2023-01-23 PROCEDURE — 84100 ASSAY OF PHOSPHORUS: CPT

## 2023-01-23 PROCEDURE — 25010000002 MAGNESIUM SULFATE 2 GM/50ML SOLUTION: Performed by: INTERNAL MEDICINE

## 2023-01-23 PROCEDURE — 80053 COMPREHEN METABOLIC PANEL: CPT

## 2023-01-23 PROCEDURE — 25010000002 DARATUMUMAB-HYALURONIDASE-FIHJ 1800-30000 MG-UT/15ML SOLUTION: Performed by: INTERNAL MEDICINE

## 2023-01-23 PROCEDURE — 96401 CHEMO ANTI-NEOPL SQ/IM: CPT

## 2023-01-23 RX ORDER — FAMOTIDINE 20 MG/1
20 TABLET, FILM COATED ORAL ONCE
Status: COMPLETED | OUTPATIENT
Start: 2023-01-23 | End: 2023-01-23

## 2023-01-23 RX ORDER — DIPHENHYDRAMINE HYDROCHLORIDE 50 MG/ML
50 INJECTION INTRAMUSCULAR; INTRAVENOUS AS NEEDED
Status: CANCELLED | OUTPATIENT
Start: 2023-01-23

## 2023-01-23 RX ORDER — MAGNESIUM SULFATE HEPTAHYDRATE 40 MG/ML
2 INJECTION, SOLUTION INTRAVENOUS ONCE
Status: COMPLETED | OUTPATIENT
Start: 2023-01-23 | End: 2023-01-23

## 2023-01-23 RX ORDER — FAMOTIDINE 20 MG/1
20 TABLET, FILM COATED ORAL ONCE
Status: CANCELLED
Start: 2023-01-23 | End: 2023-01-23

## 2023-01-23 RX ORDER — DIPHENHYDRAMINE HCL 25 MG
25 CAPSULE ORAL ONCE
Status: DISCONTINUED | OUTPATIENT
Start: 2023-01-23 | End: 2023-01-23 | Stop reason: HOSPADM

## 2023-01-23 RX ORDER — FAMOTIDINE 10 MG/ML
20 INJECTION, SOLUTION INTRAVENOUS AS NEEDED
Status: CANCELLED | OUTPATIENT
Start: 2023-01-23

## 2023-01-23 RX ORDER — ACETAMINOPHEN 500 MG
1000 TABLET ORAL ONCE
Status: CANCELLED | OUTPATIENT
Start: 2023-01-23

## 2023-01-23 RX ORDER — DIPHENHYDRAMINE HCL 25 MG
25 CAPSULE ORAL ONCE
Status: CANCELLED | OUTPATIENT
Start: 2023-01-23

## 2023-01-23 RX ORDER — MEPERIDINE HYDROCHLORIDE 25 MG/ML
25 INJECTION INTRAMUSCULAR; INTRAVENOUS; SUBCUTANEOUS
Status: CANCELLED | OUTPATIENT
Start: 2023-01-23

## 2023-01-23 RX ORDER — ACETAMINOPHEN 500 MG
1000 TABLET ORAL ONCE
Status: DISCONTINUED | OUTPATIENT
Start: 2023-01-23 | End: 2023-01-23 | Stop reason: HOSPADM

## 2023-01-23 RX ADMIN — DARATUMUMAB AND HYALURONIDASE-FIHJ (HUMAN RECOMBINANT) 1800 MG: 1800; 30000 INJECTION SUBCUTANEOUS at 09:48

## 2023-01-23 RX ADMIN — MAGNESIUM SULFATE HEPTAHYDRATE 2 G: 40 INJECTION, SOLUTION INTRAVENOUS at 10:01

## 2023-01-23 RX ADMIN — FAMOTIDINE 20 MG: 20 TABLET, FILM COATED ORAL at 09:31

## 2023-01-23 NOTE — TELEPHONE ENCOUNTER
----- Message from Red Del Valle MD sent at 1/23/2023  8:07 AM EST -----  Regarding: RE: here a day too early  Can you please contact the patient and ask him to come tomorrow and reschedule today's visit and treatment to tomorrow?    Thank you      ----- Message -----  From: Sobia Lopez, NATALIO  Sent: 1/23/2023   7:31 AM EST  To: Red Del Valle MD, Jacque Barclay RN  Subject: here a day too early                             It looks like pt would be a day early for darzalex.  His last injection was on 12/27, so it's only been 27 days.    Not sure if you would like to do the procrit today and have him return for darzalex tomorrow or just move it all to tomorrow.      Thanks,  Nilda

## 2023-01-23 NOTE — TELEPHONE ENCOUNTER
Attempted to contact patient and wifes number multiple times, but no answer. Will make contact with patient and wife in person.

## 2023-01-23 NOTE — PROGRESS NOTES
Subjective     CHIEF COMPLAINT:      Chief Complaint   Patient presents with   • Follow-up     No concerns     HISTORY OF PRESENT ILLNESS:     Contreras Albert is a 67 y.o. male patient who returns today for follow up on his multiple myeloma.  He returns today for follow-up reporting continued problem with diarrhea.  He reports feeling somewhat better and stronger compared to the way he felt a few months ago.  Leg swelling has improved.    ROS:  Pertinent ROS is in the HPI.     Past medical, surgical, social and family history were reviewed.     MEDICATIONS:    Current Outpatient Medications:   •  acetaminophen (TYLENOL) 325 MG tablet, Take 2 tablets by mouth Every 6 (Six) Hours As Needed for Mild Pain., Disp: , Rfl:   •  acyclovir (ZOVIRAX) 800 MG tablet, Take 1 tablet by mouth Daily., Disp: 90 tablet, Rfl: 2  •  ASPIRIN 81 PO, Take 81 mg by mouth Daily., Disp: , Rfl:   •  atorvastatin (LIPITOR) 10 MG tablet, By mouth take 1 tab daily before bed or after evening meal., Disp: 30 tablet, Rfl: 6  •  Blood Glucose Monitoring Suppl (FREESTYLE FREEDOM LITE) w/Device kit, 1 kit Daily. Indications: Type 2 Diabetes, Disp: 1 each, Rfl: 0  •  bumetanide (BUMEX) 2 MG tablet, Take 0.5 tablets by mouth 2 (Two) Times a Day. (Patient taking differently: Take 2 mg by mouth Daily.), Disp: , Rfl:   •  calcium carbonate (TUMS) 500 MG chewable tablet, Chew 3 tablets Daily., Disp: , Rfl:   •  carvedilol (COREG) 12.5 MG tablet, Take 12.5 mg by mouth 2 (Two) Times a Day With Meals., Disp: , Rfl:   •  Continuous Blood Gluc Sensor (Dexcom G6 Sensor), Replace sensor every 10 days.  Dx: E 11.22, Disp: 9 each, Rfl: 1  •  Continuous Blood Gluc Transmit (Dexcom G6 Transmitter) misc, 1 each Every 3 (Three) Months. Dx: E 11.22, Disp: 1 each, Rfl: 1  •  dexamethasone (DECADRON) 4 MG tablet, Take 3 tablets on day of chemo, 1 tablet on day #2 and 1 tablet on day #3 and repeat monthly., Disp: 15 tablet, Rfl: 1  •  diphenoxylate-atropine (LOMOTIL) 2.5-0.025  MG per tablet, Take 2 tablets by mouth 4 (Four) Times a Day As Needed for Diarrhea., Disp: 120 tablet, Rfl: 0  •  ergocalciferol (ERGOCALCIFEROL) 1.25 MG (41451 UT) capsule, Take 50,000 Units by mouth., Disp: , Rfl:   •  escitalopram (LEXAPRO) 5 MG tablet, Take 1 tablet by mouth Daily., Disp: 30 tablet, Rfl: 5  •  finasteride (PROSCAR) 5 MG tablet, Take 5 mg by mouth daily., Disp: , Rfl:   •  gabapentin (NEURONTIN) 300 MG capsule, Take 1 capsule in AM and take 2 capsules at bedtime., Disp: 90 capsule, Rfl: 5  •  glucagon (GLUCAGEN) 1 MG injection, Inject 1 mg into the appropriate muscle as directed by prescriber 1 (One) Time As Needed for Low Blood Sugar for up to 1 dose. Follow package directions for low blood sugar., Disp: 1 kit, Rfl: 3  •  glucose blood (FREESTYLE LITE) test strip, Use to test blood sugar 4-5 times daily., Disp: 200 each, Rfl: 5  •  Insulin Glargine (LANTUS SOLOSTAR) 100 UNIT/ML injection pen, Inject 60 Units under the skin into the appropriate area as directed Daily for 90 days. : Non-Chemo Days take 16 units twice daily. Chemo Days take 30 units twice daily. 16-30 units twice daily for max daily dose of 70 units daily., Disp: 54 mL, Rfl: 1  •  Insulin Lispro, 1 Unit Dial, (HumaLOG KwikPen) 100 UNIT/ML solution pen-injector, Non-chemo days: 10 units three times daily before meals plus 1 unit per every 50 over 150.  Chemo days: 15 units three times daily before meals plus 2 units per 50 greater than 150.  Max daily dose 90 units daily, Disp: 27 mL, Rfl: 3  •  Insulin Pen Needle 32G X 4 MM misc, USING 4 PEN NEEDLES DAILY, Disp: 400 each, Rfl: 3  •  Lancets (freestyle) lancets, Use to check blood sugar 4 times daily, Disp: 400 each, Rfl: 2  •  loperamide (IMODIUM) 2 MG capsule, Take 1 capsule by mouth 4 (Four) Times a Day As Needed for Diarrhea., Disp: , Rfl:   •  ondansetron (ZOFRAN) 8 MG tablet, Take 1 tablet by mouth 3 (Three) Times a Day As Needed for Nausea or Vomiting., Disp: 30 tablet, Rfl:  "5  •  pantoprazole (PROTONIX) 40 MG EC tablet, TAKE ONE TABLET BY MOUTH DAILY, Disp: 30 tablet, Rfl: 1  •  pomalidomide (Pomalyst) 2 MG chemo capsule, Take 1 capsule by mouth Daily. Take for 21 days on, then 7 days off, then repeat., Disp: 21 capsule, Rfl: 0  •  tamsulosin (FLOMAX) 0.4 MG capsule 24 hr capsule, Take 1 capsule by mouth Daily., Disp: , Rfl:   •  vitamin D (ERGOCALCIFEROL) 1.25 MG (17086 UT) capsule capsule, Take 1 capsule by mouth 1 (One) Time Per Week. Mondays, Disp: 13 capsule, Rfl: 1  Objective     VITAL SIGNS:     Vitals:    01/23/23 0850   BP: 145/87   Pulse: 70   Resp: 18   Temp: 96.6 °F (35.9 °C)   TempSrc: Temporal   SpO2: 98%   Weight: (!) 139 kg (306 lb 8 oz)   Height: 190 cm (74.8\")   PainSc: 0-No pain     Body mass index is 38.51 kg/m².     Wt Readings from Last 5 Encounters:   01/23/23 (!) 139 kg (306 lb 8 oz)   01/17/23 (!) 141 kg (310 lb 3.2 oz)   01/16/23 (!) 139 kg (306 lb 9.6 oz)   01/10/23 (!) 140 kg (308 lb 9.6 oz)   01/05/23 (!) 137 kg (302 lb 3.2 oz)     PHYSICAL EXAMINATION:   GENERAL: The patient appears in fair general condition, not in acute distress.   SKIN: No ecchymosis.  EYES: No jaundice. No pallor.  LYMPHATICS: No cervical lymphadenopathy.  CHEST: Normal respiratory effort.  Lungs clear bilaterally.  No added sounds.  CVS: Lungs clear bilaterally.  No added sounds.  ABDOMEN: Soft. No tenderness. No Hepatomegaly. No Splenomegaly. No masses.  EXTREMITIES: +1 edema bilaterally.  Mild erythema without warmth.    DIAGNOSTIC DATA:     Results from last 7 days   Lab Units 01/23/23  0838 01/16/23  1224   WBC 10*3/mm3 3.80 2.14*   NEUTROS ABS 10*3/mm3 2.43 0.77*   HEMOGLOBIN g/dL 10.2* 9.8*   HEMATOCRIT % 32.8* 31.1*   PLATELETS 10*3/mm3 141 105*     Results from last 7 days   Lab Units 01/23/23  0838   SODIUM mmol/L 141   POTASSIUM mmol/L 4.5   CHLORIDE mmol/L 106   CO2 mmol/L 23.8   BUN mg/dL 43*   CREATININE mg/dL 3.22*   CALCIUM mg/dL 8.2*   ALBUMIN g/dL 4.0   BILIRUBIN " mg/dL 0.4   ALK PHOS U/L 50   ALT (SGPT) U/L 11   AST (SGOT) U/L 11   GLUCOSE mg/dL 230*   MAGNESIUM mg/dL 1.6*     Component      Latest Ref Rng & Units 11/7/2022 11/28/2022 12/19/2022   IgG      603 - 1613 mg/dL 443 (L) 492 (L) 620   IgA      61 - 437 mg/dL 45 (L) 51 (L) 73   IgM      20 - 172 mg/dL 10 (L) 10 (L) 15 (L)   Total Protein      6.0 - 8.5 g/dL 5.5 (L) 5.7 (L) 6.0   Albumin      2.9 - 4.4 g/dL 3.5 3.6 3.6   Alpha-1-Globulin      0.0 - 0.4 g/dL 0.2 0.2 0.2   Alpha-2-Globulin      0.4 - 1.0 g/dL 0.6 0.7 0.8   Beta Globulin      0.7 - 1.3 g/dL 0.7 0.8 0.8   Gamma Globulin      0.4 - 1.8 g/dL 0.4 0.5 0.6   M-Jurgen      Not Observed g/dL Comment: Comment: Comment:   Globulin      2.2 - 3.9 g/dL 2.0 (L) 2.1 (L) 2.4   A/G Ratio      0.7 - 1.7 1.8 (H) 1.8 (H) 1.6   Immunofixation Reflex, Serum       Comment (A) Comment (A) Comment (A)   Please note       Comment Comment Comment   Kappa FLC      3.3 - 19.4 mg/L 54.5 (H) 47.7 (H) 80.8 (H)   Free Lambda Light Chains      5.7 - 26.3 mg/L 31.9 (H) 37.5 (H) 55.3 (H)   Kappa/Lambda Ratio      0.26 - 1.65 1.71 (H) 1.27 1.46     Assessment & Plan    *IgG lambda multiple myeloma.  · Bone marrow 5/23/2020 hypercellular marrow with 80% involvement of plasma cell myeloma; FISH studies pending  · Bone survey 5/26/2020: Diffuse osteoporosis and demineralization.  However, no discrete lytic lesions.  · SPEP 5/23/2020: M spike 5.1.  IgG 6629 lambda.  Light chain ratio 0 with free lambda light chains 6400.  Beta-2 microglobulin 16.3.  24-hour urine 12.7 g protein per 24-hour with monoclonal lambda free light chain 33.9%, monoclonal IgG lambda 23.5%  · 24-hour urine testing from 5/24/2020 free lambda light chains 8.4 g/L  · Due to the renal failure, initiated CyBorD 5/28/20.  Given the significantly elevated light chains with the first cycle of treatment plan to give Velcade days 1, 4, 8, 11; Cytoxan 300 PO mg/m² weekly and dexamethasone 40 mg p.o. days 1, 2, 3, 4, 8 and 15.     · After cycle 1 treatment can likely be altered to weekly dosing of each drug.  · On 6/18/2020, Velcade was changed to 1.5 mg/m² weekly continuously along with weekly Decadron and oral Cytoxan.  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   · Treatment was changed on 8/27/2020 to to Daratumumab subcutaneous injection weekly along with Revlimid 10 mg daily for 21 out of 28-day cycle and Decadron 40 mg daily.  · S/p stem cell transplant on 12/9/2020 at [facility].  · Day 100 bone marrow on 3/16/2021 revealed minute  plasma cell population.  PET scan on 3/29/2021 was negative.  • Patient enrolled in the  clinical trial randomizing to maintenance lenalidomide versus lenalidomide and daratumumab.  Patient was randomized to the Revlimid arm initiated 4/13/2021 dosed at 5 mg daily (due to renal function).  Patient developed neutropenia requiring dose adjustment to 5 mg daily for 21/28 days.  Patient withdrew from clinical trial after 5 cycles on 8/31/2021 due to worsening diarrhea.    • He was switched to Velcade maintenance every other week 9/14/2021 which he discontinued 10/26/2021 due to neuropathy and lower extremity edema.    • He was subsequently changed to pomalidomide 2 mg daily 21/28 days on 11/8/2021.    • 1 year post transplant, bone marrow biopsy showed 5-7% plasma cells and due to the residual disease he was started on DPd (daratumumab, pomalidomide, dexamethasone) on 2/21/2022.    • Patient was seen by Dr. Romero on 9/12/2022.  At that time patient was cycle 8-day 7 DPd. Due to chronic side effects, dexamethasone was changed from weekly to monthly.    • Patient did continue pomalidomide on his own during admission to the hospital, received 1 dose 4 mg p.o. on 9/24/2022 before was subsequently discontinued due to cytopenias.   • Labs on 9/24/2022 with IgG 314, IgM less than 25, IgA less than 50.  • 9/25/2022 24-hour urine free light chains with free kappa 351, free lambda  104, ratio 3.37  • On 9/24/2022, IgG kappa M protein was 0.1 g.  IgG 311, IgA 43, IgM 7.   • Low level M spike of IgG kappa likely represents daratumumab.   • No IgG lambda M protein.   • Serum free light chains on 9/26/2022 with kappa 57.4, lambda 42.8, ratio normal at 1.34.  • He received Darzlex on 10/3/2022 at [facility]. He decided to transfer his treatment back to our office.   • On 10/13/2022, M protein was too small to quantitate. Kappa/Lambda ratio was 1.34.  • Dexamethasone was changed to 12 mg on Mondays, 4 mg on Tuesday and 4 mg on Wednesday.  • Pomalyst was restarted at 4 mg every other day to be taken on 10/13/2022, 10/15/2022, 10/17/2022, 10/19/2022, 10/21/2022 and 10/23/2022.  • Pomalyst dose was subsequently reduced to 3 mg daily for 21 out of a 28-day cycle starting on 10/31/2022.  • He tolerated the reduced dose better with improvement in the diarrhea.  • SPEP GUANAKITO FLC on 12/19/2022 showed no definite M protein.  K/L ratio was 1.46-normal.  • He received Darzalex on 12/27/2022 (1 day delay due to Christmas holiday being on 12/26/2022).  • Due to neutropenia, the dose of Pomalyst was reduced to 2 mg daily for 21/28-day cycle.  • SPEP GUANAKITO FLC will be repeated today.    *Bone health.  · He is on Xgeva.   · Last dose was on 9/19/2022.  · Patient developed severe hypocalcemia following the Xgeva injection.  He required inpatient hospital stay.  · Given the duration of Xgeva injections, recommended changing treatment to every 3 months starting December 2022.   · I recommended giving Xgeva today and continuing every 3 months.  · He received Xgeva on 12/27/2022.  · Next dose will be due in late March 2023.    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  · Patient was receiving Procrit at [facility].  · Records from [facility] showed that the patient was receiving 70,000 units weekly and received the last dose on 9/27/2022.  · Patient was  given Procrit 70,000 units on 9/27/2022.  · Hemoglobin was 9.9 on 10/17/2022.  He was given Procrit 60,000 units.  · Hemoglobin was 9.8 on 11/4/2022.  He was given Procrit 60,000 units.  · Hemoglobin is 10.2 today.  · He reports improvement in his fatigue.    *Thrombocytopenia.  · This is attributed to multiple myeloma, prior stem cell transplant and chemotherapy.  · Platelet count is 141,000 today.    *Neutropenia.  · This was attributed to Pomalyst.  · Neutrophil count was down to 290 on 9/25/2022.  · He was given Neupogen during his hospital stay in September 2022.  · Neutrophil count improved afterwards.  · However, neutrophil count decreased to 600 on 12/19/2022.  · He reports that he was feeling tired and achy at that time.  · Neutrophil count was 770 on 1/16/2023.  · Due to the recurrent neutropenia, the dose of Pomalyst was reduced to 2 mg.    *Hypocalcemia.  · Patient developed severe hypocalcemia due to Xgeva.  · Calcium was 5.9 on 9/23/2022.  · Calcium improved to 8.1 on 9/28/2022  · Calcium improved to 9.6 on 10/13/2022  · Calcium decreased to 7.7 on 10/24/2022.  Albumin was 3.8.  · Calcium improved to 10.3 on 12/19/2022.  · Calcium decreased to 8.4 on 12/27/2022.  · The drop in the calcium level was attributed to the patient not taking the full dose of the calcium supplement and due to him not consuming milk recently.  · He is back on taking calcium 2 tablets daily.  · Calcium is 8.2 today.    *Hypomagnesemia.  · Magnesium was 1.1 on 10/24/2022.  · He was placed on oral magnesium oxide 400 mg twice daily.  · He developed diarrhea and it was stopped.  · He has been receiving magnesium IV intermittently depending on his magnesium level.  · Magnesium was 1.5 on 1/16/2023.  He was given 2 g IV magnesium.  · Magnesium is 1.6 today.  · Since he did not tolerate magnesium oxide, I recommended changing to magnesium chloride.    *Vitamin D deficiency.  · He is on vitamin D 50,000 units weekly.  · Vitamin D level  was 18 on 10/3/2022.  · He was continued on vitamin D weekly.    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  · He is on gabapentin 300 mg in the morning and 600 mg in the evening.  · Neuropathy symptoms in the feet are better as the swelling improved.     *Immunocompromised host.  · He is on acyclovir 800 mg daily.  · He is on ASA 81 mg daily.  · He is not having problem with bleeding or infections.  · No recent infections.  · There was advised to continue follow-up with the transplant team for posttransplant immunizations.    *Chronic diarrhea.  · He is on Lomotil 2 tablets every 6 hours as needed.  · He also takes Imodium PRN.  · No improvement with Probiotic. No improvement with low lactose diet.  · Diarrhea improved after reducing the dose of Pomalyst.  · However, he continues to have the diarrhea.    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  · S/p right partial nephrectomy on 5/18/2016.    PLAN:    1.  We will give Darzalex today.    2.  Start new cycle of Pomalyst today at 2 mg daily for 21 days of a 28-day cycle.   3.  Start magnesium chloride 1 tablet daily.  4.  Continue calcium 2 tablets daily.    5.  Patient does not need Procrit today.  CBC will be monitored weekly and he will receive Procrit if hemoglobin is <10.    6.  Return weekly for CBC, CMP magnesium and possible Procrit and IV magnesium depending on his levels.  7.  Follow-up in 4 weeks.  He will be due for the next dose of Darzalex.    8.  Next dose of Xgeva will be due in 8 weeks.       I spent 41 minutes caring for Contreras on this date of service. This time includes time spent by me in the following activities: preparing for the visit, reviewing tests, obtaining and/or reviewing a separately obtained history, performing a medically appropriate examination and/or evaluation, counseling and educating the patient/family/caregiver, ordering medications, tests, or procedures, documenting information in the medical record,  independently interpreting results and communicating that information with the patient/family/caregiver and care coordination       Red Del Valle MD  01/23/23

## 2023-01-24 ENCOUNTER — SPECIALTY PHARMACY (OUTPATIENT)
Dept: PHARMACY | Facility: HOSPITAL | Age: 68
End: 2023-01-24
Payer: MEDICARE

## 2023-01-24 LAB
ALBUMIN SERPL ELPH-MCNC: 3.6 G/DL (ref 2.9–4.4)
ALBUMIN/GLOB SERPL: 1.6 {RATIO} (ref 0.7–1.7)
ALPHA1 GLOB SERPL ELPH-MCNC: 0.2 G/DL (ref 0–0.4)
ALPHA2 GLOB SERPL ELPH-MCNC: 0.8 G/DL (ref 0.4–1)
B-GLOBULIN SERPL ELPH-MCNC: 0.8 G/DL (ref 0.7–1.3)
GAMMA GLOB SERPL ELPH-MCNC: 0.5 G/DL (ref 0.4–1.8)
GLOBULIN SER-MCNC: 2.3 G/DL (ref 2.2–3.9)
IGA SERPL-MCNC: 71 MG/DL (ref 61–437)
IGG SERPL-MCNC: 565 MG/DL (ref 603–1613)
IGM SERPL-MCNC: 11 MG/DL (ref 20–172)
INTERPRETATION SERPL IEP-IMP: ABNORMAL
KAPPA LC FREE SER-MCNC: 68.1 MG/L (ref 3.3–19.4)
KAPPA LC FREE/LAMBDA FREE SER: 1.69 {RATIO} (ref 0.26–1.65)
LABORATORY COMMENT REPORT: ABNORMAL
LAMBDA LC FREE SERPL-MCNC: 40.3 MG/L (ref 5.7–26.3)
M PROTEIN SERPL ELPH-MCNC: ABNORMAL G/DL
PROT SERPL-MCNC: 5.9 G/DL (ref 6–8.5)

## 2023-01-24 NOTE — PROGRESS NOTES
Specialty Note ( Darzalex and Pomalyst)    Dr Del Valle's dictation is noted    We will give Darzalex today.    Start new cycle of Pomalyst today at 2 mg daily for 21 days of a 28-day cycle.     Labs reviewed        1/23/2023  Day 1   WBC 3.40 - 10.80 10*3/mm3 3.80   Neutrophils Absolute 1.70 - 7.00 10*3/mm3 2.43   Hemoglobin 13.0 - 17.7 g/dL 10.2 (A)   Hematocrit 37.5 - 51.0 % 32.8 (A)   Platelets 140 - 450 10*3/mm3 141   Creatinine 0.70 - 1.30 mg/dL 3.22 (A)   BUN 6 - 20 mg/dL 43 (A)   Sodium 134 - 145 mmol/L 141   Potassium 3.5 - 4.7 mmol/L 4.5   Glucose 74 - 124 mg/dL 230 (A)   Magnesium 1.8 - 2.5 mg/dL 1.6 (A)   Calcium 8.5 - 10.2 mg/dL 8.2 (A)   Albumin 3.5 - 5.2 g/dL 4.0   Total Protein 6.3 - 8.0 g/dL 6.3   AST (SGOT) 0 - 40 U/L 11   ALT (SGPT) 0 - 41 U/L 11   Alkaline Phosphatase 38 - 116 U/L 50   Total Bilirubin 0.2 - 1.2 mg/dL 0.4

## 2023-01-30 ENCOUNTER — INFUSION (OUTPATIENT)
Dept: ONCOLOGY | Facility: HOSPITAL | Age: 68
End: 2023-01-30
Payer: MEDICARE

## 2023-01-30 ENCOUNTER — LAB (OUTPATIENT)
Dept: LAB | Facility: HOSPITAL | Age: 68
End: 2023-01-30
Payer: MEDICARE

## 2023-01-30 VITALS
SYSTOLIC BLOOD PRESSURE: 138 MMHG | TEMPERATURE: 97.8 F | HEART RATE: 76 BPM | WEIGHT: 304.8 LBS | RESPIRATION RATE: 16 BRPM | BODY MASS INDEX: 38.3 KG/M2 | DIASTOLIC BLOOD PRESSURE: 79 MMHG | OXYGEN SATURATION: 95 %

## 2023-01-30 DIAGNOSIS — D64.9 ANEMIA, UNSPECIFIED TYPE: ICD-10-CM

## 2023-01-30 DIAGNOSIS — C90.00 MULTIPLE MYELOMA, REMISSION STATUS UNSPECIFIED: ICD-10-CM

## 2023-01-30 DIAGNOSIS — C90.00 MULTIPLE MYELOMA NOT HAVING ACHIEVED REMISSION: Primary | ICD-10-CM

## 2023-01-30 DIAGNOSIS — N18.32 ANEMIA IN STAGE 3B CHRONIC KIDNEY DISEASE: ICD-10-CM

## 2023-01-30 DIAGNOSIS — E83.42 HYPOMAGNESEMIA: ICD-10-CM

## 2023-01-30 DIAGNOSIS — D63.1 ANEMIA IN STAGE 3B CHRONIC KIDNEY DISEASE: ICD-10-CM

## 2023-01-30 LAB
ALBUMIN SERPL-MCNC: 3.6 G/DL (ref 3.5–5.2)
ALBUMIN/GLOB SERPL: 1.6 G/DL (ref 1.1–2.4)
ALP SERPL-CCNC: 48 U/L (ref 38–116)
ALT SERPL W P-5'-P-CCNC: 10 U/L (ref 0–41)
ANION GAP SERPL CALCULATED.3IONS-SCNC: 12.8 MMOL/L (ref 5–15)
AST SERPL-CCNC: 13 U/L (ref 0–40)
BASOPHILS # BLD AUTO: 0.06 10*3/MM3 (ref 0–0.2)
BASOPHILS NFR BLD AUTO: 1.5 % (ref 0–1.5)
BILIRUB SERPL-MCNC: 0.4 MG/DL (ref 0.2–1.2)
BUN SERPL-MCNC: 37 MG/DL (ref 6–20)
BUN/CREAT SERPL: 13.9 (ref 7.3–30)
CALCIUM SPEC-SCNC: 8 MG/DL (ref 8.5–10.2)
CHLORIDE SERPL-SCNC: 104 MMOL/L (ref 98–107)
CO2 SERPL-SCNC: 23.2 MMOL/L (ref 22–29)
CREAT SERPL-MCNC: 2.67 MG/DL (ref 0.7–1.3)
DEPRECATED RDW RBC AUTO: 60.1 FL (ref 37–54)
EGFRCR SERPLBLD CKD-EPI 2021: 25.7 ML/MIN/1.73
EOSINOPHIL # BLD AUTO: 0.17 10*3/MM3 (ref 0–0.4)
EOSINOPHIL NFR BLD AUTO: 4.1 % (ref 0.3–6.2)
ERYTHROCYTE [DISTWIDTH] IN BLOOD BY AUTOMATED COUNT: 17.3 % (ref 12.3–15.4)
GLOBULIN UR ELPH-MCNC: 2.3 GM/DL (ref 1.8–3.5)
GLUCOSE SERPL-MCNC: 191 MG/DL (ref 74–124)
HCT VFR BLD AUTO: 30.7 % (ref 37.5–51)
HGB BLD-MCNC: 9.7 G/DL (ref 13–17.7)
IMM GRANULOCYTES # BLD AUTO: 0.01 10*3/MM3 (ref 0–0.05)
IMM GRANULOCYTES NFR BLD AUTO: 0.2 % (ref 0–0.5)
LYMPHOCYTES # BLD AUTO: 0.64 10*3/MM3 (ref 0.7–3.1)
LYMPHOCYTES NFR BLD AUTO: 15.5 % (ref 19.6–45.3)
MAGNESIUM SERPL-MCNC: 1.5 MG/DL (ref 1.8–2.5)
MCH RBC QN AUTO: 30.1 PG (ref 26.6–33)
MCHC RBC AUTO-ENTMCNC: 31.6 G/DL (ref 31.5–35.7)
MCV RBC AUTO: 95.3 FL (ref 79–97)
MONOCYTES # BLD AUTO: 0.28 10*3/MM3 (ref 0.1–0.9)
MONOCYTES NFR BLD AUTO: 6.8 % (ref 5–12)
NEUTROPHILS NFR BLD AUTO: 2.96 10*3/MM3 (ref 1.7–7)
NEUTROPHILS NFR BLD AUTO: 71.9 % (ref 42.7–76)
NRBC BLD AUTO-RTO: 0 /100 WBC (ref 0–0.2)
PLATELET # BLD AUTO: 142 10*3/MM3 (ref 140–450)
PMV BLD AUTO: 11.4 FL (ref 6–12)
POTASSIUM SERPL-SCNC: 4.1 MMOL/L (ref 3.5–4.7)
PROT SERPL-MCNC: 5.9 G/DL (ref 6.3–8)
RBC # BLD AUTO: 3.22 10*6/MM3 (ref 4.14–5.8)
SODIUM SERPL-SCNC: 140 MMOL/L (ref 134–145)
WBC NRBC COR # BLD: 4.12 10*3/MM3 (ref 3.4–10.8)

## 2023-01-30 PROCEDURE — 25010000002 MAGNESIUM SULFATE 2 GM/50ML SOLUTION: Performed by: INTERNAL MEDICINE

## 2023-01-30 PROCEDURE — 25010000002 EPOETIN ALFA PER 1000 UNITS: Performed by: INTERNAL MEDICINE

## 2023-01-30 PROCEDURE — 96365 THER/PROPH/DIAG IV INF INIT: CPT

## 2023-01-30 PROCEDURE — 96372 THER/PROPH/DIAG INJ SC/IM: CPT

## 2023-01-30 PROCEDURE — 83735 ASSAY OF MAGNESIUM: CPT

## 2023-01-30 PROCEDURE — 85025 COMPLETE CBC W/AUTO DIFF WBC: CPT

## 2023-01-30 PROCEDURE — 80053 COMPREHEN METABOLIC PANEL: CPT

## 2023-01-30 RX ORDER — MAGNESIUM SULFATE HEPTAHYDRATE 40 MG/ML
2 INJECTION, SOLUTION INTRAVENOUS ONCE
Status: COMPLETED | OUTPATIENT
Start: 2023-01-30 | End: 2023-01-30

## 2023-01-30 RX ADMIN — ERYTHROPOIETIN 45000 UNITS: 20000 INJECTION, SOLUTION INTRAVENOUS; SUBCUTANEOUS at 10:38

## 2023-01-30 RX ADMIN — MAGNESIUM SULFATE HEPTAHYDRATE 2 G: 40 INJECTION, SOLUTION INTRAVENOUS at 11:09

## 2023-02-01 ENCOUNTER — TELEPHONE (OUTPATIENT)
Dept: ONCOLOGY | Facility: CLINIC | Age: 68
End: 2023-02-01
Payer: MEDICARE

## 2023-02-01 NOTE — TELEPHONE ENCOUNTER
----- Message from Red Del Valle MD sent at 1/31/2023 10:38 PM EST -----  Please inform the patient that his calcium level was low and check on the number of calcium tablets he is currently taking. I recommend increasing by 1 additional tablet a day.    Thank you

## 2023-02-03 NOTE — TELEPHONE ENCOUNTER
Attempted to contact patient a second time. Reviewed Dr. Del Valle's instructions via patient's identified voicemail. I reviewed instructions to increase calcium by 1 additional tablet, d/t recent low calcium level. I left my direct number if they have any questions.

## 2023-02-06 ENCOUNTER — APPOINTMENT (OUTPATIENT)
Dept: LAB | Facility: HOSPITAL | Age: 68
End: 2023-02-06
Payer: MEDICARE

## 2023-02-06 ENCOUNTER — INFUSION (OUTPATIENT)
Dept: ONCOLOGY | Facility: HOSPITAL | Age: 68
End: 2023-02-06
Payer: MEDICARE

## 2023-02-06 VITALS
TEMPERATURE: 97.8 F | DIASTOLIC BLOOD PRESSURE: 69 MMHG | SYSTOLIC BLOOD PRESSURE: 125 MMHG | OXYGEN SATURATION: 94 % | RESPIRATION RATE: 18 BRPM | BODY MASS INDEX: 38.32 KG/M2 | WEIGHT: 305 LBS | HEART RATE: 72 BPM

## 2023-02-06 DIAGNOSIS — C90.00 MULTIPLE MYELOMA NOT HAVING ACHIEVED REMISSION: ICD-10-CM

## 2023-02-06 DIAGNOSIS — E83.42 HYPOMAGNESEMIA: ICD-10-CM

## 2023-02-06 LAB
ALBUMIN SERPL-MCNC: 3.7 G/DL (ref 3.5–5.2)
ALBUMIN/GLOB SERPL: 1.8 G/DL (ref 1.1–2.4)
ALP SERPL-CCNC: 46 U/L (ref 38–116)
ALT SERPL W P-5'-P-CCNC: 13 U/L (ref 0–41)
ANION GAP SERPL CALCULATED.3IONS-SCNC: 9.3 MMOL/L (ref 5–15)
AST SERPL-CCNC: 10 U/L (ref 0–40)
BASOPHILS # BLD AUTO: 0.09 10*3/MM3 (ref 0–0.2)
BASOPHILS NFR BLD AUTO: 2.3 % (ref 0–1.5)
BILIRUB SERPL-MCNC: 0.5 MG/DL (ref 0.2–1.2)
BUN SERPL-MCNC: 36 MG/DL (ref 6–20)
BUN/CREAT SERPL: 12.5 (ref 7.3–30)
CALCIUM SPEC-SCNC: 7.7 MG/DL (ref 8.5–10.2)
CHLORIDE SERPL-SCNC: 105 MMOL/L (ref 98–107)
CO2 SERPL-SCNC: 23.7 MMOL/L (ref 22–29)
CREAT SERPL-MCNC: 2.88 MG/DL (ref 0.7–1.3)
DEPRECATED RDW RBC AUTO: 60.8 FL (ref 37–54)
EGFRCR SERPLBLD CKD-EPI 2021: 23.5 ML/MIN/1.73
EOSINOPHIL # BLD AUTO: 0.15 10*3/MM3 (ref 0–0.4)
EOSINOPHIL NFR BLD AUTO: 3.9 % (ref 0.3–6.2)
ERYTHROCYTE [DISTWIDTH] IN BLOOD BY AUTOMATED COUNT: 17.2 % (ref 12.3–15.4)
GLOBULIN UR ELPH-MCNC: 2.1 GM/DL (ref 1.8–3.5)
GLUCOSE SERPL-MCNC: 182 MG/DL (ref 74–124)
HCT VFR BLD AUTO: 33.2 % (ref 37.5–51)
HGB BLD-MCNC: 10.3 G/DL (ref 13–17.7)
IMM GRANULOCYTES # BLD AUTO: 0.01 10*3/MM3 (ref 0–0.05)
IMM GRANULOCYTES NFR BLD AUTO: 0.3 % (ref 0–0.5)
LYMPHOCYTES # BLD AUTO: 0.65 10*3/MM3 (ref 0.7–3.1)
LYMPHOCYTES NFR BLD AUTO: 16.8 % (ref 19.6–45.3)
MAGNESIUM SERPL-MCNC: 1.6 MG/DL (ref 1.8–2.5)
MCH RBC QN AUTO: 29.8 PG (ref 26.6–33)
MCHC RBC AUTO-ENTMCNC: 31 G/DL (ref 31.5–35.7)
MCV RBC AUTO: 96 FL (ref 79–97)
MONOCYTES # BLD AUTO: 0.48 10*3/MM3 (ref 0.1–0.9)
MONOCYTES NFR BLD AUTO: 12.4 % (ref 5–12)
NEUTROPHILS NFR BLD AUTO: 2.49 10*3/MM3 (ref 1.7–7)
NEUTROPHILS NFR BLD AUTO: 64.3 % (ref 42.7–76)
NRBC BLD AUTO-RTO: 0 /100 WBC (ref 0–0.2)
PLATELET # BLD AUTO: 135 10*3/MM3 (ref 140–450)
PMV BLD AUTO: 10 FL (ref 6–12)
POTASSIUM SERPL-SCNC: 4.3 MMOL/L (ref 3.5–4.7)
PROT SERPL-MCNC: 5.8 G/DL (ref 6.3–8)
RBC # BLD AUTO: 3.46 10*6/MM3 (ref 4.14–5.8)
SODIUM SERPL-SCNC: 138 MMOL/L (ref 134–145)
WBC NRBC COR # BLD: 3.87 10*3/MM3 (ref 3.4–10.8)

## 2023-02-06 PROCEDURE — 83735 ASSAY OF MAGNESIUM: CPT

## 2023-02-06 PROCEDURE — 80053 COMPREHEN METABOLIC PANEL: CPT

## 2023-02-06 PROCEDURE — 96365 THER/PROPH/DIAG IV INF INIT: CPT

## 2023-02-06 PROCEDURE — 25010000002 MAGNESIUM SULFATE IN D5W 1G/100ML (PREMIX) 1-5 GM/100ML-% SOLUTION: Performed by: INTERNAL MEDICINE

## 2023-02-06 PROCEDURE — 85025 COMPLETE CBC W/AUTO DIFF WBC: CPT

## 2023-02-06 RX ORDER — MAGNESIUM SULFATE 1 G/100ML
1 INJECTION INTRAVENOUS ONCE
Status: COMPLETED | OUTPATIENT
Start: 2023-02-06 | End: 2023-02-06

## 2023-02-06 RX ADMIN — MAGNESIUM SULFATE IN DEXTROSE 1 G: 10 INJECTION, SOLUTION INTRAVENOUS at 09:38

## 2023-02-06 RX ADMIN — MAGNESIUM SULFATE IN DEXTROSE 1 G: 10 INJECTION, SOLUTION INTRAVENOUS at 10:50

## 2023-02-07 DIAGNOSIS — G62.0 NEUROPATHY DUE TO CHEMOTHERAPEUTIC DRUG: ICD-10-CM

## 2023-02-07 DIAGNOSIS — T45.1X5A NEUROPATHY DUE TO CHEMOTHERAPEUTIC DRUG: ICD-10-CM

## 2023-02-07 RX ORDER — GABAPENTIN 300 MG/1
CAPSULE ORAL
Qty: 90 CAPSULE | Refills: 5 | Status: SHIPPED | OUTPATIENT
Start: 2023-02-07

## 2023-02-13 ENCOUNTER — INFUSION (OUTPATIENT)
Dept: ONCOLOGY | Facility: HOSPITAL | Age: 68
End: 2023-02-13
Payer: MEDICARE

## 2023-02-13 VITALS
SYSTOLIC BLOOD PRESSURE: 105 MMHG | BODY MASS INDEX: 37.47 KG/M2 | DIASTOLIC BLOOD PRESSURE: 66 MMHG | WEIGHT: 298.2 LBS | HEART RATE: 74 BPM | TEMPERATURE: 97.8 F | RESPIRATION RATE: 16 BRPM | OXYGEN SATURATION: 96 %

## 2023-02-13 DIAGNOSIS — E83.42 HYPOMAGNESEMIA: ICD-10-CM

## 2023-02-13 DIAGNOSIS — C90.00 MULTIPLE MYELOMA NOT HAVING ACHIEVED REMISSION: ICD-10-CM

## 2023-02-13 LAB
ALBUMIN SERPL-MCNC: 4.1 G/DL (ref 3.5–5.2)
ALBUMIN/GLOB SERPL: 1.9 G/DL (ref 1.1–2.4)
ALP SERPL-CCNC: 48 U/L (ref 38–116)
ALT SERPL W P-5'-P-CCNC: 13 U/L (ref 0–41)
ANION GAP SERPL CALCULATED.3IONS-SCNC: 12.3 MMOL/L (ref 5–15)
AST SERPL-CCNC: 13 U/L (ref 0–40)
BASOPHILS # BLD AUTO: 0.09 10*3/MM3 (ref 0–0.2)
BASOPHILS NFR BLD AUTO: 2.8 % (ref 0–1.5)
BILIRUB SERPL-MCNC: 0.8 MG/DL (ref 0.2–1.2)
BUN SERPL-MCNC: 36 MG/DL (ref 6–20)
BUN/CREAT SERPL: 11.6 (ref 7.3–30)
CALCIUM SPEC-SCNC: 9.9 MG/DL (ref 8.5–10.2)
CHLORIDE SERPL-SCNC: 104 MMOL/L (ref 98–107)
CO2 SERPL-SCNC: 24.7 MMOL/L (ref 22–29)
CREAT SERPL-MCNC: 3.1 MG/DL (ref 0.7–1.3)
DEPRECATED RDW RBC AUTO: 55.9 FL (ref 37–54)
EGFRCR SERPLBLD CKD-EPI 2021: 21.5 ML/MIN/1.73
EOSINOPHIL # BLD AUTO: 0.29 10*3/MM3 (ref 0–0.4)
EOSINOPHIL NFR BLD AUTO: 9 % (ref 0.3–6.2)
ERYTHROCYTE [DISTWIDTH] IN BLOOD BY AUTOMATED COUNT: 16.2 % (ref 12.3–15.4)
GLOBULIN UR ELPH-MCNC: 2.2 GM/DL (ref 1.8–3.5)
GLUCOSE SERPL-MCNC: 148 MG/DL (ref 74–124)
HCT VFR BLD AUTO: 35.6 % (ref 37.5–51)
HGB BLD-MCNC: 11.5 G/DL (ref 13–17.7)
IMM GRANULOCYTES # BLD AUTO: 0.02 10*3/MM3 (ref 0–0.05)
IMM GRANULOCYTES NFR BLD AUTO: 0.6 % (ref 0–0.5)
LYMPHOCYTES # BLD AUTO: 0.8 10*3/MM3 (ref 0.7–3.1)
LYMPHOCYTES NFR BLD AUTO: 24.8 % (ref 19.6–45.3)
MAGNESIUM SERPL-MCNC: 1.4 MG/DL (ref 1.8–2.5)
MCH RBC QN AUTO: 30.1 PG (ref 26.6–33)
MCHC RBC AUTO-ENTMCNC: 32.3 G/DL (ref 31.5–35.7)
MCV RBC AUTO: 93.2 FL (ref 79–97)
MONOCYTES # BLD AUTO: 0.61 10*3/MM3 (ref 0.1–0.9)
MONOCYTES NFR BLD AUTO: 18.9 % (ref 5–12)
NEUTROPHILS NFR BLD AUTO: 1.42 10*3/MM3 (ref 1.7–7)
NEUTROPHILS NFR BLD AUTO: 43.9 % (ref 42.7–76)
NRBC BLD AUTO-RTO: 0 /100 WBC (ref 0–0.2)
PLATELET # BLD AUTO: 104 10*3/MM3 (ref 140–450)
PMV BLD AUTO: 8.8 FL (ref 6–12)
POTASSIUM SERPL-SCNC: 4.2 MMOL/L (ref 3.5–4.7)
PROT SERPL-MCNC: 6.3 G/DL (ref 6.3–8)
RBC # BLD AUTO: 3.82 10*6/MM3 (ref 4.14–5.8)
SODIUM SERPL-SCNC: 141 MMOL/L (ref 134–145)
WBC NRBC COR # BLD: 3.23 10*3/MM3 (ref 3.4–10.8)

## 2023-02-13 PROCEDURE — 25010000002 MAGNESIUM SULFATE IN D5W 1G/100ML (PREMIX) 1-5 GM/100ML-% SOLUTION: Performed by: INTERNAL MEDICINE

## 2023-02-13 PROCEDURE — 80053 COMPREHEN METABOLIC PANEL: CPT

## 2023-02-13 PROCEDURE — 96365 THER/PROPH/DIAG IV INF INIT: CPT

## 2023-02-13 PROCEDURE — 96366 THER/PROPH/DIAG IV INF ADDON: CPT

## 2023-02-13 PROCEDURE — 25010000002 MAGNESIUM SULFATE 2 GM/50ML SOLUTION: Performed by: INTERNAL MEDICINE

## 2023-02-13 PROCEDURE — 85025 COMPLETE CBC W/AUTO DIFF WBC: CPT

## 2023-02-13 PROCEDURE — 83735 ASSAY OF MAGNESIUM: CPT

## 2023-02-13 RX ORDER — MAGNESIUM SULFATE 1 G/100ML
1 INJECTION INTRAVENOUS ONCE
Status: COMPLETED | OUTPATIENT
Start: 2023-02-13 | End: 2023-02-13

## 2023-02-13 RX ORDER — MAGNESIUM SULFATE HEPTAHYDRATE 40 MG/ML
2 INJECTION, SOLUTION INTRAVENOUS ONCE
Status: COMPLETED | OUTPATIENT
Start: 2023-02-13 | End: 2023-02-13

## 2023-02-13 RX ADMIN — MAGNESIUM SULFATE HEPTAHYDRATE 2 G: 40 INJECTION, SOLUTION INTRAVENOUS at 09:49

## 2023-02-13 RX ADMIN — MAGNESIUM SULFATE IN DEXTROSE 1 G: 10 INJECTION, SOLUTION INTRAVENOUS at 10:53

## 2023-02-13 NOTE — NURSING NOTE
Pt here for IV mag and Poss procrit. Mag 1.4 today. 3 grams given per protocol. Procrit not given today for hgb up 11.5.      IV mag infiltrated and was not seen until pt finished the IV Mag. Pt never reported any pain or swelling at the site. RN noticed the swelling when I went to let the patient go and unhook the IV. IV removed and warm compress applied. Yadira ALEJANDRA back to evaluate. After approx 1 hour, the site is looking better and less swollen. There is no redness at the site. Pt is not complaining of pain. Per Yadira ALEJANDRA, ok to leave. He will continue to use warm compresses for 15 min 4x per day. He will notify us of redness/swelling/pain. Pt v/u.

## 2023-02-20 ENCOUNTER — LAB (OUTPATIENT)
Dept: ONCOLOGY | Facility: HOSPITAL | Age: 68
End: 2023-02-20
Payer: MEDICARE

## 2023-02-20 ENCOUNTER — OFFICE VISIT (OUTPATIENT)
Dept: ONCOLOGY | Facility: CLINIC | Age: 68
End: 2023-02-20
Payer: MEDICARE

## 2023-02-20 ENCOUNTER — INFUSION (OUTPATIENT)
Dept: ONCOLOGY | Facility: HOSPITAL | Age: 68
End: 2023-02-20
Payer: MEDICARE

## 2023-02-20 VITALS
BODY MASS INDEX: 37.46 KG/M2 | HEART RATE: 70 BPM | RESPIRATION RATE: 18 BRPM | DIASTOLIC BLOOD PRESSURE: 80 MMHG | WEIGHT: 301.3 LBS | HEIGHT: 75 IN | OXYGEN SATURATION: 98 % | TEMPERATURE: 96.9 F | SYSTOLIC BLOOD PRESSURE: 130 MMHG

## 2023-02-20 DIAGNOSIS — E83.42 HYPOMAGNESEMIA: ICD-10-CM

## 2023-02-20 DIAGNOSIS — K52.9 CHRONIC DIARRHEA: ICD-10-CM

## 2023-02-20 DIAGNOSIS — E83.51 HYPOCALCEMIA: ICD-10-CM

## 2023-02-20 DIAGNOSIS — D70.1 CHEMOTHERAPY INDUCED NEUTROPENIA: ICD-10-CM

## 2023-02-20 DIAGNOSIS — C90.00 MULTIPLE MYELOMA NOT HAVING ACHIEVED REMISSION: Primary | ICD-10-CM

## 2023-02-20 DIAGNOSIS — D63.1 ANEMIA IN STAGE 3B CHRONIC KIDNEY DISEASE: ICD-10-CM

## 2023-02-20 DIAGNOSIS — D84.9 IMMUNOCOMPROMISED STATE: ICD-10-CM

## 2023-02-20 DIAGNOSIS — N18.32 ANEMIA IN STAGE 3B CHRONIC KIDNEY DISEASE: ICD-10-CM

## 2023-02-20 DIAGNOSIS — C90.00 MULTIPLE MYELOMA NOT HAVING ACHIEVED REMISSION: ICD-10-CM

## 2023-02-20 DIAGNOSIS — Z79.899 ENCOUNTER FOR LONG-TERM CURRENT USE OF HIGH RISK MEDICATION: ICD-10-CM

## 2023-02-20 DIAGNOSIS — D69.6 THROMBOCYTOPENIA: ICD-10-CM

## 2023-02-20 DIAGNOSIS — T45.1X5A CHEMOTHERAPY INDUCED NEUTROPENIA: ICD-10-CM

## 2023-02-20 LAB
ALBUMIN SERPL-MCNC: 4 G/DL (ref 3.5–5.2)
ALBUMIN/GLOB SERPL: 1.8 G/DL (ref 1.1–2.4)
ALP SERPL-CCNC: 50 U/L (ref 38–116)
ALT SERPL W P-5'-P-CCNC: 12 U/L (ref 0–41)
ANION GAP SERPL CALCULATED.3IONS-SCNC: 12.6 MMOL/L (ref 5–15)
AST SERPL-CCNC: 11 U/L (ref 0–40)
BASOPHILS # BLD AUTO: 0.07 10*3/MM3 (ref 0–0.2)
BASOPHILS NFR BLD AUTO: 1.6 % (ref 0–1.5)
BILIRUB SERPL-MCNC: 0.4 MG/DL (ref 0.2–1.2)
BUN SERPL-MCNC: 49 MG/DL (ref 6–20)
BUN/CREAT SERPL: 14.3 (ref 7.3–30)
CALCIUM SPEC-SCNC: 8.4 MG/DL (ref 8.5–10.2)
CHLORIDE SERPL-SCNC: 105 MMOL/L (ref 98–107)
CO2 SERPL-SCNC: 23.4 MMOL/L (ref 22–29)
CREAT SERPL-MCNC: 3.42 MG/DL (ref 0.7–1.3)
DEPRECATED RDW RBC AUTO: 54.7 FL (ref 37–54)
EGFRCR SERPLBLD CKD-EPI 2021: 19.1 ML/MIN/1.73
EOSINOPHIL # BLD AUTO: 0.15 10*3/MM3 (ref 0–0.4)
EOSINOPHIL NFR BLD AUTO: 3.5 % (ref 0.3–6.2)
ERYTHROCYTE [DISTWIDTH] IN BLOOD BY AUTOMATED COUNT: 16 % (ref 12.3–15.4)
GLOBULIN UR ELPH-MCNC: 2.2 GM/DL (ref 1.8–3.5)
GLUCOSE SERPL-MCNC: 162 MG/DL (ref 74–124)
HCT VFR BLD AUTO: 32.8 % (ref 37.5–51)
HGB BLD-MCNC: 10.7 G/DL (ref 13–17.7)
IMM GRANULOCYTES # BLD AUTO: 0.02 10*3/MM3 (ref 0–0.05)
IMM GRANULOCYTES NFR BLD AUTO: 0.5 % (ref 0–0.5)
LYMPHOCYTES # BLD AUTO: 1.08 10*3/MM3 (ref 0.7–3.1)
LYMPHOCYTES NFR BLD AUTO: 25.1 % (ref 19.6–45.3)
MAGNESIUM SERPL-MCNC: 1.4 MG/DL (ref 1.8–2.5)
MCH RBC QN AUTO: 30.6 PG (ref 26.6–33)
MCHC RBC AUTO-ENTMCNC: 32.6 G/DL (ref 31.5–35.7)
MCV RBC AUTO: 93.7 FL (ref 79–97)
MONOCYTES # BLD AUTO: 0.65 10*3/MM3 (ref 0.1–0.9)
MONOCYTES NFR BLD AUTO: 15.1 % (ref 5–12)
NEUTROPHILS NFR BLD AUTO: 2.34 10*3/MM3 (ref 1.7–7)
NEUTROPHILS NFR BLD AUTO: 54.2 % (ref 42.7–76)
NRBC BLD AUTO-RTO: 0 /100 WBC (ref 0–0.2)
PHOSPHATE SERPL-MCNC: 3.6 MG/DL (ref 2.5–4.5)
PLATELET # BLD AUTO: 147 10*3/MM3 (ref 140–450)
PMV BLD AUTO: 9.4 FL (ref 6–12)
POTASSIUM SERPL-SCNC: 4 MMOL/L (ref 3.5–4.7)
PROT SERPL-MCNC: 6.2 G/DL (ref 6.3–8)
RBC # BLD AUTO: 3.5 10*6/MM3 (ref 4.14–5.8)
SODIUM SERPL-SCNC: 141 MMOL/L (ref 134–145)
WBC NRBC COR # BLD: 4.31 10*3/MM3 (ref 3.4–10.8)

## 2023-02-20 PROCEDURE — 83735 ASSAY OF MAGNESIUM: CPT

## 2023-02-20 PROCEDURE — 96366 THER/PROPH/DIAG IV INF ADDON: CPT

## 2023-02-20 PROCEDURE — 80053 COMPREHEN METABOLIC PANEL: CPT

## 2023-02-20 PROCEDURE — 85025 COMPLETE CBC W/AUTO DIFF WBC: CPT

## 2023-02-20 PROCEDURE — 96367 TX/PROPH/DG ADDL SEQ IV INF: CPT

## 2023-02-20 PROCEDURE — 99215 OFFICE O/P EST HI 40 MIN: CPT | Performed by: INTERNAL MEDICINE

## 2023-02-20 PROCEDURE — 25010000002 MAGNESIUM SULFATE IN D5W 1G/100ML (PREMIX) 1-5 GM/100ML-% SOLUTION: Performed by: INTERNAL MEDICINE

## 2023-02-20 PROCEDURE — 84100 ASSAY OF PHOSPHORUS: CPT

## 2023-02-20 PROCEDURE — 25010000002 MAGNESIUM SULFATE 2 GM/50ML SOLUTION: Performed by: INTERNAL MEDICINE

## 2023-02-20 PROCEDURE — 96365 THER/PROPH/DIAG IV INF INIT: CPT

## 2023-02-20 PROCEDURE — 96401 CHEMO ANTI-NEOPL SQ/IM: CPT

## 2023-02-20 PROCEDURE — 25010000002 DARATUMUMAB-HYALURONIDASE-FIHJ 1800-30000 MG-UT/15ML SOLUTION: Performed by: INTERNAL MEDICINE

## 2023-02-20 RX ORDER — MEPERIDINE HYDROCHLORIDE 25 MG/ML
25 INJECTION INTRAMUSCULAR; INTRAVENOUS; SUBCUTANEOUS
Status: CANCELLED | OUTPATIENT
Start: 2023-02-20

## 2023-02-20 RX ORDER — ACETAMINOPHEN 500 MG
1000 TABLET ORAL ONCE
Status: CANCELLED | OUTPATIENT
Start: 2023-02-20

## 2023-02-20 RX ORDER — FAMOTIDINE 20 MG/1
20 TABLET, FILM COATED ORAL ONCE
Status: CANCELLED
Start: 2023-02-20 | End: 2023-02-20

## 2023-02-20 RX ORDER — MAGNESIUM SULFATE HEPTAHYDRATE 40 MG/ML
2 INJECTION, SOLUTION INTRAVENOUS ONCE
Status: COMPLETED | OUTPATIENT
Start: 2023-02-20 | End: 2023-02-20

## 2023-02-20 RX ORDER — FAMOTIDINE 10 MG/ML
20 INJECTION, SOLUTION INTRAVENOUS AS NEEDED
Status: CANCELLED | OUTPATIENT
Start: 2023-02-20

## 2023-02-20 RX ORDER — DIPHENHYDRAMINE HCL 25 MG
25 CAPSULE ORAL ONCE
Status: CANCELLED | OUTPATIENT
Start: 2023-02-20

## 2023-02-20 RX ORDER — MAGNESIUM SULFATE 1 G/100ML
1 INJECTION INTRAVENOUS ONCE
Status: COMPLETED | OUTPATIENT
Start: 2023-02-20 | End: 2023-02-20

## 2023-02-20 RX ORDER — DIPHENHYDRAMINE HYDROCHLORIDE 50 MG/ML
50 INJECTION INTRAMUSCULAR; INTRAVENOUS AS NEEDED
Status: CANCELLED | OUTPATIENT
Start: 2023-02-20

## 2023-02-20 RX ADMIN — MAGNESIUM SULFATE HEPTAHYDRATE 1 G: 1 INJECTION, SOLUTION INTRAVENOUS at 09:36

## 2023-02-20 RX ADMIN — DARATUMUMAB AND HYALURONIDASE-FIHJ (HUMAN RECOMBINANT) 1800 MG: 1800; 30000 INJECTION SUBCUTANEOUS at 10:12

## 2023-02-20 RX ADMIN — MAGNESIUM SULFATE HEPTAHYDRATE 2 G: 40 INJECTION, SOLUTION INTRAVENOUS at 08:41

## 2023-02-20 NOTE — PROGRESS NOTES
Subjective     CHIEF COMPLAINT:      Chief Complaint   Patient presents with   • Follow-up     DISCUSS IMMUNIZATIONS FROM [facility]/MAGNESIUM SUPPLEMENT      HISTORY OF PRESENT ILLNESS:     Contreras Albert is a 67 y.o. male patient who returns today for follow up on his multiple myeloma.  He returns today for follow-up accompanied by his wife.  At the last visit, we started him on magnesium chloride 1 tablet daily.  After taking it for about 7 days, he started having recurrent watery diarrhea.  Magnesium level did not improve with this medicine.  He stopped the magnesium chloride about 10 days after starting it.    Patient reports gradual improvement in the strength of his legs.  He continues to have neuropathy affecting the bilateral little fingers as well as the left foot.  He gives himself time when he stands up before he walks to avoid becoming dizzy and falling.    Patient contacted [facility] bone marrow transplant but did not hear from them about the needed vaccines.    ROS:  Pertinent ROS is in the HPI.     Past medical, surgical, social and family history were reviewed.     MEDICATIONS:    Current Outpatient Medications:   •  acetaminophen (TYLENOL) 325 MG tablet, Take 2 tablets by mouth Every 6 (Six) Hours As Needed for Mild Pain., Disp: , Rfl:   •  acyclovir (ZOVIRAX) 800 MG tablet, Take 1 tablet by mouth Daily., Disp: 90 tablet, Rfl: 2  •  ASPIRIN 81 PO, Take 81 mg by mouth Daily., Disp: , Rfl:   •  atorvastatin (LIPITOR) 10 MG tablet, By mouth take 1 tab daily before bed or after evening meal., Disp: 30 tablet, Rfl: 6  •  Blood Glucose Monitoring Suppl (FREESTYLE FREEDOM LITE) w/Device kit, 1 kit Daily. Indications: Type 2 Diabetes, Disp: 1 each, Rfl: 0  •  bumetanide (BUMEX) 2 MG tablet, Take 0.5 tablets by mouth 2 (Two) Times a Day. (Patient taking differently: Take 2 mg by mouth Daily.), Disp: , Rfl:   •  calcium carbonate (TUMS) 500 MG chewable tablet, Chew 3 tablets Daily., Disp: , Rfl:   •   carvedilol (COREG) 12.5 MG tablet, Take 12.5 mg by mouth 2 (Two) Times a Day With Meals., Disp: , Rfl:   •  Continuous Blood Gluc Sensor (Dexcom G6 Sensor), Replace sensor every 10 days.  Dx: E 11.22, Disp: 9 each, Rfl: 1  •  Continuous Blood Gluc Transmit (Dexcom G6 Transmitter) misc, 1 each Every 3 (Three) Months. Dx: E 11.22, Disp: 1 each, Rfl: 1  •  dexamethasone (DECADRON) 4 MG tablet, Take 3 tablets on day of chemo, 1 tablet on day #2 and 1 tablet on day #3 and repeat monthly., Disp: 15 tablet, Rfl: 1  •  diphenoxylate-atropine (LOMOTIL) 2.5-0.025 MG per tablet, Take 2 tablets by mouth 4 (Four) Times a Day As Needed for Diarrhea., Disp: 120 tablet, Rfl: 0  •  escitalopram (LEXAPRO) 5 MG tablet, Take 1 tablet by mouth Daily., Disp: 30 tablet, Rfl: 5  •  finasteride (PROSCAR) 5 MG tablet, Take 5 mg by mouth daily., Disp: , Rfl:   •  gabapentin (NEURONTIN) 300 MG capsule, Take 1 capsule in AM and take 2 capsules at bedtime., Disp: 90 capsule, Rfl: 5  •  glucagon (GLUCAGEN) 1 MG injection, Inject 1 mg into the appropriate muscle as directed by prescriber 1 (One) Time As Needed for Low Blood Sugar for up to 1 dose. Follow package directions for low blood sugar., Disp: 1 kit, Rfl: 3  •  glucose blood (FREESTYLE LITE) test strip, Use to test blood sugar 4-5 times daily., Disp: 200 each, Rfl: 5  •  Insulin Glargine (LANTUS SOLOSTAR) 100 UNIT/ML injection pen, Inject 60 Units under the skin into the appropriate area as directed Daily for 90 days. : Non-Chemo Days take 16 units twice daily. Chemo Days take 30 units twice daily. 16-30 units twice daily for max daily dose of 70 units daily., Disp: 54 mL, Rfl: 1  •  Insulin Lispro, 1 Unit Dial, (HumaLOG KwikPen) 100 UNIT/ML solution pen-injector, Non-chemo days: 10 units three times daily before meals plus 1 unit per every 50 over 150.  Chemo days: 15 units three times daily before meals plus 2 units per 50 greater than 150.  Max daily dose 90 units daily, Disp: 27 mL,  "Rfl: 3  •  Insulin Pen Needle 32G X 4 MM misc, USING 4 PEN NEEDLES DAILY, Disp: 400 each, Rfl: 3  •  Lancets (freestyle) lancets, Use to check blood sugar 4 times daily, Disp: 400 each, Rfl: 2  •  loperamide (IMODIUM) 2 MG capsule, Take 1 capsule by mouth 4 (Four) Times a Day As Needed for Diarrhea., Disp: , Rfl:   •  ondansetron (ZOFRAN) 8 MG tablet, Take 1 tablet by mouth 3 (Three) Times a Day As Needed for Nausea or Vomiting., Disp: 30 tablet, Rfl: 5  •  pantoprazole (PROTONIX) 40 MG EC tablet, TAKE ONE TABLET BY MOUTH DAILY, Disp: 30 tablet, Rfl: 1  •  pomalidomide (Pomalyst) 2 MG chemo capsule, Take 1 capsule by mouth Daily. Take for 21 days on, then 7 days off, then repeat., Disp: 21 capsule, Rfl: 0  •  tamsulosin (FLOMAX) 0.4 MG capsule 24 hr capsule, Take 1 capsule by mouth Daily., Disp: , Rfl:   •  vitamin D (ERGOCALCIFEROL) 1.25 MG (47709 UT) capsule capsule, Take 1 capsule by mouth 1 (One) Time Per Week. Mondays, Disp: 13 capsule, Rfl: 1  •  magnesium chloride ER (MAG-DELAY) 70 MG tablet delayed-release CR tablet, Take 1 tablet by mouth Daily., Disp: 90 tablet, Rfl: 1  Objective     VITAL SIGNS:     Vitals:    02/20/23 0801   BP: 130/80   Pulse: 70   Resp: 18   Temp: 96.9 °F (36.1 °C)   TempSrc: Temporal   SpO2: 98%   Weight: (!) 137 kg (301 lb 4.8 oz)   Height: 190 cm (74.8\")   PainSc: 0-No pain     Body mass index is 37.86 kg/m².     Wt Readings from Last 5 Encounters:   02/20/23 (!) 137 kg (301 lb 4.8 oz)   02/13/23 135 kg (298 lb 3.2 oz)   02/06/23 (!) 138 kg (305 lb)   01/30/23 (!) 138 kg (304 lb 12.8 oz)   01/23/23 (!) 139 kg (306 lb 8 oz)     PHYSICAL EXAMINATION:   GENERAL: The patient appears in fair general condition, not in acute distress.   SKIN: No ecchymosis.  EYES: No jaundice. No pallor.  LYMPHATICS: No cervical lymphadenopathy.  CHEST: Normal respiratory effort.  Lungs clear bilaterally.  No added sounds.  CVS: Normal S1-S2.  No murmurs.  ABDOMEN: Soft. No tenderness. No Hepatomegaly. No " Splenomegaly. No masses.  EXTREMITIES: Trace edema.    DIAGNOSTIC DATA:     Results from last 7 days   Lab Units 02/20/23  0735 02/13/23  0935   WBC 10*3/mm3 4.31 3.23*   NEUTROS ABS 10*3/mm3 2.34 1.42*   HEMOGLOBIN g/dL 10.7* 11.5*   HEMATOCRIT % 32.8* 35.6*   PLATELETS 10*3/mm3 147 104*     Results from last 7 days   Lab Units 02/20/23  0735   SODIUM mmol/L 141   POTASSIUM mmol/L 4.0   CHLORIDE mmol/L 105   CO2 mmol/L 23.4   BUN mg/dL 49*   CREATININE mg/dL 3.42*   CALCIUM mg/dL 8.4*   ALBUMIN g/dL 4.0   BILIRUBIN mg/dL 0.4   ALK PHOS U/L 50   ALT (SGPT) U/L 12   AST (SGOT) U/L 11   GLUCOSE mg/dL 162*   MAGNESIUM mg/dL 1.4*      2/20/2023   Phosphorus 2.5 - 4.5 mg/dL 3.6     Component      Latest Ref Rng & Units 11/28/2022 12/19/2022 1/23/2023   IgG      603 - 1613 mg/dL 492 (L) 620 565 (L)   IgA      61 - 437 mg/dL 51 (L) 73 71   IgM      20 - 172 mg/dL 10 (L) 15 (L) 11 (L)   Total Protein      6.0 - 8.5 g/dL 5.7 (L) 6.0 5.9 (L)   Albumin      2.9 - 4.4 g/dL 3.6 3.6 3.6   Alpha-1-Globulin      0.0 - 0.4 g/dL 0.2 0.2 0.2   Alpha-2-Globulin      0.4 - 1.0 g/dL 0.7 0.8 0.8   Beta Globulin      0.7 - 1.3 g/dL 0.8 0.8 0.8   Gamma Globulin      0.4 - 1.8 g/dL 0.5 0.6 0.5   M-Jurgen      Not Observed g/dL Comment: Comment: Comment:   Globulin      2.2 - 3.9 g/dL 2.1 (L) 2.4 2.3   A/G Ratio      0.7 - 1.7 1.8 (H) 1.6 1.6   Immunofixation Reflex, Serum       Comment (A) Comment (A) Comment (A)   Please note       Comment Comment Comment   Kappa FLC      3.3 - 19.4 mg/L 47.7 (H) 80.8 (H) 68.1 (H)   Free Lambda Light Chains      5.7 - 26.3 mg/L 37.5 (H) 55.3 (H) 40.3 (H)   Kappa/Lambda Ratio      0.26 - 1.65 1.27 1.46 1.69 (H)     Assessment & Plan    *IgG lambda multiple myeloma.  · Bone marrow 5/23/2020 hypercellular marrow with 80% involvement of plasma cell myeloma; FISH studies pending  · Bone survey 5/26/2020: Diffuse osteoporosis and demineralization.  However, no discrete lytic lesions.  · SPEP 5/23/2020: M spike  5.1.  IgG 6629 lambda.  Light chain ratio 0 with free lambda light chains 6400.  Beta-2 microglobulin 16.3.  24-hour urine 12.7 g protein per 24-hour with monoclonal lambda free light chain 33.9%, monoclonal IgG lambda 23.5%  · 24-hour urine testing from 5/24/2020 free lambda light chains 8.4 g/L  · Due to the renal failure, initiated CyBorD 5/28/20.  Given the significantly elevated light chains with the first cycle of treatment plan to give Velcade days 1, 4, 8, 11; Cytoxan 300 PO mg/m² weekly and dexamethasone 40 mg p.o. days 1, 2, 3, 4, 8 and 15.    · After cycle 1 treatment can likely be altered to weekly dosing of each drug.  · On 6/18/2020, Velcade was changed to 1.5 mg/m² weekly continuously along with weekly Decadron and oral Cytoxan.  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   · Treatment was changed on 8/27/2020 to to Daratumumab subcutaneous injection weekly along with Revlimid 10 mg daily for 21 out of 28-day cycle and Decadron 40 mg daily.  · S/p stem cell transplant on 12/9/2020 at [facility].  · Day 100 bone marrow on 3/16/2021 revealed minute  plasma cell population.  PET scan on 3/29/2021 was negative.  • Patient enrolled in the  clinical trial randomizing to maintenance lenalidomide versus lenalidomide and daratumumab.  Patient was randomized to the Revlimid arm initiated 4/13/2021 dosed at 5 mg daily (due to renal function).  Patient developed neutropenia requiring dose adjustment to 5 mg daily for 21/28 days.  Patient withdrew from clinical trial after 5 cycles on 8/31/2021 due to worsening diarrhea.    • He was switched to Velcade maintenance every other week 9/14/2021 which he discontinued 10/26/2021 due to neuropathy and lower extremity edema.    • He was subsequently changed to pomalidomide 2 mg daily 21/28 days on 11/8/2021.    • 1 year post transplant, bone marrow biopsy showed 5-7% plasma cells and due to the residual disease he was started  on DPd (daratumumab, pomalidomide, dexamethasone) on 2/21/2022.    • Patient was seen by Dr. Romero on 9/12/2022.  At that time patient was cycle 8-day 7 DPd. Due to chronic side effects, dexamethasone was changed from weekly to monthly.    • Patient did continue pomalidomide on his own during admission to the hospital, received 1 dose 4 mg p.o. on 9/24/2022 before was subsequently discontinued due to cytopenias.   • Labs on 9/24/2022 with IgG 314, IgM less than 25, IgA less than 50.  • 9/25/2022 24-hour urine free light chains with free kappa 351, free lambda 104, ratio 3.37  • On 9/24/2022, IgG kappa M protein was 0.1 g.  IgG 311, IgA 43, IgM 7.   • Low level M spike of IgG kappa likely represents daratumumab.   • No IgG lambda M protein.   • Serum free light chains on 9/26/2022 with kappa 57.4, lambda 42.8, ratio normal at 1.34.  • He received Darzlex on 10/3/2022 at [facility]. He decided to transfer his treatment back to our office.   • On 10/13/2022, M protein was too small to quantitate. Kappa/Lambda ratio was 1.34.  • Dexamethasone was changed to 12 mg on Mondays, 4 mg on Tuesday and 4 mg on Wednesday.  • Pomalyst was restarted at 4 mg every other day to be taken on 10/13/2022, 10/15/2022, 10/17/2022, 10/19/2022, 10/21/2022 and 10/23/2022.  • Pomalyst dose was subsequently reduced to 3 mg daily for 21 out of a 28-day cycle starting on 10/31/2022.  • He tolerated the reduced dose better with improvement in the diarrhea.  • SPEP GUANAKITO FLC on 12/19/2022 showed no definite M protein.  K/L ratio was 1.46-normal.  • He received Darzalex on 12/27/2022 (1 day delay due to Christmas holiday being on 12/26/2022).  • Due to neutropenia, the dose of Pomalyst was reduced to 2 mg daily for 21/28-day cycle.  • SPEP G[place]O FLC on 1/23/2022 showed no M protein.  K/L ratio was slightly elevated at 1.69.  • This does not represent a significant increase in the kappa to lambda ratio.  • Paraprotein studies will be repeated  today.    *Bone health.  · He is on Xgeva.   · Last dose was on 9/19/2022.  · Patient developed severe hypocalcemia following the Xgeva injection.  He required inpatient hospital stay.  · Given the duration of Xgeva injections, recommended changing treatment to every 3 months starting December 2022.   · I recommended giving Xgeva today and continuing every 3 months.  · He received Xgeva on 12/27/2022.  · Next dose will be in late March 2023.    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  · Patient was receiving Procrit at [facility].  · Records from [facility] showed that the patient was receiving 70,000 units weekly and received the last dose on 9/27/2022.  · Patient was given Procrit 70,000 units on 9/27/2022.  · Hemoglobin was 9.9 on 10/17/2022.  He was given Procrit 60,000 units.  · Hemoglobin was 9.8 on 11/4/2022.  He was given Procrit 60,000 units.  · Last Procrit injection was 45,000 units on 1/30/2023.  · Hemoglobin is 10.7 today.       *Thrombocytopenia.  · This is attributed to multiple myeloma, prior stem cell transplant and chemotherapy.  · Platelet count is 147,000 today.    *Neutropenia.  · This was attributed to Pomalyst.  · Neutrophil count was down to 290 on 9/25/2022.  · He was given Neupogen during his hospital stay in September 2022.  · Neutrophil count improved afterwards.  · However, neutrophil count decreased to 600 on 12/19/2022.  · He reports that he was feeling tired and achy at that time.  · Neutrophil count was 770 on 1/16/2023.  · Due to the recurrent neutropenia, the dose of Pomalyst was reduced to 2 mg in January 2023.  · Neutrophil count improved following the dose reduction.  · Neutrophil count is 2340 today.    *Hypocalcemia.  · Patient developed severe hypocalcemia due to Xgeva.  · Calcium was 5.9 on 9/23/2022.  · Calcium improved to 8.1 on 9/28/2022  · Calcium improved to 9.6 on 10/13/2022  · Calcium  decreased to 7.7 on 10/24/2022.  Albumin was 3.8.  · Calcium improved to 10.3 on 12/19/2022.  · Calcium decreased to 8.4 on 12/27/2022.  · The drop in the calcium level was attributed to the patient not taking the full dose of the calcium supplement and due to him not consuming milk recently.  · He is currently taking calcium 2 tablets daily.  · Calcium is 8.4 today.    *Hypomagnesemia.  · Magnesium was 1.1 on 10/24/2022.  · He was placed on oral magnesium oxide 400 mg twice daily.  · He developed diarrhea and it was stopped.  · He has been receiving magnesium IV intermittently depending on his magnesium level.  · Magnesium was 1.5 on 1/16/2023.  He was given 2 g IV magnesium.  · Magnesium was 1.6 on 1/23/2023.  · He was started on magnesium chloride on 1/23/2023.  · He developed diarrhea about 7 days after starting the magnesium chloride.  · Magnesium is 1.4 today.    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  · He is on gabapentin 300 mg in the morning and 600 mg in the evening.  · Neuropathy symptoms in the feet are better as the swelling improved.     *Prophylaxis..  · He is on acyclovir 800 mg daily.  · He is on ASA 81 mg daily.  · No recent infections.    *Chronic diarrhea.  · He is on Lomotil 2 tablets every 6 hours as needed.  · He also takes Imodium PRN.  · No improvement with Probiotic. No improvement with low lactose diet.  · Diarrhea improved after dose reduction of Pomalyst.    *Vitamin D deficiency.  · He is on vitamin D 50,000 units weekly.  · Vitamin D level was 18 on 10/3/2022.  · He was continued on vitamin D weekly.    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  · S/p right partial nephrectomy on 5/18/2016.    PLAN:    1.  We will give Darzalex today.   2.  Start new cycle of Pomalyst at 2 mg daily for 21 days out of a 28-day cycle.   3.  He does not need Procrit today.   4.  We will give 3 g of IV magnesium sulfate  5.  Discontinue oral magnesium chloride.  6.  We will  contact the bone marrow transplant team for further instructions regarding the vaccines he needs at this point.  7.  Continue to maintain adequate hydration and follow-up with nephrology.  8.  Return weekly for CBC CMP and magnesium.  He will receive Procrit.  He will receive IV magnesium accordingly.    I spent 48 minutes caring for Contreras on this date of service. This time includes time spent by me in the following activities: preparing for the visit, reviewing tests, obtaining and/or reviewing a separately obtained history, performing a medically appropriate examination and/or evaluation, counseling and educating the patient/family/caregiver, ordering medications, tests, or procedures, documenting information in the medical record, independently interpreting results and communicating that information with the patient/family/caregiver and care coordination       Red Del Valle MD  02/20/23

## 2023-02-21 ENCOUNTER — SPECIALTY PHARMACY (OUTPATIENT)
Dept: PHARMACY | Facility: HOSPITAL | Age: 68
End: 2023-02-21
Payer: MEDICARE

## 2023-02-21 LAB
ALBUMIN SERPL ELPH-MCNC: 3.4 G/DL (ref 2.9–4.4)
ALBUMIN/GLOB SERPL: 1.5 {RATIO} (ref 0.7–1.7)
ALPHA1 GLOB SERPL ELPH-MCNC: 0.2 G/DL (ref 0–0.4)
ALPHA2 GLOB SERPL ELPH-MCNC: 0.8 G/DL (ref 0.4–1)
B-GLOBULIN SERPL ELPH-MCNC: 0.8 G/DL (ref 0.7–1.3)
GAMMA GLOB SERPL ELPH-MCNC: 0.6 G/DL (ref 0.4–1.8)
GLOBULIN SER-MCNC: 2.3 G/DL (ref 2.2–3.9)
IGA SERPL-MCNC: 68 MG/DL (ref 61–437)
IGG SERPL-MCNC: 585 MG/DL (ref 603–1613)
IGM SERPL-MCNC: 12 MG/DL (ref 20–172)
INTERPRETATION SERPL IEP-IMP: ABNORMAL
KAPPA LC FREE SER-MCNC: 61.8 MG/L (ref 3.3–19.4)
KAPPA LC FREE/LAMBDA FREE SER: 1.81 {RATIO} (ref 0.26–1.65)
LABORATORY COMMENT REPORT: ABNORMAL
LAMBDA LC FREE SERPL-MCNC: 34.1 MG/L (ref 5.7–26.3)
M PROTEIN SERPL ELPH-MCNC: ABNORMAL G/DL
PROT SERPL-MCNC: 5.7 G/DL (ref 6–8.5)

## 2023-02-21 NOTE — PROGRESS NOTES
Specialty Note ( Darzalex and Pomalyst)    MD dictation noted    We will give Darzalex today.   Start new cycle of Pomalyst at 2 mg daily for 21 days out of a 28-day cycle.     Labs reviewed        2/20/2023   WBC 3.40 - 10.80 10*3/mm3 4.31   Neutrophils Absolute 1.70 - 7.00 10*3/mm3 2.34   Hemoglobin 13.0 - 17.7 g/dL 10.7 (A)   Hematocrit 37.5 - 51.0 % 32.8 (A)   Platelets 140 - 450 10*3/mm3 147   Creatinine 0.70 - 1.30 mg/dL 3.42 (A)   BUN 6 - 20 mg/dL 49 (A)   Sodium 134 - 145 mmol/L 141   Potassium 3.5 - 4.7 mmol/L 4.0   Glucose 74 - 124 mg/dL 162 (A)   Magnesium 1.8 - 2.5 mg/dL 1.4 (A)   Calcium 8.5 - 10.2 mg/dL 8.4 (A)   Albumin 3.5 - 5.2 g/dL 4.0   Total Protein 6.3 - 8.0 g/dL 6.2 (A)   AST (SGOT) 0 - 40 U/L 11   ALT (SGPT) 0 - 41 U/L 12   Alkaline Phosphatase 38 - 116 U/L 50   Total Bilirubin 0.2 - 1.2 mg/dL 0.4       Cr is noted- no dose adjustment for Pomalyst is necessary.

## 2023-02-22 ENCOUNTER — TELEPHONE (OUTPATIENT)
Dept: ONCOLOGY | Facility: CLINIC | Age: 68
End: 2023-02-22
Payer: MEDICARE

## 2023-02-22 NOTE — TELEPHONE ENCOUNTER
----- Message from Red Del Valle MD sent at 2/20/2023  6:25 PM EST -----  Please contact the patient's bone marrow nurse/coordinator for further instructions regarding vaccination.  Patient did not hear from them about the recommended vaccines that he is supposed to receive at this point.    Thank you

## 2023-02-22 NOTE — TELEPHONE ENCOUNTER
Spoke with Liv from the [facility] BMT clinic. The patient did have an appt with them in early March for the post vaccines, but it was canceled. She is going to discuss the patients case with pharmacy and the NP covering for Dr. Romero. She will be in touch.

## 2023-02-27 ENCOUNTER — INFUSION (OUTPATIENT)
Dept: ONCOLOGY | Facility: HOSPITAL | Age: 68
End: 2023-02-27
Payer: MEDICARE

## 2023-02-27 VITALS
HEART RATE: 83 BPM | TEMPERATURE: 98.2 F | OXYGEN SATURATION: 93 % | DIASTOLIC BLOOD PRESSURE: 76 MMHG | SYSTOLIC BLOOD PRESSURE: 145 MMHG | RESPIRATION RATE: 18 BRPM | WEIGHT: 305.6 LBS | BODY MASS INDEX: 38.4 KG/M2

## 2023-02-27 DIAGNOSIS — C90.00 MULTIPLE MYELOMA NOT HAVING ACHIEVED REMISSION: ICD-10-CM

## 2023-02-27 DIAGNOSIS — E83.42 HYPOMAGNESEMIA: ICD-10-CM

## 2023-02-27 LAB
ALBUMIN SERPL-MCNC: 3.7 G/DL (ref 3.5–5.2)
ALBUMIN/GLOB SERPL: 1.6 G/DL (ref 1.1–2.4)
ALP SERPL-CCNC: 55 U/L (ref 38–116)
ALT SERPL W P-5'-P-CCNC: 10 U/L (ref 0–41)
ANION GAP SERPL CALCULATED.3IONS-SCNC: 13.7 MMOL/L (ref 5–15)
AST SERPL-CCNC: 9 U/L (ref 0–40)
BASOPHILS # BLD AUTO: 0.07 10*3/MM3 (ref 0–0.2)
BASOPHILS NFR BLD AUTO: 1.3 % (ref 0–1.5)
BILIRUB SERPL-MCNC: 0.3 MG/DL (ref 0.2–1.2)
BUN SERPL-MCNC: 59 MG/DL (ref 6–20)
BUN/CREAT SERPL: 18.8 (ref 7.3–30)
CALCIUM SPEC-SCNC: 9.3 MG/DL (ref 8.5–10.2)
CHLORIDE SERPL-SCNC: 103 MMOL/L (ref 98–107)
CO2 SERPL-SCNC: 24.3 MMOL/L (ref 22–29)
CREAT SERPL-MCNC: 3.13 MG/DL (ref 0.7–1.3)
DEPRECATED RDW RBC AUTO: 57.2 FL (ref 37–54)
EGFRCR SERPLBLD CKD-EPI 2021: 21.2 ML/MIN/1.73
EOSINOPHIL # BLD AUTO: 0.14 10*3/MM3 (ref 0–0.4)
EOSINOPHIL NFR BLD AUTO: 2.5 % (ref 0.3–6.2)
ERYTHROCYTE [DISTWIDTH] IN BLOOD BY AUTOMATED COUNT: 16.7 % (ref 12.3–15.4)
GLOBULIN UR ELPH-MCNC: 2.3 GM/DL (ref 1.8–3.5)
GLUCOSE SERPL-MCNC: 186 MG/DL (ref 74–124)
HCT VFR BLD AUTO: 31.2 % (ref 37.5–51)
HGB BLD-MCNC: 10.4 G/DL (ref 13–17.7)
IMM GRANULOCYTES # BLD AUTO: 0.03 10*3/MM3 (ref 0–0.05)
IMM GRANULOCYTES NFR BLD AUTO: 0.5 % (ref 0–0.5)
LYMPHOCYTES # BLD AUTO: 0.79 10*3/MM3 (ref 0.7–3.1)
LYMPHOCYTES NFR BLD AUTO: 14.2 % (ref 19.6–45.3)
MAGNESIUM SERPL-MCNC: 1.4 MG/DL (ref 1.8–2.5)
MCH RBC QN AUTO: 31 PG (ref 26.6–33)
MCHC RBC AUTO-ENTMCNC: 33.3 G/DL (ref 31.5–35.7)
MCV RBC AUTO: 93.1 FL (ref 79–97)
MONOCYTES # BLD AUTO: 0.34 10*3/MM3 (ref 0.1–0.9)
MONOCYTES NFR BLD AUTO: 6.1 % (ref 5–12)
NEUTROPHILS NFR BLD AUTO: 4.21 10*3/MM3 (ref 1.7–7)
NEUTROPHILS NFR BLD AUTO: 75.4 % (ref 42.7–76)
NRBC BLD AUTO-RTO: 0 /100 WBC (ref 0–0.2)
PLATELET # BLD AUTO: 156 10*3/MM3 (ref 140–450)
PMV BLD AUTO: 9.6 FL (ref 6–12)
POTASSIUM SERPL-SCNC: 4.2 MMOL/L (ref 3.5–4.7)
PROT SERPL-MCNC: 6 G/DL (ref 6.3–8)
RBC # BLD AUTO: 3.35 10*6/MM3 (ref 4.14–5.8)
SODIUM SERPL-SCNC: 141 MMOL/L (ref 134–145)
WBC NRBC COR # BLD: 5.58 10*3/MM3 (ref 3.4–10.8)

## 2023-02-27 PROCEDURE — 25010000002 MAGNESIUM SULFATE IN D5W 1G/100ML (PREMIX) 1-5 GM/100ML-% SOLUTION: Performed by: INTERNAL MEDICINE

## 2023-02-27 PROCEDURE — 80053 COMPREHEN METABOLIC PANEL: CPT

## 2023-02-27 PROCEDURE — 85025 COMPLETE CBC W/AUTO DIFF WBC: CPT

## 2023-02-27 PROCEDURE — 96365 THER/PROPH/DIAG IV INF INIT: CPT

## 2023-02-27 PROCEDURE — 25010000002 MAGNESIUM SULFATE 2 GM/50ML SOLUTION: Performed by: INTERNAL MEDICINE

## 2023-02-27 PROCEDURE — 96366 THER/PROPH/DIAG IV INF ADDON: CPT

## 2023-02-27 PROCEDURE — 83735 ASSAY OF MAGNESIUM: CPT

## 2023-02-27 RX ORDER — MAGNESIUM SULFATE HEPTAHYDRATE 40 MG/ML
2 INJECTION, SOLUTION INTRAVENOUS ONCE
Status: COMPLETED | OUTPATIENT
Start: 2023-02-27 | End: 2023-02-27

## 2023-02-27 RX ORDER — MAGNESIUM SULFATE 1 G/100ML
1 INJECTION INTRAVENOUS ONCE
Status: COMPLETED | OUTPATIENT
Start: 2023-02-27 | End: 2023-02-27

## 2023-02-27 RX ADMIN — MAGNESIUM SULFATE HEPTAHYDRATE 2 G: 2 INJECTION, SOLUTION INTRAVENOUS at 15:20

## 2023-02-27 RX ADMIN — MAGNESIUM SULFATE HEPTAHYDRATE 1 G: 1 INJECTION, SOLUTION INTRAVENOUS at 16:23

## 2023-03-02 ENCOUNTER — SPECIALTY PHARMACY (OUTPATIENT)
Dept: PHARMACY | Facility: HOSPITAL | Age: 68
End: 2023-03-02
Payer: MEDICARE

## 2023-03-02 NOTE — PROGRESS NOTES
Re: Refills of Oral Specialty Medication - Pomalyst (pomalidomide)    • Drug-Drug Interactions: The current medication list was reviewed and there are no relevant drug-drug interactions.  • Medication Allergies: The patient has no relevant allergies as it relates to their oral specialty medication  • Review of Labs/Dose Adjustments: NO DOSE CHANGE - I reviewed the most recent note and labs and the patient will continue without any dose changes.  I sent refills as described below.    Drug: Pomalyst (pomalidomide)  Strength: 2 mg  Directions: Take 1 capsule by mouth daily for 21` days on, then 7 days off, then repeat.   Quantity: 21  Refills: 0  Pharmacy prescription sent to: [facility]    Name/Credentials Donna Cano RPh, NANCY    3/2/2023  13:19 EST     Completed independent double check on medication order/RX.    Thanks,   Yolande Huitron, PharmD, BCPS

## 2023-03-06 ENCOUNTER — INFUSION (OUTPATIENT)
Dept: ONCOLOGY | Facility: HOSPITAL | Age: 68
End: 2023-03-06
Payer: MEDICARE

## 2023-03-06 VITALS
TEMPERATURE: 98.4 F | BODY MASS INDEX: 38.45 KG/M2 | DIASTOLIC BLOOD PRESSURE: 66 MMHG | OXYGEN SATURATION: 94 % | SYSTOLIC BLOOD PRESSURE: 103 MMHG | RESPIRATION RATE: 18 BRPM | WEIGHT: 306 LBS | HEART RATE: 87 BPM

## 2023-03-06 DIAGNOSIS — E83.42 HYPOMAGNESEMIA: ICD-10-CM

## 2023-03-06 DIAGNOSIS — C90.00 MULTIPLE MYELOMA NOT HAVING ACHIEVED REMISSION: ICD-10-CM

## 2023-03-06 LAB
BASOPHILS # BLD AUTO: 0.08 10*3/MM3 (ref 0–0.2)
BASOPHILS NFR BLD AUTO: 1.6 % (ref 0–1.5)
DEPRECATED RDW RBC AUTO: 54.2 FL (ref 37–54)
EOSINOPHIL # BLD AUTO: 0.14 10*3/MM3 (ref 0–0.4)
EOSINOPHIL NFR BLD AUTO: 2.8 % (ref 0.3–6.2)
ERYTHROCYTE [DISTWIDTH] IN BLOOD BY AUTOMATED COUNT: 15.9 % (ref 12.3–15.4)
HCT VFR BLD AUTO: 30.8 % (ref 37.5–51)
HGB BLD-MCNC: 10.2 G/DL (ref 13–17.7)
IMM GRANULOCYTES # BLD AUTO: 0.03 10*3/MM3 (ref 0–0.05)
IMM GRANULOCYTES NFR BLD AUTO: 0.6 % (ref 0–0.5)
LYMPHOCYTES # BLD AUTO: 0.84 10*3/MM3 (ref 0.7–3.1)
LYMPHOCYTES NFR BLD AUTO: 16.7 % (ref 19.6–45.3)
MAGNESIUM SERPL-MCNC: 1.4 MG/DL (ref 1.8–2.5)
MCH RBC QN AUTO: 30.7 PG (ref 26.6–33)
MCHC RBC AUTO-ENTMCNC: 33.1 G/DL (ref 31.5–35.7)
MCV RBC AUTO: 92.8 FL (ref 79–97)
MONOCYTES # BLD AUTO: 0.55 10*3/MM3 (ref 0.1–0.9)
MONOCYTES NFR BLD AUTO: 11 % (ref 5–12)
NEUTROPHILS NFR BLD AUTO: 3.38 10*3/MM3 (ref 1.7–7)
NEUTROPHILS NFR BLD AUTO: 67.3 % (ref 42.7–76)
NRBC BLD AUTO-RTO: 0 /100 WBC (ref 0–0.2)
PLATELET # BLD AUTO: 155 10*3/MM3 (ref 140–450)
PMV BLD AUTO: 10.5 FL (ref 6–12)
RBC # BLD AUTO: 3.32 10*6/MM3 (ref 4.14–5.8)
WBC NRBC COR # BLD: 5.02 10*3/MM3 (ref 3.4–10.8)

## 2023-03-06 PROCEDURE — 85025 COMPLETE CBC W/AUTO DIFF WBC: CPT

## 2023-03-06 PROCEDURE — 25010000002 MAGNESIUM SULFATE IN D5W 1G/100ML (PREMIX) 1-5 GM/100ML-% SOLUTION: Performed by: INTERNAL MEDICINE

## 2023-03-06 PROCEDURE — 25010000002 MAGNESIUM SULFATE 2 GM/50ML SOLUTION: Performed by: INTERNAL MEDICINE

## 2023-03-06 PROCEDURE — 83735 ASSAY OF MAGNESIUM: CPT

## 2023-03-06 PROCEDURE — 96366 THER/PROPH/DIAG IV INF ADDON: CPT

## 2023-03-06 PROCEDURE — 96365 THER/PROPH/DIAG IV INF INIT: CPT

## 2023-03-06 RX ORDER — MAGNESIUM SULFATE HEPTAHYDRATE 40 MG/ML
2 INJECTION, SOLUTION INTRAVENOUS ONCE
Status: COMPLETED | OUTPATIENT
Start: 2023-03-06 | End: 2023-03-06

## 2023-03-06 RX ORDER — MAGNESIUM SULFATE 1 G/100ML
1 INJECTION INTRAVENOUS ONCE
Status: COMPLETED | OUTPATIENT
Start: 2023-03-06 | End: 2023-03-06

## 2023-03-06 RX ADMIN — MAGNESIUM SULFATE HEPTAHYDRATE 1 G: 1 INJECTION, SOLUTION INTRAVENOUS at 13:01

## 2023-03-06 RX ADMIN — MAGNESIUM SULFATE HEPTAHYDRATE 2 G: 40 INJECTION, SOLUTION INTRAVENOUS at 13:52

## 2023-03-13 ENCOUNTER — INFUSION (OUTPATIENT)
Dept: ONCOLOGY | Facility: HOSPITAL | Age: 68
End: 2023-03-13
Payer: MEDICARE

## 2023-03-13 VITALS
OXYGEN SATURATION: 94 % | SYSTOLIC BLOOD PRESSURE: 102 MMHG | HEART RATE: 106 BPM | TEMPERATURE: 98.6 F | BODY MASS INDEX: 38.88 KG/M2 | RESPIRATION RATE: 18 BRPM | WEIGHT: 309.4 LBS | DIASTOLIC BLOOD PRESSURE: 65 MMHG

## 2023-03-13 DIAGNOSIS — E83.42 HYPOMAGNESEMIA: ICD-10-CM

## 2023-03-13 DIAGNOSIS — C90.00 MULTIPLE MYELOMA, REMISSION STATUS UNSPECIFIED: ICD-10-CM

## 2023-03-13 DIAGNOSIS — D64.9 ANEMIA, UNSPECIFIED TYPE: ICD-10-CM

## 2023-03-13 DIAGNOSIS — C90.00 MULTIPLE MYELOMA NOT HAVING ACHIEVED REMISSION: Primary | ICD-10-CM

## 2023-03-13 DIAGNOSIS — D63.1 ANEMIA IN STAGE 3B CHRONIC KIDNEY DISEASE: ICD-10-CM

## 2023-03-13 DIAGNOSIS — N18.32 ANEMIA IN STAGE 3B CHRONIC KIDNEY DISEASE: ICD-10-CM

## 2023-03-13 LAB
ALBUMIN SERPL-MCNC: 3.8 G/DL (ref 3.5–5.2)
ALBUMIN/GLOB SERPL: 1.7 G/DL (ref 1.1–2.4)
ALP SERPL-CCNC: 52 U/L (ref 38–116)
ALT SERPL W P-5'-P-CCNC: 11 U/L (ref 0–41)
ANION GAP SERPL CALCULATED.3IONS-SCNC: 13.6 MMOL/L (ref 5–15)
AST SERPL-CCNC: 10 U/L (ref 0–40)
BASOPHILS # BLD AUTO: 0.07 10*3/MM3 (ref 0–0.2)
BASOPHILS NFR BLD AUTO: 2.7 % (ref 0–1.5)
BILIRUB SERPL-MCNC: 0.4 MG/DL (ref 0.2–1.2)
BUN SERPL-MCNC: 53 MG/DL (ref 6–20)
BUN/CREAT SERPL: 15.5 (ref 7.3–30)
CALCIUM SPEC-SCNC: 8.8 MG/DL (ref 8.5–10.2)
CHLORIDE SERPL-SCNC: 106 MMOL/L (ref 98–107)
CO2 SERPL-SCNC: 22.4 MMOL/L (ref 22–29)
CREAT SERPL-MCNC: 3.41 MG/DL (ref 0.7–1.3)
DEPRECATED RDW RBC AUTO: 54.3 FL (ref 37–54)
EGFRCR SERPLBLD CKD-EPI 2021: 19.2 ML/MIN/1.73
EOSINOPHIL # BLD AUTO: 0.2 10*3/MM3 (ref 0–0.4)
EOSINOPHIL NFR BLD AUTO: 7.7 % (ref 0.3–6.2)
ERYTHROCYTE [DISTWIDTH] IN BLOOD BY AUTOMATED COUNT: 15.9 % (ref 12.3–15.4)
FERRITIN SERPL-MCNC: 312.1 NG/ML (ref 30–400)
GLOBULIN UR ELPH-MCNC: 2.3 GM/DL (ref 1.8–3.5)
GLUCOSE SERPL-MCNC: 220 MG/DL (ref 74–124)
HCT VFR BLD AUTO: 30.1 % (ref 37.5–51)
HGB BLD-MCNC: 9.8 G/DL (ref 13–17.7)
IMM GRANULOCYTES # BLD AUTO: 0.01 10*3/MM3 (ref 0–0.05)
IMM GRANULOCYTES NFR BLD AUTO: 0.4 % (ref 0–0.5)
IRON 24H UR-MRATE: 76 MCG/DL (ref 59–158)
IRON SATN MFR SERPL: 28 % (ref 14–48)
LYMPHOCYTES # BLD AUTO: 0.69 10*3/MM3 (ref 0.7–3.1)
LYMPHOCYTES NFR BLD AUTO: 26.6 % (ref 19.6–45.3)
MAGNESIUM SERPL-MCNC: 1.6 MG/DL (ref 1.8–2.5)
MCH RBC QN AUTO: 30.2 PG (ref 26.6–33)
MCHC RBC AUTO-ENTMCNC: 32.6 G/DL (ref 31.5–35.7)
MCV RBC AUTO: 92.9 FL (ref 79–97)
MONOCYTES # BLD AUTO: 0.42 10*3/MM3 (ref 0.1–0.9)
MONOCYTES NFR BLD AUTO: 16.2 % (ref 5–12)
NEUTROPHILS NFR BLD AUTO: 1.2 10*3/MM3 (ref 1.7–7)
NEUTROPHILS NFR BLD AUTO: 46.4 % (ref 42.7–76)
NRBC BLD AUTO-RTO: 0 /100 WBC (ref 0–0.2)
PLATELET # BLD AUTO: 107 10*3/MM3 (ref 140–450)
PMV BLD AUTO: 9.9 FL (ref 6–12)
POTASSIUM SERPL-SCNC: 4 MMOL/L (ref 3.5–4.7)
PROT SERPL-MCNC: 6.1 G/DL (ref 6.3–8)
RBC # BLD AUTO: 3.24 10*6/MM3 (ref 4.14–5.8)
SODIUM SERPL-SCNC: 142 MMOL/L (ref 134–145)
TIBC SERPL-MCNC: 274 MCG/DL (ref 249–505)
TRANSFERRIN SERPL-MCNC: 196 MG/DL (ref 200–360)
WBC NRBC COR # BLD: 2.59 10*3/MM3 (ref 3.4–10.8)

## 2023-03-13 PROCEDURE — 25010000002 EPOETIN ALFA PER 1000 UNITS: Performed by: INTERNAL MEDICINE

## 2023-03-13 PROCEDURE — 96365 THER/PROPH/DIAG IV INF INIT: CPT

## 2023-03-13 PROCEDURE — 83540 ASSAY OF IRON: CPT

## 2023-03-13 PROCEDURE — 84466 ASSAY OF TRANSFERRIN: CPT

## 2023-03-13 PROCEDURE — 82728 ASSAY OF FERRITIN: CPT

## 2023-03-13 PROCEDURE — 83735 ASSAY OF MAGNESIUM: CPT

## 2023-03-13 PROCEDURE — 85025 COMPLETE CBC W/AUTO DIFF WBC: CPT

## 2023-03-13 PROCEDURE — 96372 THER/PROPH/DIAG INJ SC/IM: CPT

## 2023-03-13 PROCEDURE — 80053 COMPREHEN METABOLIC PANEL: CPT

## 2023-03-13 PROCEDURE — 25010000002 MAGNESIUM SULFATE 2 GM/50ML SOLUTION: Performed by: INTERNAL MEDICINE

## 2023-03-13 RX ORDER — MAGNESIUM SULFATE HEPTAHYDRATE 40 MG/ML
2 INJECTION, SOLUTION INTRAVENOUS ONCE
Status: COMPLETED | OUTPATIENT
Start: 2023-03-13 | End: 2023-03-13

## 2023-03-13 RX ADMIN — ERYTHROPOIETIN 34000 UNITS: 20000 INJECTION, SOLUTION INTRAVENOUS; SUBCUTANEOUS at 14:26

## 2023-03-13 RX ADMIN — MAGNESIUM SULFATE HEPTAHYDRATE 2 G: 40 INJECTION, SOLUTION INTRAVENOUS at 13:37

## 2023-03-20 ENCOUNTER — INFUSION (OUTPATIENT)
Dept: ONCOLOGY | Facility: HOSPITAL | Age: 68
End: 2023-03-20
Payer: MEDICARE

## 2023-03-20 ENCOUNTER — LAB (OUTPATIENT)
Dept: LAB | Facility: HOSPITAL | Age: 68
End: 2023-03-20
Payer: MEDICARE

## 2023-03-20 ENCOUNTER — APPOINTMENT (OUTPATIENT)
Dept: ONCOLOGY | Facility: HOSPITAL | Age: 68
End: 2023-03-20
Payer: MEDICARE

## 2023-03-20 ENCOUNTER — OFFICE VISIT (OUTPATIENT)
Dept: ONCOLOGY | Facility: CLINIC | Age: 68
End: 2023-03-20
Payer: MEDICARE

## 2023-03-20 VITALS
SYSTOLIC BLOOD PRESSURE: 124 MMHG | WEIGHT: 308.2 LBS | HEIGHT: 75 IN | DIASTOLIC BLOOD PRESSURE: 69 MMHG | RESPIRATION RATE: 18 BRPM | BODY MASS INDEX: 38.32 KG/M2 | HEART RATE: 75 BPM | OXYGEN SATURATION: 96 % | TEMPERATURE: 97.1 F

## 2023-03-20 DIAGNOSIS — K52.9 CHRONIC DIARRHEA: ICD-10-CM

## 2023-03-20 DIAGNOSIS — C90.00 MULTIPLE MYELOMA, REMISSION STATUS UNSPECIFIED: ICD-10-CM

## 2023-03-20 DIAGNOSIS — C90.00 MULTIPLE MYELOMA NOT HAVING ACHIEVED REMISSION: Primary | ICD-10-CM

## 2023-03-20 DIAGNOSIS — D64.9 ANEMIA, UNSPECIFIED TYPE: ICD-10-CM

## 2023-03-20 DIAGNOSIS — N18.32 ANEMIA IN STAGE 3B CHRONIC KIDNEY DISEASE: ICD-10-CM

## 2023-03-20 DIAGNOSIS — T45.1X5A CHEMOTHERAPY INDUCED NEUTROPENIA: ICD-10-CM

## 2023-03-20 DIAGNOSIS — E83.42 HYPOMAGNESEMIA: ICD-10-CM

## 2023-03-20 DIAGNOSIS — D63.1 ANEMIA IN STAGE 3B CHRONIC KIDNEY DISEASE: ICD-10-CM

## 2023-03-20 DIAGNOSIS — D70.1 CHEMOTHERAPY INDUCED NEUTROPENIA: ICD-10-CM

## 2023-03-20 DIAGNOSIS — Z79.899 ENCOUNTER FOR LONG-TERM CURRENT USE OF HIGH RISK MEDICATION: ICD-10-CM

## 2023-03-20 DIAGNOSIS — D69.6 THROMBOCYTOPENIA: ICD-10-CM

## 2023-03-20 DIAGNOSIS — D84.9 IMMUNOCOMPROMISED STATE: ICD-10-CM

## 2023-03-20 DIAGNOSIS — E83.51 HYPOCALCEMIA: ICD-10-CM

## 2023-03-20 LAB
ALBUMIN SERPL-MCNC: 3.8 G/DL (ref 3.5–5.2)
ALBUMIN/GLOB SERPL: 1.7 G/DL (ref 1.1–2.4)
ALP SERPL-CCNC: 44 U/L (ref 38–116)
ALT SERPL W P-5'-P-CCNC: 10 U/L (ref 0–41)
ANION GAP SERPL CALCULATED.3IONS-SCNC: 12.6 MMOL/L (ref 5–15)
AST SERPL-CCNC: 10 U/L (ref 0–40)
BASOPHILS # BLD AUTO: 0.05 10*3/MM3 (ref 0–0.2)
BASOPHILS NFR BLD AUTO: 1.1 % (ref 0–1.5)
BILIRUB SERPL-MCNC: 0.4 MG/DL (ref 0.2–1.2)
BUN SERPL-MCNC: 42 MG/DL (ref 6–20)
BUN/CREAT SERPL: 13 (ref 7.3–30)
CALCIUM SPEC-SCNC: 8 MG/DL (ref 8.5–10.2)
CHLORIDE SERPL-SCNC: 106 MMOL/L (ref 98–107)
CO2 SERPL-SCNC: 22.4 MMOL/L (ref 22–29)
CREAT SERPL-MCNC: 3.23 MG/DL (ref 0.7–1.3)
DEPRECATED RDW RBC AUTO: 57 FL (ref 37–54)
EGFRCR SERPLBLD CKD-EPI 2021: 20.5 ML/MIN/1.73
EOSINOPHIL # BLD AUTO: 0.16 10*3/MM3 (ref 0–0.4)
EOSINOPHIL NFR BLD AUTO: 3.5 % (ref 0.3–6.2)
ERYTHROCYTE [DISTWIDTH] IN BLOOD BY AUTOMATED COUNT: 16.3 % (ref 12.3–15.4)
GLOBULIN UR ELPH-MCNC: 2.3 GM/DL (ref 1.8–3.5)
GLUCOSE SERPL-MCNC: 247 MG/DL (ref 74–124)
HCT VFR BLD AUTO: 30.9 % (ref 37.5–51)
HGB BLD-MCNC: 9.7 G/DL (ref 13–17.7)
IMM GRANULOCYTES # BLD AUTO: 0.04 10*3/MM3 (ref 0–0.05)
IMM GRANULOCYTES NFR BLD AUTO: 0.9 % (ref 0–0.5)
LYMPHOCYTES # BLD AUTO: 0.81 10*3/MM3 (ref 0.7–3.1)
LYMPHOCYTES NFR BLD AUTO: 17.8 % (ref 19.6–45.3)
MAGNESIUM SERPL-MCNC: 1.6 MG/DL (ref 1.8–2.5)
MCH RBC QN AUTO: 30.3 PG (ref 26.6–33)
MCHC RBC AUTO-ENTMCNC: 31.4 G/DL (ref 31.5–35.7)
MCV RBC AUTO: 96.6 FL (ref 79–97)
MONOCYTES # BLD AUTO: 0.6 10*3/MM3 (ref 0.1–0.9)
MONOCYTES NFR BLD AUTO: 13.2 % (ref 5–12)
NEUTROPHILS NFR BLD AUTO: 2.88 10*3/MM3 (ref 1.7–7)
NEUTROPHILS NFR BLD AUTO: 63.5 % (ref 42.7–76)
NRBC BLD AUTO-RTO: 0 /100 WBC (ref 0–0.2)
PHOSPHATE SERPL-MCNC: 3.1 MG/DL (ref 2.5–4.5)
PLATELET # BLD AUTO: 141 10*3/MM3 (ref 140–450)
PMV BLD AUTO: 8.9 FL (ref 6–12)
POTASSIUM SERPL-SCNC: 4.3 MMOL/L (ref 3.5–4.7)
PROT SERPL-MCNC: 6.1 G/DL (ref 6.3–8)
RBC # BLD AUTO: 3.2 10*6/MM3 (ref 4.14–5.8)
SODIUM SERPL-SCNC: 141 MMOL/L (ref 134–145)
WBC NRBC COR # BLD: 4.54 10*3/MM3 (ref 3.4–10.8)

## 2023-03-20 PROCEDURE — 96401 CHEMO ANTI-NEOPL SQ/IM: CPT

## 2023-03-20 PROCEDURE — 83735 ASSAY OF MAGNESIUM: CPT

## 2023-03-20 PROCEDURE — 1160F RVW MEDS BY RX/DR IN RCRD: CPT | Performed by: INTERNAL MEDICINE

## 2023-03-20 PROCEDURE — 96372 THER/PROPH/DIAG INJ SC/IM: CPT

## 2023-03-20 PROCEDURE — 85025 COMPLETE CBC W/AUTO DIFF WBC: CPT

## 2023-03-20 PROCEDURE — 3074F SYST BP LT 130 MM HG: CPT | Performed by: INTERNAL MEDICINE

## 2023-03-20 PROCEDURE — 1159F MED LIST DOCD IN RCRD: CPT | Performed by: INTERNAL MEDICINE

## 2023-03-20 PROCEDURE — 80053 COMPREHEN METABOLIC PANEL: CPT

## 2023-03-20 PROCEDURE — 25010000002 EPOETIN ALFA PER 1000 UNITS: Performed by: INTERNAL MEDICINE

## 2023-03-20 PROCEDURE — 84100 ASSAY OF PHOSPHORUS: CPT

## 2023-03-20 PROCEDURE — 25010000002 MAGNESIUM SULFATE 2 GM/50ML SOLUTION: Performed by: INTERNAL MEDICINE

## 2023-03-20 PROCEDURE — 1126F AMNT PAIN NOTED NONE PRSNT: CPT | Performed by: INTERNAL MEDICINE

## 2023-03-20 PROCEDURE — 99215 OFFICE O/P EST HI 40 MIN: CPT | Performed by: INTERNAL MEDICINE

## 2023-03-20 PROCEDURE — 96365 THER/PROPH/DIAG IV INF INIT: CPT

## 2023-03-20 PROCEDURE — 36415 COLL VENOUS BLD VENIPUNCTURE: CPT

## 2023-03-20 PROCEDURE — 25010000002 DARATUMUMAB-HYALURONIDASE-FIHJ 1800-30000 MG-UT/15ML SOLUTION: Performed by: INTERNAL MEDICINE

## 2023-03-20 PROCEDURE — 3078F DIAST BP <80 MM HG: CPT | Performed by: INTERNAL MEDICINE

## 2023-03-20 RX ORDER — FAMOTIDINE 20 MG/1
20 TABLET, FILM COATED ORAL ONCE
Status: CANCELLED
Start: 2023-03-20 | End: 2023-03-20

## 2023-03-20 RX ORDER — DIPHENHYDRAMINE HCL 25 MG
25 CAPSULE ORAL ONCE
Status: CANCELLED | OUTPATIENT
Start: 2023-03-20

## 2023-03-20 RX ORDER — MAGNESIUM SULFATE HEPTAHYDRATE 40 MG/ML
2 INJECTION, SOLUTION INTRAVENOUS ONCE
Status: COMPLETED | OUTPATIENT
Start: 2023-03-20 | End: 2023-03-20

## 2023-03-20 RX ORDER — DIPHENHYDRAMINE HCL 25 MG
25 CAPSULE ORAL ONCE
OUTPATIENT
Start: 2023-04-17

## 2023-03-20 RX ORDER — DIPHENHYDRAMINE HYDROCHLORIDE 50 MG/ML
50 INJECTION INTRAMUSCULAR; INTRAVENOUS AS NEEDED
OUTPATIENT
Start: 2023-04-17

## 2023-03-20 RX ORDER — ACETAMINOPHEN 500 MG
1000 TABLET ORAL ONCE
OUTPATIENT
Start: 2023-04-17

## 2023-03-20 RX ORDER — ACETAMINOPHEN 500 MG
1000 TABLET ORAL ONCE
Status: DISCONTINUED | OUTPATIENT
Start: 2023-03-20 | End: 2023-03-20 | Stop reason: HOSPADM

## 2023-03-20 RX ORDER — DIPHENHYDRAMINE HYDROCHLORIDE 50 MG/ML
50 INJECTION INTRAMUSCULAR; INTRAVENOUS AS NEEDED
Status: CANCELLED | OUTPATIENT
Start: 2023-03-20

## 2023-03-20 RX ORDER — FAMOTIDINE 20 MG/1
20 TABLET, FILM COATED ORAL ONCE
Status: DISCONTINUED | OUTPATIENT
Start: 2023-03-20 | End: 2023-03-20 | Stop reason: HOSPADM

## 2023-03-20 RX ORDER — FAMOTIDINE 10 MG/ML
20 INJECTION, SOLUTION INTRAVENOUS AS NEEDED
OUTPATIENT
Start: 2023-04-17

## 2023-03-20 RX ORDER — ERGOCALCIFEROL 1.25 MG/1
CAPSULE ORAL
Qty: 13 CAPSULE | Refills: 1 | Status: SHIPPED | OUTPATIENT
Start: 2023-03-20

## 2023-03-20 RX ORDER — DIPHENHYDRAMINE HCL 25 MG
25 CAPSULE ORAL ONCE
Status: DISCONTINUED | OUTPATIENT
Start: 2023-03-20 | End: 2023-03-20 | Stop reason: HOSPADM

## 2023-03-20 RX ORDER — FAMOTIDINE 10 MG/ML
20 INJECTION, SOLUTION INTRAVENOUS AS NEEDED
Status: CANCELLED | OUTPATIENT
Start: 2023-03-20

## 2023-03-20 RX ORDER — MEPERIDINE HYDROCHLORIDE 25 MG/ML
25 INJECTION INTRAMUSCULAR; INTRAVENOUS; SUBCUTANEOUS
Status: CANCELLED | OUTPATIENT
Start: 2023-03-20

## 2023-03-20 RX ORDER — MEPERIDINE HYDROCHLORIDE 25 MG/ML
25 INJECTION INTRAMUSCULAR; INTRAVENOUS; SUBCUTANEOUS
OUTPATIENT
Start: 2023-04-17

## 2023-03-20 RX ORDER — FAMOTIDINE 20 MG/1
20 TABLET, FILM COATED ORAL ONCE
Start: 2023-04-17 | End: 2023-04-17

## 2023-03-20 RX ORDER — ACETAMINOPHEN 500 MG
1000 TABLET ORAL ONCE
Status: CANCELLED | OUTPATIENT
Start: 2023-03-20

## 2023-03-20 RX ADMIN — DARATUMUMAB AND HYALURONIDASE-FIHJ (HUMAN RECOMBINANT) 1800 MG: 1800; 30000 INJECTION SUBCUTANEOUS at 11:03

## 2023-03-20 RX ADMIN — MAGNESIUM SULFATE HEPTAHYDRATE 2 G: 40 INJECTION, SOLUTION INTRAVENOUS at 10:57

## 2023-03-20 RX ADMIN — ERYTHROPOIETIN 34000 UNITS: 20000 INJECTION, SOLUTION INTRAVENOUS; SUBCUTANEOUS at 11:19

## 2023-03-20 NOTE — NURSING NOTE
Pt to infusion room for treatment .  Mag 1.6.  Per protocol pt received 2 gm IV mag .       Pt calcium today 8.   Discussed with Dr Del Valle , hold xgeva today and reschedule for 4 weeks.  Message sent to Jacque to reschedule xgeva injection.  Procrit injection given as per orders.    1241 - I called patient to see if he was taking his calcium tablets. Spoke with wife and she reported that he is taking them as previously prescribed.,  I instructed her to have him increase calcium tablets to 4 times a day.   She V/U

## 2023-03-20 NOTE — PROGRESS NOTES
Subjective     CHIEF COMPLAINT:      Multiple myeloma    HISTORY OF PRESENT ILLNESS:     Contreras Albert is a 67 y.o. male patient who returns today for follow up on his multiple myeloma.  He returns today for follow-up reporting that he lost his sister last week.  He reports worsening fatigue over the past week.  He reports that he doing more than before as he is recovering from the effect of multiple myeloma on his body.  He has occasional diarrhea-less than what he was experiencing when he was taking the magnesium.    Patient reports leg swelling.  There is occasional pain in the legs and feet which overall has improved.    ROS:  Pertinent ROS is in the HPI.     Past medical, surgical, social and family history were reviewed.     MEDICATIONS:    Current Outpatient Medications:   •  acetaminophen (TYLENOL) 325 MG tablet, Take 2 tablets by mouth Every 6 (Six) Hours As Needed for Mild Pain., Disp: , Rfl:   •  acyclovir (ZOVIRAX) 800 MG tablet, Take 1 tablet by mouth Daily., Disp: 90 tablet, Rfl: 2  •  ASPIRIN 81 PO, Take 81 mg by mouth Daily., Disp: , Rfl:   •  atorvastatin (LIPITOR) 10 MG tablet, By mouth take 1 tab daily before bed or after evening meal., Disp: 30 tablet, Rfl: 6  •  Blood Glucose Monitoring Suppl (FREESTYLE FREEDOM LITE) w/Device kit, 1 kit Daily. Indications: Type 2 Diabetes, Disp: 1 each, Rfl: 0  •  bumetanide (BUMEX) 2 MG tablet, Take 0.5 tablets by mouth 2 (Two) Times a Day. (Patient taking differently: Take 1 tablet by mouth Daily.), Disp: , Rfl:   •  calcium carbonate (TUMS) 500 MG chewable tablet, Chew 3 tablets Daily., Disp: , Rfl:   •  carvedilol (COREG) 12.5 MG tablet, Take 1 tablet by mouth 2 (Two) Times a Day With Meals., Disp: , Rfl:   •  Continuous Blood Gluc Sensor (Dexcom G6 Sensor), Replace sensor every 10 days.  Dx: E 11.22, Disp: 9 each, Rfl: 1  •  Continuous Blood Gluc Transmit (Dexcom G6 Transmitter) misc, 1 each Every 3 (Three) Months. Dx: E 11.22, Disp: 1 each, Rfl: 1  •   dexamethasone (DECADRON) 4 MG tablet, Take 3 tablets on day of chemo, 1 tablet on day #2 and 1 tablet on day #3 and repeat monthly., Disp: 15 tablet, Rfl: 1  •  diphenoxylate-atropine (LOMOTIL) 2.5-0.025 MG per tablet, Take 2 tablets by mouth 4 (Four) Times a Day As Needed for Diarrhea., Disp: 120 tablet, Rfl: 0  •  escitalopram (LEXAPRO) 5 MG tablet, Take 1 tablet by mouth Daily., Disp: 30 tablet, Rfl: 5  •  finasteride (PROSCAR) 5 MG tablet, Take 1 tablet by mouth Daily., Disp: , Rfl:   •  gabapentin (NEURONTIN) 300 MG capsule, Take 1 capsule in AM and take 2 capsules at bedtime., Disp: 90 capsule, Rfl: 5  •  glucagon (GLUCAGEN) 1 MG injection, Inject 1 mg into the appropriate muscle as directed by prescriber 1 (One) Time As Needed for Low Blood Sugar for up to 1 dose. Follow package directions for low blood sugar., Disp: 1 kit, Rfl: 3  •  glucose blood (FREESTYLE LITE) test strip, Use to test blood sugar 4-5 times daily., Disp: 200 each, Rfl: 5  •  Insulin Glargine (LANTUS SOLOSTAR) 100 UNIT/ML injection pen, Inject 60 Units under the skin into the appropriate area as directed Daily for 90 days. : Non-Chemo Days take 16 units twice daily. Chemo Days take 30 units twice daily. 16-30 units twice daily for max daily dose of 70 units daily., Disp: 54 mL, Rfl: 1  •  Insulin Lispro, 1 Unit Dial, (HumaLOG KwikPen) 100 UNIT/ML solution pen-injector, Non-chemo days: 10 units three times daily before meals plus 1 unit per every 50 over 150.  Chemo days: 15 units three times daily before meals plus 2 units per 50 greater than 150.  Max daily dose 90 units daily, Disp: 27 mL, Rfl: 3  •  Insulin Pen Needle 32G X 4 MM misc, USING 4 PEN NEEDLES DAILY, Disp: 400 each, Rfl: 3  •  Lancets (freestyle) lancets, Use to check blood sugar 4 times daily, Disp: 400 each, Rfl: 2  •  loperamide (IMODIUM) 2 MG capsule, Take 1 capsule by mouth 4 (Four) Times a Day As Needed for Diarrhea., Disp: , Rfl:   •  ondansetron (ZOFRAN) 8 MG tablet,  "Take 1 tablet by mouth 3 (Three) Times a Day As Needed for Nausea or Vomiting., Disp: 30 tablet, Rfl: 5  •  pantoprazole (PROTONIX) 40 MG EC tablet, TAKE ONE TABLET BY MOUTH DAILY, Disp: 30 tablet, Rfl: 1  •  pomalidomide (Pomalyst) 2 MG chemo capsule, Take 1 capsule by mouth Daily. Take for 21 days on, then 7 days off, then repeat., Disp: 21 capsule, Rfl: 0  •  tamsulosin (FLOMAX) 0.4 MG capsule 24 hr capsule, Take 1 capsule by mouth Daily., Disp: , Rfl:   •  vitamin D (ERGOCALCIFEROL) 1.25 MG (78920 UT) capsule capsule, Take 1 capsule by mouth 1 (One) Time Per Week. Mondays, Disp: 13 capsule, Rfl: 1  •  magnesium chloride ER (MAG-DELAY) 70 MG tablet delayed-release CR tablet, Take 1 tablet by mouth Daily. (Patient not taking: Reported on 3/20/2023), Disp: 90 tablet, Rfl: 1  No current facility-administered medications for this visit.    Facility-Administered Medications Ordered in Other Visits:   •  acetaminophen (TYLENOL) tablet 1,000 mg, 1,000 mg, Oral, Once, Red Del Valle MD  •  diphenhydrAMINE (BENADRYL) capsule 25 mg, 25 mg, Oral, Once, Red Del Valle MD  •  epoetin valerie (EPOGEN,PROCRIT) injection 34,000 Units, 34,000 Units, Subcutaneous, Once, Red Del Valle MD  •  famotidine (PEPCID) tablet 20 mg, 20 mg, Oral, Once, Red Del Valle MD  Objective     VITAL SIGNS:     Vitals:    03/20/23 0922   BP: 124/69   Pulse: 75   Resp: 18   Temp: 97.1 °F (36.2 °C)   TempSrc: Temporal   SpO2: 96%   Weight: (!) 140 kg (308 lb 3.2 oz)   Height: 190 cm (74.8\")   PainSc: 0-No pain     Body mass index is 38.73 kg/m².     Wt Readings from Last 5 Encounters:   03/20/23 (!) 140 kg (308 lb 3.2 oz)   03/13/23 (!) 140 kg (309 lb 6.4 oz)   03/06/23 (!) 139 kg (306 lb)   02/27/23 (!) 139 kg (305 lb 9.6 oz)   02/20/23 (!) 137 kg (301 lb 4.8 oz)     PHYSICAL EXAMINATION:   GENERAL: The patient appears in fair general condition, not in acute distress.   SKIN: No ecchymosis.  EYES: No jaundice. Pallor.  LYMPHATICS: No " cervical lymphadenopathy.  CHEST: Normal respiratory effort. Lungs clear bilaterally.  No added sounds.  CVS: Normal S1-S2.  No murmurs.  ABDOMEN: Soft. No tenderness. No Hepatomegaly. No Splenomegaly. No masses.  EXTREMITIES: Trace edema bilaterally.  Mild chronic erythema with no warmth.    DIAGNOSTIC DATA:     Results from last 7 days   Lab Units 03/20/23  0910 03/13/23  1316   WBC 10*3/mm3 4.54 2.59*   NEUTROS ABS 10*3/mm3 2.88 1.20*   HEMOGLOBIN g/dL 9.7* 9.8*   HEMATOCRIT % 30.9* 30.1*   PLATELETS 10*3/mm3 141 107*     Results from last 7 days   Lab Units 03/20/23  0910 03/13/23  1251   SODIUM mmol/L 141 142   POTASSIUM mmol/L 4.3 4.0   CHLORIDE mmol/L 106 106   CO2 mmol/L 22.4 22.4   BUN mg/dL 42* 53*   CREATININE mg/dL 3.23* 3.41*   CALCIUM mg/dL 8.0* 8.8   ALBUMIN g/dL 3.8 3.8   BILIRUBIN mg/dL 0.4 0.4   ALK PHOS U/L 44 52   ALT (SGPT) U/L 10 11   AST (SGOT) U/L 10 10   GLUCOSE mg/dL 247* 220*   MAGNESIUM mg/dL 1.6* 1.6*         Lab 03/13/23  1251   IRON 76   IRON SATURATION 28   TIBC 274   TRANSFERRIN 196*   FERRITIN 312.10      Component      Latest Ref Rng & Units 12/19/2022 1/23/2023 2/20/2023   IgG      603 - 1613 mg/dL 620 565 (L) 585 (L)   IgA      61 - 437 mg/dL 73 71 68   IgM      20 - 172 mg/dL 15 (L) 11 (L) 12 (L)   Total Protein      6.0 - 8.5 g/dL 6.0 5.9 (L) 5.7 (L)   Albumin      2.9 - 4.4 g/dL 3.6 3.6 3.4   Alpha-1-Globulin      0.0 - 0.4 g/dL 0.2 0.2 0.2   Alpha-2-Globulin      0.4 - 1.0 g/dL 0.8 0.8 0.8   Beta Globulin      0.7 - 1.3 g/dL 0.8 0.8 0.8   Gamma Globulin      0.4 - 1.8 g/dL 0.6 0.5 0.6   M-Jurgen      Not Observed g/dL Comment: Comment: Not Observed   Globulin      2.2 - 3.9 g/dL 2.4 2.3 2.3   A/G Ratio      0.7 - 1.7 1.6 1.6 1.5   Immunofixation Reflex, Serum       Comment (A) Comment (A) Comment   Please note       Comment Comment Comment   Kappa FLC      3.3 - 19.4 mg/L 80.8 (H) 68.1 (H) 61.8 (H)   Free Lambda Light Chains      5.7 - 26.3 mg/L 55.3 (H) 40.3 (H) 34.1 (H)    Kappa/Lambda Ratio      0.26 - 1.65 1.46 1.69 (H) 1.81 (H)     Assessment & Plan    *IgG lambda multiple myeloma.  · Bone marrow 5/23/2020 hypercellular marrow with 80% involvement of plasma cell myeloma; FISH studies pending  · Bone survey 5/26/2020: Diffuse osteoporosis and demineralization.  However, no discrete lytic lesions.  · SPEP 5/23/2020: M spike 5.1.  IgG 6629 lambda.  Light chain ratio 0 with free lambda light chains 6400.  Beta-2 microglobulin 16.3.  24-hour urine 12.7 g protein per 24-hour with monoclonal lambda free light chain 33.9%, monoclonal IgG lambda 23.5%  · 24-hour urine testing from 5/24/2020 free lambda light chains 8.4 g/L  · Due to the renal failure, initiated CyBorD 5/28/20.  Given the significantly elevated light chains with the first cycle of treatment plan to give Velcade days 1, 4, 8, 11; Cytoxan 300 PO mg/m² weekly and dexamethasone 40 mg p.o. days 1, 2, 3, 4, 8 and 15.    · After cycle 1 treatment can likely be altered to weekly dosing of each drug.  · On 6/18/2020, Velcade was changed to 1.5 mg/m² weekly continuously along with weekly Decadron and oral Cytoxan.  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   · Treatment was changed on 8/27/2020 to to Daratumumab subcutaneous injection weekly along with Revlimid 10 mg daily for 21 out of 28-day cycle and Decadron 40 mg daily.  · S/p stem cell transplant on 12/9/2020 at [facility].  · Day 100 bone marrow on 3/16/2021 revealed minute  plasma cell population.  PET scan on 3/29/2021 was negative.  • Patient enrolled in the  clinical trial randomizing to maintenance lenalidomide versus lenalidomide and daratumumab.  Patient was randomized to the Revlimid arm initiated 4/13/2021 dosed at 5 mg daily (due to renal function).  Patient developed neutropenia requiring dose adjustment to 5 mg daily for 21/28 days.  Patient withdrew from clinical trial after 5 cycles on 8/31/2021 due to worsening diarrhea.     • He was switched to Velcade maintenance every other week 9/14/2021 which he discontinued 10/26/2021 due to neuropathy and lower extremity edema.    • He was subsequently changed to pomalidomide 2 mg daily 21/28 days on 11/8/2021.    • 1 year post transplant, bone marrow biopsy showed 5-7% plasma cells and due to the residual disease he was started on DPd (daratumumab, pomalidomide, dexamethasone) on 2/21/2022.    • Patient was seen by Dr. Romero on 9/12/2022.  At that time patient was cycle 8-day 7 DPd. Due to chronic side effects, dexamethasone was changed from weekly to monthly.    • Patient did continue pomalidomide on his own during admission to the hospital, received 1 dose 4 mg p.o. on 9/24/2022 before was subsequently discontinued due to cytopenias.   • Labs on 9/24/2022 with IgG 314, IgM less than 25, IgA less than 50.  • 9/25/2022 24-hour urine free light chains with free kappa 351, free lambda 104, ratio 3.37  • On 9/24/2022, IgG kappa M protein was 0.1 g.  IgG 311, IgA 43, IgM 7.   • Low level M spike of IgG kappa likely represents daratumumab.   • No IgG lambda M protein.   • Serum free light chains on 9/26/2022 with kappa 57.4, lambda 42.8, ratio normal at 1.34.  • He received Darzlex on 10/3/2022 at [facility]. He decided to transfer his treatment back to our office.   • On 10/13/2022, M protein was too small to quantitate. Kappa/Lambda ratio was 1.34.  • Dexamethasone was changed to 12 mg on Mondays, 4 mg on Tuesday and 4 mg on Wednesday.  • Pomalyst was restarted at 4 mg every other day to be taken on 10/13/2022, 10/15/2022, 10/17/2022, 10/19/2022, 10/21/2022 and 10/23/2022.  • Pomalyst dose was subsequently reduced to 3 mg daily for 21 out of a 28-day cycle starting on 10/31/2022.  • He tolerated the reduced dose better with improvement in the diarrhea.  • SPEP GUANAKITO FLC on 12/19/2022 showed no definite M protein.  K/L ratio was 1.46-normal.  • He received Darzalex on 12/27/2022 (1 day delay due to  Christmas holiday being on 12/26/2022).  • Due to neutropenia, the dose of Pomalyst was reduced to 2 mg daily for 21/28-day cycle.  • SPEP G[place] FLC on 1/23/2022 showed no M protein.  K/L ratio was slightly elevated at 1.69.  • SPEP [facility] FLC on 2/20/2023 revealed no M protein.  Kappa to lambda ratio was 1.81.  • No evidence of relapse of his multiple myeloma.  • The elevated kappa to lambda ratio might be attributed to Darzalex.  • I recommended continuing treatment with Darzalex monthly and Pomalyst 2 mg daily for 21/28-day cycle.    *Bone health.  · He is on Xgeva.   · Last dose was on 9/19/2022.  · Patient developed severe hypocalcemia following the Xgeva injection.  He required inpatient hospital stay.  · Given the duration of Xgeva injections, recommended changing treatment to every 3 months starting December 2022.   · I recommended giving Xgeva today and continuing every 3 months.  · He received Xgeva on 12/27/2022.  · He is due for Xgeva today.  · However, calcium level is low today at 8.0.    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  · Patient was receiving Procrit at [facility].  · Records from [facility] showed that the patient was receiving 70,000 units weekly and received the last dose on 9/27/2022.  · Patient was given Procrit 70,000 units on 9/27/2022.  · Hemoglobin was 9.9 on 10/17/2022.  He was given Procrit 60,000 units.  · Hemoglobin was 9.8 on 11/4/2022.  He was given Procrit 60,000 units.  · Last Procrit injection was 34,000 units on 3/13/2023 for hemoglobin of 9.8.  · Hemoglobin is 9.7 today.  · He has adequate iron stores.    *Thrombocytopenia.  · This is attributed to multiple myeloma, prior stem cell transplant and chemotherapy.  · Platelet count is 141,000 today.    *Neutropenia.  · This was attributed to Pomalyst.  · Neutrophil count was down to 290 on 9/25/2022.  · He was given Neupogen during his hospital stay in  September 2022.  · Neutrophil count improved afterwards.  · However, neutrophil count decreased to 600 on 12/19/2022.  · He reports that he was feeling tired and achy at that time.  · Neutrophil count was 770 on 1/16/2023.  · Due to the recurrent neutropenia, the dose of Pomalyst was reduced to 2 mg in January 2023.  · Neutrophil count improved following the dose reduction.  · Neutrophil count decreased to 1200 on 3/13/2023.  · Neutrophil count is 2880 today.    *Hypocalcemia.  · Patient developed severe hypocalcemia due to Xgeva.  · Calcium was 5.9 on 9/23/2022.  · Calcium improved to 8.1 on 9/28/2022  · Calcium improved to 9.6 on 10/13/2022  · Calcium decreased to 7.7 on 10/24/2022.  Albumin was 3.8.  · Calcium improved to 10.3 on 12/19/2022.  · Calcium decreased to 8.4 on 12/27/2022.  · Calcium dose was subsequently increased to 3 tablets daily.  · Calcium level decreased to 8.0 today.    *Hypomagnesemia.  · Magnesium was 1.1 on 10/24/2022.  · He was placed on oral magnesium oxide 400 mg twice daily.  · He developed diarrhea and it was stopped.  · He has been receiving magnesium IV intermittently depending on his magnesium level.  · Magnesium was 1.5 on 1/16/2023.  He was given 2 g IV magnesium.  · Magnesium was 1.6 on 1/23/2023.  · He was started on magnesium chloride on 1/23/2023.  · He developed diarrhea about 7 days after starting the magnesium chloride.  · Magnesium chloride was discontinued.  · Magnesium is 1.6 today.    *Prophylaxis..  · He is on acyclovir 800 mg daily.  · He is on ASA 81 mg daily.  · No recent infections.    *Chronic diarrhea.  · He is on Lomotil 2 tablets every 6 hours as needed.  · He also takes Imodium PRN.  · No improvement with Probiotic. No improvement with low lactose diet.  · Diarrhea improved after dose reduction of Pomalyst.  · Diarrhea temporarily worsened after taking magnesium chloride and it improved after magnesium chloride was discontinued.    *Vitamin D deficiency.  · He  is on vitamin D 50,000 units weekly.  · Vitamin D level was 18 on 10/3/2022.  · He was continued on vitamin D weekly.    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  · He is on gabapentin 300 mg in the morning and 600 mg in the evening.  · Neuropathy symptoms in the feet are better as the swelling improved.     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  · S/p right partial nephrectomy on 5/18/2016.    PLAN:    1.  We will give Darzalex today.   2.  Hold Xgeva today.  Increase calcium carbonate to 4 tablets daily.  3.  Start a new cycle of Pomalyst 2 mg daily for 21/28 day cycle.   4.  We will give 2 g magnesium sulfate IV.  5.  We will give Procrit today at 34,000 units.    6.  Since his magnesium and hemoglobin levels have overall improved, we will change the frequency of lab monitoring to every 2 weeks.  7.  Patient will return in 2 weeks for CBC CMP magnesium phosphorus and possible Procrit and IV magnesium.  8.  In 4 weeks, he will be scheduled to receive Darzalex and Xgeva, electrolytes permitting.  9.  I will see him in follow-up in 8 weeks.  He will be scheduled to receive Darzalex.    I spent 48 minutes caring for Contreras on this date of service. This time includes time spent by me in the following activities: preparing for the visit, reviewing tests, obtaining and/or reviewing a separately obtained history, performing a medically appropriate examination and/or evaluation, counseling and educating the patient/family/caregiver, ordering medications, tests, or procedures, documenting information in the medical record, independently interpreting results and communicating that information with the patient/family/caregiver and care coordination       Red Del Valle MD  03/20/23

## 2023-03-21 ENCOUNTER — SPECIALTY PHARMACY (OUTPATIENT)
Dept: PHARMACY | Facility: HOSPITAL | Age: 68
End: 2023-03-21
Payer: MEDICARE

## 2023-03-21 LAB
ALBUMIN SERPL ELPH-MCNC: 3.4 G/DL (ref 2.9–4.4)
ALBUMIN/GLOB SERPL: 1.5 {RATIO} (ref 0.7–1.7)
ALPHA1 GLOB SERPL ELPH-MCNC: 0.2 G/DL (ref 0–0.4)
ALPHA2 GLOB SERPL ELPH-MCNC: 0.8 G/DL (ref 0.4–1)
B-GLOBULIN SERPL ELPH-MCNC: 0.7 G/DL (ref 0.7–1.3)
GAMMA GLOB SERPL ELPH-MCNC: 0.6 G/DL (ref 0.4–1.8)
GLOBULIN SER-MCNC: 2.3 G/DL (ref 2.2–3.9)
IGA SERPL-MCNC: 60 MG/DL (ref 61–437)
IGG SERPL-MCNC: 545 MG/DL (ref 603–1613)
IGM SERPL-MCNC: 14 MG/DL (ref 20–172)
INTERPRETATION SERPL IEP-IMP: ABNORMAL
KAPPA LC FREE SER-MCNC: 55.8 MG/L (ref 3.3–19.4)
KAPPA LC FREE/LAMBDA FREE SER: 1.9 {RATIO} (ref 0.26–1.65)
LABORATORY COMMENT REPORT: ABNORMAL
LAMBDA LC FREE SERPL-MCNC: 29.4 MG/L (ref 5.7–26.3)
M PROTEIN SERPL ELPH-MCNC: ABNORMAL G/DL
PROT SERPL-MCNC: 5.7 G/DL (ref 6–8.5)

## 2023-03-24 ENCOUNTER — SPECIALTY PHARMACY (OUTPATIENT)
Dept: PHARMACY | Facility: HOSPITAL | Age: 68
End: 2023-03-24
Payer: MEDICARE

## 2023-03-24 DIAGNOSIS — C90.00 MULTIPLE MYELOMA NOT HAVING ACHIEVED REMISSION: ICD-10-CM

## 2023-03-28 ENCOUNTER — SPECIALTY PHARMACY (OUTPATIENT)
Dept: PHARMACY | Facility: HOSPITAL | Age: 68
End: 2023-03-28
Payer: MEDICARE

## 2023-03-28 ENCOUNTER — TELEPHONE (OUTPATIENT)
Dept: ONCOLOGY | Facility: CLINIC | Age: 68
End: 2023-03-28
Payer: MEDICARE

## 2023-03-28 NOTE — PROGRESS NOTES
Re: Refills of Oral Specialty Medication - Pomalyst (pomalidomide)    • Drug-Drug Interactions: The current medication list was reviewed and there are no relevant drug-drug interactions.  • Medication Allergies: The patient has no relevant allergies as it relates to their oral specialty medication  • Review of Labs/Dose Adjustments: NO DOSE CHANGE - I reviewed the most recent note and labs and the patient will continue without any dose changes.  I sent refills as described below.    Drug: Pomalyst (pomalidomide)  Strength: 2 mg  Directions: Take 1 capsule by mouth daily for 21 days on, then 7 days off, then repeat.  Quantity: 21  Refills: 0  Pharmacy prescription sent to: [facility]    Name/Credentials Donna Cano RPh, NANCY    3/28/2023  10:26 EDT    Completed independent double check on medication order/RX.    Thanks,   Yolande Huitron, PharmD, BCPS

## 2023-03-28 NOTE — TELEPHONE ENCOUNTER
Caller: Gabby Albert    Relationship: Emergency Contact    Best call back number: [phone]    What is the best time to reach you: ANYTIME    CAN LEAVE VM IF NEEDED ALSO     Who are you requesting to speak with (clinical staff, provider,  specific staff member): INFUSION NURSE        What was the call regarding:     WANTED TO CHECK WITH INFUSION NURSE THAT INFUSION SCHEDULED 04/03 AT 2:20 IS NOT TOO LATE AND WONT NEED TO BE MOVED UP    DUE TO THIS HAPPENED BEFORE AND HAD TO MOVE APPT UP,. WOULD LIKE TO KNOW BEFORE HAND DUE TO WORK SCHEDULE.       Do you require a callback: YES

## 2023-03-30 NOTE — TELEPHONE ENCOUNTER
Left voicemail to patients wife letting her know that he is fine at the time he is scheduled. I spoke with the infusion charge nurse and she stated they would have enough time to treat him.

## 2023-04-03 ENCOUNTER — INFUSION (OUTPATIENT)
Dept: ONCOLOGY | Facility: HOSPITAL | Age: 68
End: 2023-04-03
Payer: MEDICARE

## 2023-04-03 ENCOUNTER — LAB (OUTPATIENT)
Dept: ONCOLOGY | Facility: HOSPITAL | Age: 68
End: 2023-04-03
Payer: MEDICARE

## 2023-04-03 ENCOUNTER — TELEPHONE (OUTPATIENT)
Dept: ENDOCRINOLOGY | Age: 68
End: 2023-04-03
Payer: MEDICARE

## 2023-04-03 VITALS
SYSTOLIC BLOOD PRESSURE: 120 MMHG | WEIGHT: 312.2 LBS | RESPIRATION RATE: 16 BRPM | HEART RATE: 74 BPM | TEMPERATURE: 97.8 F | DIASTOLIC BLOOD PRESSURE: 70 MMHG | BODY MASS INDEX: 39.23 KG/M2 | OXYGEN SATURATION: 92 %

## 2023-04-03 DIAGNOSIS — N18.32 ANEMIA IN STAGE 3B CHRONIC KIDNEY DISEASE: ICD-10-CM

## 2023-04-03 DIAGNOSIS — D63.1 ANEMIA IN STAGE 3B CHRONIC KIDNEY DISEASE: ICD-10-CM

## 2023-04-03 DIAGNOSIS — C90.00 MULTIPLE MYELOMA NOT HAVING ACHIEVED REMISSION: ICD-10-CM

## 2023-04-03 DIAGNOSIS — E83.42 HYPOMAGNESEMIA: ICD-10-CM

## 2023-04-03 DIAGNOSIS — C90.00 MULTIPLE MYELOMA, REMISSION STATUS UNSPECIFIED: ICD-10-CM

## 2023-04-03 DIAGNOSIS — D64.9 ANEMIA, UNSPECIFIED TYPE: ICD-10-CM

## 2023-04-03 LAB
ALBUMIN SERPL-MCNC: 3.7 G/DL (ref 3.5–5.2)
ALBUMIN/GLOB SERPL: 1.6 G/DL (ref 1.1–2.4)
ALP SERPL-CCNC: 46 U/L (ref 38–116)
ALT SERPL W P-5'-P-CCNC: 10 U/L (ref 0–41)
ANION GAP SERPL CALCULATED.3IONS-SCNC: 11.7 MMOL/L (ref 5–15)
AST SERPL-CCNC: 8 U/L (ref 0–40)
BASOPHILS # BLD AUTO: 0.07 10*3/MM3 (ref 0–0.2)
BASOPHILS NFR BLD AUTO: 2 % (ref 0–1.5)
BILIRUB SERPL-MCNC: 0.4 MG/DL (ref 0.2–1.2)
BUN SERPL-MCNC: 51 MG/DL (ref 6–20)
BUN/CREAT SERPL: 13.4 (ref 7.3–30)
CALCIUM SPEC-SCNC: 9.4 MG/DL (ref 8.5–10.2)
CHLORIDE SERPL-SCNC: 102 MMOL/L (ref 98–107)
CO2 SERPL-SCNC: 27.3 MMOL/L (ref 22–29)
CREAT SERPL-MCNC: 3.82 MG/DL (ref 0.7–1.3)
DEPRECATED RDW RBC AUTO: 56.7 FL (ref 37–54)
EGFRCR SERPLBLD CKD-EPI 2021: 16.7 ML/MIN/1.73
EOSINOPHIL # BLD AUTO: 0.12 10*3/MM3 (ref 0–0.4)
EOSINOPHIL NFR BLD AUTO: 3.4 % (ref 0.3–6.2)
ERYTHROCYTE [DISTWIDTH] IN BLOOD BY AUTOMATED COUNT: 15.9 % (ref 12.3–15.4)
GLOBULIN UR ELPH-MCNC: 2.3 GM/DL (ref 1.8–3.5)
GLUCOSE SERPL-MCNC: 219 MG/DL (ref 74–124)
HCT VFR BLD AUTO: 32.2 % (ref 37.5–51)
HGB BLD-MCNC: 10 G/DL (ref 13–17.7)
IMM GRANULOCYTES # BLD AUTO: 0.01 10*3/MM3 (ref 0–0.05)
IMM GRANULOCYTES NFR BLD AUTO: 0.3 % (ref 0–0.5)
LYMPHOCYTES # BLD AUTO: 0.75 10*3/MM3 (ref 0.7–3.1)
LYMPHOCYTES NFR BLD AUTO: 21.4 % (ref 19.6–45.3)
MAGNESIUM SERPL-MCNC: 1.6 MG/DL (ref 1.8–2.5)
MCH RBC QN AUTO: 29.9 PG (ref 26.6–33)
MCHC RBC AUTO-ENTMCNC: 31.1 G/DL (ref 31.5–35.7)
MCV RBC AUTO: 96.1 FL (ref 79–97)
MONOCYTES # BLD AUTO: 0.46 10*3/MM3 (ref 0.1–0.9)
MONOCYTES NFR BLD AUTO: 13.1 % (ref 5–12)
NEUTROPHILS NFR BLD AUTO: 2.1 10*3/MM3 (ref 1.7–7)
NEUTROPHILS NFR BLD AUTO: 59.8 % (ref 42.7–76)
NRBC BLD AUTO-RTO: 0 /100 WBC (ref 0–0.2)
PLATELET # BLD AUTO: 127 10*3/MM3 (ref 140–450)
PMV BLD AUTO: 9.7 FL (ref 6–12)
POTASSIUM SERPL-SCNC: 4.4 MMOL/L (ref 3.5–4.7)
PROT SERPL-MCNC: 6 G/DL (ref 6.3–8)
RBC # BLD AUTO: 3.35 10*6/MM3 (ref 4.14–5.8)
SODIUM SERPL-SCNC: 141 MMOL/L (ref 134–145)
WBC NRBC COR # BLD: 3.51 10*3/MM3 (ref 3.4–10.8)

## 2023-04-03 PROCEDURE — 85025 COMPLETE CBC W/AUTO DIFF WBC: CPT

## 2023-04-03 PROCEDURE — 96365 THER/PROPH/DIAG IV INF INIT: CPT

## 2023-04-03 PROCEDURE — 83735 ASSAY OF MAGNESIUM: CPT

## 2023-04-03 PROCEDURE — 80053 COMPREHEN METABOLIC PANEL: CPT

## 2023-04-03 PROCEDURE — 25010000002 MAGNESIUM SULFATE 2 GM/50ML SOLUTION: Performed by: NURSE PRACTITIONER

## 2023-04-03 RX ORDER — MAGNESIUM SULFATE HEPTAHYDRATE 40 MG/ML
2 INJECTION, SOLUTION INTRAVENOUS ONCE
Status: COMPLETED | OUTPATIENT
Start: 2023-04-03 | End: 2023-04-03

## 2023-04-03 RX ADMIN — MAGNESIUM SULFATE HEPTAHYDRATE 2 G: 40 INJECTION, SOLUTION INTRAVENOUS at 15:05

## 2023-04-03 NOTE — TELEPHONE ENCOUNTER
Rx Refill Note  Requested Prescriptions     Pending Prescriptions Disp Refills   • Insulin Glargine (LANTUS SOLOSTAR) 100 UNIT/ML injection pen 54 mL 1     Sig: Inject 60 Units under the skin into the appropriate area as directed Daily for 90 days. : Non-Chemo Days take 16 units twice daily. Chemo Days take 30 units twice daily. 16-30 units twice daily for max daily dose of 70 units daily.      Last office visit with prescribing clinician: 10/6/2022   Last telemedicine visit with prescribing clinician: 4/26/2023   Next office visit with prescribing clinician: 4/26/2023                         Would you like a call back once the refill request has been completed: [] Yes [] No    If the office needs to give you a call back, can they leave a voicemail: [] Yes [] No    Susy Castro MA  04/03/23, 10:12 EDT

## 2023-04-03 NOTE — NURSING NOTE
Pt's hgb 10.0 today; pt does not qualify for Procrit today.   Pt's mag 1.6. Per protocol, 2gm IV magnesium given.

## 2023-04-05 RX ORDER — INSULIN GLARGINE 100 [IU]/ML
70 INJECTION, SOLUTION SUBCUTANEOUS DAILY
Qty: 63 ML | Refills: 1 | Status: SHIPPED | OUTPATIENT
Start: 2023-04-05 | End: 2023-07-04

## 2023-04-12 DIAGNOSIS — N18.4 TYPE 2 DIABETES MELLITUS WITH STAGE 4 CHRONIC KIDNEY DISEASE, WITH LONG-TERM CURRENT USE OF INSULIN: ICD-10-CM

## 2023-04-12 DIAGNOSIS — E11.22 TYPE 2 DIABETES MELLITUS WITH STAGE 4 CHRONIC KIDNEY DISEASE, WITH LONG-TERM CURRENT USE OF INSULIN: ICD-10-CM

## 2023-04-12 DIAGNOSIS — Z79.4 TYPE 2 DIABETES MELLITUS WITH STAGE 4 CHRONIC KIDNEY DISEASE, WITH LONG-TERM CURRENT USE OF INSULIN: ICD-10-CM

## 2023-04-12 NOTE — TELEPHONE ENCOUNTER
Antihyperglycemics Protocol Failed 04/12/2023 08:02 AM   Protocol Details  GFR >30ml/min in past year     Insulin Protocol Failed   Protocol Details  Creatinine < 1.3 in past 12 months    Lipid profile in past 12 months

## 2023-04-13 RX ORDER — INSULIN LISPRO 100 [IU]/ML
INJECTION, SOLUTION INTRAVENOUS; SUBCUTANEOUS
Qty: 27 ML | Refills: 3 | Status: SHIPPED | OUTPATIENT
Start: 2023-04-13

## 2023-04-14 ENCOUNTER — SPECIALTY PHARMACY (OUTPATIENT)
Dept: PHARMACY | Facility: HOSPITAL | Age: 68
End: 2023-04-14
Payer: MEDICARE

## 2023-04-14 NOTE — PROGRESS NOTES
Specialty Note ( Darzalex and Pomalyst)    Call placed to assess adherence and side effects.  No answer.   direct line [phone].

## 2023-04-17 ENCOUNTER — INFUSION (OUTPATIENT)
Dept: ONCOLOGY | Facility: HOSPITAL | Age: 68
End: 2023-04-17
Payer: MEDICARE

## 2023-04-17 ENCOUNTER — LAB (OUTPATIENT)
Dept: LAB | Facility: HOSPITAL | Age: 68
End: 2023-04-17
Payer: MEDICARE

## 2023-04-17 VITALS
DIASTOLIC BLOOD PRESSURE: 70 MMHG | SYSTOLIC BLOOD PRESSURE: 132 MMHG | BODY MASS INDEX: 40.11 KG/M2 | WEIGHT: 315 LBS | TEMPERATURE: 98 F | OXYGEN SATURATION: 94 % | RESPIRATION RATE: 16 BRPM | HEART RATE: 78 BPM

## 2023-04-17 DIAGNOSIS — N18.32 ANEMIA IN STAGE 3B CHRONIC KIDNEY DISEASE: Primary | ICD-10-CM

## 2023-04-17 DIAGNOSIS — D64.9 ANEMIA, UNSPECIFIED TYPE: ICD-10-CM

## 2023-04-17 DIAGNOSIS — C90.00 MULTIPLE MYELOMA NOT HAVING ACHIEVED REMISSION: ICD-10-CM

## 2023-04-17 DIAGNOSIS — D63.1 ANEMIA IN STAGE 3B CHRONIC KIDNEY DISEASE: Primary | ICD-10-CM

## 2023-04-17 DIAGNOSIS — C90.00 MULTIPLE MYELOMA, REMISSION STATUS UNSPECIFIED: ICD-10-CM

## 2023-04-17 LAB
ALBUMIN SERPL-MCNC: 3.8 G/DL (ref 3.5–5.2)
ALBUMIN/GLOB SERPL: 1.7 G/DL (ref 1.1–2.4)
ALP SERPL-CCNC: 55 U/L (ref 38–116)
ALT SERPL W P-5'-P-CCNC: 12 U/L (ref 0–41)
ANION GAP SERPL CALCULATED.3IONS-SCNC: 9.7 MMOL/L (ref 5–15)
AST SERPL-CCNC: 10 U/L (ref 0–40)
BASOPHILS # BLD AUTO: 0.04 10*3/MM3 (ref 0–0.2)
BASOPHILS NFR BLD AUTO: 1 % (ref 0–1.5)
BILIRUB SERPL-MCNC: 0.4 MG/DL (ref 0.2–1.2)
BUN SERPL-MCNC: 30 MG/DL (ref 6–20)
BUN/CREAT SERPL: 10 (ref 7.3–30)
CALCIUM SPEC-SCNC: 8.7 MG/DL (ref 8.5–10.2)
CHLORIDE SERPL-SCNC: 105 MMOL/L (ref 98–107)
CO2 SERPL-SCNC: 27.3 MMOL/L (ref 22–29)
CREAT SERPL-MCNC: 3 MG/DL (ref 0.7–1.3)
DEPRECATED RDW RBC AUTO: 53.7 FL (ref 37–54)
EGFRCR SERPLBLD CKD-EPI 2021: 22.3 ML/MIN/1.73
EOSINOPHIL # BLD AUTO: 0.17 10*3/MM3 (ref 0–0.4)
EOSINOPHIL NFR BLD AUTO: 4.3 % (ref 0.3–6.2)
ERYTHROCYTE [DISTWIDTH] IN BLOOD BY AUTOMATED COUNT: 15.7 % (ref 12.3–15.4)
GLOBULIN UR ELPH-MCNC: 2.3 GM/DL (ref 1.8–3.5)
GLUCOSE SERPL-MCNC: 152 MG/DL (ref 74–124)
HCT VFR BLD AUTO: 31.1 % (ref 37.5–51)
HGB BLD-MCNC: 9.8 G/DL (ref 13–17.7)
IMM GRANULOCYTES # BLD AUTO: 0.02 10*3/MM3 (ref 0–0.05)
IMM GRANULOCYTES NFR BLD AUTO: 0.5 % (ref 0–0.5)
LYMPHOCYTES # BLD AUTO: 0.82 10*3/MM3 (ref 0.7–3.1)
LYMPHOCYTES NFR BLD AUTO: 20.6 % (ref 19.6–45.3)
MAGNESIUM SERPL-MCNC: 1.5 MG/DL (ref 1.8–2.5)
MCH RBC QN AUTO: 29.8 PG (ref 26.6–33)
MCHC RBC AUTO-ENTMCNC: 31.5 G/DL (ref 31.5–35.7)
MCV RBC AUTO: 94.5 FL (ref 79–97)
MONOCYTES # BLD AUTO: 0.42 10*3/MM3 (ref 0.1–0.9)
MONOCYTES NFR BLD AUTO: 10.5 % (ref 5–12)
NEUTROPHILS NFR BLD AUTO: 2.52 10*3/MM3 (ref 1.7–7)
NEUTROPHILS NFR BLD AUTO: 63.1 % (ref 42.7–76)
NRBC BLD AUTO-RTO: 0 /100 WBC (ref 0–0.2)
PHOSPHATE SERPL-MCNC: 2.5 MG/DL (ref 2.5–4.5)
PLATELET # BLD AUTO: 144 10*3/MM3 (ref 140–450)
PMV BLD AUTO: 8.3 FL (ref 6–12)
POTASSIUM SERPL-SCNC: 4.2 MMOL/L (ref 3.5–4.7)
PROT SERPL-MCNC: 6.1 G/DL (ref 6.3–8)
RBC # BLD AUTO: 3.29 10*6/MM3 (ref 4.14–5.8)
SODIUM SERPL-SCNC: 142 MMOL/L (ref 134–145)
WBC NRBC COR # BLD: 3.99 10*3/MM3 (ref 3.4–10.8)

## 2023-04-17 PROCEDURE — 96401 CHEMO ANTI-NEOPL SQ/IM: CPT

## 2023-04-17 PROCEDURE — 25010000002 DARATUMUMAB-HYALURONIDASE-FIHJ 1800-30000 MG-UT/15ML SOLUTION: Performed by: INTERNAL MEDICINE

## 2023-04-17 PROCEDURE — 96365 THER/PROPH/DIAG IV INF INIT: CPT

## 2023-04-17 PROCEDURE — 25010000002 DENOSUMAB 120 MG/1.7ML SOLUTION: Performed by: INTERNAL MEDICINE

## 2023-04-17 PROCEDURE — 25010000002 EPOETIN ALFA PER 1000 UNITS: Performed by: INTERNAL MEDICINE

## 2023-04-17 PROCEDURE — 83735 ASSAY OF MAGNESIUM: CPT

## 2023-04-17 PROCEDURE — 25010000002 MAGNESIUM SULFATE IN D5W 1G/100ML (PREMIX) 1-5 GM/100ML-% SOLUTION: Performed by: INTERNAL MEDICINE

## 2023-04-17 PROCEDURE — 84100 ASSAY OF PHOSPHORUS: CPT

## 2023-04-17 PROCEDURE — 36415 COLL VENOUS BLD VENIPUNCTURE: CPT

## 2023-04-17 PROCEDURE — 80053 COMPREHEN METABOLIC PANEL: CPT

## 2023-04-17 PROCEDURE — 96372 THER/PROPH/DIAG INJ SC/IM: CPT

## 2023-04-17 PROCEDURE — 85025 COMPLETE CBC W/AUTO DIFF WBC: CPT

## 2023-04-17 RX ORDER — FAMOTIDINE 20 MG/1
20 TABLET, FILM COATED ORAL ONCE
Status: DISCONTINUED | OUTPATIENT
Start: 2023-04-17 | End: 2023-04-17 | Stop reason: HOSPADM

## 2023-04-17 RX ORDER — DIPHENHYDRAMINE HCL 25 MG
25 CAPSULE ORAL ONCE
Status: DISCONTINUED | OUTPATIENT
Start: 2023-04-17 | End: 2023-04-17 | Stop reason: HOSPADM

## 2023-04-17 RX ORDER — MAGNESIUM SULFATE 1 G/100ML
1 INJECTION INTRAVENOUS ONCE
Status: COMPLETED | OUTPATIENT
Start: 2023-04-17 | End: 2023-04-17

## 2023-04-17 RX ORDER — ACETAMINOPHEN 500 MG
1000 TABLET ORAL ONCE
Status: DISCONTINUED | OUTPATIENT
Start: 2023-04-17 | End: 2023-04-17 | Stop reason: HOSPADM

## 2023-04-17 RX ADMIN — ERYTHROPOIETIN 25000 UNITS: 20000 INJECTION, SOLUTION INTRAVENOUS; SUBCUTANEOUS at 14:00

## 2023-04-17 RX ADMIN — DARATUMUMAB AND HYALURONIDASE-FIHJ (HUMAN RECOMBINANT) 1800 MG: 1800; 30000 INJECTION SUBCUTANEOUS at 13:59

## 2023-04-17 RX ADMIN — MAGNESIUM SULFATE HEPTAHYDRATE 1 G: 1 INJECTION, SOLUTION INTRAVENOUS at 13:20

## 2023-04-17 RX ADMIN — DENOSUMAB 120 MG: 120 INJECTION SUBCUTANEOUS at 13:59

## 2023-04-17 RX ADMIN — MAGNESIUM SULFATE HEPTAHYDRATE 1 G: 1 INJECTION, SOLUTION INTRAVENOUS at 13:49

## 2023-04-18 LAB
ALBUMIN SERPL ELPH-MCNC: 3.5 G/DL (ref 2.9–4.4)
ALBUMIN/GLOB SERPL: 1.8 {RATIO} (ref 0.7–1.7)
ALPHA1 GLOB SERPL ELPH-MCNC: 0.2 G/DL (ref 0–0.4)
ALPHA2 GLOB SERPL ELPH-MCNC: 0.7 G/DL (ref 0.4–1)
B-GLOBULIN SERPL ELPH-MCNC: 0.7 G/DL (ref 0.7–1.3)
GAMMA GLOB SERPL ELPH-MCNC: 0.5 G/DL (ref 0.4–1.8)
GLOBULIN SER-MCNC: 2 G/DL (ref 2.2–3.9)
IGA SERPL-MCNC: 72 MG/DL (ref 61–437)
IGG SERPL-MCNC: 585 MG/DL (ref 603–1613)
IGM SERPL-MCNC: 14 MG/DL (ref 20–172)
INTERPRETATION SERPL IEP-IMP: ABNORMAL
KAPPA LC FREE SER-MCNC: 65.8 MG/L (ref 3.3–19.4)
KAPPA LC FREE/LAMBDA FREE SER: 1.67 {RATIO} (ref 0.26–1.65)
LABORATORY COMMENT REPORT: ABNORMAL
LAMBDA LC FREE SERPL-MCNC: 39.5 MG/L (ref 5.7–26.3)
M PROTEIN SERPL ELPH-MCNC: ABNORMAL G/DL
PROT SERPL-MCNC: 5.5 G/DL (ref 6–8.5)

## 2023-04-19 DIAGNOSIS — C90.00 MULTIPLE MYELOMA NOT HAVING ACHIEVED REMISSION: ICD-10-CM

## 2023-04-19 NOTE — TELEPHONE ENCOUNTER
Caller: [facility] – [place], [place] - [address] - [phone]  - [phone]    Relationship: Pharmacy    Best call back number: [phone]    Requested Prescriptions:   Requested Prescriptions     Pending Prescriptions Disp Refills   • pomalidomide (POMALYST) 2 MG chemo capsule 21 capsule 0        Pharmacy where request should be sent: [place], [place] - [address] - [phone]  - [phone] FX     Last office visit with prescribing clinician: 3/20/2023   Last telemedicine visit with prescribing clinician: 5/15/2023   Next office visit with prescribing clinician: 5/15/2023         Does the patient have less than a 3 day supply:  [] Yes  [x] No    Would you like a call back once the refill request has been completed: [] Yes [x] No    If the office needs to give you a call back, can they leave a voicemail: [] Yes [x] No    Rohan Morgan Rep   04/19/23 11:26 EDT

## 2023-04-20 ENCOUNTER — SPECIALTY PHARMACY (OUTPATIENT)
Dept: PHARMACY | Facility: HOSPITAL | Age: 68
End: 2023-04-20
Payer: MEDICARE

## 2023-04-20 NOTE — PROGRESS NOTES
Re: Refills of Oral Specialty Medication - Pomalyst (pomalidomide)    • Drug-Drug Interactions: The current medication list was reviewed and there are no relevant drug-drug interactions.  • Medication Allergies: The patient has no relevant allergies as it relates to their oral specialty medication  • Review of Labs/Dose Adjustments: NO DOSE CHANGE - I reviewed the most recent note and labs and the patient will continue without any dose changes.  I sent refills as described below.    Drug: Pomalyst (pomalidomide)  Strength: 2 mg  Directions: Take 1 capsule by mouth daily for 21 days on, then 7 days off  Quantity: 21  Refills: 0  Pharmacy prescription sent to: [facility]    Name/Credentials Donna Cano RPh, NANCY    4/20/2023  08:30 EDT    Completed independent double check on medication order/RX.    Thanks,   Yolande Huitron, PharmD, BCPS

## 2023-04-21 ENCOUNTER — SPECIALTY PHARMACY (OUTPATIENT)
Dept: PHARMACY | Facility: HOSPITAL | Age: 68
End: 2023-04-21
Payer: MEDICARE

## 2023-04-21 NOTE — PROGRESS NOTES
Specialty Pharmacy Patient Management Program  Oncology 6-Month Clinical Assessment       Contreras Albert is a 67 y.o. male with multiple myeloma called today to assess adherence and side effects.    Regimen: Pomalyst 2 mg po daily for 21 days on, then 7 days off, and Darzalex Faspro 1800 mg subcutaneously monthly    Reason for Outreach: Routine medication check-in .       Problem list reviewed by Donna Cano RPH on 4/21/2023 at 10:26 AM  Medicines reviewed by Donna Cano RPH on 4/21/2023 at 10:26 AM  Allergies reviewed by Donan Cano RPH on 4/21/2023 at 10:26 AM     Goals     • Specialty Pharmacy General Goal      GUANAKITO, SPEP and FLE within normal limits          Medication Assessment:  • Medication Assessment  o Follow Up Clinical Assessment  o Medication(s) assessed: Pomalyst  o Therapeutic appropriateness: Appropriate  o Medication tolerability: Patient reported common adverse drug reaction  o Common ADR(s) experienced: diarrhea  o Medication plan: Continue therapy with normal follow-up  o Quality of Life Improvement Scale: A good deal better  o Comments on Quality of Life: His wife reported he rarely has now diarrhea now, and tolerates much better than when he first started treatment  o Administration: as prescribed .   o Patient can self administer medications: yes  o Medication Follow-Up Plan: Next clinical assessment  • Lab Review: The labs listed below have been reviewed. No dose adjustments are needed for the oral specialty medication(s) based on the labs.    Lab Results   Component Value Date    GLUCOSE 152 (H) 04/17/2023    CALCIUM 8.7 04/17/2023     04/17/2023    K 4.2 04/17/2023    CO2 27.3 04/17/2023     04/17/2023    BUN 30 (H) 04/17/2023    CREATININE 3.00 (C) 04/17/2023    EGFRIFAFRI 27 (L) 08/09/2021    EGFRIFNONA 24 (L) 08/09/2021    BCR 10.0 04/17/2023    ANIONGAP 9.7 04/17/2023     Lab Results   Component Value Date    WBC 3.99 04/17/2023    RBC 3.29 (L) 04/17/2023    HGB  9.8 (L) 04/17/2023    HCT 31.1 (L) 04/17/2023    MCV 94.5 04/17/2023    MCH 29.8 04/17/2023    MCHC 31.5 04/17/2023    RDW 15.7 (H) 04/17/2023    RDWSD 53.7 04/17/2023    MPV 8.3 04/17/2023     04/17/2023    NEUTRORELPCT 63.1 04/17/2023    LYMPHORELPCT 20.6 04/17/2023    MONORELPCT 10.5 04/17/2023    EOSRELPCT 4.3 04/17/2023    BASORELPCT 1.0 04/17/2023    AUTOIGPER 0.5 04/17/2023    NEUTROABS 2.52 04/17/2023    LYMPHSABS 0.82 04/17/2023    MONOSABS 0.42 04/17/2023    EOSABS 0.17 04/17/2023    BASOSABS 0.04 04/17/2023    AUTOIGNUM 0.02 04/17/2023    NRBC 0.0 04/17/2023     • Drug-drug interactions  o Completed medication reconciliation today to assess for drug interactions. Patient denies starting or stopping any medications.    o Assessed medication list for interactions, level c ddi with lomoitl and Pomalyst, can increase CNS depression. NO rpeorted side effects today.  o Advised patient to call the clinic if any new medications are started so we can assess for drug-drug interactions.  • Drug-food interactions discussed: none of concern  • Vaccines are coordinated by the patient's oncologist and primary care provider.    Allergies  Known allergies and reactions were discussed with the patient. The patient's chart has been reviewed for allergy information and updated as necessary.   Allergies   Allergen Reactions   • Lisinopril Unknown - High Severity     Tickle in throat, cannot take due to kidney disease   • Crestor [Rosuvastatin] GI Intolerance     Abdominal cramping        Hospitalizations and Urgent Care Visits Since Last Assessment:  • Unplanned hospitalizations or inpatient admissions: no  • ED Visits: no  • Urgent Office Visits: no    Adherence Assessment:  Adherence Questions  Medication(s) assessed: Pomalyst  On average, how many doses/injections does the patient miss per month?: 0  What are the identified reasons for non-adherence or missed doses? : no problems identfied  What is the estimated  medication adherence level?: %  Based on the patient/caregiver response and refill history, does this patient require an MTP to track adherence improvements?: no    Quality of Life Assessment:  Quality of Life Assessment  Quality of Life Improvement Scale: A good deal better  Comments on Quality of Life: His wife reported he rarely has now diarrhea now, and tolerates much better than when he first started treatment  -- Quality of Life: 8/10    Financial Assessment:  Medication availability/affordability: Patient has had no issues obtaining medication from pharmacy.      All questions addressed and patient had no additional concerns. Patient has pharmacy contact information.    Name/Credentials Donna Cano RPh, NANCY  Telephone encounter  4/21/2023  10:28 EDT

## 2023-04-26 ENCOUNTER — OFFICE VISIT (OUTPATIENT)
Dept: ENDOCRINOLOGY | Age: 68
End: 2023-04-26
Payer: MEDICARE

## 2023-04-26 VITALS
HEART RATE: 73 BPM | BODY MASS INDEX: 38.92 KG/M2 | WEIGHT: 313 LBS | HEIGHT: 75 IN | TEMPERATURE: 97.3 F | DIASTOLIC BLOOD PRESSURE: 80 MMHG | SYSTOLIC BLOOD PRESSURE: 134 MMHG | OXYGEN SATURATION: 96 %

## 2023-04-26 DIAGNOSIS — E11.22 TYPE 2 DIABETES MELLITUS WITH STAGE 4 CHRONIC KIDNEY DISEASE, WITH LONG-TERM CURRENT USE OF INSULIN: Primary | ICD-10-CM

## 2023-04-26 DIAGNOSIS — Z79.4 TYPE 2 DIABETES MELLITUS WITH STAGE 4 CHRONIC KIDNEY DISEASE, WITH LONG-TERM CURRENT USE OF INSULIN: Primary | ICD-10-CM

## 2023-04-26 DIAGNOSIS — N18.4 TYPE 2 DIABETES MELLITUS WITH STAGE 4 CHRONIC KIDNEY DISEASE, WITH LONG-TERM CURRENT USE OF INSULIN: Primary | ICD-10-CM

## 2023-04-26 DIAGNOSIS — N18.4 BENIGN HYPERTENSION WITH CKD (CHRONIC KIDNEY DISEASE) STAGE IV: ICD-10-CM

## 2023-04-26 DIAGNOSIS — E78.2 MIXED HYPERLIPIDEMIA: ICD-10-CM

## 2023-04-26 DIAGNOSIS — I12.9 BENIGN HYPERTENSION WITH CKD (CHRONIC KIDNEY DISEASE) STAGE IV: ICD-10-CM

## 2023-04-26 NOTE — PROGRESS NOTES
Chief Complaint  Chief Complaint   Patient presents with   • Diabetes     Type 2: Pt Dexcom is attached, is up to date on eye exam, does have retinopathy, moderate neuropathy.        Subjective          History of Present Illness    Contreras Albert 67 y.o. presents for a follow-up evaluation for type 2 DM     He has been diabetic since 2001 and started insulin in 2009       Pt is on the following medications for their DM:         Non-Chemo Days     Lantus 16 units twice a day and Novolog 10 untis three times daily before meals plus 1 units per every 50 over 150        Chemo Days     Lantus to 30 units twice a day on the day of chemotherapy and on the morning after chemotherapy.  NovoLog to 15 units 3 times daily with meals +2 units per 50 greater than 150 on the day of chemotherapy           Pt complains of chronic diarrhea, which is better since decreased chemo dose, shortness of breath with activity (better) and numbness and tingling in feet/hands    Denies constipation, chest pain, and vision changes    Pt does have a history of DM retinopathy and had previous intraocular eye injections on both eyes.  Last eye exam was 2023     Pt does have a history of nephropathy.  Patient is not currently taking ACE/ARB - follows Dr. 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  He is on gabapentin 300 mg 1 in am and 2 in pm  prescribed by his oncologist Dr. Alvares    Last A1C in 01/23 was 8.2  Fructosamine 01/23 was 260    Last microalbumin in 09/22 was positive          Blood Sugars    Pt's cancer has came out of remission.  Along with the chemo treatments, he is receiving steroids once a month.      Blood glucoses are checked via Dexcom    Fasting blood glucoses: mid to high 100s    Pre-meal blood glucoses: mid 100s - 200s    Pt has rare episodes of hypoglycemia.            Sensor Data    Time in range 43%  High 50%  Very high 6%  Low <1%  Very low 0%      Average Glucose - 187  "mg/dL      Sensor Wear - 93 %             Hyperlipidemia     Pt denies any muscle/body aches and chest pain    Pt is currently taking atorvastatin 10 mg HS    Last lipid panel in 01/23 showed Total 173, HDL 26, LDL 54 and Triglycerides 632            Hypertension with CKD Stage 4     Pt denies any chest pain, palpitations or headache     Current regimen includes Bumex 1 mg BID and carvedilol 12.5 mg daily  He was taken off amlodipine because of pedal edema                 Other Medical History     He has multiple myeloma and had chemotherapy followed by stem cell transplant.  His cancer has came out of remission and he is on Pomalyst along with steriods monthly.  He transferred his care back to [facility] from [facility].       He had a previous right partial nephrectomy for cancer.  He has no known recurrence.           Pt was hospitalized 09/23/22-09/28/22 for hypocalcemia, hypomagnesemia and pancytopenia.  Pt taking OTC Caltrate and Vitamin D 50,000 units weekly               I have reviewed the patient's allergies, medicines, past medical hx, family hx and social hx.    Objective   Vital Signs:   /80   Pulse 73   Temp 97.3 °F (36.3 °C) (Temporal)   Ht 190 cm (74.8\")   Wt (!) 142 kg (313 lb)   SpO2 96%   BMI 39.33 kg/m²       Physical Exam   Physical Exam  Constitutional:       General: He is not in acute distress.     Appearance: Normal appearance. He is not diaphoretic.   HENT:      Head: Normocephalic and atraumatic.   Eyes:      General:         Right eye: No discharge.         Left eye: No discharge.   Skin:     General: Skin is warm and dry.   Neurological:      Mental Status: He is alert.   Psychiatric:         Mood and Affect: Mood normal.         Behavior: Behavior normal.                    Results Review:   Hemoglobin A1C   Date Value Ref Range Status   01/03/2023 8.20 (H) 4.80 - 5.60 % Final     Comment:     Hemoglobin A1C Ranges:  Increased Risk for Diabetes  5.7% to " 6.4%  Diabetes                     >= 6.5%  Diabetic Goal                < 7.0%     05/23/2020 6.60 (H) 4.80 - 5.60 % Final     Triglycerides   Date Value Ref Range Status   01/03/2023 632 (H) 0 - 150 mg/dL Final     HDL Cholesterol   Date Value Ref Range Status   01/03/2023 26 (L) 40 - 60 mg/dL Final     LDL Chol Calc (NIH)   Date Value Ref Range Status   01/03/2023 54 0 - 100 mg/dL Final     VLDL Cholesterol Wale   Date Value Ref Range Status   01/03/2023 93 (H) 5 - 40 mg/dL Final     LDL/HDL RATIO   Date Value Ref Range Status   01/15/2021 2.8 0.0 - 3.6 ratio Final     Comment:                                         LDL/HDL Ratio                                              Men  Women                                1/2 Avg.Risk  1.0    1.5                                    Avg.Risk  3.6    3.2                                 2X Avg.Risk  6.2    5.0                                 3X Avg.Risk  8.0    6.1           Assessment and Plan {CC Problem List  Visit Diagnosis  ROS  Review (Popup)  Health Maintenance  Quality  BestPractice  Medications  SmartSets  SnapShot Encounters  Media :23  Diagnoses and all orders for this visit:    1. Type 2 diabetes mellitus with stage 4 chronic kidney disease, with long-term current use of insulin (Primary)  -     Fructosamine; Future    Change    Non-Chemo Days     Lantus 16 units in the morning and 18 units in evening and Novolog 10 units before breakfast and lunch plus 1 units per every 50 over 150 and Novolog 12 units before dinner plus 1 units per every 50 over 150        Chemo Days     Lantus to 30 units twice a day on the day of chemotherapy and on the morning after chemotherapy.  NovoLog to 15 units 3 times daily with meals +2 units per 50 greater than 150 on the day of chemotherapy    Check labs  Continue with Dexcom        2. Mixed hyperlipidemia  -     Lipid Panel; Future    Check labs  Continue with statin      3. Benign hypertension with CKD (chronic  kidney disease) stage IV     Stable  Continue with current medication regimen  Defer management to PCP/Nephrology             Labs next week at oncologist - please give orders  RTC in 3-4 months with me and 6-7 months with Dr. Haro      Follow Up     Patient was given instructions and counseling regarding her condition or for health maintenance advice. Please see specific information pulled into the AVS if appropriate.              Chelsea Haskins, IZZY  04/26/23

## 2023-04-26 NOTE — PATIENT INSTRUCTIONS
Non-Chemo Days     Lantus 16 units in the morning and 18 units in evening and Novolog 10 units before breakfast and lunch plus 1 units per every 50 over 150 and Novolog 12 units before dinner plus 1 units per every 50 over 150        Chemo Days     Lantus to 30 units twice a day on the day of chemotherapy and on the morning after chemotherapy.  NovoLog to 15 units 3 times daily with meals +2 units per 50 greater than 150 on the day of chemotherapy  
No

## 2023-05-01 ENCOUNTER — LAB (OUTPATIENT)
Dept: LAB | Facility: HOSPITAL | Age: 68
End: 2023-05-01
Payer: MEDICARE

## 2023-05-01 ENCOUNTER — INFUSION (OUTPATIENT)
Dept: ONCOLOGY | Facility: HOSPITAL | Age: 68
End: 2023-05-01
Payer: MEDICARE

## 2023-05-01 VITALS
DIASTOLIC BLOOD PRESSURE: 63 MMHG | BODY MASS INDEX: 39.86 KG/M2 | RESPIRATION RATE: 18 BRPM | TEMPERATURE: 98 F | SYSTOLIC BLOOD PRESSURE: 121 MMHG | WEIGHT: 315 LBS | OXYGEN SATURATION: 94 % | HEART RATE: 76 BPM

## 2023-05-01 DIAGNOSIS — D63.1 ANEMIA IN STAGE 3B CHRONIC KIDNEY DISEASE: Primary | ICD-10-CM

## 2023-05-01 DIAGNOSIS — C90.00 MULTIPLE MYELOMA, REMISSION STATUS UNSPECIFIED: ICD-10-CM

## 2023-05-01 DIAGNOSIS — E83.42 HYPOMAGNESEMIA: ICD-10-CM

## 2023-05-01 DIAGNOSIS — N18.32 ANEMIA IN STAGE 3B CHRONIC KIDNEY DISEASE: Primary | ICD-10-CM

## 2023-05-01 DIAGNOSIS — D64.9 ANEMIA, UNSPECIFIED TYPE: ICD-10-CM

## 2023-05-01 DIAGNOSIS — C90.00 MULTIPLE MYELOMA NOT HAVING ACHIEVED REMISSION: ICD-10-CM

## 2023-05-01 LAB
ALBUMIN SERPL-MCNC: 3.8 G/DL (ref 3.5–5.2)
ALBUMIN/GLOB SERPL: 1.7 G/DL (ref 1.1–2.4)
ALP SERPL-CCNC: 55 U/L (ref 38–116)
ALT SERPL W P-5'-P-CCNC: 11 U/L (ref 0–41)
ANION GAP SERPL CALCULATED.3IONS-SCNC: 10.6 MMOL/L (ref 5–15)
AST SERPL-CCNC: 8 U/L (ref 0–40)
BASOPHILS # BLD AUTO: 0.1 10*3/MM3 (ref 0–0.2)
BASOPHILS NFR BLD AUTO: 2.4 % (ref 0–1.5)
BILIRUB SERPL-MCNC: 0.4 MG/DL (ref 0.2–1.2)
BUN SERPL-MCNC: 35 MG/DL (ref 6–20)
BUN/CREAT SERPL: 11.3 (ref 7.3–30)
CALCIUM SPEC-SCNC: 9 MG/DL (ref 8.5–10.2)
CHLORIDE SERPL-SCNC: 104 MMOL/L (ref 98–107)
CO2 SERPL-SCNC: 24.4 MMOL/L (ref 22–29)
CREAT SERPL-MCNC: 3.09 MG/DL (ref 0.7–1.3)
DEPRECATED RDW RBC AUTO: 57.5 FL (ref 37–54)
EGFRCR SERPLBLD CKD-EPI 2021: 21.6 ML/MIN/1.73
EOSINOPHIL # BLD AUTO: 0.15 10*3/MM3 (ref 0–0.4)
EOSINOPHIL NFR BLD AUTO: 3.6 % (ref 0.3–6.2)
ERYTHROCYTE [DISTWIDTH] IN BLOOD BY AUTOMATED COUNT: 16.1 % (ref 12.3–15.4)
GLOBULIN UR ELPH-MCNC: 2.2 GM/DL (ref 1.8–3.5)
GLUCOSE SERPL-MCNC: 205 MG/DL (ref 74–124)
HCT VFR BLD AUTO: 31.7 % (ref 37.5–51)
HGB BLD-MCNC: 9.9 G/DL (ref 13–17.7)
IMM GRANULOCYTES # BLD AUTO: 0.02 10*3/MM3 (ref 0–0.05)
IMM GRANULOCYTES NFR BLD AUTO: 0.5 % (ref 0–0.5)
LYMPHOCYTES # BLD AUTO: 0.82 10*3/MM3 (ref 0.7–3.1)
LYMPHOCYTES NFR BLD AUTO: 19.6 % (ref 19.6–45.3)
MAGNESIUM SERPL-MCNC: 1.6 MG/DL (ref 1.8–2.5)
MCH RBC QN AUTO: 30.2 PG (ref 26.6–33)
MCHC RBC AUTO-ENTMCNC: 31.2 G/DL (ref 31.5–35.7)
MCV RBC AUTO: 96.6 FL (ref 79–97)
MONOCYTES # BLD AUTO: 0.5 10*3/MM3 (ref 0.1–0.9)
MONOCYTES NFR BLD AUTO: 12 % (ref 5–12)
NEUTROPHILS NFR BLD AUTO: 2.59 10*3/MM3 (ref 1.7–7)
NEUTROPHILS NFR BLD AUTO: 61.9 % (ref 42.7–76)
NRBC BLD AUTO-RTO: 0 /100 WBC (ref 0–0.2)
PLATELET # BLD AUTO: 116 10*3/MM3 (ref 140–450)
PMV BLD AUTO: 9 FL (ref 6–12)
POTASSIUM SERPL-SCNC: 4.7 MMOL/L (ref 3.5–4.7)
PROT SERPL-MCNC: 6 G/DL (ref 6.3–8)
RBC # BLD AUTO: 3.28 10*6/MM3 (ref 4.14–5.8)
SODIUM SERPL-SCNC: 139 MMOL/L (ref 134–145)
WBC NRBC COR # BLD: 4.18 10*3/MM3 (ref 3.4–10.8)

## 2023-05-01 PROCEDURE — 36415 COLL VENOUS BLD VENIPUNCTURE: CPT

## 2023-05-01 PROCEDURE — 85025 COMPLETE CBC W/AUTO DIFF WBC: CPT

## 2023-05-01 PROCEDURE — 96372 THER/PROPH/DIAG INJ SC/IM: CPT

## 2023-05-01 PROCEDURE — 96365 THER/PROPH/DIAG IV INF INIT: CPT

## 2023-05-01 PROCEDURE — 25010000002 EPOETIN ALFA PER 1000 UNITS: Performed by: INTERNAL MEDICINE

## 2023-05-01 PROCEDURE — 83735 ASSAY OF MAGNESIUM: CPT

## 2023-05-01 PROCEDURE — 25010000002 MAGNESIUM SULFATE 2 GM/50ML SOLUTION: Performed by: INTERNAL MEDICINE

## 2023-05-01 PROCEDURE — 80053 COMPREHEN METABOLIC PANEL: CPT

## 2023-05-01 RX ORDER — MAGNESIUM SULFATE HEPTAHYDRATE 40 MG/ML
2 INJECTION, SOLUTION INTRAVENOUS ONCE
Status: COMPLETED | OUTPATIENT
Start: 2023-05-01 | End: 2023-05-01

## 2023-05-01 RX ADMIN — MAGNESIUM SULFATE HEPTAHYDRATE 2 G: 40 INJECTION, SOLUTION INTRAVENOUS at 13:06

## 2023-05-01 RX ADMIN — ERYTHROPOIETIN 25000 UNITS: 20000 INJECTION, SOLUTION INTRAVENOUS; SUBCUTANEOUS at 13:33

## 2023-05-01 NOTE — NURSING NOTE
Pt to infusion room for labs, procrit and magnesium replacement .  Mag 1.6 .   Magnesium 2 gm infused over 1 hr per mag protocol  Hgb 9.9   Procrit 25,000 give SQ per treatment parameters.   Pt without concerns or complaintrs

## 2023-05-03 RX ORDER — ESCITALOPRAM OXALATE 5 MG/1
TABLET ORAL
Qty: 30 TABLET | Refills: 5 | Status: SHIPPED | OUTPATIENT
Start: 2023-05-03

## 2023-05-08 ENCOUNTER — TELEPHONE (OUTPATIENT)
Dept: ENDOCRINOLOGY | Age: 68
End: 2023-05-08
Payer: MEDICARE

## 2023-05-09 DIAGNOSIS — N18.4 TYPE 2 DIABETES MELLITUS WITH STAGE 4 CHRONIC KIDNEY DISEASE, WITH LONG-TERM CURRENT USE OF INSULIN: ICD-10-CM

## 2023-05-09 DIAGNOSIS — Z79.4 TYPE 2 DIABETES MELLITUS WITH STAGE 4 CHRONIC KIDNEY DISEASE, WITH LONG-TERM CURRENT USE OF INSULIN: ICD-10-CM

## 2023-05-09 DIAGNOSIS — E11.22 TYPE 2 DIABETES MELLITUS WITH STAGE 4 CHRONIC KIDNEY DISEASE, WITH LONG-TERM CURRENT USE OF INSULIN: ICD-10-CM

## 2023-05-09 RX ORDER — PROCHLORPERAZINE 25 MG/1
SUPPOSITORY RECTAL
Qty: 9 EACH | Refills: 1 | Status: SHIPPED | OUTPATIENT
Start: 2023-05-09

## 2023-05-15 ENCOUNTER — OFFICE VISIT (OUTPATIENT)
Dept: ONCOLOGY | Facility: CLINIC | Age: 68
End: 2023-05-15
Payer: MEDICARE

## 2023-05-15 ENCOUNTER — INFUSION (OUTPATIENT)
Dept: ONCOLOGY | Facility: HOSPITAL | Age: 68
End: 2023-05-15
Payer: MEDICARE

## 2023-05-15 ENCOUNTER — LAB (OUTPATIENT)
Dept: LAB | Facility: HOSPITAL | Age: 68
End: 2023-05-15
Payer: MEDICARE

## 2023-05-15 VITALS
TEMPERATURE: 98 F | RESPIRATION RATE: 16 BRPM | DIASTOLIC BLOOD PRESSURE: 87 MMHG | HEIGHT: 75 IN | WEIGHT: 304.3 LBS | OXYGEN SATURATION: 96 % | BODY MASS INDEX: 37.84 KG/M2 | SYSTOLIC BLOOD PRESSURE: 147 MMHG | HEART RATE: 68 BPM

## 2023-05-15 DIAGNOSIS — C90.00 MULTIPLE MYELOMA NOT HAVING ACHIEVED REMISSION: ICD-10-CM

## 2023-05-15 DIAGNOSIS — N18.32 ANEMIA IN STAGE 3B CHRONIC KIDNEY DISEASE: ICD-10-CM

## 2023-05-15 DIAGNOSIS — D63.1 ANEMIA IN STAGE 3B CHRONIC KIDNEY DISEASE: ICD-10-CM

## 2023-05-15 DIAGNOSIS — D70.1 CHEMOTHERAPY INDUCED NEUTROPENIA: ICD-10-CM

## 2023-05-15 DIAGNOSIS — Z79.899 ENCOUNTER FOR LONG-TERM CURRENT USE OF HIGH RISK MEDICATION: ICD-10-CM

## 2023-05-15 DIAGNOSIS — C90.00 MULTIPLE MYELOMA NOT HAVING ACHIEVED REMISSION: Primary | ICD-10-CM

## 2023-05-15 DIAGNOSIS — D84.9 IMMUNOCOMPROMISED STATE: ICD-10-CM

## 2023-05-15 DIAGNOSIS — T45.1X5A CHEMOTHERAPY INDUCED NEUTROPENIA: ICD-10-CM

## 2023-05-15 DIAGNOSIS — E83.51 HYPOCALCEMIA: ICD-10-CM

## 2023-05-15 DIAGNOSIS — D69.6 THROMBOCYTOPENIA: ICD-10-CM

## 2023-05-15 DIAGNOSIS — E83.42 HYPOMAGNESEMIA: ICD-10-CM

## 2023-05-15 LAB
ALBUMIN SERPL-MCNC: 3.9 G/DL (ref 3.5–5.2)
ALBUMIN/GLOB SERPL: 1.5 G/DL (ref 1.1–2.4)
ALP SERPL-CCNC: 57 U/L (ref 38–116)
ALT SERPL W P-5'-P-CCNC: 13 U/L (ref 0–41)
ANION GAP SERPL CALCULATED.3IONS-SCNC: 12.7 MMOL/L (ref 5–15)
AST SERPL-CCNC: 12 U/L (ref 0–40)
BASOPHILS # BLD AUTO: 0.06 10*3/MM3 (ref 0–0.2)
BASOPHILS NFR BLD AUTO: 1.4 % (ref 0–1.5)
BILIRUB SERPL-MCNC: 0.6 MG/DL (ref 0.2–1.2)
BUN SERPL-MCNC: 40 MG/DL (ref 6–20)
BUN/CREAT SERPL: 12 (ref 7.3–30)
CALCIUM SPEC-SCNC: 9.2 MG/DL (ref 8.5–10.2)
CHLORIDE SERPL-SCNC: 101 MMOL/L (ref 98–107)
CO2 SERPL-SCNC: 24.3 MMOL/L (ref 22–29)
CREAT SERPL-MCNC: 3.34 MG/DL (ref 0.7–1.3)
DEPRECATED RDW RBC AUTO: 56.7 FL (ref 37–54)
EGFRCR SERPLBLD CKD-EPI 2021: 19.6 ML/MIN/1.73
EOSINOPHIL # BLD AUTO: 0.12 10*3/MM3 (ref 0–0.4)
EOSINOPHIL NFR BLD AUTO: 2.8 % (ref 0.3–6.2)
ERYTHROCYTE [DISTWIDTH] IN BLOOD BY AUTOMATED COUNT: 16 % (ref 12.3–15.4)
GLOBULIN UR ELPH-MCNC: 2.6 GM/DL (ref 1.8–3.5)
GLUCOSE SERPL-MCNC: 240 MG/DL (ref 74–124)
HCT VFR BLD AUTO: 35.3 % (ref 37.5–51)
HGB BLD-MCNC: 11.1 G/DL (ref 13–17.7)
IMM GRANULOCYTES # BLD AUTO: 0.01 10*3/MM3 (ref 0–0.05)
IMM GRANULOCYTES NFR BLD AUTO: 0.2 % (ref 0–0.5)
LYMPHOCYTES # BLD AUTO: 0.87 10*3/MM3 (ref 0.7–3.1)
LYMPHOCYTES NFR BLD AUTO: 20.3 % (ref 19.6–45.3)
MAGNESIUM SERPL-MCNC: 1.7 MG/DL (ref 1.8–2.5)
MCH RBC QN AUTO: 30.2 PG (ref 26.6–33)
MCHC RBC AUTO-ENTMCNC: 31.4 G/DL (ref 31.5–35.7)
MCV RBC AUTO: 95.9 FL (ref 79–97)
MONOCYTES # BLD AUTO: 0.43 10*3/MM3 (ref 0.1–0.9)
MONOCYTES NFR BLD AUTO: 10 % (ref 5–12)
NEUTROPHILS NFR BLD AUTO: 2.79 10*3/MM3 (ref 1.7–7)
NEUTROPHILS NFR BLD AUTO: 65.3 % (ref 42.7–76)
NRBC BLD AUTO-RTO: 0 /100 WBC (ref 0–0.2)
PHOSPHATE SERPL-MCNC: 4 MG/DL (ref 2.5–4.5)
PLATELET # BLD AUTO: 149 10*3/MM3 (ref 140–450)
PMV BLD AUTO: 10.2 FL (ref 6–12)
POTASSIUM SERPL-SCNC: 4.3 MMOL/L (ref 3.5–4.7)
PROT SERPL-MCNC: 6.5 G/DL (ref 6.3–8)
RBC # BLD AUTO: 3.68 10*6/MM3 (ref 4.14–5.8)
SODIUM SERPL-SCNC: 138 MMOL/L (ref 134–145)
WBC NRBC COR # BLD: 4.28 10*3/MM3 (ref 3.4–10.8)

## 2023-05-15 PROCEDURE — 25010000002 MAGNESIUM SULFATE IN D5W 1G/100ML (PREMIX) 1-5 GM/100ML-% SOLUTION: Performed by: INTERNAL MEDICINE

## 2023-05-15 PROCEDURE — 84100 ASSAY OF PHOSPHORUS: CPT

## 2023-05-15 PROCEDURE — 80053 COMPREHEN METABOLIC PANEL: CPT

## 2023-05-15 PROCEDURE — 25010000002 DARATUMUMAB-HYALURONIDASE-FIHJ 1800-30000 MG-UT/15ML SOLUTION: Performed by: INTERNAL MEDICINE

## 2023-05-15 PROCEDURE — 96365 THER/PROPH/DIAG IV INF INIT: CPT

## 2023-05-15 PROCEDURE — 85025 COMPLETE CBC W/AUTO DIFF WBC: CPT

## 2023-05-15 PROCEDURE — 36415 COLL VENOUS BLD VENIPUNCTURE: CPT

## 2023-05-15 PROCEDURE — 96401 CHEMO ANTI-NEOPL SQ/IM: CPT

## 2023-05-15 PROCEDURE — 83735 ASSAY OF MAGNESIUM: CPT

## 2023-05-15 RX ORDER — FAMOTIDINE 10 MG/ML
20 INJECTION, SOLUTION INTRAVENOUS AS NEEDED
Status: CANCELLED | OUTPATIENT
Start: 2023-05-15

## 2023-05-15 RX ORDER — MEPERIDINE HYDROCHLORIDE 25 MG/ML
25 INJECTION INTRAMUSCULAR; INTRAVENOUS; SUBCUTANEOUS
Status: CANCELLED | OUTPATIENT
Start: 2023-05-15

## 2023-05-15 RX ORDER — FAMOTIDINE 20 MG/1
20 TABLET, FILM COATED ORAL ONCE
Status: CANCELLED
Start: 2023-05-15 | End: 2023-05-15

## 2023-05-15 RX ORDER — MAGNESIUM SULFATE 1 G/100ML
1 INJECTION INTRAVENOUS ONCE
Status: COMPLETED | OUTPATIENT
Start: 2023-05-15 | End: 2023-05-15

## 2023-05-15 RX ORDER — DIPHENHYDRAMINE HYDROCHLORIDE 50 MG/ML
50 INJECTION INTRAMUSCULAR; INTRAVENOUS AS NEEDED
Status: CANCELLED | OUTPATIENT
Start: 2023-05-15

## 2023-05-15 RX ORDER — DIPHENHYDRAMINE HCL 25 MG
25 CAPSULE ORAL ONCE
Status: CANCELLED | OUTPATIENT
Start: 2023-05-15

## 2023-05-15 RX ORDER — ACETAMINOPHEN 500 MG
1000 TABLET ORAL ONCE
Status: CANCELLED | OUTPATIENT
Start: 2023-05-15

## 2023-05-15 RX ADMIN — DARATUMUMAB AND HYALURONIDASE-FIHJ (HUMAN RECOMBINANT) 1800 MG: 1800; 30000 INJECTION SUBCUTANEOUS at 10:47

## 2023-05-15 RX ADMIN — MAGNESIUM SULFATE HEPTAHYDRATE 1 G: 1 INJECTION, SOLUTION INTRAVENOUS at 10:39

## 2023-05-15 NOTE — PROGRESS NOTES
Subjective     CHIEF COMPLAINT:      Chief Complaint   Patient presents with   • Follow-up     NO CONCERNS     HISTORY OF PRESENT ILLNESS:     Contreras Albert is a 67 y.o. male patient who returns today for follow up on his multiple myeloma. He returns today for follow up reporting further gradual improvement in his strength. He reports having problem with his vision. He had laser treatment but it did not help it.     Patient reports improvement in his bowel movement. He recently had an episode of constipation which he did not have for some time.     ROS:  Pertinent ROS is in the HPI.     Past medical, surgical, social and family history were reviewed.     MEDICATIONS:    Current Outpatient Medications:   •  acetaminophen (TYLENOL) 325 MG tablet, Take 2 tablets by mouth Every 6 (Six) Hours As Needed for Mild Pain., Disp: , Rfl:   •  acyclovir (ZOVIRAX) 800 MG tablet, Take 1 tablet by mouth Daily., Disp: 90 tablet, Rfl: 2  •  ASPIRIN 81 PO, Take 81 mg by mouth Daily., Disp: , Rfl:   •  atorvastatin (LIPITOR) 10 MG tablet, By mouth take 1 tab daily before bed or after evening meal., Disp: 30 tablet, Rfl: 6  •  Blood Glucose Monitoring Suppl (FREESTYLE FREEDOM LITE) w/Device kit, 1 kit Daily. Indications: Type 2 Diabetes, Disp: 1 each, Rfl: 0  •  bumetanide (BUMEX) 2 MG tablet, Take 0.5 tablets by mouth 2 (Two) Times a Day. (Patient taking differently: Take 1 tablet by mouth Daily.), Disp: , Rfl:   •  calcium carbonate (TUMS) 500 MG chewable tablet, Chew 3 tablets Daily., Disp: , Rfl:   •  carvedilol (COREG) 12.5 MG tablet, Take 1 tablet by mouth 2 (Two) Times a Day With Meals., Disp: , Rfl:   •  Continuous Blood Gluc Sensor (Dexcom G6 Sensor), Replace sensor every 10 days.  Dx: E 11.22, Disp: 9 each, Rfl: 1  •  Continuous Blood Gluc Transmit (Dexcom G6 Transmitter) misc, 1 each Every 3 (Three) Months. Dx: E 11.22, Disp: 1 each, Rfl: 1  •  dexamethasone (DECADRON) 4 MG tablet, Take 3 tablets on day of chemo, 1 tablet on day  #2 and 1 tablet on day #3 and repeat monthly., Disp: 15 tablet, Rfl: 1  •  diphenoxylate-atropine (LOMOTIL) 2.5-0.025 MG per tablet, Take 2 tablets by mouth 4 (Four) Times a Day As Needed for Diarrhea., Disp: 120 tablet, Rfl: 0  •  escitalopram (LEXAPRO) 5 MG tablet, TAKE 1 TABLET BY MOUTH EVERY DAY, Disp: 30 tablet, Rfl: 5  •  finasteride (PROSCAR) 5 MG tablet, Take 1 tablet by mouth Daily., Disp: , Rfl:   •  gabapentin (NEURONTIN) 300 MG capsule, Take 1 capsule in AM and take 2 capsules at bedtime., Disp: 90 capsule, Rfl: 5  •  glucagon (GLUCAGEN) 1 MG injection, Inject 1 mg into the appropriate muscle as directed by prescriber 1 (One) Time As Needed for Low Blood Sugar for up to 1 dose. Follow package directions for low blood sugar., Disp: 1 kit, Rfl: 3  •  glucose blood (FREESTYLE LITE) test strip, Use to test blood sugar 4-5 times daily., Disp: 200 each, Rfl: 5  •  Insulin Lispro, 1 Unit Dial, (HumaLOG KwikPen) 100 UNIT/ML solution pen-injector, NONCHEMO DAYS:10U THREE TIMES DAILY BEFORE MEALS&1 UNIT FOR EVERY 50>150. CHEOMDAYS 15U THREE TIMES DAILY BEFORE MEALS& 2 UNIT FOR 50>150, Disp: 27 mL, Rfl: 3  •  Insulin Pen Needle 32G X 4 MM misc, USING 4 PEN NEEDLES DAILY, Disp: 400 each, Rfl: 3  •  Lancets (freestyle) lancets, Use to check blood sugar 4 times daily, Disp: 400 each, Rfl: 2  •  Lantus SoloStar 100 UNIT/ML injection pen, Inject 70 Units under the skin into the appropriate area as directed Daily for 90 days. Non-Chemo Days take 16 units twice daily. Chemo Days take 30 units twice daily. 16-30 units twice daily for max daily dose of 70 units daily., Disp: 63 mL, Rfl: 1  •  loperamide (IMODIUM) 2 MG capsule, Take 1 capsule by mouth 4 (Four) Times a Day As Needed for Diarrhea., Disp: , Rfl:   •  ondansetron (ZOFRAN) 8 MG tablet, Take 1 tablet by mouth 3 (Three) Times a Day As Needed for Nausea or Vomiting., Disp: 30 tablet, Rfl: 5  •  pantoprazole (PROTONIX) 40 MG EC tablet, TAKE ONE TABLET BY MOUTH DAILY,  "Disp: 30 tablet, Rfl: 1  •  pomalidomide (POMALYST) 2 MG chemo capsule, Take 1 capsule by mouth Daily. Take for 21 days on, then 7 days off., Disp: 21 capsule, Rfl: 0  •  tamsulosin (FLOMAX) 0.4 MG capsule 24 hr capsule, Take 1 capsule by mouth Daily., Disp: , Rfl:   •  vitamin D (ERGOCALCIFEROL) 1.25 MG (55961 UT) capsule capsule, TAKE 1 CAPSULE BY MOUTH ONCE A WEEK ON MONDAY, Disp: 13 capsule, Rfl: 1  •  magnesium chloride ER (MAG-DELAY) 70 MG tablet delayed-release CR tablet, Take 1 tablet by mouth Daily. (Patient not taking: Reported on 5/15/2023), Disp: 90 tablet, Rfl: 1  Objective     VITAL SIGNS:     Vitals:    05/15/23 0938   BP: 147/87   Pulse: 68   Resp: 16   Temp: 98 °F (36.7 °C)   TempSrc: Temporal   SpO2: 96%   Weight: (!) 138 kg (304 lb 4.8 oz)   Height: 190 cm (74.8\")   PainSc: 0-No pain     Body mass index is 38.24 kg/m².     Wt Readings from Last 5 Encounters:   05/15/23 (!) 138 kg (304 lb 4.8 oz)   05/01/23 (!) 144 kg (317 lb 3.2 oz)   04/26/23 (!) 142 kg (313 lb)   04/17/23 (!) 145 kg (319 lb 3.2 oz)   04/03/23 (!) 142 kg (312 lb 3.2 oz)     PHYSICAL EXAMINATION:   GENERAL: The patient appears in fair general condition, not in acute distress.   SKIN: No ecchymosis.  EYES: No jaundice. No pallor.  LYMPHATICS: No cervical lymphadenopathy.  CHEST: Normal respiratory effort. Lungs clear bilaterally. No added sounds.   CVS: Normal S1 and S2. No murmurs.  ABDOMEN: Soft. No tenderness. No Hepatomegaly. No Splenomegaly. No masses.  EXTREMITIES: Right leg edema. No calf tenderness.     DIAGNOSTIC DATA:     Results from last 7 days   Lab Units 05/15/23  0924   WBC 10*3/mm3 4.28   NEUTROS ABS 10*3/mm3 2.79   HEMOGLOBIN g/dL 11.1*   HEMATOCRIT % 35.3*   PLATELETS 10*3/mm3 149     Results from last 7 days   Lab Units 05/15/23  0924   SODIUM mmol/L 138   POTASSIUM mmol/L 4.3   CHLORIDE mmol/L 101   CO2 mmol/L 24.3   BUN mg/dL 40*   CREATININE mg/dL 3.34*   CALCIUM mg/dL 9.2   ALBUMIN g/dL 3.9   BILIRUBIN mg/dL " 0.6   ALK PHOS U/L 57   ALT (SGPT) U/L 13   AST (SGOT) U/L 12   GLUCOSE mg/dL 240*   MAGNESIUM mg/dL 1.7*      Component      Latest Ref Rng 3/20/2023 4/17/2023   IgG      603 - 1613 mg/dL 545 (L)  585 (L)    IgA      61 - 437 mg/dL 60 (L)  72    IgM      20 - 172 mg/dL 14 (L)  14 (L)    Total Protein      6.0 - 8.5 g/dL 5.7 (L)  5.5 (L)    Albumin      2.9 - 4.4 g/dL 3.4  3.5    Alpha-1-Globulin      0.0 - 0.4 g/dL 0.2  0.2    Alpha-2-Globulin      0.4 - 1.0 g/dL 0.8  0.7    Beta Globulin      0.7 - 1.3 g/dL 0.7  0.7    Gamma Globulin      0.4 - 1.8 g/dL 0.6  0.5    M-Jurgen      Not Observed g/dL Not Observed  Not Observed    Globulin      2.2 - 3.9 g/dL 2.3  2.0 (L)    A/G Ratio      0.7 - 1.7  1.5  1.8 (H)    Immunofixation Reflex, Serum Comment:  Comment    Please note Comment  Comment    Kappa FLC      3.3 - 19.4 mg/L 55.8 (H)  65.8 (H)    Free Lambda Light Chains      5.7 - 26.3 mg/L 29.4 (H)  39.5 (H)    Kappa/Lambda Ratio      0.26 - 1.65  1.90 (H)  1.67 (H)         Assessment & Plan    *IgG lambda multiple myeloma.  · Bone marrow 5/23/2020 hypercellular marrow with 80% involvement of plasma cell myeloma; FISH studies pending  · Bone survey 5/26/2020: Diffuse osteoporosis and demineralization.  However, no discrete lytic lesions.  · SPEP 5/23/2020: M spike 5.1.  IgG 6629 lambda.  Light chain ratio 0 with free lambda light chains 6400.  Beta-2 microglobulin 16.3.  24-hour urine 12.7 g protein per 24-hour with monoclonal lambda free light chain 33.9%, monoclonal IgG lambda 23.5%  · 24-hour urine testing from 5/24/2020 free lambda light chains 8.4 g/L  · Due to the renal failure, initiated CyBorD 5/28/20.  Given the significantly elevated light chains with the first cycle of treatment plan to give Velcade days 1, 4, 8, 11; Cytoxan 300 PO mg/m² weekly and dexamethasone 40 mg p.o. days 1, 2, 3, 4, 8 and 15.    · After cycle 1 treatment can likely be altered to weekly dosing of each drug.  · On 6/18/2020, Velcade  was changed to 1.5 mg/m² weekly continuously along with weekly Decadron and oral Cytoxan.  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   · Treatment was changed on 8/27/2020 to to Daratumumab subcutaneous injection weekly along with Revlimid 10 mg daily for 21 out of 28-day cycle and Decadron 40 mg daily.  · S/p stem cell transplant on 12/9/2020 at [facility].  · Day 100 bone marrow on 3/16/2021 revealed minute  plasma cell population.  PET scan on 3/29/2021 was negative.  • Patient enrolled in the  clinical trial randomizing to maintenance lenalidomide versus lenalidomide and daratumumab.  Patient was randomized to the Revlimid arm initiated 4/13/2021 dosed at 5 mg daily (due to renal function).  Patient developed neutropenia requiring dose adjustment to 5 mg daily for 21/28 days.  Patient withdrew from clinical trial after 5 cycles on 8/31/2021 due to worsening diarrhea.    • He was switched to Velcade maintenance every other week 9/14/2021 which he discontinued 10/26/2021 due to neuropathy and lower extremity edema.    • He was subsequently changed to pomalidomide 2 mg daily 21/28 days on 11/8/2021.    • 1 year post transplant, bone marrow biopsy showed 5-7% plasma cells and due to the residual disease he was started on DPd (daratumumab, pomalidomide, dexamethasone) on 2/21/2022.    • Patient was seen by Dr. Romero on 9/12/2022.  At that time patient was cycle 8-day 7 DPd. Due to chronic side effects, dexamethasone was changed from weekly to monthly.    • Patient did continue pomalidomide on his own during admission to the hospital, received 1 dose 4 mg p.o. on 9/24/2022 before was subsequently discontinued due to cytopenias.   • Labs on 9/24/2022 with IgG 314, IgM less than 25, IgA less than 50.  • 9/25/2022 24-hour urine free light chains with free kappa 351, free lambda 104, ratio 3.37  • On 9/24/2022, IgG kappa M protein was 0.1 g.  IgG 311, IgA 43, IgM 7.   • Low level M  spike of IgG kappa likely represents daratumumab.   • No IgG lambda M protein.   • Serum free light chains on 9/26/2022 with kappa 57.4, lambda 42.8, ratio normal at 1.34.  • He received Darzlex on 10/3/2022 at [facility]. He decided to transfer his treatment back to our office.   • On 10/13/2022, M protein was too small to quantitate. Kappa/Lambda ratio was 1.34.  • Dexamethasone was changed to 12 mg on Mondays, 4 mg on Tuesday and 4 mg on Wednesday.  • Pomalyst was restarted at 4 mg every other day to be taken on 10/13/2022, 10/15/2022, 10/17/2022, 10/19/2022, 10/21/2022 and 10/23/2022.  • Pomalyst dose was reduced to 3 mg daily for 21 out of a 28-day cycle starting on 10/31/2022.  • He tolerated the reduced dose with improvement in the diarrhea.  • SPEP GUANAKITO FLC on 12/19/2022 showed no definite M protein.  K/L ratio was 1.46-normal.  • He received Darzalex on 12/27/2022 (1 day delay due to Christmas holiday being on 12/26/2022).  • Due to neutropenia, the dose of Pomalyst was reduced to 2 mg daily for 21/28-day cycle.  • SPEP GUANAK[facility]O FLC on 4/17/2023 revealed no M protein.  Kappa to lambda ratio was 1.67.  • There is no evidence of relapse of his multiple myeloma.  • His slightly elevated kappa to lambda ratio is likely attributed to Darzalex.  • He is tolerating his treatment well.   • He is approximately 3 years since his diagnosis.    *Bone health.  · He is on Xgeva.   · Last dose was on 9/19/2022.  · Patient developed severe hypocalcemia following the Xgeva injection.  He required inpatient hospital stay.  · Given the duration of Xgeva injections, recommended changing treatment to every 3 months starting December 2022.   · I recommended giving Xgeva today and continuing every 3 months.  · Last dose of Xgeva was on 4/17/2023.  · Next dose will be in mid July.    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  · Patient was receiving Procrit at [facility]  [place].  · Records from [facility] showed that the patient was receiving 70,000 units weekly and received the last dose on 9/27/2022.  · Patient was given Procrit 70,000 units on 9/27/2022.  · Hemoglobin was 9.9 on 10/17/2022.  He was given Procrit 60,000 units.  · Hemoglobin was 9.8 on 11/4/2022.  He was given Procrit 60,000 units.  · He has adequate iron stores.  · Last dose of Procrit was 25,000 on 5/1/2023 for hemoglobin of 9.9.  · 5/15/2023: Hemoglobin 11.1.  · He does not need Procrit therapy today.    *Thrombocytopenia.  · This is attributed to multiple myeloma, prior stem cell transplant and chemotherapy.  · 5/15/2023: Platelet count 149,000 today.    *Neutropenia.  · This was attributed to Pomalyst.  · Neutrophil count was down to 290 on 9/25/2022.  · He was given Neupogen during his hospital stay in September 2022.  · Neutrophil count improved afterwards.  · However, neutrophil count decreased to 600 on 12/19/2022.  · He reports that he was feeling tired and achy at that time.  · Neutrophil count was 770 on 1/16/2023.  · Due to the recurrent neutropenia, the dose of Pomalyst was reduced to 2 mg in January 2023.  · Neutrophil count improved following the dose reduction.  · Neutrophil count decreased to 1200 on 3/13/2023.  · Neutrophil count is 2880 today.    *Hypocalcemia.  · Patient developed severe hypocalcemia due to Xgeva.  · Calcium was 5.9 on 9/23/2022.  · Calcium improved to 8.1 on 9/28/2022  · Calcium improved to 9.6 on 10/13/2022  · Calcium decreased to 7.7 on 10/24/2022.  Albumin was 3.8.  · Calcium improved to 10.3 on 12/19/2022.  · Calcium decreased to 8.4 on 12/27/2022.  · Calcium dose was subsequently increased to 3 tablets daily.  · Calcium is 9.2 today.    *Hypomagnesemia.  · Magnesium was 1.1 on 10/24/2022.  · He was placed on oral magnesium oxide 400 mg twice daily.  · He developed diarrhea and it was stopped.  · He was started on magnesium chloride on 1/23/2023.  · He  developed diarrhea about 7 days after starting the magnesium chloride.  · Oral magnesium chloride was discontinued.  · He currently receives IV magnesium per protocol based on his magnesium level.  · 5/15/2023: Magnesium 1.7.  · We will give magnesium 1 g IV today.    *Prophylaxis..  · He is on acyclovir 800 mg daily.  · He is on ASA 81 mg daily.  · No recent infections.    *Vitamin D deficiency.  · He is on vitamin D 50,000 units weekly.  · Vitamin D level was 18 on 10/3/2022.  · He was continued on vitamin D weekly.    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  · He is on gabapentin 300 mg in the morning and 600 mg in the evening.  · Neuropathy symptoms in the feet are better as the swelling improved.     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  · S/p right partial nephrectomy on 5/18/2016.    PLAN:    1.  Proceed with Darzalex today.  2.  Continue Pomalyst 2 mg daily for 21 days of a 28-day cycle.  3.  He does not need Procrit today.  4.  Continue prophylactic aspirin and acyclovir.  5.  Continue calcium carbonate 3 tablets daily.  6.  Return in 2 weeks for CBC and possible Procrit.  CMP and magnesium levels will be obtained and he will receive magnesium accordingly.  7.  I will see him in follow-up in 1 month.  He will be scheduled to receive Darzalex.  SPEP GUANAKITO FLC will be obtained.    I spent 42 minutes caring for Contreras on this date of service. This time includes time spent by me in the following activities: preparing for the visit, reviewing tests, obtaining and/or reviewing a separately obtained history, performing a medically appropriate examination and/or evaluation, counseling and educating the patient/family/caregiver, ordering medications, tests, or procedures, documenting information in the medical record, independently interpreting results and communicating that information with the patient/family/caregiver and care coordination       Red Del Valle MD  05/15/23

## 2023-05-16 ENCOUNTER — TELEPHONE (OUTPATIENT)
Dept: ONCOLOGY | Facility: CLINIC | Age: 68
End: 2023-05-16

## 2023-05-16 ENCOUNTER — SPECIALTY PHARMACY (OUTPATIENT)
Dept: PHARMACY | Facility: HOSPITAL | Age: 68
End: 2023-05-16
Payer: MEDICARE

## 2023-05-16 LAB
ALBUMIN SERPL ELPH-MCNC: 3.6 G/DL (ref 2.9–4.4)
ALBUMIN/GLOB SERPL: 1.6 {RATIO} (ref 0.7–1.7)
ALPHA1 GLOB SERPL ELPH-MCNC: 0.2 G/DL (ref 0–0.4)
ALPHA2 GLOB SERPL ELPH-MCNC: 0.8 G/DL (ref 0.4–1)
B-GLOBULIN SERPL ELPH-MCNC: 0.8 G/DL (ref 0.7–1.3)
GAMMA GLOB SERPL ELPH-MCNC: 0.6 G/DL (ref 0.4–1.8)
GLOBULIN SER-MCNC: 2.3 G/DL (ref 2.2–3.9)
IGA SERPL-MCNC: 70 MG/DL (ref 61–437)
IGG SERPL-MCNC: 696 MG/DL (ref 603–1613)
IGM SERPL-MCNC: 19 MG/DL (ref 20–172)
INTERPRETATION SERPL IEP-IMP: ABNORMAL
KAPPA LC FREE SER-MCNC: 61.9 MG/L (ref 3.3–19.4)
KAPPA LC FREE/LAMBDA FREE SER: 1.63 {RATIO} (ref 0.26–1.65)
LABORATORY COMMENT REPORT: ABNORMAL
LAMBDA LC FREE SERPL-MCNC: 38 MG/L (ref 5.7–26.3)
M PROTEIN SERPL ELPH-MCNC: ABNORMAL G/DL
PROT SERPL-MCNC: 5.9 G/DL (ref 6–8.5)

## 2023-05-16 NOTE — TELEPHONE ENCOUNTER
Caller: [facility]    Relationship:     Best call back number: [phone]    What is the best time to reach you: ASAP    Who are you requesting to speak with (clinical staff, provider,  specific staff member): CLINICAL     What was the call regarding: PHARMACY CALLING TO CHECK ON PRESCRIPTION POMALYST 2MG THAT WAS CALLED IN ON 5-13  PLEASE CALL [phone]  [phone]    Do you require a callback: YES

## 2023-05-18 ENCOUNTER — SPECIALTY PHARMACY (OUTPATIENT)
Dept: PHARMACY | Facility: HOSPITAL | Age: 68
End: 2023-05-18
Payer: MEDICARE

## 2023-05-18 DIAGNOSIS — C90.00 MULTIPLE MYELOMA NOT HAVING ACHIEVED REMISSION: ICD-10-CM

## 2023-05-18 NOTE — PROGRESS NOTES
Re: Refills of Oral Specialty Medication - Pomalyst (pomalidomide)    • Drug-Drug Interactions: The current medication list was reviewed and there are no relevant drug-drug interactions.  • Medication Allergies: The patient has no relevant allergies as it relates to their oral specialty medication  • Review of Labs/Dose Adjustments: NO DOSE CHANGE - I reviewed the most recent note and labs and the patient will continue without any dose changes.  I sent refills as described below.    Drug: Pomalyst (pomalidomide)  Strength: 2 mg  Directions: Take 1 capsule by mouth daily for 21 days on, then 7 days off  Quantity: 21  Refills: 0  Pharmacy prescription sent to: [facility]    Name/Credentials Donna Cano RPh, NANCY    5/18/2023  11:02 EDT    Completed independent double check on medication order/RX.     Thanks,   Yolande Huitron, PharmD, BCPS

## 2023-05-19 RX ORDER — ATORVASTATIN CALCIUM 10 MG/1
TABLET, FILM COATED ORAL
Qty: 90 TABLET | Refills: 1 | Status: SHIPPED | OUTPATIENT
Start: 2023-05-19

## 2023-05-23 ENCOUNTER — OFFICE VISIT (OUTPATIENT)
Dept: FAMILY MEDICINE CLINIC | Facility: CLINIC | Age: 68
End: 2023-05-23
Payer: MEDICARE

## 2023-05-23 VITALS
OXYGEN SATURATION: 97 % | DIASTOLIC BLOOD PRESSURE: 64 MMHG | SYSTOLIC BLOOD PRESSURE: 104 MMHG | HEART RATE: 69 BPM | TEMPERATURE: 98 F | HEIGHT: 75 IN | WEIGHT: 308.2 LBS | BODY MASS INDEX: 38.32 KG/M2

## 2023-05-23 DIAGNOSIS — F41.9 ANXIETY: ICD-10-CM

## 2023-05-23 DIAGNOSIS — Z00.00 ENCOUNTER FOR MEDICARE ANNUAL WELLNESS EXAM: Primary | ICD-10-CM

## 2023-05-23 DIAGNOSIS — E78.2 MIXED HYPERLIPIDEMIA: ICD-10-CM

## 2023-05-23 DIAGNOSIS — E11.3413 TYPE 2 DIABETES MELLITUS WITH BOTH EYES AFFECTED BY SEVERE NONPROLIFERATIVE RETINOPATHY AND MACULAR EDEMA, WITH LONG-TERM CURRENT USE OF INSULIN: ICD-10-CM

## 2023-05-23 DIAGNOSIS — C90.00 MULTIPLE MYELOMA NOT HAVING ACHIEVED REMISSION: ICD-10-CM

## 2023-05-23 DIAGNOSIS — Z79.4 TYPE 2 DIABETES MELLITUS WITH BOTH EYES AFFECTED BY SEVERE NONPROLIFERATIVE RETINOPATHY AND MACULAR EDEMA, WITH LONG-TERM CURRENT USE OF INSULIN: ICD-10-CM

## 2023-05-23 DIAGNOSIS — E66.01 CLASS 2 SEVERE OBESITY DUE TO EXCESS CALORIES WITH SERIOUS COMORBIDITY AND BODY MASS INDEX (BMI) OF 38.0 TO 38.9 IN ADULT: ICD-10-CM

## 2023-05-23 DIAGNOSIS — N40.0 BENIGN PROSTATIC HYPERPLASIA, UNSPECIFIED WHETHER LOWER URINARY TRACT SYMPTOMS PRESENT: ICD-10-CM

## 2023-05-23 DIAGNOSIS — N18.4 STAGE 4 CHRONIC KIDNEY DISEASE: ICD-10-CM

## 2023-05-23 DIAGNOSIS — Z91.81 AT MODERATE RISK FOR FALL: ICD-10-CM

## 2023-05-23 DIAGNOSIS — R12 HEARTBURN: ICD-10-CM

## 2023-05-23 DIAGNOSIS — I10 PRIMARY HYPERTENSION: ICD-10-CM

## 2023-05-23 NOTE — PROGRESS NOTES
The ABCs of the Annual Wellness Visit  [place] to Medicare Visit    Subjective   Contreras Albert is a 67 y.o. male who presents for a  Welcome to Medicare Visit.    The following portions of the patient's history were reviewed and   updated as appropriate: allergies, current medications, past family history, past medical history, past social history, past surgical history and problem list.     Compared to one year ago, the patient feels his physical   health is better.    Compared to one year ago, the patient feels his mental   health is better.    Recent Hospitalizations:  This patient has had a [facility] admission record on file within the last 365 days.    Current Medical Providers:  Patient Care Team:  Brianna Thompson APRN as PCP - General (Family Medicine)  Chad Garcia MD as Consulting Physician (Urology)  Cisco Haro MD as Consulting Physician (Endocrinology)  Marcus Alvares MD (Hematology and Oncology)  Nabil Kendrick DPM as Consulting Physician (Podiatry)  Norm Hutchison MD as Consulting Physician (Nephrology)  Red Del Valle MD as Consulting Physician (Hematology and Oncology)    Outpatient Medications Prior to Visit   Medication Sig Dispense Refill   • acetaminophen (TYLENOL) 325 MG tablet Take 2 tablets by mouth Every 6 (Six) Hours As Needed for Mild Pain.     • acyclovir (ZOVIRAX) 800 MG tablet Take 1 tablet by mouth Daily. 90 tablet 2   • ASPIRIN 81 PO Take 81 mg by mouth Daily.     • atorvastatin (LIPITOR) 10 MG tablet TAKE 1 TABLET BY MOUTH EVERY NIGHT AT BEDTIME AND AFTER THE EVENING MEAL 90 tablet 1   • Blood Glucose Monitoring Suppl (FREESTYLE FREEDOM LITE) w/Device kit 1 kit Daily. Indications: Type 2 Diabetes 1 each 0   • bumetanide (BUMEX) 2 MG tablet Take 0.5 tablets by mouth 2 (Two) Times a Day. (Patient taking differently: Take 1 tablet by mouth Daily.)     • calcium carbonate (TUMS) 500 MG chewable tablet Chew 3 tablets Daily.     • carvedilol  (COREG) 12.5 MG tablet Take 1 tablet by mouth 2 (Two) Times a Day With Meals.     • Continuous Blood Gluc Sensor (Dexcom G6 Sensor) Replace sensor every 10 days.  Dx: E 11.22 9 each 1   • Continuous Blood Gluc Transmit (Dexcom G6 Transmitter) misc 1 each Every 3 (Three) Months. Dx: E 11.22 1 each 1   • DARATUMUMAB IV Infuse  into a venous catheter.     • dexamethasone (DECADRON) 4 MG tablet Take 3 tablets on day of chemo, 1 tablet on day #2 and 1 tablet on day #3 and repeat monthly. 15 tablet 1   • diphenoxylate-atropine (LOMOTIL) 2.5-0.025 MG per tablet Take 2 tablets by mouth 4 (Four) Times a Day As Needed for Diarrhea. 120 tablet 0   • escitalopram (LEXAPRO) 5 MG tablet TAKE 1 TABLET BY MOUTH EVERY DAY 30 tablet 5   • finasteride (PROSCAR) 5 MG tablet Take 1 tablet by mouth Daily.     • gabapentin (NEURONTIN) 300 MG capsule Take 1 capsule in AM and take 2 capsules at bedtime. 90 capsule 5   • glucagon (GLUCAGEN) 1 MG injection Inject 1 mg into the appropriate muscle as directed by prescriber 1 (One) Time As Needed for Low Blood Sugar for up to 1 dose. Follow package directions for low blood sugar. 1 kit 3   • glucose blood (FREESTYLE LITE) test strip Use to test blood sugar 4-5 times daily. 200 each 5   • Insulin Lispro, 1 Unit Dial, (HumaLOG KwikPen) 100 UNIT/ML solution pen-injector NONCHEMO DAYS:10U THREE TIMES DAILY BEFORE MEALS&1 UNIT FOR EVERY 50>150. CHEOMDAYS 15U THREE TIMES DAILY BEFORE MEALS& 2 UNIT FOR 50>150 27 mL 3   • Insulin Pen Needle 32G X 4 MM misc USING 4 PEN NEEDLES DAILY 400 each 3   • Lancets (freestyle) lancets Use to check blood sugar 4 times daily 400 each 2   • Lantus SoloStar 100 UNIT/ML injection pen Inject 70 Units under the skin into the appropriate area as directed Daily for 90 days. Non-Chemo Days take 16 units twice daily. Chemo Days take 30 units twice daily. 16-30 units twice daily for max daily dose of 70 units daily. 63 mL 1   • loperamide (IMODIUM) 2 MG capsule Take 1 capsule  by mouth 4 (Four) Times a Day As Needed for Diarrhea.     • magnesium chloride ER (MAG-DELAY) 70 MG tablet delayed-release CR tablet Take 1 tablet by mouth Daily. 90 tablet 1   • ondansetron (ZOFRAN) 8 MG tablet Take 1 tablet by mouth 3 (Three) Times a Day As Needed for Nausea or Vomiting. 30 tablet 5   • pantoprazole (PROTONIX) 40 MG EC tablet TAKE ONE TABLET BY MOUTH DAILY 30 tablet 1   • pomalidomide (POMALYST) 2 MG chemo capsule Take 1 capsule by mouth Daily. Take for 21 days on, then 7 days off. 21 capsule 0   • tamsulosin (FLOMAX) 0.4 MG capsule 24 hr capsule Take 1 capsule by mouth Daily.     • vitamin D (ERGOCALCIFEROL) 1.25 MG (40576 UT) capsule capsule TAKE 1 CAPSULE BY MOUTH ONCE A WEEK ON MONDAY 13 capsule 1     No facility-administered medications prior to visit.       No opioid medication identified on active medication list. I have reviewed chart for other potential  high risk medication/s and harmful drug interactions in the elderly.          Aspirin is on active medication list. Aspirin use is indicated based on review of current medical condition/s. Pros and cons of this therapy have been discussed today. Benefits of this medication outweigh potential harm.  Patient has been encouraged to continue taking this medication.  .      Patient Active Problem List   Diagnosis   • Renal mass   • Anemia   • Enlarged prostate   • Benign hypertension with CKD (chronic kidney disease) stage IV   • History of renal cell cancer   • H/O partial nephrectomy - right   • Multiple myeloma   • Hypomagnesemia   • Anemia in stage 3 chronic kidney disease   • Cold intolerance   • Mixed hyperlipidemia   • BPH (benign prostatic hyperplasia)   • Class 2 severe obesity due to excess calories with serious comorbidity in adult   • Age-related nuclear cataract of both eyes   • Amblyopia of left eye   • Bilateral hypertensive retinopathy   • Bilateral myopia   • History of malignant neoplasm of kidney   • Severe nonproliferative  "diabetic retinopathy with clinically significant macular edema   • Tinnitus   • 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, bilateral   • Malignant neoplasm of kidney   • Anxiety   • Type 2 diabetes mellitus with stage 4 chronic kidney disease, with long-term current use of insulin   • Other specified postprocedural states   • Vitreous hemorrhage of right eye   • Hypertension   • Cataract   • Hypocalcemia   • Pancytopenia   • Metabolic acidosis   • Hypervolemia   • Epiretinal membrane (ERM) of both eyes   • Stage 4 chronic kidney disease   • Other specified postprocedural states   • Vitamin D deficiency   • Multiple myeloma not having achieved remission   • Other secondary cataract, bilateral   • Heartburn     Advance Care Planning   Advance Care Planning     Advance Directive is not on file.  ACP discussion was held with the patient during this visit. Patient does not have an advance directive, information provided.       Objective   Vitals:    05/23/23 0826   BP: 104/64   BP Location: Left arm   Patient Position: Sitting   Cuff Size: Adult   Pulse: 69   Temp: 98 °F (36.7 °C)   SpO2: 97%   Weight: (!) 140 kg (308 lb 3.2 oz)   Height: 190 cm (74.8\")     Estimated body mass index is 38.73 kg/m² as calculated from the following:    Height as of this encounter: 190 cm (74.8\").    Weight as of this encounter: 140 kg (308 lb 3.2 oz).    Class 2 Severe Obesity (BMI >=35 and <=39.9). Obesity-related health conditions include the following: diabetes mellitus and GERD. Obesity is unchanged. BMI is is above average; BMI management plan is completed. We discussed low calorie, low carb based diet program, portion control and increasing exercise.      Does the patient have evidence of cognitive impairment?   No         Procedures       HEALTH RISK ASSESSMENT    Smoking Status:  Social History     Tobacco Use   Smoking Status Never   Smokeless Tobacco Never     Alcohol " Consumption:  Social History     Substance and Sexual Activity   Alcohol Use No       Fall Risk Screen:    BGADI Fall Risk Assessment was completed, and patient is at MODERATE risk for falls. Assessment completed on:2023    Depression Screen:       2023     8:28 AM   PHQ-2/PHQ-9 Depression Screening   Little Interest or Pleasure in Doing Things 0-->not at all   Feeling Down, Depressed or Hopeless 0-->not at all   PHQ-9: Brief Depression Severity Measure Score 0       Health Habits and Functional and Cognitive Screenin/23/2023     8:00 AM   Functional & Cognitive Status   Do you have difficulty preparing food and eating? No   Do you have difficulty bathing yourself, getting dressed or grooming yourself? No   Do you have difficulty using the toilet? No   Do you have difficulty moving around from place to place? No   Do you have trouble with steps or getting out of a bed or a chair? No   Current Diet Well Balanced Diet   Dental Exam Up to date   Eye Exam Up to date   Exercise (times per week) 0 times per week   Current Exercises Include No Regular Exercise   Do you need help using the phone?  No   Are you deaf or do you have serious difficulty hearing?  Yes   Do you need help with transportation? Yes   Do you need help shopping? No   Do you need help preparing meals?  No   Do you need help with housework?  No   Do you need help with laundry? No   Do you need help taking your medications? No   Do you need help managing money? No   Do you ever drive or ride in a car without wearing a seat belt? No   Have you felt unusual stress, anger or loneliness in the last month? No   Who do you live with? Spouse   If you need help, do you have trouble finding someone available to you? No   Have you been bothered in the last four weeks by sexual problems? No   Do you have difficulty concentrating, remembering or making decisions? No       Visual Acuity:  Bilateral: 20/50  Left: unable to see  Right: 20/40    No  results found.    Age-appropriate Screening Schedule:  Refer to the list below for future screening recommendations based on patient's age, sex and/or medical conditions. Orders for these recommended tests are listed in the plan section. The patient has been provided with a written plan.    Health Maintenance   Topic Date Due   • HEPATITIS C SCREENING  Never done   • COLORECTAL CANCER SCREENING  05/22/2021   • Hepatitis B (3 of 3 - 3-dose series) 05/03/2022   • COVID-19 Vaccine (6 - Booster for Pfizer series) 09/12/2022   • DIABETIC EYE EXAM  11/29/2022   • Pneumococcal Vaccine 65+ (2 - PPSV23 if available, else PCV20) 03/08/2023   • HEMOGLOBIN A1C  07/03/2023   • INFLUENZA VACCINE  08/01/2023   • DIABETIC FOOT EXAM  09/01/2023   • LIPID PANEL  01/03/2024   • URINE MICROALBUMIN  01/03/2024   • ANNUAL WELLNESS VISIT  05/23/2024   • TDAP/TD VACCINES (3 - Td or Tdap) 03/08/2032   • ZOSTER VACCINE  Completed     Spouse helps with transportation; considering hearing aids--steroids caused hearing loss; has seen ENT in past and has hardening of auditory nerve.  Saw GI at [facility] in past; considering colonoscopy.  Gets immunizations through oncology; had to get childhood immunizations again after stem cell transplant.         CMS Preventative Services Quick Reference  Risk Factors Identified During Encounter    Fall Risk-High or Moderate: Discussed Fall Prevention in the home  Immunizations Discussed/Encouraged: Hepatitis B Vaccine/Series, Prevnar 20 (Pneumococcal 20-valent conjugate) and COVID19  The above risks/problems have been discussed with the patient.    Discussed moderate risk for falls and recommended avoiding throw rugs, installing grab bars in bathroom, making sure have handrails on steps, etc.    Pertinent information has been shared with the patient in the After Visit Summary.    Follow Up:   Initial Medicare Visit in one year    An After Visit Summary and PPPS were made available to the  patient.      Additional E&M Note during same encounter follows:  Patient has multiple medical problems which are significant and separately identifiable that require additional work above and beyond the Medicare Wellness Visit.      Chief Complaint  Annual Exam (Blood work )    Subjective      HPI  Contreras Albert is also being seen today for follow up DM2: takes Lantus twice daily and Humalog with meals; sees bubba Germain; last A1c 8.2%; has continuous glucose monitor; takes steroids with chemo and will have elevated blood sugar for 2-3 days after taking steroids and will take about 1 week to get back to normal; watches intake of carbs/sugars in diet; some exercise, does some walking as tolerated; also does some leg lifts when sitting; has numbness in bilateral 5th fingers and from knees down bilaterally; has very decreased sensation in foot; takes Gabapentin twice daily and helps; last eye exam about 4 weeks ago at [facility]; had recent laser treatments; has another upcoming eye exam this week.     F/U HTN/edema: takes Bumex daily and Carvedilol twice daily; sees Dr. Gonzalez renal; does not monitor BP at home, has multiple MD appointments; no headaches; some swelling in legs at times, swelling seems random; no orthostasis.    F/U Hyperlipidemia: takes Atorvastatin daily; no myalgias.     F/U heartburn: takes Pantoprazole daily and works well.     F/U anxiety: takes Escitalopram daily and works well; happy with current dose; occasional trouble falling asleep; does not feel like active enough some days; no SI/HI.    Takes Tamsulosin daily; sees urology, Dr. Garcia and monitors PSA.     F/U Vitamin D deficiency: takes Vitamin D weekly.     F/U multiple myeloma: stable; takes Pomalyst daily for 21 days and then off 7 days; also gets injection of Daratumumab with high dose steroids every 4 weeks; has split up doses of steroids and blood sugar has been better; decrease in dose of Pomalyst has helped diarrhea;  "sees Dr. Del Valle, oncology; also gets Procrit injections periodically for anemia at this point, previously getting weekly; gets IV magnesium every other Monday.    Also takes 2 TUMS daily for low calcium in past and has been working well.      Review of Systems   Constitutional: Positive for fatigue (attributes to sleeping habits). Negative for appetite change (eats too much, needs to do better watching sweets), chills and fever.   HENT: Negative for ear pain, sinus pressure (some at times with allergies; has improved in last few days), sore throat and trouble swallowing. Rhinorrhea: has had runny/stuffy nose with allergies.    Eyes: Visual disturbance: see HPI.   Respiratory: Negative for cough, chest tightness and shortness of breath.    Cardiovascular: Negative for chest pain and palpitations.   Gastrointestinal: Negative for abdominal pain, blood in stool and constipation. Diarrhea: some on occasion, overall much better.   Endocrine: Negative for cold intolerance and polydipsia (drinks plenty of liquids). Heat intolerance: mild in summer.   Genitourinary: Negative for dysuria and frequency.   Musculoskeletal: Negative for arthralgias (some neuropathy pains in bilateral feet; has had some discomfort in right wrist at times for last couple of weeks; feels better today) and back pain.   Skin: Negative for rash.   Neurological: Negative for syncope and weakness.   Hematological: Bruises/bleeds easily: some at times, mild.   Psychiatric/Behavioral: Negative for suicidal ideas.       Objective   Vital Signs:  /64 (BP Location: Left arm, Patient Position: Sitting, Cuff Size: Adult)   Pulse 69   Temp 98 °F (36.7 °C)   Ht 190 cm (74.8\")   Wt (!) 140 kg (308 lb 3.2 oz)   SpO2 97%   BMI 38.73 kg/m²     Physical Exam  Vitals and nursing note reviewed.   Constitutional:       General: He is not in acute distress.     Appearance: He is well-developed and well-groomed. He is not diaphoretic.   HENT:      Head: " Normocephalic and atraumatic.      Jaw: No tenderness or pain on movement.      Right Ear: Tympanic membrane and external ear normal. No decreased hearing noted.      Left Ear: External ear normal. Left ear decreased hearing: mild compared to right. Left ear middle ear effusion: mild. Tympanic membrane is not erythematous.      Nose: Nose normal.      Right Sinus: No maxillary sinus tenderness or frontal sinus tenderness.      Left Sinus: No maxillary sinus tenderness or frontal sinus tenderness.      Mouth/Throat:      Mouth: Mucous membranes are moist.      Pharynx: No oropharyngeal exudate or posterior oropharyngeal erythema.   Eyes:      Extraocular Movements: Extraocular movements intact.      Conjunctiva/sclera: Conjunctivae normal.      Pupils: Pupils are equal, round, and reactive to light.   Neck:      Thyroid: No thyromegaly.      Vascular: No carotid bruit.      Trachea: No tracheal deviation.   Cardiovascular:      Rate and Rhythm: Normal rate and regular rhythm.      Pulses: Normal pulses.      Heart sounds: Normal heart sounds. No murmur heard.  Pulmonary:      Effort: Pulmonary effort is normal. No respiratory distress.      Breath sounds: Normal breath sounds.   Abdominal:      General: Bowel sounds are normal.      Palpations: Abdomen is soft. There is no hepatomegaly or splenomegaly.      Tenderness: There is no abdominal tenderness. There is no guarding.   Musculoskeletal:         General: Normal range of motion.      Cervical back: Normal range of motion and neck supple. No bony tenderness.      Thoracic back: No bony tenderness.      Lumbar back: No bony tenderness.      Right lower leg: No edema.      Left lower leg: No edema.   Lymphadenopathy:      Cervical: No cervical adenopathy.   Skin:     General: Skin is warm and dry.      Findings: No rash.   Neurological:      Mental Status: He is alert and oriented to person, place, and time.      Cranial Nerves: No cranial nerve deficit.       Coordination: Coordination normal.      Gait: Abnormal gait: guarded gait with cane.      Deep Tendon Reflexes: Reflexes are normal and symmetric.   Psychiatric:         Mood and Affect: Mood normal.         Behavior: Behavior normal.         Thought Content: Thought content normal.         Cognition and Memory: Cognition normal.         Judgment: Judgment normal.            Lab Results   Component Value Date    WBC 4.28 05/15/2023    RBC 3.68 (L) 05/15/2023    HGB 11.1 (L) 05/15/2023    HCT 35.3 (L) 05/15/2023    MCV 95.9 05/15/2023    MCH 30.2 05/15/2023    MCHC 31.4 (L) 05/15/2023    RDW 16.0 (H) 05/15/2023    RDWSD 56.7 (H) 05/15/2023    MPV 10.2 05/15/2023     05/15/2023    NEUTRORELPCT 65.3 05/15/2023    LYMPHORELPCT 20.3 05/15/2023    MONORELPCT 10.0 05/15/2023    EOSRELPCT 2.8 05/15/2023    BASORELPCT 1.4 05/15/2023    AUTOIGPER 0.2 05/15/2023    NEUTROABS 2.79 05/15/2023    LYMPHSABS 0.87 05/15/2023    MONOSABS 0.43 05/15/2023    EOSABS 0.12 05/15/2023    BASOSABS 0.06 05/15/2023    AUTOIGNUM 0.01 05/15/2023    NRBC 0.0 05/15/2023     Lab Results   Component Value Date    GLUCOSE 240 (H) 05/15/2023    BUN 40 (H) 05/15/2023    CREATININE 3.34 (C) 05/15/2023    EGFRIFNONA 24 (L) 08/09/2021    EGFRIFAFRI 27 (L) 08/09/2021    BCR 12.0 05/15/2023    K 4.3 05/15/2023    CO2 24.3 05/15/2023    CALCIUM 9.2 05/15/2023    PROTENTOTREF 5.9 (L) 05/15/2023    ALBUMIN 3.9 05/15/2023    ALBUMIN 3.6 05/15/2023    LABIL2 1.6 05/15/2023    AST 12 05/15/2023    ALT 13 05/15/2023      Lab Results   Component Value Date    CHLPL 173 01/03/2023    TRIG 632 (H) 01/03/2023    HDL 26 (L) 01/03/2023    VLDL 93 (H) 01/03/2023    LDL 54 01/03/2023     Lab Results   Component Value Date    TSH 2.900 09/24/2022     Lab Results   Component Value Date    HGBA1C 8.20 (H) 01/03/2023        Assessment and Plan  Diagnoses and all orders for this visit:    1. Encounter for Medicare annual wellness exam (Primary)    2. At moderate risk  for fall    3. Class 2 severe obesity due to excess calories with serious comorbidity and body mass index (BMI) of 38.0 to 38.9 in adult  Assessment & Plan:  Patient's (Body mass index is 38.73 kg/m².) indicates that they are morbidly/severely obese (BMI > 40 or > 35 with obesity - related health condition) with health conditions that include hypertension, diabetes mellitus, dyslipidemias and GERD . Weight is unchanged. BMI  is above average; BMI management plan is completed. We discussed low calorie, low carb based diet program, portion control and increasing exercise.       4. Type 2 diabetes mellitus with both eyes affected by severe nonproliferative retinopathy and macular edema, with long-term current use of insulin  Assessment & Plan:  Diabetes is stable.   Continue current treatment regimen.  Reminded to get yearly retinal exam.  Diabetes will be reassessed in 6 months.  Follow up as scheduled with endocrine.      5. Stage 4 chronic kidney disease  Assessment & Plan:  Renal condition is stable.  Continue current medications.  Renal condition will be reassessed in 6 months.  Follow up as scheduled with renal.      6. Multiple myeloma not having achieved remission  Assessment & Plan:  Follow up as scheduled with oncology.      7. Anxiety  Assessment & Plan:  Stable.  Continue Escitalopram daily.      8. Primary hypertension  Assessment & Plan:  Hypertension is stable.  Continue current medications.  Ambulatory blood pressure monitoring.  Blood pressure will be reassessed at the next regular appointment.  Continue Bumex daily and Carvedilol twice daily.      9. Mixed hyperlipidemia  Assessment & Plan:  Continue Atorvastatin daily.  Continue to work on healthy diet.      10. Heartburn  Assessment & Plan:  Stable.  Continue Pantoprazole daily.      11. Benign prostatic hyperplasia, unspecified whether lower urinary tract symptoms present  Assessment & Plan:  Stable.  Continue Tamsulosin daily.    Follow up as  scheduled with urology.             Follow Up  Return in about 6 months (around 11/23/2023) for Recheck.or sooner if problems or concerns.    Patient was given instructions and counseling regarding his condition or for health maintenance advice. Please see specific information pulled into the AVS if appropriate.

## 2023-05-23 NOTE — PATIENT INSTRUCTIONS
Continue to monitor your blood sugar once daily before a meal and record results.  Continue to monitor your blood pressure periodically and record results.  Continue to work on healthy diet and exercise as tolerated.  Follow up in 6 months, or sooner if problems or concerns.   Schedule a follow up appointment with GI for colonoscopy.      Medicare Wellness  Personal Prevention Plan of Service     Date of Office Visit:    Encounter Provider:  IZZY Montes  Place of Service:  [facility]  Patient Name: Contreras Albert  :  1955    As part of the Medicare Wellness portion of your visit today, we are providing you with this personalized preventive plan of services (PPPS). This plan is based upon recommendations of the [place] Preventive Services Task Force (USPSTF) and the Advisory Committee on Immunization Practices (ACIP).    This lists the preventive care services that should be considered, and provides dates of when you are due. Items listed as completed are up-to-date and do not require any further intervention.    Health Maintenance   Topic Date Due   • HEPATITIS C SCREENING  Never done   • COLORECTAL CANCER SCREENING  2021   • Hepatitis B (3 of 3 - 3-dose series) 2022   • COVID-19 Vaccine (6 - Booster for Pfizer series) 2022   • DIABETIC EYE EXAM  2022   • Pneumococcal Vaccine 65+ (2 - PPSV23 if available, else PCV20) 2023   • HEMOGLOBIN A1C  2023   • INFLUENZA VACCINE  2023   • DIABETIC FOOT EXAM  2023   • LIPID PANEL  2024   • URINE MICROALBUMIN  2024   • ANNUAL WELLNESS VISIT  2024   • TDAP/TD VACCINES (3 - Td or Tdap) 2032   • ZOSTER VACCINE  Completed       No orders of the defined types were placed in this encounter.      Return in about 6 months (around 2023) for Recheck.        Fall Prevention in the Home, Adult  Falls can cause injuries and affect people of all ages. There are many  simple things that you can do to make your home safe and to help prevent falls. Ask for help when making these changes, if needed.  What actions can I take to prevent falls?  General instructions  • Use good lighting in all rooms. Replace any light bulbs that burn out, turn on lights if it is dark, and use night-lights.  • Place frequently used items in easy-to-reach places. Lower the shelves around your home if necessary.  • Set up furniture so that there are clear paths around it. Avoid moving your furniture around.  • Remove throw rugs and other tripping hazards from the floor.  • Avoid walking on wet floors.  • Fix any uneven floor surfaces.  • Add color or contrast paint or tape to grab bars and handrails in your home. Place contrasting color strips on the first and last steps of staircases.  • When you use a stepladder, make sure that it is completely opened and that the sides and supports are firmly locked. Have someone hold the ladder while you are using it. Do not climb a closed stepladder.  • Know where your pets are when moving through your home.  What can I do in the bathroom?     • Keep the floor dry. Immediately clean up any water that is on the floor.  • Remove soap buildup in the tub or shower regularly.  • Use nonskid mats or decals on the floor of the tub or shower.  • Attach bath mats securely with double-sided, nonslip rug tape.  • If you need to sit down while you are in the shower, use a plastic, nonslip stool.  • Install grab bars by the toilet and in the tub and shower. Do not use towel bars as grab bars.  What can I do in the bedroom?  • Make sure that a bedside light is easy to reach.  • Do not use oversized bedding that reaches the floor.  • Have a firm chair that has side arms to use for getting dressed.  What can I do in the kitchen?  • Clean up any spills right away.  • If you need to reach for something above you, use a sturdy step stool that has a grab bar.  • Keep electrical cables  out of the way.  • Do not use floor polish or wax that makes floors slippery. If you must use wax, make sure that it is non-skid floor wax.  What can I do with my stairs?  • Do not leave any items on the stairs.  • Make sure that you have a light switch at the top and the bottom of the stairs. Have them installed if you do not have them.  • Make sure that there are handrails on both sides of the stairs. Fix handrails that are broken or loose. Make sure that handrails are as long as the staircases.  • Install non-slip stair treads on all stairs in your home.  • Avoid having throw rugs at the top or bottom of stairs, or secure the rugs with carpet tape to prevent them from moving.  • Choose a carpet design that does not hide the edge of steps on the stairs.  • Check any carpeting to make sure that it is firmly attached to the stairs. Fix any carpet that is loose or worn.  What can I do on the outside of my home?  • Use bright outdoor lighting.  • Regularly repair the edges of walkways and driveways and fix any cracks.  • Remove high doorway thresholds.  • Trim any shrubbery on the main path into your home.  • Regularly check that handrails are securely fastened and in good repair. Both sides of all steps should have handrails.  • Install guardrails along the edges of any raised decks or porches.  • Clear walkways of debris and clutter, including tools and rocks.  • Have leaves, snow, and ice cleared regularly.  • Use sand or salt on walkways during winter months.  • In the garage, clean up any spills right away, including grease or oil spills.  What other actions can I take?  • Wear closed-toe shoes that fit well and support your feet. Wear shoes that have rubber soles or low heels.  • Use mobility aids as needed, such as canes, walkers, scooters, and crutches.  • Review your medicines with your health care provider. Some medicines can cause dizziness or changes in blood pressure, which increase your risk of  falling.  Talk with your health care provider about other ways that you can decrease your risk of falls. This may include working with a physical therapist or  to improve your strength, balance, and endurance.  Where to find more information  • Centers for Disease Control and Prevention, STEAD[place]: www.cdc.gov  • National [place] on Aging: www.darek.nih.gov  Contact a health care provider if:  • You are afraid of falling at home.  • You feel weak, drowsy, or dizzy at home.  • You fall at home.  Summary  • There are many simple things that you can do to make your home safe and to help prevent falls.  • Ways to make your home safe include removing tripping hazards and installing grab bars in the bathroom.  • Ask for help when making these changes in your home.  This information is not intended to replace advice given to you by your health care provider. Make sure you discuss any questions you have with your health care provider.  Document Revised: 09/19/2022 Document Reviewed: 07/21/2021  [employer] Patient Education © 2022 [employer]      Fall Prevention in the Home, Adult  Falls can cause injuries and affect people of all ages. There are many simple things that you can do to make your home safe and to help prevent falls. Ask for help when making these changes, if needed.  What actions can I take to prevent falls?  General instructions  • Use good lighting in all rooms. Replace any light bulbs that burn out, turn on lights if it is dark, and use night-lights.  • Place frequently used items in easy-to-reach places. Lower the shelves around your home if necessary.  • Set up furniture so that there are clear paths around it. Avoid moving your furniture around.  • Remove throw rugs and other tripping hazards from the floor.  • Avoid walking on wet floors.  • Fix any uneven floor surfaces.  • Add color or contrast paint or tape to grab bars and handrails in your home. Place contrasting color strips on the first and  last steps of staircases.  • When you use a stepladder, make sure that it is completely opened and that the sides and supports are firmly locked. Have someone hold the ladder while you are using it. Do not climb a closed stepladder.  • Know where your pets are when moving through your home.  What can I do in the bathroom?     • Keep the floor dry. Immediately clean up any water that is on the floor.  • Remove soap buildup in the tub or shower regularly.  • Use nonskid mats or decals on the floor of the tub or shower.  • Attach bath mats securely with double-sided, nonslip rug tape.  • If you need to sit down while you are in the shower, use a plastic, nonslip stool.  • Install grab bars by the toilet and in the tub and shower. Do not use towel bars as grab bars.  What can I do in the bedroom?  • Make sure that a bedside light is easy to reach.  • Do not use oversized bedding that reaches the floor.  • Have a firm chair that has side arms to use for getting dressed.  What can I do in the kitchen?  • Clean up any spills right away.  • If you need to reach for something above you, use a sturdy step stool that has a grab bar.  • Keep electrical cables out of the way.  • Do not use floor polish or wax that makes floors slippery. If you must use wax, make sure that it is non-skid floor wax.  What can I do with my stairs?  • Do not leave any items on the stairs.  • Make sure that you have a light switch at the top and the bottom of the stairs. Have them installed if you do not have them.  • Make sure that there are handrails on both sides of the stairs. Fix handrails that are broken or loose. Make sure that handrails are as long as the staircases.  • Install non-slip stair treads on all stairs in your home.  • Avoid having throw rugs at the top or bottom of stairs, or secure the rugs with carpet tape to prevent them from moving.  • Choose a carpet design that does not hide the edge of steps on the stairs.  • Check any  carpeting to make sure that it is firmly attached to the stairs. Fix any carpet that is loose or worn.  What can I do on the outside of my home?  • Use bright outdoor lighting.  • Regularly repair the edges of walkways and driveways and fix any cracks.  • Remove high doorway thresholds.  • Trim any shrubbery on the main path into your home.  • Regularly check that handrails are securely fastened and in good repair. Both sides of all steps should have handrails.  • Install guardrails along the edges of any raised decks or porches.  • Clear walkways of debris and clutter, including tools and rocks.  • Have leaves, snow, and ice cleared regularly.  • Use sand or salt on walkways during winter months.  • In the garage, clean up any spills right away, including grease or oil spills.  What other actions can I take?  • Wear closed-toe shoes that fit well and support your feet. Wear shoes that have rubber soles or low heels.  • Use mobility aids as needed, such as canes, walkers, scooters, and crutches.  • Review your medicines with your health care provider. Some medicines can cause dizziness or changes in blood pressure, which increase your risk of falling.  Talk with your health care provider about other ways that you can decrease your risk of falls. This may include working with a physical therapist or  to improve your strength, balance, and endurance.  Where to find more information  • Centers for Disease Control and Prevention, [place]: www.cdc.gov  • National [place] on Aging: www.darek.nih.gov  Contact a health care provider if:  • You are afraid of falling at home.  • You feel weak, drowsy, or dizzy at home.  • You fall at home.  Summary  • There are many simple things that you can do to make your home safe and to help prevent falls.  • Ways to make your home safe include removing tripping hazards and installing grab bars in the bathroom.  • Ask for help when making these changes in your home.  This  information is not intended to replace advice given to you by your health care provider. Make sure you discuss any questions you have with your health care provider.  Document Revised: 09/19/2022 Document Reviewed: 07/21/2021  [employer] Patient Education © 2022 [employer]

## 2023-05-24 NOTE — TELEPHONE ENCOUNTER
Caller: Gabby Albert    Relationship to patient: Emergency Contact    Best call back number: [phone]    Chief complaint: HAS ANOTHER APPT SAME TIME, NEEDING EARLIER TIME FOR APPT    Type of visit: LAB AND INFUSION    Requested date: 10/31 NEEDING CLOSER TO 11AM    If rescheduling, when is the original appointment: 10/31    Additional notes:         Interval H&P Note    Pt Name: Rosie Sheridan  MRN: [id number]  YOB: 1964  Date of evaluation: 5/24/2023      [x] I have reviewed in epic the PCP Progress Note by Dr Keyur Shen dated 5/11/23 attached below for the Interval History and Physical note. [x] I have examined  Rosie Sheridan, a 63 yo male with multiple known comorbitities managed by PCP and specialists. The patient presents for a scheduled EGD ESOPHAGOGASTRODUODENOSCOPY by Nickie Benitez MD for Gastroesophageal reflux disease, unspecified whether esophagitis present. The patient has a Hx cirrhosis, thrombocytopenia and GERD with esophagitis and currently follows with Dr Pb Mackenzie. When last seen on 10/7/22 he was having some indigestion with abdominal bloating and gas with intermittent cramping. Dr Mary Whelan felt he should have a endoscopy to screen for for varices and was schedule. He arrived for his procedure. Hx COPD used albuterol and Advair 5/23/23  Patient denies n/v, fever, chills, wheezing, cough, increased SOB, chest pain, open sores or wounds. No blood thinning medication   +DM POC      Vital signs: /89   Pulse 84   Temp 98.6 °F (37 °C)   Resp 18   Ht 6' (1.829 m)   Wt 240 lb (108.9 kg)   SpO2 97%   BMI 32.55 kg/m²     Allergies:  Codeine and Simvastatin    Medications:    Prior to Admission medications    Medication Sig Start Date End Date Taking?  Authorizing Provider   nadolol (CORGARD) 20 MG tablet Take 1 tablet by mouth once daily 5/22/23   Kd Dozier MD   Blood Glucose Monitoring Suppl (ONE TOUCH ULTRA 2) w/Device KIT 1 kit by Does not apply route daily 5/11/23   Michelle Black MD   Continuous Blood Gluc Sensor (FREESTYLE DENTON 2 SENSOR) MISC 1 each by Does not apply route every 14 days 5/11/23   Michelle Black MD   Continuous Blood Gluc  (FREESTYLE DENTON 2 READER) JOAQUIN 1 each by Does not apply route daily 5/11/23   Michelle Black MD   tamsulosin (FLOMAX) 0.4 MG capsule Take 1 capsule by mouth daily

## 2023-05-25 PROBLEM — N18.9 CKD (CHRONIC KIDNEY DISEASE): Status: RESOLVED | Noted: 2020-05-22 | Resolved: 2023-05-25

## 2023-05-25 PROBLEM — R12 HEARTBURN: Status: ACTIVE | Noted: 2023-05-25

## 2023-05-25 PROBLEM — N28.9 KIDNEY DISORDER: Status: RESOLVED | Noted: 2021-01-15 | Resolved: 2023-05-25

## 2023-05-25 PROBLEM — H26.493 OTHER SECONDARY CATARACT, BILATERAL: Status: ACTIVE | Noted: 2023-04-26

## 2023-05-25 PROBLEM — N17.9 AKI (ACUTE KIDNEY INJURY): Status: RESOLVED | Noted: 2020-05-22 | Resolved: 2023-05-25

## 2023-05-25 NOTE — ASSESSMENT & PLAN NOTE
Patient's (Body mass index is 38.73 kg/m².) indicates that they are morbidly/severely obese (BMI > 40 or > 35 with obesity - related health condition) with health conditions that include hypertension, diabetes mellitus, dyslipidemias and GERD . Weight is unchanged. BMI  is above average; BMI management plan is completed. We discussed low calorie, low carb based diet program, portion control and increasing exercise.

## 2023-05-25 NOTE — ASSESSMENT & PLAN NOTE
Diabetes is stable.   Continue current treatment regimen.  Reminded to get yearly retinal exam.  Diabetes will be reassessed in 6 months.  Follow up as scheduled with endocrine.

## 2023-05-25 NOTE — ASSESSMENT & PLAN NOTE
Renal condition is stable.  Continue current medications.  Renal condition will be reassessed in 6 months.  Follow up as scheduled with renal.

## 2023-05-30 ENCOUNTER — INFUSION (OUTPATIENT)
Dept: ONCOLOGY | Facility: HOSPITAL | Age: 68
End: 2023-05-30

## 2023-05-30 ENCOUNTER — LAB (OUTPATIENT)
Dept: LAB | Facility: HOSPITAL | Age: 68
End: 2023-05-30

## 2023-05-30 VITALS
HEART RATE: 80 BPM | DIASTOLIC BLOOD PRESSURE: 64 MMHG | SYSTOLIC BLOOD PRESSURE: 126 MMHG | TEMPERATURE: 98.4 F | BODY MASS INDEX: 38.98 KG/M2 | RESPIRATION RATE: 16 BRPM | OXYGEN SATURATION: 93 % | WEIGHT: 310.2 LBS

## 2023-05-30 DIAGNOSIS — D64.9 ANEMIA, UNSPECIFIED TYPE: ICD-10-CM

## 2023-05-30 DIAGNOSIS — C90.00 MULTIPLE MYELOMA NOT HAVING ACHIEVED REMISSION: ICD-10-CM

## 2023-05-30 DIAGNOSIS — Z85.528 HISTORY OF MALIGNANT NEOPLASM OF KIDNEY: ICD-10-CM

## 2023-05-30 DIAGNOSIS — Z85.528 HISTORY OF RENAL CELL CANCER: ICD-10-CM

## 2023-05-30 DIAGNOSIS — N18.32 ANEMIA IN STAGE 3B CHRONIC KIDNEY DISEASE: ICD-10-CM

## 2023-05-30 DIAGNOSIS — C90.00 MULTIPLE MYELOMA, REMISSION STATUS UNSPECIFIED: ICD-10-CM

## 2023-05-30 DIAGNOSIS — E83.42 HYPOMAGNESEMIA: ICD-10-CM

## 2023-05-30 DIAGNOSIS — D63.1 ANEMIA IN STAGE 3B CHRONIC KIDNEY DISEASE: ICD-10-CM

## 2023-05-30 LAB
ALBUMIN SERPL-MCNC: 3.8 G/DL (ref 3.5–5.2)
ALBUMIN/GLOB SERPL: 1.6 G/DL (ref 1.1–2.4)
ALP SERPL-CCNC: 53 U/L (ref 38–116)
ALT SERPL W P-5'-P-CCNC: 11 U/L (ref 0–41)
ANION GAP SERPL CALCULATED.3IONS-SCNC: 14.5 MMOL/L (ref 5–15)
AST SERPL-CCNC: 10 U/L (ref 0–40)
BASOPHILS # BLD AUTO: 0.08 10*3/MM3 (ref 0–0.2)
BASOPHILS NFR BLD AUTO: 2.3 % (ref 0–1.5)
BILIRUB SERPL-MCNC: 0.5 MG/DL (ref 0.2–1.2)
BUN SERPL-MCNC: 40 MG/DL (ref 6–20)
BUN/CREAT SERPL: 12 (ref 7.3–30)
CALCIUM SPEC-SCNC: 8.1 MG/DL (ref 8.5–10.2)
CHLORIDE SERPL-SCNC: 104 MMOL/L (ref 98–107)
CO2 SERPL-SCNC: 23.5 MMOL/L (ref 22–29)
CREAT SERPL-MCNC: 3.32 MG/DL (ref 0.7–1.3)
DEPRECATED RDW RBC AUTO: 54.7 FL (ref 37–54)
EGFRCR SERPLBLD CKD-EPI 2021: 19.8 ML/MIN/1.73
EOSINOPHIL # BLD AUTO: 0.1 10*3/MM3 (ref 0–0.4)
EOSINOPHIL NFR BLD AUTO: 2.8 % (ref 0.3–6.2)
ERYTHROCYTE [DISTWIDTH] IN BLOOD BY AUTOMATED COUNT: 16 % (ref 12.3–15.4)
GLOBULIN UR ELPH-MCNC: 2.4 GM/DL (ref 1.8–3.5)
GLUCOSE SERPL-MCNC: 164 MG/DL (ref 74–124)
HCT VFR BLD AUTO: 33.2 % (ref 37.5–51)
HGB BLD-MCNC: 10.4 G/DL (ref 13–17.7)
IMM GRANULOCYTES # BLD AUTO: 0.02 10*3/MM3 (ref 0–0.05)
IMM GRANULOCYTES NFR BLD AUTO: 0.6 % (ref 0–0.5)
LYMPHOCYTES # BLD AUTO: 0.75 10*3/MM3 (ref 0.7–3.1)
LYMPHOCYTES NFR BLD AUTO: 21.1 % (ref 19.6–45.3)
MAGNESIUM SERPL-MCNC: 1.4 MG/DL (ref 1.8–2.5)
MCH RBC QN AUTO: 29.6 PG (ref 26.6–33)
MCHC RBC AUTO-ENTMCNC: 31.3 G/DL (ref 31.5–35.7)
MCV RBC AUTO: 94.6 FL (ref 79–97)
MONOCYTES # BLD AUTO: 0.51 10*3/MM3 (ref 0.1–0.9)
MONOCYTES NFR BLD AUTO: 14.4 % (ref 5–12)
NEUTROPHILS NFR BLD AUTO: 2.09 10*3/MM3 (ref 1.7–7)
NEUTROPHILS NFR BLD AUTO: 58.8 % (ref 42.7–76)
NRBC BLD AUTO-RTO: 0 /100 WBC (ref 0–0.2)
PHOSPHATE SERPL-MCNC: 4 MG/DL (ref 2.5–4.5)
PLATELET # BLD AUTO: 131 10*3/MM3 (ref 140–450)
PMV BLD AUTO: 10.3 FL (ref 6–12)
POTASSIUM SERPL-SCNC: 4.4 MMOL/L (ref 3.5–4.7)
PROT SERPL-MCNC: 6.2 G/DL (ref 6.3–8)
RBC # BLD AUTO: 3.51 10*6/MM3 (ref 4.14–5.8)
SODIUM SERPL-SCNC: 142 MMOL/L (ref 134–145)
WBC NRBC COR # BLD: 3.55 10*3/MM3 (ref 3.4–10.8)

## 2023-05-30 PROCEDURE — 85025 COMPLETE CBC W/AUTO DIFF WBC: CPT

## 2023-05-30 PROCEDURE — 36415 COLL VENOUS BLD VENIPUNCTURE: CPT

## 2023-05-30 PROCEDURE — 96366 THER/PROPH/DIAG IV INF ADDON: CPT

## 2023-05-30 PROCEDURE — 84100 ASSAY OF PHOSPHORUS: CPT

## 2023-05-30 PROCEDURE — 25010000002 MAGNESIUM SULFATE IN D5W 1G/100ML (PREMIX) 1-5 GM/100ML-% SOLUTION: Performed by: INTERNAL MEDICINE

## 2023-05-30 PROCEDURE — 83735 ASSAY OF MAGNESIUM: CPT

## 2023-05-30 PROCEDURE — 80053 COMPREHEN METABOLIC PANEL: CPT

## 2023-05-30 PROCEDURE — 25010000002 MAGNESIUM SULFATE 2 GM/50ML SOLUTION: Performed by: INTERNAL MEDICINE

## 2023-05-30 PROCEDURE — 96365 THER/PROPH/DIAG IV INF INIT: CPT

## 2023-05-30 RX ORDER — MAGNESIUM SULFATE HEPTAHYDRATE 40 MG/ML
2 INJECTION, SOLUTION INTRAVENOUS ONCE
Status: COMPLETED | OUTPATIENT
Start: 2023-05-30 | End: 2023-05-30

## 2023-05-30 RX ORDER — MAGNESIUM SULFATE 1 G/100ML
1 INJECTION INTRAVENOUS ONCE
Status: COMPLETED | OUTPATIENT
Start: 2023-05-30 | End: 2023-05-30

## 2023-05-30 RX ADMIN — MAGNESIUM SULFATE HEPTAHYDRATE 2 G: 2 INJECTION, SOLUTION INTRAVENOUS at 15:44

## 2023-05-30 RX ADMIN — MAGNESIUM SULFATE IN DEXTROSE 1 G: 10 INJECTION, SOLUTION INTRAVENOUS at 15:09

## 2023-05-31 LAB
ALBUMIN SERPL ELPH-MCNC: 3.3 G/DL (ref 2.9–4.4)
ALBUMIN/GLOB SERPL: 1.4 {RATIO} (ref 0.7–1.7)
ALPHA1 GLOB SERPL ELPH-MCNC: 0.2 G/DL (ref 0–0.4)
ALPHA2 GLOB SERPL ELPH-MCNC: 0.8 G/DL (ref 0.4–1)
B-GLOBULIN SERPL ELPH-MCNC: 0.8 G/DL (ref 0.7–1.3)
GAMMA GLOB SERPL ELPH-MCNC: 0.6 G/DL (ref 0.4–1.8)
GLOBULIN SER-MCNC: 2.4 G/DL (ref 2.2–3.9)
IGA SERPL-MCNC: 63 MG/DL (ref 61–437)
IGG SERPL-MCNC: 590 MG/DL (ref 603–1613)
IGM SERPL-MCNC: 15 MG/DL (ref 20–172)
INTERPRETATION SERPL IEP-IMP: ABNORMAL
KAPPA LC FREE SER-MCNC: 63.7 MG/L (ref 3.3–19.4)
KAPPA LC FREE/LAMBDA FREE SER: 1.77 {RATIO} (ref 0.26–1.65)
LABORATORY COMMENT REPORT: ABNORMAL
LAMBDA LC FREE SERPL-MCNC: 36 MG/L (ref 5.7–26.3)
M PROTEIN SERPL ELPH-MCNC: ABNORMAL G/DL
PROT SERPL-MCNC: 5.7 G/DL (ref 6–8.5)

## 2023-06-06 ENCOUNTER — TELEPHONE (OUTPATIENT)
Dept: ENDOCRINOLOGY | Age: 68
End: 2023-06-06

## 2023-06-06 NOTE — TELEPHONE ENCOUNTER
PT CALLED AND STATED [facility] CALLED HIM AND STATED THEY NEED MORE INFORMATION FROM US ON HIS DEXCOM SENSOR RX. PT ONLY HAS 48 HOURS OF HIS SENSORS LEFT AND NEEDS TO GET HIS ORDER FILLED.      Continuous Blood Gluc Sensor (Dexcom G6 Sensor) [[id number]]         GOING TO:    [id number] - [facility] [address] [place] [id number] - [phone] [facility] [phone] FX

## 2023-06-06 NOTE — TELEPHONE ENCOUNTER
6/6 called and ashley Carrasquillo @ [facility] pt has been approved she is sending at email to the pts local [facility]

## 2023-06-08 DIAGNOSIS — C90.00 MULTIPLE MYELOMA NOT HAVING ACHIEVED REMISSION: ICD-10-CM

## 2023-06-12 ENCOUNTER — INFUSION (OUTPATIENT)
Dept: ONCOLOGY | Facility: HOSPITAL | Age: 68
End: 2023-06-12
Payer: MEDICARE

## 2023-06-12 ENCOUNTER — SPECIALTY PHARMACY (OUTPATIENT)
Dept: PHARMACY | Facility: HOSPITAL | Age: 68
End: 2023-06-12
Payer: MEDICARE

## 2023-06-12 ENCOUNTER — OFFICE VISIT (OUTPATIENT)
Dept: ONCOLOGY | Facility: CLINIC | Age: 68
End: 2023-06-12
Payer: MEDICARE

## 2023-06-12 VITALS
HEART RATE: 67 BPM | OXYGEN SATURATION: 96 % | WEIGHT: 310.1 LBS | SYSTOLIC BLOOD PRESSURE: 146 MMHG | DIASTOLIC BLOOD PRESSURE: 87 MMHG | RESPIRATION RATE: 16 BRPM | BODY MASS INDEX: 38.56 KG/M2 | HEIGHT: 75 IN | TEMPERATURE: 96.8 F

## 2023-06-12 DIAGNOSIS — E83.51 HYPOCALCEMIA: ICD-10-CM

## 2023-06-12 DIAGNOSIS — E83.42 HYPOMAGNESEMIA: ICD-10-CM

## 2023-06-12 DIAGNOSIS — D84.9 IMMUNOCOMPROMISED STATE: ICD-10-CM

## 2023-06-12 DIAGNOSIS — T45.1X5A CHEMOTHERAPY INDUCED NEUTROPENIA: ICD-10-CM

## 2023-06-12 DIAGNOSIS — Z79.899 ENCOUNTER FOR LONG-TERM CURRENT USE OF HIGH RISK MEDICATION: ICD-10-CM

## 2023-06-12 DIAGNOSIS — N18.32 ANEMIA IN STAGE 3B CHRONIC KIDNEY DISEASE: ICD-10-CM

## 2023-06-12 DIAGNOSIS — D69.6 THROMBOCYTOPENIA: ICD-10-CM

## 2023-06-12 DIAGNOSIS — D70.1 CHEMOTHERAPY INDUCED NEUTROPENIA: ICD-10-CM

## 2023-06-12 DIAGNOSIS — D63.1 ANEMIA IN STAGE 3B CHRONIC KIDNEY DISEASE: ICD-10-CM

## 2023-06-12 DIAGNOSIS — C90.00 MULTIPLE MYELOMA NOT HAVING ACHIEVED REMISSION: Primary | ICD-10-CM

## 2023-06-12 DIAGNOSIS — C90.00 MULTIPLE MYELOMA NOT HAVING ACHIEVED REMISSION: ICD-10-CM

## 2023-06-12 LAB
ALBUMIN SERPL-MCNC: 3.5 G/DL (ref 3.5–5.2)
ALBUMIN/GLOB SERPL: 1.5 G/DL (ref 1.1–2.4)
ALP SERPL-CCNC: 56 U/L (ref 38–116)
ALT SERPL W P-5'-P-CCNC: 12 U/L (ref 0–41)
ANION GAP SERPL CALCULATED.3IONS-SCNC: 12.3 MMOL/L (ref 5–15)
AST SERPL-CCNC: 10 U/L (ref 0–40)
BASOPHILS # BLD AUTO: 0.06 10*3/MM3 (ref 0–0.2)
BASOPHILS NFR BLD AUTO: 1.6 % (ref 0–1.5)
BILIRUB SERPL-MCNC: 0.4 MG/DL (ref 0.2–1.2)
BUN SERPL-MCNC: 39 MG/DL (ref 6–20)
BUN/CREAT SERPL: 11.7 (ref 7.3–30)
CALCIUM SPEC-SCNC: 8.4 MG/DL (ref 8.5–10.2)
CHLORIDE SERPL-SCNC: 103 MMOL/L (ref 98–107)
CO2 SERPL-SCNC: 23.7 MMOL/L (ref 22–29)
CREAT SERPL-MCNC: 3.33 MG/DL (ref 0.7–1.3)
DEPRECATED RDW RBC AUTO: 53.7 FL (ref 37–54)
EGFRCR SERPLBLD CKD-EPI 2021: 19.7 ML/MIN/1.73
EOSINOPHIL # BLD AUTO: 0.13 10*3/MM3 (ref 0–0.4)
EOSINOPHIL NFR BLD AUTO: 3.4 % (ref 0.3–6.2)
ERYTHROCYTE [DISTWIDTH] IN BLOOD BY AUTOMATED COUNT: 15.7 % (ref 12.3–15.4)
FERRITIN SERPL-MCNC: 269.4 NG/ML (ref 30–400)
GLOBULIN UR ELPH-MCNC: 2.4 GM/DL (ref 1.8–3.5)
GLUCOSE SERPL-MCNC: 269 MG/DL (ref 74–124)
HCT VFR BLD AUTO: 32.2 % (ref 37.5–51)
HGB BLD-MCNC: 10.3 G/DL (ref 13–17.7)
IMM GRANULOCYTES # BLD AUTO: 0.02 10*3/MM3 (ref 0–0.05)
IMM GRANULOCYTES NFR BLD AUTO: 0.5 % (ref 0–0.5)
IRON 24H UR-MRATE: 73 MCG/DL (ref 59–158)
IRON SATN MFR SERPL: 27 % (ref 14–48)
LYMPHOCYTES # BLD AUTO: 0.86 10*3/MM3 (ref 0.7–3.1)
LYMPHOCYTES NFR BLD AUTO: 22.3 % (ref 19.6–45.3)
MAGNESIUM SERPL-MCNC: 1.5 MG/DL (ref 1.8–2.5)
MCH RBC QN AUTO: 30.7 PG (ref 26.6–33)
MCHC RBC AUTO-ENTMCNC: 32 G/DL (ref 31.5–35.7)
MCV RBC AUTO: 95.8 FL (ref 79–97)
MONOCYTES # BLD AUTO: 0.6 10*3/MM3 (ref 0.1–0.9)
MONOCYTES NFR BLD AUTO: 15.5 % (ref 5–12)
NEUTROPHILS NFR BLD AUTO: 2.19 10*3/MM3 (ref 1.7–7)
NEUTROPHILS NFR BLD AUTO: 56.7 % (ref 42.7–76)
NRBC BLD AUTO-RTO: 0 /100 WBC (ref 0–0.2)
PHOSPHATE SERPL-MCNC: 3.3 MG/DL (ref 2.5–4.5)
PLATELET # BLD AUTO: 144 10*3/MM3 (ref 140–450)
PMV BLD AUTO: 9.5 FL (ref 6–12)
POTASSIUM SERPL-SCNC: 4.3 MMOL/L (ref 3.5–4.7)
PROT SERPL-MCNC: 5.9 G/DL (ref 6.3–8)
RBC # BLD AUTO: 3.36 10*6/MM3 (ref 4.14–5.8)
SODIUM SERPL-SCNC: 139 MMOL/L (ref 134–145)
TIBC SERPL-MCNC: 273 MCG/DL (ref 249–505)
TRANSFERRIN SERPL-MCNC: 195 MG/DL (ref 200–360)
WBC NRBC COR # BLD: 3.86 10*3/MM3 (ref 3.4–10.8)

## 2023-06-12 PROCEDURE — 83735 ASSAY OF MAGNESIUM: CPT

## 2023-06-12 PROCEDURE — 3079F DIAST BP 80-89 MM HG: CPT | Performed by: INTERNAL MEDICINE

## 2023-06-12 PROCEDURE — 82728 ASSAY OF FERRITIN: CPT | Performed by: INTERNAL MEDICINE

## 2023-06-12 PROCEDURE — 84466 ASSAY OF TRANSFERRIN: CPT | Performed by: INTERNAL MEDICINE

## 2023-06-12 PROCEDURE — 25010000002 MAGNESIUM SULFATE 2 GM/50ML SOLUTION: Performed by: INTERNAL MEDICINE

## 2023-06-12 PROCEDURE — 25010000002 DARATUMUMAB-HYALURONIDASE-FIHJ 1800-30000 MG-UT/15ML SOLUTION: Performed by: INTERNAL MEDICINE

## 2023-06-12 PROCEDURE — 96365 THER/PROPH/DIAG IV INF INIT: CPT

## 2023-06-12 PROCEDURE — 85025 COMPLETE CBC W/AUTO DIFF WBC: CPT

## 2023-06-12 PROCEDURE — 3077F SYST BP >= 140 MM HG: CPT | Performed by: INTERNAL MEDICINE

## 2023-06-12 PROCEDURE — 1159F MED LIST DOCD IN RCRD: CPT | Performed by: INTERNAL MEDICINE

## 2023-06-12 PROCEDURE — 83540 ASSAY OF IRON: CPT | Performed by: INTERNAL MEDICINE

## 2023-06-12 PROCEDURE — 1126F AMNT PAIN NOTED NONE PRSNT: CPT | Performed by: INTERNAL MEDICINE

## 2023-06-12 PROCEDURE — 99214 OFFICE O/P EST MOD 30 MIN: CPT | Performed by: INTERNAL MEDICINE

## 2023-06-12 PROCEDURE — 84100 ASSAY OF PHOSPHORUS: CPT

## 2023-06-12 PROCEDURE — 80053 COMPREHEN METABOLIC PANEL: CPT

## 2023-06-12 PROCEDURE — 96401 CHEMO ANTI-NEOPL SQ/IM: CPT

## 2023-06-12 PROCEDURE — 1160F RVW MEDS BY RX/DR IN RCRD: CPT | Performed by: INTERNAL MEDICINE

## 2023-06-12 RX ORDER — ACETAMINOPHEN 500 MG
1000 TABLET ORAL ONCE
Status: CANCELLED | OUTPATIENT
Start: 2023-06-12

## 2023-06-12 RX ORDER — DEXAMETHASONE 4 MG/1
TABLET ORAL
Qty: 15 TABLET | Refills: 1 | Status: SHIPPED | OUTPATIENT
Start: 2023-06-12

## 2023-06-12 RX ORDER — FAMOTIDINE 20 MG/1
20 TABLET, FILM COATED ORAL ONCE
Status: CANCELLED
Start: 2023-06-12 | End: 2023-06-12

## 2023-06-12 RX ORDER — MEPERIDINE HYDROCHLORIDE 25 MG/ML
25 INJECTION INTRAMUSCULAR; INTRAVENOUS; SUBCUTANEOUS
OUTPATIENT
Start: 2023-07-10

## 2023-06-12 RX ORDER — FAMOTIDINE 10 MG/ML
20 INJECTION, SOLUTION INTRAVENOUS AS NEEDED
OUTPATIENT
Start: 2023-07-10

## 2023-06-12 RX ORDER — FAMOTIDINE 10 MG/ML
20 INJECTION, SOLUTION INTRAVENOUS AS NEEDED
Status: CANCELLED | OUTPATIENT
Start: 2023-06-12

## 2023-06-12 RX ORDER — DIPHENHYDRAMINE HCL 25 MG
25 CAPSULE ORAL ONCE
Status: DISCONTINUED | OUTPATIENT
Start: 2023-06-12 | End: 2023-06-12 | Stop reason: HOSPADM

## 2023-06-12 RX ORDER — MAGNESIUM SULFATE HEPTAHYDRATE 40 MG/ML
2 INJECTION, SOLUTION INTRAVENOUS ONCE
Status: COMPLETED | OUTPATIENT
Start: 2023-06-12 | End: 2023-06-12

## 2023-06-12 RX ORDER — DIPHENHYDRAMINE HCL 25 MG
25 CAPSULE ORAL ONCE
Status: CANCELLED | OUTPATIENT
Start: 2023-06-12

## 2023-06-12 RX ORDER — DIPHENHYDRAMINE HCL 25 MG
25 CAPSULE ORAL ONCE
OUTPATIENT
Start: 2023-07-10

## 2023-06-12 RX ORDER — DIPHENHYDRAMINE HYDROCHLORIDE 50 MG/ML
50 INJECTION INTRAMUSCULAR; INTRAVENOUS AS NEEDED
Status: CANCELLED | OUTPATIENT
Start: 2023-06-12

## 2023-06-12 RX ORDER — DIPHENHYDRAMINE HYDROCHLORIDE 50 MG/ML
50 INJECTION INTRAMUSCULAR; INTRAVENOUS AS NEEDED
OUTPATIENT
Start: 2023-07-10

## 2023-06-12 RX ORDER — FAMOTIDINE 20 MG/1
20 TABLET, FILM COATED ORAL ONCE
Status: DISCONTINUED | OUTPATIENT
Start: 2023-06-12 | End: 2023-06-12 | Stop reason: HOSPADM

## 2023-06-12 RX ORDER — ACETAMINOPHEN 500 MG
1000 TABLET ORAL ONCE
Status: DISCONTINUED | OUTPATIENT
Start: 2023-06-12 | End: 2023-06-12 | Stop reason: HOSPADM

## 2023-06-12 RX ORDER — MEPERIDINE HYDROCHLORIDE 25 MG/ML
25 INJECTION INTRAMUSCULAR; INTRAVENOUS; SUBCUTANEOUS
Status: CANCELLED | OUTPATIENT
Start: 2023-06-12

## 2023-06-12 RX ORDER — ACETAMINOPHEN 500 MG
1000 TABLET ORAL ONCE
OUTPATIENT
Start: 2023-07-10

## 2023-06-12 RX ORDER — FAMOTIDINE 20 MG/1
20 TABLET, FILM COATED ORAL ONCE
Start: 2023-07-10 | End: 2023-07-10

## 2023-06-12 RX ADMIN — DARATUMUMAB AND HYALURONIDASE-FIHJ (HUMAN RECOMBINANT) 1800 MG: 1800; 30000 INJECTION SUBCUTANEOUS at 09:49

## 2023-06-12 RX ADMIN — MAGNESIUM SULFATE HEPTAHYDRATE 2 G: 40 INJECTION, SOLUTION INTRAVENOUS at 09:39

## 2023-06-12 NOTE — PROGRESS NOTES
Subjective     CHIEF COMPLAINT:      Chief Complaint   Patient presents with    Med Refill     Decardran    Follow-up     No concerns     HISTORY OF PRESENT ILLNESS:     Contreras Albert is a 67 y.o. male patient who returns today for follow up on his multiple myeloma.  He returns today for follow-up accompanied by his wife.  He reports developing some diarrhea after he received the last IV magnesium infusion.  Otherwise, the diarrhea has significantly improved.  He has chronic swelling of both legs.  No recent changes.  He saw his nephrologist recently.  His kidney function status was stable.    ROS:  Pertinent ROS is in the HPI.     Past medical, surgical, social and family history were reviewed.     MEDICATIONS:    Current Outpatient Medications:     acetaminophen (TYLENOL) 325 MG tablet, Take 2 tablets by mouth Every 6 (Six) Hours As Needed for Mild Pain., Disp: , Rfl:     acyclovir (ZOVIRAX) 800 MG tablet, Take 1 tablet by mouth Daily., Disp: 90 tablet, Rfl: 2    ASPIRIN 81 PO, Take 81 mg by mouth Daily., Disp: , Rfl:     atorvastatin (LIPITOR) 10 MG tablet, TAKE 1 TABLET BY MOUTH EVERY NIGHT AT BEDTIME AND AFTER THE EVENING MEAL, Disp: 90 tablet, Rfl: 1    Blood Glucose Monitoring Suppl (FREESTYLE FREEDOM LITE) w/Device kit, 1 kit Daily. Indications: Type 2 Diabetes, Disp: 1 each, Rfl: 0    bumetanide (BUMEX) 2 MG tablet, Take 0.5 tablets by mouth 2 (Two) Times a Day. (Patient taking differently: Take 1 tablet by mouth Daily.), Disp: , Rfl:     calcium carbonate (TUMS) 500 MG chewable tablet, Chew 3 tablets Daily., Disp: , Rfl:     carvedilol (COREG) 12.5 MG tablet, Take 1 tablet by mouth 2 (Two) Times a Day With Meals., Disp: , Rfl:     Continuous Blood Gluc Sensor (Dexcom G6 Sensor), Replace sensor every 10 days.  Dx: E 11.22, Disp: 9 each, Rfl: 1    Continuous Blood Gluc Transmit (Dexcom G6 Transmitter) misc, 1 each Every 3 (Three) Months. Dx: E 11.22, Disp: 1 each, Rfl: 1    DARATUMUMAB IV, Infuse  into a venous  catheter., Disp: , Rfl:     dexamethasone (DECADRON) 4 MG tablet, Take 3 tablets on day of chemo, 1 tablet on day #2 and 1 tablet on day #3 and repeat monthly., Disp: 15 tablet, Rfl: 1    diphenoxylate-atropine (LOMOTIL) 2.5-0.025 MG per tablet, Take 2 tablets by mouth 4 (Four) Times a Day As Needed for Diarrhea., Disp: 120 tablet, Rfl: 0    escitalopram (LEXAPRO) 5 MG tablet, TAKE 1 TABLET BY MOUTH EVERY DAY, Disp: 30 tablet, Rfl: 5    finasteride (PROSCAR) 5 MG tablet, Take 1 tablet by mouth Daily., Disp: , Rfl:     gabapentin (NEURONTIN) 300 MG capsule, Take 1 capsule in AM and take 2 capsules at bedtime., Disp: 90 capsule, Rfl: 5    glucagon (GLUCAGEN) 1 MG injection, Inject 1 mg into the appropriate muscle as directed by prescriber 1 (One) Time As Needed for Low Blood Sugar for up to 1 dose. Follow package directions for low blood sugar., Disp: 1 kit, Rfl: 3    glucose blood (FREESTYLE LITE) test strip, Use to test blood sugar 4-5 times daily., Disp: 200 each, Rfl: 5    Insulin Lispro, 1 Unit Dial, (HumaLOG KwikPen) 100 UNIT/ML solution pen-injector, NONCHEMO DAYS:10U THREE TIMES DAILY BEFORE MEALS&1 UNIT FOR EVERY 50>150. CHEOMDAYS 15U THREE TIMES DAILY BEFORE MEALS& 2 UNIT FOR 50>150, Disp: 27 mL, Rfl: 3    Insulin Pen Needle 32G X 4 MM misc, USING 4 PEN NEEDLES DAILY, Disp: 400 each, Rfl: 3    Lancets (freestyle) lancets, Use to check blood sugar 4 times daily, Disp: 400 each, Rfl: 2    Lantus SoloStar 100 UNIT/ML injection pen, Inject 70 Units under the skin into the appropriate area as directed Daily for 90 days. Non-Chemo Days take 16 units twice daily. Chemo Days take 30 units twice daily. 16-30 units twice daily for max daily dose of 70 units daily., Disp: 63 mL, Rfl: 1    loperamide (IMODIUM) 2 MG capsule, Take 1 capsule by mouth 4 (Four) Times a Day As Needed for Diarrhea., Disp: , Rfl:     ondansetron (ZOFRAN) 8 MG tablet, Take 1 tablet by mouth 3 (Three) Times a Day As Needed for Nausea or  "Vomiting., Disp: 30 tablet, Rfl: 5    pantoprazole (PROTONIX) 40 MG EC tablet, TAKE ONE TABLET BY MOUTH DAILY, Disp: 30 tablet, Rfl: 1    pomalidomide (POMALYST) 2 MG chemo capsule, Take 1 capsule by mouth Daily. Take for 21 days on, then 7 days off., Disp: 21 capsule, Rfl: 0    tamsulosin (FLOMAX) 0.4 MG capsule 24 hr capsule, Take 1 capsule by mouth Daily., Disp: , Rfl:     vitamin D (ERGOCALCIFEROL) 1.25 MG (39522 UT) capsule capsule, TAKE 1 CAPSULE BY MOUTH ONCE A WEEK ON MONDAY, Disp: 13 capsule, Rfl: 1    magnesium chloride ER (MAG-DELAY) 70 MG tablet delayed-release CR tablet, Take 1 tablet by mouth Daily. (Patient not taking: Reported on 6/12/2023), Disp: 90 tablet, Rfl: 1  Objective     VITAL SIGNS:     Vitals:    06/12/23 0847   BP: 146/87   Pulse: 67   Resp: 16   Temp: 96.8 °F (36 °C)   TempSrc: Temporal   SpO2: 96%   Weight: (!) 141 kg (310 lb 1.6 oz)   Height: 190 cm (74.8\")   PainSc: 0-No pain     Body mass index is 38.96 kg/m².     Wt Readings from Last 5 Encounters:   06/12/23 (!) 141 kg (310 lb 1.6 oz)   05/30/23 (!) 141 kg (310 lb 3.2 oz)   05/23/23 (!) 140 kg (308 lb 3.2 oz)   05/15/23 (!) 138 kg (304 lb 4.8 oz)   05/01/23 (!) 144 kg (317 lb 3.2 oz)     PHYSICAL EXAMINATION:   GENERAL: The patient appears in fair general condition, not in acute distress.   SKIN: No ecchymosis.  EYES: No jaundice. No pallor.  CHEST: Normal respiratory effort.  Lungs clear bilaterally.  No added sounds.  CVS: Normal S1-S2.  No murmurs.  ABDOMEN: Soft. No tenderness. No Hepatomegaly. No Splenomegaly. No masses.  EXTREMITIES: Trace edema bilaterally.  Chronic erythema of the legs.  No warmth.    DIAGNOSTIC DATA:     Results from last 7 days   Lab Units 06/12/23  0831   WBC 10*3/mm3 3.86   NEUTROS ABS 10*3/mm3 2.19   HEMOGLOBIN g/dL 10.3*   HEMATOCRIT % 32.2*   PLATELETS 10*3/mm3 144     Results from last 7 days   Lab Units 06/12/23  0831   SODIUM mmol/L 139   POTASSIUM mmol/L 4.3   CHLORIDE mmol/L 103   CO2 mmol/L " 23.7   BUN mg/dL 39*   CREATININE mg/dL 3.33*   CALCIUM mg/dL 8.4*   ALBUMIN g/dL 3.5   BILIRUBIN mg/dL 0.4   ALK PHOS U/L 56   ALT (SGPT) U/L 12   AST (SGOT) U/L 10   GLUCOSE mg/dL 269*   MAGNESIUM mg/dL 1.5*             Latest Reference Range & Units Day 1,  Cycle 9  06/12/23   Phosphorus 2.5 - 4.5 mg/dL 3.3     Component      Latest Ref Rng 5/15/2023 5/30/2023   IgG      603 - 1613 mg/dL 696  590 (L)    IgA      61 - 437 mg/dL 70  63    IgM      20 - 172 mg/dL 19 (L)  15 (L)    Total Protein      6.0 - 8.5 g/dL 5.9 (L)  5.7 (L)    Albumin      2.9 - 4.4 g/dL 3.6  3.3    Alpha-1-Globulin      0.0 - 0.4 g/dL 0.2  0.2    Alpha-2-Globulin      0.4 - 1.0 g/dL 0.8  0.8    Beta Globulin      0.7 - 1.3 g/dL 0.8  0.8    Gamma Globulin      0.4 - 1.8 g/dL 0.6  0.6    M-Jurgen      Not Observed g/dL Not Observed  Comment:    Globulin      2.2 - 3.9 g/dL 2.3  2.4    A/G Ratio      0.7 - 1.7  1.6  1.4    Immunofixation Reflex, Serum Comment:  Comment !    Please note Comment  Comment    Kappa FLC      3.3 - 19.4 mg/L 61.9 (H)  63.7 (H)    Free Lambda Light Chains      5.7 - 26.3 mg/L 38.0 (H)  36.0 (H)    Kappa/Lambda Ratio      0.26 - 1.65  1.63  1.77 (H)      Assessment & Plan    *IgG lambda multiple myeloma.  Bone marrow 5/23/2020 hypercellular marrow with 80% involvement of plasma cell myeloma; FISH studies pending  Bone survey 5/26/2020: Diffuse osteoporosis and demineralization.  However, no discrete lytic lesions.  SPEP 5/23/2020: M spike 5.1.  IgG 6629 lambda.  Light chain ratio 0 with free lambda light chains 6400.  Beta-2 microglobulin 16.3.  24-hour urine 12.7 g protein per 24-hour with monoclonal lambda free light chain 33.9%, monoclonal IgG lambda 23.5%  24-hour urine testing from 5/24/2020 free lambda light chains 8.4 g/L  Due to the renal failure, initiated CyBorD 5/28/20.  Given the significantly elevated light chains with the first cycle of treatment plan to give Velcade days 1, 4, 8, 11; Cytoxan 300 PO mg/m²  weekly and dexamethasone 40 mg p.o. days 1, 2, 3, 4, 8 and 15.    After cycle 1 treatment can likely be altered to weekly dosing of each drug.  On 6/18/2020, Velcade was changed to 1.5 mg/m² weekly continuously along with weekly Decadron and oral Cytoxan.  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   Treatment was changed on 8/27/2020 to to Daratumumab subcutaneous injection weekly along with Revlimid 10 mg daily for 21 out of 28-day cycle and Decadron 40 mg daily.  S/p stem cell transplant on 12/9/2020 at [facility].  Day 100 bone marrow on 3/16/2021 revealed minute  plasma cell population.  PET scan on 3/29/2021 was negative.  Patient enrolled in the  clinical trial randomizing to maintenance lenalidomide versus lenalidomide and daratumumab.  Patient was randomized to the Revlimid arm initiated 4/13/2021 dosed at 5 mg daily (due to renal function).  Patient developed neutropenia requiring dose adjustment to 5 mg daily for 21/28 days.  Patient withdrew from clinical trial after 5 cycles on 8/31/2021 due to worsening diarrhea.    He was switched to Velcade maintenance every other week 9/14/2021 which he discontinued 10/26/2021 due to neuropathy and lower extremity edema.    He was subsequently changed to pomalidomide 2 mg daily 21/28 days on 11/8/2021.    1 year post transplant, bone marrow biopsy showed 5-7% plasma cells and due to the residual disease he was started on DPd (daratumumab, pomalidomide, dexamethasone) on 2/21/2022.    Patient was seen by Dr. Romero on 9/12/2022.  At that time patient was cycle 8-day 7 DPd. Due to chronic side effects, dexamethasone was changed from weekly to monthly.    Patient did continue pomalidomide on his own during admission to the hospital, received 1 dose 4 mg p.o. on 9/24/2022 before was subsequently discontinued due to cytopenias.   Labs on 9/24/2022 with IgG 314, IgM less than 25, IgA less than 50.  9/25/2022 24-hour urine free light  chains with free kappa 351, free lambda 104, ratio 3.37  On 9/24/2022, IgG kappa M protein was 0.1 g.  IgG 311, IgA 43, IgM 7.   Low level M spike of IgG kappa likely represents daratumumab.   No IgG lambda M protein.   Serum free light chains on 9/26/2022 with kappa 57.4, lambda 42.8, ratio normal at 1.34.  He received Darzlex on 10/3/2022 at [facility]. He decided to transfer his treatment back to our office.   On 10/13/2022, M protein was too small to quantitate. Kappa/Lambda ratio was 1.34.  Dexamethasone was changed to 12 mg on Mondays, 4 mg on Tuesday and 4 mg on Wednesday.  Pomalyst was restarted at 4 mg every other day to be taken on 10/13/2022, 10/15/2022, 10/17/2022, 10/19/2022, 10/21/2022 and 10/23/2022.  Pomalyst dose was reduced to 3 mg daily for 21 out of a 28-day cycle starting on 10/31/2022.  He tolerated the reduced dose with improvement in the diarrhea.  SPEP GUANAKITO FLC on 12/19/2022 showed no definite M protein.  K/L ratio was 1.46-normal.  He received Darzalex on 12/27/2022 (1 day delay due to Christmas holiday being on 12/26/2022).  Due to neutropenia, the dose of Pomalyst was reduced to 2 mg daily for 21/28-day cycle.  SPEP GUANAKITO FLC on 4/17/2023 revealed no M protein.  Kappa to lambda ratio was 1.67.  SPEP GUANAKITO FLC on 5/30/2023 revealed no M protein.  Kappa to lambda ratio was 1.77.  I reassured the patient that there is no evidence of relapse of the multiple myeloma.  I recommended continuing the current regimen with Darzalex, Pomalyst and dexamethasone.    *Bone health.  He is on Xgeva.   Last dose was on 9/19/2022.  Patient developed severe hypocalcemia following the Xgeva injection.  He required inpatient hospital stay.  Given the duration of Xgeva injections, recommended changing treatment to every 3 months starting December 2022.   I recommended giving Xgeva today and continuing every 3 months.  Last dose of Xgeva was on 4/17/2023.  Next dose will be in 4 weeks.    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  Patient was receiving Procrit at [facility].  Records from [facility] showed that the patient was receiving 70,000 units weekly and received the last dose on 9/27/2022.  Patient was given Procrit 70,000 units on 9/27/2022.  Hemoglobin was 9.9 on 10/17/2022.  He was given Procrit 60,000 units.  Hemoglobin was 9.8 on 11/4/2022.  He was given Procrit 60,000 units.  Labs in March 2023 revealed adequate iron stores.  Last dose of Procrit was 25,000 units on 5/1/2023 for hemoglobin of 9.9.  6/12/2023: Hemoglobin 10.3.    *Thrombocytopenia.  This is attributed to multiple myeloma, prior stem cell transplant and chemotherapy.  5/15/2023: Platelet count 149,000  6/12/2023: Platelets 144,000.    *Neutropenia.  This was attributed to Pomalyst.  Neutrophil count was down to 290 on 9/25/2022.  He was given Neupogen during his hospital stay in September 2022.  Neutrophil count improved afterwards.  However, neutrophil count decreased to 600 on 12/19/2022.  He reports that he was feeling tired and achy at that time.  Neutrophil count was 770 on 1/16/2023.  Due to the recurrent neutropenia, the dose of Pomalyst was reduced to 2 mg in January 2023.  Neutrophil count improved following the dose reduction.  Neutrophil count decreased to 1200 on 3/13/2023.  Neutrophil count is 2190 today.      *Hypocalcemia.  Patient developed severe hypocalcemia due to Xgeva.  Calcium was 5.9 on 9/23/2022.  Calcium improved to 8.1 on 9/28/2022  Calcium improved to 9.6 on 10/13/2022  Calcium decreased to 7.7 on 10/24/2022.  Albumin was 3.8.  Calcium improved to 10.3 on 12/19/2022.  Calcium decreased to 8.4 on 12/27/2022.  Calcium dose was subsequently increased to 3 tablets daily.  Calcium is 8.4 today.    *Hypomagnesemia.  Magnesium was 1.1 on 10/24/2022.  He was placed on oral magnesium oxide 400 mg twice daily.  He developed diarrhea and it was stopped.  He was  started on magnesium chloride on 1/23/2023.  He developed diarrhea about 7 days after starting the magnesium chloride.  Oral magnesium chloride was discontinued.  He currently receives IV magnesium per protocol based on his magnesium level.  5/30/2023: Magnesium 1.4.  6/12/2023: Magnesium 1.5.     *Prophylaxis..  He is on acyclovir 800 mg daily.  He is on ASA 81 mg daily.  He did not have any recent infections.    *Vitamin D deficiency.  He is on vitamin D 50,000 units weekly.  Vitamin D level was 18 on 10/3/2022.  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  He is on gabapentin 300 mg in the morning and 600 mg in the evening.  Neuropathy symptoms in the feet are better as the swelling improved.     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  S/p right partial nephrectomy on 5/18/2016.    PLAN:    1.  We will give Darzalex today.  2.  Continue Pomalyst 2 mg daily for 21 days of a 28-day cycle.  3.  We will give IV magnesium 2 g today.  4.  Continue calcium carbonate 3 tablets daily.  5.  Continue aspirin and acyclovir.  6.  Return every 2 weeks for CBC CMP and stat magnesium.  He will receive IV magnesium per protocol.  He will receive Procrit if hemoglobin is <10.  7.  Return in 4 weeks for follow-up with the NP.  He will be scheduled to receive the next dose of Darzalex.  He will be also scheduled to receive Xgeva.  8.  I will see him in follow-up in 8 weeks.        Red eDl Valle MD  06/12/23

## 2023-06-12 NOTE — NURSING NOTE
Mag 1.5.  2 gms IV Magnesium ordered per protocol.  Creat elevated.  Treated previously with elevated creat.   Lab Results   Component Value Date    GLUCOSE 269 (H) 06/12/2023    BUN 39 (H) 06/12/2023    CREATININE 3.33 (C) 06/12/2023    EGFRRESULT 19.0 (L) 09/22/2022    EGFR 19.7 (L) 06/12/2023    BCR 11.7 06/12/2023    K 4.3 06/12/2023    CO2 23.7 06/12/2023    CALCIUM 8.4 (L) 06/12/2023    PROTENTOTREF 5.7 (L) 05/30/2023    ALBUMIN 3.5 06/12/2023    BILITOT 0.4 06/12/2023    AST 10 06/12/2023    ALT 12 06/12/2023      Lab Results   Component Value Date    WBC 3.86 06/12/2023    HGB 10.3 (L) 06/12/2023    HCT 32.2 (L) 06/12/2023    MCV 95.8 06/12/2023     06/12/2023    No procrit today.  Hgb  10.3

## 2023-06-12 NOTE — PROGRESS NOTES
Re: Refills of Oral Specialty Medication - Pomalyst (pomalidomide)    Drug-Drug Interactions: The current medication list was reviewed and there are no relevant drug-drug interactions.  Medication Allergies: The patient has no relevant allergies as it relates to their oral specialty medication  Review of Labs/Dose Adjustments: NO DOSE CHANGE - I reviewed the most recent note and labs and the patient will continue without any dose changes.  I sent refills as described below.    Drug: Pomalyst (pomalidomide)  Strength: 2 mg  Directions: Take one capsule by mouth daily for 21 days on then 7 days off  Quantity: 21  Refills: 0  Pharmacy prescription sent to: [facility]    Name/Credentials: Yolande Huitron, Evan, BCPS    6/12/2023  10:56 EDT         Completed independent double check on medication order/RX.    Carlos Clarke, YumikoD, BCOP

## 2023-06-13 ENCOUNTER — SPECIALTY PHARMACY (OUTPATIENT)
Dept: PHARMACY | Facility: HOSPITAL | Age: 68
End: 2023-06-13
Payer: MEDICARE

## 2023-07-25 ENCOUNTER — INFUSION (OUTPATIENT)
Dept: ONCOLOGY | Facility: HOSPITAL | Age: 68
End: 2023-07-25
Payer: MEDICARE

## 2023-07-25 ENCOUNTER — OFFICE VISIT (OUTPATIENT)
Dept: ONCOLOGY | Facility: CLINIC | Age: 68
End: 2023-07-25
Payer: MEDICARE

## 2023-07-25 VITALS — HEART RATE: 79 BPM | TEMPERATURE: 97.3 F | DIASTOLIC BLOOD PRESSURE: 67 MMHG | SYSTOLIC BLOOD PRESSURE: 111 MMHG

## 2023-07-25 DIAGNOSIS — C90.00 MULTIPLE MYELOMA NOT HAVING ACHIEVED REMISSION: ICD-10-CM

## 2023-07-25 DIAGNOSIS — H53.9 CHANGES IN VISION: ICD-10-CM

## 2023-07-25 DIAGNOSIS — D69.6 THROMBOCYTOPENIA: ICD-10-CM

## 2023-07-25 DIAGNOSIS — E83.42 HYPOMAGNESEMIA: ICD-10-CM

## 2023-07-25 DIAGNOSIS — E83.51 HYPOCALCEMIA: ICD-10-CM

## 2023-07-25 DIAGNOSIS — C90.00 MULTIPLE MYELOMA NOT HAVING ACHIEVED REMISSION: Primary | ICD-10-CM

## 2023-07-25 DIAGNOSIS — R29.6 MULTIPLE FALLS: ICD-10-CM

## 2023-07-25 DIAGNOSIS — D84.9 IMMUNOCOMPROMISED STATE: ICD-10-CM

## 2023-07-25 DIAGNOSIS — D63.1 ANEMIA IN STAGE 3B CHRONIC KIDNEY DISEASE: ICD-10-CM

## 2023-07-25 DIAGNOSIS — T45.1X5A CHEMOTHERAPY INDUCED NEUTROPENIA: ICD-10-CM

## 2023-07-25 DIAGNOSIS — N18.32 ANEMIA IN STAGE 3B CHRONIC KIDNEY DISEASE: ICD-10-CM

## 2023-07-25 DIAGNOSIS — D70.1 CHEMOTHERAPY INDUCED NEUTROPENIA: ICD-10-CM

## 2023-07-25 LAB
ALBUMIN SERPL-MCNC: 3.4 G/DL (ref 3.5–5.2)
ALBUMIN/GLOB SERPL: 2.3 G/DL
ALP SERPL-CCNC: 41 U/L (ref 39–117)
ALT SERPL W P-5'-P-CCNC: 6 U/L (ref 1–41)
ANION GAP SERPL CALCULATED.3IONS-SCNC: 14.8 MMOL/L (ref 5–15)
AST SERPL-CCNC: 9 U/L (ref 1–40)
BASOPHILS # BLD AUTO: 0.08 10*3/MM3 (ref 0–0.2)
BASOPHILS # BLD AUTO: 0.08 10*3/MM3 (ref 0–0.2)
BASOPHILS NFR BLD AUTO: 1.7 % (ref 0–1.5)
BASOPHILS NFR BLD AUTO: 1.8 % (ref 0–1.5)
BILIRUB SERPL-MCNC: 0.5 MG/DL (ref 0–1.2)
BUN SERPL-MCNC: 37 MG/DL (ref 8–23)
BUN/CREAT SERPL: 12.5 (ref 7–25)
CALCIUM SPEC-SCNC: 8.8 MG/DL (ref 8.6–10.5)
CHLORIDE SERPL-SCNC: 103 MMOL/L (ref 98–107)
CO2 SERPL-SCNC: 23.2 MMOL/L (ref 22–29)
CREAT SERPL-MCNC: 2.95 MG/DL (ref 0.7–1.3)
DEPRECATED RDW RBC AUTO: 54.4 FL (ref 37–54)
DEPRECATED RDW RBC AUTO: 54.7 FL (ref 37–54)
EGFRCR SERPLBLD CKD-EPI 2021: 22.8 ML/MIN/1.73
EOSINOPHIL # BLD AUTO: 0.17 10*3/MM3 (ref 0–0.4)
EOSINOPHIL # BLD AUTO: 0.19 10*3/MM3 (ref 0–0.4)
EOSINOPHIL NFR BLD AUTO: 3.6 % (ref 0.3–6.2)
EOSINOPHIL NFR BLD AUTO: 4.4 % (ref 0.3–6.2)
ERYTHROCYTE [DISTWIDTH] IN BLOOD BY AUTOMATED COUNT: 15.6 % (ref 12.3–15.4)
ERYTHROCYTE [DISTWIDTH] IN BLOOD BY AUTOMATED COUNT: 15.8 % (ref 12.3–15.4)
GLOBULIN UR ELPH-MCNC: 1.5 GM/DL
GLUCOSE SERPL-MCNC: 197 MG/DL (ref 65–99)
HCT VFR BLD AUTO: 31.5 % (ref 37.5–51)
HCT VFR BLD AUTO: 32.6 % (ref 37.5–51)
HGB BLD-MCNC: 10.2 G/DL (ref 13–17.7)
HGB BLD-MCNC: 10.5 G/DL (ref 13–17.7)
IMM GRANULOCYTES # BLD AUTO: 0.02 10*3/MM3 (ref 0–0.05)
IMM GRANULOCYTES # BLD AUTO: 0.03 10*3/MM3 (ref 0–0.05)
IMM GRANULOCYTES NFR BLD AUTO: 0.5 % (ref 0–0.5)
IMM GRANULOCYTES NFR BLD AUTO: 0.6 % (ref 0–0.5)
LYMPHOCYTES # BLD AUTO: 0.76 10*3/MM3 (ref 0.7–3.1)
LYMPHOCYTES # BLD AUTO: 0.77 10*3/MM3 (ref 0.7–3.1)
LYMPHOCYTES NFR BLD AUTO: 16.2 % (ref 19.6–45.3)
LYMPHOCYTES NFR BLD AUTO: 17.6 % (ref 19.6–45.3)
MAGNESIUM SERPL-MCNC: 1.7 MG/DL (ref 1.6–2.4)
MCH RBC QN AUTO: 30.5 PG (ref 26.6–33)
MCH RBC QN AUTO: 31 PG (ref 26.6–33)
MCHC RBC AUTO-ENTMCNC: 32.2 G/DL (ref 31.5–35.7)
MCHC RBC AUTO-ENTMCNC: 32.4 G/DL (ref 31.5–35.7)
MCV RBC AUTO: 94.8 FL (ref 79–97)
MCV RBC AUTO: 95.7 FL (ref 79–97)
MONOCYTES # BLD AUTO: 0.59 10*3/MM3 (ref 0.1–0.9)
MONOCYTES # BLD AUTO: 0.6 10*3/MM3 (ref 0.1–0.9)
MONOCYTES NFR BLD AUTO: 12.4 % (ref 5–12)
MONOCYTES NFR BLD AUTO: 13.9 % (ref 5–12)
NEUTROPHILS NFR BLD AUTO: 2.68 10*3/MM3 (ref 1.7–7)
NEUTROPHILS NFR BLD AUTO: 3.1 10*3/MM3 (ref 1.7–7)
NEUTROPHILS NFR BLD AUTO: 61.8 % (ref 42.7–76)
NEUTROPHILS NFR BLD AUTO: 65.5 % (ref 42.7–76)
NRBC BLD AUTO-RTO: 0 /100 WBC (ref 0–0.2)
NRBC BLD AUTO-RTO: 0 /100 WBC (ref 0–0.2)
PHOSPHATE SERPL-MCNC: 3 MG/DL (ref 2.5–4.5)
PLATELET # BLD AUTO: 115 10*3/MM3 (ref 140–450)
PLATELET # BLD AUTO: 133 10*3/MM3 (ref 140–450)
PMV BLD AUTO: 10.4 FL (ref 6–12)
PMV BLD AUTO: 9.6 FL (ref 6–12)
POTASSIUM SERPL-SCNC: 4.4 MMOL/L (ref 3.5–5.2)
PROT SERPL-MCNC: 4.9 G/DL (ref 6–8.5)
RBC # BLD AUTO: 3.29 10*6/MM3 (ref 4.14–5.8)
RBC # BLD AUTO: 3.44 10*6/MM3 (ref 4.14–5.8)
SODIUM SERPL-SCNC: 141 MMOL/L (ref 136–145)
WBC NRBC COR # BLD: 4.33 10*3/MM3 (ref 3.4–10.8)
WBC NRBC COR # BLD: 4.74 10*3/MM3 (ref 3.4–10.8)

## 2023-07-25 PROCEDURE — 36415 COLL VENOUS BLD VENIPUNCTURE: CPT

## 2023-07-25 PROCEDURE — 80053 COMPREHEN METABOLIC PANEL: CPT

## 2023-07-25 PROCEDURE — 84100 ASSAY OF PHOSPHORUS: CPT

## 2023-07-25 PROCEDURE — 96360 HYDRATION IV INFUSION INIT: CPT

## 2023-07-25 PROCEDURE — 85025 COMPLETE CBC W/AUTO DIFF WBC: CPT

## 2023-07-25 PROCEDURE — 83735 ASSAY OF MAGNESIUM: CPT

## 2023-07-25 RX ORDER — SODIUM CHLORIDE 9 MG/ML
1000 INJECTION, SOLUTION INTRAVENOUS ONCE
Status: COMPLETED | OUTPATIENT
Start: 2023-07-25 | End: 2023-07-25

## 2023-07-25 RX ADMIN — SODIUM CHLORIDE 1000 ML: 9 INJECTION, SOLUTION INTRAVENOUS at 12:46

## 2023-07-25 NOTE — PROGRESS NOTES
Subjective     CHIEF COMPLAINT:      No chief complaint on file.    HISTORY OF PRESENT ILLNESS:     Contreras Albert is a 67 y.o. male patient who returned to the office today for repeat labs and IV fluids.  Patient was seen as triage as his wife reports he has had multiple falls in the last month.      After speaking to the patient he reports that he has had more trouble with his gait when he moves from light to dark or dark to light.  He reports he has had ongoing vision issues related to his diabetic retinopathy and cataract surgery.  He continues to follow-up with his eye doctor regularly but does note that he has had more issues with vision.  He notes that his vision does occasionally go in and out intermittently.  Patient does have neuropathy that remains unchanged.  He does feel like when he gets up to walk sometimes his mind is ready to walk but his legs do not respond quickly enough.  He denies having any head injuries or loss of consciousness related to his falls but does note he has had some bumps and bruises from hitting his knees and extremities.     He remains on his Pomalyst 2 mg daily for 21 out of 28 days.  He continues to have some mild diarrhea but notes this is managed with Lomotil.  He continues to eat and drink adequately.  He denies any new signs of pain.  No other concerns noted at this time.      ROS:  Pertinent ROS is in the HPI.     Past medical, surgical, social and family history were reviewed.     MEDICATIONS:    Current Outpatient Medications:     acetaminophen (TYLENOL) 325 MG tablet, Take 2 tablets by mouth Every 6 (Six) Hours As Needed for Mild Pain., Disp: , Rfl:     acyclovir (ZOVIRAX) 800 MG tablet, Take 1 tablet by mouth Daily., Disp: 90 tablet, Rfl: 2    ASPIRIN 81 PO, Take 81 mg by mouth Daily., Disp: , Rfl:     atorvastatin (LIPITOR) 10 MG tablet, TAKE 1 TABLET BY MOUTH EVERY NIGHT AT BEDTIME AND AFTER THE EVENING MEAL, Disp: 90 tablet, Rfl: 1    Blood Glucose Monitoring Suppl  (FREESTYLE FREEDOM LITE) w/Device kit, 1 kit Daily. Indications: Type 2 Diabetes, Disp: 1 each, Rfl: 0    bumetanide (BUMEX) 2 MG tablet, Take 0.5 tablets by mouth 2 (Two) Times a Day. (Patient taking differently: Take 1 tablet by mouth Daily.), Disp: , Rfl:     calcium carbonate (TUMS) 500 MG chewable tablet, Chew 3 tablets Daily., Disp: , Rfl:     carvedilol (COREG) 12.5 MG tablet, Take 1 tablet by mouth 2 (Two) Times a Day With Meals., Disp: , Rfl:     Continuous Blood Gluc Sensor (Dexcom G6 Sensor), Replace sensor every 10 days.  Dx: E 11.22, Disp: 9 each, Rfl: 1    Continuous Blood Gluc Transmit (Dexcom G6 Transmitter) misc, 1 each Every 3 (Three) Months. Dx: E 11.22, Disp: 1 each, Rfl: 1    DARATUMUMAB IV, Infuse  into a venous catheter., Disp: , Rfl:     dexamethasone (DECADRON) 4 MG tablet, Take 3 tablets on day of chemo, 1 tablet on day #2 and 1 tablet on day #3 and repeat monthly., Disp: 15 tablet, Rfl: 1    diphenoxylate-atropine (LOMOTIL) 2.5-0.025 MG per tablet, Take 2 tablets by mouth 4 (Four) Times a Day As Needed for Diarrhea., Disp: 120 tablet, Rfl: 0    escitalopram (LEXAPRO) 5 MG tablet, TAKE 1 TABLET BY MOUTH EVERY DAY, Disp: 30 tablet, Rfl: 5    finasteride (PROSCAR) 5 MG tablet, Take 1 tablet by mouth Daily., Disp: , Rfl:     gabapentin (NEURONTIN) 300 MG capsule, TAKE ONE CAPSULE BY MOUTH EVERY MORNING AND TAKE 2 CAPSULES EVERY NIGHT AT BEDTIME, Disp: 90 capsule, Rfl: 0    glucagon (GLUCAGEN) 1 MG injection, Inject 1 mg into the appropriate muscle as directed by prescriber 1 (One) Time As Needed for Low Blood Sugar for up to 1 dose. Follow package directions for low blood sugar., Disp: 1 kit, Rfl: 3    glucose blood (FREESTYLE LITE) test strip, Use to test blood sugar 4-5 times daily., Disp: 200 each, Rfl: 5    Insulin Lispro, 1 Unit Dial, (HumaLOG KwikPen) 100 UNIT/ML solution pen-injector, NONCHEMO DAYS:10U THREE TIMES DAILY BEFORE MEALS&1 UNIT FOR EVERY 50>150. CHEOMDAYS 15U THREE TIMES  DAILY BEFORE MEALS& 2 UNIT FOR 50>150, Disp: 27 mL, Rfl: 3    Insulin Pen Needle 32G X 4 MM misc, USING 4 PEN NEEDLES DAILY, Disp: 400 each, Rfl: 3    Lancets (freestyle) lancets, Use to check blood sugar 4 times daily, Disp: 400 each, Rfl: 2    Lantus SoloStar 100 UNIT/ML injection pen, Inject 70 Units under the skin into the appropriate area as directed Daily for 90 days. Non-Chemo Days take 16 units twice daily. Chemo Days take 30 units twice daily. 16-30 units twice daily for max daily dose of 70 units daily., Disp: 63 mL, Rfl: 1    loperamide (IMODIUM) 2 MG capsule, Take 1 capsule by mouth 4 (Four) Times a Day As Needed for Diarrhea., Disp: , Rfl:     magnesium chloride ER (MAG-DELAY) 70 MG tablet delayed-release CR tablet, Take 1 tablet by mouth Daily., Disp: 90 tablet, Rfl: 1    ondansetron (ZOFRAN) 8 MG tablet, Take 1 tablet by mouth 3 (Three) Times a Day As Needed for Nausea or Vomiting., Disp: 30 tablet, Rfl: 5    pantoprazole (PROTONIX) 40 MG EC tablet, TAKE ONE TABLET BY MOUTH DAILY, Disp: 30 tablet, Rfl: 1    pomalidomide (POMALYST) 2 MG chemo capsule, Take 1 capsule by mouth Daily. Take for 21 days on, then 7 days off., Disp: 21 capsule, Rfl: 0    tamsulosin (FLOMAX) 0.4 MG capsule 24 hr capsule, Take 1 capsule by mouth Daily., Disp: , Rfl:     vitamin D (ERGOCALCIFEROL) 1.25 MG (59608 UT) capsule capsule, Take 1 capsule by mouth Every 7 (Seven) Days., Disp: 13 capsule, Rfl: 1  No current facility-administered medications for this visit.  Objective     VITAL SIGNS:     There were no vitals filed for this visit.      There is no height or weight on file to calculate BMI.     Wt Readings from Last 5 Encounters:   07/10/23 (!) 137 kg (303 lb)   06/26/23 (!) 139 kg (306 lb 12.8 oz)   06/12/23 (!) 141 kg (310 lb 1.6 oz)   05/30/23 (!) 141 kg (310 lb 3.2 oz)   05/23/23 (!) 140 kg (308 lb 3.2 oz)     PHYSICAL EXAMINATION:   GENERAL: The patient appears in fair general condition, not in acute distress.   SKIN:  No ecchymosis.  EYES: No jaundice. No pallor.  CHEST: Normal respiratory effort.  Lungs clear bilaterally.  No added sounds.  CVS: Normal S1-S2.  No murmurs.  ABDOMEN: Soft. No tenderness. No masses. Bowels sounds present  EXTREMITIES: Trace edema bilaterally.  Chronic erythema of the legs.  No warmth.  NEURO: Patient responds appropriately.  He does have some delay in finding his words.  Speech is clear.  Strength is equal bilaterally.  Gait is impaired and patient uses cane to ambulate.      I have reexamined the patient and the results are consistent with the previously documented exam. IZZY Sky      DIAGNOSTIC DATA:     Results from last 7 days   Lab Units 07/25/23  1239 07/25/23  1153   WBC 10*3/mm3 4.33 4.74   NEUTROS ABS 10*3/mm3 2.68 3.10   HEMOGLOBIN g/dL 10.2* 10.5*   HEMATOCRIT % 31.5* 32.6*   PLATELETS 10*3/mm3 115* 133*       Results from last 7 days   Lab Units 07/25/23  1239   SODIUM mmol/L 141   POTASSIUM mmol/L 4.4   CHLORIDE mmol/L 103   CO2 mmol/L 23.2   BUN mg/dL 37*   CREATININE mg/dL 2.95*   CALCIUM mg/dL 8.8   ALBUMIN g/dL 3.4*   BILIRUBIN mg/dL 0.5   ALK PHOS U/L 41   ALT (SGPT) U/L 6   AST (SGOT) U/L 9   GLUCOSE mg/dL 197*   MAGNESIUM mg/dL 1.7                 Latest Reference Range & Units Day 1,  Cycle 9  06/12/23   Phosphorus 2.5 - 4.5 mg/dL 3.3     Component      Latest Ref Rng 5/15/2023 5/30/2023   IgG      603 - 1613 mg/dL 696  590 (L)    IgA      61 - 437 mg/dL 70  63    IgM      20 - 172 mg/dL 19 (L)  15 (L)    Total Protein      6.0 - 8.5 g/dL 5.9 (L)  5.7 (L)    Albumin      2.9 - 4.4 g/dL 3.6  3.3    Alpha-1-Globulin      0.0 - 0.4 g/dL 0.2  0.2    Alpha-2-Globulin      0.4 - 1.0 g/dL 0.8  0.8    Beta Globulin      0.7 - 1.3 g/dL 0.8  0.8    Gamma Globulin      0.4 - 1.8 g/dL 0.6  0.6    M-Jurgen      Not Observed g/dL Not Observed  Comment:    Globulin      2.2 - 3.9 g/dL 2.3  2.4    A/G Ratio      0.7 - 1.7  1.6  1.4    Immunofixation Reflex, Serum Comment:   Comment !    Please note Comment  Comment    Kappa FLC      3.3 - 19.4 mg/L 61.9 (H)  63.7 (H)    Free Lambda Light Chains      5.7 - 26.3 mg/L 38.0 (H)  36.0 (H)    Kappa/Lambda Ratio      0.26 - 1.65  1.63  1.77 (H)      Assessment & Plan    *IgG lambda multiple myeloma.  Bone marrow 5/23/2020 hypercellular marrow with 80% involvement of plasma cell myeloma; FISH studies pending  Bone survey 5/26/2020: Diffuse osteoporosis and demineralization.  However, no discrete lytic lesions.  SPEP 5/23/2020: M spike 5.1.  IgG 6629 lambda.  Light chain ratio 0 with free lambda light chains 6400.  Beta-2 microglobulin 16.3.  24-hour urine 12.7 g protein per 24-hour with monoclonal lambda free light chain 33.9%, monoclonal IgG lambda 23.5%  24-hour urine testing from 5/24/2020 free lambda light chains 8.4 g/L  Due to the renal failure, initiated CyBorD 5/28/20.  Given the significantly elevated light chains with the first cycle of treatment plan to give Velcade days 1, 4, 8, 11; Cytoxan 300 PO mg/m² weekly and dexamethasone 40 mg p.o. days 1, 2, 3, 4, 8 and 15.    After cycle 1 treatment can likely be altered to weekly dosing of each drug.  On 6/18/2020, Velcade was changed to 1.5 mg/m² weekly continuously along with weekly Decadron and oral Cytoxan.  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   Treatment was changed on 8/27/2020 to to Daratumumab subcutaneous injection weekly along with Revlimid 10 mg daily for 21 out of 28-day cycle and Decadron 40 mg daily.  S/p stem cell transplant on 12/9/2020 at [facility].  Day 100 bone marrow on 3/16/2021 revealed minute  plasma cell population.  PET scan on 3/29/2021 was negative.  Patient enrolled in the  clinical trial randomizing to maintenance lenalidomide versus lenalidomide and daratumumab.  Patient was randomized to the Revlimid arm initiated 4/13/2021 dosed at 5 mg daily (due to renal function).  Patient developed neutropenia requiring dose  adjustment to 5 mg daily for 21/28 days.  Patient withdrew from clinical trial after 5 cycles on 8/31/2021 due to worsening diarrhea.    He was switched to Velcade maintenance every other week 9/14/2021 which he discontinued 10/26/2021 due to neuropathy and lower extremity edema.    He was subsequently changed to pomalidomide 2 mg daily 21/28 days on 11/8/2021.    1 year post transplant, bone marrow biopsy showed 5-7% plasma cells and due to the residual disease he was started on DPd (daratumumab, pomalidomide, dexamethasone) on 2/21/2022.    Patient was seen by Dr. Romero on 9/12/2022.  At that time patient was cycle 8-day 7 DPd. Due to chronic side effects, dexamethasone was changed from weekly to monthly.    Patient did continue pomalidomide on his own during admission to the hospital, received 1 dose 4 mg p.o. on 9/24/2022 before was subsequently discontinued due to cytopenias.   Labs on 9/24/2022 with IgG 314, IgM less than 25, IgA less than 50.  9/25/2022 24-hour urine free light chains with free kappa 351, free lambda 104, ratio 3.37  On 9/24/2022, IgG kappa M protein was 0.1 g.  IgG 311, IgA 43, IgM 7.   Low level M spike of IgG kappa likely represents daratumumab.   No IgG lambda M protein.   Serum free light chains on 9/26/2022 with kappa 57.4, lambda 42.8, ratio normal at 1.34.  He received Darzlex on 10/3/2022 at [facility]. He decided to transfer his treatment back to our office.   On 10/13/2022, M protein was too small to quantitate. Kappa/Lambda ratio was 1.34.  Dexamethasone was changed to 12 mg on Mondays, 4 mg on Tuesday and 4 mg on Wednesday.  Pomalyst was restarted at 4 mg every other day to be taken on 10/13/2022, 10/15/2022, 10/17/2022, 10/19/2022, 10/21/2022 and 10/23/2022.  Pomalyst dose was reduced to 3 mg daily for 21 out of a 28-day cycle starting on 10/31/2022.  He tolerated the reduced dose with improvement in the diarrhea.  SPEP GUANAKITO FLC on 12/19/2022 showed no definite M protein.  K/L ratio  was 1.46-normal.  He received Darzalex on 12/27/2022 (1 day delay due to Christmas holiday being on 12/26/2022).  Due to neutropenia, the dose of Pomalyst was reduced to 2 mg daily for 21/28-day cycle.  SPEP G[place]NAK[place]O FLC on 4/17/2023 revealed no M protein.  Kappa to lambda ratio was 1.67.  SPEP G[place] FLC on 5/30/2023 revealed no M protein.  Kappa to lambda ratio was 1.77.  Dr. Del Valle reassured the patient that there is no evidence of relapse of the multiple myeloma. Recommended continuing the current regimen with Darzalex, Pomalyst and dexamethasone.  Proceed today, 7/10/2023 with monthly darzalex, continue Pomalyst 2 mg 21 out of 28 days  7/25/2023: Patient is seen for triage visit today with IV fluids and lab reevaluation.  He continues on Pomalyst 2 mg daily for 21 out of 28 days.  He does have noted balance and gait changes with multiple falls over the past month.  He also notes intermittent loss of vision.  Patient was discussed with Dr. Del Valle and he is in agreement to proceed with an MRI of his head without contrast and have patient hold his Pomalyst until follow-up.  Patient and wife have been instructed to go to the emergency room if he has any acute neurological change.    *Bone health.  He is on Xgeva.   Last dose was on 9/19/2022.  Patient developed severe hypocalcemia following the Xgeva injection.  He required inpatient hospital stay.  Given the duration of Xgeva injections, recommended changing treatment to every 3 months starting December 2022.   Recommended giving Xgeva today and continuing every 3 months.  Last dose of Xgeva was on 4/17/2023.  Due for Xgeva today.    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  Patient was receiving Procrit at [facility].  Records from [facility] showed that the patient was receiving 70,000 units weekly and received the last dose on 9/27/2022.  Patient was given Procrit 70,000 units on  9/27/2022.  Hemoglobin was 9.9 on 10/17/2022.  He was given Procrit 60,000 units.  Hemoglobin was 9.8 on 11/4/2022.  He was given Procrit 60,000 units.  Labs in March 2023 revealed adequate iron stores.  Last dose of Procrit was 25,000 units on 5/1/2023 for hemoglobin of 9.9.  7/10/2023 hemoglobin 10.0  7/25/2023: Hemoglobin 10.2    *Thrombocytopenia.  This is attributed to multiple myeloma, prior stem cell transplant and chemotherapy.  5/15/2023: Platelet count 149,000  1/10/2023 platelets 182,000  7/25/2023: Platelets 115,000    *Neutropenia.  This was attributed to Pomalyst.  Neutrophil count was down to 290 on 9/25/2022.  He was given Neupogen during his hospital stay in September 2022.  Neutrophil count improved afterwards.  However, neutrophil count decreased to 600 on 12/19/2022.  He reports that he was feeling tired and achy at that time.  Neutrophil count was 770 on 1/16/2023.  Due to the recurrent neutropenia, the dose of Pomalyst was reduced to 2 mg in January 2023.  Neutrophil count improved following the dose reduction.  Neutrophil count decreased to 1200 on 3/13/2023.  Neutrophil count is 3770 today.    7/25/2023: Absolute neutrophil count 2680    *Hypocalcemia.  Patient developed severe hypocalcemia due to Xgeva.  Calcium was 5.9 on 9/23/2022.  Calcium improved to 8.1 on 9/28/2022  Calcium improved to 9.6 on 10/13/2022  Calcium decreased to 7.7 on 10/24/2022.  Albumin was 3.8.  Calcium improved to 10.3 on 12/19/2022.  Calcium decreased to 8.4 on 12/27/2022.  Calcium dose was subsequently increased to 3 tablets daily.  Calcium is 8.9 today.  7/25/2023: Corrected calcium 9.28    *Hypomagnesemia.  Magnesium was 1.1 on 10/24/2022.  He was placed on oral magnesium oxide 400 mg twice daily.  He developed diarrhea and it was stopped.  He was started on magnesium chloride on 1/23/2023.  He developed diarrhea about 7 days after starting the magnesium chloride.  Oral magnesium chloride was discontinued.  He  currently receives IV magnesium per protocol based on his magnesium level.  Magnesium today, 7/10/2023 1.6  7/25/2023: Kidney exam today is normal at 1.7     *Prophylaxis..  He is on acyclovir 800 mg daily.  He is on ASA 81 mg daily.  He did not have any recent infections.    *Vitamin D deficiency.  He is on vitamin D 50,000 units weekly.  Vitamin D level was 18 on 10/3/2022.  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  He is on gabapentin 300 mg in the morning and 600 mg in the evening.  Neuropathy symptoms in the feet are better as the swelling improved.   Vitamin D refilled today.    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  S/p right partial nephrectomy on 5/18/2016.    *Multiple falls and balance and gait disturbance with continued vision changes  7/25/2023: Patient has had multiple noninjury falls and difficulties with his balance and gait.  He continues to have ongoing issues with his vision and reports increased loss of vision.  We will go ahead and schedule him for an MRI of the brain without contrast.  He has also been instructed to hold his Pomalyst at this time until scheduled follow-up with Dr. Del Valle in 2 weeks.    PLAN:    Patient to be scheduled for MRI of the brain without contrast  Hold Pomalyst 2 mg 21 out of 28 days.   Received 1 L of normal saline today.    Continue calcium carbonate 3 tablets daily  Continue prophylactic aspirin and acyclovir  Continue Lomotil as needed for diarrhea, this was refilled today  Return in 2 weeks with Dr. Del Valle for follow-up, MRI review, monthly Darzalex and possible Procrit.  For CBC, CMP, stat magnesium, IV magnesium per protocol and Procrit if hemoglobin is less than 10.0    Patient is on a high risk medication requiring close monitoring for toxicity    Tushar Flowers, IZZY  07/25/23       Plan of care was discussed with Dr. Del Valle and he is in agreement.

## 2023-07-25 NOTE — NURSING NOTE
"Pt here for possible IV fluids and possible mag. IV started at [facility].  Blood drawn from IV.  1 liter of NS hung while waiting on labs.  Mag 1.7.  No replacement of Mag per protocol.  Creat stable at 2.95.  Giving IVFs.  Pt is diabetic with chronic kidney disease.    Pt reports worsening neuropathy in his hands and feet.  Pt also reports an increase of falls at home.  States his vision sometimes \"comes and goes.\"   Discussed with Tushar MAGANA.  Tushar came out to see and assess patient.    Pt instructed to stop Pomalyst per XI MAGANA.  Pt also scheduled for MRI of the brain.  Pt and wife verbalized understanding.  "

## 2023-07-26 ENCOUNTER — HOSPITAL ENCOUNTER (OUTPATIENT)
Facility: HOSPITAL | Age: 68
Discharge: HOME | End: 2023-07-26
Admitting: NURSE PRACTITIONER
Payer: MEDICARE

## 2023-07-26 DIAGNOSIS — H53.9 CHANGES IN VISION: ICD-10-CM

## 2023-07-26 DIAGNOSIS — R29.6 MULTIPLE FALLS: ICD-10-CM

## 2023-07-26 LAB
ALBUMIN SERPL ELPH-MCNC: 3.3 G/DL (ref 2.9–4.4)
ALBUMIN/GLOB SERPL: 1.7 {RATIO} (ref 0.7–1.7)
ALPHA1 GLOB SERPL ELPH-MCNC: 0.2 G/DL (ref 0–0.4)
ALPHA2 GLOB SERPL ELPH-MCNC: 0.7 G/DL (ref 0.4–1)
B-GLOBULIN SERPL ELPH-MCNC: 0.7 G/DL (ref 0.7–1.3)
GAMMA GLOB SERPL ELPH-MCNC: 0.5 G/DL (ref 0.4–1.8)
GLOBULIN SER-MCNC: 2 G/DL (ref 2.2–3.9)
IGA SERPL-MCNC: 55 MG/DL (ref 61–437)
IGG SERPL-MCNC: 492 MG/DL (ref 603–1613)
IGM SERPL-MCNC: 14 MG/DL (ref 20–172)
INTERPRETATION SERPL IEP-IMP: ABNORMAL
KAPPA LC FREE SER-MCNC: 65.3 MG/L (ref 3.3–19.4)
KAPPA LC FREE/LAMBDA FREE SER: 1.7 {RATIO} (ref 0.26–1.65)
LABORATORY COMMENT REPORT: ABNORMAL
LAMBDA LC FREE SERPL-MCNC: 38.5 MG/L (ref 5.7–26.3)
M PROTEIN SERPL ELPH-MCNC: ABNORMAL G/DL
PROT SERPL-MCNC: 5.3 G/DL (ref 6–8.5)

## 2023-07-26 PROCEDURE — 70551 MRI BRAIN STEM W/O DYE: CPT

## 2023-08-07 ENCOUNTER — OFFICE VISIT (OUTPATIENT)
Dept: ONCOLOGY | Facility: CLINIC | Age: 68
End: 2023-08-07
Payer: MEDICARE

## 2023-08-07 ENCOUNTER — LAB (OUTPATIENT)
Dept: LAB | Facility: HOSPITAL | Age: 68
End: 2023-08-07
Payer: MEDICARE

## 2023-08-07 ENCOUNTER — INFUSION (OUTPATIENT)
Dept: ONCOLOGY | Facility: HOSPITAL | Age: 68
End: 2023-08-07
Payer: MEDICARE

## 2023-08-07 ENCOUNTER — APPOINTMENT (OUTPATIENT)
Dept: ONCOLOGY | Facility: HOSPITAL | Age: 68
End: 2023-08-07
Payer: MEDICARE

## 2023-08-07 VITALS
SYSTOLIC BLOOD PRESSURE: 119 MMHG | TEMPERATURE: 97.1 F | HEART RATE: 69 BPM | HEIGHT: 75 IN | WEIGHT: 307.5 LBS | RESPIRATION RATE: 18 BRPM | DIASTOLIC BLOOD PRESSURE: 76 MMHG | BODY MASS INDEX: 38.23 KG/M2

## 2023-08-07 DIAGNOSIS — D69.6 THROMBOCYTOPENIA: ICD-10-CM

## 2023-08-07 DIAGNOSIS — D70.1 CHEMOTHERAPY INDUCED NEUTROPENIA: ICD-10-CM

## 2023-08-07 DIAGNOSIS — N18.32 ANEMIA IN STAGE 3B CHRONIC KIDNEY DISEASE: ICD-10-CM

## 2023-08-07 DIAGNOSIS — D84.9 IMMUNOCOMPROMISED STATE: ICD-10-CM

## 2023-08-07 DIAGNOSIS — D63.1 ANEMIA IN STAGE 3B CHRONIC KIDNEY DISEASE: ICD-10-CM

## 2023-08-07 DIAGNOSIS — E83.51 HYPOCALCEMIA: ICD-10-CM

## 2023-08-07 DIAGNOSIS — C90.00 MULTIPLE MYELOMA NOT HAVING ACHIEVED REMISSION: Primary | ICD-10-CM

## 2023-08-07 DIAGNOSIS — Z79.899 ENCOUNTER FOR LONG-TERM CURRENT USE OF HIGH RISK MEDICATION: ICD-10-CM

## 2023-08-07 DIAGNOSIS — T45.1X5A CHEMOTHERAPY INDUCED NEUTROPENIA: ICD-10-CM

## 2023-08-07 DIAGNOSIS — E83.42 HYPOMAGNESEMIA: ICD-10-CM

## 2023-08-07 DIAGNOSIS — R53.1 WEAKNESS: ICD-10-CM

## 2023-08-07 DIAGNOSIS — Z86.73 HISTORY OF CVA (CEREBROVASCULAR ACCIDENT): ICD-10-CM

## 2023-08-07 DIAGNOSIS — R42 POSTURAL DIZZINESS WITH PRESYNCOPE: ICD-10-CM

## 2023-08-07 DIAGNOSIS — Z79.899 HIGH RISK MEDICATION USE: ICD-10-CM

## 2023-08-07 DIAGNOSIS — C90.00 MULTIPLE MYELOMA NOT HAVING ACHIEVED REMISSION: ICD-10-CM

## 2023-08-07 DIAGNOSIS — R55 POSTURAL DIZZINESS WITH PRESYNCOPE: ICD-10-CM

## 2023-08-07 LAB
ALBUMIN SERPL-MCNC: 3.6 G/DL (ref 3.5–5.2)
ALBUMIN/GLOB SERPL: 2.1 G/DL
ALP SERPL-CCNC: 37 U/L (ref 39–117)
ALT SERPL W P-5'-P-CCNC: 10 U/L (ref 1–41)
ANION GAP SERPL CALCULATED.3IONS-SCNC: 15.8 MMOL/L (ref 5–15)
AST SERPL-CCNC: 14 U/L (ref 1–40)
BASOPHILS # BLD AUTO: 0.05 10*3/MM3 (ref 0–0.2)
BASOPHILS NFR BLD AUTO: 1 % (ref 0–1.5)
BILIRUB SERPL-MCNC: 0.4 MG/DL (ref 0–1.2)
BUN SERPL-MCNC: 39 MG/DL (ref 8–23)
BUN/CREAT SERPL: 12.9 (ref 7–25)
CALCIUM SPEC-SCNC: 8.1 MG/DL (ref 8.6–10.5)
CHLORIDE SERPL-SCNC: 104 MMOL/L (ref 98–107)
CO2 SERPL-SCNC: 22.2 MMOL/L (ref 22–29)
CREAT SERPL-MCNC: 3.02 MG/DL (ref 0.7–1.3)
DEPRECATED RDW RBC AUTO: 57.1 FL (ref 37–54)
EGFRCR SERPLBLD CKD-EPI 2021: 22 ML/MIN/1.73
EOSINOPHIL # BLD AUTO: 0.17 10*3/MM3 (ref 0–0.4)
EOSINOPHIL NFR BLD AUTO: 3.6 % (ref 0.3–6.2)
ERYTHROCYTE [DISTWIDTH] IN BLOOD BY AUTOMATED COUNT: 16.2 % (ref 12.3–15.4)
GLOBULIN UR ELPH-MCNC: 1.7 GM/DL
GLUCOSE SERPL-MCNC: 168 MG/DL (ref 65–99)
HCT VFR BLD AUTO: 32.9 % (ref 37.5–51)
HGB BLD-MCNC: 10.3 G/DL (ref 13–17.7)
IMM GRANULOCYTES # BLD AUTO: 0.03 10*3/MM3 (ref 0–0.05)
IMM GRANULOCYTES NFR BLD AUTO: 0.6 % (ref 0–0.5)
LYMPHOCYTES # BLD AUTO: 1.13 10*3/MM3 (ref 0.7–3.1)
LYMPHOCYTES NFR BLD AUTO: 23.7 % (ref 19.6–45.3)
MAGNESIUM SERPL-MCNC: 1.8 MG/DL (ref 1.6–2.4)
MCH RBC QN AUTO: 30.3 PG (ref 26.6–33)
MCHC RBC AUTO-ENTMCNC: 31.3 G/DL (ref 31.5–35.7)
MCV RBC AUTO: 96.8 FL (ref 79–97)
MONOCYTES # BLD AUTO: 0.48 10*3/MM3 (ref 0.1–0.9)
MONOCYTES NFR BLD AUTO: 10.1 % (ref 5–12)
NEUTROPHILS NFR BLD AUTO: 2.91 10*3/MM3 (ref 1.7–7)
NEUTROPHILS NFR BLD AUTO: 61 % (ref 42.7–76)
NRBC BLD AUTO-RTO: 0 /100 WBC (ref 0–0.2)
PHOSPHATE SERPL-MCNC: 2.2 MG/DL (ref 2.5–4.5)
PLATELET # BLD AUTO: 152 10*3/MM3 (ref 140–450)
PMV BLD AUTO: 8.6 FL (ref 6–12)
POTASSIUM SERPL-SCNC: 4.1 MMOL/L (ref 3.5–5.2)
PROT SERPL-MCNC: 5.3 G/DL (ref 6–8.5)
RBC # BLD AUTO: 3.4 10*6/MM3 (ref 4.14–5.8)
SODIUM SERPL-SCNC: 142 MMOL/L (ref 136–145)
WBC NRBC COR # BLD: 4.77 10*3/MM3 (ref 3.4–10.8)

## 2023-08-07 PROCEDURE — 96401 CHEMO ANTI-NEOPL SQ/IM: CPT

## 2023-08-07 PROCEDURE — 80053 COMPREHEN METABOLIC PANEL: CPT

## 2023-08-07 PROCEDURE — 25010000002 DARATUMUMAB-HYALURONIDASE-FIHJ 1800-30000 MG-UT/15ML SOLUTION: Performed by: INTERNAL MEDICINE

## 2023-08-07 PROCEDURE — 3074F SYST BP LT 130 MM HG: CPT | Performed by: INTERNAL MEDICINE

## 2023-08-07 PROCEDURE — 85025 COMPLETE CBC W/AUTO DIFF WBC: CPT

## 2023-08-07 PROCEDURE — 36415 COLL VENOUS BLD VENIPUNCTURE: CPT

## 2023-08-07 PROCEDURE — 83735 ASSAY OF MAGNESIUM: CPT

## 2023-08-07 PROCEDURE — 3078F DIAST BP <80 MM HG: CPT | Performed by: INTERNAL MEDICINE

## 2023-08-07 PROCEDURE — 84100 ASSAY OF PHOSPHORUS: CPT

## 2023-08-07 PROCEDURE — 1126F AMNT PAIN NOTED NONE PRSNT: CPT | Performed by: INTERNAL MEDICINE

## 2023-08-07 RX ORDER — FAMOTIDINE 20 MG/1
20 TABLET, FILM COATED ORAL ONCE
Status: DISCONTINUED | OUTPATIENT
Start: 2023-08-07 | End: 2023-08-07 | Stop reason: HOSPADM

## 2023-08-07 RX ORDER — MEPERIDINE HYDROCHLORIDE 25 MG/ML
25 INJECTION INTRAMUSCULAR; INTRAVENOUS; SUBCUTANEOUS
Status: CANCELLED | OUTPATIENT
Start: 2023-08-07

## 2023-08-07 RX ORDER — FAMOTIDINE 10 MG/ML
20 INJECTION, SOLUTION INTRAVENOUS AS NEEDED
Status: CANCELLED | OUTPATIENT
Start: 2023-08-07

## 2023-08-07 RX ORDER — DIPHENHYDRAMINE HCL 25 MG
25 CAPSULE ORAL ONCE
Status: DISCONTINUED | OUTPATIENT
Start: 2023-08-07 | End: 2023-08-07 | Stop reason: HOSPADM

## 2023-08-07 RX ORDER — BUMETANIDE 2 MG/1
2 TABLET ORAL DAILY
Start: 2023-08-07

## 2023-08-07 RX ORDER — FAMOTIDINE 20 MG/1
20 TABLET, FILM COATED ORAL ONCE
Status: CANCELLED
Start: 2023-08-07 | End: 2023-08-07

## 2023-08-07 RX ORDER — ACETAMINOPHEN 500 MG
1000 TABLET ORAL ONCE
Status: CANCELLED | OUTPATIENT
Start: 2023-08-07

## 2023-08-07 RX ORDER — DIPHENHYDRAMINE HCL 25 MG
25 CAPSULE ORAL ONCE
Status: CANCELLED | OUTPATIENT
Start: 2023-08-07

## 2023-08-07 RX ORDER — DIPHENHYDRAMINE HYDROCHLORIDE 50 MG/ML
50 INJECTION INTRAMUSCULAR; INTRAVENOUS AS NEEDED
Status: CANCELLED | OUTPATIENT
Start: 2023-08-07

## 2023-08-07 RX ORDER — ACETAMINOPHEN 500 MG
1000 TABLET ORAL ONCE
Status: DISCONTINUED | OUTPATIENT
Start: 2023-08-07 | End: 2023-08-07 | Stop reason: HOSPADM

## 2023-08-07 RX ADMIN — DARATUMUMAB AND HYALURONIDASE-FIHJ (HUMAN RECOMBINANT) 1800 MG: 1800; 30000 INJECTION SUBCUTANEOUS at 09:57

## 2023-08-07 NOTE — PROGRESS NOTES
Subjective     CHIEF COMPLAINT:      Chief Complaint   Patient presents with    Follow-up     Discuss Pomalyst dosage      HISTORY OF PRESENT ILLNESS:     Contreras Albert is a 68 y.o. male patient who returns today for follow up on his multiple myeloma. He returns today for follow up accompanied by his wife. He reported on 7/24/2023 having episodes of dizziness after standing and walking and he had episodes of falling at home. The etiology could not be determined. He was asked to hold Pomalyst (took 14 days of the planned 21 days at that point). He did not notice improvement after Pomalyst was held.     Patient states that he does not develop the symptoms while sitting.     Patient reports that he is taking Bumex 2 mg daily as ordered by his Nephrologist. He was on Lisinopril but it was stopped by his Nephrologist.     ROS:  Pertinent ROS is in the HPI.     Past medical, surgical, social and family history were reviewed.     MEDICATIONS:    Current Outpatient Medications:     acetaminophen (TYLENOL) 325 MG tablet, Take 2 tablets by mouth Every 6 (Six) Hours As Needed for Mild Pain., Disp: , Rfl:     acyclovir (ZOVIRAX) 800 MG tablet, Take 1 tablet by mouth Daily., Disp: 90 tablet, Rfl: 2    ASPIRIN 81 PO, Take 81 mg by mouth Daily., Disp: , Rfl:     atorvastatin (LIPITOR) 10 MG tablet, TAKE 1 TABLET BY MOUTH EVERY NIGHT AT BEDTIME AND AFTER THE EVENING MEAL, Disp: 90 tablet, Rfl: 1    Blood Glucose Monitoring Suppl (FREESTYLE FREEDOM LITE) w/Device kit, 1 kit Daily. Indications: Type 2 Diabetes, Disp: 1 each, Rfl: 0    bumetanide (BUMEX) 2 MG tablet, Take 0.5 tablets by mouth 2 (Two) Times a Day. (Patient taking differently: Take 1 tablet by mouth Daily.), Disp: , Rfl:     calcium carbonate (TUMS) 500 MG chewable tablet, Chew 3 tablets Daily., Disp: , Rfl:     carvedilol (COREG) 12.5 MG tablet, Take 1 tablet by mouth 2 (Two) Times a Day With Meals., Disp: , Rfl:     Continuous Blood Gluc Sensor (Dexcom G6 Sensor),  Replace sensor every 10 days.  Dx: E 11.22, Disp: 9 each, Rfl: 1    Continuous Blood Gluc Transmit (Dexcom G6 Transmitter) misc, 1 each Every 3 (Three) Months. Dx: E 11.22, Disp: 1 each, Rfl: 1    DARATUMUMAB IV, Infuse  into a venous catheter., Disp: , Rfl:     dexamethasone (DECADRON) 4 MG tablet, Take 3 tablets on day of chemo, 1 tablet on day #2 and 1 tablet on day #3 and repeat monthly., Disp: 15 tablet, Rfl: 1    diphenoxylate-atropine (LOMOTIL) 2.5-0.025 MG per tablet, Take 2 tablets by mouth 4 (Four) Times a Day As Needed for Diarrhea., Disp: 120 tablet, Rfl: 0    escitalopram (LEXAPRO) 5 MG tablet, TAKE 1 TABLET BY MOUTH EVERY DAY, Disp: 30 tablet, Rfl: 5    finasteride (PROSCAR) 5 MG tablet, Take 1 tablet by mouth Daily., Disp: , Rfl:     gabapentin (NEURONTIN) 300 MG capsule, TAKE ONE CAPSULE BY MOUTH EVERY MORNING AND TAKE 2 CAPSULES EVERY NIGHT AT BEDTIME, Disp: 90 capsule, Rfl: 0    glucagon (GLUCAGEN) 1 MG injection, Inject 1 mg into the appropriate muscle as directed by prescriber 1 (One) Time As Needed for Low Blood Sugar for up to 1 dose. Follow package directions for low blood sugar., Disp: 1 kit, Rfl: 3    glucose blood (FREESTYLE LITE) test strip, Use to test blood sugar 4-5 times daily., Disp: 200 each, Rfl: 5    Insulin Lispro, 1 Unit Dial, (HumaLOG KwikPen) 100 UNIT/ML solution pen-injector, NONCHEMO DAYS:10U THREE TIMES DAILY BEFORE MEALS&1 UNIT FOR EVERY 50>150. CHEOMDAYS 15U THREE TIMES DAILY BEFORE MEALS& 2 UNIT FOR 50>150, Disp: 27 mL, Rfl: 3    Insulin Pen Needle 32G X 4 MM misc, USING 4 PEN NEEDLES DAILY, Disp: 400 each, Rfl: 3    Lancets (freestyle) lancets, Use to check blood sugar 4 times daily, Disp: 400 each, Rfl: 2    Lantus SoloStar 100 UNIT/ML injection pen, Inject 70 Units under the skin into the appropriate area as directed Daily for 90 days. Non-Chemo Days take 16 units twice daily. Chemo Days take 30 units twice daily. 16-30 units twice daily for max daily dose of 70 units  "daily., Disp: 63 mL, Rfl: 1    loperamide (IMODIUM) 2 MG capsule, Take 1 capsule by mouth 4 (Four) Times a Day As Needed for Diarrhea., Disp: , Rfl:     ondansetron (ZOFRAN) 8 MG tablet, Take 1 tablet by mouth 3 (Three) Times a Day As Needed for Nausea or Vomiting., Disp: 30 tablet, Rfl: 5    pantoprazole (PROTONIX) 40 MG EC tablet, TAKE ONE TABLET BY MOUTH DAILY, Disp: 30 tablet, Rfl: 1    tamsulosin (FLOMAX) 0.4 MG capsule 24 hr capsule, Take 1 capsule by mouth Daily., Disp: , Rfl:     vitamin D (ERGOCALCIFEROL) 1.25 MG (90728 UT) capsule capsule, Take 1 capsule by mouth Every 7 (Seven) Days., Disp: 13 capsule, Rfl: 1    magnesium chloride ER (MAG-DELAY) 70 MG tablet delayed-release CR tablet, Take 1 tablet by mouth Daily. (Patient not taking: Reported on 8/7/2023), Disp: 90 tablet, Rfl: 1    pomalidomide (POMALYST) 2 MG chemo capsule, Take 1 capsule by mouth Daily. Take for 21 days on, then 7 days off. (Patient not taking: Reported on 8/7/2023), Disp: 21 capsule, Rfl: 0  Objective     VITAL SIGNS:     Vitals:    08/07/23 0828   BP: 125/75   Pulse: 69   Resp: 18   Temp: 97.1 øF (36.2 øC)   TempSrc: Temporal   Weight: (!) 139 kg (307 lb 8 oz)   Height: 190 cm (74.8\")   PainSc: 0-No pain     Body mass index is 38.64 kg/mý.     Wt Readings from Last 5 Encounters:   08/07/23 (!) 139 kg (307 lb 8 oz)   07/26/23 (!) 137 kg (303 lb)   07/10/23 (!) 137 kg (303 lb)   06/26/23 (!) 139 kg (306 lb 12.8 oz)   06/12/23 (!) 141 kg (310 lb 1.6 oz)     PHYSICAL EXAMINATION:   GENERAL: The patient appears in fair general condition, not in acute distress.   SKIN: No ecchymosis.  EYES: No jaundice. Pallor.  LYMPHATICS: No cervical lymphadenopathy.  CHEST: Normal respiratory effort. Lungs clear bilaterally. No added sounds.   CVS: Normal S1 and S2. No murmurs.  ABDOMEN: Soft. No tenderness. No Hepatomegaly. No Splenomegaly. No masses.  EXTREMITIES: + 1 pitting edema.      DIAGNOSTIC DATA:     Results from last 7 days   Lab Units " 08/07/23  0818   WBC 10*3/mm3 4.77   NEUTROS ABS 10*3/mm3 2.91   HEMOGLOBIN g/dL 10.3*   HEMATOCRIT % 32.9*   PLATELETS 10*3/mm3 152     Results from last 7 days   Lab Units 08/07/23  0818   SODIUM mmol/L 142   POTASSIUM mmol/L 4.1   CHLORIDE mmol/L 104   CO2 mmol/L 22.2   BUN mg/dL 39*   CREATININE mg/dL 3.02*   CALCIUM mg/dL 8.1*   ALBUMIN g/dL 3.6   BILIRUBIN mg/dL 0.4   ALK PHOS U/L 37*   ALT (SGPT) U/L 10   AST (SGOT) U/L 14   GLUCOSE mg/dL 168*   MAGNESIUM mg/dL 1.8     Component      Latest Ref Rng 7/10/2023 7/25/2023   IgG      603 - 1613 mg/dL 593 (L)  492 (L)    IgA      61 - 437 mg/dL 59 (L)  55 (L)    IgM      20 - 172 mg/dL 17 (L)  14 (L)    Total Protein      6.0 - 8.5 g/dL 5.5 (L)  5.3 (L)    Albumin      2.9 - 4.4 g/dL 3.1  3.3    Alpha-1-Globulin      0.0 - 0.4 g/dL 0.2  0.2    Alpha-2-Globulin      0.4 - 1.0 g/dL 0.9  0.7    Beta Globulin      0.7 - 1.3 g/dL 0.8  0.7    Gamma Globulin      0.4 - 1.8 g/dL 0.6  0.5    M-Jurgen      Not Observed g/dL Not Observed  Comment:    Globulin      2.2 - 3.9 g/dL 2.4  2.0 (L)    A/G Ratio      0.7 - 1.7  1.3  1.7    Immunofixation Reflex, Serum Comment  Comment !    Please note Comment  Comment    Kappa FLC      3.3 - 19.4 mg/L 48.5 (H)  65.3 (H)    Free Lambda Light Chains      5.7 - 26.3 mg/L 32.4 (H)  38.5 (H)    Kappa/Lambda Ratio      0.26 - 1.65  1.50  1.70 (H)      MRI brain on 7/26/2023:  1. No acute intracranial abnormality is identified.  2. There is mild small vessel disease in cerebral white matter. There is  a 13 x 6 x 11 mm focal area of encephalomalacia in the posterior right  corona radiata region with some minimal hemosiderin deposition along its  inferior margin that is likely an old lacunar infarct that had old  petechial hemorrhage within it. There is a 7 x 4 mm old posterior  superior right frontal subcortical white matter infarct. There is a 5 x  4 mm old anterior medial left cerebellar infarct in the left PICA  territory.  3. There is  small mucous retention cyst in the inferior right maxillary  sinus and mild bilateral inferior maxillary sinus mucosal thickening.  4. There is a 7 x 6 mm ovoid area of signal loss in the central aspect  of the C2 cervical vertebra. This is an indeterminate osseous finding.  This could be underlying sclerotic focus possibly from a bone island  although other osseous lesions can have a similar appearance and may  consider a contrast-enhanced MRI of the cervical spine to further  characterize. The remainder of the MRI of the brain is normal.  Specifically, no acute infarct is seen and the etiology of patient's  vision changes and balanced difficulty is not established on this exam.    Assessment & Plan    *IgG lambda multiple myeloma.  Bone marrow 5/23/2020 hypercellular marrow with 80% involvement of plasma cell myeloma; FISH studies pending  Bone survey 5/26/2020: Diffuse osteoporosis and demineralization.  However, no discrete lytic lesions.  SPEP 5/23/2020: M spike 5.1.  IgG 6629 lambda.  Light chain ratio 0 with free lambda light chains 6400.  Beta-2 microglobulin 16.3.  24-hour urine 12.7 g protein per 24-hour with monoclonal lambda free light chain 33.9%, monoclonal IgG lambda 23.5%  24-hour urine testing from 5/24/2020 free lambda light chains 8.4 g/L  Due to the renal failure, initiated CyBorD 5/28/20.  Given the significantly elevated light chains with the first cycle of treatment plan to give Velcade days 1, 4, 8, 11; Cytoxan 300 PO mg/mý weekly and dexamethasone 40 mg p.o. days 1, 2, 3, 4, 8 and 15.    After cycle 1 treatment can likely be altered to weekly dosing of each drug.  On 6/18/2020, Velcade was changed to 1.5 mg/mý weekly continuously along with weekly Decadron and oral Cytoxan.  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   Treatment was changed on 8/27/2020 to to Daratumumab subcutaneous injection weekly along with Revlimid 10 mg daily for 21 out of 28-day cycle and Decadron 40 mg  daily.  S/p stem cell transplant on 12/9/2020 at [facility].  Day 100 bone marrow on 3/16/2021 revealed minute  plasma cell population.  PET scan on 3/29/2021 was negative.  Patient enrolled in the  clinical trial randomizing to maintenance lenalidomide versus lenalidomide and daratumumab.  Patient was randomized to the Revlimid arm initiated 4/13/2021 dosed at 5 mg daily (due to renal function).  Patient developed neutropenia requiring dose adjustment to 5 mg daily for 21/28 days.  Patient withdrew from clinical trial after 5 cycles on 8/31/2021 due to worsening diarrhea.    He was switched to Velcade maintenance every other week 9/14/2021 which he discontinued 10/26/2021 due to neuropathy and lower extremity edema.    He was subsequently changed to pomalidomide 2 mg daily 21/28 days on 11/8/2021.    1 year post transplant, bone marrow biopsy showed 5-7% plasma cells and due to the residual disease he was started on DPd (daratumumab, pomalidomide, dexamethasone) on 2/21/2022.    Patient was seen by Dr. Romero on 9/12/2022.  At that time patient was cycle 8-day 7 DPd. Due to chronic side effects, dexamethasone was changed from weekly to monthly.    Patient did continue pomalidomide on his own during admission to the hospital, received 1 dose 4 mg p.o. on 9/24/2022 before was subsequently discontinued due to cytopenias.   Labs on 9/24/2022 with IgG 314, IgM less than 25, IgA less than 50.  9/25/2022 24-hour urine free light chains with free kappa 351, free lambda 104, ratio 3.37  On 9/24/2022, IgG kappa M protein was 0.1 g.  IgG 311, IgA 43, IgM 7.   Low level M spike of IgG kappa likely represents daratumumab.   No IgG lambda M protein.   Serum free light chains on 9/26/2022 with kappa 57.4, lambda 42.8, ratio normal at 1.34.  He received Darzlex on 10/3/2022 at [facility]. He decided to transfer his treatment back to our office.   On 10/13/2022, M protein was too small to quantitate.  Kappa/Lambda ratio was 1.34.  Dexamethasone was changed to 12 mg on Mondays, 4 mg on Tuesday and 4 mg on Wednesday.  Pomalyst was restarted at 4 mg every other day to be taken on 10/13/2022, 10/15/2022, 10/17/2022, 10/19/2022, 10/21/2022 and 10/23/2022.  Pomalyst dose was reduced to 3 mg daily for 21 out of a 28-day cycle starting on 10/31/2022.  He tolerated the reduced dose with improvement in the diarrhea.  SPEP GUANAKITO FLC on 12/19/2022 showed no definite M protein.  K/L ratio was 1.46-normal.  He received Darzalex on 12/27/2022 (1 day delay due to Christmas holiday being on 12/26/2022).  Due to neutropenia, the dose of Pomalyst was reduced to 2 mg daily for 21/28-day cycle.  SPEP GUANAKITO FLC on 4/17/2023 revealed no M protein.  Kappa to lambda ratio was 1.67.  SPEP GUANAKITO FLC on 5/30/2023 revealed no M protein.  Free lambda light chain was 36.0.  Kappa to lambda ratio was 1.77.  SPEP GUANAKITO FLC on 7/25/2023 revealed no M protein.  Free lambda light chain was 38.5.  Kappa to lambda ratio was 1.70.  Pomalyst was held on 7/24/2023 due to the patient having episodes of dizziness and falls.     *Bone health.  He is on Xgeva.   Patient developed severe hypocalcemia following the Xgeva injection.  He required inpatient hospital stay.  Given the duration of Xgeva injections, recommended changing treatment to every 3 months starting December 2022.   I recommended giving Xgeva today and continuing every 3 months.  Last dose of Xgeva was on 7/10/2023.  Next dose will be in October 2023.     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  Patient was receiving Procrit at [facility].  Records from [facility] showed that the patient was receiving 70,000 units weekly and received the last dose on 9/27/2022.  Patient was given Procrit 70,000 units on 9/27/2022.  Hemoglobin was 9.9 on 10/17/2022.  He was given Procrit 60,000 units.  Hemoglobin was 9.8 on 11/4/2022.  He was  given Procrit 60,000 units.  Labs in March 2023 revealed adequate iron stores.  Last dose of Procrit was 25,000 units on 5/1/2023 for hemoglobin of 9.9.  8/7/2023: Hemoglobin 10.3.    *Thrombocytopenia.  This is attributed to multiple myeloma, prior stem cell transplant and chemotherapy.  5/15/2023: Platelet count 149,000  6/12/2023: Platelets 144,000.  8/7/2023: Platelet count 152,000.    *Neutropenia.  This was attributed to Pomalyst.  Neutrophil count was down to 290 on 9/25/2022.  He was given Neupogen during his hospital stay in September 2022.  Neutrophil count improved afterwards.  However, neutrophil count decreased to 600 on 12/19/2022.  He reports that he was feeling tired and achy at that time.  Neutrophil count was 770 on 1/16/2023.  Due to the recurrent neutropenia, the dose of Pomalyst was reduced to 2 mg in January 2023.  Neutrophil count improved following the dose reduction.  Neutrophil count decreased to 1200 on 3/13/2023.  8/7/2023: Neutrophil count 2910.    *Hypocalcemia.  Patient developed severe hypocalcemia due to Xgeva.  Calcium was 5.9 on 9/23/2022.  Calcium improved to 8.1 on 9/28/2022  Calcium improved to 9.6 on 10/13/2022  Calcium decreased to 7.7 on 10/24/2022.  Albumin was 3.8.  Calcium improved to 10.3 on 12/19/2022.  Calcium decreased to 8.4 on 12/27/2022.  Calcium dose was subsequently increased to 3 tablets daily.  8/7/2023: Calcium 8.1. Albumin 3.6.    *Hypomagnesemia.  Magnesium was 1.1 on 10/24/2022.  He was placed on oral magnesium oxide 400 mg twice daily.  He developed diarrhea and it was stopped.  He was started on magnesium chloride on 1/23/2023.  He developed diarrhea about 7 days after starting the magnesium chloride.  Oral magnesium chloride was discontinued.  He currently receives IV magnesium per protocol based on his magnesium level.  5/30/2023: Magnesium 1.4.  6/12/2023: Magnesium 1.5.  8/7/2023: Magnesium  1.8.      *Prophylaxis..  He is on acyclovir 800 mg  daily.  He is on ASA 81 mg daily.  No recent infections.     *Episodes of dizziness and falling.  Patient reports that the episodes develop after he stands up and starts walking.  No dizziness while sitting.  No restricted chest pain or shortness of breath.  Blood pressure was 133/80 and heart rate was 66 in the sitting position today.  After standing, blood pressure was 119/76 and heart rate was 69.  No improvement in the symptoms after holding Pomalyst for 2 weeks.  MRI brain on 7/26/2023 showed small vessel ischemic changes.  There were changes of 3 old strokes.  I explained the findings to the patient.  I recommended evaluation by cardiology and neurology.  I also recommended home physical therapy  I recommended that he uses a walker when he ambulates.    *Vitamin D deficiency.  He is on vitamin D 50,000 units weekly.  Vitamin D level was 18 on 10/3/2022.  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  He is on gabapentin 300 mg in the morning and 600 mg in the evening.  Neuropathy symptoms in the feet are better as the swelling improved.     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  S/p right partial nephrectomy on 5/18/2016.    PLAN:    1.  We will give Darzalex today.   2.  Start a new cycle of Pomalyst 2 mg daily for 21 days followed by 7 days off.   3.  Refer to Cardiology.   4.  Referred to Neurology.   5.  Increase calcium carbonate to 4 tablets daily.  6.  Continue Aspirin and Acyclovir.   7.  He does not require Procrit today.   8.  Return in 2 weeks for a CBC CMP MG and possible Procrit injection.   9.  Follow up in 4 weeks. He will be scheduled to receive Darzalex.     I spent 50 minutes caring for Contreras on this date of service. This time includes time spent by me in the following activities: preparing for the visit, reviewing tests, obtaining and/or reviewing a separately obtained history, performing a medically appropriate examination and/or evaluation,  counseling and educating the patient/family/caregiver, ordering medications, tests, or procedures, referring and communicating with other health care professionals, documenting information in the medical record, independently interpreting results and communicating that information with the patient/family/caregiver, and care coordination       Red Del Valle MD  08/07/23

## 2023-08-08 ENCOUNTER — TELEPHONE (OUTPATIENT)
Dept: ONCOLOGY | Facility: CLINIC | Age: 68
End: 2023-08-08
Payer: MEDICARE

## 2023-08-08 ENCOUNTER — SPECIALTY PHARMACY (OUTPATIENT)
Dept: PHARMACY | Facility: HOSPITAL | Age: 68
End: 2023-08-08
Payer: MEDICARE

## 2023-08-08 DIAGNOSIS — C90.00 MULTIPLE MYELOMA NOT HAVING ACHIEVED REMISSION: ICD-10-CM

## 2023-08-08 LAB
ALBUMIN SERPL ELPH-MCNC: 3.4 G/DL (ref 2.9–4.4)
ALBUMIN/GLOB SERPL: 1.5 {RATIO} (ref 0.7–1.7)
ALPHA1 GLOB SERPL ELPH-MCNC: 0.2 G/DL (ref 0–0.4)
ALPHA2 GLOB SERPL ELPH-MCNC: 0.8 G/DL (ref 0.4–1)
B-GLOBULIN SERPL ELPH-MCNC: 0.8 G/DL (ref 0.7–1.3)
GAMMA GLOB SERPL ELPH-MCNC: 0.6 G/DL (ref 0.4–1.8)
GLOBULIN SER-MCNC: 2.3 G/DL (ref 2.2–3.9)
IGA SERPL-MCNC: 52 MG/DL (ref 61–437)
IGG SERPL-MCNC: 592 MG/DL (ref 603–1613)
IGM SERPL-MCNC: 15 MG/DL (ref 20–172)
INTERPRETATION SERPL IEP-IMP: ABNORMAL
KAPPA LC FREE SER-MCNC: 46.5 MG/L (ref 3.3–19.4)
KAPPA LC FREE/LAMBDA FREE SER: 1.79 {RATIO} (ref 0.26–1.65)
LABORATORY COMMENT REPORT: ABNORMAL
LAMBDA LC FREE SERPL-MCNC: 26 MG/L (ref 5.7–26.3)
M PROTEIN SERPL ELPH-MCNC: ABNORMAL G/DL
PROT SERPL-MCNC: 5.7 G/DL (ref 6–8.5)

## 2023-08-08 NOTE — PROGRESS NOTES
Drug: Pomalyst (pomalidomide)  Strength: 2 mg  Directions: Take 1 capsule by mouth  daily for 21 days on, then 7 days off  Quantity: 21  Refills: 0  [facility]    Completed independent double check on medication order/RX.  Yolande Huitron, YumikoD, BCPS

## 2023-08-08 NOTE — TELEPHONE ENCOUNTER
----- Message from Red Del Valle MD sent at 8/7/2023  7:16 PM EDT -----  Please inform the patient that calcium was low.  If he has been taking Tums 3 tablets a day regularly, I recommend increasing to a total of 4 a day.    Thank you

## 2023-08-08 NOTE — PROGRESS NOTES
Re: Refills of Oral Specialty Medication - Pomalyst (pomalidomide)    Drug-Drug Interactions: The current medication list was reviewed and there are no relevant drug-drug interactions.  Medication Allergies: The patient has no relevant allergies as it relates to their oral specialty medication  Review of Labs/Dose Adjustments: NO DOSE CHANGE - I reviewed the most recent note and labs and the patient will continue without any dose changes.  I sent refills as described below.    Drug: Pomalyst (pomalidomide)  Strength: 2 mg  Directions: Take 1 capsule by mouth  daily for 21 days on, then 7 days off  Quantity: 21  Refills: 0  Pharmacy prescription sent to: [facility]    Name/Credentials Donna Cano Rph, NANCY    8/8/2023  11:13 EDT

## 2023-08-14 ENCOUNTER — TELEPHONE (OUTPATIENT)
Dept: ENDOCRINOLOGY | Age: 68
End: 2023-08-14
Payer: MEDICARE

## 2023-08-14 NOTE — TELEPHONE ENCOUNTER
Patient's wife called needing a refill on Gabapentin. Can we send this to [facility] in [facility]

## 2023-08-16 ENCOUNTER — OFFICE VISIT (OUTPATIENT)
Dept: CARDIOLOGY | Facility: CLINIC | Age: 68
End: 2023-08-16
Payer: MEDICARE

## 2023-08-16 ENCOUNTER — TELEPHONE (OUTPATIENT)
Dept: ENDOCRINOLOGY | Age: 68
End: 2023-08-16
Payer: MEDICARE

## 2023-08-16 VITALS
BODY MASS INDEX: 39.78 KG/M2 | RESPIRATION RATE: 20 BRPM | HEIGHT: 74 IN | OXYGEN SATURATION: 99 % | WEIGHT: 310 LBS | HEART RATE: 68 BPM | DIASTOLIC BLOOD PRESSURE: 76 MMHG | SYSTOLIC BLOOD PRESSURE: 116 MMHG

## 2023-08-16 DIAGNOSIS — R42 POSTURAL DIZZINESS WITH PRESYNCOPE: ICD-10-CM

## 2023-08-16 DIAGNOSIS — N18.4 BENIGN HYPERTENSION WITH CKD (CHRONIC KIDNEY DISEASE) STAGE IV: ICD-10-CM

## 2023-08-16 DIAGNOSIS — E66.01 CLASS 2 SEVERE OBESITY DUE TO EXCESS CALORIES WITH SERIOUS COMORBIDITY AND BODY MASS INDEX (BMI) OF 38.0 TO 38.9 IN ADULT: ICD-10-CM

## 2023-08-16 DIAGNOSIS — I12.9 BENIGN HYPERTENSION WITH CKD (CHRONIC KIDNEY DISEASE) STAGE IV: ICD-10-CM

## 2023-08-16 DIAGNOSIS — I10 PRIMARY HYPERTENSION: ICD-10-CM

## 2023-08-16 DIAGNOSIS — E78.2 MIXED HYPERLIPIDEMIA: Primary | ICD-10-CM

## 2023-08-16 DIAGNOSIS — R55 POSTURAL DIZZINESS WITH PRESYNCOPE: ICD-10-CM

## 2023-08-16 RX ORDER — ATORVASTATIN CALCIUM 10 MG/1
TABLET, FILM COATED ORAL
Qty: 90 TABLET | Refills: 1 | Status: SHIPPED | OUTPATIENT
Start: 2023-08-16

## 2023-08-16 NOTE — PROGRESS NOTES
"      CARDIOLOGY    Yadira Menendez MD    ENCOUNTER DATE:  08/16/2023    Contreras Albert / 68 y.o. / male        CHIEF COMPLAINT / REASON FOR OFFICE VISIT     Heart Problem      HISTORY OF PRESENT ILLNESS       HPI    Contreras Albert is a 68 y.o. male     He has diabetes with neuropathy and retinopathy, hypertension, hyperlipidemia, chronic kidney disease stage 4, multiple myeloma.  He has had a stem cell transplant.  He is here today because he is having episodes at night when he gets up where he gets lightheaded and feels like he is going to pass out though he has not lost consciousness.  This could happen 2-3 times in a week but could go several weeks in between episodes. No palpitations.  He said that if he stands and holds onto something for about 10 minutes it passes.  He did have an MRI of the brain that showed strokes.  He has an appointment coming up with the Neurologist.         REVIEW OF SYSTEMS     Review of Systems   Constitutional: Negative for chills, fever, weight gain and weight loss.   Cardiovascular:  Positive for leg swelling.   Respiratory:  Negative for cough, snoring and wheezing.    Hematologic/Lymphatic: Negative for bleeding problem. Does not bruise/bleed easily.   Skin:  Negative for color change.   Musculoskeletal:  Negative for falls, joint pain and myalgias.   Gastrointestinal:  Negative for melena.   Genitourinary:  Negative for hematuria.   Neurological:  Negative for excessive daytime sleepiness.   Psychiatric/Behavioral:  Negative for depression. The patient is nervous/anxious.        VITAL SIGNS     Visit Vitals  /76 (BP Location: Right arm, Patient Position: Sitting, Cuff Size: Adult)   Pulse 68   Resp 20   Ht 188 cm (74\")   Wt (!) 141 kg (310 lb)   SpO2 99%   BMI 39.80 kg/mý         Wt Readings from Last 3 Encounters:   08/16/23 (!) 141 kg (310 lb)   08/07/23 (!) 139 kg (307 lb 8 oz)   07/26/23 (!) 137 kg (303 lb)     Body mass index is 39.8 kg/mý.      PHYSICAL EXAMINATION "     Constitutional:       General: Not in acute distress.  Neck:      Vascular: No carotid bruit or JVD.   Pulmonary:      Effort: Pulmonary effort is normal.      Breath sounds: Normal breath sounds.   Cardiovascular:      Normal rate. Regular rhythm.      Murmurs: There is no murmur.   Psychiatric:         Mood and Affect: Mood and affect normal.         REVIEWED DATA       ECG 12 Lead    Date/Time: 8/16/2023 8:42 AM  Performed by: Yadira Menendez MD  Authorized by: Yadira Menendez MD   Comparison: compared with previous ECG   Similar to previous ECG  Rhythm: sinus rhythm  BPM: 68  Conduction: conduction normal  ST Segments: ST segments normal  T Waves: T waves normal    Clinical impression: normal ECG          Lipid Panel          9/22/2022    11:15 1/3/2023    10:25   Lipid Panel   Total Cholesterol 120  173    Triglycerides 146  632    HDL Cholesterol 29  26    VLDL Cholesterol 26  93    LDL Cholesterol  65  54        Lab Results   Component Value Date    GLUCOSE 168 (H) 08/07/2023    BUN 39 (H) 08/07/2023    CREATININE 3.02 (C) 08/07/2023    EGFRRESULT 19.0 (L) 09/22/2022    EGFR 22.0 (L) 08/07/2023    BCR 12.9 08/07/2023    K 4.1 08/07/2023    CO2 22.2 08/07/2023    CALCIUM 8.1 (L) 08/07/2023    PROTENTOTREF 5.7 (L) 08/07/2023    ALBUMIN 3.6 08/07/2023    ALBUMIN 3.4 08/07/2023    BILITOT 0.4 08/07/2023    AST 14 08/07/2023    ALT 10 08/07/2023       ASSESSMENT & PLAN      Diagnosis Plan   1. Mixed hyperlipidemia  Adult Transthoracic Echo Complete W/ Cont if Necessary Per Protocol    Mobile Cardiac Outpatient Telemetry      2. Benign hypertension with CKD (chronic kidney disease) stage IV  Adult Transthoracic Echo Complete W/ Cont if Necessary Per Protocol    Mobile Cardiac Outpatient Telemetry      3. Class 2 severe obesity due to excess calories with serious comorbidity and body mass index (BMI) of 38.0 to 38.9 in adult  Adult Transthoracic Echo Complete W/ Cont if Necessary Per Protocol    Mobile  Cardiac Outpatient Telemetry      4. Primary hypertension  Adult Transthoracic Echo Complete W/ Cont if Necessary Per Protocol    Mobile Cardiac Outpatient Telemetry      5. Postural dizziness with presyncope  Adult Transthoracic Echo Complete W/ Cont if Necessary Per Protocol    Mobile Cardiac Outpatient Telemetry          Presyncope.  I suspect this is orthostatic due to position changes.  I encouraged him to stay hydrated and take his time when he goes from one position to another.  I am going to check an echocardiogram.  I am also going to check mobile telemetry to look for any arrhythmia when he has these symptoms.  I am also going to look for atrial fibrillation given his history of stroke on MRI.  Diabetes with neuropathy and retinopathy.  Hypertension.  Controlled.  Hyperlipidemia  Stage 4 chronic kidney disease.   Multiple myeloma.     One of my nurses or I will go over the results of his echocardiogram and mobile telemetry when they become available and will determine followup based upon those results.          Orders Placed This Encounter   Procedures    Mobile Cardiac Outpatient Telemetry     Standing Status:   Future     Standing Expiration Date:   8/15/2024     Order Specific Question:   Reason for exam?     Answer:   Presyncope or Syncope     Order Specific Question:   Release to patient     Answer:   Routine Release [[id number]]    ECG 12 Lead     This order was created via procedure documentation     Order Specific Question:   Release to patient     Answer:   Routine Release [[id number]]    Adult Transthoracic Echo Complete W/ Cont if Necessary Per Protocol     Standing Status:   Future     Standing Expiration Date:   8/15/2024     Order Specific Question:   Reason for exam?     Answer:   Syncope     Order Specific Question:   Release to patient     Answer:   Routine Release [[id number]]           MEDICATIONS         Discharge Medications            Accurate as of August 16, 2023  8:43 AM. If you  have any questions, ask your nurse or doctor.                Changes to Medications        Instructions Start Date   acetaminophen 325 MG tablet  Commonly known as: TYLENOL  What changed: additional instructions   650 mg, Oral, Every 6 Hours PRN      dexAMETHasone 4 MG tablet  Commonly known as: DECADRON  What changed: additional instructions   Take 3 tablets on day of chemo, 1 tablet on day #2 and 1 tablet on day #3 and repeat monthly.      ondansetron 8 MG tablet  Commonly known as: ZOFRAN  What changed: additional instructions   8 mg, Oral, 3 Times Daily PRN             Continue These Medications        Instructions Start Date   acyclovir 800 MG tablet  Commonly known as: ZOVIRAX   800 mg, Oral, Daily      ASPIRIN 81 PO   81 mg, Oral, Daily      atorvastatin 10 MG tablet  Commonly known as: LIPITOR   TAKE 1 TABLET BY MOUTH EVERY NIGHT AT BEDTIME AND AFTER THE EVENING MEAL      bumetanide 2 MG tablet  Commonly known as: BUMEX   2 mg, Oral, Daily      calcium carbonate 500 MG chewable tablet  Commonly known as: TUMS   4 tablets, Oral, Daily      carvedilol 12.5 MG tablet  Commonly known as: COREG   12.5 mg, Oral, 2 Times Daily With Meals      DARATUMUMAB IV   Intravenous      Dexcom G6 Sensor   Replace sensor every 10 days.  Dx: E 11.22      Dexcom G6 Transmitter misc   1 each, Does not apply, Every 3 Months, Dx: E 11.22      diphenoxylate-atropine 2.5-0.025 MG per tablet  Commonly known as: LOMOTIL   2 tablets, Oral, 4 Times Daily PRN      escitalopram 5 MG tablet  Commonly known as: LEXAPRO   TAKE 1 TABLET BY MOUTH EVERY DAY      finasteride 5 MG tablet  Commonly known as: PROSCAR   5 mg, Oral, Daily      FreeStyle Freedom Lite w/Device kit   1 kit, Does not apply, Daily      freestyle lancets   Use to check blood sugar 4 times daily      FREESTYLE LITE test strip  Generic drug: glucose blood   Use to test blood sugar 4-5 times daily.      gabapentin 300 MG capsule  Commonly known as: NEURONTIN   TAKE ONE CAPSULE  BY MOUTH EVERY MORNING AND TAKE 2 CAPSULES EVERY NIGHT AT BEDTIME      glucagon 1 MG injection  Commonly known as: GLUCAGEN   1 mg, Intramuscular, Once As Needed, Follow package directions for low blood sugar.      Insulin Lispro (1 Unit Dial) 100 UNIT/ML solution pen-injector  Commonly known as: HumaLOG KwikPen   NONCHEMO DAYS:10U THREE TIMES DAILY BEFORE MEALS&1 UNIT FOR EVERY 50>150. CHEOMDAYS 15U THREE TIMES DAILY BEFORE MEALS& 2 UNIT FOR 50>150      Insulin Pen Needle 32G X 4 MM misc   USING 4 PEN NEEDLES DAILY      Lantus SoloStar 100 UNIT/ML injection pen  Generic drug: Insulin Glargine   70 Units, Subcutaneous, Daily, Non-Chemo Days take 16 units twice daily. Chemo Days take 30 units twice daily. 16-30 units twice daily for max daily dose of 70 units daily.      loperamide 2 MG capsule  Commonly known as: IMODIUM   2 mg, Oral, 4 Times Daily PRN      pomalidomide 2 MG chemo capsule  Commonly known as: POMALYST   2 mg, Oral, Daily, Take for 21 days on, then 7 days off.      tamsulosin 0.4 MG capsule 24 hr capsule  Commonly known as: FLOMAX   1 capsule, Oral, Daily      vitamin D 1.25 MG (61807 UT) capsule capsule  Commonly known as: ERGOCALCIFEROL   50,000 Units, Oral, Every 7 Days                 Yadira Menendez MD  08/16/23  08:43 EDT    Part of this note may be an electronic transcription/translation of spoken language to printed text using the Dragon dictation system.

## 2023-08-17 ENCOUNTER — PRIOR AUTHORIZATION (OUTPATIENT)
Dept: ENDOCRINOLOGY | Age: 68
End: 2023-08-17
Payer: MEDICARE

## 2023-08-17 NOTE — TELEPHONE ENCOUNTER
8/17/2023    PA has been initiated in Covermymeds as of today for the Atorvastatin Calcium 10 MG Tablets all information has been submitted and forward over to the patient insurance company for further review. It came back saying that the drug is covered by the patient current benefit plan no further PA activity is needed/ required. No further actions needed at the present time.      Key: [id number] - Rx #: [id number]        Drug is covered by current benefit plan. No further PA activity needed

## 2023-08-18 ENCOUNTER — PRIOR AUTHORIZATION (OUTPATIENT)
Dept: ENDOCRINOLOGY | Age: 68
End: 2023-08-18
Payer: MEDICARE

## 2023-08-18 NOTE — TELEPHONE ENCOUNTER
"8/18/2023      Patient Prior Authorization has been approved on Covermymeds as of today for the Insulin Lispro 100 Unit/ML Pen Injectors all information has been submitted and forward over to the patient insurance company and it came back as being approved. The Approval information is as follows: approval number  \" [id number]' and it is good from 07/19/2023 thru 08/17/2024. No further action needed at the present time.      Key: [id number] - PA Case ID: [id number] - Rx #: [id number]        Approvedtoday  CaseId:[id number];Status:Approved;Review Type:Prior Auth;Coverage Start Date:07/19/2023;Coverage End Date:08/17/2024;    "

## 2023-08-21 ENCOUNTER — INFUSION (OUTPATIENT)
Dept: ONCOLOGY | Facility: HOSPITAL | Age: 68
End: 2023-08-21
Payer: MEDICARE

## 2023-08-21 ENCOUNTER — LAB (OUTPATIENT)
Dept: LAB | Facility: HOSPITAL | Age: 68
End: 2023-08-21
Payer: MEDICARE

## 2023-08-21 VITALS
TEMPERATURE: 97.7 F | RESPIRATION RATE: 18 BRPM | SYSTOLIC BLOOD PRESSURE: 115 MMHG | HEART RATE: 71 BPM | WEIGHT: 307.4 LBS | BODY MASS INDEX: 39.47 KG/M2 | DIASTOLIC BLOOD PRESSURE: 66 MMHG | OXYGEN SATURATION: 96 %

## 2023-08-21 DIAGNOSIS — E83.42 HYPOMAGNESEMIA: ICD-10-CM

## 2023-08-21 DIAGNOSIS — C90.00 MULTIPLE MYELOMA NOT HAVING ACHIEVED REMISSION: ICD-10-CM

## 2023-08-21 LAB
BASOPHILS # BLD AUTO: 0.08 10*3/MM3 (ref 0–0.2)
BASOPHILS NFR BLD AUTO: 1.6 % (ref 0–1.5)
DEPRECATED RDW RBC AUTO: 55.7 FL (ref 37–54)
EOSINOPHIL # BLD AUTO: 0.17 10*3/MM3 (ref 0–0.4)
EOSINOPHIL NFR BLD AUTO: 3.5 % (ref 0.3–6.2)
ERYTHROCYTE [DISTWIDTH] IN BLOOD BY AUTOMATED COUNT: 15.7 % (ref 12.3–15.4)
HCT VFR BLD AUTO: 33.5 % (ref 37.5–51)
HGB BLD-MCNC: 10.8 G/DL (ref 13–17.7)
IMM GRANULOCYTES # BLD AUTO: 0.02 10*3/MM3 (ref 0–0.05)
IMM GRANULOCYTES NFR BLD AUTO: 0.4 % (ref 0–0.5)
LYMPHOCYTES # BLD AUTO: 1.1 10*3/MM3 (ref 0.7–3.1)
LYMPHOCYTES NFR BLD AUTO: 22.7 % (ref 19.6–45.3)
MCH RBC QN AUTO: 31 PG (ref 26.6–33)
MCHC RBC AUTO-ENTMCNC: 32.2 G/DL (ref 31.5–35.7)
MCV RBC AUTO: 96.3 FL (ref 79–97)
MONOCYTES # BLD AUTO: 0.57 10*3/MM3 (ref 0.1–0.9)
MONOCYTES NFR BLD AUTO: 11.8 % (ref 5–12)
NEUTROPHILS NFR BLD AUTO: 2.91 10*3/MM3 (ref 1.7–7)
NEUTROPHILS NFR BLD AUTO: 60 % (ref 42.7–76)
NRBC BLD AUTO-RTO: 0 /100 WBC (ref 0–0.2)
PLATELET # BLD AUTO: 123 10*3/MM3 (ref 140–450)
PMV BLD AUTO: 10.1 FL (ref 6–12)
RBC # BLD AUTO: 3.48 10*6/MM3 (ref 4.14–5.8)
WBC NRBC COR # BLD: 4.85 10*3/MM3 (ref 3.4–10.8)

## 2023-08-21 PROCEDURE — G0463 HOSPITAL OUTPT CLINIC VISIT: HCPCS

## 2023-08-21 PROCEDURE — 85025 COMPLETE CBC W/AUTO DIFF WBC: CPT

## 2023-08-21 NOTE — NURSING NOTE
Pt's hemoglobin was 10.8 today. Di not meet guidelines for Procrit. CMP and Mag specimen hemolyzed and since Mag has been stable, pt didn't stay for a re-collect.

## 2023-08-29 ENCOUNTER — OFFICE VISIT (OUTPATIENT)
Dept: ENDOCRINOLOGY | Age: 68
End: 2023-08-29
Payer: MEDICARE

## 2023-08-29 VITALS
DIASTOLIC BLOOD PRESSURE: 80 MMHG | BODY MASS INDEX: 38.96 KG/M2 | WEIGHT: 303.6 LBS | HEIGHT: 74 IN | SYSTOLIC BLOOD PRESSURE: 126 MMHG | OXYGEN SATURATION: 96 % | HEART RATE: 74 BPM

## 2023-08-29 DIAGNOSIS — Z79.4 TYPE 2 DIABETES MELLITUS WITH STAGE 4 CHRONIC KIDNEY DISEASE, WITH LONG-TERM CURRENT USE OF INSULIN: Primary | ICD-10-CM

## 2023-08-29 DIAGNOSIS — N18.4 BENIGN HYPERTENSION WITH CKD (CHRONIC KIDNEY DISEASE) STAGE IV: ICD-10-CM

## 2023-08-29 DIAGNOSIS — E78.2 MIXED HYPERLIPIDEMIA: ICD-10-CM

## 2023-08-29 DIAGNOSIS — E11.22 TYPE 2 DIABETES MELLITUS WITH STAGE 4 CHRONIC KIDNEY DISEASE, WITH LONG-TERM CURRENT USE OF INSULIN: Primary | ICD-10-CM

## 2023-08-29 DIAGNOSIS — I12.9 BENIGN HYPERTENSION WITH CKD (CHRONIC KIDNEY DISEASE) STAGE IV: ICD-10-CM

## 2023-08-29 DIAGNOSIS — N18.4 TYPE 2 DIABETES MELLITUS WITH STAGE 4 CHRONIC KIDNEY DISEASE, WITH LONG-TERM CURRENT USE OF INSULIN: Primary | ICD-10-CM

## 2023-08-29 NOTE — PROGRESS NOTES
Chief Complaint  Chief Complaint   Patient presents with    Diabetes     Type 2: Pt Dexcom is attached, is up to date on eye exam, does have retinopathy, moderate neuropathy.        Subjective          History of Present Illness    Contreras Albert 68 y.o. presents for a follow-up evaluation for type 2 DM     He has been diabetic since 2001 and started insulin in 2009        Pt is on the following medications for their DM:           Non-Chemo Days     Lantus 16 units in the morning and 18 units in evening and Novolog 10 units before breakfast and lunch plus 1 units per every 50 over 150 and Novolog 12 units before dinner plus 1 units per every 50 over 150        Chemo Days     Lantus to 30 units twice a day on the day of chemotherapy and on the morning after chemotherapy.  NovoLog to 15 units 3 times daily with meals +2 units per 50 greater than 150 on the day of chemotherapy           Pt complains of  unstable gait and numbness and tingling in feet/hands    Denies diarrhea, constipation, chest pain, shortness of breath and vision changes    Weight loss of 10 lbs since last visit.    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 Had previous intraocular eye injections on both eyes.  Last eye exam was 04/2023     Pt does have a history of nephropathy.  Patient is not currently taking ACE/ARB - follows Dr. 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  He is on gabapentin 300 mg 1 in am and 2 in pm  prescribed by his oncologist Dr. Alvares    Per MRI in 07/23 showed old infarct    Last A1C in 01/23 was 8.20  Fructosamine in 01/23 was 260    Last microalbumin in 01/23 was positive          Blood Sugars    Pt's cancer has came out of remission.  Along with the chemo treatments, he is receiving steroids once a month.       Blood glucoses are checked via  "Dexcom.    Fasting blood glucoses: low and mid 100s    Pre-meal blood glucoses: mid 100s - 200s    Pt has rare episodes of hypoglycemia.          Sensor Data    Time in range 63%  High 34%  Very high 2%  Low <1%  Very low <1%      Average Glucose - 167 mg/dL      Sensor Wear - 93 %        Pt states that on chemo days and a couple of days after BGs high 200s to low 300s          Hyperlipidemia     Pt denies any muscle/body aches, chest pain or shortness of breath    Pt is currently taking atorvastatin 10 mg HS     Last lipid panel in 01/23 showed Total 173, HDL 26, LDL 54 and Triglycerides 632            Hypertension with CKD Stage 4     Pt denies any chest pain, palpitations or headache     Current regimen includes Bumex 1 mg BID and carvedilol 12.5 mg daily  He was taken off amlodipine because of pedal edema                 Other Medical History     He has multiple myeloma and had chemotherapy followed by stem cell transplant.  His cancer has came out of remission and he is on Pomalyst along with steriods monthly.  He transferred his care back to [facility] from [facility].        He had a previous right partial nephrectomy for cancer.  He has no known recurrence.           Pt was hospitalized 09/23/22-09/28/22 for hypocalcemia, hypomagnesemia and pancytopenia.  Pt taking OTC Caltrate and Vitamin D 50,000 units weekly            I have reviewed the patient's allergies, medicines, past medical hx, family hx and social hx.    Objective   Vital Signs:   /80   Pulse 74   Ht 188 cm (74.02\")   Wt (!) 138 kg (303 lb 9.6 oz)   SpO2 96%   BMI 38.96 kg/mý       Physical Exam   Physical Exam  Constitutional:       General: He is not in acute distress.     Appearance: Normal appearance. He is not diaphoretic.   HENT:      Head: Normocephalic and atraumatic.   Eyes:      General:         Right eye: No discharge.         Left eye: No discharge.   Skin:     General: Skin is warm and dry.   Neurological:      " Mental Status: He is alert.   Psychiatric:         Mood and Affect: Mood normal.         Behavior: Behavior normal.                  Results Review:   Hemoglobin A1C   Date Value Ref Range Status   01/03/2023 8.20 (H) 4.80 - 5.60 % Final     Comment:     Hemoglobin A1C Ranges:  Increased Risk for Diabetes  5.7% to 6.4%  Diabetes                     >= 6.5%  Diabetic Goal                < 7.0%     05/23/2020 6.60 (H) 4.80 - 5.60 % Final     Triglycerides   Date Value Ref Range Status   01/03/2023 632 (H) 0 - 150 mg/dL Final     HDL Cholesterol   Date Value Ref Range Status   01/03/2023 26 (L) 40 - 60 mg/dL Final     LDL Chol Calc (NIH)   Date Value Ref Range Status   01/03/2023 54 0 - 100 mg/dL Final     VLDL Cholesterol Wale   Date Value Ref Range Status   01/03/2023 93 (H) 5 - 40 mg/dL Final     LDL/HDL RATIO   Date Value Ref Range Status   01/15/2021 2.8 0.0 - 3.6 ratio Final     Comment:                                         LDL/HDL Ratio                                              Men  Women                                1/2 Avg.Risk  1.0    1.5                                    Avg.Risk  3.6    3.2                                 2X Avg.Risk  6.2    5.0                                 3X Avg.Risk  8.0    6.1           Assessment and Plan {CC Problem List  Visit Diagnosis  ROS  Review (Popup)  Health Maintenance  Quality  BestPractice  Medications  SmartSets  SnapShot Encounters  Media :23  Diagnoses and all orders for this visit:    1. Type 2 diabetes mellitus with stage 4 chronic kidney disease, with long-term current use of insulin (Primary)  -     Hemoglobin A1c  -     Microalbumin / Creatinine Urine Ratio - Urine, Clean Catch    Change doses below  Non-Chemo Days     Lantus 16 units in the morning and 18 units in evening and Novolog 12 units before breakfast, 10 units before lunch and 12 units before dinner plus 1 units per every 50 over 150        Chemo Days     Lantus to 30 units twice a  day on the day of chemotherapy and on the morning after chemotherapy.  NovoLog to 18 units 3 times daily with meals +2 units per 50 greater than 150 on the day of chemotherapy    Continue with Dexcom  Check labs      2. Mixed hyperlipidemia  -     Lipid Panel    Check labs today  Continue with statin      3. Benign hypertension with CKD (chronic kidney disease) stage IV    Stable  Continue with current medication regimen  Defer management to PCP/Nephrology          Labs today  RTC on 11/29/23 with Dr. Haro      Follow Up     Patient was given instructions and counseling regarding her condition or for health maintenance advice. Please see specific information pulled into the AVS if appropriate.              Chelsea Haskins, IZZY  08/29/23

## 2023-08-29 NOTE — PATIENT INSTRUCTIONS
Non-Chemo Days     Lantus 16 units in the morning and 18 units in evening and Novolog 12 units before breakfast, 10 units before lunch and 12 units before dinner plus 1 units per every 50 over 150        Chemo Days     Lantus to 30 units twice a day on the day of chemotherapy and on the morning after chemotherapy.  NovoLog to 18 units 3 times daily with meals +2 units per 50 greater than 150 on the day of chemotherapy

## 2023-08-30 ENCOUNTER — TELEPHONE (OUTPATIENT)
Dept: ENDOCRINOLOGY | Age: 68
End: 2023-08-30
Payer: MEDICARE

## 2023-08-30 ENCOUNTER — SPECIALTY PHARMACY (OUTPATIENT)
Dept: PHARMACY | Facility: HOSPITAL | Age: 68
End: 2023-08-30
Payer: MEDICARE

## 2023-08-30 ENCOUNTER — TELEPHONE (OUTPATIENT)
Dept: ENDOCRINOLOGY | Age: 68
End: 2023-08-30

## 2023-08-30 ENCOUNTER — HOSPITAL ENCOUNTER (OUTPATIENT)
Dept: CARDIOLOGY | Facility: HOSPITAL | Age: 68
Discharge: HOME | End: 2023-08-30
Admitting: INTERNAL MEDICINE
Payer: MEDICARE

## 2023-08-30 VITALS
WEIGHT: 304.24 LBS | SYSTOLIC BLOOD PRESSURE: 142 MMHG | HEIGHT: 74 IN | HEART RATE: 70 BPM | BODY MASS INDEX: 39.04 KG/M2 | DIASTOLIC BLOOD PRESSURE: 80 MMHG

## 2023-08-30 DIAGNOSIS — N18.4 BENIGN HYPERTENSION WITH CKD (CHRONIC KIDNEY DISEASE) STAGE IV: ICD-10-CM

## 2023-08-30 DIAGNOSIS — R55 POSTURAL DIZZINESS WITH PRESYNCOPE: ICD-10-CM

## 2023-08-30 DIAGNOSIS — I12.9 BENIGN HYPERTENSION WITH CKD (CHRONIC KIDNEY DISEASE) STAGE IV: ICD-10-CM

## 2023-08-30 DIAGNOSIS — R42 POSTURAL DIZZINESS WITH PRESYNCOPE: ICD-10-CM

## 2023-08-30 DIAGNOSIS — E78.2 MIXED HYPERLIPIDEMIA: ICD-10-CM

## 2023-08-30 DIAGNOSIS — E66.01 CLASS 2 SEVERE OBESITY DUE TO EXCESS CALORIES WITH SERIOUS COMORBIDITY AND BODY MASS INDEX (BMI) OF 38.0 TO 38.9 IN ADULT: ICD-10-CM

## 2023-08-30 DIAGNOSIS — C90.00 MULTIPLE MYELOMA NOT HAVING ACHIEVED REMISSION: ICD-10-CM

## 2023-08-30 DIAGNOSIS — I10 PRIMARY HYPERTENSION: ICD-10-CM

## 2023-08-30 LAB
AORTIC ARCH: 3 CM
ASCENDING AORTA: 3.6 CM
BH CV ECHO LEFT VENTRICLE GLOBAL LONGITUDINAL STRAIN: -18.4 %
BH CV ECHO MEAS - ACS: 1.98 CM
BH CV ECHO MEAS - AO MAX PG: 7.5 MMHG
BH CV ECHO MEAS - AO MEAN PG: 3.7 MMHG
BH CV ECHO MEAS - AO ROOT DIAM: 4.6 CM
BH CV ECHO MEAS - AO V2 MAX: 136.8 CM/SEC
BH CV ECHO MEAS - AO V2 VTI: 33.1 CM
BH CV ECHO MEAS - AVA(I,D): 2.32 CM2
BH CV ECHO MEAS - EDV(CUBED): 43.9 ML
BH CV ECHO MEAS - EDV(MOD-SP2): 174 ML
BH CV ECHO MEAS - EDV(MOD-SP4): 155 ML
BH CV ECHO MEAS - EF(MOD-BP): 71.5 %
BH CV ECHO MEAS - EF(MOD-SP2): 70.1 %
BH CV ECHO MEAS - EF(MOD-SP4): 73.5 %
BH CV ECHO MEAS - ESV(CUBED): 11.2 ML
BH CV ECHO MEAS - ESV(MOD-SP2): 52 ML
BH CV ECHO MEAS - ESV(MOD-SP4): 41 ML
BH CV ECHO MEAS - FS: 36.5 %
BH CV ECHO MEAS - IVS/LVPW: 1.08 CM
BH CV ECHO MEAS - IVSD: 1.66 CM
BH CV ECHO MEAS - LAT PEAK E' VEL: 4.7 CM/SEC
BH CV ECHO MEAS - LV DIASTOLIC VOL/BSA (35-75): 59.7 CM2
BH CV ECHO MEAS - LV MASS(C)D: 217 GRAMS
BH CV ECHO MEAS - LV MAX PG: 3.7 MMHG
BH CV ECHO MEAS - LV MEAN PG: 2.4 MMHG
BH CV ECHO MEAS - LV SYSTOLIC VOL/BSA (12-30): 15.8 CM2
BH CV ECHO MEAS - LV V1 MAX: 96.7 CM/SEC
BH CV ECHO MEAS - LV V1 VTI: 24.7 CM
BH CV ECHO MEAS - LVIDD: 3.5 CM
BH CV ECHO MEAS - LVIDS: 2.24 CM
BH CV ECHO MEAS - LVOT AREA: 3.1 CM2
BH CV ECHO MEAS - LVOT DIAM: 1.99 CM
BH CV ECHO MEAS - LVPWD: 1.54 CM
BH CV ECHO MEAS - MED PEAK E' VEL: 5.4 CM/SEC
BH CV ECHO MEAS - MV A DUR: 0.18 SEC
BH CV ECHO MEAS - MV A MAX VEL: 72.2 CM/SEC
BH CV ECHO MEAS - MV DEC SLOPE: 371.8 CM/SEC2
BH CV ECHO MEAS - MV DEC TIME: 0.17 MSEC
BH CV ECHO MEAS - MV E MAX VEL: 61.2 CM/SEC
BH CV ECHO MEAS - MV E/A: 0.85
BH CV ECHO MEAS - MV MAX PG: 6.4 MMHG
BH CV ECHO MEAS - MV MEAN PG: 2.11 MMHG
BH CV ECHO MEAS - MV P1/2T: 77.9 MSEC
BH CV ECHO MEAS - MV V2 VTI: 40.5 CM
BH CV ECHO MEAS - MVA(P1/2T): 2.8 CM2
BH CV ECHO MEAS - MVA(VTI): 1.9 CM2
BH CV ECHO MEAS - PA ACC TIME: 0.09 SEC
BH CV ECHO MEAS - PA V2 MAX: 84.3 CM/SEC
BH CV ECHO MEAS - PULM A REVS DUR: 0.13 SEC
BH CV ECHO MEAS - PULM A REVS VEL: 24.5 CM/SEC
BH CV ECHO MEAS - PULM DIAS VEL: 37.1 CM/SEC
BH CV ECHO MEAS - PULM S/D: 2.01
BH CV ECHO MEAS - PULM SYS VEL: 74.7 CM/SEC
BH CV ECHO MEAS - QP/QS: 1.03
BH CV ECHO MEAS - RV MAX PG: 1.3 MMHG
BH CV ECHO MEAS - RV V1 MAX: 57 CM/SEC
BH CV ECHO MEAS - RV V1 VTI: 12.6 CM
BH CV ECHO MEAS - RVOT DIAM: 2.8 CM
BH CV ECHO MEAS - SI(MOD-SP2): 47 ML/M2
BH CV ECHO MEAS - SI(MOD-SP4): 43.9 ML/M2
BH CV ECHO MEAS - SV(LVOT): 76.9 ML
BH CV ECHO MEAS - SV(MOD-SP2): 122 ML
BH CV ECHO MEAS - SV(MOD-SP4): 114 ML
BH CV ECHO MEAS - SV(RVOT): 79.1 ML
BH CV ECHO MEAS - TAPSE (>1.6): 2.42 CM
BH CV ECHO MEAS - TR MAX PG: 19.2 MMHG
BH CV ECHO MEAS - TR MAX VEL: 219.4 CM/SEC
BH CV ECHO MEASUREMENTS AVERAGE E/E' RATIO: 12.12
BH CV XLRA - RV BASE: 3.8 CM
BH CV XLRA - RV LENGTH: 8.1 CM
BH CV XLRA - RV MID: 3.9 CM
BH CV XLRA - TDI S': 12.9 CM/SEC
CHOLEST SERPL-MCNC: 143 MG/DL (ref 0–200)
HBA1C MFR BLD: 8.1 % (ref 4.8–5.6)
HDLC SERPL-MCNC: 22 MG/DL (ref 40–60)
IMP & REVIEW OF LAB RESULTS: NORMAL
LDLC SERPL CALC-MCNC: 23 MG/DL (ref 0–100)
LEFT ATRIUM VOLUME INDEX: 44.6 ML/M2
REPORT: NORMAL
SINUS: 4.4 CM
STJ: 3.2 CM
TRIGL SERPL-MCNC: 759 MG/DL (ref 0–150)
UNABLE TO VOID: NORMAL
VLDLC SERPL CALC-MCNC: 98 MG/DL (ref 5–40)

## 2023-08-30 PROCEDURE — 25510000001 PERFLUTREN (DEFINITY) 8.476 MG IN SODIUM CHLORIDE (PF) 0.9 % 10 ML INJECTION: Performed by: INTERNAL MEDICINE

## 2023-08-30 PROCEDURE — 93356 MYOCRD STRAIN IMG SPCKL TRCK: CPT

## 2023-08-30 PROCEDURE — 93306 TTE W/DOPPLER COMPLETE: CPT

## 2023-08-30 RX ADMIN — PERFLUTREN 2 ML: 6.52 INJECTION, SUSPENSION INTRAVENOUS at 10:12

## 2023-08-30 NOTE — TELEPHONE ENCOUNTER
Caller: Contreras Albert    Relationship to patient: Self    Best call back number: [phone]    Patient is needing: PTS WIFE IS CALLING BACK TO GET NAME OF MEDICATION, PTS WIFE BAYRON BE BUSY ADVISE TO PLEASE LEAVE INFO ON THE VOICEMAIL

## 2023-08-30 NOTE — PROGRESS NOTES
Re: Refills of Oral Specialty Medication - Pomalyst (pomalidomide)    Drug-Drug Interactions: The current medication list was reviewed and there are no relevant drug-drug interactions.  Medication Allergies: The patient has no relevant allergies as it relates to their oral specialty medication  Review of Labs/Dose Adjustments: NO DOSE CHANGE - I reviewed the most recent note and labs and the patient will continue without any dose changes.  I sent refills as described below.    Drug: Pomalyst (pomalidomide)  Strength: 2 mg  Directions: Take 1 capsule by mouth  daily for 21 days on, then 7 days off  Quantity: 21  Refills: 0  Pharmacy prescription sent to: [facility]    Name/Credentials Donna Cano RPh, BCOP    8/30/2023  10:42 EDT

## 2023-08-30 NOTE — PROGRESS NOTES
Drug: Pomalyst (pomalidomide)  Strength: 2 mg  Directions: Take 1 capsule by mouth  daily for 21 days on, then 7 days off  Quantity: 21  Refills: 0  [facility]    Completed independent double check on medication order/RX.  Yolande Huitron, YumikoD, BCPS

## 2023-08-30 NOTE — TELEPHONE ENCOUNTER
Provider: GISEL MCKEON     Caller: PANCHITO LYNCH    Relationship to Patient: SELF    Pharmacy: [id number] - [facility] [id number] [address] - [phone]  - [phone]     Phone Number: [phone]    Reason for Call: PT CALLED BACK TO LET THE OFFICE KNOW ITS OKAY TO SEND THE PRESCRIPTION FOR THE  MEDICATION FOR VASCEPA.     When was the patient last seen: 08/29/23

## 2023-08-30 NOTE — TELEPHONE ENCOUNTER
Spoke with pt wife regarding lab results, states that she would call back and let us know about the medication Vascepa.

## 2023-08-31 RX ORDER — ICOSAPENT ETHYL 1000 MG/1
2 CAPSULE ORAL 2 TIMES DAILY WITH MEALS
Qty: 360 CAPSULE | Refills: 1 | Status: SHIPPED | OUTPATIENT
Start: 2023-08-31

## 2023-09-01 ENCOUNTER — TELEPHONE (OUTPATIENT)
Dept: CARDIOLOGY | Facility: CLINIC | Age: 68
End: 2023-09-01
Payer: MEDICARE

## 2023-09-01 DIAGNOSIS — I10 PRIMARY HYPERTENSION: ICD-10-CM

## 2023-09-01 DIAGNOSIS — I77.810 ASCENDING AORTA DILATATION: Primary | ICD-10-CM

## 2023-09-01 DIAGNOSIS — E78.2 MIXED HYPERLIPIDEMIA: ICD-10-CM

## 2023-09-01 NOTE — TELEPHONE ENCOUNTER
He was having some symptoms that sounded more orthostatic but I checked an echo and the echo shows normal LV function.  Some grade 2 diastolic dysfunction so we need to make sure his blood pressure staying controlled.  There is some suggestion that the aortic root may be enlarged.  I reviewed a noncontrasted CT from 2020 and I do not think it is likely significantly different in the last 3 years but we should probably recheck another CT scan.  I do not want to use contrast because his last creatinine was 3.0.  So we will try another noncontrasted CT a and I will ask them to gait it so we can get a better measurement of the aortic root.  Mobile telemetry is pending.

## 2023-09-01 NOTE — TELEPHONE ENCOUNTER
Notified patient of results/recommendations. Patient verbalized understanding.    Regina Saldivar RN  Triage [facility]

## 2023-09-05 ENCOUNTER — INFUSION (OUTPATIENT)
Dept: ONCOLOGY | Facility: HOSPITAL | Age: 68
End: 2023-09-05
Payer: MEDICARE

## 2023-09-05 ENCOUNTER — OFFICE VISIT (OUTPATIENT)
Dept: ONCOLOGY | Facility: CLINIC | Age: 68
End: 2023-09-05
Payer: MEDICARE

## 2023-09-05 VITALS
HEIGHT: 74 IN | OXYGEN SATURATION: 96 % | HEART RATE: 72 BPM | TEMPERATURE: 97.8 F | DIASTOLIC BLOOD PRESSURE: 84 MMHG | RESPIRATION RATE: 18 BRPM | SYSTOLIC BLOOD PRESSURE: 134 MMHG | BODY MASS INDEX: 39.28 KG/M2 | WEIGHT: 306.1 LBS

## 2023-09-05 DIAGNOSIS — C90.00 MULTIPLE MYELOMA NOT HAVING ACHIEVED REMISSION: Primary | ICD-10-CM

## 2023-09-05 DIAGNOSIS — N18.32 ANEMIA IN STAGE 3B CHRONIC KIDNEY DISEASE: ICD-10-CM

## 2023-09-05 DIAGNOSIS — D70.1 CHEMOTHERAPY INDUCED NEUTROPENIA: ICD-10-CM

## 2023-09-05 DIAGNOSIS — D69.6 THROMBOCYTOPENIA: ICD-10-CM

## 2023-09-05 DIAGNOSIS — D84.9 IMMUNOCOMPROMISED STATE: ICD-10-CM

## 2023-09-05 DIAGNOSIS — E83.51 HYPOCALCEMIA: ICD-10-CM

## 2023-09-05 DIAGNOSIS — D63.1 ANEMIA IN STAGE 3B CHRONIC KIDNEY DISEASE: ICD-10-CM

## 2023-09-05 DIAGNOSIS — T45.1X5A CHEMOTHERAPY INDUCED NEUTROPENIA: ICD-10-CM

## 2023-09-05 DIAGNOSIS — R55 POSTURAL DIZZINESS WITH PRESYNCOPE: ICD-10-CM

## 2023-09-05 DIAGNOSIS — C90.00 MULTIPLE MYELOMA NOT HAVING ACHIEVED REMISSION: ICD-10-CM

## 2023-09-05 DIAGNOSIS — E83.42 HYPOMAGNESEMIA: ICD-10-CM

## 2023-09-05 DIAGNOSIS — R42 POSTURAL DIZZINESS WITH PRESYNCOPE: ICD-10-CM

## 2023-09-05 LAB
ALBUMIN SERPL-MCNC: 3.8 G/DL (ref 3.5–5.2)
ALBUMIN/GLOB SERPL: 2 G/DL
ALP SERPL-CCNC: 44 U/L (ref 39–117)
ALT SERPL W P-5'-P-CCNC: 8 U/L (ref 1–41)
ANION GAP SERPL CALCULATED.3IONS-SCNC: 11.3 MMOL/L (ref 5–15)
AST SERPL-CCNC: 14 U/L (ref 1–40)
BASOPHILS # BLD AUTO: 0.07 10*3/MM3 (ref 0–0.2)
BASOPHILS NFR BLD AUTO: 1.7 % (ref 0–1.5)
BILIRUB SERPL-MCNC: 0.5 MG/DL (ref 0–1.2)
BUN SERPL-MCNC: 34 MG/DL (ref 8–23)
BUN/CREAT SERPL: 11.1 (ref 7–25)
CALCIUM SPEC-SCNC: 9.1 MG/DL (ref 8.6–10.5)
CHLORIDE SERPL-SCNC: 104 MMOL/L (ref 98–107)
CO2 SERPL-SCNC: 25.7 MMOL/L (ref 22–29)
CREAT SERPL-MCNC: 3.07 MG/DL (ref 0.7–1.3)
DEPRECATED RDW RBC AUTO: 55.2 FL (ref 37–54)
EGFRCR SERPLBLD CKD-EPI 2021: 21.6 ML/MIN/1.73
EOSINOPHIL # BLD AUTO: 0.14 10*3/MM3 (ref 0–0.4)
EOSINOPHIL NFR BLD AUTO: 3.4 % (ref 0.3–6.2)
ERYTHROCYTE [DISTWIDTH] IN BLOOD BY AUTOMATED COUNT: 15.6 % (ref 12.3–15.4)
GLOBULIN UR ELPH-MCNC: 1.9 GM/DL
GLUCOSE SERPL-MCNC: 146 MG/DL (ref 65–99)
HCT VFR BLD AUTO: 33.5 % (ref 37.5–51)
HGB BLD-MCNC: 10.4 G/DL (ref 13–17.7)
IMM GRANULOCYTES # BLD AUTO: 0.05 10*3/MM3 (ref 0–0.05)
IMM GRANULOCYTES NFR BLD AUTO: 1.2 % (ref 0–0.5)
LYMPHOCYTES # BLD AUTO: 1.02 10*3/MM3 (ref 0.7–3.1)
LYMPHOCYTES NFR BLD AUTO: 24.6 % (ref 19.6–45.3)
MAGNESIUM SERPL-MCNC: 1.7 MG/DL (ref 1.6–2.4)
MCH RBC QN AUTO: 30 PG (ref 26.6–33)
MCHC RBC AUTO-ENTMCNC: 31 G/DL (ref 31.5–35.7)
MCV RBC AUTO: 96.5 FL (ref 79–97)
MONOCYTES # BLD AUTO: 0.49 10*3/MM3 (ref 0.1–0.9)
MONOCYTES NFR BLD AUTO: 11.8 % (ref 5–12)
NEUTROPHILS NFR BLD AUTO: 2.38 10*3/MM3 (ref 1.7–7)
NEUTROPHILS NFR BLD AUTO: 57.3 % (ref 42.7–76)
NRBC BLD AUTO-RTO: 0 /100 WBC (ref 0–0.2)
PHOSPHATE SERPL-MCNC: 2.2 MG/DL (ref 2.5–4.5)
PLATELET # BLD AUTO: 167 10*3/MM3 (ref 140–450)
PMV BLD AUTO: 9.3 FL (ref 6–12)
POTASSIUM SERPL-SCNC: 4.4 MMOL/L (ref 3.5–5.2)
PROT SERPL-MCNC: 5.7 G/DL (ref 6–8.5)
RBC # BLD AUTO: 3.47 10*6/MM3 (ref 4.14–5.8)
SODIUM SERPL-SCNC: 141 MMOL/L (ref 136–145)
WBC NRBC COR # BLD: 4.15 10*3/MM3 (ref 3.4–10.8)

## 2023-09-05 PROCEDURE — 80053 COMPREHEN METABOLIC PANEL: CPT

## 2023-09-05 PROCEDURE — 96365 THER/PROPH/DIAG IV INF INIT: CPT

## 2023-09-05 PROCEDURE — 25010000002 MAGNESIUM SULFATE IN D5W 1G/100ML (PREMIX) 1-5 GM/100ML-% SOLUTION: Performed by: INTERNAL MEDICINE

## 2023-09-05 PROCEDURE — 83735 ASSAY OF MAGNESIUM: CPT

## 2023-09-05 PROCEDURE — 84100 ASSAY OF PHOSPHORUS: CPT

## 2023-09-05 PROCEDURE — 85025 COMPLETE CBC W/AUTO DIFF WBC: CPT

## 2023-09-05 RX ORDER — MAGNESIUM SULFATE 1 G/100ML
1 INJECTION INTRAVENOUS ONCE
Status: COMPLETED | OUTPATIENT
Start: 2023-09-05 | End: 2023-09-05

## 2023-09-05 RX ADMIN — MAGNESIUM SULFATE HEPTAHYDRATE 1 G: 1 INJECTION, SOLUTION INTRAVENOUS at 12:27

## 2023-09-05 NOTE — PROGRESS NOTES
Subjective     CHIEF COMPLAINT:      Chief Complaint   Patient presents with    Follow-up     Concern about falling      HISTORY OF PRESENT ILLNESS:     Contreras Albert is a 68 y.o. male patient who returns today for follow up on his multiple myeloma. He returns today for follow up reporting episodes of falling. He is being evaluated by Cardiology and he has a cardiac monitor in place. He was noted to have a drop in his BP with standing up. He became concerned that this was from one of his multiple myeloma medications.     Patient continues to have leg edema. He is having difficulty wearing the compression stocking.     ROS:  Pertinent ROS is in the HPI.     Past medical, surgical, social and family history were reviewed.     MEDICATIONS:    Current Outpatient Medications:     acetaminophen (TYLENOL) 325 MG tablet, Take 2 tablets by mouth Every 6 (Six) Hours As Needed for Mild Pain. (Patient taking differently: Take 2 tablets by mouth Every 6 (Six) Hours As Needed for Mild Pain. As needed), Disp: , Rfl:     acyclovir (ZOVIRAX) 800 MG tablet, Take 1 tablet by mouth Daily., Disp: 90 tablet, Rfl: 2    ASPIRIN 81 PO, Take 81 mg by mouth Daily., Disp: , Rfl:     atorvastatin (LIPITOR) 10 MG tablet, TAKE 1 TABLET BY MOUTH EVERY NIGHT AT BEDTIME AND AFTER THE EVENING MEAL, Disp: 90 tablet, Rfl: 1    Blood Glucose Monitoring Suppl (FREESTYLE FREEDOM LITE) w/Device kit, 1 kit Daily. Indications: Type 2 Diabetes (Patient taking differently: Use 1 kit Daily.), Disp: 1 each, Rfl: 0    bumetanide (BUMEX) 2 MG tablet, Take 1 tablet by mouth Daily., Disp: , Rfl:     calcium carbonate (TUMS) 500 MG chewable tablet, Chew 4 tablets Daily., Disp: , Rfl:     carvedilol (COREG) 12.5 MG tablet, Take 1 tablet by mouth 2 (Two) Times a Day With Meals., Disp: , Rfl:     Continuous Blood Gluc Sensor (Dexcom G6 Sensor), Replace sensor every 10 days.  Dx: E 11.22, Disp: 9 each, Rfl: 1    Continuous Blood Gluc Transmit (Dexcom G6 Transmitter) misc,  1 each Every 3 (Three) Months. Dx: E 11.22, Disp: 1 each, Rfl: 1    DARATUMUMAB IV, Infuse  into a venous catheter., Disp: , Rfl:     dexamethasone (DECADRON) 4 MG tablet, Take 3 tablets on day of chemo, 1 tablet on day #2 and 1 tablet on day #3 and repeat monthly. (Patient taking differently: Take 3 tablets on day of chemo, 1 tablet on day #2 and 1 tablet on day #3 and repeat monthly.  Takes every 4th week during chemo shot, one day), Disp: 15 tablet, Rfl: 1    diphenoxylate-atropine (LOMOTIL) 2.5-0.025 MG per tablet, Take 2 tablets by mouth 4 (Four) Times a Day As Needed for Diarrhea., Disp: 120 tablet, Rfl: 0    escitalopram (LEXAPRO) 5 MG tablet, TAKE 1 TABLET BY MOUTH EVERY DAY, Disp: 30 tablet, Rfl: 5    finasteride (PROSCAR) 5 MG tablet, Take 1 tablet by mouth Daily., Disp: , Rfl:     gabapentin (NEURONTIN) 300 MG capsule, TAKE ONE CAPSULE BY MOUTH EVERY MORNING AND TAKE 2 CAPSULES EVERY NIGHT AT BEDTIME, Disp: 90 capsule, Rfl: 0    glucagon (GLUCAGEN) 1 MG injection, Inject 1 mg into the appropriate muscle as directed by prescriber 1 (One) Time As Needed for Low Blood Sugar for up to 1 dose. Follow package directions for low blood sugar., Disp: 1 kit, Rfl: 3    glucose blood (FREESTYLE LITE) test strip, Use to test blood sugar 4-5 times daily., Disp: 200 each, Rfl: 5    icosapent ethyl (Vascepa) 1 g capsule capsule, Take 2 g by mouth 2 (Two) Times a Day With Meals., Disp: 360 capsule, Rfl: 1    Insulin Lispro, 1 Unit Dial, (HumaLOG KwikPen) 100 UNIT/ML solution pen-injector, NONCHEMO DAYS:10U THREE TIMES DAILY BEFORE MEALS&1 UNIT FOR EVERY 50>150. CHEOMDAYS 15U THREE TIMES DAILY BEFORE MEALS& 2 UNIT FOR 50>150, Disp: 27 mL, Rfl: 3    Insulin Pen Needle 32G X 4 MM misc, USING 4 PEN NEEDLES DAILY, Disp: 400 each, Rfl: 3    Lancets (freestyle) lancets, Use to check blood sugar 4 times daily, Disp: 400 each, Rfl: 2    Lantus SoloStar 100 UNIT/ML injection pen, Inject 70 Units under the skin into the appropriate  "area as directed Daily for 90 days. Non-Chemo Days take 16 units twice daily. Chemo Days take 30 units twice daily. 16-30 units twice daily for max daily dose of 70 units daily., Disp: 63 mL, Rfl: 1    loperamide (IMODIUM) 2 MG capsule, Take 1 capsule by mouth 4 (Four) Times a Day As Needed for Diarrhea., Disp: , Rfl:     ondansetron (ZOFRAN) 8 MG tablet, Take 1 tablet by mouth 3 (Three) Times a Day As Needed for Nausea or Vomiting., Disp: 30 tablet, Rfl: 5    pomalidomide (POMALYST) 2 MG chemo capsule, Take 1 capsule by mouth Daily. Take for 21 days on, then 7 days off., Disp: 21 capsule, Rfl: 0    tamsulosin (FLOMAX) 0.4 MG capsule 24 hr capsule, Take 1 capsule by mouth Daily., Disp: , Rfl:     vitamin D (ERGOCALCIFEROL) 1.25 MG (57309 UT) capsule capsule, Take 1 capsule by mouth Every 7 (Seven) Days., Disp: 13 capsule, Rfl: 1  Objective     VITAL SIGNS:     Vitals:    09/05/23 1126   BP: 134/84   Pulse: 72   Resp: 18   Temp: 97.8 °F (36.6 °C)   TempSrc: Temporal   SpO2: 96%   Weight: (!) 139 kg (306 lb 1.6 oz)   Height: 188 cm (74.02\")   PainSc: 0-No pain     Body mass index is 39.28 kg/m².     Wt Readings from Last 5 Encounters:   09/05/23 (!) 139 kg (306 lb 1.6 oz)   08/30/23 (!) 138 kg (304 lb 3.8 oz)   08/29/23 (!) 138 kg (303 lb 9.6 oz)   08/21/23 (!) 139 kg (307 lb 6.4 oz)   08/16/23 (!) 141 kg (310 lb)     PHYSICAL EXAMINATION:   GENERAL: The patient appears in fair general condition, not in acute distress.   SKIN: No ecchymosis.  EYES: No jaundice. No pallor.  CHEST: Normal respiratory effort.   CVS: No edema.  ABDOMEN: Non-distended.  EXTREMITIES: + 1-2 edema.     DIAGNOSTIC DATA:     Results from last 7 days   Lab Units 09/05/23  1055   WBC 10*3/mm3 4.15   NEUTROS ABS 10*3/mm3 2.38   HEMOGLOBIN g/dL 10.4*   HEMATOCRIT % 33.5*   PLATELETS 10*3/mm3 167     Results from last 7 days   Lab Units 09/05/23  1055   SODIUM mmol/L 141   POTASSIUM mmol/L 4.4   CHLORIDE mmol/L 104   CO2 mmol/L 25.7   BUN mg/dL 34* "   CREATININE mg/dL 3.07*   CALCIUM mg/dL 9.1   ALBUMIN g/dL 3.8   BILIRUBIN mg/dL 0.5   ALK PHOS U/L 44   ALT (SGPT) U/L 8   AST (SGOT) U/L 14   GLUCOSE mg/dL 146*   MAGNESIUM mg/dL 1.7     Component      Latest Ref Rng 7/25/2023 8/7/2023   IgG      603 - 1613 mg/dL 492 (L)  592 (L)    IgA      61 - 437 mg/dL 55 (L)  52 (L)    IgM      20 - 172 mg/dL 14 (L)  15 (L)    Total Protein      6.0 - 8.5 g/dL 5.3 (L)  5.7 (L)    Albumin      2.9 - 4.4 g/dL 3.3  3.4    Alpha-1-Globulin      0.0 - 0.4 g/dL 0.2  0.2    Alpha-2-Globulin      0.4 - 1.0 g/dL 0.7  0.8    Beta Globulin      0.7 - 1.3 g/dL 0.7  0.8    Gamma Globulin      0.4 - 1.8 g/dL 0.5  0.6    M-Jurgen      Not Observed g/dL Comment:  Not Observed    Globulin      2.2 - 3.9 g/dL 2.0 (L)  2.3    A/G Ratio      0.7 - 1.7  1.7  1.5    Immunofixation Reflex, Serum Comment !  Comment    Please note Comment  Comment    Kappa FLC      3.3 - 19.4 mg/L 65.3 (H)  46.5 (H)    Free Lambda Light Chains      5.7 - 26.3 mg/L 38.5 (H)  26.0    Kappa/Lambda Ratio      0.26 - 1.65  1.70 (H)  1.79 (H)       Assessment & Plan    *IgG lambda multiple myeloma.  Bone marrow 5/23/2020 hypercellular marrow with 80% involvement of plasma cell myeloma; FISH studies pending  Bone survey 5/26/2020: Diffuse osteoporosis and demineralization.  However, no discrete lytic lesions.  SPEP 5/23/2020: M spike 5.1.  IgG 6629 lambda.  Light chain ratio 0 with free lambda light chains 6400.  Beta-2 microglobulin 16.3.  24-hour urine 12.7 g protein per 24-hour with monoclonal lambda free light chain 33.9%, monoclonal IgG lambda 23.5%  24-hour urine testing from 5/24/2020 free lambda light chains 8.4 g/L  Due to the renal failure, initiated CyBorD 5/28/20.  Given the significantly elevated light chains with the first cycle of treatment plan to give Velcade days 1, 4, 8, 11; Cytoxan 300 PO mg/m² weekly and dexamethasone 40 mg p.o. days 1, 2, 3, 4, 8 and 15.    After cycle 1 treatment can likely be altered  to weekly dosing of each drug.  On 6/18/2020, Velcade was changed to 1.5 mg/m² weekly continuously along with weekly Decadron and oral Cytoxan.  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   Treatment was changed on 8/27/2020 to to Daratumumab subcutaneous injection weekly along with Revlimid 10 mg daily for 21 out of 28-day cycle and Decadron 40 mg daily.  S/p stem cell transplant on 12/9/2020 at [facility].  Day 100 bone marrow on 3/16/2021 revealed minute  plasma cell population.  PET scan on 3/29/2021 was negative.  Patient enrolled in the  clinical trial randomizing to maintenance lenalidomide versus lenalidomide and daratumumab.  Patient was randomized to the Revlimid arm initiated 4/13/2021 dosed at 5 mg daily (due to renal function).  Patient developed neutropenia requiring dose adjustment to 5 mg daily for 21/28 days.  Patient withdrew from clinical trial after 5 cycles on 8/31/2021 due to worsening diarrhea.    He was switched to Velcade maintenance every other week 9/14/2021 which he discontinued 10/26/2021 due to neuropathy and lower extremity edema.    He was subsequently changed to pomalidomide 2 mg daily 21/28 days on 11/8/2021.    1 year post transplant, bone marrow biopsy showed 5-7% plasma cells and due to the residual disease he was started on DPd (daratumumab, pomalidomide, dexamethasone) on 2/21/2022.    Patient was seen by Dr. Romero on 9/12/2022.  At that time patient was cycle 8-day 7 DPd. Due to chronic side effects, dexamethasone was changed from weekly to monthly.    Patient did continue pomalidomide on his own during admission to the hospital, received 1 dose 4 mg p.o. on 9/24/2022 before was subsequently discontinued due to cytopenias.   Labs on 9/24/2022 with IgG 314, IgM less than 25, IgA less than 50.  9/25/2022 24-hour urine free light chains with free kappa 351, free lambda 104, ratio 3.37  On 9/24/2022, IgG kappa M protein was 0.1 g.  IgG 311, IgA  43, IgM 7.   Low level M spike of IgG kappa likely represents daratumumab.   No IgG lambda M protein.   Serum free light chains on 9/26/2022 with kappa 57.4, lambda 42.8, ratio normal at 1.34.  He received Darzlex on 10/3/2022 at [facility]. He decided to transfer his treatment back to our office.   On 10/13/2022, M protein was too small to quantitate. Kappa/Lambda ratio was 1.34.  Dexamethasone was changed to 12 mg on Mondays, 4 mg on Tuesday and 4 mg on Wednesday.  Pomalyst was restarted at 4 mg every other day to be taken on 10/13/2022, 10/15/2022, 10/17/2022, 10/19/2022, 10/21/2022 and 10/23/2022.  Pomalyst dose was reduced to 3 mg daily for 21 out of a 28-day cycle starting on 10/31/2022.  He tolerated the reduced dose with improvement in the diarrhea.  SPEP GUANAKITO FLC on 12/19/2022 showed no definite M protein.  K/L ratio was 1.46-normal.  He received Darzalex on 12/27/2022 (1 day delay due to Christmas holiday being on 12/26/2022).  Due to neutropenia, the dose of Pomalyst was reduced to 2 mg daily for 21/28-day cycle.  SPEP GUANAKITO FLC on 4/17/2023 revealed no M protein.  Kappa to lambda ratio was 1.67.  SPEP GUANAKITO FLC on 5/30/2023 revealed no M protein.  Free lambda light chain was 36.0.  Kappa to lambda ratio was 1.77.  SPEP GUANAKITO FLC on 7/25/2023 revealed no M protein.  Free lambda light chain was 38.5.  Kappa to lambda ratio was 1.70.  SPEP GUANAKITO FLC on 8/7/2023 revealed no M protein. Kappa FLC 46.5. Lambda FLC 26.0. K/L ratio 1.79.  He is due to receive Darzalex today. He started a new cycle of Pomalyst yesterday.   Due to the weakness and falls, we will hold treatment this month.     *Bone health.  He is on Xgeva.   Patient developed severe hypocalcemia following the Xgeva injection.  He required inpatient hospital stay.  Given the duration of Xgeva injections, recommended changing treatment to every 3 months starting December 2022.   I recommended giving Xgeva today and continuing every 3 months.  Last dose of Xgeva  was on 7/10/2023.  Next dose will be in October 2023.     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  Patient was receiving Procrit at [facility].  Records from [facility] showed that the patient was receiving 70,000 units weekly and received the last dose on 9/27/2022.  Patient was given Procrit 70,000 units on 9/27/2022.  Hemoglobin was 9.9 on 10/17/2022.  He was given Procrit 60,000 units.  Hemoglobin was 9.8 on 11/4/2022.  He was given Procrit 60,000 units.  Labs in March 2023 revealed adequate iron stores.  Last dose of Procrit was 25,000 units on 5/1/2023 for hemoglobin of 9.9.  9/5/2023: Hemoglobin 10.4.    *Thrombocytopenia.  This is attributed to multiple myeloma, prior stem cell transplant and chemotherapy.  8/21/2023: Platelets decreased to 123,000.  9/5/2023: Platelet count 167,000.    *Neutropenia.  This was attributed to Pomalyst.  Neutrophil count was down to 290 on 9/25/2022.  He was given Neupogen during his hospital stay in September 2022.  Neutrophil count improved afterwards.  However, neutrophil count decreased to 600 on 12/19/2022.  He reports that he was feeling tired and achy at that time.  Neutrophil count was 770 on 1/16/2023.  Due to the recurrent neutropenia, the dose of Pomalyst was reduced to 2 mg in January 2023.  Neutrophil count improved following the dose reduction.  Neutrophil count decreased to 1200 on 3/13/2023.  8/7/2023: Neutrophil count 2910.  9/5/2023: Neutrophil count normal at 2380.    *Hypocalcemia.  Patient developed severe hypocalcemia due to Xgeva.  Calcium was 5.9 on 9/23/2022.  Calcium improved to 8.1 on 9/28/2022  Calcium improved to 9.6 on 10/13/2022  Calcium decreased to 7.7 on 10/24/2022.  Albumin was 3.8.  Calcium improved to 10.3 on 12/19/2022.  Calcium decreased to 8.4 on 12/27/2022.  Calcium dose was subsequently increased to 3 tablets daily.  8/7/2023: Calcium 8.1. Albumin  3.6.  9/5/2023: Calcium 9.1. Albumin 3.8.    *Hypomagnesemia.  Magnesium was 1.1 on 10/24/2022.  He was placed on oral magnesium oxide 400 mg twice daily.  He developed diarrhea and it was stopped.  He was started on magnesium chloride on 1/23/2023.  He developed diarrhea about 7 days after starting the magnesium chloride.  Oral magnesium chloride was discontinued.  He currently receives IV magnesium per protocol based on his magnesium level.  5/30/2023: Magnesium 1.4.  6/12/2023: Magnesium 1.5.  8/7/2023: Magnesium  1.8.   9/5/2023: Magnesium decreased to 1.7.     *Prophylaxis.  He is on acyclovir 800 mg daily.  He is on ASA 81 mg daily.  He did not have any recent infections.     *Episodes of dizziness and falling.  Patient reports that the episodes develop after he stands up and starts walking.  No dizziness while sitting.  No restricted chest pain or shortness of breath.  Blood pressure was 133/80 and heart rate was 66 in the sitting position today.  After standing, blood pressure was 119/76 and heart rate was 69.  No improvement in the symptoms after holding Pomalyst for 2 weeks.  MRI brain on 7/26/2023 showed small vessel ischemic changes.  There were changes of 3 old strokes.  I explained the findings to the patient.  I recommended evaluation by cardiology and neurology.  I also recommended home physical therapy  I recommended that he uses a walker when he ambulates.  His evaluation showed possible changes of postural hypotension.   He is concerned that this is due to his medications. I explained that while postural hypotension is a side effect of medications, it is not a common side effect of Darzalex or Pomalyst.  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  I recommended holding Darzalex and Pomalyst for now.     *Vitamin D deficiency.  He is on vitamin D 50,000 units weekly.  Vitamin D level was 18 on 10/3/2022.  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  He is on gabapentin 300 mg in the morning and 600 mg in the evening.  Neuropathy symptoms in the feet are better as the swelling improved.     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  S/p right partial nephrectomy on 5/18/2016.    PLAN:    1.  We will hold Darzalex and Pomalyst. I asked him not to take additional doses of Pomalyst for now.  2.  We will give IV magnesium sulfate 1 gm today.  3.  Continue Calcium Carbonate 4 tablets daily.  4.  Start Neutra-phos 1 tablet daily.  5.  Continue Aspirin and Acyclovir.   6.  He does not need Procrit injection at this point.   7.  We discussed elevation of the legs while in bed (using a wedge) and attempting to place compression stockings in the morning.   8.  I will check with Cardiology regarding the suspected postural hypotension.   9.  Follow up in 4 weeks. We will repeat CBC CMP MG PHOS SPEP GUANAKITO and FLC.     I spent 50 minutes caring for Contreras on this date of service. This time includes time spent by me in the following activities: preparing for the visit, reviewing tests, obtaining and/or reviewing a separately obtained history, performing a medically appropriate examination and/or evaluation, counseling and educating the patient/family/caregiver, ordering medications, tests, or procedures, documenting information in the medical record, independently interpreting results and communicating that information with the patient/family/caregiver, and care coordination       Red Del Valle MD  09/05/23

## 2023-09-06 ENCOUNTER — SPECIALTY PHARMACY (OUTPATIENT)
Dept: PHARMACY | Facility: HOSPITAL | Age: 68
End: 2023-09-06
Payer: MEDICARE

## 2023-09-06 LAB
ALBUMIN SERPL ELPH-MCNC: 3.4 G/DL (ref 2.9–4.4)
ALBUMIN/GLOB SERPL: 1.6 {RATIO} (ref 0.7–1.7)
ALPHA1 GLOB SERPL ELPH-MCNC: 0.2 G/DL (ref 0–0.4)
ALPHA2 GLOB SERPL ELPH-MCNC: 0.7 G/DL (ref 0.4–1)
B-GLOBULIN SERPL ELPH-MCNC: 0.7 G/DL (ref 0.7–1.3)
GAMMA GLOB SERPL ELPH-MCNC: 0.5 G/DL (ref 0.4–1.8)
GLOBULIN SER-MCNC: 2.2 G/DL (ref 2.2–3.9)
IGA SERPL-MCNC: 55 MG/DL (ref 61–437)
IGG SERPL-MCNC: 562 MG/DL (ref 603–1613)
IGM SERPL-MCNC: 16 MG/DL (ref 20–172)
INTERPRETATION SERPL IEP-IMP: ABNORMAL
KAPPA LC FREE SER-MCNC: 49.6 MG/L (ref 3.3–19.4)
KAPPA LC FREE/LAMBDA FREE SER: 1.53 {RATIO} (ref 0.26–1.65)
LABORATORY COMMENT REPORT: ABNORMAL
LAMBDA LC FREE SERPL-MCNC: 32.4 MG/L (ref 5.7–26.3)
M PROTEIN SERPL ELPH-MCNC: ABNORMAL G/DL
PROT SERPL-MCNC: 5.6 G/DL (ref 6–8.5)

## 2023-09-11 DIAGNOSIS — G62.0 NEUROPATHY DUE TO CHEMOTHERAPEUTIC DRUG: ICD-10-CM

## 2023-09-11 DIAGNOSIS — T45.1X5A NEUROPATHY DUE TO CHEMOTHERAPEUTIC DRUG: ICD-10-CM

## 2023-09-12 RX ORDER — GABAPENTIN 300 MG/1
CAPSULE ORAL
Qty: 90 CAPSULE | Refills: 0 | Status: SHIPPED | OUTPATIENT
Start: 2023-09-12

## 2023-09-12 NOTE — TELEPHONE ENCOUNTER
Caller: Contreras Albert    Relationship to patient: Self    Best call back number:[phone]    Patient is needing: TO GET GABAPENTIN REFILLED. PT IS COMPLETELY OUT OF THIS MEDICATION AFTER TODAY.      PLEASE SEND TO:  [id number] - [facility] [address] [address] [address] AT [address] [address] [address] [address] - [phone] [facility] [phone]  [phone]

## 2023-09-29 ENCOUNTER — SPECIALTY PHARMACY (OUTPATIENT)
Dept: PHARMACY | Facility: HOSPITAL | Age: 68
End: 2023-09-29
Payer: MEDICARE

## 2023-09-29 DIAGNOSIS — C90.00 MULTIPLE MYELOMA NOT HAVING ACHIEVED REMISSION: ICD-10-CM

## 2023-09-29 NOTE — PROGRESS NOTES
Re: Refills of Oral Specialty Medication - Pomalyst (pomalidomide)    Drug-Drug Interactions: The current medication list was reviewed and there are no relevant drug-drug interactions with the specialty medication.  Medication Allergies: The patient has no relevant allergies as it relates to their oral specialty medication  Review of Labs/Dose Adjustments: NO DOSE CHANGE - I reviewed the most recent note and labs and the patient will continue without any dose changes.  I sent refills as described below.    Drug: Pomalyst (pomalidomide)  Strength: 2 mg  Directions: Take 1 capsule by mouth daily on days 1-21 of each 28 day cycle  Quantity: 21  Refills: 0  Pharmacy prescription sent to: [facility]    Name/Credentials: Carlos Clarke, YumikoD, NANCY    9/29/2023  15:23 EDT

## 2023-09-29 NOTE — TELEPHONE ENCOUNTER
Caller: [facility] – [place] - [address] - [phone]  - [phone]    Relationship: Pharmacy    Best call back number: [phone]    Requested Prescriptions:   Requested Prescriptions     Pending Prescriptions Disp Refills    pomalidomide (POMALYST) 2 MG chemo capsule 21 capsule 0     Sig: Take 1 capsule by mouth Daily. Take for 21 days on, then 7 days off.        Pharmacy where request should be sent: [facility] – [place], [place] - [address] - [phone]  - [phone] FX     Last office visit with prescribing clinician: 9/5/2023   Last telemedicine visit with prescribing clinician: Visit date not found   Next office visit with prescribing clinician: 10/2/2023     Additional details provided by patient: NA    Does the patient have less than a 3 day supply:  [] Yes  [x] No    Would you like a call back once the refill request has been completed: [] Yes [x] No    If the office needs to give you a call back, can they leave a voicemail: [] Yes [x] No    Rohan Ulrich Rep   09/29/23 11:19 EDT

## 2023-09-29 NOTE — TELEPHONE ENCOUNTER
Refill requested from pharmacy. Per last chart note, patient to continue Pomalyst 2 mg daily for 21/28-day cycle. Will route to MD for cosignature.    Carlos Clarke, PharmD, BCOP

## 2023-10-02 ENCOUNTER — OFFICE VISIT (OUTPATIENT)
Dept: ONCOLOGY | Facility: CLINIC | Age: 68
End: 2023-10-02
Payer: MEDICARE

## 2023-10-02 ENCOUNTER — INFUSION (OUTPATIENT)
Dept: ONCOLOGY | Facility: HOSPITAL | Age: 68
End: 2023-10-02
Payer: MEDICARE

## 2023-10-02 ENCOUNTER — LAB (OUTPATIENT)
Dept: LAB | Facility: HOSPITAL | Age: 68
End: 2023-10-02
Payer: MEDICARE

## 2023-10-02 VITALS
RESPIRATION RATE: 18 BRPM | BODY MASS INDEX: 38.65 KG/M2 | TEMPERATURE: 98 F | HEIGHT: 74 IN | OXYGEN SATURATION: 99 % | WEIGHT: 301.2 LBS | SYSTOLIC BLOOD PRESSURE: 127 MMHG | HEART RATE: 66 BPM | DIASTOLIC BLOOD PRESSURE: 85 MMHG

## 2023-10-02 DIAGNOSIS — N18.32 ANEMIA IN STAGE 3B CHRONIC KIDNEY DISEASE: ICD-10-CM

## 2023-10-02 DIAGNOSIS — C90.00 MULTIPLE MYELOMA NOT HAVING ACHIEVED REMISSION: Primary | ICD-10-CM

## 2023-10-02 DIAGNOSIS — D84.9 IMMUNOCOMPROMISED STATE: ICD-10-CM

## 2023-10-02 DIAGNOSIS — E83.51 HYPOCALCEMIA: ICD-10-CM

## 2023-10-02 DIAGNOSIS — T45.1X5A CHEMOTHERAPY INDUCED NEUTROPENIA: ICD-10-CM

## 2023-10-02 DIAGNOSIS — D69.6 THROMBOCYTOPENIA: ICD-10-CM

## 2023-10-02 DIAGNOSIS — T45.1X5A NEUROPATHY DUE TO CHEMOTHERAPEUTIC DRUG: ICD-10-CM

## 2023-10-02 DIAGNOSIS — D63.1 ANEMIA IN STAGE 3B CHRONIC KIDNEY DISEASE: ICD-10-CM

## 2023-10-02 DIAGNOSIS — D70.1 CHEMOTHERAPY INDUCED NEUTROPENIA: ICD-10-CM

## 2023-10-02 DIAGNOSIS — E83.42 HYPOMAGNESEMIA: ICD-10-CM

## 2023-10-02 DIAGNOSIS — C90.00 MULTIPLE MYELOMA NOT HAVING ACHIEVED REMISSION: ICD-10-CM

## 2023-10-02 DIAGNOSIS — G62.0 NEUROPATHY DUE TO CHEMOTHERAPEUTIC DRUG: ICD-10-CM

## 2023-10-02 DIAGNOSIS — R55 POSTURAL DIZZINESS WITH PRESYNCOPE: ICD-10-CM

## 2023-10-02 DIAGNOSIS — R42 POSTURAL DIZZINESS WITH PRESYNCOPE: ICD-10-CM

## 2023-10-02 LAB
ALBUMIN SERPL-MCNC: 3.6 G/DL (ref 3.5–5.2)
ALBUMIN/GLOB SERPL: 1.7 G/DL
ALP SERPL-CCNC: 55 U/L (ref 39–117)
ALT SERPL W P-5'-P-CCNC: 5 U/L (ref 1–41)
ANION GAP SERPL CALCULATED.3IONS-SCNC: 12.2 MMOL/L (ref 5–15)
AST SERPL-CCNC: 10 U/L (ref 1–40)
BASOPHILS # BLD AUTO: 0.06 10*3/MM3 (ref 0–0.2)
BASOPHILS NFR BLD AUTO: 0.8 % (ref 0–1.5)
BILIRUB SERPL-MCNC: 0.4 MG/DL (ref 0–1.2)
BUN SERPL-MCNC: 41 MG/DL (ref 8–23)
BUN/CREAT SERPL: 13.9 (ref 7–25)
CALCIUM SPEC-SCNC: 9.3 MG/DL (ref 8.6–10.5)
CHLORIDE SERPL-SCNC: 101 MMOL/L (ref 98–107)
CO2 SERPL-SCNC: 25.8 MMOL/L (ref 22–29)
CREAT SERPL-MCNC: 2.96 MG/DL (ref 0.7–1.3)
DEPRECATED RDW RBC AUTO: 54.9 FL (ref 37–54)
EGFRCR SERPLBLD CKD-EPI 2021: 22.6 ML/MIN/1.73
EOSINOPHIL # BLD AUTO: 0.17 10*3/MM3 (ref 0–0.4)
EOSINOPHIL NFR BLD AUTO: 2.2 % (ref 0.3–6.2)
ERYTHROCYTE [DISTWIDTH] IN BLOOD BY AUTOMATED COUNT: 15.7 % (ref 12.3–15.4)
GLOBULIN UR ELPH-MCNC: 2.1 GM/DL
GLUCOSE SERPL-MCNC: 145 MG/DL (ref 65–99)
HCT VFR BLD AUTO: 33.7 % (ref 37.5–51)
HGB BLD-MCNC: 10.8 G/DL (ref 13–17.7)
IMM GRANULOCYTES # BLD AUTO: 0.04 10*3/MM3 (ref 0–0.05)
IMM GRANULOCYTES NFR BLD AUTO: 0.5 % (ref 0–0.5)
LYMPHOCYTES # BLD AUTO: 1.28 10*3/MM3 (ref 0.7–3.1)
LYMPHOCYTES NFR BLD AUTO: 16.6 % (ref 19.6–45.3)
MAGNESIUM SERPL-MCNC: 1.7 MG/DL (ref 1.6–2.4)
MCH RBC QN AUTO: 30.9 PG (ref 26.6–33)
MCHC RBC AUTO-ENTMCNC: 32 G/DL (ref 31.5–35.7)
MCV RBC AUTO: 96.6 FL (ref 79–97)
MONOCYTES # BLD AUTO: 0.57 10*3/MM3 (ref 0.1–0.9)
MONOCYTES NFR BLD AUTO: 7.4 % (ref 5–12)
NEUTROPHILS NFR BLD AUTO: 5.6 10*3/MM3 (ref 1.7–7)
NEUTROPHILS NFR BLD AUTO: 72.5 % (ref 42.7–76)
NRBC BLD AUTO-RTO: 0 /100 WBC (ref 0–0.2)
PHOSPHATE SERPL-MCNC: 3.7 MG/DL (ref 2.5–4.5)
PLATELET # BLD AUTO: 191 10*3/MM3 (ref 140–450)
PMV BLD AUTO: 9.5 FL (ref 6–12)
POTASSIUM SERPL-SCNC: 4 MMOL/L (ref 3.5–5.2)
PROT SERPL-MCNC: 5.7 G/DL (ref 6–8.5)
RBC # BLD AUTO: 3.49 10*6/MM3 (ref 4.14–5.8)
SODIUM SERPL-SCNC: 139 MMOL/L (ref 136–145)
WBC NRBC COR # BLD: 7.72 10*3/MM3 (ref 3.4–10.8)

## 2023-10-02 PROCEDURE — 96372 THER/PROPH/DIAG INJ SC/IM: CPT

## 2023-10-02 PROCEDURE — 83735 ASSAY OF MAGNESIUM: CPT

## 2023-10-02 PROCEDURE — 84100 ASSAY OF PHOSPHORUS: CPT

## 2023-10-02 PROCEDURE — 96401 CHEMO ANTI-NEOPL SQ/IM: CPT

## 2023-10-02 PROCEDURE — 85025 COMPLETE CBC W/AUTO DIFF WBC: CPT

## 2023-10-02 PROCEDURE — 25010000002 DENOSUMAB 120 MG/1.7ML SOLUTION: Performed by: INTERNAL MEDICINE

## 2023-10-02 PROCEDURE — 80053 COMPREHEN METABOLIC PANEL: CPT

## 2023-10-02 PROCEDURE — 25010000002 DARATUMUMAB-HYALURONIDASE-FIHJ 1800-30000 MG-UT/15ML SOLUTION: Performed by: INTERNAL MEDICINE

## 2023-10-02 RX ORDER — DIPHENHYDRAMINE HYDROCHLORIDE 50 MG/ML
50 INJECTION INTRAMUSCULAR; INTRAVENOUS AS NEEDED
Status: CANCELLED | OUTPATIENT
Start: 2023-10-02

## 2023-10-02 RX ORDER — ACETAMINOPHEN 500 MG
1000 TABLET ORAL ONCE
Status: DISCONTINUED | OUTPATIENT
Start: 2023-10-02 | End: 2023-10-02 | Stop reason: HOSPADM

## 2023-10-02 RX ORDER — DIPHENHYDRAMINE HCL 25 MG
25 CAPSULE ORAL ONCE
Status: CANCELLED | OUTPATIENT
Start: 2023-10-02

## 2023-10-02 RX ORDER — DIPHENHYDRAMINE HCL 25 MG
25 CAPSULE ORAL ONCE
Status: DISCONTINUED | OUTPATIENT
Start: 2023-10-02 | End: 2023-10-02 | Stop reason: HOSPADM

## 2023-10-02 RX ORDER — FAMOTIDINE 20 MG/1
20 TABLET, FILM COATED ORAL ONCE
Status: CANCELLED
Start: 2023-10-02 | End: 2023-10-02

## 2023-10-02 RX ORDER — FAMOTIDINE 10 MG/ML
20 INJECTION, SOLUTION INTRAVENOUS AS NEEDED
Status: CANCELLED | OUTPATIENT
Start: 2023-10-02

## 2023-10-02 RX ORDER — MEPERIDINE HYDROCHLORIDE 25 MG/ML
25 INJECTION INTRAMUSCULAR; INTRAVENOUS; SUBCUTANEOUS
Status: CANCELLED | OUTPATIENT
Start: 2023-10-02

## 2023-10-02 RX ORDER — ACETAMINOPHEN 500 MG
1000 TABLET ORAL ONCE
Status: CANCELLED | OUTPATIENT
Start: 2023-10-02

## 2023-10-02 RX ORDER — FAMOTIDINE 20 MG/1
20 TABLET, FILM COATED ORAL ONCE
Status: DISCONTINUED | OUTPATIENT
Start: 2023-10-02 | End: 2023-10-02 | Stop reason: HOSPADM

## 2023-10-02 RX ADMIN — DARATUMUMAB AND HYALURONIDASE-FIHJ (HUMAN RECOMBINANT) 1800 MG: 1800; 30000 INJECTION SUBCUTANEOUS at 10:27

## 2023-10-02 RX ADMIN — DENOSUMAB 120 MG: 120 INJECTION SUBCUTANEOUS at 10:26

## 2023-10-02 NOTE — PROGRESS NOTES
Subjective     CHIEF COMPLAINT:      Chief Complaint   Patient presents with    Follow-up     Discuss tx     HISTORY OF PRESENT ILLNESS:     Contreras Albert is a 68 y.o. male patient who returns today for follow up on his multiple myeloma.  He is accompanied by his wife.  He reports that he continues to have feeling off balance if he stands up and starts to walk quickly afterwards.  He recently saw his nephrologist who recommended continuing Bumex at 2 mg daily.  He is noticing improvement in the leg swelling.    When the patient was seen at our office on 9/5/2023, we recommended holding Darzalex and Pomalyst.  He did not notice improvement in the symptoms of being off balance after stopping both medicines.    Patient reports noticing improvement in the numbness in the feet over the past month.  However, he reports that he has some degree of neuropathy related to his diabetes.    ROS:  Pertinent ROS is in the HPI.     Past medical, surgical, social and family history were reviewed.     MEDICATIONS:    Current Outpatient Medications:     acetaminophen (TYLENOL) 325 MG tablet, Take 2 tablets by mouth Every 6 (Six) Hours As Needed for Mild Pain. (Patient taking differently: Take 2 tablets by mouth Every 6 (Six) Hours As Needed for Mild Pain. As needed), Disp: , Rfl:     acyclovir (ZOVIRAX) 800 MG tablet, Take 1 tablet by mouth Daily., Disp: 90 tablet, Rfl: 2    ASPIRIN 81 PO, Take 81 mg by mouth Daily., Disp: , Rfl:     atorvastatin (LIPITOR) 10 MG tablet, TAKE 1 TABLET BY MOUTH EVERY NIGHT AT BEDTIME AND AFTER THE EVENING MEAL, Disp: 90 tablet, Rfl: 1    Blood Glucose Monitoring Suppl (FREESTYLE FREEDOM LITE) w/Device kit, 1 kit Daily. Indications: Type 2 Diabetes (Patient taking differently: Use 1 kit Daily.), Disp: 1 each, Rfl: 0    bumetanide (BUMEX) 2 MG tablet, Take 1 tablet by mouth Daily., Disp: , Rfl:     calcium carbonate (TUMS) 500 MG chewable tablet, Chew 4 tablets Daily., Disp: , Rfl:     carvedilol (COREG)  12.5 MG tablet, Take 1 tablet by mouth 2 (Two) Times a Day With Meals., Disp: , Rfl:     Continuous Blood Gluc Sensor (Dexcom G6 Sensor), Replace sensor every 10 days.  Dx: E 11.22, Disp: 9 each, Rfl: 1    Continuous Blood Gluc Transmit (Dexcom G6 Transmitter) misc, 1 each Every 3 (Three) Months. Dx: E 11.22, Disp: 1 each, Rfl: 1    DARATUMUMAB IV, Infuse  into a venous catheter., Disp: , Rfl:     dexamethasone (DECADRON) 4 MG tablet, Take 3 tablets on day of chemo, 1 tablet on day #2 and 1 tablet on day #3 and repeat monthly. (Patient taking differently: Take 3 tablets on day of chemo, 1 tablet on day #2 and 1 tablet on day #3 and repeat monthly.  Takes every 4th week during chemo shot, one day), Disp: 15 tablet, Rfl: 1    diphenoxylate-atropine (LOMOTIL) 2.5-0.025 MG per tablet, Take 2 tablets by mouth 4 (Four) Times a Day As Needed for Diarrhea., Disp: 120 tablet, Rfl: 0    escitalopram (LEXAPRO) 5 MG tablet, TAKE 1 TABLET BY MOUTH EVERY DAY, Disp: 30 tablet, Rfl: 5    finasteride (PROSCAR) 5 MG tablet, Take 1 tablet by mouth Daily., Disp: , Rfl:     gabapentin (NEURONTIN) 300 MG capsule, TAKE ONE CAPSULE BY MOUTH EVERY MORNING AND 2 CAPSULES EVERY NIGHT AT BEDTIME, Disp: 90 capsule, Rfl: 0    glucagon (GLUCAGEN) 1 MG injection, Inject 1 mg into the appropriate muscle as directed by prescriber 1 (One) Time As Needed for Low Blood Sugar for up to 1 dose. Follow package directions for low blood sugar., Disp: 1 kit, Rfl: 3    glucose blood (FREESTYLE LITE) test strip, Use to test blood sugar 4-5 times daily., Disp: 200 each, Rfl: 5    icosapent ethyl (Vascepa) 1 g capsule capsule, Take 2 g by mouth 2 (Two) Times a Day With Meals., Disp: 360 capsule, Rfl: 1    Insulin Lispro, 1 Unit Dial, (HumaLOG KwikPen) 100 UNIT/ML solution pen-injector, NONCHEMO DAYS:10U THREE TIMES DAILY BEFORE MEALS&1 UNIT FOR EVERY 50>150. CHEOMDAYS 15U THREE TIMES DAILY BEFORE MEALS& 2 UNIT FOR 50>150, Disp: 27 mL, Rfl: 3    Insulin Pen  "Needle 32G X 4 MM misc, USING 4 PEN NEEDLES DAILY, Disp: 400 each, Rfl: 3    Lancets (freestyle) lancets, Use to check blood sugar 4 times daily, Disp: 400 each, Rfl: 2    Lantus SoloStar 100 UNIT/ML injection pen, Inject 70 Units under the skin into the appropriate area as directed Daily for 90 days. Non-Chemo Days take 16 units twice daily. Chemo Days take 30 units twice daily. 16-30 units twice daily for max daily dose of 70 units daily., Disp: 63 mL, Rfl: 1    loperamide (IMODIUM) 2 MG capsule, Take 1 capsule by mouth 4 (Four) Times a Day As Needed for Diarrhea., Disp: , Rfl:     ondansetron (ZOFRAN) 8 MG tablet, Take 1 tablet by mouth 3 (Three) Times a Day As Needed for Nausea or Vomiting., Disp: 30 tablet, Rfl: 5    phosphorus (K PHOS NEUTRAL) 155-852-130 MG tablet, Take 1 tablet by mouth Daily., Disp: 30 tablet, Rfl: 1    pomalidomide (POMALYST) 2 MG chemo capsule, Take 1 capsule by mouth Daily. Take for 21 days on, then 7 days off., Disp: 21 capsule, Rfl: 0    tamsulosin (FLOMAX) 0.4 MG capsule 24 hr capsule, Take 1 capsule by mouth Daily., Disp: , Rfl:     vitamin D (ERGOCALCIFEROL) 1.25 MG (43863 UT) capsule capsule, Take 1 capsule by mouth Every 7 (Seven) Days., Disp: 13 capsule, Rfl: 1  Objective     VITAL SIGNS:     Vitals:    10/02/23 0859   BP: 127/85   Pulse: 66   Resp: 18   Temp: 98 °F (36.7 °C)   TempSrc: Temporal   SpO2: 99%   Height: 188 cm (74.02\")   PainSc: 0-No pain     Body mass index is 39.28 kg/m².     Wt Readings from Last 5 Encounters:   09/05/23 (!) 139 kg (306 lb 1.6 oz)   08/30/23 (!) 138 kg (304 lb 3.8 oz)   08/29/23 (!) 138 kg (303 lb 9.6 oz)   08/21/23 (!) 139 kg (307 lb 6.4 oz)   08/16/23 (!) 141 kg (310 lb)     PHYSICAL EXAMINATION:   GENERAL: The patient appears in fair general condition, not in acute distress.   SKIN: No ecchymosis.  EYES: No jaundice. Pallor.  CHEST: Normal respiratory effort. Lungs clear bilaterally.  No added sounds.  CVS: No edema. Normal S1-S2.  No " murmurs.  ABDOMEN: Protuberant.  EXTREMITIES: Improvement in the leg edema.  No calf tenderness.    DIAGNOSTIC DATA:     Results from last 7 days   Lab Units 10/02/23  0822   WBC 10*3/mm3 7.72   NEUTROS ABS 10*3/mm3 5.60   HEMOGLOBIN g/dL 10.8*   HEMATOCRIT % 33.7*   PLATELETS 10*3/mm3 191     Results from last 7 days   Lab Units 10/02/23  0822   SODIUM mmol/L 139   POTASSIUM mmol/L 4.0   CHLORIDE mmol/L 101   CO2 mmol/L 25.8   BUN mg/dL 41*   CREATININE mg/dL 2.96*   CALCIUM mg/dL 9.3   ALBUMIN g/dL 3.6   BILIRUBIN mg/dL 0.4   ALK PHOS U/L 55   ALT (SGPT) U/L 5   AST (SGOT) U/L 10   GLUCOSE mg/dL 145*   MAGNESIUM mg/dL 1.7     Component      Latest Ref Rng 8/7/2023 9/5/2023   IgG      603 - 1613 mg/dL 592 (L)  562 (L)    IgA      61 - 437 mg/dL 52 (L)  55 (L)    IgM      20 - 172 mg/dL 15 (L)  16 (L)    Total Protein      6.0 - 8.5 g/dL 5.7 (L)  5.6 (L)    Albumin      2.9 - 4.4 g/dL 3.4  3.4    Alpha-1-Globulin      0.0 - 0.4 g/dL 0.2  0.2    Alpha-2-Globulin      0.4 - 1.0 g/dL 0.8  0.7    Beta Globulin      0.7 - 1.3 g/dL 0.8  0.7    Gamma Globulin      0.4 - 1.8 g/dL 0.6  0.5    M-Jurgen      Not Observed g/dL Not Observed  Not Observed    Globulin      2.2 - 3.9 g/dL 2.3  2.2    A/G Ratio      0.7 - 1.7  1.5  1.6    Immunofixation Reflex, Serum Comment  Comment    Please note Comment  Comment    Kappa FLC      3.3 - 19.4 mg/L 46.5 (H)  49.6 (H)    Free Lambda Light Chains      5.7 - 26.3 mg/L 26.0  32.4 (H)    Kappa/Lambda Ratio      0.26 - 1.65  1.79 (H)  1.53      Assessment & Plan    *IgG lambda multiple myeloma.  Bone marrow 5/23/2020 hypercellular marrow with 80% involvement of plasma cell myeloma; FISH studies pending  Bone survey 5/26/2020: Diffuse osteoporosis and demineralization.  However, no discrete lytic lesions.  SPEP 5/23/2020: M spike 5.1.  IgG 6629 lambda.  Light chain ratio 0 with free lambda light chains 6400.  Beta-2 microglobulin 16.3.  24-hour urine 12.7 g protein per 24-hour with  monoclonal lambda free light chain 33.9%, monoclonal IgG lambda 23.5%  24-hour urine testing from 5/24/2020 free lambda light chains 8.4 g/L  Due to the renal failure, CyBorD regimen was chosen and was started on 5/28/2020.    On 6/18/2020, Velcade was changed to 1.5 mg/m² weekly continuously along with weekly Decadron and oral Cytoxan.  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   Treatment was changed on 8/27/2020 to to Daratumumab subcutaneous injection weekly along with Revlimid 10 mg daily for 21 out of 28-day cycle and Decadron 40 mg daily.  S/p stem cell transplant on 12/9/2020 at [facility].  Day 100 bone marrow on 3/16/2021 revealed minute  plasma cell population.  PET scan on 3/29/2021 was negative.  Patient enrolled in the  clinical trial randomizing to maintenance lenalidomide versus lenalidomide and daratumumab.  Patient was randomized to the Revlimid arm initiated 4/13/2021 dosed at 5 mg daily (due to renal function).  Patient developed neutropenia requiring dose adjustment to 5 mg daily for 21/28 days.  Patient withdrew from clinical trial after 5 cycles on 8/31/2021 due to worsening diarrhea.    He was switched to Velcade maintenance every other week 9/14/2021 which he discontinued 10/26/2021 due to neuropathy and lower extremity edema.    He was subsequently changed to pomalidomide 2 mg daily 21/28 days on 11/8/2021.    1 year post transplant, bone marrow biopsy showed 5-7% plasma cells and due to the residual disease he was started on DPd (daratumumab, pomalidomide, dexamethasone) on 2/21/2022.    Patient was seen by Dr. Romero on 9/12/2022.  At that time patient was cycle 8-day 7 DPd. Due to chronic side effects, dexamethasone was changed from weekly to monthly.    Patient did continue pomalidomide on his own during admission to the hospital, received 1 dose 4 mg p.o. on 9/24/2022 before was subsequently discontinued due to cytopenias.   Dexamethasone was changed  to 12 mg on Mondays, 4 mg on Tuesday and 4 mg on Wednesday.  Pomalyst dose was reduced to 3 mg daily for 21 out of a 28-day cycle starting on 10/31/2022.  Due to neutropenia, the dose of Pomalyst was subsequently reduced to 2 mg daily for 21/28-day cycle.  9/5/2023: Treatment held due to weakness and postural dizziness.  9/5/2023: No M protein.  Kappa FLC 49.6.  Lambda FLC 32.4.  Kappa to lambda ratio 1.53.  He did not notice improvement in his symptoms while off Darzalex and Pomalyst.  Given that he is at increased risk for relapse, recommended proceeding with treatment today.    *Bone health.  He is on Xgeva.   Patient developed severe hypocalcemia following the Xgeva injection.  He required inpatient hospital stay.  Given the duration of Xgeva injections, recommended changing treatment to every 3 months starting December 2022.   I recommended giving Xgeva today and continuing every 3 months.  Last dose of Xgeva was on 7/10/2023.  He is due for Xgeva today.    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  Patient was receiving Procrit at [facility].  Records from [facility] showed that the patient was receiving 70,000 units weekly and received the last dose on 9/27/2022.  Patient was given Procrit 70,000 units on 9/27/2022.  Hemoglobin was 9.9 on 10/17/2022.  He was given Procrit 60,000 units.  Hemoglobin was 9.8 on 11/4/2022.  He was given Procrit 60,000 units.  Labs in March 2023 revealed adequate iron stores.  Last dose of Procrit was 25,000 units on 5/1/2023 for hemoglobin of 9.9.  10/2/2023: Hemoglobin 10.8.    *Thrombocytopenia.  This is attributed to multiple myeloma, prior stem cell transplant and chemotherapy.  8/21/2023: Platelets decreased to 123,000.  9/5/2023: Platelet count 167,000.  10/2/2023: Platelet count 191,000.    *Neutropenia.  This was attributed to Pomalyst.  Neutrophil count was down to 290 on 9/25/2022.  He was given  Neupogen during his hospital stay in September 2022.  Neutrophil count improved afterwards.  However, neutrophil count decreased to 600 on 12/19/2022.  He reports that he was feeling tired and achy at that time.  Neutrophil count was 770 on 1/16/2023.  Due to the recurrent neutropenia, the dose of Pomalyst was reduced to 2 mg in January 2023.  Neutrophil count improved following the dose reduction.  10/2/2023: Neutrophil count 5600.    *Hypocalcemia.  Patient developed severe hypocalcemia due to Xgeva.  Calcium was 5.9 on 9/23/2022.  Patient was placed on calcium replacement.  Calcium dose was subsequently increased to 3 tablets daily.  8/7/2023: Calcium 8.1. Albumin 3.6.  The dose was increased to 4 tablets daily.  9/5/2023: Calcium 9.1. Albumin 3.8.  10/2/2023: Calcium 9.3.  Albumin 3.6.    *Hypomagnesemia.  Magnesium was 1.1 on 10/24/2022.  He was placed on oral magnesium oxide 400 mg twice daily.  He developed diarrhea and it was stopped.  He was started on magnesium chloride on 1/23/2023.  He developed diarrhea about 7 days after starting the magnesium chloride.  Oral magnesium chloride was discontinued.  He currently receives IV magnesium per protocol based on his magnesium level.  5/30/2023: Magnesium 1.4.  6/12/2023: Magnesium 1.5.  8/7/2023: Magnesium  1.8.   9/5/2023: Magnesium decreased to 1.7.  He was given 1 g IV magnesium sulfate.     *Prophylaxis.  He is on acyclovir 800 mg daily.  He is on ASA 81 mg daily.  No recent infections.    *Episodes of dizziness and falling.  Patient reports that the episodes develop after he stands up and starts walking.  No dizziness while sitting.  No restricted chest pain or shortness of breath.  Blood pressure was 133/80 and heart rate was 66 in the sitting position today.  After standing, blood pressure was 119/76 and heart rate was 69.  No improvement in the symptoms after holding Pomalyst for 2 weeks.  MRI brain on 7/26/2023 showed small vessel ischemic  changes.  There were changes of 3 old strokes.  I explained the findings to the patient.  I recommended evaluation by cardiology and neurology.  I also recommended home physical therapy  I recommended that he uses a walker when he ambulates.  His evaluation showed possible changes of postural hypotension.   He is concerned that this is due to his medications. I explained that while postural hypotension is a side effect of medications, it is not a common side effect of Darzalex or Pomalyst.  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  Symptoms did not improve after placing Darzalex and Pomalyst on hold.  I expressed my concern that Bumex may be contributing.    *Vitamin D deficiency.  He is on vitamin D 50,000 units weekly.  Vitamin D level was 18 on 10/3/2022.  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  He is on gabapentin 300 mg in the morning and 600 mg in the evening.  Neuropathy symptoms in the feet are better as the swelling improved.     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  S/p right partial nephrectomy on 5/18/2016.    PLAN:    1.  We will give Darzalex today (cycle #12)..  2.  Start a new cycle of Pomalyst today at 2 mg daily for 21 out of 28-day cycle.   3.  We will give Xgeva today.  4.  Reduce Bumex to 1 mg daily x7 days and monitor for symptoms.  If leg swelling worsens and he does not notice improvement in the off-balance feeling, would recommend changing Bumex back to 2 mg daily.  5.  Continue calcium carbonate 4 tablets daily.  6.  Continue Neutra-Phos 1 tablet daily.  7.  Continue Aspirin and Acyclovir.  8.  He does not need Procrit at this time.  9.  Follow-up in 1 month.  We will schedule him to receive Darzalex.  CBC CMP magnesium phosphorus SPEP [place] FLC will be obtained.    I spent 45 minutes caring for Contreras on this date of service. This time includes time spent by me in the  following activities: preparing for the visit, reviewing tests, obtaining and/or reviewing a separately obtained history, performing a medically appropriate examination and/or evaluation, counseling and educating the patient/family/caregiver, ordering medications, tests, or procedures, documenting information in the medical record, independently interpreting results and communicating that information with the patient/family/caregiver, and care coordination       Red Del Valle MD  10/02/23

## 2023-10-03 ENCOUNTER — SPECIALTY PHARMACY (OUTPATIENT)
Dept: PHARMACY | Facility: HOSPITAL | Age: 68
End: 2023-10-03
Payer: MEDICARE

## 2023-10-04 LAB
ALBUMIN SERPL ELPH-MCNC: 3.4 G/DL (ref 2.9–4.4)
ALBUMIN/GLOB SERPL: 1.6 {RATIO} (ref 0.7–1.7)
ALPHA1 GLOB SERPL ELPH-MCNC: 0.2 G/DL (ref 0–0.4)
ALPHA2 GLOB SERPL ELPH-MCNC: 0.9 G/DL (ref 0.4–1)
B-GLOBULIN SERPL ELPH-MCNC: 0.7 G/DL (ref 0.7–1.3)
GAMMA GLOB SERPL ELPH-MCNC: 0.5 G/DL (ref 0.4–1.8)
GLOBULIN SER-MCNC: 2.2 G/DL (ref 2.2–3.9)
IGA SERPL-MCNC: 40 MG/DL (ref 61–437)
IGG SERPL-MCNC: 512 MG/DL (ref 603–1613)
IGM SERPL-MCNC: 17 MG/DL (ref 20–172)
INTERPRETATION SERPL IEP-IMP: ABNORMAL
KAPPA LC FREE SER-MCNC: 27.4 MG/L (ref 3.3–19.4)
KAPPA LC FREE/LAMBDA FREE SER: 1.34 {RATIO} (ref 0.26–1.65)
LABORATORY COMMENT REPORT: ABNORMAL
LAMBDA LC FREE SERPL-MCNC: 20.5 MG/L (ref 5.7–26.3)
M PROTEIN SERPL ELPH-MCNC: ABNORMAL G/DL
PROT SERPL-MCNC: 5.6 G/DL (ref 6–8.5)

## 2023-10-05 DIAGNOSIS — Z79.4 TYPE 2 DIABETES MELLITUS WITH STAGE 4 CHRONIC KIDNEY DISEASE, WITH LONG-TERM CURRENT USE OF INSULIN: ICD-10-CM

## 2023-10-05 DIAGNOSIS — N18.4 TYPE 2 DIABETES MELLITUS WITH STAGE 4 CHRONIC KIDNEY DISEASE, WITH LONG-TERM CURRENT USE OF INSULIN: ICD-10-CM

## 2023-10-05 DIAGNOSIS — E11.22 TYPE 2 DIABETES MELLITUS WITH STAGE 4 CHRONIC KIDNEY DISEASE, WITH LONG-TERM CURRENT USE OF INSULIN: ICD-10-CM

## 2023-10-05 DIAGNOSIS — G62.0 NEUROPATHY DUE TO CHEMOTHERAPEUTIC DRUG: ICD-10-CM

## 2023-10-05 DIAGNOSIS — T45.1X5A NEUROPATHY DUE TO CHEMOTHERAPEUTIC DRUG: ICD-10-CM

## 2023-10-05 RX ORDER — GABAPENTIN 300 MG/1
CAPSULE ORAL
Qty: 90 CAPSULE | Refills: 3 | Status: SHIPPED | OUTPATIENT
Start: 2023-10-05

## 2023-10-05 RX ORDER — INSULIN LISPRO 100 [IU]/ML
INJECTION, SOLUTION INTRAVENOUS; SUBCUTANEOUS
Qty: 27 ML | Refills: 0 | Status: SHIPPED | OUTPATIENT
Start: 2023-10-05

## 2023-10-05 RX ORDER — INSULIN GLARGINE 100 [IU]/ML
70 INJECTION, SOLUTION SUBCUTANEOUS DAILY
Qty: 63 ML | Refills: 0 | Status: SHIPPED | OUTPATIENT
Start: 2023-10-05 | End: 2024-01-03

## 2023-10-05 NOTE — TELEPHONE ENCOUNTER
DELETE AFTER REVIEWING: Send the encounter HIGH priority, If patient has less than a 3 day supply. If the patient will run out of medication over the weekend add that information to the additional details line. Send this encounter to the clinical pool.    Caller: Gabby Albert    Relationship: Emergency Contact    Best call back number: [phone]     Requested Prescriptions:   Requested Prescriptions     Pending Prescriptions Disp Refills    Insulin Lispro, 1 Unit Dial, (HumaLOG KwikPen) 100 UNIT/ML solution pen-injector 27 mL 3     Sig: NONCHEMO DAYS:10U THREE TIMES DAILY BEFORE MEALS&1 UNIT FOR EVERY 50>150. CHEOMDAYS 15U THREE TIMES DAILY BEFORE MEALS& 2 UNIT FOR 50>150    Lantus SoloStar 100 UNIT/ML injection pen 63 mL 1     Sig: Inject 70 Units under the skin into the appropriate area as directed Daily for 90 days. Non-Chemo Days take 16 units twice daily. Chemo Days take 30 units twice daily. 16-30 units twice daily for max daily dose of 70 units daily.    Insulin Pen Needle 32G X 4 MM misc 400 each 3     Sig: USING 4 PEN NEEDLES DAILY        Pharmacy where request should be sent:  [id number] [address] [address] [facility] [address] [place] [address] & [place] [address] - [phone] [facility] [phone] FX     Last office visit with prescribing clinician: 8/29/2023   Last telemedicine visit with prescribing clinician: Visit date not found   Next office visit with prescribing clinician: Visit date not found     Additional details provided by patient: N/A    Does the patient have less than a 3 day supply:  [] Yes  [x] No    Would you like a call back once the refill request has been completed: [x] Yes [] No    If the office needs to give you a call back, can they leave a voicemail: [x] Yes [] No    Rohan Petersen Rep   10/05/23 11:34 EDT

## 2023-10-05 NOTE — TELEPHONE ENCOUNTER
Rx Refill Note  Requested Prescriptions     Pending Prescriptions Disp Refills    Insulin Lispro, 1 Unit Dial, (HumaLOG KwikPen) 100 UNIT/ML solution pen-injector 27 mL 3     Sig: NONCHEMO DAYS:10U THREE TIMES DAILY BEFORE MEALS&1 UNIT FOR EVERY 50>150. CHEOMDAYS 15U THREE TIMES DAILY BEFORE MEALS& 2 UNIT FOR 50>150    Lantus SoloStar 100 UNIT/ML injection pen 63 mL 1     Sig: Inject 70 Units under the skin into the appropriate area as directed Daily for 90 days. Non-Chemo Days take 16 units twice daily. Chemo Days take 30 units twice daily. 16-30 units twice daily for max daily dose of 70 units daily.    Insulin Pen Needle 32G X 4 MM misc 400 each 3     Sig: USING 4 PEN NEEDLES DAILY      Last office visit with prescribing clinician: 8/29/2023   Last telemedicine visit with prescribing clinician: Visit date not found   Next office visit with prescribing clinician: Visit date not found                         Would you like a call back once the refill request has been completed: [] Yes [] No    If the office needs to give you a call back, can they leave a voicemail: [] Yes [] No    Michelle Davis  10/05/23, 12:05 EDT

## 2023-10-06 ENCOUNTER — TELEPHONE (OUTPATIENT)
Dept: CARDIOLOGY | Facility: CLINIC | Age: 68
End: 2023-10-06
Payer: MEDICARE

## 2023-10-06 NOTE — TELEPHONE ENCOUNTER
Notified patient of results. Patient verbalized understanding.    Regina Saldivar RN  [place] [facility]

## 2023-10-09 ENCOUNTER — SPECIALTY PHARMACY (OUTPATIENT)
Dept: PHARMACY | Facility: HOSPITAL | Age: 68
End: 2023-10-09
Payer: MEDICARE

## 2023-10-11 ENCOUNTER — SPECIALTY PHARMACY (OUTPATIENT)
Dept: PHARMACY | Facility: HOSPITAL | Age: 68
End: 2023-10-11
Payer: MEDICARE

## 2023-10-11 NOTE — PROGRESS NOTES
Specialty Pharmacy Patient Management Program  Oncology 6-Month Clinical Assessment       Contreras Albert is a 68 y.o. male with multiple myeloma seen today to assess adherence and side effects.    Regimen: Pomalyst 2mg PO once daily (21 days on/7 days off)    Reason for Outreach: Routine medication check-in .         Goals Addressed Today    None       Medication Assessment:  Medication Assessment   Patient reports minimal to no side effects with medication since restarting Pomalyst about a week ago after taking 1 month off for weakness. Plan to continue current therapy.  Administration: Patient is taking every day at the same time  and as prescribed .   Patient can self administer medications: yes  Medication Follow-Up Plan: Next clinical assessment  Lab Review: The labs listed below have been reviewed. No dose adjustments are needed for the oral specialty medication(s) based on the labs.    Lab Results   Component Value Date    GLUCOSE 145 (H) 10/02/2023    CALCIUM 9.3 10/02/2023     10/02/2023    K 4.0 10/02/2023    CO2 25.8 10/02/2023     10/02/2023    BUN 41 (H) 10/02/2023    CREATININE 2.96 (C) 10/02/2023    EGFRIFAFRI 27 (L) 08/09/2021    EGFRIFNONA 24 (L) 08/09/2021    BCR 13.9 10/02/2023    ANIONGAP 12.2 10/02/2023     Lab Results   Component Value Date    WBC 7.72 10/02/2023    RBC 3.49 (L) 10/02/2023    HGB 10.8 (L) 10/02/2023    HCT 33.7 (L) 10/02/2023    MCV 96.6 10/02/2023    MCH 30.9 10/02/2023    MCHC 32.0 10/02/2023    RDW 15.7 (H) 10/02/2023    RDWSD 54.9 (H) 10/02/2023    MPV 9.5 10/02/2023     10/02/2023    NEUTRORELPCT 72.5 10/02/2023    LYMPHORELPCT 16.6 (L) 10/02/2023    MONORELPCT 7.4 10/02/2023    EOSRELPCT 2.2 10/02/2023    BASORELPCT 0.8 10/02/2023    AUTOIGPER 0.5 10/02/2023    NEUTROABS 5.60 10/02/2023    LYMPHSABS 1.28 10/02/2023    MONOSABS 0.57 10/02/2023    EOSABS 0.17 10/02/2023    BASOSABS 0.06 10/02/2023    AUTOIGNUM 0.04 10/02/2023    NRBC 0.0 10/02/2023      Drug-drug interactions  Completed medication reconciliation today to assess for drug interactions. Patient denies starting or stopping any medications.    Assessed medication list for interactions, no significant drug interactions noted.   Advised patient to call the clinic if any new medications are started so we can assess for drug-drug interactions.  Drug-food interactions discussed:  none  Vaccines are coordinated by the patient's oncologist and primary care provider.    Allergies  Known allergies and reactions were discussed with the patient. The patient's chart has been reviewed for allergy information and updated as necessary.   Allergies   Allergen Reactions    Lisinopril Unknown - High Severity     Tickle in throat, cannot take due to kidney disease    Crestor [Rosuvastatin] GI Intolerance     Abdominal cramping        Hospitalizations and Urgent Care Visits Since Last Assessment:  Unplanned hospitalizations or inpatient admissions: none  ED Visits: none  Urgent Office Visits: none    Adherence Assessment:   Patient reports % adherence with no missed doses since restarting. No barriers to adherence noted.     Quality of Life Assessment:     -- Quality of Life: 7/10    Financial Assessment:  Medication availability/affordability: Patient has had no issues obtaining medication from pharmacy.    Attestation     I attest the patient was actively involved in and has agreed to the above plan of care.  If the prescribed therapy is at any point deemed not appropriate based on the current or future assessments, a consultation will be initiated with the patient's specialty care provider to determine the best course of action. The revised plan of therapy will be documented along with any required assessments and/or additional patient education provided.       All questions addressed and patient had no additional concerns. Patient has pharmacy contact information.    Gina Chapman, Pharmacy Student  Yolande  Evan Huitron, BCPS  10/11/2023  10:38 EDT

## 2023-10-17 ENCOUNTER — TELEPHONE (OUTPATIENT)
Dept: ENDOCRINOLOGY | Age: 68
End: 2023-10-17
Payer: MEDICARE

## 2023-10-24 ENCOUNTER — TELEPHONE (OUTPATIENT)
Dept: ENDOCRINOLOGY | Age: 68
End: 2023-10-24
Payer: MEDICARE

## 2023-10-25 ENCOUNTER — SPECIALTY PHARMACY (OUTPATIENT)
Dept: PHARMACY | Facility: HOSPITAL | Age: 68
End: 2023-10-25
Payer: MEDICARE

## 2023-10-25 DIAGNOSIS — C90.00 MULTIPLE MYELOMA NOT HAVING ACHIEVED REMISSION: ICD-10-CM

## 2023-10-25 NOTE — PROGRESS NOTES
Drug: Pomalyst (pomalidomide)  Strength: 2 mg  Directions: Take 1 capsule by mouth daily for 21 days on, then 7 days off  Quantity: 21  Refills: 0  Pharmacy prescription sent to: [facility]    Completed independent double check on medication order/RX.  Carlos Clarke, PharmD, BCOP

## 2023-10-25 NOTE — PROGRESS NOTES
Re: Refills of Oral Specialty Medication - Pomalyst (pomalidomide)    Drug-Drug Interactions: The current medication list was reviewed and there are no relevant drug-drug interactions with the specialty medication.  Medication Allergies: The patient has no relevant allergies as it relates to their oral specialty medication  Review of Labs/Dose Adjustments: NO DOSE CHANGE - I reviewed the most recent note and labs and the patient will continue without any dose changes.  I sent refills as described below.    Drug: Pomalyst (pomalidomide)  Strength: 2 mg  Directions: Take 1 capsule by mouth daily for 21 days on, then 7 days off  Quantity: 21  Refills: 0  Pharmacy prescription sent to: [facility]    Name/Credentials Donna Cano RPh, BCOP    10/25/2023  08:59 EDT

## 2023-10-26 RX ORDER — ESCITALOPRAM OXALATE 5 MG/1
TABLET ORAL
Qty: 30 TABLET | Refills: 5 | Status: SHIPPED | OUTPATIENT
Start: 2023-10-26

## 2023-10-30 ENCOUNTER — INFUSION (OUTPATIENT)
Dept: ONCOLOGY | Facility: HOSPITAL | Age: 68
End: 2023-10-30
Payer: MEDICARE

## 2023-10-30 ENCOUNTER — OFFICE VISIT (OUTPATIENT)
Dept: ONCOLOGY | Facility: CLINIC | Age: 68
End: 2023-10-30
Payer: MEDICARE

## 2023-10-30 ENCOUNTER — LAB (OUTPATIENT)
Dept: LAB | Facility: HOSPITAL | Age: 68
End: 2023-10-30
Payer: MEDICARE

## 2023-10-30 VITALS
BODY MASS INDEX: 40.02 KG/M2 | RESPIRATION RATE: 16 BRPM | WEIGHT: 311.8 LBS | TEMPERATURE: 96.4 F | OXYGEN SATURATION: 96 % | HEART RATE: 68 BPM | HEIGHT: 74 IN | SYSTOLIC BLOOD PRESSURE: 144 MMHG | DIASTOLIC BLOOD PRESSURE: 79 MMHG

## 2023-10-30 DIAGNOSIS — E83.42 HYPOMAGNESEMIA: ICD-10-CM

## 2023-10-30 DIAGNOSIS — N18.32 ANEMIA IN STAGE 3B CHRONIC KIDNEY DISEASE: ICD-10-CM

## 2023-10-30 DIAGNOSIS — F06.31 DEPRESSION DUE TO PHYSICAL ILLNESS: ICD-10-CM

## 2023-10-30 DIAGNOSIS — D63.1 ANEMIA IN STAGE 3B CHRONIC KIDNEY DISEASE: ICD-10-CM

## 2023-10-30 DIAGNOSIS — T45.1X5A CHEMOTHERAPY INDUCED NEUTROPENIA: ICD-10-CM

## 2023-10-30 DIAGNOSIS — C90.00 MULTIPLE MYELOMA NOT HAVING ACHIEVED REMISSION: ICD-10-CM

## 2023-10-30 DIAGNOSIS — C90.00 MULTIPLE MYELOMA NOT HAVING ACHIEVED REMISSION: Primary | ICD-10-CM

## 2023-10-30 DIAGNOSIS — D69.6 THROMBOCYTOPENIA: ICD-10-CM

## 2023-10-30 DIAGNOSIS — Z79.899 HIGH RISK MEDICATION USE: ICD-10-CM

## 2023-10-30 DIAGNOSIS — R42 POSTURAL DIZZINESS WITH PRESYNCOPE: ICD-10-CM

## 2023-10-30 DIAGNOSIS — E83.51 HYPOCALCEMIA: ICD-10-CM

## 2023-10-30 DIAGNOSIS — R55 POSTURAL DIZZINESS WITH PRESYNCOPE: ICD-10-CM

## 2023-10-30 DIAGNOSIS — D70.1 CHEMOTHERAPY INDUCED NEUTROPENIA: ICD-10-CM

## 2023-10-30 DIAGNOSIS — D84.9 IMMUNOCOMPROMISED STATE: ICD-10-CM

## 2023-10-30 LAB
ALBUMIN SERPL-MCNC: 3.5 G/DL (ref 3.5–5.2)
ALBUMIN/GLOB SERPL: 1.8 G/DL
ALP SERPL-CCNC: 46 U/L (ref 39–117)
ALT SERPL W P-5'-P-CCNC: 12 U/L (ref 1–41)
ANION GAP SERPL CALCULATED.3IONS-SCNC: 11.3 MMOL/L (ref 5–15)
AST SERPL-CCNC: 11 U/L (ref 1–40)
BASOPHILS # BLD AUTO: 0.04 10*3/MM3 (ref 0–0.2)
BASOPHILS NFR BLD AUTO: 1 % (ref 0–1.5)
BILIRUB SERPL-MCNC: 0.4 MG/DL (ref 0–1.2)
BUN SERPL-MCNC: 35 MG/DL (ref 8–23)
BUN/CREAT SERPL: 11.1 (ref 7–25)
CALCIUM SPEC-SCNC: 8.3 MG/DL (ref 8.6–10.5)
CHLORIDE SERPL-SCNC: 104 MMOL/L (ref 98–107)
CO2 SERPL-SCNC: 25.7 MMOL/L (ref 22–29)
CREAT SERPL-MCNC: 3.14 MG/DL (ref 0.7–1.3)
DEPRECATED RDW RBC AUTO: 53.8 FL (ref 37–54)
EGFRCR SERPLBLD CKD-EPI 2021: 21 ML/MIN/1.73
EOSINOPHIL # BLD AUTO: 0.09 10*3/MM3 (ref 0–0.4)
EOSINOPHIL NFR BLD AUTO: 2.2 % (ref 0.3–6.2)
ERYTHROCYTE [DISTWIDTH] IN BLOOD BY AUTOMATED COUNT: 15.2 % (ref 12.3–15.4)
GLOBULIN UR ELPH-MCNC: 2 GM/DL
GLUCOSE SERPL-MCNC: 197 MG/DL (ref 65–99)
HCT VFR BLD AUTO: 33.1 % (ref 37.5–51)
HGB BLD-MCNC: 10.5 G/DL (ref 13–17.7)
IMM GRANULOCYTES # BLD AUTO: 0.02 10*3/MM3 (ref 0–0.05)
IMM GRANULOCYTES NFR BLD AUTO: 0.5 % (ref 0–0.5)
LYMPHOCYTES # BLD AUTO: 0.96 10*3/MM3 (ref 0.7–3.1)
LYMPHOCYTES NFR BLD AUTO: 23.2 % (ref 19.6–45.3)
MAGNESIUM SERPL-MCNC: 1.6 MG/DL (ref 1.6–2.4)
MCH RBC QN AUTO: 30.6 PG (ref 26.6–33)
MCHC RBC AUTO-ENTMCNC: 31.7 G/DL (ref 31.5–35.7)
MCV RBC AUTO: 96.5 FL (ref 79–97)
MONOCYTES # BLD AUTO: 0.58 10*3/MM3 (ref 0.1–0.9)
MONOCYTES NFR BLD AUTO: 14 % (ref 5–12)
NEUTROPHILS NFR BLD AUTO: 2.44 10*3/MM3 (ref 1.7–7)
NEUTROPHILS NFR BLD AUTO: 59.1 % (ref 42.7–76)
NRBC BLD AUTO-RTO: 0 /100 WBC (ref 0–0.2)
PHOSPHATE SERPL-MCNC: 3.1 MG/DL (ref 2.5–4.5)
PLATELET # BLD AUTO: 145 10*3/MM3 (ref 140–450)
PMV BLD AUTO: 8.8 FL (ref 6–12)
POTASSIUM SERPL-SCNC: 4.5 MMOL/L (ref 3.5–5.2)
PROT SERPL-MCNC: 5.5 G/DL (ref 6–8.5)
RBC # BLD AUTO: 3.43 10*6/MM3 (ref 4.14–5.8)
SODIUM SERPL-SCNC: 141 MMOL/L (ref 136–145)
WBC NRBC COR # BLD: 4.13 10*3/MM3 (ref 3.4–10.8)

## 2023-10-30 PROCEDURE — 63710000001 DIPHENHYDRAMINE PER 50 MG: Performed by: INTERNAL MEDICINE

## 2023-10-30 PROCEDURE — 36415 COLL VENOUS BLD VENIPUNCTURE: CPT

## 2023-10-30 PROCEDURE — 3077F SYST BP >= 140 MM HG: CPT | Performed by: INTERNAL MEDICINE

## 2023-10-30 PROCEDURE — 1126F AMNT PAIN NOTED NONE PRSNT: CPT | Performed by: INTERNAL MEDICINE

## 2023-10-30 PROCEDURE — 84100 ASSAY OF PHOSPHORUS: CPT

## 2023-10-30 PROCEDURE — 3078F DIAST BP <80 MM HG: CPT | Performed by: INTERNAL MEDICINE

## 2023-10-30 PROCEDURE — 1159F MED LIST DOCD IN RCRD: CPT | Performed by: INTERNAL MEDICINE

## 2023-10-30 PROCEDURE — 83735 ASSAY OF MAGNESIUM: CPT

## 2023-10-30 PROCEDURE — 96401 CHEMO ANTI-NEOPL SQ/IM: CPT

## 2023-10-30 PROCEDURE — 99214 OFFICE O/P EST MOD 30 MIN: CPT | Performed by: INTERNAL MEDICINE

## 2023-10-30 PROCEDURE — 85025 COMPLETE CBC W/AUTO DIFF WBC: CPT

## 2023-10-30 PROCEDURE — 25010000002 DARATUMUMAB-HYALURONIDASE-FIHJ 1800-30000 MG-UT/15ML SOLUTION: Performed by: INTERNAL MEDICINE

## 2023-10-30 PROCEDURE — 80053 COMPREHEN METABOLIC PANEL: CPT

## 2023-10-30 RX ORDER — FAMOTIDINE 20 MG/1
20 TABLET, FILM COATED ORAL ONCE
Status: CANCELLED | OUTPATIENT
Start: 2023-10-30 | End: 2023-10-30

## 2023-10-30 RX ORDER — DIPHENHYDRAMINE HCL 25 MG
25 CAPSULE ORAL ONCE
Status: DISCONTINUED | OUTPATIENT
Start: 2023-10-30 | End: 2023-10-30 | Stop reason: HOSPADM

## 2023-10-30 RX ORDER — ACETAMINOPHEN 500 MG
1000 TABLET ORAL ONCE
Status: CANCELLED | OUTPATIENT
Start: 2023-10-30

## 2023-10-30 RX ORDER — DIPHENHYDRAMINE HYDROCHLORIDE 50 MG/ML
50 INJECTION INTRAMUSCULAR; INTRAVENOUS AS NEEDED
Status: CANCELLED | OUTPATIENT
Start: 2023-10-30

## 2023-10-30 RX ORDER — FAMOTIDINE 10 MG/ML
20 INJECTION, SOLUTION INTRAVENOUS AS NEEDED
Status: CANCELLED | OUTPATIENT
Start: 2023-10-30

## 2023-10-30 RX ORDER — ACETAMINOPHEN 500 MG
1000 TABLET ORAL ONCE
Status: DISCONTINUED | OUTPATIENT
Start: 2023-10-30 | End: 2023-10-30 | Stop reason: HOSPADM

## 2023-10-30 RX ORDER — DIPHENHYDRAMINE HCL 25 MG
25 CAPSULE ORAL ONCE
Status: CANCELLED | OUTPATIENT
Start: 2023-10-30

## 2023-10-30 RX ORDER — FAMOTIDINE 20 MG/1
20 TABLET, FILM COATED ORAL ONCE
Status: DISCONTINUED | OUTPATIENT
Start: 2023-10-30 | End: 2023-10-30 | Stop reason: HOSPADM

## 2023-10-30 RX ORDER — MEPERIDINE HYDROCHLORIDE 25 MG/ML
12.5 INJECTION INTRAMUSCULAR; INTRAVENOUS; SUBCUTANEOUS
Status: CANCELLED | OUTPATIENT
Start: 2023-10-30

## 2023-10-30 RX ORDER — DEXAMETHASONE 4 MG/1
TABLET ORAL
Qty: 15 TABLET | Refills: 1 | Status: SHIPPED | OUTPATIENT
Start: 2023-10-30

## 2023-10-30 RX ADMIN — DARATUMUMAB AND HYALURONIDASE-FIHJ (HUMAN RECOMBINANT) 1800 MG: 1800; 30000 INJECTION SUBCUTANEOUS at 12:06

## 2023-10-30 NOTE — PROGRESS NOTES
Subjective     CHIEF COMPLAINT:      Chief Complaint   Patient presents with    Follow-up     No concerns     Med Refill     Lexapro and Decadron      HISTORY OF PRESENT ILLNESS:     Contreras Albert is a 68 y.o. male patient who returns today for follow up on his multiple myeloma. He returns today for follow up and reports that he recently had a fall. However, it developed after he tripped on a carpet tag. He was not dizzy or off balance when this developed. He is not having symptoms of postural dizziness. He reduced Bumex to 1 mg daily and took this dose for 2 weeks. He did notice improvement in the symptoms after the dose reduction. He noticed that his legs became more swollen after the dose reduction. He saw his Nephrologist and the dose was increased back to 2 mg daily. The swelling started to improve after the dose was increased back to 2 mg. He is elevating the legs when he sits. He has been unable to wear compression stockings because they feel very tight.     ROS:  Pertinent ROS is in the HPI.     Past medical, surgical, social and family history were reviewed.     MEDICATIONS:    Current Outpatient Medications:     acetaminophen (TYLENOL) 325 MG tablet, Take 2 tablets by mouth Every 6 (Six) Hours As Needed for Mild Pain. (Patient taking differently: Take 2 tablets by mouth Every 6 (Six) Hours As Needed for Mild Pain. As needed), Disp: , Rfl:     acyclovir (ZOVIRAX) 800 MG tablet, Take 1 tablet by mouth Daily., Disp: 90 tablet, Rfl: 2    ASPIRIN 81 PO, Take 81 mg by mouth Daily., Disp: , Rfl:     atorvastatin (LIPITOR) 10 MG tablet, TAKE 1 TABLET BY MOUTH EVERY NIGHT AT BEDTIME AND AFTER THE EVENING MEAL, Disp: 90 tablet, Rfl: 1    Blood Glucose Monitoring Suppl (FREESTYLE FREEDOM LITE) w/Device kit, 1 kit Daily. Indications: Type 2 Diabetes (Patient taking differently: Use 1 kit Daily.), Disp: 1 each, Rfl: 0    bumetanide (BUMEX) 2 MG tablet, Take 1 tablet by mouth Daily., Disp: , Rfl:     calcium carbonate  (TUMS) 500 MG chewable tablet, Chew 4 tablets Daily., Disp: , Rfl:     carvedilol (COREG) 12.5 MG tablet, Take 1 tablet by mouth 2 (Two) Times a Day With Meals., Disp: , Rfl:     Continuous Blood Gluc Sensor (Dexcom G6 Sensor), Replace sensor every 10 days.  Dx: E 11.22, Disp: 9 each, Rfl: 1    Continuous Blood Gluc Transmit (Dexcom G6 Transmitter) misc, 1 each Every 3 (Three) Months. Dx: E 11.22, Disp: 1 each, Rfl: 1    DARATUMUMAB IV, Infuse  into a venous catheter., Disp: , Rfl:     dexAMETHasone (DECADRON) 4 MG tablet, Take 3 tablets on day of chemo, 1 tablet on day #2 and 1 tablet on day #3 and repeat monthly., Disp: 15 tablet, Rfl: 1    diphenoxylate-atropine (LOMOTIL) 2.5-0.025 MG per tablet, Take 2 tablets by mouth 4 (Four) Times a Day As Needed for Diarrhea., Disp: 120 tablet, Rfl: 0    escitalopram (LEXAPRO) 5 MG tablet, TAKE 1 TABLET BY MOUTH EVERY DAY, Disp: 30 tablet, Rfl: 5    finasteride (PROSCAR) 5 MG tablet, Take 1 tablet by mouth Daily., Disp: , Rfl:     gabapentin (NEURONTIN) 300 MG capsule, TAKE ONE CAPSULE BY MOUTH EVERY MORNING AND 2 CAPSULES BY MOUTH EVERY NIGHT AT BEDTIME, Disp: 90 capsule, Rfl: 3    glucagon (GLUCAGEN) 1 MG injection, Inject 1 mg into the appropriate muscle as directed by prescriber 1 (One) Time As Needed for Low Blood Sugar for up to 1 dose. Follow package directions for low blood sugar., Disp: 1 kit, Rfl: 3    glucose blood (FREESTYLE LITE) test strip, Use to test blood sugar 4-5 times daily., Disp: 200 each, Rfl: 5    icosapent ethyl (Vascepa) 1 g capsule capsule, Take 2 g by mouth 2 (Two) Times a Day With Meals., Disp: 360 capsule, Rfl: 1    Insulin Lispro, 1 Unit Dial, (HumaLOG KwikPen) 100 UNIT/ML solution pen-injector, NONCHEMO DAYS:10U THREE TIMES DAILY BEFORE MEALS&1 UNIT FOR EVERY 50>150. CHEOMDAYS 15U THREE TIMES DAILY BEFORE MEALS& 2 UNIT FOR 50>150, Disp: 27 mL, Rfl: 0    Insulin Pen Needle 32G X 4 MM misc, USING 4 PEN NEEDLES DAILY, Disp: 400 each, Rfl: 3     "Lancets (freestyle) lancets, Use to check blood sugar 4 times daily, Disp: 400 each, Rfl: 2    Lantus SoloStar 100 UNIT/ML injection pen, Inject 70 Units under the skin into the appropriate area as directed Daily for 90 days. Non-Chemo Days take 16 units twice daily. Chemo Days take 30 units twice daily. 16-30 units twice daily for max daily dose of 70 units daily., Disp: 63 mL, Rfl: 0    loperamide (IMODIUM) 2 MG capsule, Take 1 capsule by mouth 4 (Four) Times a Day As Needed for Diarrhea., Disp: , Rfl:     ondansetron (ZOFRAN) 8 MG tablet, Take 1 tablet by mouth 3 (Three) Times a Day As Needed for Nausea or Vomiting., Disp: 30 tablet, Rfl: 5    phosphorus (K PHOS NEUTRAL) 155-852-130 MG tablet, Take 1 tablet by mouth Daily., Disp: 30 tablet, Rfl: 1    pomalidomide (POMALYST) 2 MG chemo capsule, Take 1 capsule by mouth Daily. Take for 21 days on, then 7 days off., Disp: 21 capsule, Rfl: 0    tamsulosin (FLOMAX) 0.4 MG capsule 24 hr capsule, Take 1 capsule by mouth Daily., Disp: , Rfl:     vitamin D (ERGOCALCIFEROL) 1.25 MG (53305 UT) capsule capsule, Take 1 capsule by mouth Every 7 (Seven) Days., Disp: 13 capsule, Rfl: 1  No current facility-administered medications for this visit.    Facility-Administered Medications Ordered in Other Visits:     acetaminophen (TYLENOL) tablet 1,000 mg, 1,000 mg, Oral, Once, Red Del Valle MD    diphenhydrAMINE (BENADRYL) capsule 25 mg, 25 mg, Oral, Once, Red Del Valle MD    famotidine (PEPCID) tablet 20 mg, 20 mg, Oral, Once, Red Del Valle MD  Objective     VITAL SIGNS:     Vitals:    10/30/23 1112   BP: 144/79   Pulse: 68   Resp: 16   Temp: 96.4 °F (35.8 °C)   TempSrc: Temporal   SpO2: 96%   Weight: (!) 141 kg (311 lb 12.8 oz)   Height: 188 cm (74.02\")   PainSc: 0-No pain     Body mass index is 40.02 kg/m².     Wt Readings from Last 5 Encounters:   10/30/23 (!) 141 kg (311 lb 12.8 oz)   10/02/23 (!) 137 kg (301 lb 3.2 oz)   09/05/23 (!) 139 kg (306 lb 1.6 oz) "   08/30/23 (!) 138 kg (304 lb 3.8 oz)   08/29/23 (!) 138 kg (303 lb 9.6 oz)     PHYSICAL EXAMINATION:   GENERAL: The patient appears in fair general condition, not in acute distress.   SKIN: No ecchymosis.  EYES: No jaundice. Pallor.  CHEST: Normal respiratory effort. Lungs clear. No added sounds.  CVS: Normal S1 and S2. No murmurs.  ABDOMEN: Soft. No tenderness. No Hepatomegaly. No Splenomegaly. No masses.  EXTREMITIES: + 2 edema.     DIAGNOSTIC DATA:     Results from last 7 days   Lab Units 10/30/23  1039   WBC 10*3/mm3 4.13   NEUTROS ABS 10*3/mm3 2.44   HEMOGLOBIN g/dL 10.5*   HEMATOCRIT % 33.1*   PLATELETS 10*3/mm3 145     Results from last 7 days   Lab Units 10/30/23  1039   SODIUM mmol/L 141   POTASSIUM mmol/L 4.5   CHLORIDE mmol/L 104   CO2 mmol/L 25.7   BUN mg/dL 35*   CREATININE mg/dL 3.14*   CALCIUM mg/dL 8.3*   ALBUMIN g/dL 3.5   BILIRUBIN mg/dL 0.4   ALK PHOS U/L 46   ALT (SGPT) U/L 12   AST (SGOT) U/L 11   GLUCOSE mg/dL 197*   MAGNESIUM mg/dL 1.6     Component      Latest Ref Rn 8/7/2023 9/5/2023 10/2/2023   IgG      603 - 1613 mg/dL 592 (L)  562 (L)  512 (L)    IgA      61 - 437 mg/dL 52 (L)  55 (L)  40 (L)    IgM      20 - 172 mg/dL 15 (L)  16 (L)  17 (L)    Total Protein      6.0 - 8.5 g/dL 5.7 (L)  5.6 (L)  5.6 (L)    Albumin      2.9 - 4.4 g/dL 3.4  3.4  3.4    Alpha-1-Globulin      0.0 - 0.4 g/dL 0.2  0.2  0.2    Alpha-2-Globulin      0.4 - 1.0 g/dL 0.8  0.7  0.9    Beta Globulin      0.7 - 1.3 g/dL 0.8  0.7  0.7    Gamma Globulin      0.4 - 1.8 g/dL 0.6  0.5  0.5    M-Jurgen      Not Observed g/dL Not Observed  Not Observed  Not Observed    Globulin      2.2 - 3.9 g/dL 2.3  2.2  2.2    A/G Ratio      0.7 - 1.7  1.5  1.6  1.6    Immunofixation Reflex, Serum Comment  Comment  Comment    Please note Comment  Comment  Comment    Kappa FLC      3.3 - 19.4 mg/L 46.5 (H)  49.6 (H)  27.4 (H)    Free Lambda Light Chains      5.7 - 26.3 mg/L 26.0  32.4 (H)  20.5    Kappa/Lambda Ratio      0.26 - 1.65  1.79  (H)  1.53  1.34       Assessment & Plan    *IgG lambda multiple myeloma.  Bone marrow 5/23/2020 hypercellular marrow with 80% involvement of plasma cell myeloma; FISH studies pending  Bone survey 5/26/2020: Diffuse osteoporosis and demineralization.  However, no discrete lytic lesions.  SPEP 5/23/2020: M spike 5.1.  IgG 6629 lambda.  Light chain ratio 0 with free lambda light chains 6400.  Beta-2 microglobulin 16.3.  24-hour urine 12.7 g protein per 24-hour with monoclonal lambda free light chain 33.9%, monoclonal IgG lambda 23.5%  24-hour urine testing from 5/24/2020 free lambda light chains 8.4 g/L  Due to the renal failure, CyBorD regimen was chosen and was started on 5/28/2020.    On 6/18/2020, Velcade was changed to 1.5 mg/m² weekly continuously along with weekly Decadron and oral Cytoxan.  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   Treatment was changed on 8/27/2020 to to Daratumumab subcutaneous injection weekly along with Revlimid 10 mg daily for 21 out of 28-day cycle and Decadron 40 mg daily.  S/p stem cell transplant on 12/9/2020 at [facility].  Day 100 bone marrow on 3/16/2021 revealed minute  plasma cell population.  PET scan on 3/29/2021 was negative.  Patient enrolled in the  clinical trial randomizing to maintenance lenalidomide versus lenalidomide and daratumumab.  Patient was randomized to the Revlimid arm initiated 4/13/2021 dosed at 5 mg daily (due to renal function).  Patient developed neutropenia requiring dose adjustment to 5 mg daily for 21/28 days.  Patient withdrew from clinical trial after 5 cycles on 8/31/2021 due to worsening diarrhea.    He was switched to Velcade maintenance every other week 9/14/2021 which he discontinued 10/26/2021 due to neuropathy and lower extremity edema.    He was subsequently changed to pomalidomide 2 mg daily 21/28 days on 11/8/2021.    1 year post transplant, bone marrow biopsy showed 5-7% plasma cells and due to the  residual disease he was started on DPd (daratumumab, pomalidomide, dexamethasone) on 2/21/2022.    Patient was seen by Dr. Romero on 9/12/2022.  At that time patient was cycle 8-day 7 DPd. Due to chronic side effects, dexamethasone was changed from weekly to monthly.    Patient did continue pomalidomide on his own during admission to the hospital, received 1 dose 4 mg p.o. on 9/24/2022 before was subsequently discontinued due to cytopenias.   Dexamethasone was changed to 12 mg on Mondays, 4 mg on Tuesday and 4 mg on Wednesday.  Pomalyst dose was reduced to 3 mg daily for 21 out of a 28-day cycle starting on 10/31/2022.  Due to neutropenia, the dose of Pomalyst was subsequently reduced to 2 mg daily for 21/28-day cycle.  9/5/2023: Treatment held due to weakness and postural dizziness.  9/5/2023: No M protein.  Kappa FLC 49.6.  Lambda FLC 32.4.  Kappa to lambda ratio 1.53.  He did not notice improvement in his symptoms while off Darzalex and Pomalyst.  Therefore, his symptoms were not attributed to treatment.  He was restarted on Darzalex and Pomalyst on 10/2/2023.  He tolerated the treatment. He reports that he is feeling better than the way he felt earlier in the year.     *Bone health.  He is on Xgeva.   Patient developed severe hypocalcemia following the Xgeva injection.  He required inpatient hospital stay.  Given the duration of Xgeva injections, recommended changing treatment to every 3 months starting December 2022.   Last dose of Xgeva was on 10/2/2023.    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  Patient was receiving Procrit at [facility].  Records from [facility] showed that the patient was receiving 70,000 units weekly and received the last dose on 9/27/2022.  Patient was given Procrit 70,000 units on 9/27/2022.  Hemoglobin was 9.9 on 10/17/2022.  He was given Procrit 60,000 units.  Hemoglobin was 9.8 on 11/4/2022.  He was given  Procrit 60,000 units.  Labs in March 2023 revealed adequate iron stores.  Last dose of Procrit was 25,000 units on 5/1/2023 for hemoglobin of 9.9.  10/2/2023: Hemoglobin 10.8.  10/30/2023: Hemoglobin 10.5.  He reports feeling better as his hemoglobin improved.     *Thrombocytopenia.  This is attributed to multiple myeloma, prior stem cell transplant and chemotherapy.  8/21/2023: Platelets decreased to 123,000.  Platelet improved afterwards.  10/30/2023: Platelet count 145,000.    *Neutropenia.  This was attributed to Pomalyst.  Neutrophil count was down to 290 on 9/25/2022.  He was given Neupogen during his hospital stay in September 2022.  Neutrophil count improved afterwards.  However, neutrophil count decreased to 600 on 12/19/2022.  He reports that he was feeling tired and achy at that time.  Neutrophil count was 770 on 1/16/2023.  Due to the recurrent neutropenia, the dose of Pomalyst was reduced to 2 mg in January 2023.  Neutrophil count improved following the dose reduction.  10/2/2023: Neutrophil count 5600.  10/30/2023: Neutrophil count 2440.    *Hypocalcemia.  Patient developed severe hypocalcemia due to Xgeva.  Calcium was 5.9 on 9/23/2022.  Patient was placed on calcium replacement.  Calcium dose was subsequently increased to 3 tablets daily.  8/7/2023: Calcium 8.1. Albumin 3.6.  The dose was increased to 4 tablets daily.  9/5/2023: Calcium 9.1. Albumin 3.8.  10/2/2023: Calcium 9.3.  Albumin 3.6.  10/30/2023: Calcium 8.3.  Albumin 3.5     *Hypomagnesemia.  Magnesium was 1.1 on 10/24/2022.  He was placed on oral magnesium oxide 400 mg twice daily.  He developed diarrhea and it was stopped.  He was started on magnesium chloride on 1/23/2023.  He developed diarrhea about 7 days after starting the magnesium chloride.  Oral magnesium chloride was discontinued.  He receives IV magnesium per protocol based on his magnesium level.  9/5/2023: Magnesium decreased to 1.7.  He was given 1 g IV magnesium  sulfate.  10/30/2023: Magnesium 1.6.     *Prophylaxis.  He is on acyclovir 800 mg daily.  He is on ASA 81 mg daily.  No recent infections.    *Episodes of dizziness and falling.  Patient reports that the episodes develop after he stands up and starts walking.  No dizziness while sitting.  No restricted chest pain or shortness of breath.  Blood pressure was 133/80 and heart rate was 66 in the sitting position today.  After standing, blood pressure was 119/76 and heart rate was 69.  No improvement in the symptoms after holding Pomalyst for 2 weeks.  MRI brain on 7/26/2023 showed small vessel ischemic changes.  There were changes of 3 old strokes.  I explained the findings to the patient.  I recommended evaluation by cardiology and neurology.  I also recommended home physical therapy  I recommended that he uses a walker when he ambulates.  His evaluation showed possible changes of postural hypotension.   He is concerned that this is due to his medications. I explained that while postural hypotension is a side effect of medications, it is not a common side effect of Darzalex or Pomalyst.  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  Symptoms did not improve after placing Darzalex and Pomalyst on hold.  On 10/2/2023, we recommended reducing Bumex to 1 mg daily and monitoring his symptoms and the leg swelling.  He did not notice a difference in his postural dizziness after the dose reduction.   Bumex was increased back to 2 mg daily due to worsening of his edema.     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  It is controlled with Lexapro 5 mg daily.    *Vitamin D deficiency.  He is on vitamin D 50,000 units weekly.  Vitamin D level was 18 on 10/3/2022.  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  He is on gabapentin 300 mg in the morning and 600 mg in the evening.  Neuropathy symptoms in the feet are better as the swelling improved.      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  S/p right partial nephrectomy on 5/18/2016.    PLAN:    1.  We will give Darzalex today.  2.  Start a new cycle of Pomalyst at 2 mg daily for 21 days followed by 7 days off.  3.  Continue Dexamethasone 20 mg monthly (12 mg on day of treatment, 4 mg on days 2 and 3).  4.  Continue Calcium Carbonate 4 tablets daily.  5.  Continue Neutra-Phos 1 tablet daily.  6.  Continue ASA 81 daily and Acyclovir 800 mg daily.  7.  Continue Lexapro 5 mg daily.  8.  He does not need Procrit at this point.  9.  Follow up in 4 weeks. He will be scheduled to receive Darzalex.           Red Del Valle MD  10/30/23

## 2023-10-31 ENCOUNTER — SPECIALTY PHARMACY (OUTPATIENT)
Dept: PHARMACY | Facility: HOSPITAL | Age: 68
End: 2023-10-31
Payer: MEDICARE

## 2023-10-31 LAB
ALBUMIN SERPL ELPH-MCNC: 3.2 G/DL (ref 2.9–4.4)
ALBUMIN/GLOB SERPL: 1.5 {RATIO} (ref 0.7–1.7)
ALPHA1 GLOB SERPL ELPH-MCNC: 0.2 G/DL (ref 0–0.4)
ALPHA2 GLOB SERPL ELPH-MCNC: 0.8 G/DL (ref 0.4–1)
B-GLOBULIN SERPL ELPH-MCNC: 0.7 G/DL (ref 0.7–1.3)
GAMMA GLOB SERPL ELPH-MCNC: 0.4 G/DL (ref 0.4–1.8)
GLOBULIN SER-MCNC: 2.2 G/DL (ref 2.2–3.9)
IGA SERPL-MCNC: 52 MG/DL (ref 61–437)
IGG SERPL-MCNC: 523 MG/DL (ref 603–1613)
IGM SERPL-MCNC: 16 MG/DL (ref 20–172)
INTERPRETATION SERPL IEP-IMP: ABNORMAL
KAPPA LC FREE SER-MCNC: 36.8 MG/L (ref 3.3–19.4)
KAPPA LC FREE/LAMBDA FREE SER: 1.33 {RATIO} (ref 0.26–1.65)
LABORATORY COMMENT REPORT: ABNORMAL
LAMBDA LC FREE SERPL-MCNC: 27.7 MG/L (ref 5.7–26.3)
M PROTEIN SERPL ELPH-MCNC: ABNORMAL G/DL
PROT SERPL-MCNC: 5.4 G/DL (ref 6–8.5)

## 2023-11-04 DIAGNOSIS — N18.4 TYPE 2 DIABETES MELLITUS WITH STAGE 4 CHRONIC KIDNEY DISEASE, WITH LONG-TERM CURRENT USE OF INSULIN: ICD-10-CM

## 2023-11-04 DIAGNOSIS — Z79.4 TYPE 2 DIABETES MELLITUS WITH STAGE 4 CHRONIC KIDNEY DISEASE, WITH LONG-TERM CURRENT USE OF INSULIN: ICD-10-CM

## 2023-11-04 DIAGNOSIS — E11.22 TYPE 2 DIABETES MELLITUS WITH STAGE 4 CHRONIC KIDNEY DISEASE, WITH LONG-TERM CURRENT USE OF INSULIN: ICD-10-CM

## 2023-11-05 DIAGNOSIS — Z79.4 TYPE 2 DIABETES MELLITUS WITH STAGE 4 CHRONIC KIDNEY DISEASE, WITH LONG-TERM CURRENT USE OF INSULIN: ICD-10-CM

## 2023-11-05 DIAGNOSIS — N18.4 TYPE 2 DIABETES MELLITUS WITH STAGE 4 CHRONIC KIDNEY DISEASE, WITH LONG-TERM CURRENT USE OF INSULIN: ICD-10-CM

## 2023-11-05 DIAGNOSIS — E11.22 TYPE 2 DIABETES MELLITUS WITH STAGE 4 CHRONIC KIDNEY DISEASE, WITH LONG-TERM CURRENT USE OF INSULIN: ICD-10-CM

## 2023-11-06 ENCOUNTER — TELEPHONE (OUTPATIENT)
Dept: ENDOCRINOLOGY | Age: 68
End: 2023-11-06
Payer: MEDICARE

## 2023-11-06 RX ORDER — SOD PHOS DI, MONO/K PHOS MONO 250 MG
1 TABLET ORAL DAILY
Qty: 30 TABLET | Refills: 1 | Status: SHIPPED | OUTPATIENT
Start: 2023-11-06

## 2023-11-06 RX ORDER — INSULIN LISPRO 100 [IU]/ML
INJECTION, SOLUTION INTRAVENOUS; SUBCUTANEOUS
Qty: 30 ML | Refills: 2 | Status: SHIPPED | OUTPATIENT
Start: 2023-11-06

## 2023-11-06 RX ORDER — INSULIN GLARGINE 100 [IU]/ML
INJECTION, SOLUTION SUBCUTANEOUS
Qty: 63 ML | Refills: 1 | Status: SHIPPED | OUTPATIENT
Start: 2023-11-06

## 2023-11-06 NOTE — TELEPHONE ENCOUNTER
Called and [facility] and they informed me nothing was wrong with the RXS for Insulin and that it went through his insurance just fine.

## 2023-11-06 NOTE — TELEPHONE ENCOUNTER
Patient called stating they received a recording message from the pharmacy stating they need to call us regarding the insulins but did not give a reason why. I see where we sent them to the pharmacy today.     Is there a way we can see if the medications need a PA?

## 2023-11-15 RX ORDER — ATORVASTATIN CALCIUM 10 MG/1
TABLET, FILM COATED ORAL
Qty: 90 TABLET | Refills: 1 | Status: SHIPPED | OUTPATIENT
Start: 2023-11-15

## 2023-11-15 NOTE — TELEPHONE ENCOUNTER
Rx Refill Note  Requested Prescriptions     Pending Prescriptions Disp Refills    atorvastatin (LIPITOR) 10 MG tablet [Pharmacy Med Name: ATORVASTATIN 10MG TABLETS] 90 tablet 1     Sig: TAKE 1 TABLET BY MOUTH EVERY NIGHT AT BEDTIME      Last office visit with prescribing clinician: 8/29/2023   Last telemedicine visit with prescribing clinician: Visit date not found   Next office visit with prescribing clinician: Visit date not found                         Would you like a call back once the refill request has been completed: [] Yes [] No    If the office needs to give you a call back, can they leave a voicemail: [] Yes [] No    Michelle Davis  11/15/23, 08:10 EST

## 2023-11-20 ENCOUNTER — SPECIALTY PHARMACY (OUTPATIENT)
Dept: PHARMACY | Facility: HOSPITAL | Age: 68
End: 2023-11-20
Payer: MEDICARE

## 2023-11-20 DIAGNOSIS — C90.00 MULTIPLE MYELOMA NOT HAVING ACHIEVED REMISSION: ICD-10-CM

## 2023-11-20 NOTE — PROGRESS NOTES
Re: Refills of Oral Specialty Medication - Pomalyst (pomalidomide)    Drug-Drug Interactions: The current medication list was reviewed and there are no relevant drug-drug interactions with the specialty medication.  Medication Allergies: The patient has no relevant allergies as it relates to their oral specialty medication  Review of Labs/Dose Adjustments: NO DOSE CHANGE - I reviewed the most recent note and labs and the patient will continue without any dose changes.  I sent refills as described below.    Drug: Pomalyst (pomalidomide)  Strength: 2 mg  Directions: Take 1 capsule by mouth daily for 21 days on, th en 7 days off  Quantity: 21  Refills: 0  Pharmacy prescription sent to: [facility]    Name/Credentials Donna Cano RPh, BCOP    11/20/2023  14:57 EST

## 2023-11-20 NOTE — PROGRESS NOTES
Drug: Pomalyst (pomalidomide)  Strength: 2 mg  Directions: Take 1 capsule by mouth daily for 21 days on, th en 7 days off  Quantity: 21  Refills: 0  Pharmacy prescription sent to: [facility]    Completed independent double check on medication order/RX.  Carlos Clarke, YumikoD, BCOP  Clinical Oncology Pharmacist

## 2023-11-21 ENCOUNTER — OFFICE VISIT (OUTPATIENT)
Dept: FAMILY MEDICINE CLINIC | Facility: CLINIC | Age: 68
End: 2023-11-21
Payer: MEDICARE

## 2023-11-21 VITALS
BODY MASS INDEX: 38.79 KG/M2 | HEART RATE: 76 BPM | WEIGHT: 312 LBS | DIASTOLIC BLOOD PRESSURE: 82 MMHG | OXYGEN SATURATION: 98 % | HEIGHT: 75 IN | SYSTOLIC BLOOD PRESSURE: 134 MMHG

## 2023-11-21 DIAGNOSIS — R12 HEARTBURN: ICD-10-CM

## 2023-11-21 DIAGNOSIS — F41.9 ANXIETY: ICD-10-CM

## 2023-11-21 DIAGNOSIS — N18.4 STAGE 4 CHRONIC KIDNEY DISEASE: ICD-10-CM

## 2023-11-21 DIAGNOSIS — R55 POSTURAL DIZZINESS WITH PRESYNCOPE: ICD-10-CM

## 2023-11-21 DIAGNOSIS — R42 POSTURAL DIZZINESS WITH PRESYNCOPE: ICD-10-CM

## 2023-11-21 DIAGNOSIS — Z85.528 HISTORY OF MALIGNANT NEOPLASM OF KIDNEY: ICD-10-CM

## 2023-11-21 DIAGNOSIS — I10 PRIMARY HYPERTENSION: Primary | ICD-10-CM

## 2023-11-21 DIAGNOSIS — E11.3413 TYPE 2 DIABETES MELLITUS WITH BOTH EYES AFFECTED BY SEVERE NONPROLIFERATIVE RETINOPATHY AND MACULAR EDEMA, WITH LONG-TERM CURRENT USE OF INSULIN: ICD-10-CM

## 2023-11-21 DIAGNOSIS — E78.2 MIXED HYPERLIPIDEMIA: ICD-10-CM

## 2023-11-21 DIAGNOSIS — Z23 ENCOUNTER FOR IMMUNIZATION: ICD-10-CM

## 2023-11-21 DIAGNOSIS — Z79.4 TYPE 2 DIABETES MELLITUS WITH BOTH EYES AFFECTED BY SEVERE NONPROLIFERATIVE RETINOPATHY AND MACULAR EDEMA, WITH LONG-TERM CURRENT USE OF INSULIN: ICD-10-CM

## 2023-11-21 DIAGNOSIS — C90.00 MULTIPLE MYELOMA, REMISSION STATUS UNSPECIFIED: ICD-10-CM

## 2023-11-21 NOTE — PROGRESS NOTES
Subjective   Contreras Albert is a 68 y.o. male.     Chief Complaint   Patient presents with    Hypertension     Follow up     Hyperlipidemia     Follow up       History of Present Illness   Patient presents for follow up HTN/edema: takes Bumex daily and Carvedilol half tablet twice daily; decreased Carvedilol to one daily due to low BP and has helped; sees Dr. Gonzalez renal; some mild swelling at this point, exercise seems to be helping; able to sleep laying flat now; does not monitor BP at home, BP is checked at multiple appointments; no headaches.    Recently had several episodes of falls, so oncology referred to cardiology and neurology; saw cardiology; had Echo and WNL; also had additional tests; diagnosed with orthostatic hypotension and has started changing positions more slowly and has helped; has upcoming follow up with neurology; had brain scan and found had 3 small strokes in past; will be having further work up.    F/U DM2: takes Lantus twice daily and Humalog with meals; sees bubba Germian; last A1c 8.1%; has continuous glucose monitor; takes steroids with chemo and will have elevated blood sugar for 2-3 days after taking steroids and will take about 1 week to get back to normal; tries to watch carbs/sugars in diet; some exercise, more than usual recently; has been doing some walking and out raking leaves; has numbness in bilateral 5th fingers and from knees down bilaterally; has very decreased sensation in both feet, but has improved; sees podiatry regularly; takes Gabapentin twice daily and helps; last eye exam April 2023 at [facility]; had recent laser treatments; has follow up next month.    Has been eating too much and has gained some weight.     F/U Hyperlipidemia: takes Atorvastatin daily and Vascepa twice daily; started Vascepa after recent labs noted elevated triglycerides; rare myalgias/cramping, no change for long time; not as severe as in past.     F/U heartburn: no longer taking  Pantoprazole daily; occasional reflux symptoms; takes 2 TUMS daily for low calcium in past.     F/U anxiety: takes Escitalopram daily and works well; happy with current dose; no problems with sleep for most part; sleeps better if has been more active; no SI/HI.     Takes Tamsulosin daily; sees urology, Dr. Garcia and monitors PSA; last PSA very low.     F/U Vitamin D deficiency: takes Vitamin D weekly.     F/U multiple myeloma: stable; takes Pomalyst 21 days on and 7 days off; currently in high state of remission; so decreased dose; also gets injection of Daratumumab with high dose steroids every 4 weeks; has split up doses of steroids and blood sugar has been better; decrease in dose of Pomalyst has helped diarrhea; sees Dr. Del Valle, oncology; also gets Procrit injections periodically for anemia at this point, previously getting weekly; has not needed IV magnesium recently; overall, has been feeling a lot better and having better quality of life.      The following portions of the patient's history were reviewed and updated as appropriate: allergies, current medications, past family history, past medical history, past social history, past surgical history and problem list.    Current Outpatient Medications on File Prior to Visit   Medication Sig    acetaminophen (TYLENOL) 325 MG tablet Take 2 tablets by mouth Every 6 (Six) Hours As Needed for Mild Pain. (Patient taking differently: Take 2 tablets by mouth Every 6 (Six) Hours As Needed for Mild Pain. As needed)    acyclovir (ZOVIRAX) 800 MG tablet Take 1 tablet by mouth Daily.    ASPIRIN 81 PO Take 81 mg by mouth Daily.    atorvastatin (LIPITOR) 10 MG tablet TAKE 1 TABLET BY MOUTH EVERY NIGHT AT BEDTIME    Blood Glucose Monitoring Suppl (FREESTYLE FREEDOM LITE) w/Device kit 1 kit Daily. Indications: Type 2 Diabetes (Patient taking differently: Use 1 kit Daily.)    bumetanide (BUMEX) 2 MG tablet Take 1 tablet by mouth Daily.    calcium carbonate (TUMS) 500 MG  chewable tablet Chew 4 tablets Daily.    carvedilol (COREG) 12.5 MG tablet Take 0.5 tablets by mouth 2 (Two) Times a Day With Meals.    Continuous Blood Gluc Sensor (Dexcom G6 Sensor) Replace sensor every 10 days.  Dx: E 11.22    Continuous Blood Gluc Transmit (Dexcom G6 Transmitter) misc 1 each Every 3 (Three) Months. Dx: E 11.22    DARATUMUMAB IV Infuse  into a venous catheter.    dexAMETHasone (DECADRON) 4 MG tablet Take 3 tablets on day of chemo, 1 tablet on day #2 and 1 tablet on day #3 and repeat monthly.    escitalopram (LEXAPRO) 5 MG tablet TAKE 1 TABLET BY MOUTH EVERY DAY    finasteride (PROSCAR) 5 MG tablet Take 1 tablet by mouth Daily.    gabapentin (NEURONTIN) 300 MG capsule TAKE ONE CAPSULE BY MOUTH EVERY MORNING AND 2 CAPSULES BY MOUTH EVERY NIGHT AT BEDTIME    glucagon (GLUCAGEN) 1 MG injection Inject 1 mg into the appropriate muscle as directed by prescriber 1 (One) Time As Needed for Low Blood Sugar for up to 1 dose. Follow package directions for low blood sugar.    glucose blood (FREESTYLE LITE) test strip Use to test blood sugar 4-5 times daily.    icosapent ethyl (Vascepa) 1 g capsule capsule Take 2 g by mouth 2 (Two) Times a Day With Meals.    Insulin Lispro, 1 Unit Dial, (HUMALOG) 100 UNIT/ML solution pen-injector Non Chemo days take Novolog 12 units before breakfast, 10 units before lunch and 12 units before dinner plus 1 units per every 50 over 150; Chemo Days take NovoLog to 18 units 3 times daily with meals +2 units per 50 greater than 150.  Max daily dose of 100 units    Insulin Pen Needle 32G X 4 MM misc USING 4 PEN NEEDLES DAILY    K Phos [place]-Sod Phos Di & Mono (Phosphorous) 155-852-130 MG tablet TAKE 1 TABLET BY MOUTH DAILY    Lancets (freestyle) lancets Use to check blood sugar 4 times daily    Lantus SoloStar 100 UNIT/ML injection pen On Non chemo days take Lantus 16 units in the morning and 18 units in evening. On Chemo Days take Lantus to 30 units twice a day.  Max daily dose of 70  units    pomalidomide (POMALYST) 2 MG chemo capsule Take 1 capsule by mouth Daily. Take for 21 days on, then 7 days off.    tamsulosin (FLOMAX) 0.4 MG capsule 24 hr capsule Take 1 capsule by mouth Daily.    vitamin D (ERGOCALCIFEROL) 1.25 MG (12319 UT) capsule capsule Take 1 capsule by mouth Every 7 (Seven) Days.    diphenoxylate-atropine (LOMOTIL) 2.5-0.025 MG per tablet Take 2 tablets by mouth 4 (Four) Times a Day As Needed for Diarrhea. (Patient not taking: Reported on 11/21/2023)    loperamide (IMODIUM) 2 MG capsule Take 1 capsule by mouth 4 (Four) Times a Day As Needed for Diarrhea. (Patient not taking: Reported on 11/21/2023)    ondansetron (ZOFRAN) 8 MG tablet Take 1 tablet by mouth 3 (Three) Times a Day As Needed for Nausea or Vomiting. (Patient not taking: Reported on 11/21/2023)     No current facility-administered medications on file prior to visit.        Past Medical History:   Diagnosis Date    Acute renal failure syndrome 05/21/2020    NATHANIEL (acute kidney injury) (HCC) 05/22/2020    Anemia     Cataract     Chronic renal insufficiency     CKD (chronic kidney disease) 05/22/2020    Clear cell carcinoma of kidney, right 2016    Stage I.  Right partial nephrectomy.  Dr. Garcia    Clostridium difficile colitis     CVA (cerebral vascular accident)     Diabetes mellitus     Diabetic macular edema(362.07) 05/26/2020    No significant central fluid today OD.  Stable without significant DME OS.  Observe OU today. Possible multiple myeloma and anemia contributing.  Pt noted improvement with plasmapheresis and was gett*    Elevated troponin 05/22/2020    Enlarged prostate     Dr. Garcia    H/O stem cell transplant 12/09/2020    Hematoma of right lower leg     High cholesterol     Hypertension     Multiple myeloma     Multiple rib fractures     on right after fall from 2 story building at age of 30 years    Postural dizziness     Severe nonproliferative diabetic retinopathy of both eyes with macular edema associated  with type 2 diabetes mellitus 09/02/2020    Testosterone deficiency     Type 2 diabetes mellitus with retinopathy, with long-term current use of insulin 05/22/2020    Vitamin D deficiency        Past Surgical History:   Procedure Laterality Date    CATARACT EXTRACTION Left 12/17/2021    CATARACT EXTRACTION Right 01/21/2022    INSERTION HEMODIALYSIS CATHETER N/A 06/02/2020    Procedure: HEMODIALYSIS CATHETER INSERTION;  Surgeon: Sumanth Hernandez MD;  Location: [facility] MAIN OR;  Service: Vascular;  Laterality: N/A;    LIMBAL STEM CELL TRANSPLANT  12/09/2020    NEPHRECTOMY PARTIAL Right 05/18/2016    Procedure: RT NEPHRECTOMY PARTIAL LAPAROSCOPIC WITH DAVINCI ROBOT;  Surgeon: Chad Garcia MD;  Location: [facility] MAIN OR;  Service:     PROSTATE SURGERY         Family History   Problem Relation Age of Onset    Parkinsonism Mother     Arrhythmia Father     Heart failure Father 84    Diabetes Maternal Uncle     Macular degeneration Sister        Social History     Socioeconomic History    Marital status:      Spouse name: Gabby   Tobacco Use    Smoking status: Never    Smokeless tobacco: Never   Vaping Use    Vaping Use: Never used   Substance and Sexual Activity    Alcohol use: No    Drug use: No    Sexual activity: Not Currently     Partners: Female     Birth control/protection: None       Review of Systems   Constitutional:  Positive for fatigue. Negative for appetite change, chills, fever, unexpected weight gain and unexpected weight loss.   HENT:  Negative for ear pain, sore throat and trouble swallowing.    Respiratory:  Negative for cough, chest tightness and shortness of breath.    Cardiovascular:  Negative for chest pain and palpitations.   Gastrointestinal:  Negative for abdominal pain, blood in stool, constipation and diarrhea.   Endocrine: Polydipsia: no change, drinks a lot of water.   Genitourinary:  Negative for dysuria and frequency.   Musculoskeletal:  Back pain: some in right back on  "occasion since broke ribs many years ago.   Skin:  Negative for rash.   Neurological:  Negative for syncope and weakness.   Hematological:  Does not bruise/bleed easily.       Objective   Vitals:    11/21/23 0852 11/21/23 0914   BP: 134/82    BP Location: Left arm    Patient Position: Sitting    Cuff Size: Adult    Pulse: 76    SpO2: 98%    Weight: Comment: 312 lbs (!) 142 kg (312 lb)   Height: 190.5 cm (75\")      Body mass index is 39 kg/m².    Physical Exam  Vitals and nursing note reviewed.   Constitutional:       General: He is not in acute distress.     Appearance: He is well-developed and well-groomed. He is not diaphoretic.   HENT:      Head: Normocephalic.      Right Ear: External ear normal.      Left Ear: External ear normal.   Eyes:      Conjunctiva/sclera: Conjunctivae normal.   Neck:      Vascular: No carotid bruit.   Cardiovascular:      Rate and Rhythm: Normal rate and regular rhythm.      Pulses: Normal pulses.      Heart sounds: Normal heart sounds. No murmur heard.  Pulmonary:      Effort: Pulmonary effort is normal. No respiratory distress.      Breath sounds: Normal breath sounds.   Abdominal:      General: Bowel sounds are normal.      Palpations: Abdomen is soft. There is no hepatomegaly or splenomegaly.      Tenderness: There is no abdominal tenderness. There is no guarding.   Musculoskeletal:      Cervical back: Normal range of motion and neck supple.      Right lower leg: No edema.      Left lower leg: No edema.   Skin:     General: Skin is warm and dry.      Findings: No rash.   Neurological:      Mental Status: He is alert and oriented to person, place, and time.      Gait: Abnormal gait: guarded gait with cane.   Psychiatric:         Mood and Affect: Mood normal.         Behavior: Behavior normal.         Thought Content: Thought content normal.         Cognition and Memory: Cognition normal.         Judgment: Judgment normal.         Lab Results   Component Value Date    WBC 4.13 " 10/30/2023    RBC 3.43 (L) 10/30/2023    HGB 10.5 (L) 10/30/2023    HCT 33.1 (L) 10/30/2023    MCV 96.5 10/30/2023    MCH 30.6 10/30/2023    MCHC 31.7 10/30/2023    RDW 15.2 10/30/2023    RDWSD 53.8 10/30/2023    MPV 8.8 10/30/2023     10/30/2023    NEUTRORELPCT 59.1 10/30/2023    LYMPHORELPCT 23.2 10/30/2023    MONORELPCT 14.0 (H) 10/30/2023    EOSRELPCT 2.2 10/30/2023    BASORELPCT 1.0 10/30/2023    AUTOIGPER 0.5 10/30/2023    NEUTROABS 2.44 10/30/2023    LYMPHSABS 0.96 10/30/2023    MONOSABS 0.58 10/30/2023    EOSABS 0.09 10/30/2023    BASOSABS 0.04 10/30/2023    AUTOIGNUM 0.02 10/30/2023    NRBC 0.0 10/30/2023     Lab Results   Component Value Date    GLUCOSE 197 (H) 10/30/2023    BUN 35 (H) 10/30/2023    CREATININE 3.14 (C) 10/30/2023    EGFRIFNONA 24 (L) 08/09/2021    EGFRIFAFRI 27 (L) 08/09/2021    BCR 11.1 10/30/2023    K 4.5 10/30/2023    CO2 25.7 10/30/2023    CALCIUM 8.3 (L) 10/30/2023    PROTENTOTREF 5.4 (L) 10/30/2023    ALBUMIN 3.5 10/30/2023    ALBUMIN 3.2 10/30/2023    LABIL2 1.5 10/30/2023    AST 11 10/30/2023    ALT 12 10/30/2023      Lab Results   Component Value Date    CHLPL 143 08/29/2023    TRIG 759 (H) 08/29/2023    HDL 22 (L) 08/29/2023    VLDL 98 (H) 08/29/2023    LDL 23 08/29/2023     Lab Results   Component Value Date    TSH 2.900 09/24/2022     Lab Results   Component Value Date    HGBA1C 8.10 (H) 08/29/2023           Assessment    Problem List Items Addressed This Visit       Multiple myeloma    Current Assessment & Plan     Follow up as scheduled with oncology.         Mixed hyperlipidemia    Current Assessment & Plan     Continue Atorvastatin daily and Vascepa twice daily.  Continue to work on healthy diet and exercise as tolerated.         History of malignant neoplasm of kidney    Current Assessment & Plan     Follow up as scheduled with oncology.         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   Continue current treatment regimen.  Reminded to get yearly retinal exam.  Diabetes will be reassessed in 6 months.  Continue current medications as managed per endocrine.         Anxiety    Current Assessment & Plan     Stable.  Continue Escitalopram daily.         Primary hypertension - Primary    Current Assessment & Plan     Hypertension is  stable .  Weight loss.  Continue current medications.  Ambulatory blood pressure monitoring.  Blood pressure will be reassessed at the next regular appointment.  Continue Carvedilol twice daily and Bumex daily.  Follow up as scheduled with renal.         Stage 4 chronic kidney disease    Current Assessment & Plan     Follow up as scheduled with renal.         Heartburn    Current Assessment & Plan     Stable off medication.         Postural dizziness with presyncope    Current Assessment & Plan     Improved with changes in medications.  Continue to change positions slowly.  Follow up as scheduled with neurology.          Other Visit Diagnoses       Encounter for immunization        Relevant Orders    Fluzone High-Dose 65+yrs (Completed)    COVID-19 F23 (Pfizer) 12yrs+ (COMIRNATY) (Completed)            Return in about 6 months (around 5/21/2024) for Medicare Wellness, Recheck after 5/23/23.or sooner if problems or concerns.         I spent 35 minutes caring for Contreras on this date of service. This time includes time spent by me in the following activities:reviewing tests, obtaining and/or reviewing a separately obtained history, performing a medically appropriate examination and/or evaluation , counseling and educating the patient/family/caregiver, ordering medications, tests, or procedures, and documenting information in the medical record

## 2023-11-21 NOTE — PATIENT INSTRUCTIONS
Continue to monitor your blood sugar and record results.  Continue to monitor your blood pressure periodically and record results.  Continue to work on healthy diet and exercise as tolerated.  Follow up in 6 months for Medicare Wellness, or sooner if problems or concerns.   Follow up as scheduled with renal, oncology, endo, cardiology and neurology.

## 2023-11-23 PROBLEM — Z98.890 OTHER SPECIFIED POSTPROCEDURAL STATES: Status: RESOLVED | Noted: 2021-09-17 | Resolved: 2023-11-23

## 2023-11-23 PROBLEM — Z96.1 BILATERAL PSEUDOPHAKIA: Status: ACTIVE | Noted: 2023-08-22

## 2023-11-23 NOTE — ASSESSMENT & PLAN NOTE
Diabetes is improving with treatment.   Continue current treatment regimen.  Reminded to get yearly retinal exam.  Diabetes will be reassessed in 6 months.  Continue current medications as managed per endocrine.

## 2023-11-23 NOTE — ASSESSMENT & PLAN NOTE
Hypertension is  stable .  Weight loss.  Continue current medications.  Ambulatory blood pressure monitoring.  Blood pressure will be reassessed at the next regular appointment.  Continue Carvedilol twice daily and Bumex daily.  Follow up as scheduled with renal.

## 2023-11-23 NOTE — ASSESSMENT & PLAN NOTE
Improved with changes in medications.  Continue to change positions slowly.  Follow up as scheduled with neurology.

## 2023-11-23 NOTE — ASSESSMENT & PLAN NOTE
Continue Atorvastatin daily and Vascepa twice daily.  Continue to work on healthy diet and exercise as tolerated.

## 2023-11-24 DIAGNOSIS — E11.22 TYPE 2 DIABETES MELLITUS WITH STAGE 4 CHRONIC KIDNEY DISEASE, WITH LONG-TERM CURRENT USE OF INSULIN: ICD-10-CM

## 2023-11-24 DIAGNOSIS — Z79.4 TYPE 2 DIABETES MELLITUS WITH STAGE 4 CHRONIC KIDNEY DISEASE, WITH LONG-TERM CURRENT USE OF INSULIN: ICD-10-CM

## 2023-11-24 DIAGNOSIS — N18.4 TYPE 2 DIABETES MELLITUS WITH STAGE 4 CHRONIC KIDNEY DISEASE, WITH LONG-TERM CURRENT USE OF INSULIN: ICD-10-CM

## 2023-11-27 ENCOUNTER — INFUSION (OUTPATIENT)
Dept: ONCOLOGY | Facility: HOSPITAL | Age: 68
End: 2023-11-27
Payer: MEDICARE

## 2023-11-27 ENCOUNTER — TELEPHONE (OUTPATIENT)
Dept: ENDOCRINOLOGY | Age: 68
End: 2023-11-27

## 2023-11-27 ENCOUNTER — OFFICE VISIT (OUTPATIENT)
Dept: ONCOLOGY | Facility: CLINIC | Age: 68
End: 2023-11-27
Payer: MEDICARE

## 2023-11-27 ENCOUNTER — LAB (OUTPATIENT)
Dept: LAB | Facility: HOSPITAL | Age: 68
End: 2023-11-27
Payer: MEDICARE

## 2023-11-27 ENCOUNTER — TELEPHONE (OUTPATIENT)
Dept: ONCOLOGY | Facility: CLINIC | Age: 68
End: 2023-11-27

## 2023-11-27 VITALS
RESPIRATION RATE: 18 BRPM | WEIGHT: 310.2 LBS | BODY MASS INDEX: 39.81 KG/M2 | HEART RATE: 66 BPM | DIASTOLIC BLOOD PRESSURE: 64 MMHG | HEIGHT: 74 IN | OXYGEN SATURATION: 97 % | TEMPERATURE: 98 F | SYSTOLIC BLOOD PRESSURE: 118 MMHG

## 2023-11-27 DIAGNOSIS — C90.00 MULTIPLE MYELOMA NOT HAVING ACHIEVED REMISSION: Primary | ICD-10-CM

## 2023-11-27 DIAGNOSIS — N18.4 TYPE 2 DIABETES MELLITUS WITH STAGE 4 CHRONIC KIDNEY DISEASE, WITH LONG-TERM CURRENT USE OF INSULIN: ICD-10-CM

## 2023-11-27 DIAGNOSIS — T45.1X5A CHEMOTHERAPY INDUCED NEUTROPENIA: ICD-10-CM

## 2023-11-27 DIAGNOSIS — E83.42 HYPOMAGNESEMIA: ICD-10-CM

## 2023-11-27 DIAGNOSIS — C90.00 MULTIPLE MYELOMA NOT HAVING ACHIEVED REMISSION: ICD-10-CM

## 2023-11-27 DIAGNOSIS — D63.1 ANEMIA IN STAGE 3B CHRONIC KIDNEY DISEASE: ICD-10-CM

## 2023-11-27 DIAGNOSIS — R55 POSTURAL DIZZINESS WITH PRESYNCOPE: ICD-10-CM

## 2023-11-27 DIAGNOSIS — D69.6 THROMBOCYTOPENIA: ICD-10-CM

## 2023-11-27 DIAGNOSIS — F06.31 DEPRESSION DUE TO PHYSICAL ILLNESS: ICD-10-CM

## 2023-11-27 DIAGNOSIS — N18.32 ANEMIA IN STAGE 3B CHRONIC KIDNEY DISEASE: ICD-10-CM

## 2023-11-27 DIAGNOSIS — D84.9 IMMUNOCOMPROMISED STATE: ICD-10-CM

## 2023-11-27 DIAGNOSIS — E83.51 HYPOCALCEMIA: ICD-10-CM

## 2023-11-27 DIAGNOSIS — Z79.4 TYPE 2 DIABETES MELLITUS WITH STAGE 4 CHRONIC KIDNEY DISEASE, WITH LONG-TERM CURRENT USE OF INSULIN: ICD-10-CM

## 2023-11-27 DIAGNOSIS — D70.1 CHEMOTHERAPY INDUCED NEUTROPENIA: ICD-10-CM

## 2023-11-27 DIAGNOSIS — R42 POSTURAL DIZZINESS WITH PRESYNCOPE: ICD-10-CM

## 2023-11-27 DIAGNOSIS — Z79.899 HIGH RISK MEDICATION USE: ICD-10-CM

## 2023-11-27 DIAGNOSIS — E11.22 TYPE 2 DIABETES MELLITUS WITH STAGE 4 CHRONIC KIDNEY DISEASE, WITH LONG-TERM CURRENT USE OF INSULIN: ICD-10-CM

## 2023-11-27 LAB
ALBUMIN SERPL-MCNC: 3.6 G/DL (ref 3.5–5.2)
ALBUMIN/GLOB SERPL: 1.6 G/DL
ALP SERPL-CCNC: 48 U/L (ref 39–117)
ALT SERPL W P-5'-P-CCNC: 9 U/L (ref 1–41)
ANION GAP SERPL CALCULATED.3IONS-SCNC: 10.5 MMOL/L (ref 5–15)
AST SERPL-CCNC: 11 U/L (ref 1–40)
BASOPHILS # BLD AUTO: 0.05 10*3/MM3 (ref 0–0.2)
BASOPHILS NFR BLD AUTO: 1.5 % (ref 0–1.5)
BILIRUB SERPL-MCNC: 0.6 MG/DL (ref 0–1.2)
BUN SERPL-MCNC: 31 MG/DL (ref 8–23)
BUN/CREAT SERPL: 10.4 (ref 7–25)
CALCIUM SPEC-SCNC: 8.3 MG/DL (ref 8.6–10.5)
CHLORIDE SERPL-SCNC: 102 MMOL/L (ref 98–107)
CO2 SERPL-SCNC: 26.5 MMOL/L (ref 22–29)
CREAT SERPL-MCNC: 2.99 MG/DL (ref 0.7–1.3)
DEPRECATED RDW RBC AUTO: 52.1 FL (ref 37–54)
EGFRCR SERPLBLD CKD-EPI 2021: 22.3 ML/MIN/1.73
EOSINOPHIL # BLD AUTO: 0.08 10*3/MM3 (ref 0–0.4)
EOSINOPHIL NFR BLD AUTO: 2.3 % (ref 0.3–6.2)
ERYTHROCYTE [DISTWIDTH] IN BLOOD BY AUTOMATED COUNT: 15.3 % (ref 12.3–15.4)
GLOBULIN UR ELPH-MCNC: 2.3 GM/DL
GLUCOSE SERPL-MCNC: 167 MG/DL (ref 65–99)
HCT VFR BLD AUTO: 32.4 % (ref 37.5–51)
HGB BLD-MCNC: 10.4 G/DL (ref 13–17.7)
IMM GRANULOCYTES # BLD AUTO: 0.01 10*3/MM3 (ref 0–0.05)
IMM GRANULOCYTES NFR BLD AUTO: 0.3 % (ref 0–0.5)
LYMPHOCYTES # BLD AUTO: 0.84 10*3/MM3 (ref 0.7–3.1)
LYMPHOCYTES NFR BLD AUTO: 24.5 % (ref 19.6–45.3)
MCH RBC QN AUTO: 30.1 PG (ref 26.6–33)
MCHC RBC AUTO-ENTMCNC: 32.1 G/DL (ref 31.5–35.7)
MCV RBC AUTO: 93.9 FL (ref 79–97)
MONOCYTES # BLD AUTO: 0.61 10*3/MM3 (ref 0.1–0.9)
MONOCYTES NFR BLD AUTO: 17.8 % (ref 5–12)
NEUTROPHILS NFR BLD AUTO: 1.84 10*3/MM3 (ref 1.7–7)
NEUTROPHILS NFR BLD AUTO: 53.6 % (ref 42.7–76)
NRBC BLD AUTO-RTO: 0 /100 WBC (ref 0–0.2)
PHOSPHATE SERPL-MCNC: 3.7 MG/DL (ref 2.5–4.5)
PLATELET # BLD AUTO: 107 10*3/MM3 (ref 140–450)
PMV BLD AUTO: 9.1 FL (ref 6–12)
POTASSIUM SERPL-SCNC: 4.4 MMOL/L (ref 3.5–5.2)
PROT SERPL-MCNC: 5.9 G/DL (ref 6–8.5)
RBC # BLD AUTO: 3.45 10*6/MM3 (ref 4.14–5.8)
SODIUM SERPL-SCNC: 139 MMOL/L (ref 136–145)
WBC NRBC COR # BLD AUTO: 3.43 10*3/MM3 (ref 3.4–10.8)

## 2023-11-27 PROCEDURE — 25010000002 DARATUMUMAB-HYALURONIDASE-FIHJ 1800-30000 MG-UT/15ML SOLUTION: Performed by: INTERNAL MEDICINE

## 2023-11-27 PROCEDURE — 3074F SYST BP LT 130 MM HG: CPT | Performed by: INTERNAL MEDICINE

## 2023-11-27 PROCEDURE — 3078F DIAST BP <80 MM HG: CPT | Performed by: INTERNAL MEDICINE

## 2023-11-27 PROCEDURE — 1126F AMNT PAIN NOTED NONE PRSNT: CPT | Performed by: INTERNAL MEDICINE

## 2023-11-27 PROCEDURE — 36415 COLL VENOUS BLD VENIPUNCTURE: CPT

## 2023-11-27 PROCEDURE — 96401 CHEMO ANTI-NEOPL SQ/IM: CPT

## 2023-11-27 PROCEDURE — 1159F MED LIST DOCD IN RCRD: CPT | Performed by: INTERNAL MEDICINE

## 2023-11-27 PROCEDURE — 84100 ASSAY OF PHOSPHORUS: CPT

## 2023-11-27 PROCEDURE — 85025 COMPLETE CBC W/AUTO DIFF WBC: CPT

## 2023-11-27 PROCEDURE — 99214 OFFICE O/P EST MOD 30 MIN: CPT | Performed by: INTERNAL MEDICINE

## 2023-11-27 PROCEDURE — 80053 COMPREHEN METABOLIC PANEL: CPT

## 2023-11-27 PROCEDURE — 83735 ASSAY OF MAGNESIUM: CPT | Performed by: INTERNAL MEDICINE

## 2023-11-27 PROCEDURE — 1160F RVW MEDS BY RX/DR IN RCRD: CPT | Performed by: INTERNAL MEDICINE

## 2023-11-27 RX ORDER — PROCHLORPERAZINE 25 MG/1
SUPPOSITORY RECTAL
Qty: 1 EACH | Refills: 1 | Status: SHIPPED | OUTPATIENT
Start: 2023-11-27

## 2023-11-27 RX ORDER — DIPHENHYDRAMINE HYDROCHLORIDE 50 MG/ML
50 INJECTION INTRAMUSCULAR; INTRAVENOUS AS NEEDED
OUTPATIENT
Start: 2023-12-25

## 2023-11-27 RX ORDER — ESCITALOPRAM OXALATE 5 MG/1
5 TABLET ORAL DAILY
Qty: 30 TABLET | Refills: 5 | Status: SHIPPED | OUTPATIENT
Start: 2023-11-27

## 2023-11-27 RX ORDER — MEPERIDINE HYDROCHLORIDE 25 MG/ML
12.5 INJECTION INTRAMUSCULAR; INTRAVENOUS; SUBCUTANEOUS
Status: CANCELLED | OUTPATIENT
Start: 2023-11-27

## 2023-11-27 RX ORDER — FAMOTIDINE 20 MG/1
20 TABLET, FILM COATED ORAL ONCE
OUTPATIENT
Start: 2023-12-25 | End: 2023-12-25

## 2023-11-27 RX ORDER — DIPHENHYDRAMINE HCL 25 MG
25 CAPSULE ORAL ONCE
OUTPATIENT
Start: 2023-12-25

## 2023-11-27 RX ORDER — ACETAMINOPHEN 500 MG
1000 TABLET ORAL ONCE
OUTPATIENT
Start: 2023-12-25

## 2023-11-27 RX ORDER — PROCHLORPERAZINE 25 MG/1
SUPPOSITORY RECTAL
Qty: 9 EACH | Refills: 1 | Status: CANCELLED | OUTPATIENT
Start: 2023-11-27

## 2023-11-27 RX ORDER — FAMOTIDINE 10 MG/ML
20 INJECTION, SOLUTION INTRAVENOUS AS NEEDED
OUTPATIENT
Start: 2023-12-25

## 2023-11-27 RX ORDER — MEPERIDINE HYDROCHLORIDE 25 MG/ML
12.5 INJECTION INTRAMUSCULAR; INTRAVENOUS; SUBCUTANEOUS
OUTPATIENT
Start: 2023-12-25

## 2023-11-27 RX ORDER — DIPHENHYDRAMINE HYDROCHLORIDE 50 MG/ML
50 INJECTION INTRAMUSCULAR; INTRAVENOUS AS NEEDED
Status: CANCELLED | OUTPATIENT
Start: 2023-11-27

## 2023-11-27 RX ORDER — DIPHENHYDRAMINE HCL 25 MG
25 CAPSULE ORAL ONCE
Status: DISCONTINUED | OUTPATIENT
Start: 2023-11-27 | End: 2023-11-27 | Stop reason: HOSPADM

## 2023-11-27 RX ORDER — ACETAMINOPHEN 500 MG
1000 TABLET ORAL ONCE
Status: CANCELLED | OUTPATIENT
Start: 2023-11-27

## 2023-11-27 RX ORDER — FAMOTIDINE 10 MG/ML
20 INJECTION, SOLUTION INTRAVENOUS AS NEEDED
Status: CANCELLED | OUTPATIENT
Start: 2023-11-27

## 2023-11-27 RX ORDER — FAMOTIDINE 20 MG/1
20 TABLET, FILM COATED ORAL ONCE
Status: CANCELLED | OUTPATIENT
Start: 2023-11-27 | End: 2023-11-27

## 2023-11-27 RX ORDER — DIPHENHYDRAMINE HCL 25 MG
25 CAPSULE ORAL ONCE
Status: CANCELLED | OUTPATIENT
Start: 2023-11-27

## 2023-11-27 RX ORDER — ACYCLOVIR 800 MG/1
800 TABLET ORAL DAILY
Qty: 90 TABLET | Refills: 2 | Status: SHIPPED | OUTPATIENT
Start: 2023-11-27

## 2023-11-27 RX ORDER — FAMOTIDINE 20 MG/1
20 TABLET, FILM COATED ORAL ONCE
Status: DISCONTINUED | OUTPATIENT
Start: 2023-11-27 | End: 2023-11-27 | Stop reason: HOSPADM

## 2023-11-27 RX ORDER — ACETAMINOPHEN 500 MG
1000 TABLET ORAL ONCE
Status: DISCONTINUED | OUTPATIENT
Start: 2023-11-27 | End: 2023-11-27 | Stop reason: HOSPADM

## 2023-11-27 RX ADMIN — DARATUMUMAB AND HYALURONIDASE-FIHJ (HUMAN RECOMBINANT) 1800 MG: 1800; 30000 INJECTION SUBCUTANEOUS at 10:28

## 2023-11-27 NOTE — NURSING NOTE
Per parameters holding procrit for a hgb of 10.4 today.       Confirmed with patient that he took tylenol, pepcid and benedryl at home prior to Darzalex treatment today.

## 2023-11-27 NOTE — TELEPHONE ENCOUNTER
Caller: Gabby Albert    Relationship: Emergency Contact    Best call back number: [phone]     Requested Prescriptions:      Continuous Blood Gluc Sensor (Dexcom G6 Sensor)      glucagon (GLUCAGEN) 1 MG injection     Pharmacy where request should be sent: [id number] - [facility] [address] [place] [address] & [place] [address] - [phone]  - [phone] FX     Last office visit with prescribing clinician: 2023      Next office visit with prescribing clinician: 2023     Additional details provided by patient:  PATIENT'S WIFE STATES THE PHARMACY IS REQUIRING PAPERWORK TO REFILL THE DEXCOM SENSOR.    PATIENT'S GLUCAGON HAS .    Does the patient have less than a 3 day supply:  [x] Yes  [] No    Would you like a call back once the refill request has been completed: [x] Yes [] No    If the office needs to give you a call back, can they leave a voicemail: [] Yes [x] No    Rohan Sampson Rep   23 10:21 EST

## 2023-11-27 NOTE — TELEPHONE ENCOUNTER
Caller: Gabby Albert    Relationship: Emergency Contact    Best call back number: [phone]    Requested Prescriptions:   acyclovir (ZOVIRAX) 800 MG table       Pharmacy where request should be sent:    [facility] #[id number] - [facility] [address] [facility] [address] [place] [address] [address] - [phone]  - [phone] FX       Last office visit with prescribing clinician: 11/27/2023   Last telemedicine visit with prescribing clinician: Visit date not found   Next office visit with prescribing clinician: 1/22/2024       Rohan Chamberlain Rep   11/27/23 15:03 EST

## 2023-11-27 NOTE — PROGRESS NOTES
Subjective     CHIEF COMPLAINT:      Chief Complaint   Patient presents with    Follow-up     No concerns      HISTORY OF PRESENT ILLNESS:     Contreras Albert is a 68 y.o. male patient who returns today for follow up on his multiple myeloma.  He returns today for follow-up accompanied by his wife.  He reports that he is feeling better.  He feels that this dose of Bumex is controlling his swelling.  He reports that he has adjusted to the drop in the blood pressure with standing up and he waits after he stands up before he starts to walk.  With doing this, he is no longer having episodes of falling.    ROS:  Pertinent ROS is in the HPI.     Past medical, surgical, social and family history were reviewed.     MEDICATIONS:    Current Outpatient Medications:     acetaminophen (TYLENOL) 325 MG tablet, Take 2 tablets by mouth Every 6 (Six) Hours As Needed for Mild Pain. (Patient taking differently: Take 2 tablets by mouth Every 6 (Six) Hours As Needed for Mild Pain. As needed), Disp: , Rfl:     acyclovir (ZOVIRAX) 800 MG tablet, Take 1 tablet by mouth Daily., Disp: 90 tablet, Rfl: 2    ASPIRIN 81 PO, Take 81 mg by mouth Daily., Disp: , Rfl:     atorvastatin (LIPITOR) 10 MG tablet, TAKE 1 TABLET BY MOUTH EVERY NIGHT AT BEDTIME, Disp: 90 tablet, Rfl: 1    Blood Glucose Monitoring Suppl (FREESTYLE FREEDOM LITE) w/Device kit, 1 kit Daily. Indications: Type 2 Diabetes (Patient taking differently: Use 1 kit Daily.), Disp: 1 each, Rfl: 0    bumetanide (BUMEX) 2 MG tablet, Take 1 tablet by mouth Daily., Disp: , Rfl:     calcium carbonate (TUMS) 500 MG chewable tablet, Chew 4 tablets Daily., Disp: , Rfl:     carvedilol (COREG) 12.5 MG tablet, Take 0.5 tablets by mouth 2 (Two) Times a Day With Meals., Disp: , Rfl:     Continuous Blood Gluc Sensor (Dexcom G6 Sensor), Replace sensor every 10 days.  Dx: E 11.22, Disp: 9 each, Rfl: 1    DARATUMUMAB IV, Infuse  into a venous catheter., Disp: , Rfl:     dexAMETHasone (DECADRON) 4 MG tablet,  Take 3 tablets on day of chemo, 1 tablet on day #2 and 1 tablet on day #3 and repeat monthly., Disp: 15 tablet, Rfl: 1    escitalopram (LEXAPRO) 5 MG tablet, TAKE 1 TABLET BY MOUTH EVERY DAY, Disp: 30 tablet, Rfl: 5    finasteride (PROSCAR) 5 MG tablet, Take 1 tablet by mouth Daily., Disp: , Rfl:     gabapentin (NEURONTIN) 300 MG capsule, TAKE ONE CAPSULE BY MOUTH EVERY MORNING AND 2 CAPSULES BY MOUTH EVERY NIGHT AT BEDTIME, Disp: 90 capsule, Rfl: 3    glucagon (GLUCAGEN) 1 MG injection, Inject 1 mg into the appropriate muscle as directed by prescriber 1 (One) Time As Needed for Low Blood Sugar for up to 1 dose. Follow package directions for low blood sugar., Disp: 1 kit, Rfl: 3    glucose blood (FREESTYLE LITE) test strip, Use to test blood sugar 4-5 times daily., Disp: 200 each, Rfl: 5    icosapent ethyl (Vascepa) 1 g capsule capsule, Take 2 g by mouth 2 (Two) Times a Day With Meals., Disp: 360 capsule, Rfl: 1    Insulin Lispro, 1 Unit Dial, (HUMALOG) 100 UNIT/ML solution pen-injector, Non Chemo days take Novolog 12 units before breakfast, 10 units before lunch and 12 units before dinner plus 1 units per every 50 over 150; Chemo Days take NovoLog to 18 units 3 times daily with meals +2 units per 50 greater than 150.  Max daily dose of 100 units, Disp: 30 mL, Rfl: 2    Insulin Pen Needle 32G X 4 MM misc, USING 4 PEN NEEDLES DAILY, Disp: 400 each, Rfl: 3    K Phos Edmunds-Sod Phos Di & Mono (Phosphorous) 155-852-130 MG tablet, TAKE 1 TABLET BY MOUTH DAILY, Disp: 30 tablet, Rfl: 1    Lancets (freestyle) lancets, Use to check blood sugar 4 times daily, Disp: 400 each, Rfl: 2    Lantus SoloStar 100 UNIT/ML injection pen, On Non chemo days take Lantus 16 units in the morning and 18 units in evening. On Chemo Days take Lantus to 30 units twice a day.  Max daily dose of 70 units, Disp: 63 mL, Rfl: 1    pomalidomide (POMALYST) 2 MG chemo capsule, Take 1 capsule by mouth Daily. Take for 21 days on, then 7 days off., Disp: 21  "capsule, Rfl: 0    tamsulosin (FLOMAX) 0.4 MG capsule 24 hr capsule, Take 1 capsule by mouth Daily., Disp: , Rfl:     vitamin D (ERGOCALCIFEROL) 1.25 MG (86814 UT) capsule capsule, Take 1 capsule by mouth Every 7 (Seven) Days., Disp: 13 capsule, Rfl: 1    Continuous Blood Gluc Transmit (Dexcom G6 Transmitter) misc, USE EVERY 3 MONTHS, Disp: 1 each, Rfl: 1    diphenoxylate-atropine (LOMOTIL) 2.5-0.025 MG per tablet, Take 2 tablets by mouth 4 (Four) Times a Day As Needed for Diarrhea. (Patient not taking: Reported on 11/21/2023), Disp: 120 tablet, Rfl: 0    ondansetron (ZOFRAN) 8 MG tablet, Take 1 tablet by mouth 3 (Three) Times a Day As Needed for Nausea or Vomiting. (Patient not taking: Reported on 11/21/2023), Disp: 30 tablet, Rfl: 5  Objective     VITAL SIGNS:     Vitals:    11/27/23 0925   BP: 118/64   Pulse: 66   Resp: 18   Temp: 98 °F (36.7 °C)   TempSrc: Temporal   SpO2: 97%   Weight: (!) 141 kg (310 lb 3.2 oz)   Height: 188 cm (74.02\")   PainSc: 0-No pain     Body mass index is 39.81 kg/m².     Wt Readings from Last 5 Encounters:   11/27/23 (!) 141 kg (310 lb 3.2 oz)   11/21/23 (!) 142 kg (312 lb)   10/30/23 (!) 141 kg (311 lb 12.8 oz)   10/02/23 (!) 137 kg (301 lb 3.2 oz)   09/05/23 (!) 139 kg (306 lb 1.6 oz)     PHYSICAL EXAMINATION:   GENERAL: The patient appears in fair general condition, not in acute distress.   SKIN: No ecchymosis.  EYES: No jaundice. Pallor.  CHEST: Normal respiratory effort.  Lungs clear bilaterally.  No added sounds.  CVS: Normal S1-S2.  No murmurs.  ABDOMEN: Soft. No tenderness. No Hepatomegaly. No Splenomegaly. No masses.  EXTREMITIES: +2 edema bilaterally.  No calf tenderness.  Chronic erythema of the distal aspect of the legs.    DIAGNOSTIC DATA:     Results from last 7 days   Lab Units 11/27/23 0902   WBC 10*3/mm3 3.43   NEUTROS ABS 10*3/mm3 1.84   HEMOGLOBIN g/dL 10.4*   HEMATOCRIT % 32.4*   PLATELETS 10*3/mm3 107*     Results from last 7 days   Lab Units 11/27/23 0902 "   SODIUM mmol/L 139   POTASSIUM mmol/L 4.4   CHLORIDE mmol/L 102   CO2 mmol/L 26.5   BUN mg/dL 31*   CREATININE mg/dL 2.99*   CALCIUM mg/dL 8.3*   ALBUMIN g/dL 3.6   BILIRUBIN mg/dL 0.6   ALK PHOS U/L 48   ALT (SGPT) U/L 9   AST (SGOT) U/L 11   GLUCOSE mg/dL 167*      Component      Latest Ref Rng 9/5/2023 10/2/2023 10/30/2023   IgG      603 - 1613 mg/dL 562 (L)  512 (L)  523 (L)    IgA      61 - 437 mg/dL 55 (L)  40 (L)  52 (L)    IgM      20 - 172 mg/dL 16 (L)  17 (L)  16 (L)    Total Protein      6.0 - 8.5 g/dL 5.6 (L)  5.6 (L)  5.4 (L)    Albumin      2.9 - 4.4 g/dL 3.4  3.4  3.2    Alpha-1-Globulin      0.0 - 0.4 g/dL 0.2  0.2  0.2    Alpha-2-Globulin      0.4 - 1.0 g/dL 0.7  0.9  0.8    Beta Globulin      0.7 - 1.3 g/dL 0.7  0.7  0.7    Gamma Globulin      0.4 - 1.8 g/dL 0.5  0.5  0.4    M-Jurgen      Not Observed g/dL Not Observed  Not Observed  Not Observed    Globulin      2.2 - 3.9 g/dL 2.2  2.2  2.2    A/G Ratio      0.7 - 1.7  1.6  1.6  1.5    Immunofixation Reflex, Serum Comment  Comment  Comment:    Please note Comment  Comment  Comment    Kappa FLC      3.3 - 19.4 mg/L 49.6 (H)  27.4 (H)  36.8 (H)    Free Lambda Light Chains      5.7 - 26.3 mg/L 32.4 (H)  20.5  27.7 (H)    Kappa/Lambda Ratio      0.26 - 1.65  1.53  1.34  1.33       Assessment & Plan    *IgG lambda multiple myeloma.  Bone marrow 5/23/2020 hypercellular marrow with 80% involvement of plasma cell myeloma; FISH studies pending  Bone survey 5/26/2020: Diffuse osteoporosis and demineralization.  However, no discrete lytic lesions.  SPEP 5/23/2020: M spike 5.1.  IgG 6629 lambda.  Light chain ratio 0 with free lambda light chains 6400.  Beta-2 microglobulin 16.3.  24-hour urine 12.7 g protein per 24-hour with monoclonal lambda free light chain 33.9%, monoclonal IgG lambda 23.5%  24-hour urine testing from 5/24/2020 free lambda light chains 8.4 g/L  Due to the renal failure, CyBorD regimen was chosen and was started on 5/28/2020.    On  6/18/2020, Velcade was changed to 1.5 mg/m² weekly continuously along with weekly Decadron and oral Cytoxan.  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   Treatment was changed on 8/27/2020 to to Daratumumab subcutaneous injection weekly along with Revlimid 10 mg daily for 21 out of 28-day cycle and Decadron 40 mg daily.  S/p stem cell transplant on 12/9/2020 at [facility].  Day 100 bone marrow on 3/16/2021 revealed minute  plasma cell population.  PET scan on 3/29/2021 was negative.  Patient enrolled in the  clinical trial randomizing to maintenance lenalidomide versus lenalidomide and daratumumab.  Patient was randomized to the Revlimid arm initiated 4/13/2021 dosed at 5 mg daily (due to renal function).  Patient developed neutropenia requiring dose adjustment to 5 mg daily for 21/28 days.  Patient withdrew from clinical trial after 5 cycles on 8/31/2021 due to worsening diarrhea.    He was switched to Velcade maintenance every other week 9/14/2021 which he discontinued 10/26/2021 due to neuropathy and lower extremity edema.    He was subsequently changed to pomalidomide 2 mg daily 21/28 days on 11/8/2021.    1 year post transplant, bone marrow biopsy showed 5-7% plasma cells and due to the residual disease he was started on DPd (daratumumab, pomalidomide, dexamethasone) on 2/21/2022.    Patient was seen by Dr. Romero on 9/12/2022.  At that time patient was cycle 8-day 7 DPd. Due to chronic side effects, dexamethasone was changed from weekly to monthly.    Patient did continue pomalidomide on his own during admission to the hospital, received 1 dose 4 mg p.o. on 9/24/2022 before was subsequently discontinued due to cytopenias.   Dexamethasone was changed to 12 mg on Mondays, 4 mg on Tuesday and 4 mg on Wednesday.  Pomalyst dose was reduced to 3 mg daily for 21 out of a 28-day cycle starting on 10/31/2022.  Due to neutropenia, the dose of Pomalyst was subsequently reduced to 2 mg  daily for 21/28-day cycle.  9/5/2023: Treatment held due to weakness and postural dizziness.  9/5/2023: No M protein.  Kappa FLC 49.6.  Lambda FLC 32.4.  Kappa to lambda ratio 1.53.  He did not notice improvement in his symptoms while off Darzalex and Pomalyst.  Therefore, his symptoms were not attributed to treatment.  He was restarted on Darzalex and Pomalyst on 10/2/2023.  SPEP GUANAKITO FLC on 10/30/2023 showed no M protein.  Kappa to lambda ratio was normal at 1.33.  I recommended continuing Darzalex and Pomalyst along with oral dexamethasone.    *Bone health.  He is on Xgeva.   Patient developed severe hypocalcemia following the Xgeva injection.  He required inpatient hospital stay.  Given the duration of Xgeva injections, recommended changing treatment to every 3 months starting December 2022.   Last dose of Xgeva was on 10/2/2023.  Next dose will be in 4 weeks.    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  Patient was receiving Procrit at [facility].  Records from [facility] showed that the patient was receiving 70,000 units weekly and received the last dose on 9/27/2022.  Patient was given Procrit 70,000 units on 9/27/2022.  Hemoglobin was 9.9 on 10/17/2022.  He was given Procrit 60,000 units.  Hemoglobin was 9.8 on 11/4/2022.  He was given Procrit 60,000 units.  Labs in March 2023 revealed adequate iron stores.  Last dose of Procrit was 25,000 units on 5/1/2023 for hemoglobin of 9.9.  11/27/2023: Hemoglobin 10.4.  Since hemoglobin remains above 10, he does not need Procrit at this time.    *Thrombocytopenia.  This is attributed to multiple myeloma, prior stem cell transplant and chemotherapy.  8/21/2023: Platelets decreased to 123,000.  Platelet improved afterwards.  10/30/2023: Platelet count 145,000.  11/27/2023: Platelet count decreased to 107,000.    *Neutropenia.  This was attributed to Pomalyst.  Neutrophil count was down to 290 on  9/25/2022.  He was given Neupogen during his hospital stay in September 2022.  Neutrophil count improved afterwards.  However, neutrophil count decreased to 600 on 12/19/2022.  He reports that he was feeling tired and achy at that time.  Neutrophil count was 770 on 1/16/2023.  Due to the recurrent neutropenia, the dose of Pomalyst was reduced to 2 mg in January 2023.  Neutrophil count improved following the dose reduction.  10/2/2023: Neutrophil count 5600.  10/30/2023: Neutrophil count 2440.  11/27/2023: Neutrophil count 1840.  No recent infections.    *Hypocalcemia.  Patient developed severe hypocalcemia due to Xgeva.  Calcium was 5.9 on 9/23/2022.  Patient was placed on calcium replacement.  Calcium dose was subsequently increased to 3 tablets daily.  8/7/2023: Calcium 8.1. Albumin 3.6.  The dose was increased to 4 tablets daily.  9/5/2023: Calcium 9.1. Albumin 3.8.  10/2/2023: Calcium 9.3.  Albumin 3.6.  10/30/2023: Calcium 8.3.  Albumin 3.5   11/27/2023: Calcium 8.3.  Albumin 3.6.    *Hypomagnesemia.  Magnesium was 1.1 on 10/24/2022.  He was placed on oral magnesium oxide 400 mg twice daily.  He developed diarrhea and it was stopped.  He was started on magnesium chloride on 1/23/2023.  He developed diarrhea about 7 days after starting the magnesium chloride.  Oral magnesium chloride was discontinued.  He receives IV magnesium per protocol based on his magnesium level.  9/5/2023: Magnesium decreased to 1.7.  He was given 1 g IV magnesium sulfate.  10/30/2023: Magnesium 1.6.  11/27/2023: Magnesium will be repeated.     *Prophylaxis.  He is on acyclovir 800 mg daily.  He is on ASA 81 mg daily.  No recent infections.    *Episodes of dizziness and falling.  Patient reports that the episodes develop after he stands up and starts walking.  No dizziness while sitting.  No restricted chest pain or shortness of breath.  Blood pressure was 133/80 and heart rate was 66 in the sitting position today.  After standing, blood  pressure was 119/76 and heart rate was 69.  No improvement in the symptoms after holding Pomalyst for 2 weeks.  MRI brain on 7/26/2023 showed small vessel ischemic changes.  There were changes of 3 old strokes.  I explained the findings to the patient.  I recommended evaluation by cardiology and neurology.  I also recommended home physical therapy  I recommended that he uses a walker when he ambulates.  His evaluation showed possible changes of postural hypotension.   He is concerned that this is due to his medications. I explained that while postural hypotension is a side effect of medications, it is not a common side effect of Darzalex or Pomalyst.  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  Symptoms did not improve after placing Darzalex and Pomalyst on hold.  On 10/2/2023, we recommended reducing Bumex to 1 mg daily and monitoring his symptoms and the leg swelling.  He did not notice a difference in his postural dizziness after the dose reduction.   Bumex was increased back to 2 mg daily due to worsening of his edema.   This dose is controlling his leg edema.    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  It is controlled with Lexapro 5 mg daily.  He is running out of the medicine despite us sending a prescription to his pharmacy about 2 weeks ago.    *Vitamin D deficiency.  He is on vitamin D 50,000 units weekly.  Vitamin D level was 18 on 10/3/2022.  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  He is on gabapentin 300 mg in the morning and 600 mg in the evening.  Neuropathy symptoms in the feet are better as the swelling improved.     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  S/p right partial nephrectomy on 5/18/2016.    PLAN:    1.  We will give Darzalex today.  2.  Start Pomalyst 2 mg daily for 21 days followed by 7 days off.   3.  Dexamethasone 20 mg monthly (12 mg on the day of Darzalex followed by 4  mg daily x 2 days).  3.  Continue calcium carbonate 4 tablets daily.  4.  Continue Neutra-Phos 1 tablet daily.  5.  Continue aspirin 81 mg daily.  6.  Continue acyclovir 800 mg daily.  7.  Continue Lexapro 5 mg daily.  I sent new prescription to his pharmacy.  8.  Return in 4 weeks for the next dose of Darzalex and Xgeva.  CBC CMP magnesium phosphorus SPEP GUANAKITO FLC will be obtained.  9.  I will see him in follow-up in 8 weeks.  He will be scheduled to receive Darzalex.      Red Del Valle MD  11/27/23

## 2023-11-27 NOTE — TELEPHONE ENCOUNTER
Rx Refill Note  Requested Prescriptions     Pending Prescriptions Disp Refills    Continuous Blood Gluc Transmit (Dexcom G6 Transmitter) misc [Pharmacy Med Name: DEXCOM G6 TRANSMITTER] 1 each 1     Sig: USE EVERY 3 MONTHS      Last office visit with prescribing clinician: 8/29/2023   Last telemedicine visit with prescribing clinician: Visit date not found   Next office visit with prescribing clinician: Visit date not found                         Would you like a call back once the refill request has been completed: [] Yes [] No    If the office needs to give you a call back, can they leave a voicemail: [] Yes [] No    Michelle Davis  11/27/23, 08:24 EST

## 2023-11-28 ENCOUNTER — SPECIALTY PHARMACY (OUTPATIENT)
Dept: PHARMACY | Facility: HOSPITAL | Age: 68
End: 2023-11-28
Payer: MEDICARE

## 2023-11-28 LAB
ALBUMIN SERPL ELPH-MCNC: 2.9 G/DL (ref 2.9–4.4)
ALBUMIN/GLOB SERPL: 1.1 {RATIO} (ref 0.7–1.7)
ALPHA1 GLOB SERPL ELPH-MCNC: 0.3 G/DL (ref 0–0.4)
ALPHA2 GLOB SERPL ELPH-MCNC: 1 G/DL (ref 0.4–1)
B-GLOBULIN SERPL ELPH-MCNC: 0.8 G/DL (ref 0.7–1.3)
GAMMA GLOB SERPL ELPH-MCNC: 0.7 G/DL (ref 0.4–1.8)
GLOBULIN SER-MCNC: 2.8 G/DL (ref 2.2–3.9)
IGA SERPL-MCNC: 65 MG/DL (ref 61–437)
IGG SERPL-MCNC: 595 MG/DL (ref 603–1613)
IGM SERPL-MCNC: 40 MG/DL (ref 20–172)
INTERPRETATION SERPL IEP-IMP: ABNORMAL
KAPPA LC FREE SER-MCNC: 60.9 MG/L (ref 3.3–19.4)
KAPPA LC FREE/LAMBDA FREE SER: 1.37 {RATIO} (ref 0.26–1.65)
LABORATORY COMMENT REPORT: ABNORMAL
LAMBDA LC FREE SERPL-MCNC: 44.3 MG/L (ref 5.7–26.3)
M PROTEIN SERPL ELPH-MCNC: ABNORMAL G/DL
MAGNESIUM SERPL-MCNC: 1.5 MG/DL (ref 1.6–2.4)
PROT SERPL-MCNC: 5.7 G/DL (ref 6–8.5)

## 2023-11-29 ENCOUNTER — OFFICE VISIT (OUTPATIENT)
Dept: ENDOCRINOLOGY | Age: 68
End: 2023-11-29
Payer: MEDICARE

## 2023-11-29 VITALS
HEIGHT: 74 IN | SYSTOLIC BLOOD PRESSURE: 132 MMHG | OXYGEN SATURATION: 98 % | TEMPERATURE: 97.2 F | HEART RATE: 70 BPM | BODY MASS INDEX: 40.43 KG/M2 | WEIGHT: 315 LBS | DIASTOLIC BLOOD PRESSURE: 72 MMHG

## 2023-11-29 DIAGNOSIS — N18.4 TYPE 2 DIABETES MELLITUS WITH STAGE 4 CHRONIC KIDNEY DISEASE, WITH LONG-TERM CURRENT USE OF INSULIN: Primary | ICD-10-CM

## 2023-11-29 DIAGNOSIS — E55.9 VITAMIN D DEFICIENCY: ICD-10-CM

## 2023-11-29 DIAGNOSIS — Z85.79 HISTORY OF MULTIPLE MYELOMA: ICD-10-CM

## 2023-11-29 DIAGNOSIS — Z79.4 TYPE 2 DIABETES MELLITUS WITH STAGE 4 CHRONIC KIDNEY DISEASE, WITH LONG-TERM CURRENT USE OF INSULIN: Primary | ICD-10-CM

## 2023-11-29 DIAGNOSIS — E11.22 TYPE 2 DIABETES MELLITUS WITH STAGE 4 CHRONIC KIDNEY DISEASE, WITH LONG-TERM CURRENT USE OF INSULIN: Primary | ICD-10-CM

## 2023-11-29 DIAGNOSIS — I10 PRIMARY HYPERTENSION: ICD-10-CM

## 2023-11-29 DIAGNOSIS — E78.2 MIXED HYPERLIPIDEMIA: ICD-10-CM

## 2023-11-29 NOTE — LETTER
November 29, 2023     IZZY Montes  [address]  [facility] [address]    Patient: Contreras Albert   YOB: 1955   Date of Visit: 11/29/2023     Dear IZZY Montes:       Thank you for referring Contreras Albert to me for evaluation. Below are the relevant portions of my assessment and plan of care.    If you have questions, please do not hesitate to call me. I look forward to following Contreras along with you.         Sincerely,        Cisco Haro MD        CC: MD Red Booth MD Wei Wang, MD Yson, Cisco PUTNAM MD  11/29/23 1214  Sign when Signing Visit  Subjective  Contreras Albert is a 68 y.o. male.     History of Present Illness     He has known diabetes mellitus since 2001 and started on insulin in 2009.  On off chemotherapy days:  Lantus 16 units every morning and 18 units every evening and NovoLog 10 units before breakfast and lunch and 12 units before supper +1 unit per 50 greater than 150.  On chemotherapy days: Lantus 30 units twice daily and on the morning after.  NovoLog 15 units with each meal +2 units per 50 greater than 150.  He has gained 12 pounds since August 2023.    Sensor data reviewed.  Average glucose 204 mg per DL.  GMI 8.2%.  Time in range 42%.  Time above range 56%.  Time below range 2%.  He has occasional hypoglycemia whenever he eats less.  Blood sugars are higher when he is on dexamethasone during chemotherapy.     His last eye examination was done by Dr. De La Cruz in 8/23 .  He has retinopathy and had previous intraocular eye injections on both eyes and retinal laser therapy.  He has chronic kidney disease with albuminuria and has been following Dr. Norm Hutchison.  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  He is on gabapentin 100 mg TID  prescribed by his oncologist Dr. Del Valle.     He has hypertension and is on carvedilol 6.25 mg BID and Bumex 2 mg/day.  He was taken off amlodipine because of pedal edema.  He  "has no history of heart attack or stroke.  He denies chest pain or shortness of breath.     He has hyperlipidemia and is on atorvastatin 10 mg/day and Vascepa 2 grams BID.  His last meal at 9 AM.     He has multiple myeloma and had chemotherapy followed by stem cell transplant.  He is on Darzalex, dexamethasone and Pomalyst.  He is on Xgeva every 3 months, Tums 3-4 tabs/day, ergocalciferol 37071 units weekly and Procrit as needed.     He had a previous right partial nephrectomy for cancer.  He has no known recurrence.     He has chronic diarrhea and takes Lomotil and Imodium as needed.  He has history of C. difficile colitis which was treated.  He denies melena or hematochezia.  He has never had a colonoscopy.        The following portions of the patient's history were reviewed and updated as appropriate: allergies, current medications, past family history, past medical history, past social history, past surgical history, and problem list.    Review of Systems   Eyes:  Positive for visual disturbance.   Respiratory:  Negative for shortness of breath.    Cardiovascular:  Negative for chest pain and palpitations.   Gastrointestinal:  Positive for diarrhea. Negative for anal bleeding and blood in stool.   Genitourinary: Negative.    Musculoskeletal:  Negative for myalgias.   Neurological:  Positive for numbness.     Vitals:    11/29/23 1046   BP: 132/72   Pulse: 70   Temp: 97.2 °F (36.2 °C)   TempSrc: Temporal   SpO2: 98%   Weight: (!) 143 kg (315 lb 6.4 oz)   Height: 188 cm (74.02\")      Objective  Physical Exam  Constitutional:       General: He is not in acute distress.     Appearance: Normal appearance. He is not ill-appearing, toxic-appearing or diaphoretic.   Eyes:      General: No scleral icterus.        Right eye: No discharge.         Left eye: No discharge.      Extraocular Movements: Extraocular movements intact.      Conjunctiva/sclera: Conjunctivae normal.   Neck:      Vascular: No carotid bruit. "   Cardiovascular:      Rate and Rhythm: Normal rate and regular rhythm.      Heart sounds: Normal heart sounds. No murmur heard.     No friction rub.   Pulmonary:      Breath sounds: Normal breath sounds. No rales.   Chest:      Chest wall: No tenderness.   Abdominal:      General: Bowel sounds are normal.      Palpations: Abdomen is soft.      Tenderness: There is no right CVA tenderness or left CVA tenderness.   Musculoskeletal:      Right lower leg: Edema present.      Left lower leg: Edema present.      Comments: Chronic venous stasis changes in distal lower ext   Lymphadenopathy:      Cervical: No cervical adenopathy.   Neurological:      Mental Status: He is alert and oriented to person, place, and time.      Comments: Intact light touch on upper ext.  Decreased light touch on distal lower ext       Office Visit on 11/27/2023   Component Date Value Ref Range Status   • IgG 11/27/2023 595 (L)  603 - 1613 mg/dL Final   • IgA 11/27/2023 65  61 - 437 mg/dL Final   • IgM 11/27/2023 40  20 - 172 mg/dL Final   • Total Protein 11/27/2023 5.7 (L)  6.0 - 8.5 g/dL Final   • Albumin 11/27/2023 2.9  2.9 - 4.4 g/dL Final   • Alpha-1-Globulin 11/27/2023 0.3  0.0 - 0.4 g/dL Final   • Alpha-2-Globulin 11/27/2023 1.0  0.4 - 1.0 g/dL Final   • Beta Globulin 11/27/2023 0.8  0.7 - 1.3 g/dL Final   • Gamma Globulin 11/27/2023 0.7  0.4 - 1.8 g/dL Final   • M-Jurgen 11/27/2023 Not Observed  Not Observed g/dL Final   • Globulin 11/27/2023 2.8  2.2 - 3.9 g/dL Final   • A/G Ratio 11/27/2023 1.1  0.7 - 1.7 Final   • Immunofixation Reflex, Serum 11/27/2023 Comment:   Final    Presence of monoclonal protein is unclear at this time. Suggest  repeat in 3 to 6 months if clinically indicated.   • Please note 11/27/2023 Comment   Final    Protein electrophoresis scan will follow via computer, mail, or   delivery.   • Free Light Chain, Kappa 11/27/2023 60.9 (H)  3.3 - 19.4 mg/L Final   • Free Lambda Light Chains 11/27/2023 44.3 (H)  5.7 -  26.3 mg/L Final   • Kappa/Lambda Ratio 11/27/2023 1.37  0.26 - 1.65 Final   • Magnesium 11/27/2023 1.5 (C)  1.6 - 2.4 mg/dL Final     Assessment & Plan  Diagnoses and all orders for this visit:    1. Type 2 diabetes mellitus with stage 4 chronic kidney disease, with long-term current use of insulin (Primary)  -     Comprehensive Metabolic Panel  -     Fructosamine  -     Vitamin D,25-Hydroxy  -     PTH, Intact  -     TSH  -     Lipid Panel    2. Primary hypertension  -     Comprehensive Metabolic Panel    3. Mixed hyperlipidemia  -     Lipid Panel    4. Vitamin D deficiency  -     Vitamin D,25-Hydroxy    5. History of multiple myeloma    Other orders  -     glucagon (GLUCAGEN) 1 MG injection; Inject 1 mg into the appropriate muscle as directed by prescriber 1 (One) Time As Needed for Low Blood Sugar. Follow package directions for low blood sugar.  Dispense: 1 kit; Refill: 10      Continue on current insulin dose.  Check fructosamine.  Follow-up with Dr. De La Cruz and Dr. Hutchison.    Continue Coreg and Bumex.    Continue atorvastatin 10 mg/day and Vascepa 2 g twice a day.    Follow-up with Dr. Del Valle.    RTC 3 mos with ETELVINA Haskins NP.    Copy of my note sent to Brianna Thompson NP, Dr. Norm Hutchison, Dr. De La Cruz, and Dr. Del Valle.

## 2023-11-29 NOTE — PROGRESS NOTES
Subjective   Contreras Albert is a 68 y.o. male.     History of Present Illness     He has known diabetes mellitus since 2001 and started on insulin in 2009.  On off chemotherapy days:  Lantus 16 units every morning and 18 units every evening and NovoLog 10 units before breakfast and lunch and 12 units before supper +1 unit per 50 greater than 150.  On chemotherapy days: Lantus 30 units twice daily and on the morning after.  NovoLog 15 units with each meal +2 units per 50 greater than 150.  He has gained 12 pounds since August 2023.    Sensor data reviewed.  Average glucose 204 mg per DL.  GMI 8.2%.  Time in range 42%.  Time above range 56%.  Time below range 2%.  He has occasional hypoglycemia whenever he eats less.  Blood sugars are higher when he is on dexamethasone during chemotherapy.     His last eye examination was done by Dr. De La Cruz in 8/23 .  He has retinopathy and had previous intraocular eye injections on both eyes and retinal laser therapy.  He has chronic kidney disease with albuminuria and has been following Dr. Norm Hutchison.  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  He is on gabapentin 100 mg TID  prescribed by his oncologist Dr. Del Valle.     He has hypertension and is on carvedilol 6.25 mg BID and Bumex 2 mg/day.  He was taken off amlodipine because of pedal edema.  He has no history of heart attack or stroke.  He denies chest pain or shortness of breath.     He has hyperlipidemia and is on atorvastatin 10 mg/day and Vascepa 2 grams BID.  His last meal at 9 AM.     He has multiple myeloma and had chemotherapy followed by stem cell transplant.  He is on Darzalex, dexamethasone and Pomalyst.  He is on Xgeva every 3 months, Tums 3-4 tabs/day, ergocalciferol 38496 units weekly and Procrit as needed.     He had a previous right partial nephrectomy for cancer.  He has no known recurrence.     He has chronic diarrhea and takes Lomotil and Imodium as needed.  He has history of C.  "difficile colitis which was treated.  He denies melena or hematochezia.  He has never had a colonoscopy.        The following portions of the patient's history were reviewed and updated as appropriate: allergies, current medications, past family history, past medical history, past social history, past surgical history, and problem list.    Review of Systems   Eyes:  Positive for visual disturbance.   Respiratory:  Negative for shortness of breath.    Cardiovascular:  Negative for chest pain and palpitations.   Gastrointestinal:  Positive for diarrhea. Negative for anal bleeding and blood in stool.   Genitourinary: Negative.    Musculoskeletal:  Negative for myalgias.   Neurological:  Positive for numbness.     Vitals:    11/29/23 1046   BP: 132/72   Pulse: 70   Temp: 97.2 °F (36.2 °C)   TempSrc: Temporal   SpO2: 98%   Weight: (!) 143 kg (315 lb 6.4 oz)   Height: 188 cm (74.02\")      Objective   Physical Exam  Constitutional:       General: He is not in acute distress.     Appearance: Normal appearance. He is not ill-appearing, toxic-appearing or diaphoretic.   Eyes:      General: No scleral icterus.        Right eye: No discharge.         Left eye: No discharge.      Extraocular Movements: Extraocular movements intact.      Conjunctiva/sclera: Conjunctivae normal.   Neck:      Vascular: No carotid bruit.   Cardiovascular:      Rate and Rhythm: Normal rate and regular rhythm.      Heart sounds: Normal heart sounds. No murmur heard.     No friction rub.   Pulmonary:      Breath sounds: Normal breath sounds. No rales.   Chest:      Chest wall: No tenderness.   Abdominal:      General: Bowel sounds are normal.      Palpations: Abdomen is soft.      Tenderness: There is no right CVA tenderness or left CVA tenderness.   Musculoskeletal:      Right lower leg: Edema present.      Left lower leg: Edema present.      Comments: Chronic venous stasis changes in distal lower ext   Lymphadenopathy:      Cervical: No cervical " adenopathy.   Neurological:      Mental Status: He is alert and oriented to person, place, and time.      Comments: Intact light touch on upper ext.  Decreased light touch on distal lower ext       Office Visit on 11/27/2023   Component Date Value Ref Range Status    IgG 11/27/2023 595 (L)  603 - 1613 mg/dL Final    IgA 11/27/2023 65  61 - 437 mg/dL Final    IgM 11/27/2023 40  20 - 172 mg/dL Final    Total Protein 11/27/2023 5.7 (L)  6.0 - 8.5 g/dL Final    Albumin 11/27/2023 2.9  2.9 - 4.4 g/dL Final    Alpha-1-Globulin 11/27/2023 0.3  0.0 - 0.4 g/dL Final    Alpha-2-Globulin 11/27/2023 1.0  0.4 - 1.0 g/dL Final    Beta Globulin 11/27/2023 0.8  0.7 - 1.3 g/dL Final    Gamma Globulin 11/27/2023 0.7  0.4 - 1.8 g/dL Final    M-Jurgen 11/27/2023 Not Observed  Not Observed g/dL Final    Globulin 11/27/2023 2.8  2.2 - 3.9 g/dL Final    A/G Ratio 11/27/2023 1.1  0.7 - 1.7 Final    Immunofixation Reflex, Serum 11/27/2023 Comment:   Final    Presence of monoclonal protein is unclear at this time. Suggest  repeat in 3 to 6 months if clinically indicated.    Please note 11/27/2023 Comment   Final    Protein electrophoresis scan will follow via computer, mail, or   delivery.    Free Light Chain, Kappa 11/27/2023 60.9 (H)  3.3 - 19.4 mg/L Final    Free Lambda Light Chains 11/27/2023 44.3 (H)  5.7 - 26.3 mg/L Final    Kappa/Lambda Ratio 11/27/2023 1.37  0.26 - 1.65 Final    Magnesium 11/27/2023 1.5 (C)  1.6 - 2.4 mg/dL Final     Assessment & Plan   Diagnoses and all orders for this visit:    1. Type 2 diabetes mellitus with stage 4 chronic kidney disease, with long-term current use of insulin (Primary)  -     Comprehensive Metabolic Panel  -     Fructosamine  -     Vitamin D,25-Hydroxy  -     PTH, Intact  -     TSH  -     Lipid Panel    2. Primary hypertension  -     Comprehensive Metabolic Panel    3. Mixed hyperlipidemia  -     Lipid Panel    4. Vitamin D deficiency  -     Vitamin D,25-Hydroxy    5. History of multiple  myeloma    Other orders  -     glucagon (GLUCAGEN) 1 MG injection; Inject 1 mg into the appropriate muscle as directed by prescriber 1 (One) Time As Needed for Low Blood Sugar. Follow package directions for low blood sugar.  Dispense: 1 kit; Refill: 10      Continue on current insulin dose.  Check fructosamine.  Follow-up with Dr. De La Cruz and Dr. Hutchison.    Continue Coreg and Bumex.    Continue atorvastatin 10 mg/day and Vascepa 2 g twice a day.    Follow-up with Dr. Del Valle.    RTC 3 mos with ETELVINA Haskins NP.    Copy of my note sent to Brianna Thompson NP, Dr. Norm Hutchison, Dr. De La Cruz, and Dr. Del Valle.

## 2023-11-30 ENCOUNTER — TELEPHONE (OUTPATIENT)
Dept: ONCOLOGY | Facility: CLINIC | Age: 68
End: 2023-11-30
Payer: MEDICARE

## 2023-11-30 RX ORDER — MAGNESIUM SULFATE HEPTAHYDRATE 40 MG/ML
2 INJECTION, SOLUTION INTRAVENOUS ONCE
Status: CANCELLED | OUTPATIENT
Start: 2023-12-01

## 2023-11-30 RX ORDER — SODIUM CHLORIDE 9 MG/ML
20 INJECTION, SOLUTION INTRAVENOUS ONCE
Status: CANCELLED | OUTPATIENT
Start: 2023-12-01

## 2023-11-30 NOTE — TELEPHONE ENCOUNTER
Reviewed Dr. Del Valle's message with the pt and his wife. They are unable to come into th office today. I spoke with NATALIO Palacio and the only availability at our [facility] location for 12/1/2023 is at 0800. Pt and wife confirmed that time. I have sent a message to scheduling.

## 2023-11-30 NOTE — TELEPHONE ENCOUNTER
----- Message from Red Del Valle MD sent at 11/30/2023  7:48 AM EST -----  Please inform the patient that his magnesium level did not get done while he was receiving the Darzalex treatment.  It came back low at 1.5.  I recommend scheduling him to come to the office to receive 2 g of IV magnesium    Thank you

## 2023-12-01 ENCOUNTER — INFUSION (OUTPATIENT)
Dept: ONCOLOGY | Facility: HOSPITAL | Age: 68
End: 2023-12-01
Payer: MEDICARE

## 2023-12-01 VITALS
WEIGHT: 315 LBS | TEMPERATURE: 98.2 F | BODY MASS INDEX: 40.63 KG/M2 | DIASTOLIC BLOOD PRESSURE: 71 MMHG | SYSTOLIC BLOOD PRESSURE: 128 MMHG | OXYGEN SATURATION: 91 % | HEART RATE: 77 BPM | RESPIRATION RATE: 16 BRPM

## 2023-12-01 DIAGNOSIS — E83.42 HYPOMAGNESEMIA: Primary | ICD-10-CM

## 2023-12-01 LAB
25(OH)D3+25(OH)D2 SERPL-MCNC: 26.2 NG/ML (ref 30–100)
ALBUMIN SERPL-MCNC: 3.9 G/DL (ref 3.9–4.9)
ALBUMIN/GLOB SERPL: 2.4 {RATIO} (ref 1.2–2.2)
ALP SERPL-CCNC: 52 IU/L (ref 44–121)
ALT SERPL-CCNC: 11 IU/L (ref 0–44)
AST SERPL-CCNC: 6 IU/L (ref 0–40)
BILIRUB SERPL-MCNC: 0.3 MG/DL (ref 0–1.2)
BUN SERPL-MCNC: 40 MG/DL (ref 8–27)
BUN/CREAT SERPL: 14 (ref 10–24)
CALCIUM SERPL-MCNC: 8.2 MG/DL (ref 8.6–10.2)
CHLORIDE SERPL-SCNC: 101 MMOL/L (ref 96–106)
CHOLEST SERPL-MCNC: 104 MG/DL (ref 100–199)
CO2 SERPL-SCNC: 23 MMOL/L (ref 20–29)
CREAT SERPL-MCNC: 2.95 MG/DL (ref 0.76–1.27)
EGFRCR SERPLBLD CKD-EPI 2021: 23 ML/MIN/1.73
FRUCTOSAMINE SERPL-SCNC: 251 UMOL/L (ref 0–285)
GLOBULIN SER CALC-MCNC: 1.6 G/DL (ref 1.5–4.5)
GLUCOSE SERPL-MCNC: 231 MG/DL (ref 70–99)
HDLC SERPL-MCNC: 28 MG/DL
IMP & REVIEW OF LAB RESULTS: NORMAL
LDLC SERPL CALC-MCNC: 20 MG/DL (ref 0–99)
POTASSIUM SERPL-SCNC: 4.1 MMOL/L (ref 3.5–5.2)
PROT SERPL-MCNC: 5.5 G/DL (ref 6–8.5)
PTH-INTACT SERPL-MCNC: 247 PG/ML (ref 15–65)
REPORT: NORMAL
SODIUM SERPL-SCNC: 141 MMOL/L (ref 134–144)
TRIGL SERPL-MCNC: 406 MG/DL (ref 0–149)
TSH SERPL DL<=0.005 MIU/L-ACNC: 3.97 UIU/ML (ref 0.45–4.5)
VLDLC SERPL CALC-MCNC: 56 MG/DL (ref 5–40)

## 2023-12-01 PROCEDURE — 96365 THER/PROPH/DIAG IV INF INIT: CPT

## 2023-12-01 PROCEDURE — 25010000002 MAGNESIUM SULFATE 2 GM/50ML SOLUTION: Performed by: INTERNAL MEDICINE

## 2023-12-01 PROCEDURE — 25810000003 SODIUM CHLORIDE 0.9 % SOLUTION: Performed by: INTERNAL MEDICINE

## 2023-12-01 RX ORDER — SODIUM CHLORIDE 9 MG/ML
20 INJECTION, SOLUTION INTRAVENOUS ONCE
Status: COMPLETED | OUTPATIENT
Start: 2023-12-01 | End: 2023-12-01

## 2023-12-01 RX ORDER — MAGNESIUM SULFATE HEPTAHYDRATE 40 MG/ML
2 INJECTION, SOLUTION INTRAVENOUS ONCE
Status: COMPLETED | OUTPATIENT
Start: 2023-12-01 | End: 2023-12-01

## 2023-12-01 RX ADMIN — SODIUM CHLORIDE 20 ML/HR: 9 INJECTION, SOLUTION INTRAVENOUS at 08:28

## 2023-12-01 RX ADMIN — MAGNESIUM SULFATE IN WATER 2 G: 40 INJECTION, SOLUTION INTRAVENOUS at 08:28

## 2023-12-03 DIAGNOSIS — E78.2 MIXED HYPERLIPIDEMIA: Primary | ICD-10-CM

## 2023-12-03 NOTE — PROGRESS NOTES
Vitamin D insufficient on ergocalciferol 50,000 units weekly.  Intact PTH elevated.  Patient has secondary hyperparathyroidism due to CKD and vitamin D deficiency.  Advised patient to follow-up with Dr. Hutchison for further treatment.  LDL 20.  HDL 28.  Continue atorvastatin 10 mg/day and Vascepa 2 g twice a day.  Fructosamine normal.  Diabetes is in good control over the past month.  Normal thyroid function test..  Copy of labs sent to [facility] and Dr. Norm Hutchison.  Please notify patient of results and instructions.

## 2023-12-04 RX ORDER — ICOSAPENT ETHYL 1000 MG/1
2 CAPSULE ORAL 2 TIMES DAILY WITH MEALS
Qty: 360 CAPSULE | Refills: 2 | Status: SHIPPED | OUTPATIENT
Start: 2023-12-04

## 2023-12-12 RX ORDER — DIBASIC SODIUM PHOSPHATE, MONOBASIC POTASSIUM PHOSPHATE AND MONOBASIC SODIUM PHOSPHATE 852; 155; 130 MG/1; MG/1; MG/1
1 TABLET ORAL DAILY
Qty: 30 TABLET | Refills: 1 | Status: SHIPPED | OUTPATIENT
Start: 2023-12-12

## 2023-12-13 ENCOUNTER — TELEPHONE (OUTPATIENT)
Dept: ENDOCRINOLOGY | Age: 68
End: 2023-12-13

## 2023-12-13 DIAGNOSIS — Z79.4 TYPE 2 DIABETES MELLITUS WITH STAGE 4 CHRONIC KIDNEY DISEASE, WITH LONG-TERM CURRENT USE OF INSULIN: ICD-10-CM

## 2023-12-13 DIAGNOSIS — N18.4 TYPE 2 DIABETES MELLITUS WITH STAGE 4 CHRONIC KIDNEY DISEASE, WITH LONG-TERM CURRENT USE OF INSULIN: ICD-10-CM

## 2023-12-13 DIAGNOSIS — E11.22 TYPE 2 DIABETES MELLITUS WITH STAGE 4 CHRONIC KIDNEY DISEASE, WITH LONG-TERM CURRENT USE OF INSULIN: ICD-10-CM

## 2023-12-13 NOTE — TELEPHONE ENCOUNTER
Caller: Contreras Albert WARD    Relationship: Self    Best call back number: [phone]    Requested Prescriptions:     Continuous Blood Gluc Transmit (Dexcom G6 Transmitter) misc       Continuous Blood Gluc Sensor (Dexcom G6 Sensor)     Requested Prescriptions      No prescriptions requested or ordered in this encounter        Pharmacy where request should be sent: [id number]     Last office visit with prescribing clinician: 11/29/2023   Last telemedicine visit with prescribing clinician: Visit date not found   Next office visit with prescribing clinician: Visit date not found     Additional details provided by patient: PT STATED THAT HE DOESN'T GET ENOUGH SENSORS THAT WILL LAST 12 DAYS. THEY ONLY LAST 8 OR 9 DAYS. THEY RUN OUT BEFORE HE NEEDS TO GET THEM REFILLED. PT STATED THAT HIS TRANSMITTER WENT OUT. PT WOULD LIKE TO FIND OUT WHAT SHOULD HE DO SINCE HIS SENSORS ARE DONE BEFORE HE CAN GET THEM REFILLED AGAIN?? PT IS OUT OF SENSORS AND NO WORKING TRANSMITTER.     Does the patient have less than a 3 day supply:  [x] Yes  [] No    Would you like a call back once the refill request has been completed: [x] Yes [] No    If the office needs to give you a call back, can they leave a voicemail: [x] Yes [] No    Rohan Gupta Rep   12/13/23 09:42 EST

## 2023-12-13 NOTE — TELEPHONE ENCOUNTER
Please ask patient to notify  whenever he has defective sensors and transmitters.  They may give him a replacement

## 2023-12-19 DIAGNOSIS — C90.00 MULTIPLE MYELOMA NOT HAVING ACHIEVED REMISSION: ICD-10-CM

## 2023-12-20 ENCOUNTER — SPECIALTY PHARMACY (OUTPATIENT)
Dept: PHARMACY | Facility: HOSPITAL | Age: 68
End: 2023-12-20
Payer: MEDICARE

## 2023-12-20 NOTE — PROGRESS NOTES
Re: Refills of Oral Specialty Medication - Pomalyst (pomalidomide)    Drug-Drug Interactions: The current medication list was reviewed and there are no relevant drug-drug interactions with the specialty medication.  Medication Allergies: The patient has no relevant allergies as it relates to their oral specialty medication  Review of Labs/Dose Adjustments: NO DOSE CHANGE - I reviewed the most recent note and labs and the patient will continue without any dose changes.  I sent refills as described below.    Drug: Pomalyst (pomalidomide)  Strength: 2 mg  Directions: Take 1 capsule by mouth daily for 21 days on, then 7 days off  Quantity: 21  Refills: 0  Pharmacy prescription sent to: [facility]    Name/Credentials Donna Cano RPH, BCOP    12/20/2023  08:55 EST

## 2023-12-20 NOTE — PROGRESS NOTES
Drug: Pomalyst (pomalidomide)  Strength: 2 mg  Directions: Take 1 capsule by mouth daily for 21 days on, then 7 days off  Quantity: 21  Refills: 0  Pharmacy prescription sent to: [facility]    Completed independent double check on medication order/RX.  Carlos Clarke, YumikoD, BCOP  Clinical Oncology Pharmacist

## 2023-12-26 ENCOUNTER — INFUSION (OUTPATIENT)
Dept: ONCOLOGY | Facility: HOSPITAL | Age: 68
End: 2023-12-26
Payer: MEDICARE

## 2023-12-26 ENCOUNTER — LAB (OUTPATIENT)
Dept: LAB | Facility: HOSPITAL | Age: 68
End: 2023-12-26
Payer: MEDICARE

## 2023-12-26 VITALS
DIASTOLIC BLOOD PRESSURE: 83 MMHG | TEMPERATURE: 98 F | SYSTOLIC BLOOD PRESSURE: 143 MMHG | HEART RATE: 73 BPM | WEIGHT: 306 LBS | BODY MASS INDEX: 39.27 KG/M2 | OXYGEN SATURATION: 95 % | RESPIRATION RATE: 18 BRPM

## 2023-12-26 DIAGNOSIS — C90.00 MULTIPLE MYELOMA NOT HAVING ACHIEVED REMISSION: ICD-10-CM

## 2023-12-26 DIAGNOSIS — C90.00 MULTIPLE MYELOMA NOT HAVING ACHIEVED REMISSION: Primary | ICD-10-CM

## 2023-12-26 LAB
ALBUMIN SERPL-MCNC: 3.9 G/DL (ref 3.5–5.2)
ALBUMIN/GLOB SERPL: 1.5 G/DL
ALP SERPL-CCNC: 58 U/L (ref 39–117)
ALT SERPL W P-5'-P-CCNC: 11 U/L (ref 1–41)
ANION GAP SERPL CALCULATED.3IONS-SCNC: 8.5 MMOL/L (ref 5–15)
AST SERPL-CCNC: 12 U/L (ref 1–40)
BASOPHILS # BLD AUTO: 0.09 10*3/MM3 (ref 0–0.2)
BASOPHILS NFR BLD AUTO: 1.9 % (ref 0–1.5)
BILIRUB SERPL-MCNC: 0.6 MG/DL (ref 0–1.2)
BUN SERPL-MCNC: 40 MG/DL (ref 8–23)
BUN/CREAT SERPL: 11.7 (ref 7–25)
CALCIUM SPEC-SCNC: 9.4 MG/DL (ref 8.6–10.5)
CHLORIDE SERPL-SCNC: 103 MMOL/L (ref 98–107)
CO2 SERPL-SCNC: 30.5 MMOL/L (ref 22–29)
CREAT SERPL-MCNC: 3.41 MG/DL (ref 0.76–1.27)
DEPRECATED RDW RBC AUTO: 53.2 FL (ref 37–54)
EGFRCR SERPLBLD CKD-EPI 2021: 19 ML/MIN/1.73
EOSINOPHIL # BLD AUTO: 0.19 10*3/MM3 (ref 0–0.4)
EOSINOPHIL NFR BLD AUTO: 3.9 % (ref 0.3–6.2)
ERYTHROCYTE [DISTWIDTH] IN BLOOD BY AUTOMATED COUNT: 15.5 % (ref 12.3–15.4)
GLOBULIN UR ELPH-MCNC: 2.6 GM/DL
GLUCOSE SERPL-MCNC: 123 MG/DL (ref 65–99)
HCT VFR BLD AUTO: 33.6 % (ref 37.5–51)
HGB BLD-MCNC: 10.8 G/DL (ref 13–17.7)
IMM GRANULOCYTES # BLD AUTO: 0.02 10*3/MM3 (ref 0–0.05)
IMM GRANULOCYTES NFR BLD AUTO: 0.4 % (ref 0–0.5)
LYMPHOCYTES # BLD AUTO: 1.16 10*3/MM3 (ref 0.7–3.1)
LYMPHOCYTES NFR BLD AUTO: 24 % (ref 19.6–45.3)
MAGNESIUM SERPL-MCNC: 1.8 MG/DL (ref 1.6–2.4)
MCH RBC QN AUTO: 30.4 PG (ref 26.6–33)
MCHC RBC AUTO-ENTMCNC: 32.1 G/DL (ref 31.5–35.7)
MCV RBC AUTO: 94.6 FL (ref 79–97)
MONOCYTES # BLD AUTO: 0.57 10*3/MM3 (ref 0.1–0.9)
MONOCYTES NFR BLD AUTO: 11.8 % (ref 5–12)
NEUTROPHILS NFR BLD AUTO: 2.81 10*3/MM3 (ref 1.7–7)
NEUTROPHILS NFR BLD AUTO: 58 % (ref 42.7–76)
NRBC BLD AUTO-RTO: 0 /100 WBC (ref 0–0.2)
PHOSPHATE SERPL-MCNC: 3.3 MG/DL (ref 2.5–4.5)
PLATELET # BLD AUTO: 165 10*3/MM3 (ref 140–450)
PMV BLD AUTO: 8.5 FL (ref 6–12)
POTASSIUM SERPL-SCNC: 4.4 MMOL/L (ref 3.5–5.2)
PROT SERPL-MCNC: 6.5 G/DL (ref 6–8.5)
RBC # BLD AUTO: 3.55 10*6/MM3 (ref 4.14–5.8)
SODIUM SERPL-SCNC: 142 MMOL/L (ref 136–145)
WBC NRBC COR # BLD AUTO: 4.84 10*3/MM3 (ref 3.4–10.8)

## 2023-12-26 PROCEDURE — 85025 COMPLETE CBC W/AUTO DIFF WBC: CPT

## 2023-12-26 PROCEDURE — 80053 COMPREHEN METABOLIC PANEL: CPT

## 2023-12-26 PROCEDURE — 25010000002 DENOSUMAB 120 MG/1.7ML SOLUTION: Performed by: INTERNAL MEDICINE

## 2023-12-26 PROCEDURE — 84100 ASSAY OF PHOSPHORUS: CPT

## 2023-12-26 PROCEDURE — 36415 COLL VENOUS BLD VENIPUNCTURE: CPT

## 2023-12-26 PROCEDURE — 96401 CHEMO ANTI-NEOPL SQ/IM: CPT

## 2023-12-26 PROCEDURE — 96372 THER/PROPH/DIAG INJ SC/IM: CPT

## 2023-12-26 PROCEDURE — 25010000002 DARATUMUMAB-HYALURONIDASE-FIHJ 1800-30000 MG-UT/15ML SOLUTION: Performed by: INTERNAL MEDICINE

## 2023-12-26 PROCEDURE — 83735 ASSAY OF MAGNESIUM: CPT

## 2023-12-26 RX ORDER — ACETAMINOPHEN 500 MG
1000 TABLET ORAL ONCE
Status: DISCONTINUED | OUTPATIENT
Start: 2023-12-26 | End: 2023-12-26 | Stop reason: HOSPADM

## 2023-12-26 RX ORDER — DIPHENHYDRAMINE HCL 25 MG
25 CAPSULE ORAL ONCE
Status: DISCONTINUED | OUTPATIENT
Start: 2023-12-26 | End: 2023-12-26 | Stop reason: HOSPADM

## 2023-12-26 RX ORDER — FAMOTIDINE 20 MG/1
20 TABLET, FILM COATED ORAL ONCE
Status: DISCONTINUED | OUTPATIENT
Start: 2023-12-26 | End: 2023-12-26 | Stop reason: HOSPADM

## 2023-12-26 RX ADMIN — DARATUMUMAB AND HYALURONIDASE-FIHJ (HUMAN RECOMBINANT) 1800 MG: 1800; 30000 INJECTION SUBCUTANEOUS at 13:16

## 2023-12-26 RX ADMIN — DENOSUMAB 120 MG: 120 INJECTION SUBCUTANEOUS at 13:15

## 2023-12-27 LAB
ALBUMIN SERPL ELPH-MCNC: 3.4 G/DL (ref 2.9–4.4)
ALBUMIN/GLOB SERPL: 1.4 {RATIO} (ref 0.7–1.7)
ALPHA1 GLOB SERPL ELPH-MCNC: 0.2 G/DL (ref 0–0.4)
ALPHA2 GLOB SERPL ELPH-MCNC: 0.8 G/DL (ref 0.4–1)
B-GLOBULIN SERPL ELPH-MCNC: 0.8 G/DL (ref 0.7–1.3)
GAMMA GLOB SERPL ELPH-MCNC: 0.7 G/DL (ref 0.4–1.8)
GLOBULIN SER-MCNC: 2.6 G/DL (ref 2.2–3.9)
IGA SERPL-MCNC: 104 MG/DL (ref 61–437)
IGG SERPL-MCNC: 719 MG/DL (ref 603–1613)
IGM SERPL-MCNC: 41 MG/DL (ref 20–172)
INTERPRETATION SERPL IEP-IMP: ABNORMAL
KAPPA LC FREE SER-MCNC: 61.6 MG/L (ref 3.3–19.4)
KAPPA LC FREE/LAMBDA FREE SER: 1.38 {RATIO} (ref 0.26–1.65)
LABORATORY COMMENT REPORT: ABNORMAL
LAMBDA LC FREE SERPL-MCNC: 44.6 MG/L (ref 5.7–26.3)
M PROTEIN SERPL ELPH-MCNC: ABNORMAL G/DL
PROT SERPL-MCNC: 6 G/DL (ref 6–8.5)

## 2023-12-28 ENCOUNTER — TELEPHONE (OUTPATIENT)
Dept: ONCOLOGY | Facility: CLINIC | Age: 68
End: 2023-12-28
Payer: MEDICARE

## 2024-01-08 ENCOUNTER — PRIOR AUTHORIZATION (OUTPATIENT)
Dept: ENDOCRINOLOGY | Age: 69
End: 2024-01-08
Payer: MEDICARE

## 2024-01-08 RX ORDER — ERGOCALCIFEROL 1.25 MG/1
50000 CAPSULE ORAL
Qty: 13 CAPSULE | Refills: 1 | Status: SHIPPED | OUTPATIENT
Start: 2024-01-08

## 2024-01-09 ENCOUNTER — TELEPHONE (OUTPATIENT)
Dept: ENDOCRINOLOGY | Age: 69
End: 2024-01-09
Payer: MEDICARE

## 2024-01-09 NOTE — TELEPHONE ENCOUNTER
Left pt a message to return call to ask about his Dexcom sensors and if he was wanting the G7 because we have the Dexcom G6 on file.

## 2024-01-16 DIAGNOSIS — C90.00 MULTIPLE MYELOMA NOT HAVING ACHIEVED REMISSION: ICD-10-CM

## 2024-01-17 ENCOUNTER — TELEPHONE (OUTPATIENT)
Dept: ENDOCRINOLOGY | Age: 69
End: 2024-01-17

## 2024-01-17 DIAGNOSIS — E11.22 TYPE 2 DIABETES MELLITUS WITH STAGE 3B CHRONIC KIDNEY DISEASE, WITH LONG-TERM CURRENT USE OF INSULIN: ICD-10-CM

## 2024-01-17 DIAGNOSIS — Z79.4 TYPE 2 DIABETES MELLITUS WITH STAGE 3B CHRONIC KIDNEY DISEASE, WITH LONG-TERM CURRENT USE OF INSULIN: ICD-10-CM

## 2024-01-17 DIAGNOSIS — N18.32 TYPE 2 DIABETES MELLITUS WITH STAGE 3B CHRONIC KIDNEY DISEASE, WITH LONG-TERM CURRENT USE OF INSULIN: ICD-10-CM

## 2024-01-17 RX ORDER — BLOOD-GLUCOSE METER
KIT MISCELLANEOUS
Qty: 200 EACH | Refills: 5 | Status: CANCELLED | OUTPATIENT
Start: 2024-01-17

## 2024-01-17 RX ORDER — BLOOD-GLUCOSE METER
KIT MISCELLANEOUS
Qty: 200 EACH | Refills: 5 | Status: SHIPPED | OUTPATIENT
Start: 2024-01-17 | End: 2024-01-19 | Stop reason: CLARIF

## 2024-01-19 ENCOUNTER — TELEPHONE (OUTPATIENT)
Dept: ENDOCRINOLOGY | Age: 69
End: 2024-01-19

## 2024-01-19 ENCOUNTER — TELEPHONE (OUTPATIENT)
Dept: ENDOCRINOLOGY | Age: 69
End: 2024-01-19
Payer: MEDICARE

## 2024-01-19 ENCOUNTER — SPECIALTY PHARMACY (OUTPATIENT)
Dept: PHARMACY | Facility: HOSPITAL | Age: 69
End: 2024-01-19
Payer: MEDICARE

## 2024-01-19 RX ORDER — BLOOD-GLUCOSE METER
1 EACH MISCELLANEOUS ONCE
Qty: 1 KIT | Refills: 0 | Status: SHIPPED | OUTPATIENT
Start: 2024-01-19 | End: 2024-01-19

## 2024-01-19 RX ORDER — BLOOD SUGAR DIAGNOSTIC
STRIP MISCELLANEOUS
Qty: 300 EACH | Refills: 3 | Status: SHIPPED | OUTPATIENT
Start: 2024-01-19

## 2024-01-19 RX ORDER — LANCETS
EACH MISCELLANEOUS
Qty: 300 EACH | Refills: 3 | Status: SHIPPED | OUTPATIENT
Start: 2024-01-19

## 2024-01-19 NOTE — TELEPHONE ENCOUNTER
Provider: SARAH YSON     Caller: PATTIE LYNCH     Relationship to Patient: EMERGENCY CONTACT     Phone Number: [phone]     Reason for Call: THE PTS WIFE CALLED BECAUSE HE HAS NOT RECEIVED HIS DEXCOM G7 DUE TO INSURANCE ISSUES. SHE WOULD LIKE THE DEXCOM G7 PRESCRIPTION SENT TO [facility]. SHE DOESN'T HAVE THE INFORMATION FOR THE PHARMACY.

## 2024-01-19 NOTE — PROGRESS NOTES
Re: Refills of Oral Specialty Medication - Pomalyst (pomalidomide)    Drug-Drug Interactions: The current medication list was reviewed and there are no relevant drug-drug interactions with the specialty medication.  Medication Allergies: The patient has no relevant allergies as it relates to their oral specialty medication  Review of Labs/Dose Adjustments: NO DOSE CHANGE - I reviewed the most recent note and labs and the patient will continue without any dose changes.  I sent refills as described below.    Drug: Pomalyst (pomalidomide)  Strength: 2 mg  Directions: Take 1 capsule by mouth daily for 21 days on, then 7 days off  Quantity: 21  Refills: 0  Pharmacy prescription sent to: [facility]    Name/Credentials Donna Cano Rph, BCOP    1/19/2024  08:50 EST

## 2024-01-19 NOTE — PROGRESS NOTES
Drug: Pomalyst (pomalidomide)  Strength: 2 mg  Directions: Take 1 capsule by mouth daily for 21 days on, then 7 days off  Quantity: 21  Refills: 0  Pharmacy prescription sent to: [facility]       Completed independent double check on medication order/RX.  Carlos Clarke, YumikoD, BCOP  Clinical Oncology Pharmacist

## 2024-01-19 NOTE — TELEPHONE ENCOUNTER
Patient's wife called. Insurance will no longer pay for freestyle testing supplies. Can we send a new script for one touch or accu check? He needs a monitor, test strips and lancets. Kristen in [facility]     He is out of supplies

## 2024-01-22 ENCOUNTER — LAB (OUTPATIENT)
Dept: LAB | Facility: HOSPITAL | Age: 69
End: 2024-01-22
Payer: MEDICARE

## 2024-01-22 ENCOUNTER — INFUSION (OUTPATIENT)
Dept: ONCOLOGY | Facility: HOSPITAL | Age: 69
End: 2024-01-22
Payer: MEDICARE

## 2024-01-22 ENCOUNTER — OFFICE VISIT (OUTPATIENT)
Dept: ONCOLOGY | Facility: CLINIC | Age: 69
End: 2024-01-22
Payer: MEDICARE

## 2024-01-22 VITALS
BODY MASS INDEX: 39.14 KG/M2 | SYSTOLIC BLOOD PRESSURE: 135 MMHG | WEIGHT: 305 LBS | HEART RATE: 68 BPM | HEIGHT: 74 IN | TEMPERATURE: 97.7 F | OXYGEN SATURATION: 99 % | RESPIRATION RATE: 18 BRPM | DIASTOLIC BLOOD PRESSURE: 82 MMHG

## 2024-01-22 DIAGNOSIS — E83.42 HYPOMAGNESEMIA: ICD-10-CM

## 2024-01-22 DIAGNOSIS — C90.00 MULTIPLE MYELOMA NOT HAVING ACHIEVED REMISSION: Primary | ICD-10-CM

## 2024-01-22 DIAGNOSIS — D69.6 THROMBOCYTOPENIA: ICD-10-CM

## 2024-01-22 DIAGNOSIS — Z79.899 HIGH RISK MEDICATION USE: ICD-10-CM

## 2024-01-22 DIAGNOSIS — D84.9 IMMUNOCOMPROMISED STATE: ICD-10-CM

## 2024-01-22 DIAGNOSIS — C90.00 MULTIPLE MYELOMA NOT HAVING ACHIEVED REMISSION: ICD-10-CM

## 2024-01-22 DIAGNOSIS — T45.1X5A CHEMOTHERAPY INDUCED NEUTROPENIA: ICD-10-CM

## 2024-01-22 DIAGNOSIS — D70.1 CHEMOTHERAPY INDUCED NEUTROPENIA: ICD-10-CM

## 2024-01-22 DIAGNOSIS — E83.51 HYPOCALCEMIA: ICD-10-CM

## 2024-01-22 DIAGNOSIS — D63.1 ANEMIA IN STAGE 3B CHRONIC KIDNEY DISEASE: ICD-10-CM

## 2024-01-22 DIAGNOSIS — N18.32 ANEMIA IN STAGE 3B CHRONIC KIDNEY DISEASE: ICD-10-CM

## 2024-01-22 LAB
ALBUMIN SERPL-MCNC: 4 G/DL (ref 3.5–5.2)
ALBUMIN/GLOB SERPL: 1.7 G/DL
ALP SERPL-CCNC: 56 U/L (ref 39–117)
ALT SERPL W P-5'-P-CCNC: 13 U/L (ref 1–41)
ANION GAP SERPL CALCULATED.3IONS-SCNC: 10.8 MMOL/L (ref 5–15)
AST SERPL-CCNC: 14 U/L (ref 1–40)
BASOPHILS # BLD AUTO: 0.06 10*3/MM3 (ref 0–0.2)
BASOPHILS NFR BLD AUTO: 1.5 % (ref 0–1.5)
BILIRUB SERPL-MCNC: 0.6 MG/DL (ref 0–1.2)
BUN SERPL-MCNC: 34 MG/DL (ref 8–23)
BUN/CREAT SERPL: 9.3 (ref 7–25)
CALCIUM SPEC-SCNC: 8.7 MG/DL (ref 8.6–10.5)
CHLORIDE SERPL-SCNC: 101 MMOL/L (ref 98–107)
CO2 SERPL-SCNC: 28.2 MMOL/L (ref 22–29)
CREAT SERPL-MCNC: 3.67 MG/DL (ref 0.76–1.27)
DEPRECATED RDW RBC AUTO: 54.1 FL (ref 37–54)
EGFRCR SERPLBLD CKD-EPI 2021: 17.4 ML/MIN/1.73
EOSINOPHIL # BLD AUTO: 0.16 10*3/MM3 (ref 0–0.4)
EOSINOPHIL NFR BLD AUTO: 3.9 % (ref 0.3–6.2)
ERYTHROCYTE [DISTWIDTH] IN BLOOD BY AUTOMATED COUNT: 15.7 % (ref 12.3–15.4)
GLOBULIN UR ELPH-MCNC: 2.4 GM/DL
GLUCOSE SERPL-MCNC: 198 MG/DL (ref 65–99)
HCT VFR BLD AUTO: 32.5 % (ref 37.5–51)
HGB BLD-MCNC: 10.6 G/DL (ref 13–17.7)
IMM GRANULOCYTES # BLD AUTO: 0.02 10*3/MM3 (ref 0–0.05)
IMM GRANULOCYTES NFR BLD AUTO: 0.5 % (ref 0–0.5)
LYMPHOCYTES # BLD AUTO: 1.08 10*3/MM3 (ref 0.7–3.1)
LYMPHOCYTES NFR BLD AUTO: 26.2 % (ref 19.6–45.3)
MAGNESIUM SERPL-MCNC: 1.8 MG/DL (ref 1.6–2.4)
MCH RBC QN AUTO: 31.1 PG (ref 26.6–33)
MCHC RBC AUTO-ENTMCNC: 32.6 G/DL (ref 31.5–35.7)
MCV RBC AUTO: 95.3 FL (ref 79–97)
MONOCYTES # BLD AUTO: 0.46 10*3/MM3 (ref 0.1–0.9)
MONOCYTES NFR BLD AUTO: 11.2 % (ref 5–12)
NEUTROPHILS NFR BLD AUTO: 2.34 10*3/MM3 (ref 1.7–7)
NEUTROPHILS NFR BLD AUTO: 56.7 % (ref 42.7–76)
NRBC BLD AUTO-RTO: 0 /100 WBC (ref 0–0.2)
PHOSPHATE SERPL-MCNC: 3.6 MG/DL (ref 2.5–4.5)
PLATELET # BLD AUTO: 158 10*3/MM3 (ref 140–450)
PMV BLD AUTO: 9 FL (ref 6–12)
POTASSIUM SERPL-SCNC: 4.5 MMOL/L (ref 3.5–5.2)
PROT SERPL-MCNC: 6.4 G/DL (ref 6–8.5)
RBC # BLD AUTO: 3.41 10*6/MM3 (ref 4.14–5.8)
SODIUM SERPL-SCNC: 140 MMOL/L (ref 136–145)
WBC NRBC COR # BLD AUTO: 4.12 10*3/MM3 (ref 3.4–10.8)

## 2024-01-22 PROCEDURE — 85025 COMPLETE CBC W/AUTO DIFF WBC: CPT

## 2024-01-22 PROCEDURE — 83735 ASSAY OF MAGNESIUM: CPT

## 2024-01-22 PROCEDURE — 1126F AMNT PAIN NOTED NONE PRSNT: CPT | Performed by: INTERNAL MEDICINE

## 2024-01-22 PROCEDURE — 1160F RVW MEDS BY RX/DR IN RCRD: CPT | Performed by: INTERNAL MEDICINE

## 2024-01-22 PROCEDURE — 3079F DIAST BP 80-89 MM HG: CPT | Performed by: INTERNAL MEDICINE

## 2024-01-22 PROCEDURE — 36415 COLL VENOUS BLD VENIPUNCTURE: CPT

## 2024-01-22 PROCEDURE — 3075F SYST BP GE 130 - 139MM HG: CPT | Performed by: INTERNAL MEDICINE

## 2024-01-22 PROCEDURE — 1159F MED LIST DOCD IN RCRD: CPT | Performed by: INTERNAL MEDICINE

## 2024-01-22 PROCEDURE — 25010000002 DARATUMUMAB-HYALURONIDASE-FIHJ 1800-30000 MG-UT/15ML SOLUTION: Performed by: INTERNAL MEDICINE

## 2024-01-22 PROCEDURE — 84100 ASSAY OF PHOSPHORUS: CPT

## 2024-01-22 PROCEDURE — 80053 COMPREHEN METABOLIC PANEL: CPT

## 2024-01-22 PROCEDURE — 96401 CHEMO ANTI-NEOPL SQ/IM: CPT

## 2024-01-22 PROCEDURE — 99214 OFFICE O/P EST MOD 30 MIN: CPT | Performed by: INTERNAL MEDICINE

## 2024-01-22 RX ORDER — FAMOTIDINE 20 MG/1
20 TABLET, FILM COATED ORAL ONCE
Status: CANCELLED | OUTPATIENT
Start: 2024-01-22 | End: 2024-01-22

## 2024-01-22 RX ORDER — ACETAMINOPHEN 500 MG
1000 TABLET ORAL ONCE
OUTPATIENT
Start: 2024-02-19

## 2024-01-22 RX ORDER — FAMOTIDINE 20 MG/1
20 TABLET, FILM COATED ORAL ONCE
Status: DISCONTINUED | OUTPATIENT
Start: 2024-01-22 | End: 2024-01-22 | Stop reason: HOSPADM

## 2024-01-22 RX ORDER — ACETAMINOPHEN 500 MG
1000 TABLET ORAL ONCE
Status: CANCELLED | OUTPATIENT
Start: 2024-01-22

## 2024-01-22 RX ORDER — DIPHENHYDRAMINE HYDROCHLORIDE 50 MG/ML
50 INJECTION INTRAMUSCULAR; INTRAVENOUS AS NEEDED
OUTPATIENT
Start: 2024-02-19

## 2024-01-22 RX ORDER — MEPERIDINE HYDROCHLORIDE 25 MG/ML
12.5 INJECTION INTRAMUSCULAR; INTRAVENOUS; SUBCUTANEOUS
Status: CANCELLED | OUTPATIENT
Start: 2024-01-22

## 2024-01-22 RX ORDER — DIPHENHYDRAMINE HCL 25 MG
25 CAPSULE ORAL ONCE
Status: CANCELLED | OUTPATIENT
Start: 2024-01-22

## 2024-01-22 RX ORDER — DIPHENHYDRAMINE HCL 25 MG
25 CAPSULE ORAL ONCE
OUTPATIENT
Start: 2024-02-19

## 2024-01-22 RX ORDER — MEPERIDINE HYDROCHLORIDE 25 MG/ML
12.5 INJECTION INTRAMUSCULAR; INTRAVENOUS; SUBCUTANEOUS
OUTPATIENT
Start: 2024-02-19

## 2024-01-22 RX ORDER — FAMOTIDINE 10 MG/ML
20 INJECTION, SOLUTION INTRAVENOUS AS NEEDED
Status: CANCELLED | OUTPATIENT
Start: 2024-01-22

## 2024-01-22 RX ORDER — FAMOTIDINE 10 MG/ML
20 INJECTION, SOLUTION INTRAVENOUS AS NEEDED
OUTPATIENT
Start: 2024-02-19

## 2024-01-22 RX ORDER — DIBASIC SODIUM PHOSPHATE, MONOBASIC POTASSIUM PHOSPHATE AND MONOBASIC SODIUM PHOSPHATE 852; 155; 130 MG/1; MG/1; MG/1
1 TABLET ORAL DAILY
Qty: 30 TABLET | Refills: 1 | Status: SHIPPED | OUTPATIENT
Start: 2024-01-22

## 2024-01-22 RX ORDER — FAMOTIDINE 20 MG/1
20 TABLET, FILM COATED ORAL ONCE
OUTPATIENT
Start: 2024-02-19 | End: 2024-02-19

## 2024-01-22 RX ORDER — DIPHENHYDRAMINE HYDROCHLORIDE 50 MG/ML
50 INJECTION INTRAMUSCULAR; INTRAVENOUS AS NEEDED
Status: CANCELLED | OUTPATIENT
Start: 2024-01-22

## 2024-01-22 RX ORDER — DIPHENHYDRAMINE HCL 25 MG
25 CAPSULE ORAL ONCE
Status: DISCONTINUED | OUTPATIENT
Start: 2024-01-22 | End: 2024-01-22 | Stop reason: HOSPADM

## 2024-01-22 RX ORDER — ACETAMINOPHEN 500 MG
1000 TABLET ORAL ONCE
Status: DISCONTINUED | OUTPATIENT
Start: 2024-01-22 | End: 2024-01-22 | Stop reason: HOSPADM

## 2024-01-22 RX ADMIN — DARATUMUMAB AND HYALURONIDASE-FIHJ (HUMAN RECOMBINANT) 1800 MG: 1800; 30000 INJECTION SUBCUTANEOUS at 10:32

## 2024-01-22 NOTE — PROGRESS NOTES
Subjective     CHIEF COMPLAINT:      Chief Complaint   Patient presents with    Follow-up     No concern      HISTORY OF PRESENT ILLNESS:     Contreras Albert is a 68 y.o. male patient who returns today for follow up on his multiple myeloma. He returns today for follow up reporting no new symptoms. He reports that his diarrhea improved and it is now occurring about once a week. He reports that his leg swelling is controlled with his current dose of Bumex. He is not having chest pain or shortness of breath.     ROS:  Pertinent ROS is in the HPI.     Past medical, surgical, social and family history were reviewed.     MEDICATIONS:    Current Outpatient Medications:     Accu-Chek Softclix Lancets lancets, Check 3 times a day on insulin tx, poor control, hypoglycemia. Dx: E 11.22, Disp: 300 each, Rfl: 3    acetaminophen (TYLENOL) 325 MG tablet, Take 2 tablets by mouth Every 6 (Six) Hours As Needed for Mild Pain. (Patient taking differently: Take 2 tablets by mouth Every 6 (Six) Hours As Needed for Mild Pain. As needed), Disp: , Rfl:     acyclovir (ZOVIRAX) 800 MG tablet, TAKE 1 TABLET BY MOUTH EVERY DAY, Disp: 90 tablet, Rfl: 2    ASPIRIN 81 PO, Take 81 mg by mouth Daily., Disp: , Rfl:     atorvastatin (LIPITOR) 10 MG tablet, TAKE 1 TABLET BY MOUTH EVERY NIGHT AT BEDTIME, Disp: 90 tablet, Rfl: 1    bumetanide (BUMEX) 2 MG tablet, Take 1 tablet by mouth Daily., Disp: , Rfl:     calcium carbonate (TUMS) 500 MG chewable tablet, Chew 4 tablets Daily., Disp: , Rfl:     carvedilol (COREG) 12.5 MG tablet, Take 0.5 tablets by mouth 2 (Two) Times a Day With Meals., Disp: , Rfl:     Continuous Blood Gluc Sensor (Dexcom G6 Sensor), Replace sensor every 10 days.  Dx: E 11.22, Disp: 9 each, Rfl: 1    Continuous Blood Gluc Transmit (Dexcom G6 Transmitter) misc, USE EVERY 3 MONTHS, Disp: 1 each, Rfl: 1    DARATUMUMAB IV, Infuse  into a venous catheter., Disp: , Rfl:     dexAMETHasone (DECADRON) 4 MG tablet, Take 3 tablets on day of chemo,  1 tablet on day #2 and 1 tablet on day #3 and repeat monthly., Disp: 15 tablet, Rfl: 1    diphenoxylate-atropine (LOMOTIL) 2.5-0.025 MG per tablet, Take 2 tablets by mouth 4 (Four) Times a Day As Needed for Diarrhea., Disp: 120 tablet, Rfl: 0    escitalopram (LEXAPRO) 5 MG tablet, Take 1 tablet by mouth Daily., Disp: 30 tablet, Rfl: 5    finasteride (PROSCAR) 5 MG tablet, Take 1 tablet by mouth Daily., Disp: , Rfl:     gabapentin (NEURONTIN) 300 MG capsule, TAKE ONE CAPSULE BY MOUTH EVERY MORNING AND 2 CAPSULES BY MOUTH EVERY NIGHT AT BEDTIME, Disp: 90 capsule, Rfl: 3    glucagon (GLUCAGEN) 1 MG injection, Inject 1 mg into the appropriate muscle as directed by prescriber 1 (One) Time As Needed for Low Blood Sugar. Follow package directions for low blood sugar., Disp: 1 kit, Rfl: 10    glucose blood (Accu-Chek Guide) test strip, Check 3 times a day on insulin tx, poor control, hypoglycemia. Dx: E 11.22, Disp: 300 each, Rfl: 3    Insulin Lispro, 1 Unit Dial, (HUMALOG) 100 UNIT/ML solution pen-injector, Non Chemo days take Novolog 12 units before breakfast, 10 units before lunch and 12 units before dinner plus 1 units per every 50 over 150; Chemo Days take NovoLog to 18 units 3 times daily with meals +2 units per 50 greater than 150.  Max daily dose of 100 units, Disp: 30 mL, Rfl: 2    Insulin Pen Needle 32G X 4 MM misc, USING 4 PEN NEEDLES DAILY, Disp: 400 each, Rfl: 3    Lantus SoloStar 100 UNIT/ML injection pen, On Non chemo days take Lantus 16 units in the morning and 18 units in evening. On Chemo Days take Lantus to 30 units twice a day.  Max daily dose of 70 units, Disp: 63 mL, Rfl: 1    ondansetron (ZOFRAN) 8 MG tablet, Take 1 tablet by mouth 3 (Three) Times a Day As Needed for Nausea or Vomiting., Disp: 30 tablet, Rfl: 5    Phospha 250 Neutral 155-852-130 MG tablet, TAKE 1 TABLET BY MOUTH DAILY, Disp: 30 tablet, Rfl: 1    pomalidomide (Pomalyst) 2 MG chemo capsule, TAKE 1 CAPSULE BY MOUTH EVERY DAY FOR 21 DAYS  "ON THEN 7 DAYS OFF, Disp: 21 capsule, Rfl: 0    tamsulosin (FLOMAX) 0.4 MG capsule 24 hr capsule, Take 1 capsule by mouth Daily., Disp: , Rfl:     Vascepa 1 g capsule capsule, TAKE 2 CAPSULES BY MOUTH TWICE DAILY WITH MEALS, Disp: 360 capsule, Rfl: 2    vitamin D (ERGOCALCIFEROL) 1.25 MG (54067 UT) capsule capsule, TAKE 1 CAPSULE BY MOUTH EVERY 7 DAYS, Disp: 13 capsule, Rfl: 1    Objective     VITAL SIGNS:     Vitals:    01/22/24 0935   BP: 135/82   Pulse: 68   Resp: 18   Temp: 97.7 °F (36.5 °C)   TempSrc: Temporal   SpO2: 99%   Weight: (!) 138 kg (305 lb)   Height: 188 cm (74.02\")   PainSc: 0-No pain     Body mass index is 39.14 kg/m².     Wt Readings from Last 5 Encounters:   01/22/24 (!) 138 kg (305 lb)   12/26/23 (!) 139 kg (306 lb)   12/01/23 (!) 144 kg (316 lb 9.6 oz)   11/29/23 (!) 143 kg (315 lb 6.4 oz)   11/27/23 (!) 141 kg (310 lb 3.2 oz)     PHYSICAL EXAMINATION:   GENERAL: The patient appears in fair general condition, not in acute distress.   SKIN: No ecchymosis.  EYES: No jaundice. No Pallor.  CHEST: Normal respiratory effort. Lungs clear bilaterally. No added sounds.  CVS: Normal S1 and S2. No murmurs.  ABDOMEN: Soft. No tenderness. No Hepatomegaly. No Splenomegaly. No masses.  EXTREMITIES: + 1 edema. No calf tenderness.    DIAGNOSTIC DATA:     Results from last 7 days   Lab Units 01/22/24  0929   WBC 10*3/mm3 4.12   NEUTROS ABS 10*3/mm3 2.34   HEMOGLOBIN g/dL 10.6*   HEMATOCRIT % 32.5*   PLATELETS 10*3/mm3 158     Results from last 7 days   Lab Units 01/22/24  0929   SODIUM mmol/L 140   POTASSIUM mmol/L 4.5   CHLORIDE mmol/L 101   CO2 mmol/L 28.2   BUN mg/dL 34*   CREATININE mg/dL 3.67*   CALCIUM mg/dL 8.7   ALBUMIN g/dL 4.0   BILIRUBIN mg/dL 0.6   ALK PHOS U/L 56   ALT (SGPT) U/L 13   AST (SGOT) U/L 14   GLUCOSE mg/dL 198*   MAGNESIUM mg/dL 1.8     Component      Latest Ref Rng 1/22/2024   Phosphorus      2.5 - 4.5 mg/dL 3.6      Component      Latest Ref Rng 10/30/2023 11/27/2023 12/26/2023   IgG   "    603 - 1613 mg/dL 523 (L)  595 (L)  719    IgA      61 - 437 mg/dL 52 (L)  65  104    IgM      20 - 172 mg/dL 16 (L)  40  41    Total Protein      6.0 - 8.5 g/dL 5.4 (L)  5.7 (L)  6.0    Albumin      2.9 - 4.4 g/dL 3.2  2.9  3.4    Alpha-1-Globulin      0.0 - 0.4 g/dL 0.2  0.3  0.2    Alpha-2-Globulin      0.4 - 1.0 g/dL 0.8  1.0  0.8    Beta Globulin      0.7 - 1.3 g/dL 0.7  0.8  0.8    Gamma Globulin      0.4 - 1.8 g/dL 0.4  0.7  0.7    M-Jurgen      Not Observed g/dL Not Observed  Not Observed  Comment:    Globulin      2.2 - 3.9 g/dL 2.2  2.8  2.6    A/G Ratio      0.7 - 1.7  1.5  1.1  1.4    Immunofixation Reflex, Serum Comment:  Comment:  Comment !    Please note Comment  Comment  Comment    Kappa FLC      3.3 - 19.4 mg/L 36.8 (H)  60.9 (H)  61.6 (H)    Free Lambda Light Chains      5.7 - 26.3 mg/L 27.7 (H)  44.3 (H)  44.6 (H)    Kappa/Lambda Ratio      0.26 - 1.65  1.33  1.37  1.38         Assessment & Plan    *IgG lambda multiple myeloma.  Bone marrow 5/23/2020 hypercellular marrow with 80% involvement of plasma cell myeloma; FISH studies pending  Bone survey 5/26/2020: Diffuse osteoporosis and demineralization.  However, no discrete lytic lesions.  SPEP 5/23/2020: M spike 5.1.  IgG 6629 lambda.  Light chain ratio 0 with free lambda light chains 6400.  Beta-2 microglobulin 16.3.  24-hour urine 12.7 g protein per 24-hour with monoclonal lambda free light chain 33.9%, monoclonal IgG lambda 23.5%  24-hour urine testing from 5/24/2020 free lambda light chains 8.4 g/L  Due to the renal failure, CyBorD regimen was chosen and was started on 5/28/2020.    On 6/18/2020, Velcade was changed to 1.5 mg/m² weekly continuously along with weekly Decadron and oral Cytoxan.  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   Treatment was changed on 8/27/2020 to to Daratumumab subcutaneous injection weekly along with Revlimid 10 mg daily for 21 out of 28-day cycle and Decadron 40 mg daily.  S/p stem cell transplant on  12/9/2020 at [facility].  Day 100 bone marrow on 3/16/2021 revealed minute  plasma cell population.  PET scan on 3/29/2021 was negative.  Patient enrolled in the  clinical trial randomizing to maintenance lenalidomide versus lenalidomide and daratumumab.  Patient was randomized to the Revlimid arm initiated 4/13/2021 dosed at 5 mg daily (due to renal function).  Patient developed neutropenia requiring dose adjustment to 5 mg daily for 21/28 days.  Patient withdrew from clinical trial after 5 cycles on 8/31/2021 due to worsening diarrhea.    He was switched to Velcade maintenance every other week 9/14/2021 which he discontinued 10/26/2021 due to neuropathy and lower extremity edema.    He was subsequently changed to pomalidomide 2 mg daily 21/28 days on 11/8/2021.    1 year post transplant, bone marrow biopsy showed 5-7% plasma cells and due to the residual disease he was started on DPd (daratumumab, pomalidomide, dexamethasone) on 2/21/2022.    Patient was seen by Dr. Romero on 9/12/2022.  At that time patient was cycle 8-day 7 DPd. Due to chronic side effects, dexamethasone was changed from weekly to monthly.    Patient did continue pomalidomide on his own during admission to the hospital, received 1 dose 4 mg p.o. on 9/24/2022 before was subsequently discontinued due to cytopenias.   Dexamethasone was changed to 12 mg on Mondays, 4 mg on Tuesday and 4 mg on Wednesday.  Pomalyst dose was reduced to 3 mg daily for 21 out of a 28-day cycle starting on 10/31/2022.  Due to neutropenia, the dose of Pomalyst was subsequently reduced to 2 mg daily for 21/28-day cycle.  9/5/2023: Treatment held due to weakness and postural dizziness.  9/5/2023: No M protein.  Kappa FLC 49.6.  Lambda FLC 32.4.  Kappa to lambda ratio 1.53.  He did not notice improvement in his symptoms while off Darzalex and Pomalyst.  Therefore, his symptoms were not attributed to treatment.  He was restarted on Darzalex and Pomalyst  on 10/2/2023.  He is tolerating the treatment well.  SPEP GUANAKITO FLC on 12/26/2023 revealed no M protein.  Kappa FLC was 61.6.  Lambda FLC was 44.6.  Kappa to lambda ratio was 1.38.  He is tolerating the treatment with Darzalex and Pomalyst.     *Bone health.  He is on Xgeva.   Patient developed severe hypocalcemia following the Xgeva injection.  He required inpatient hospital stay.  Given the duration of Xgeva injections, recommended changing treatment to every 3 months starting December 2022.   Last dose of Xgeva was on 12/26/2023.  Next dose is in 2 months.    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  Patient was receiving Procrit at [facility].  Records from [facility] showed that the patient was receiving 70,000 units weekly and received the last dose on 9/27/2022.  Patient was given Procrit 70,000 units on 9/27/2022.  Hemoglobin was 9.9 on 10/17/2022.  He was given Procrit 60,000 units.  Hemoglobin was 9.8 on 11/4/2022.  He was given Procrit 60,000 units.  Labs in March 2023 revealed adequate iron stores.  Last dose of Procrit was 25,000 units on 5/1/2023 for hemoglobin of 9.9.  1/22/2024: Hemoglobin 10.6.    *Thrombocytopenia.  This is attributed to multiple myeloma, prior stem cell transplant and chemotherapy.  8/21/2023: Platelets decreased to 123,000.  Platelet improved afterwards.  10/30/2023: Platelet count 145,000.  1/22/2024: Platelet count 158,000.    *Neutropenia.  This was attributed to Pomalyst.  Neutrophil count was down to 290 on 9/25/2022.  He was given Neupogen during his hospital stay in September 2022.  Neutrophil count improved afterwards.  However, neutrophil count decreased to 600 on 12/19/2022.  He reports that he was feeling tired and achy at that time.  Neutrophil count was 770 on 1/16/2023.  Due to the recurrent neutropenia, the dose of Pomalyst was reduced to 2 mg in January 2023.  Neutrophil count improved  following the dose reduction.  1/22/2024: Neutrophil count 2340.    *Hypocalcemia.  Patient developed severe hypocalcemia due to Xgeva.  Calcium was 5.9 on 9/23/2022.  Patient was placed on calcium replacement.  Calcium dose was subsequently increased to 3 tablets daily.  8/7/2023: Calcium 8.1. Albumin 3.6.  The dose was increased to 4 tablets daily.  9/5/2023: Calcium 9.1. Albumin 3.8.  10/2/2023: Calcium 9.3.  Albumin 3.6.  10/30/2023: Calcium 8.3.  Albumin 3.5   11/27/2023: Calcium 8.3.  Albumin 3.6.  1/22/2024: Calcium 8.7.  Albumin 4.0.      *Hypomagnesemia.  Magnesium was 1.1 on 10/24/2022.  He was placed on oral magnesium oxide 400 mg twice daily.  He developed diarrhea and it was stopped.  He was started on magnesium chloride on 1/23/2023.  He developed diarrhea about 7 days after starting the magnesium chloride.  Oral magnesium chloride was discontinued.  He receives IV magnesium per protocol based on his magnesium level.  9/5/2023: Magnesium decreased to 1.7.  He was given 1 g IV magnesium sulfate.  10/30/2023: Magnesium 1.6.  1/22/2024: Magnesium improved to 1.8.     *Prophylaxis.  He is on acyclovir 800 mg daily.  He is on ASA 81 mg daily.  No problem with bleeding.    *Episodes of dizziness and falling.  Patient reports that the episodes develop after he stands up and starts walking.  No dizziness while sitting.  No restricted chest pain or shortness of breath.  Blood pressure was 133/80 and heart rate was 66 in the sitting position today.  After standing, blood pressure was 119/76 and heart rate was 69.  No improvement in the symptoms after holding Pomalyst for 2 weeks.  MRI brain on 7/26/2023 showed small vessel ischemic changes.  There were changes of 3 old strokes.  I explained the findings to the patient.  I recommended evaluation by cardiology and neurology.  I also recommended home physical therapy  I recommended that he uses a walker when he ambulates.  His evaluation showed possible changes of  postural hypotension.   He is concerned that this is due to his medications. I explained that while postural hypotension is a side effect of medications, it is not a common side effect of Darzalex or Pomalyst.  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  Symptoms did not improve after placing Darzalex and Pomalyst on hold.  On 10/2/2023, we recommended reducing Bumex to 1 mg daily and monitoring his symptoms and the leg swelling.  He did not notice a difference in his postural dizziness after the dose reduction.   Bumex was increased back to 2 mg daily due to worsening of his edema.   This dose is controlling his leg edema.    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  It is controlled with Lexapro 5 mg daily.    *Vitamin D deficiency.  He is on vitamin D 50,000 units weekly.  Vitamin D level was 18 on 10/3/2022.  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  He is on gabapentin 300 mg in the morning and 600 mg in the evening.  Neuropathy symptoms in the feet are better as the swelling improved.     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  S/p right partial nephrectomy on 5/18/2016.    PLAN:    1.  Proceed with Darzalex today.   2.  Continue Pomalyst 2 mg daily for 21 days of a 28-day cycle.   3.  Continue dexamethasone 20 mg monthly.  He will take 12 mg on the day of Darzalex followed by 4 mg daily x 3 days.   4.  Continue Calcium Carbonate 4 tablets daily.  5.  Continue Neutra-Phos 1 tablet daily.  6.  Continue Aspirin 81 mg daily.  7.  Continue Acyclovir 800 mg daily.  8.  Return in 4 weeks for the next dose of Darzalex.  9.  I will see him in follow up in 8 weeks. We will schedule him to receive Darzalex and Xgeva. CBC CMP MG PHOS SPEP GUANAKITO FLC will be obtained.         Red Del Valle MD  01/22/24

## 2024-01-23 ENCOUNTER — SPECIALTY PHARMACY (OUTPATIENT)
Dept: PHARMACY | Facility: HOSPITAL | Age: 69
End: 2024-01-23
Payer: MEDICARE

## 2024-01-23 LAB
ALBUMIN SERPL ELPH-MCNC: 3.6 G/DL (ref 2.9–4.4)
ALBUMIN/GLOB SERPL: 1.6 {RATIO} (ref 0.7–1.7)
ALPHA1 GLOB SERPL ELPH-MCNC: 0.2 G/DL (ref 0–0.4)
ALPHA2 GLOB SERPL ELPH-MCNC: 0.8 G/DL (ref 0.4–1)
B-GLOBULIN SERPL ELPH-MCNC: 0.7 G/DL (ref 0.7–1.3)
GAMMA GLOB SERPL ELPH-MCNC: 0.6 G/DL (ref 0.4–1.8)
GLOBULIN SER-MCNC: 2.4 G/DL (ref 2.2–3.9)
IGA SERPL-MCNC: 79 MG/DL (ref 61–437)
IGG SERPL-MCNC: 652 MG/DL (ref 603–1613)
IGM SERPL-MCNC: 26 MG/DL (ref 20–172)
INTERPRETATION SERPL IEP-IMP: ABNORMAL
KAPPA LC FREE SER-MCNC: 57.1 MG/L (ref 3.3–19.4)
KAPPA LC FREE/LAMBDA FREE SER: 1.57 {RATIO} (ref 0.26–1.65)
LABORATORY COMMENT REPORT: ABNORMAL
LAMBDA LC FREE SERPL-MCNC: 36.3 MG/L (ref 5.7–26.3)
M PROTEIN SERPL ELPH-MCNC: 0.1 G/DL
PROT SERPL-MCNC: 6 G/DL (ref 6–8.5)

## 2024-01-29 DIAGNOSIS — E11.22 TYPE 2 DIABETES MELLITUS WITH STAGE 4 CHRONIC KIDNEY DISEASE, WITH LONG-TERM CURRENT USE OF INSULIN: ICD-10-CM

## 2024-01-29 DIAGNOSIS — Z79.4 TYPE 2 DIABETES MELLITUS WITH STAGE 4 CHRONIC KIDNEY DISEASE, WITH LONG-TERM CURRENT USE OF INSULIN: ICD-10-CM

## 2024-01-29 DIAGNOSIS — N18.4 TYPE 2 DIABETES MELLITUS WITH STAGE 4 CHRONIC KIDNEY DISEASE, WITH LONG-TERM CURRENT USE OF INSULIN: ICD-10-CM

## 2024-01-29 RX ORDER — INSULIN LISPRO 100 [IU]/ML
INJECTION, SOLUTION INTRAVENOUS; SUBCUTANEOUS
Qty: 54 ML | Refills: 2 | Status: SHIPPED | OUTPATIENT
Start: 2024-01-29

## 2024-01-29 NOTE — TELEPHONE ENCOUNTER
Rx Refill Note  Requested Prescriptions     Pending Prescriptions Disp Refills    Insulin Lispro, 1 Unit Dial, (HUMALOG) 100 UNIT/ML solution pen-injector [Pharmacy Med Name: INSULIN LISPRO 100U/ML KWIKPEN 3ML] 30 mL 2     Sig: SEE ATTACHED INSTRUCTIONS FOR NONCHEMO DAYS AND CHEMO DAYS; MAX DAILY DOSE: 100 UNITS      Last office visit with prescribing clinician: 8/29/2023   Last telemedicine visit with prescribing clinician: Visit date not found   Next office visit with prescribing clinician: 2/27/2024                         Would you like a call back once the refill request has been completed: [] Yes [] No    If the office needs to give you a call back, can they leave a voicemail: [] Yes [] No    Michelle Davis  01/29/24, 08:21 EST

## 2024-01-30 ENCOUNTER — SPECIALTY PHARMACY (OUTPATIENT)
Dept: PHARMACY | Facility: HOSPITAL | Age: 69
End: 2024-01-30
Payer: MEDICARE

## 2024-02-05 ENCOUNTER — TELEPHONE (OUTPATIENT)
Dept: ENDOCRINOLOGY | Age: 69
End: 2024-02-05
Payer: MEDICARE

## 2024-02-05 DIAGNOSIS — T45.1X5A NEUROPATHY DUE TO CHEMOTHERAPEUTIC DRUG: ICD-10-CM

## 2024-02-05 DIAGNOSIS — G62.0 NEUROPATHY DUE TO CHEMOTHERAPEUTIC DRUG: ICD-10-CM

## 2024-02-05 DIAGNOSIS — E11.22 TYPE 2 DIABETES MELLITUS WITH STAGE 4 CHRONIC KIDNEY DISEASE, WITH LONG-TERM CURRENT USE OF INSULIN: Primary | ICD-10-CM

## 2024-02-05 DIAGNOSIS — N18.4 TYPE 2 DIABETES MELLITUS WITH STAGE 4 CHRONIC KIDNEY DISEASE, WITH LONG-TERM CURRENT USE OF INSULIN: Primary | ICD-10-CM

## 2024-02-05 DIAGNOSIS — Z79.4 TYPE 2 DIABETES MELLITUS WITH STAGE 4 CHRONIC KIDNEY DISEASE, WITH LONG-TERM CURRENT USE OF INSULIN: Primary | ICD-10-CM

## 2024-02-05 RX ORDER — GABAPENTIN 300 MG/1
CAPSULE ORAL
Qty: 90 CAPSULE | Refills: 0 | Status: SHIPPED | OUTPATIENT
Start: 2024-02-05

## 2024-02-05 NOTE — TELEPHONE ENCOUNTER
Called patient's wife called. They spoke to their insurance stating insurance will cover freestyle not dexcom. It will have to go through a pharmacy. They did not specify if it is 2 or 3 that is covered.       Kristen in [facility]      Patient's wife is requesting a call back once it is sent in

## 2024-02-05 NOTE — TELEPHONE ENCOUNTER
Caller: Gabby Albert    Relationship: Emergency Contact    Best call back number: [phone]    What is the best time to reach you: ANYTIME    Who are you requesting to speak with (clinical staff, provider,  specific staff member): CLINICAL    What was the call regarding: PATIENT'S WIFE STATED PATIENT ONLY HAS 2 PILLS LEFT OF HIS GABAPENTIN. PLEASE SEND ASAP TO THE PHARMACY. PHARMACY ALREADY REQUESTED REFILL.

## 2024-02-05 NOTE — TELEPHONE ENCOUNTER
Jin 2 prescription sent to pharmacy.  Please let them know that he will have to scan the Jin at least every 8 hours so all data is transferred.

## 2024-02-12 ENCOUNTER — CLINICAL SUPPORT (OUTPATIENT)
Dept: ENDOCRINOLOGY | Age: 69
End: 2024-02-12
Payer: MEDICARE

## 2024-02-15 ENCOUNTER — SPECIALTY PHARMACY (OUTPATIENT)
Dept: PHARMACY | Facility: HOSPITAL | Age: 69
End: 2024-02-15
Payer: MEDICARE

## 2024-02-15 DIAGNOSIS — C90.00 MULTIPLE MYELOMA NOT HAVING ACHIEVED REMISSION: ICD-10-CM

## 2024-02-19 ENCOUNTER — LAB (OUTPATIENT)
Dept: LAB | Facility: HOSPITAL | Age: 69
End: 2024-02-19
Payer: MEDICARE

## 2024-02-19 ENCOUNTER — INFUSION (OUTPATIENT)
Dept: ONCOLOGY | Facility: HOSPITAL | Age: 69
End: 2024-02-19
Payer: MEDICARE

## 2024-02-19 VITALS
RESPIRATION RATE: 16 BRPM | TEMPERATURE: 97.7 F | HEART RATE: 70 BPM | BODY MASS INDEX: 40.79 KG/M2 | DIASTOLIC BLOOD PRESSURE: 62 MMHG | WEIGHT: 315 LBS | SYSTOLIC BLOOD PRESSURE: 153 MMHG | OXYGEN SATURATION: 94 %

## 2024-02-19 DIAGNOSIS — C90.00 MULTIPLE MYELOMA NOT HAVING ACHIEVED REMISSION: ICD-10-CM

## 2024-02-19 DIAGNOSIS — C90.00 MULTIPLE MYELOMA NOT HAVING ACHIEVED REMISSION: Primary | ICD-10-CM

## 2024-02-19 LAB
ALBUMIN SERPL-MCNC: 3.9 G/DL (ref 3.5–5.2)
ALBUMIN/GLOB SERPL: 1.9 G/DL
ALP SERPL-CCNC: 44 U/L (ref 39–117)
ALT SERPL W P-5'-P-CCNC: 9 U/L (ref 1–41)
ANION GAP SERPL CALCULATED.3IONS-SCNC: 8.2 MMOL/L (ref 5–15)
AST SERPL-CCNC: 11 U/L (ref 1–40)
BASOPHILS # BLD AUTO: 0.04 10*3/MM3 (ref 0–0.2)
BASOPHILS NFR BLD AUTO: 1 % (ref 0–1.5)
BILIRUB SERPL-MCNC: 0.5 MG/DL (ref 0–1.2)
BUN SERPL-MCNC: 48 MG/DL (ref 8–23)
BUN/CREAT SERPL: 13.4 (ref 7–25)
CALCIUM SPEC-SCNC: 9.2 MG/DL (ref 8.6–10.5)
CHLORIDE SERPL-SCNC: 105 MMOL/L (ref 98–107)
CO2 SERPL-SCNC: 29.8 MMOL/L (ref 22–29)
CREAT SERPL-MCNC: 3.59 MG/DL (ref 0.76–1.27)
DEPRECATED RDW RBC AUTO: 53.7 FL (ref 37–54)
EGFRCR SERPLBLD CKD-EPI 2021: 17.9 ML/MIN/1.73
EOSINOPHIL # BLD AUTO: 0.12 10*3/MM3 (ref 0–0.4)
EOSINOPHIL NFR BLD AUTO: 3 % (ref 0.3–6.2)
ERYTHROCYTE [DISTWIDTH] IN BLOOD BY AUTOMATED COUNT: 15.8 % (ref 12.3–15.4)
GLOBULIN UR ELPH-MCNC: 2.1 GM/DL
GLUCOSE SERPL-MCNC: 201 MG/DL (ref 65–99)
HCT VFR BLD AUTO: 32.7 % (ref 37.5–51)
HGB BLD-MCNC: 10.6 G/DL (ref 13–17.7)
IMM GRANULOCYTES # BLD AUTO: 0.02 10*3/MM3 (ref 0–0.05)
IMM GRANULOCYTES NFR BLD AUTO: 0.5 % (ref 0–0.5)
LYMPHOCYTES # BLD AUTO: 0.94 10*3/MM3 (ref 0.7–3.1)
LYMPHOCYTES NFR BLD AUTO: 23.6 % (ref 19.6–45.3)
MAGNESIUM SERPL-MCNC: 1.7 MG/DL (ref 1.6–2.4)
MCH RBC QN AUTO: 30.5 PG (ref 26.6–33)
MCHC RBC AUTO-ENTMCNC: 32.4 G/DL (ref 31.5–35.7)
MCV RBC AUTO: 94 FL (ref 79–97)
MONOCYTES # BLD AUTO: 0.58 10*3/MM3 (ref 0.1–0.9)
MONOCYTES NFR BLD AUTO: 14.6 % (ref 5–12)
NEUTROPHILS NFR BLD AUTO: 2.28 10*3/MM3 (ref 1.7–7)
NEUTROPHILS NFR BLD AUTO: 57.3 % (ref 42.7–76)
NRBC BLD AUTO-RTO: 0 /100 WBC (ref 0–0.2)
PHOSPHATE SERPL-MCNC: 4.2 MG/DL (ref 2.5–4.5)
PLATELET # BLD AUTO: 147 10*3/MM3 (ref 140–450)
PMV BLD AUTO: 9.7 FL (ref 6–12)
POTASSIUM SERPL-SCNC: 4.7 MMOL/L (ref 3.5–5.2)
PROT SERPL-MCNC: 6 G/DL (ref 6–8.5)
RBC # BLD AUTO: 3.48 10*6/MM3 (ref 4.14–5.8)
SODIUM SERPL-SCNC: 143 MMOL/L (ref 136–145)
WBC NRBC COR # BLD AUTO: 3.98 10*3/MM3 (ref 3.4–10.8)

## 2024-02-19 PROCEDURE — 80053 COMPREHEN METABOLIC PANEL: CPT

## 2024-02-19 PROCEDURE — 96401 CHEMO ANTI-NEOPL SQ/IM: CPT

## 2024-02-19 PROCEDURE — 36415 COLL VENOUS BLD VENIPUNCTURE: CPT

## 2024-02-19 PROCEDURE — 84100 ASSAY OF PHOSPHORUS: CPT

## 2024-02-19 PROCEDURE — 83735 ASSAY OF MAGNESIUM: CPT

## 2024-02-19 PROCEDURE — 25010000002 DARATUMUMAB-HYALURONIDASE-FIHJ 1800-30000 MG-UT/15ML SOLUTION: Performed by: INTERNAL MEDICINE

## 2024-02-19 PROCEDURE — 85025 COMPLETE CBC W/AUTO DIFF WBC: CPT

## 2024-02-19 RX ADMIN — DARATUMUMAB AND HYALURONIDASE-FIHJ (HUMAN RECOMBINANT) 1800 MG: 1800; 30000 INJECTION SUBCUTANEOUS at 13:06

## 2024-02-19 NOTE — NURSING NOTE
Confirmed with patient that he took tylenol, pepcid and benedryl at home prior to Darzalex treatment today.

## 2024-02-20 LAB
ALBUMIN SERPL ELPH-MCNC: 3.6 G/DL (ref 2.9–4.4)
ALBUMIN/GLOB SERPL: 1.7 {RATIO} (ref 0.7–1.7)
ALPHA1 GLOB SERPL ELPH-MCNC: 0.2 G/DL (ref 0–0.4)
ALPHA2 GLOB SERPL ELPH-MCNC: 0.7 G/DL (ref 0.4–1)
B-GLOBULIN SERPL ELPH-MCNC: 0.7 G/DL (ref 0.7–1.3)
GAMMA GLOB SERPL ELPH-MCNC: 0.5 G/DL (ref 0.4–1.8)
GLOBULIN SER-MCNC: 2.2 G/DL (ref 2.2–3.9)
IGA SERPL-MCNC: 67 MG/DL (ref 61–437)
IGG SERPL-MCNC: 576 MG/DL (ref 603–1613)
IGM SERPL-MCNC: 17 MG/DL (ref 20–172)
INTERPRETATION SERPL IEP-IMP: ABNORMAL
KAPPA LC FREE SER-MCNC: 50.2 MG/L (ref 3.3–19.4)
KAPPA LC FREE/LAMBDA FREE SER: 1.43 {RATIO} (ref 0.26–1.65)
LABORATORY COMMENT REPORT: ABNORMAL
LAMBDA LC FREE SERPL-MCNC: 35 MG/L (ref 5.7–26.3)
M PROTEIN SERPL ELPH-MCNC: ABNORMAL G/DL
PROT SERPL-MCNC: 5.8 G/DL (ref 6–8.5)

## 2024-02-27 ENCOUNTER — OFFICE VISIT (OUTPATIENT)
Dept: ENDOCRINOLOGY | Age: 69
End: 2024-02-27
Payer: MEDICARE

## 2024-02-27 VITALS
BODY MASS INDEX: 40.25 KG/M2 | WEIGHT: 313.6 LBS | DIASTOLIC BLOOD PRESSURE: 84 MMHG | HEIGHT: 74 IN | SYSTOLIC BLOOD PRESSURE: 150 MMHG | OXYGEN SATURATION: 98 % | HEART RATE: 69 BPM

## 2024-02-27 DIAGNOSIS — E11.22 TYPE 2 DIABETES MELLITUS WITH STAGE 4 CHRONIC KIDNEY DISEASE, WITH LONG-TERM CURRENT USE OF INSULIN: Primary | ICD-10-CM

## 2024-02-27 DIAGNOSIS — I12.9 BENIGN HYPERTENSION WITH CKD (CHRONIC KIDNEY DISEASE) STAGE IV: ICD-10-CM

## 2024-02-27 DIAGNOSIS — N18.4 TYPE 2 DIABETES MELLITUS WITH STAGE 4 CHRONIC KIDNEY DISEASE, WITH LONG-TERM CURRENT USE OF INSULIN: Primary | ICD-10-CM

## 2024-02-27 DIAGNOSIS — Z79.4 TYPE 2 DIABETES MELLITUS WITH STAGE 4 CHRONIC KIDNEY DISEASE, WITH LONG-TERM CURRENT USE OF INSULIN: Primary | ICD-10-CM

## 2024-02-27 DIAGNOSIS — E78.2 MIXED HYPERLIPIDEMIA: ICD-10-CM

## 2024-02-27 DIAGNOSIS — N18.4 BENIGN HYPERTENSION WITH CKD (CHRONIC KIDNEY DISEASE) STAGE IV: ICD-10-CM

## 2024-02-27 PROCEDURE — 3079F DIAST BP 80-89 MM HG: CPT | Performed by: NURSE PRACTITIONER

## 2024-02-27 PROCEDURE — 99214 OFFICE O/P EST MOD 30 MIN: CPT | Performed by: NURSE PRACTITIONER

## 2024-02-27 PROCEDURE — 3077F SYST BP >= 140 MM HG: CPT | Performed by: NURSE PRACTITIONER

## 2024-02-27 RX ORDER — ATORVASTATIN CALCIUM 10 MG/1
10 TABLET, FILM COATED ORAL NIGHTLY
Qty: 90 TABLET | Refills: 1 | Status: SHIPPED | OUTPATIENT
Start: 2024-02-27

## 2024-03-02 DIAGNOSIS — E78.2 MIXED HYPERLIPIDEMIA: ICD-10-CM

## 2024-03-04 RX ORDER — ICOSAPENT ETHYL 1000 MG/1
2 CAPSULE ORAL 2 TIMES DAILY WITH MEALS
Qty: 360 CAPSULE | Refills: 1 | Status: SHIPPED | OUTPATIENT
Start: 2024-03-04

## 2024-03-04 NOTE — TELEPHONE ENCOUNTER
Rx Refill Note  Requested Prescriptions     Pending Prescriptions Disp Refills    icosapent ethyl (VASCEPA) 1 g capsule capsule [Pharmacy Med Name: ICOSAPENT ETHYL 1GM CAPSULES] 360 capsule 2     Sig: TAKE 2 CAPSULES BY MOUTH TWICE DAILY WITH MEALS      Last office visit with prescribing clinician: 2/27/2024   Last telemedicine visit with prescribing clinician: Visit date not found   Next office visit with prescribing clinician: 6/27/2024                         Would you like a call back once the refill request has been completed: [] Yes [] No    If the office needs to give you a call back, can they leave a voicemail: [] Yes [] No    Amada Davis MA  03/04/24, 08:00 EST

## 2024-03-05 DIAGNOSIS — G62.0 NEUROPATHY DUE TO CHEMOTHERAPEUTIC DRUG: ICD-10-CM

## 2024-03-05 DIAGNOSIS — T45.1X5A NEUROPATHY DUE TO CHEMOTHERAPEUTIC DRUG: ICD-10-CM

## 2024-03-05 RX ORDER — GABAPENTIN 300 MG/1
CAPSULE ORAL
Qty: 90 CAPSULE | Refills: 0 | Status: SHIPPED | OUTPATIENT
Start: 2024-03-05

## 2024-03-13 DIAGNOSIS — C90.00 MULTIPLE MYELOMA NOT HAVING ACHIEVED REMISSION: ICD-10-CM

## 2024-03-14 ENCOUNTER — SPECIALTY PHARMACY (OUTPATIENT)
Dept: PHARMACY | Facility: HOSPITAL | Age: 69
End: 2024-03-14
Payer: MEDICARE

## 2024-03-14 NOTE — PROGRESS NOTES
Drug: Pomalyst (pomalidomide)  Strength: 2 mg  Directions: Take one capsule by mouth daily for 21 days on then 7 days off.  Quantity: 21  Refills: 0  Pharmacy prescription sent to: [facility]     Completed independent double check on medication order/RX.  Donna Cano Rph, BCOP

## 2024-03-14 NOTE — PROGRESS NOTES
Re: Refills of Oral Specialty Medication - Pomalyst (pomalidomide)    Drug-Drug Interactions: The current medication list was reviewed and there are no relevant drug-drug interactions with the specialty medication.  Medication Allergies: The patient has no relevant allergies as it relates to their oral specialty medication  Review of Labs/Dose Adjustments: NO DOSE CHANGE - I reviewed the most recent note and labs and the patient will continue without any dose changes.  I sent refills as described below.    Drug: Pomalyst (pomalidomide)  Strength: 2 mg  Directions: Take one capsule by mouth daily for 21 days on then 7 days off.  Quantity: 21  Refills: 0  Pharmacy prescription sent to: [facility]    Name/Credentials: Yolande Huitron, YumikoD, BCPS    3/14/2024  09:02 EDT

## 2024-03-18 ENCOUNTER — TELEPHONE (OUTPATIENT)
Dept: ENDOCRINOLOGY | Age: 69
End: 2024-03-18
Payer: MEDICARE

## 2024-03-18 ENCOUNTER — OFFICE VISIT (OUTPATIENT)
Dept: ONCOLOGY | Facility: CLINIC | Age: 69
End: 2024-03-18
Payer: MEDICARE

## 2024-03-18 ENCOUNTER — LAB (OUTPATIENT)
Dept: LAB | Facility: HOSPITAL | Age: 69
End: 2024-03-18
Payer: MEDICARE

## 2024-03-18 ENCOUNTER — INFUSION (OUTPATIENT)
Dept: ONCOLOGY | Facility: HOSPITAL | Age: 69
End: 2024-03-18
Payer: MEDICARE

## 2024-03-18 VITALS
HEIGHT: 74 IN | RESPIRATION RATE: 18 BRPM | SYSTOLIC BLOOD PRESSURE: 116 MMHG | DIASTOLIC BLOOD PRESSURE: 68 MMHG | WEIGHT: 306.3 LBS | OXYGEN SATURATION: 97 % | BODY MASS INDEX: 39.31 KG/M2 | TEMPERATURE: 97.7 F | HEART RATE: 69 BPM

## 2024-03-18 DIAGNOSIS — D63.1 ANEMIA IN STAGE 3B CHRONIC KIDNEY DISEASE: ICD-10-CM

## 2024-03-18 DIAGNOSIS — E83.42 HYPOMAGNESEMIA: ICD-10-CM

## 2024-03-18 DIAGNOSIS — D84.9 IMMUNOCOMPROMISED STATE: ICD-10-CM

## 2024-03-18 DIAGNOSIS — D70.1 CHEMOTHERAPY INDUCED NEUTROPENIA: ICD-10-CM

## 2024-03-18 DIAGNOSIS — N18.32 ANEMIA IN STAGE 3B CHRONIC KIDNEY DISEASE: ICD-10-CM

## 2024-03-18 DIAGNOSIS — D69.6 THROMBOCYTOPENIA: ICD-10-CM

## 2024-03-18 DIAGNOSIS — Z79.899 HIGH RISK MEDICATION USE: ICD-10-CM

## 2024-03-18 DIAGNOSIS — E83.51 HYPOCALCEMIA: ICD-10-CM

## 2024-03-18 DIAGNOSIS — C90.00 MULTIPLE MYELOMA NOT HAVING ACHIEVED REMISSION: Primary | ICD-10-CM

## 2024-03-18 DIAGNOSIS — T45.1X5A CHEMOTHERAPY INDUCED NEUTROPENIA: ICD-10-CM

## 2024-03-18 DIAGNOSIS — C90.00 MULTIPLE MYELOMA NOT HAVING ACHIEVED REMISSION: ICD-10-CM

## 2024-03-18 LAB
ALBUMIN SERPL-MCNC: 3.5 G/DL (ref 3.5–5.2)
ALBUMIN/GLOB SERPL: 1.6 G/DL
ALP SERPL-CCNC: 64 U/L (ref 39–117)
ALT SERPL W P-5'-P-CCNC: <5 U/L (ref 1–41)
ANION GAP SERPL CALCULATED.3IONS-SCNC: 11.3 MMOL/L (ref 5–15)
AST SERPL-CCNC: 17 U/L (ref 1–40)
BASOPHILS # BLD AUTO: 0.07 10*3/MM3 (ref 0–0.2)
BASOPHILS NFR BLD AUTO: 1.4 % (ref 0–1.5)
BILIRUB SERPL-MCNC: 0.4 MG/DL (ref 0–1.2)
BUN SERPL-MCNC: 35 MG/DL (ref 8–23)
BUN/CREAT SERPL: 11 (ref 7–25)
CALCIUM SPEC-SCNC: 8.4 MG/DL (ref 8.6–10.5)
CHLORIDE SERPL-SCNC: 102 MMOL/L (ref 98–107)
CO2 SERPL-SCNC: 23.7 MMOL/L (ref 22–29)
CREAT SERPL-MCNC: 3.19 MG/DL (ref 0.76–1.27)
DEPRECATED RDW RBC AUTO: 52.2 FL (ref 37–54)
EGFRCR SERPLBLD CKD-EPI 2021: 20.6 ML/MIN/1.73
EOSINOPHIL # BLD AUTO: 0.19 10*3/MM3 (ref 0–0.4)
EOSINOPHIL NFR BLD AUTO: 3.9 % (ref 0.3–6.2)
ERYTHROCYTE [DISTWIDTH] IN BLOOD BY AUTOMATED COUNT: 15.5 % (ref 12.3–15.4)
GLOBULIN UR ELPH-MCNC: 2.2 GM/DL
GLUCOSE SERPL-MCNC: 239 MG/DL (ref 65–99)
HCT VFR BLD AUTO: 32.7 % (ref 37.5–51)
HGB BLD-MCNC: 11 G/DL (ref 13–17.7)
IMM GRANULOCYTES # BLD AUTO: 0.01 10*3/MM3 (ref 0–0.05)
IMM GRANULOCYTES NFR BLD AUTO: 0.2 % (ref 0–0.5)
LYMPHOCYTES # BLD AUTO: 1.42 10*3/MM3 (ref 0.7–3.1)
LYMPHOCYTES NFR BLD AUTO: 29 % (ref 19.6–45.3)
MAGNESIUM SERPL-MCNC: 1.6 MG/DL (ref 1.6–2.4)
MCH RBC QN AUTO: 31.3 PG (ref 26.6–33)
MCHC RBC AUTO-ENTMCNC: 33.6 G/DL (ref 31.5–35.7)
MCV RBC AUTO: 93.2 FL (ref 79–97)
MONOCYTES # BLD AUTO: 0.6 10*3/MM3 (ref 0.1–0.9)
MONOCYTES NFR BLD AUTO: 12.2 % (ref 5–12)
NEUTROPHILS NFR BLD AUTO: 2.61 10*3/MM3 (ref 1.7–7)
NEUTROPHILS NFR BLD AUTO: 53.3 % (ref 42.7–76)
NRBC BLD AUTO-RTO: 0 /100 WBC (ref 0–0.2)
PHOSPHATE SERPL-MCNC: 3.3 MG/DL (ref 2.5–4.5)
PLATELET # BLD AUTO: 174 10*3/MM3 (ref 140–450)
PMV BLD AUTO: 10.2 FL (ref 6–12)
POTASSIUM SERPL-SCNC: 4.1 MMOL/L (ref 3.5–5.2)
PROT SERPL-MCNC: 5.7 G/DL (ref 6–8.5)
RBC # BLD AUTO: 3.51 10*6/MM3 (ref 4.14–5.8)
SODIUM SERPL-SCNC: 137 MMOL/L (ref 136–145)
WBC NRBC COR # BLD AUTO: 4.9 10*3/MM3 (ref 3.4–10.8)

## 2024-03-18 PROCEDURE — 25010000002 DARATUMUMAB-HYALURONIDASE-FIHJ 1800-30000 MG-UT/15ML SOLUTION: Performed by: INTERNAL MEDICINE

## 2024-03-18 PROCEDURE — 25010000002 MAGNESIUM SULFATE 2 GM/50ML SOLUTION: Performed by: INTERNAL MEDICINE

## 2024-03-18 PROCEDURE — 83735 ASSAY OF MAGNESIUM: CPT

## 2024-03-18 PROCEDURE — 80053 COMPREHEN METABOLIC PANEL: CPT

## 2024-03-18 PROCEDURE — 85025 COMPLETE CBC W/AUTO DIFF WBC: CPT

## 2024-03-18 PROCEDURE — 84100 ASSAY OF PHOSPHORUS: CPT

## 2024-03-18 PROCEDURE — 96401 CHEMO ANTI-NEOPL SQ/IM: CPT

## 2024-03-18 PROCEDURE — 36415 COLL VENOUS BLD VENIPUNCTURE: CPT

## 2024-03-18 PROCEDURE — 96365 THER/PROPH/DIAG IV INF INIT: CPT

## 2024-03-18 RX ORDER — FAMOTIDINE 10 MG/ML
20 INJECTION, SOLUTION INTRAVENOUS AS NEEDED
Status: CANCELLED | OUTPATIENT
Start: 2024-03-18

## 2024-03-18 RX ORDER — MEPERIDINE HYDROCHLORIDE 25 MG/ML
12.5 INJECTION INTRAMUSCULAR; INTRAVENOUS; SUBCUTANEOUS
Status: CANCELLED | OUTPATIENT
Start: 2024-03-18

## 2024-03-18 RX ORDER — DIPHENHYDRAMINE HYDROCHLORIDE 50 MG/ML
50 INJECTION INTRAMUSCULAR; INTRAVENOUS AS NEEDED
Status: CANCELLED | OUTPATIENT
Start: 2024-03-18

## 2024-03-18 RX ORDER — MAGNESIUM SULFATE HEPTAHYDRATE 40 MG/ML
2 INJECTION, SOLUTION INTRAVENOUS ONCE
Status: COMPLETED | OUTPATIENT
Start: 2024-03-18 | End: 2024-03-18

## 2024-03-18 RX ORDER — OMEGA-3-ACID ETHYL ESTERS 1 G/1
2 CAPSULE, LIQUID FILLED ORAL 2 TIMES DAILY
Qty: 360 CAPSULE | Refills: 1 | Status: SHIPPED | OUTPATIENT
Start: 2024-03-18

## 2024-03-18 RX ADMIN — DARATUMUMAB AND HYALURONIDASE-FIHJ (HUMAN RECOMBINANT) 1800 MG: 1800; 30000 INJECTION SUBCUTANEOUS at 11:37

## 2024-03-18 RX ADMIN — MAGNESIUM SULFATE HEPTAHYDRATE 2 G: 40 INJECTION, SOLUTION INTRAVENOUS at 11:20

## 2024-03-18 NOTE — NURSING NOTE
Mag 1.6.  2 gms of IV Mag ordered by Dr. Del Valle. 2 gms of Mag given IV as ordered.  Darzalex Faspro given subq as ordered.  Took pre-meds at home.

## 2024-03-18 NOTE — PROGRESS NOTES
Subjective     CHIEF COMPLAINT:      Chief Complaint   Patient presents with    Follow-up     No concerns      HISTORY OF PRESENT ILLNESS:     Contreras Albert is a 68 y.o. male patient who returns today for follow up on his multiple myeloma.  He returns today for follow-up accompanied by his wife.  He reports that he developed diarrhea last night.  The diarrhea is now occurring much less frequently.  He reports that he had an episode of falling recently.  He did not injure himself.  He is obtaining his blood pressure daily and nephrologist is now monitoring his blood pressure readings regularly.    ROS:  Pertinent ROS is in the HPI.     Past medical, surgical, social and family history were reviewed.     MEDICATIONS:    Current Outpatient Medications:     Accu-Chek Softclix Lancets lancets, Check 3 times a day on insulin tx, poor control, hypoglycemia. Dx: E 11.22, Disp: 300 each, Rfl: 3    acetaminophen (TYLENOL) 325 MG tablet, Take 2 tablets by mouth Every 6 (Six) Hours As Needed for Mild Pain. (Patient taking differently: Take 2 tablets by mouth Every 6 (Six) Hours As Needed for Mild Pain. As needed), Disp: , Rfl:     acyclovir (ZOVIRAX) 800 MG tablet, TAKE 1 TABLET BY MOUTH EVERY DAY, Disp: 90 tablet, Rfl: 2    ASPIRIN 81 PO, Take 81 mg by mouth Daily., Disp: , Rfl:     atorvastatin (LIPITOR) 10 MG tablet, Take 1 tablet by mouth Every Night., Disp: 90 tablet, Rfl: 1    bumetanide (BUMEX) 2 MG tablet, Take 1 tablet by mouth Daily., Disp: , Rfl:     calcium carbonate (TUMS) 500 MG chewable tablet, Chew 4 tablets Daily., Disp: , Rfl:     carvedilol (COREG) 12.5 MG tablet, Take 0.5 tablets by mouth 2 (Two) Times a Day With Meals., Disp: , Rfl:     Continuous Blood Gluc  (FreeStyle Jin 2 Geraldine) device, USE 1 FOR 1 DOSE, Disp: , Rfl:     Continuous Blood Gluc Sensor (FreeStyle Jin 2 Sensor) misc, Use 1 each Every 14 (Fourteen) Days. 2 for a 28 day supply, Disp: 2 each, Rfl: 3    DARATUMUMAB IV, Infuse  into  a venous catheter., Disp: , Rfl:     dexAMETHasone (DECADRON) 4 MG tablet, Take 3 tablets on day of chemo, 1 tablet on day #2 and 1 tablet on day #3 and repeat monthly., Disp: 15 tablet, Rfl: 1    diphenoxylate-atropine (LOMOTIL) 2.5-0.025 MG per tablet, Take 2 tablets by mouth 4 (Four) Times a Day As Needed for Diarrhea., Disp: 120 tablet, Rfl: 0    escitalopram (LEXAPRO) 5 MG tablet, Take 1 tablet by mouth Daily., Disp: 30 tablet, Rfl: 5    finasteride (PROSCAR) 5 MG tablet, Take 1 tablet by mouth Daily., Disp: , Rfl:     gabapentin (NEURONTIN) 300 MG capsule, TAKE 1 CAPSULE BY MOUTH EVERY MORNING AND 2 CAPSULES BY MOUTH EVERY EVENING, Disp: 90 capsule, Rfl: 0    glucagon (GLUCAGEN) 1 MG injection, Inject 1 mg into the appropriate muscle as directed by prescriber 1 (One) Time As Needed for Low Blood Sugar. Follow package directions for low blood sugar., Disp: 1 kit, Rfl: 10    glucose blood (Accu-Chek Guide) test strip, Check 3 times a day on insulin tx, poor control, hypoglycemia. Dx: E 11.22, Disp: 300 each, Rfl: 3    icosapent ethyl (VASCEPA) 1 g capsule capsule, TAKE 2 CAPSULES BY MOUTH TWICE DAILY WITH MEALS, Disp: 360 capsule, Rfl: 1    Insulin Lispro, 1 Unit Dial, (HUMALOG) 100 UNIT/ML solution pen-injector, On off chemotherapy days: 10 units at breakfast, 10 units at lunch and 12 units at supper.  On chemotherapy days: 15 units 3 times daily with each meal.  In addition, take additional 2 units for blood sugars 50 mg per DL above 150 with each meal.  Maximum of 20 units per dose., Disp: 54 mL, Rfl: 2    Insulin Pen Needle 32G X 4 MM misc, USING 4 PEN NEEDLES DAILY, Disp: 400 each, Rfl: 3    Lantus SoloStar 100 UNIT/ML injection pen, On Non chemo days take Lantus 16 units in the morning and 18 units in evening. On Chemo Days take Lantus to 30 units twice a day.  Max daily dose of 70 units, Disp: 63 mL, Rfl: 1    ondansetron (ZOFRAN) 8 MG tablet, Take 1 tablet by mouth 3 (Three) Times a Day As Needed for  "Nausea or Vomiting., Disp: 30 tablet, Rfl: 5    Phospha 250 Neutral 155-852-130 MG tablet, TAKE 1 TABLET BY MOUTH DAILY, Disp: 30 tablet, Rfl: 1    pomalidomide (Pomalyst) 2 MG chemo capsule, TAKE 1 CAPSULE BY MOUTH EVERY DAY FOR 21 DAYS ON FOLLOWED BY 7 DAYS OFF, Disp: 21 capsule, Rfl: 0    tamsulosin (FLOMAX) 0.4 MG capsule 24 hr capsule, Take 1 capsule by mouth Daily., Disp: , Rfl:     vitamin D (ERGOCALCIFEROL) 1.25 MG (70754 UT) capsule capsule, TAKE 1 CAPSULE BY MOUTH EVERY 7 DAYS, Disp: 13 capsule, Rfl: 1  Objective     VITAL SIGNS:     Vitals:    03/18/24 1024   BP: 116/68   Pulse: 69   Resp: 18   Temp: 97.7 °F (36.5 °C)   TempSrc: Temporal   SpO2: 97%   Weight: (!) 139 kg (306 lb 4.8 oz)   Height: 188 cm (74.02\")   PainSc: 0-No pain     Body mass index is 39.31 kg/m².     Wt Readings from Last 5 Encounters:   03/18/24 (!) 139 kg (306 lb 4.8 oz)   02/27/24 (!) 142 kg (313 lb 9.6 oz)   02/19/24 (!) 144 kg (317 lb 12.8 oz)   01/22/24 (!) 138 kg (305 lb)   12/26/23 (!) 139 kg (306 lb)     PHYSICAL EXAMINATION:   GENERAL: The patient appears in fair general condition, not in acute distress.   SKIN: No ecchymosis.  EYES: No jaundice. Pallor.  CHEST: Normal respiratory effort.  Lungs clear bilaterally.  No added sounds.  CVS: Normal S1-S2.  Grade 1 systolic murmur.  ABDOMEN: Soft. No tenderness. No Hepatomegaly. No Splenomegaly. No masses.  EXTREMITIES: +1 edema bilaterally.    DIAGNOSTIC DATA:     Results from last 7 days   Lab Units 03/18/24  1014   WBC 10*3/mm3 4.90   NEUTROS ABS 10*3/mm3 2.61   HEMOGLOBIN g/dL 11.0*   HEMATOCRIT % 32.7*   PLATELETS 10*3/mm3 174     Results from last 7 days   Lab Units 03/18/24  1014   SODIUM mmol/L 137   POTASSIUM mmol/L 4.1   CHLORIDE mmol/L 102   CO2 mmol/L 23.7   BUN mg/dL 35*   CREATININE mg/dL 3.19*   CALCIUM mg/dL 8.4*   ALBUMIN g/dL 3.5   BILIRUBIN mg/dL 0.4   ALK PHOS U/L 64   ALT (SGPT) U/L <5   AST (SGOT) U/L 17   GLUCOSE mg/dL 239*   MAGNESIUM mg/dL 1.6 "     Component      Latest Ref Rng 12/26/2023 1/22/2024 2/19/2024   IgG      603 - 1613 mg/dL 719  652  576 (L)    IgA      61 - 437 mg/dL 104  79  67    IgM      20 - 172 mg/dL 41  26  17 (L)    Total Protein      6.0 - 8.5 g/dL 6.0  6.0  5.8 (L)    Albumin      2.9 - 4.4 g/dL 3.4  3.6  3.6    Alpha-1-Globulin      0.0 - 0.4 g/dL 0.2  0.2  0.2    Alpha-2-Globulin      0.4 - 1.0 g/dL 0.8  0.8  0.7    Beta Globulin      0.7 - 1.3 g/dL 0.8  0.7  0.7    Gamma Globulin      0.4 - 1.8 g/dL 0.7  0.6  0.5    M-Jurgen      Not Observed g/dL Comment:  0.1 (H)  Comment:    Globulin      2.2 - 3.9 g/dL 2.6  2.4  2.2    A/G Ratio      0.7 - 1.7  1.4  1.6  1.7    Immunofixation Reflex, Serum Comment !  Comment !  Comment !    Please note Comment  Comment  Comment    Kappa FLC      3.3 - 19.4 mg/L 61.6 (H)  57.1 (H)  50.2 (H)    Free Lambda Light Chains      5.7 - 26.3 mg/L 44.6 (H)  36.3 (H)  35.0 (H)    Kappa/Lambda Ratio      0.26 - 1.65  1.38  1.57  1.43       Assessment & Plan    *IgG lambda multiple myeloma.  Bone marrow 5/23/2020 hypercellular marrow with 80% involvement of plasma cell myeloma; FISH studies pending  Bone survey 5/26/2020: Diffuse osteoporosis and demineralization.  However, no discrete lytic lesions.  SPEP 5/23/2020: M spike 5.1.  IgG 6629 lambda.  Light chain ratio 0 with free lambda light chains 6400.  Beta-2 microglobulin 16.3.  24-hour urine 12.7 g protein per 24-hour with monoclonal lambda free light chain 33.9%, monoclonal IgG lambda 23.5%  24-hour urine testing from 5/24/2020 free lambda light chains 8.4 g/L  Due to the renal failure, CyBorD regimen was chosen and was started on 5/28/2020.    On 6/18/2020, Velcade was changed to 1.5 mg/m² weekly continuously along with weekly Decadron and oral Cytoxan.  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   Treatment was changed on 8/27/2020 to to Daratumumab subcutaneous injection weekly along with Revlimid 10 mg daily for 21 out of 28-day cycle and  Decadron 40 mg daily.  S/p stem cell transplant on 12/9/2020 at [facility].  Day 100 bone marrow on 3/16/2021 revealed minute  plasma cell population.  PET scan on 3/29/2021 was negative.  Patient enrolled in the  clinical trial randomizing to maintenance lenalidomide versus lenalidomide and daratumumab.  Patient was randomized to the Revlimid arm initiated 4/13/2021 dosed at 5 mg daily (due to renal function).  Patient developed neutropenia requiring dose adjustment to 5 mg daily for 21/28 days.  Patient withdrew from clinical trial after 5 cycles on 8/31/2021 due to worsening diarrhea.    He was switched to Velcade maintenance every other week 9/14/2021 which he discontinued 10/26/2021 due to neuropathy and lower extremity edema.    He was subsequently changed to pomalidomide 2 mg daily 21/28 days on 11/8/2021.    1 year post transplant, bone marrow biopsy showed 5-7% plasma cells and due to the residual disease he was started on DPd (daratumumab, pomalidomide, dexamethasone) on 2/21/2022.    Patient was seen by Dr. Romero on 9/12/2022.  At that time patient was cycle 8-day 7 DPd. Due to chronic side effects, dexamethasone was changed from weekly to monthly.    Patient did continue pomalidomide on his own during admission to the hospital, received 1 dose 4 mg p.o. on 9/24/2022 before was subsequently discontinued due to cytopenias.   Dexamethasone was changed to 12 mg on Mondays, 4 mg on Tuesday and 4 mg on Wednesday.  Pomalyst dose was reduced to 3 mg daily for 21 out of a 28-day cycle starting on 10/31/2022.  Due to neutropenia, the dose of Pomalyst was subsequently reduced to 2 mg daily for 21/28-day cycle.  9/5/2023: Treatment held due to weakness and postural dizziness.  9/5/2023: No M protein.  Kappa FLC 49.6.  Lambda FLC 32.4.  Kappa to lambda ratio 1.53.  He did not notice improvement in his symptoms while off Darzalex and Pomalyst.  Therefore, his symptoms were not attributed to  treatment.  He was restarted on Darzalex and Pomalyst on 10/2/2023.  1/22/2024: M protein was detected at 0.1 g.  However, this was IgG kappa and represented Darzalex (not IgG lambda).    *Bone health.  He is on Xgeva.   Patient developed severe hypocalcemia following the Xgeva injection.  He required inpatient hospital stay.  Given the duration of Xgeva injections, recommended changing treatment to every 3 months starting December 2022.   Last dose of Xgeva was on 12/26/2023.  He is due for Xgeva today.  Due to the hypomagnesemia, we will hold off on Xgeva today.    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  Patient was receiving Procrit at [facility].  Records from [facility] showed that the patient was receiving 70,000 units weekly and received the last dose on 9/27/2022.  Patient was given Procrit 70,000 units on 9/27/2022.  Hemoglobin was 9.9 on 10/17/2022.  He was given Procrit 60,000 units.  Hemoglobin was 9.8 on 11/4/2022.  He was given Procrit 60,000 units.  Labs in March 2023 revealed adequate iron stores.  Last dose of Procrit was 25,000 units on 5/1/2023 for hemoglobin of 9.9.  1/22/2024: Hemoglobin 10.6.  3/18/2024: Hemoglobin improved to 11.0.    *Thrombocytopenia.  This is attributed to multiple myeloma, prior stem cell transplant and chemotherapy.  8/21/2023: Platelets decreased to 123,000.  Platelet improved afterwards.  10/30/2023: Platelet count 145,000.  1/22/2024: Platelet count 158,000.  3/18/2024: Platelet count 174,000.    *Neutropenia.  This was attributed to Pomalyst.  Neutrophil count was down to 290 on 9/25/2022.  He was given Neupogen during his hospital stay in September 2022.  Neutrophil count improved afterwards.  However, neutrophil count decreased to 600 on 12/19/2022.  He reports that he was feeling tired and achy at that time.  Neutrophil count was 770 on 1/16/2023.  Due to the recurrent neutropenia, the  dose of Pomalyst was reduced to 2 mg in January 2023.  Neutrophil count improved following the dose reduction.  3/18/2024: Neutrophil count 2610.    *Hypocalcemia.  Patient developed severe hypocalcemia due to Xgeva.  Calcium was 5.9 on 9/23/2022.  Patient was placed on calcium replacement.  Calcium dose was subsequently increased to 3 tablets daily.  8/7/2023: Calcium 8.1. Albumin 3.6.  The dose was increased to 4 tablets daily.  3/18/2024: Calcium 8.4.  Albumin borderline at 3.5.    *Hypomagnesemia.  Magnesium was 1.1 on 10/24/2022.  He was placed on oral magnesium oxide 400 mg twice daily.  He developed diarrhea and it was stopped.  He was started on magnesium chloride on 1/23/2023.  He developed diarrhea about 7 days after starting the magnesium chloride.  Oral magnesium chloride was discontinued.  He receives IV magnesium per protocol based on his magnesium level.  3/18/2024: Magnesium 1.6.     *Prophylaxis.  He is on acyclovir 800 mg daily.  He is on ASA 81 mg daily.  No problem with bleeding.    *Episodes of dizziness and falling.  Patient reports that the episodes develop after he stands up and starts walking.  No dizziness while sitting.  No restricted chest pain or shortness of breath.  Blood pressure was 133/80 and heart rate was 66 in the sitting position today.  After standing, blood pressure was 119/76 and heart rate was 69.  No improvement in the symptoms after holding Pomalyst for 2 weeks.  MRI brain on 7/26/2023 showed small vessel ischemic changes.  There were changes of 3 old strokes.  I explained the findings to the patient.  I recommended evaluation by cardiology and neurology.  I also recommended home physical therapy  I recommended that he uses a walker when he ambulates.  His evaluation showed possible changes of postural hypotension.   He is concerned that this is due to his medications. I explained that while postural hypotension is a side effect of medications, it is not a common side  effect of Darzalex or Pomalyst.  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  Symptoms did not improve after placing Darzalex and Pomalyst on hold.  On 10/2/2023, we recommended reducing Bumex to 1 mg daily and monitoring his symptoms and the leg swelling.  He did not notice a difference in his postural dizziness after the dose reduction.   Bumex was increased back to 2 mg daily due to worsening of his edema.   This dose is controlling his leg edema.    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  It is controlled with Lexapro 5 mg daily.    *Vitamin D deficiency.  He is on vitamin D 50,000 units weekly.  Vitamin D level was 18 on 10/3/2022.  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  He is on gabapentin 300 mg in the morning and 600 mg in the evening.  Neuropathy symptoms in the feet are better as the swelling improved.     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  S/p right partial nephrectomy on 5/18/2016.    PLAN:    1.  We will give Darzalex today.    2.  We will give IV magnesium sulfate 2 g IV today.  3.  Continue Pomalyst 2 mg daily for 21 days of a 28-day cycle.  4.  Continue dexamethasone 20 mg monthly.  He takes 12 mg on the day of Darzalex followed by 4 mg daily x 3 days.  5.  We will hold Xgeva today.  Plan to Xgeva in 1 month.  6.  Continue calcium carbonate 4 tablets daily.  7.  Continue Neutra-Phos 1 tablet daily.  8.  Continue acyclovir 800 mg daily.  9.  Continue aspirin 81 mg daily.  10.  He will return in 4 weeks for the next dose of Darzalex.  He will be 0 to receive Xgeva.  11.  I will see him in follow-up in 8 weeks.  He will be scheduled to receive Darzalex.  CBC CMP magnesium phosphorus SPEP GUANAKITO FLC will be obtained.        Red Del Valle MD  03/18/24

## 2024-03-18 NOTE — TELEPHONE ENCOUNTER
Scanned into Media under that last office note, would not let me attach to the telephone encounter.

## 2024-03-19 ENCOUNTER — SPECIALTY PHARMACY (OUTPATIENT)
Dept: PHARMACY | Facility: HOSPITAL | Age: 69
End: 2024-03-19
Payer: MEDICARE

## 2024-03-20 LAB
ALBUMIN SERPL ELPH-MCNC: 3.2 G/DL (ref 2.9–4.4)
ALBUMIN/GLOB SERPL: 1.4 {RATIO} (ref 0.7–1.7)
ALPHA1 GLOB SERPL ELPH-MCNC: 0.2 G/DL (ref 0–0.4)
ALPHA2 GLOB SERPL ELPH-MCNC: 0.8 G/DL (ref 0.4–1)
B-GLOBULIN SERPL ELPH-MCNC: 0.7 G/DL (ref 0.7–1.3)
GAMMA GLOB SERPL ELPH-MCNC: 0.6 G/DL (ref 0.4–1.8)
GLOBULIN SER-MCNC: 2.3 G/DL (ref 2.2–3.9)
IGA SERPL-MCNC: 68 MG/DL (ref 61–437)
IGG SERPL-MCNC: 577 MG/DL (ref 603–1613)
IGM SERPL-MCNC: 15 MG/DL (ref 20–172)
INTERPRETATION SERPL IEP-IMP: ABNORMAL
KAPPA LC FREE SER-MCNC: 50.1 MG/L (ref 3.3–19.4)
KAPPA LC FREE/LAMBDA FREE SER: 1.47 {RATIO} (ref 0.26–1.65)
LABORATORY COMMENT REPORT: ABNORMAL
LAMBDA LC FREE SERPL-MCNC: 34.1 MG/L (ref 5.7–26.3)
M PROTEIN SERPL ELPH-MCNC: ABNORMAL G/DL
PROT SERPL-MCNC: 5.5 G/DL (ref 6–8.5)

## 2024-03-26 ENCOUNTER — SPECIALTY PHARMACY (OUTPATIENT)
Dept: PHARMACY | Facility: HOSPITAL | Age: 69
End: 2024-03-26
Payer: MEDICARE

## 2024-04-04 DIAGNOSIS — Z79.4 TYPE 2 DIABETES MELLITUS WITH STAGE 4 CHRONIC KIDNEY DISEASE, WITH LONG-TERM CURRENT USE OF INSULIN: ICD-10-CM

## 2024-04-04 DIAGNOSIS — N18.4 TYPE 2 DIABETES MELLITUS WITH STAGE 4 CHRONIC KIDNEY DISEASE, WITH LONG-TERM CURRENT USE OF INSULIN: ICD-10-CM

## 2024-04-04 DIAGNOSIS — E11.22 TYPE 2 DIABETES MELLITUS WITH STAGE 4 CHRONIC KIDNEY DISEASE, WITH LONG-TERM CURRENT USE OF INSULIN: ICD-10-CM

## 2024-04-04 RX ORDER — INSULIN LISPRO 100 [IU]/ML
INJECTION, SOLUTION INTRAVENOUS; SUBCUTANEOUS
Qty: 54 ML | Refills: 1 | Status: SHIPPED | OUTPATIENT
Start: 2024-04-04

## 2024-04-04 NOTE — TELEPHONE ENCOUNTER
Rx Refill Note  Requested Prescriptions     Pending Prescriptions Disp Refills    Insulin Lispro, 1 Unit Dial, (HUMALOG) 100 UNIT/ML solution pen-injector [Pharmacy Med Name: INSULIN LISPRO 100U/ML KWIKPEN 3ML] 54 mL 2     Sig: NONCHEMO DAYS: 10 USE FOR BREAKFAST AND LUNCH, 12 USE AT SUPPER. CHEMO DAYS: 15 UNITS 3X DAILY WITH MEALS      Last office visit with prescribing clinician: 11/29/2023   Last telemedicine visit with prescribing clinician: Visit date not found   Next office visit with prescribing clinician: 9/27/2024                         Would you like a call back once the refill request has been completed: [] Yes [] No    If the office needs to give you a call back, can they leave a voicemail: [] Yes [] No    Michelle Davis  04/04/24, 08:13 EDT

## 2024-04-07 DIAGNOSIS — C90.00 MULTIPLE MYELOMA NOT HAVING ACHIEVED REMISSION: ICD-10-CM

## 2024-04-07 DIAGNOSIS — T45.1X5A NEUROPATHY DUE TO CHEMOTHERAPEUTIC DRUG: ICD-10-CM

## 2024-04-07 DIAGNOSIS — G62.0 NEUROPATHY DUE TO CHEMOTHERAPEUTIC DRUG: ICD-10-CM

## 2024-04-08 ENCOUNTER — SPECIALTY PHARMACY (OUTPATIENT)
Dept: PHARMACY | Facility: HOSPITAL | Age: 69
End: 2024-04-08
Payer: MEDICARE

## 2024-04-08 DIAGNOSIS — C90.00 MULTIPLE MYELOMA NOT HAVING ACHIEVED REMISSION: ICD-10-CM

## 2024-04-08 RX ORDER — GABAPENTIN 300 MG/1
CAPSULE ORAL
Qty: 90 CAPSULE | Refills: 3 | Status: SHIPPED | OUTPATIENT
Start: 2024-04-08

## 2024-04-08 RX ORDER — POMALIDOMIDE 2 MG/1
CAPSULE ORAL
Refills: 0 | OUTPATIENT
Start: 2024-04-08

## 2024-04-08 NOTE — PROGRESS NOTES
Drug: Pomalyst (pomalidomide)  Strength: 2 mg  Directions: Take 1 capsule by mouth daily on days 1-21 of each 28 day cycle  Quantity: 21  Refills: 0  Pharmacy prescription sent to: [facility]    Completed independent double check on medication order/RX.  Yolande Huitron, YumikoD, BCPS

## 2024-04-08 NOTE — PROGRESS NOTES
Re: Refills of Oral Specialty Medication - Pomalyst (pomalidomide)    Drug-Drug Interactions: The current medication list was reviewed and there are no relevant drug-drug interactions with the specialty medication.  Medication Allergies: The patient has no relevant allergies as it relates to their oral specialty medication  Review of Labs/Dose Adjustments: NO DOSE CHANGE - I reviewed the most recent note and labs and the patient will continue without any dose changes.  I sent refills as described below.    Drug: Pomalyst (pomalidomide)  Strength: 2 mg  Directions: Take 1 capsule by mouth daily on days 1-21 of each 28 day cycle  Quantity: 21  Refills: 0  Pharmacy prescription sent to: [facility]    Name/Credentials: Carlos Clarke, YumikoD, BCOP  Clinical Oncology Pharmacist      4/8/2024  14:40 EDT

## 2024-04-08 NOTE — PROGRESS NOTES
Pomalyst script to [facility].  Svetlana called previous pharmacy and canceled refill request.      Sandee Simon  Specialty Pharmacy Technician

## 2024-04-08 NOTE — PROGRESS NOTES
I have emailed Anne Westfall with [facility] to inform her that Mr. Albert's pomalyst has been transferred to [facility].     Svetlana Zayas - Care Coordinator   4/8/2024  15:51 EDT

## 2024-04-09 ENCOUNTER — SPECIALTY PHARMACY (OUTPATIENT)
Dept: PHARMACY | Facility: HOSPITAL | Age: 69
End: 2024-04-09
Payer: MEDICARE

## 2024-04-09 NOTE — PROGRESS NOTES
Pomalyst was sent to [facility]. Latisha responded that the co-pay will be $0.  However, when the REMS nurse at [facility] called to schedule delivery, she was told that the patient had enough medication to last through his next cycle starting on 4/15/24.  [employer] authorization is good until 5/8/24.  The pharmacy will call patient before 5/8/24 to schedule delivery of the following cycle of Pomalyst using this fill.     Sandee Simon  Specialty Pharmacy Technician

## 2024-04-15 ENCOUNTER — SPECIALTY PHARMACY (OUTPATIENT)
Dept: ONCOLOGY | Facility: HOSPITAL | Age: 69
End: 2024-04-15
Payer: MEDICARE

## 2024-04-15 ENCOUNTER — INFUSION (OUTPATIENT)
Dept: ONCOLOGY | Facility: HOSPITAL | Age: 69
End: 2024-04-15
Payer: MEDICARE

## 2024-04-15 ENCOUNTER — LAB (OUTPATIENT)
Dept: OTHER | Facility: HOSPITAL | Age: 69
End: 2024-04-15
Payer: MEDICARE

## 2024-04-15 VITALS — BODY MASS INDEX: 39.51 KG/M2 | HEIGHT: 74 IN | WEIGHT: 307.9 LBS

## 2024-04-15 DIAGNOSIS — C90.00 MULTIPLE MYELOMA NOT HAVING ACHIEVED REMISSION: ICD-10-CM

## 2024-04-15 DIAGNOSIS — C90.00 MULTIPLE MYELOMA NOT HAVING ACHIEVED REMISSION: Primary | ICD-10-CM

## 2024-04-15 LAB
ALBUMIN SERPL-MCNC: 4 G/DL (ref 3.5–5.2)
ALBUMIN/GLOB SERPL: 1.6 G/DL
ALP SERPL-CCNC: 56 U/L (ref 39–117)
ALT SERPL W P-5'-P-CCNC: 10 U/L (ref 1–41)
ANION GAP SERPL CALCULATED.3IONS-SCNC: 8.4 MMOL/L (ref 5–15)
AST SERPL-CCNC: 10 U/L (ref 1–40)
BASOPHILS # BLD AUTO: 0.07 10*3/MM3 (ref 0–0.2)
BASOPHILS NFR BLD AUTO: 1.3 % (ref 0–1.5)
BILIRUB SERPL-MCNC: 0.5 MG/DL (ref 0–1.2)
BUN SERPL-MCNC: 39 MG/DL (ref 8–23)
BUN/CREAT SERPL: 12.9 (ref 7–25)
CALCIUM SPEC-SCNC: 9.1 MG/DL (ref 8.6–10.5)
CHLORIDE SERPL-SCNC: 105 MMOL/L (ref 98–107)
CO2 SERPL-SCNC: 25.6 MMOL/L (ref 22–29)
CREAT SERPL-MCNC: 3.02 MG/DL (ref 0.76–1.27)
DEPRECATED RDW RBC AUTO: 51 FL (ref 37–54)
EGFRCR SERPLBLD CKD-EPI 2021: 22 ML/MIN/1.73
EOSINOPHIL # BLD AUTO: 0.17 10*3/MM3 (ref 0–0.4)
EOSINOPHIL NFR BLD AUTO: 3.1 % (ref 0.3–6.2)
ERYTHROCYTE [DISTWIDTH] IN BLOOD BY AUTOMATED COUNT: 15.5 % (ref 12.3–15.4)
GLOBULIN UR ELPH-MCNC: 2.5 GM/DL
GLUCOSE SERPL-MCNC: 162 MG/DL (ref 65–99)
HCT VFR BLD AUTO: 34.4 % (ref 37.5–51)
HGB BLD-MCNC: 11.3 G/DL (ref 13–17.7)
IMM GRANULOCYTES # BLD AUTO: 0.03 10*3/MM3 (ref 0–0.05)
IMM GRANULOCYTES NFR BLD AUTO: 0.5 % (ref 0–0.5)
LYMPHOCYTES # BLD AUTO: 1.53 10*3/MM3 (ref 0.7–3.1)
LYMPHOCYTES NFR BLD AUTO: 27.8 % (ref 19.6–45.3)
MAGNESIUM SERPL-MCNC: 1.8 MG/DL (ref 1.6–2.4)
MCH RBC QN AUTO: 30 PG (ref 26.6–33)
MCHC RBC AUTO-ENTMCNC: 32.8 G/DL (ref 31.5–35.7)
MCV RBC AUTO: 91.2 FL (ref 79–97)
MONOCYTES # BLD AUTO: 0.63 10*3/MM3 (ref 0.1–0.9)
MONOCYTES NFR BLD AUTO: 11.5 % (ref 5–12)
NEUTROPHILS NFR BLD AUTO: 3.07 10*3/MM3 (ref 1.7–7)
NEUTROPHILS NFR BLD AUTO: 55.8 % (ref 42.7–76)
NRBC BLD AUTO-RTO: 0 /100 WBC (ref 0–0.2)
PHOSPHATE SERPL-MCNC: 3.1 MG/DL (ref 2.5–4.5)
PLATELET # BLD AUTO: 158 10*3/MM3 (ref 140–450)
PMV BLD AUTO: 9.6 FL (ref 6–12)
POTASSIUM SERPL-SCNC: 4.2 MMOL/L (ref 3.5–5.2)
PROT SERPL-MCNC: 6.5 G/DL (ref 6–8.5)
RBC # BLD AUTO: 3.77 10*6/MM3 (ref 4.14–5.8)
SODIUM SERPL-SCNC: 139 MMOL/L (ref 136–145)
WBC NRBC COR # BLD AUTO: 5.5 10*3/MM3 (ref 3.4–10.8)

## 2024-04-15 PROCEDURE — 83735 ASSAY OF MAGNESIUM: CPT | Performed by: INTERNAL MEDICINE

## 2024-04-15 PROCEDURE — 86334 IMMUNOFIX E-PHORESIS SERUM: CPT | Performed by: INTERNAL MEDICINE

## 2024-04-15 PROCEDURE — 83521 IG LIGHT CHAINS FREE EACH: CPT | Performed by: INTERNAL MEDICINE

## 2024-04-15 PROCEDURE — 80053 COMPREHEN METABOLIC PANEL: CPT | Performed by: INTERNAL MEDICINE

## 2024-04-15 PROCEDURE — 84165 PROTEIN E-PHORESIS SERUM: CPT | Performed by: INTERNAL MEDICINE

## 2024-04-15 PROCEDURE — 36415 COLL VENOUS BLD VENIPUNCTURE: CPT

## 2024-04-15 PROCEDURE — 25010000002 DENOSUMAB 120 MG/1.7ML SOLUTION: Performed by: NURSE PRACTITIONER

## 2024-04-15 PROCEDURE — 82784 ASSAY IGA/IGD/IGG/IGM EACH: CPT | Performed by: INTERNAL MEDICINE

## 2024-04-15 PROCEDURE — 96372 THER/PROPH/DIAG INJ SC/IM: CPT

## 2024-04-15 PROCEDURE — 25010000002 DARATUMUMAB-HYALURONIDASE-FIHJ 1800-30000 MG-UT/15ML SOLUTION: Performed by: NURSE PRACTITIONER

## 2024-04-15 PROCEDURE — 85025 COMPLETE CBC W/AUTO DIFF WBC: CPT | Performed by: INTERNAL MEDICINE

## 2024-04-15 PROCEDURE — 96401 CHEMO ANTI-NEOPL SQ/IM: CPT

## 2024-04-15 PROCEDURE — 84100 ASSAY OF PHOSPHORUS: CPT | Performed by: INTERNAL MEDICINE

## 2024-04-15 RX ORDER — DIPHENHYDRAMINE HYDROCHLORIDE 50 MG/ML
50 INJECTION INTRAMUSCULAR; INTRAVENOUS AS NEEDED
Status: CANCELLED | OUTPATIENT
Start: 2024-04-15

## 2024-04-15 RX ORDER — FAMOTIDINE 10 MG/ML
20 INJECTION, SOLUTION INTRAVENOUS AS NEEDED
Status: CANCELLED | OUTPATIENT
Start: 2024-04-15

## 2024-04-15 RX ADMIN — DENOSUMAB 120 MG: 120 INJECTION SUBCUTANEOUS at 13:47

## 2024-04-15 RX ADMIN — DARATUMUMAB AND HYALURONIDASE-FIHJ (HUMAN RECOMBINANT) 1800 MG: 1800; 30000 INJECTION SUBCUTANEOUS at 13:26

## 2024-04-15 NOTE — NURSING NOTE
Patient to infusion room for darzalex faspro & xgeva. Patient states he took premeds at home prior to coming into clinic today.     Xgeva Indication and side effects reviewed. Denies recent/upcoming dental work/jaw bone pain. Labs and medications verified. Xgeva Injection performed in right arm.     Patient tolerated treatment without complaints. Discharged in stable condition.

## 2024-04-15 NOTE — PROGRESS NOTES
Specialty Pharmacy Patient Management Program  Oncology 6-Month Clinical Assessment       Contreras Albert is a 68 y.o. male with multiple myeloma seen today to assess adherence and side effects.    Regimen: Pomalyst 2 mg once daily for 21 days on, then 7 days off    Reason for Outreach: Routine medication check-in .       Problem list reviewed by Dariela Clarke RPH on 4/15/2024 at 12:24 PM  Medicines reviewed by Dariela Clarke RPH on 4/15/2024 at 12:24 PM  Allergies reviewed by Dariela Clarke RPH on 4/15/2024 at 12:23 PM     Goals Addressed Today        Specialty Pharmacy General Goal      GUANAKITO, SPEP and FLE within normal limits            Medication Assessment:  Medication Assessment  Follow Up Clinical Assessment  Linked Medication(s) Assessed: Pomalidomide  Therapeutic appropriateness: Appropriate  Medication tolerability: Tolerating with no to minimal ADRs  Medication plan: Continue therapy with normal follow-up  Quality of Life Improvement Scale: 8-Moderately better  Administration: Patient is taking every day at the same time  and as prescribed .   Patient can self administer medications: yes  Medication Follow-Up Plan: Next clinical assessment  Lab Review: The labs listed below have been reviewed. No dose adjustments are needed for the oral specialty medication(s) based on the labs.    Lab Results   Component Value Date    GLUCOSE 239 (H) 03/18/2024    CALCIUM 8.4 (L) 03/18/2024     03/18/2024    K 4.1 03/18/2024    CO2 23.7 03/18/2024     03/18/2024    BUN 35 (H) 03/18/2024    CREATININE 3.19 (C) 03/18/2024    EGFRIFAFRI 27 (L) 08/09/2021    EGFRIFNONA 24 (L) 08/09/2021    BCR 11.0 03/18/2024    ANIONGAP 11.3 03/18/2024     Lab Results   Component Value Date    WBC 4.90 03/18/2024    RBC 3.51 (L) 03/18/2024    HGB 11.0 (L) 03/18/2024    HCT 32.7 (L) 03/18/2024    MCV 93.2 03/18/2024    MCH 31.3 03/18/2024    MCHC 33.6 03/18/2024    RDW 15.5 (H) 03/18/2024    RDWSD 52.2 03/18/2024     MPV 10.2 03/18/2024     03/18/2024    NEUTRORELPCT 53.3 03/18/2024    LYMPHORELPCT 29.0 03/18/2024    MONORELPCT 12.2 (H) 03/18/2024    EOSRELPCT 3.9 03/18/2024    BASORELPCT 1.4 03/18/2024    AUTOIGPER 0.2 03/18/2024    NEUTROABS 2.61 03/18/2024    LYMPHSABS 1.42 03/18/2024    MONOSABS 0.60 03/18/2024    EOSABS 0.19 03/18/2024    BASOSABS 0.07 03/18/2024    AUTOIGNUM 0.01 03/18/2024    NRBC 0.0 03/18/2024     Drug-drug interactions  Completed medication reconciliation today to assess for drug interactions. Patient denies starting or stopping any medications.    Assessed medication list for interactions, no significant drug interactions noted.   Advised patient to call the clinic if any new medications are started so we can assess for drug-drug interactions.  Drug-food interactions discussed:  none today  Vaccines are coordinated by the patient's oncologist and primary care provider.    Allergies  Known allergies and reactions were discussed with the patient. The patient's chart has been reviewed for allergy information and updated as necessary.   Allergies   Allergen Reactions    Lisinopril Unknown - High Severity     Tickle in throat, cannot take due to kidney disease    Crestor [Rosuvastatin] GI Intolerance     Abdominal cramping        Hospitalizations and Urgent Care Visits Since Last Assessment:  Unplanned hospitalizations or inpatient admissions: no  ED Visits: no  Urgent Office Visits: no    Adherence Assessment:  Adherence Questions  Linked Medication(s) Assessed: Pomalidomide  On average, how many doses/injections does the patient miss per month?: 0  What are the identified reasons for non-adherence or missed doses? : no problems identified  What is the estimated medication adherence level?: %  Based on the patient/caregiver response and refill history, does this patient require an MTP to track adherence improvements?: no    Quality of Life Assessment:  Quality of Life Assessment  Quality of  Life Improvement Scale: 8-Moderately better  -- Quality of Life: 8/10    Financial Assessment:  Medication availability/affordability: Patient has had no issues obtaining medication from pharmacy.    Attestation     I attest the patient was actively involved in and has agreed to the above plan of care.  If the prescribed therapy is at any point deemed not appropriate based on the current or future assessments, a consultation will be initiated with the patient's specialty care provider to determine the best course of action. The revised plan of therapy will be documented along with any required assessments and/or additional patient education provided.       All questions addressed and patient had no additional concerns. Patient has pharmacy contact information.    Name/Credentials: Carlos Clarke, Evan, [facility]  Clinical Oncology Pharmacist    4/15/2024  12:25 EDT

## 2024-04-16 LAB
ALBUMIN SERPL ELPH-MCNC: 3.4 G/DL (ref 2.9–4.4)
ALBUMIN/GLOB SERPL: 1.4 {RATIO} (ref 0.7–1.7)
ALPHA1 GLOB SERPL ELPH-MCNC: 0.2 G/DL (ref 0–0.4)
ALPHA2 GLOB SERPL ELPH-MCNC: 0.9 G/DL (ref 0.4–1)
B-GLOBULIN SERPL ELPH-MCNC: 0.8 G/DL (ref 0.7–1.3)
GAMMA GLOB SERPL ELPH-MCNC: 0.7 G/DL (ref 0.4–1.8)
GLOBULIN SER-MCNC: 2.6 G/DL (ref 2.2–3.9)
IGA SERPL-MCNC: 69 MG/DL (ref 61–437)
IGG SERPL-MCNC: 668 MG/DL (ref 603–1613)
IGM SERPL-MCNC: 15 MG/DL (ref 20–172)
INTERPRETATION SERPL IEP-IMP: ABNORMAL
KAPPA LC FREE SER-MCNC: 55.2 MG/L (ref 3.3–19.4)
KAPPA LC FREE/LAMBDA FREE SER: 1.57 {RATIO} (ref 0.26–1.65)
LABORATORY COMMENT REPORT: ABNORMAL
LAMBDA LC FREE SERPL-MCNC: 35.1 MG/L (ref 5.7–26.3)
M PROTEIN SERPL ELPH-MCNC: ABNORMAL G/DL
PROT SERPL-MCNC: 6 G/DL (ref 6–8.5)

## 2024-04-25 DIAGNOSIS — Z79.4 TYPE 2 DIABETES MELLITUS WITH STAGE 4 CHRONIC KIDNEY DISEASE, WITH LONG-TERM CURRENT USE OF INSULIN: ICD-10-CM

## 2024-04-25 DIAGNOSIS — N18.4 TYPE 2 DIABETES MELLITUS WITH STAGE 4 CHRONIC KIDNEY DISEASE, WITH LONG-TERM CURRENT USE OF INSULIN: ICD-10-CM

## 2024-04-25 DIAGNOSIS — E11.22 TYPE 2 DIABETES MELLITUS WITH STAGE 4 CHRONIC KIDNEY DISEASE, WITH LONG-TERM CURRENT USE OF INSULIN: ICD-10-CM

## 2024-04-25 RX ORDER — INSULIN GLARGINE 100 [IU]/ML
INJECTION, SOLUTION SUBCUTANEOUS
Qty: 63 ML | Refills: 1 | Status: SHIPPED | OUTPATIENT
Start: 2024-04-25

## 2024-04-25 NOTE — TELEPHONE ENCOUNTER
Rx Refill Note  Requested Prescriptions     Pending Prescriptions Disp Refills    Lantus SoloStar 100 UNIT/ML injection pen [Pharmacy Med Name: LANTUS SOLOSTAR PEN INJ 3ML] 63 mL 1     Sig: NONCHEMO DAYS: 16 UNITS EVERY MORNING AND 18 UNITS EVERY EVENING; CHEMO DAYS: 30 UNITS TWICE DAILY; MAX DAILY DOSE: 70 UNITS      Last office visit with prescribing clinician: 2/27/2024   Last telemedicine visit with prescribing clinician: Visit date not found   Next office visit with prescribing clinician: 6/27/2024                         Would you like a call back once the refill request has been completed: [] Yes [] No    If the office needs to give you a call back, can they leave a voicemail: [] Yes [] No    Michelle Davis  04/25/24, 08:09 EDT

## 2024-05-03 ENCOUNTER — SPECIALTY PHARMACY (OUTPATIENT)
Dept: PHARMACY | Facility: HOSPITAL | Age: 69
End: 2024-05-03
Payer: MEDICARE

## 2024-05-03 NOTE — PROGRESS NOTES
Pomalyst will be delivered to patient's home by [facility] on 5/7/24.     Sandee Simon  Specialty Pharmacy Technician

## 2024-05-13 ENCOUNTER — OFFICE VISIT (OUTPATIENT)
Dept: ONCOLOGY | Facility: CLINIC | Age: 69
End: 2024-05-13
Payer: MEDICARE

## 2024-05-13 ENCOUNTER — INFUSION (OUTPATIENT)
Dept: ONCOLOGY | Facility: HOSPITAL | Age: 69
End: 2024-05-13
Payer: MEDICARE

## 2024-05-13 ENCOUNTER — LAB (OUTPATIENT)
Dept: LAB | Facility: HOSPITAL | Age: 69
End: 2024-05-13
Payer: MEDICARE

## 2024-05-13 VITALS
HEART RATE: 66 BPM | WEIGHT: 312.2 LBS | SYSTOLIC BLOOD PRESSURE: 152 MMHG | DIASTOLIC BLOOD PRESSURE: 91 MMHG | RESPIRATION RATE: 16 BRPM | TEMPERATURE: 97.9 F | HEIGHT: 74 IN | OXYGEN SATURATION: 99 % | BODY MASS INDEX: 40.07 KG/M2

## 2024-05-13 DIAGNOSIS — D70.1 CHEMOTHERAPY INDUCED NEUTROPENIA: ICD-10-CM

## 2024-05-13 DIAGNOSIS — E83.51 HYPOCALCEMIA: ICD-10-CM

## 2024-05-13 DIAGNOSIS — D84.9 IMMUNOCOMPROMISED STATE: ICD-10-CM

## 2024-05-13 DIAGNOSIS — D69.6 THROMBOCYTOPENIA: ICD-10-CM

## 2024-05-13 DIAGNOSIS — E83.42 HYPOMAGNESEMIA: ICD-10-CM

## 2024-05-13 DIAGNOSIS — C90.00 MULTIPLE MYELOMA NOT HAVING ACHIEVED REMISSION: Primary | ICD-10-CM

## 2024-05-13 DIAGNOSIS — N18.32 ANEMIA IN STAGE 3B CHRONIC KIDNEY DISEASE: ICD-10-CM

## 2024-05-13 DIAGNOSIS — C90.00 MULTIPLE MYELOMA NOT HAVING ACHIEVED REMISSION: ICD-10-CM

## 2024-05-13 DIAGNOSIS — D63.1 ANEMIA IN STAGE 3B CHRONIC KIDNEY DISEASE: ICD-10-CM

## 2024-05-13 DIAGNOSIS — T45.1X5A CHEMOTHERAPY INDUCED NEUTROPENIA: ICD-10-CM

## 2024-05-13 LAB
ALBUMIN SERPL-MCNC: 3.9 G/DL (ref 3.5–5.2)
ALBUMIN/GLOB SERPL: 1.7 G/DL
ALP SERPL-CCNC: 49 U/L (ref 39–117)
ALT SERPL W P-5'-P-CCNC: 13 U/L (ref 1–41)
ANION GAP SERPL CALCULATED.3IONS-SCNC: 12.5 MMOL/L (ref 5–15)
AST SERPL-CCNC: 13 U/L (ref 1–40)
BASOPHILS # BLD AUTO: 0.05 10*3/MM3 (ref 0–0.2)
BASOPHILS NFR BLD AUTO: 1.1 % (ref 0–1.5)
BILIRUB SERPL-MCNC: 0.4 MG/DL (ref 0–1.2)
BUN SERPL-MCNC: 38 MG/DL (ref 8–23)
BUN/CREAT SERPL: 11.8 (ref 7–25)
CALCIUM SPEC-SCNC: 8.6 MG/DL (ref 8.6–10.5)
CHLORIDE SERPL-SCNC: 102 MMOL/L (ref 98–107)
CO2 SERPL-SCNC: 24.5 MMOL/L (ref 22–29)
CREAT SERPL-MCNC: 3.21 MG/DL (ref 0.76–1.27)
DEPRECATED RDW RBC AUTO: 54.3 FL (ref 37–54)
EGFRCR SERPLBLD CKD-EPI 2021: 20.5 ML/MIN/1.73
EOSINOPHIL # BLD AUTO: 0.15 10*3/MM3 (ref 0–0.4)
EOSINOPHIL NFR BLD AUTO: 3.2 % (ref 0.3–6.2)
ERYTHROCYTE [DISTWIDTH] IN BLOOD BY AUTOMATED COUNT: 15.9 % (ref 12.3–15.4)
GLOBULIN UR ELPH-MCNC: 2.3 GM/DL
GLUCOSE SERPL-MCNC: 151 MG/DL (ref 65–99)
HCT VFR BLD AUTO: 34.8 % (ref 37.5–51)
HGB BLD-MCNC: 11.2 G/DL (ref 13–17.7)
IMM GRANULOCYTES # BLD AUTO: 0.02 10*3/MM3 (ref 0–0.05)
IMM GRANULOCYTES NFR BLD AUTO: 0.4 % (ref 0–0.5)
LYMPHOCYTES # BLD AUTO: 1.14 10*3/MM3 (ref 0.7–3.1)
LYMPHOCYTES NFR BLD AUTO: 24.4 % (ref 19.6–45.3)
MAGNESIUM SERPL-MCNC: 1.6 MG/DL (ref 1.6–2.4)
MCH RBC QN AUTO: 30.4 PG (ref 26.6–33)
MCHC RBC AUTO-ENTMCNC: 32.2 G/DL (ref 31.5–35.7)
MCV RBC AUTO: 94.6 FL (ref 79–97)
MONOCYTES # BLD AUTO: 0.6 10*3/MM3 (ref 0.1–0.9)
MONOCYTES NFR BLD AUTO: 12.8 % (ref 5–12)
NEUTROPHILS NFR BLD AUTO: 2.72 10*3/MM3 (ref 1.7–7)
NEUTROPHILS NFR BLD AUTO: 58.1 % (ref 42.7–76)
NRBC BLD AUTO-RTO: 0 /100 WBC (ref 0–0.2)
PHOSPHATE SERPL-MCNC: 4.1 MG/DL (ref 2.5–4.5)
PLATELET # BLD AUTO: 164 10*3/MM3 (ref 140–450)
PMV BLD AUTO: 8.7 FL (ref 6–12)
POTASSIUM SERPL-SCNC: 4.4 MMOL/L (ref 3.5–5.2)
PROT SERPL-MCNC: 6.2 G/DL (ref 6–8.5)
RBC # BLD AUTO: 3.68 10*6/MM3 (ref 4.14–5.8)
SODIUM SERPL-SCNC: 139 MMOL/L (ref 136–145)
WBC NRBC COR # BLD AUTO: 4.68 10*3/MM3 (ref 3.4–10.8)

## 2024-05-13 PROCEDURE — 25010000002 MAGNESIUM SULFATE 2 GM/50ML SOLUTION: Performed by: INTERNAL MEDICINE

## 2024-05-13 PROCEDURE — 84100 ASSAY OF PHOSPHORUS: CPT

## 2024-05-13 PROCEDURE — 36415 COLL VENOUS BLD VENIPUNCTURE: CPT

## 2024-05-13 PROCEDURE — 83735 ASSAY OF MAGNESIUM: CPT

## 2024-05-13 PROCEDURE — 1126F AMNT PAIN NOTED NONE PRSNT: CPT | Performed by: INTERNAL MEDICINE

## 2024-05-13 PROCEDURE — 99214 OFFICE O/P EST MOD 30 MIN: CPT | Performed by: INTERNAL MEDICINE

## 2024-05-13 PROCEDURE — 3080F DIAST BP >= 90 MM HG: CPT | Performed by: INTERNAL MEDICINE

## 2024-05-13 PROCEDURE — 1159F MED LIST DOCD IN RCRD: CPT | Performed by: INTERNAL MEDICINE

## 2024-05-13 PROCEDURE — 85025 COMPLETE CBC W/AUTO DIFF WBC: CPT

## 2024-05-13 PROCEDURE — 96365 THER/PROPH/DIAG IV INF INIT: CPT

## 2024-05-13 PROCEDURE — 80053 COMPREHEN METABOLIC PANEL: CPT

## 2024-05-13 PROCEDURE — 96401 CHEMO ANTI-NEOPL SQ/IM: CPT

## 2024-05-13 PROCEDURE — 25010000002 DARATUMUMAB-HYALURONIDASE-FIHJ 1800-30000 MG-UT/15ML SOLUTION: Performed by: INTERNAL MEDICINE

## 2024-05-13 PROCEDURE — 3077F SYST BP >= 140 MM HG: CPT | Performed by: INTERNAL MEDICINE

## 2024-05-13 PROCEDURE — 1160F RVW MEDS BY RX/DR IN RCRD: CPT | Performed by: INTERNAL MEDICINE

## 2024-05-13 RX ORDER — MAGNESIUM SULFATE HEPTAHYDRATE 40 MG/ML
2 INJECTION, SOLUTION INTRAVENOUS ONCE
Status: COMPLETED | OUTPATIENT
Start: 2024-05-13 | End: 2024-05-13

## 2024-05-13 RX ORDER — MEPERIDINE HYDROCHLORIDE 25 MG/ML
12.5 INJECTION INTRAMUSCULAR; INTRAVENOUS; SUBCUTANEOUS
Status: CANCELLED | OUTPATIENT
Start: 2024-05-13

## 2024-05-13 RX ORDER — DIPHENHYDRAMINE HYDROCHLORIDE 50 MG/ML
50 INJECTION INTRAMUSCULAR; INTRAVENOUS AS NEEDED
Status: CANCELLED | OUTPATIENT
Start: 2024-05-13

## 2024-05-13 RX ORDER — CARVEDILOL 12.5 MG/1
12.5 TABLET ORAL
COMMUNITY
Start: 2024-04-25 | End: 2025-04-25

## 2024-05-13 RX ORDER — FAMOTIDINE 10 MG/ML
20 INJECTION, SOLUTION INTRAVENOUS AS NEEDED
Status: CANCELLED | OUTPATIENT
Start: 2024-05-13

## 2024-05-13 RX ADMIN — DARATUMUMAB AND HYALURONIDASE-FIHJ (HUMAN RECOMBINANT) 1800 MG: 1800; 30000 INJECTION SUBCUTANEOUS at 11:55

## 2024-05-13 RX ADMIN — MAGNESIUM SULFATE HEPTAHYDRATE 2 G: 40 INJECTION, SOLUTION INTRAVENOUS at 11:21

## 2024-05-13 NOTE — PROGRESS NOTES
Subjective     CHIEF COMPLAINT:      Chief Complaint   Patient presents with    Follow-up     HISTORY OF PRESENT ILLNESS:     Contreras Albert is a 68 y.o. male patient who returns today for follow up on his multiple myeloma.  He returns today for follow-up reporting that he developed muscle cramps in the left thigh.  He stopped having them about a week ago.  He reports that he was walking more over the past 3 weeks.    Patient reports that he saw his nephrologist recently and the dose of his carvedilol was increased to 1 tablet twice daily due to high blood pressure readings.    Patient reports stable leg swelling.  No recent worsening.  He is not noticing any fluid coming from the legs.    ROS:  Pertinent ROS is in the HPI.     Past medical, surgical, social and family history were reviewed.     MEDICATIONS:    Current Outpatient Medications:     Accu-Chek Softclix Lancets lancets, Check 3 times a day on insulin tx, poor control, hypoglycemia. Dx: E 11.22, Disp: 300 each, Rfl: 3    acetaminophen (TYLENOL) 325 MG tablet, Take 2 tablets by mouth Every 6 (Six) Hours As Needed for Mild Pain. (Patient taking differently: Take 2 tablets by mouth Every 6 (Six) Hours As Needed for Mild Pain. As needed), Disp: , Rfl:     acyclovir (ZOVIRAX) 800 MG tablet, TAKE 1 TABLET BY MOUTH EVERY DAY, Disp: 90 tablet, Rfl: 2    ASPIRIN 81 PO, Take 81 mg by mouth Daily., Disp: , Rfl:     atorvastatin (LIPITOR) 10 MG tablet, Take 1 tablet by mouth Every Night., Disp: 90 tablet, Rfl: 1    bumetanide (BUMEX) 2 MG tablet, Take 1 tablet by mouth Daily., Disp: , Rfl:     calcium carbonate (TUMS) 500 MG chewable tablet, Chew 4 tablets Daily., Disp: , Rfl:     carvedilol (COREG) 12.5 MG tablet, Take 1 tablet by mouth., Disp: , Rfl:     Continuous Blood Gluc  (FreeStyle Jin 2 [place]) device, USE 1 FOR 1 DOSE, Disp: , Rfl:     Continuous Blood Gluc Sensor (FreeStyle Jin 2 Sensor) misc, Use 1 each Every 14 (Fourteen) Days. 2 for a 28 day  supply, Disp: 2 each, Rfl: 3    DARATUMUMAB IV, Infuse  into a venous catheter., Disp: , Rfl:     dexAMETHasone (DECADRON) 4 MG tablet, Take 3 tablets on day of chemo, 1 tablet on day #2 and 1 tablet on day #3 and repeat monthly., Disp: 15 tablet, Rfl: 1    diphenoxylate-atropine (LOMOTIL) 2.5-0.025 MG per tablet, Take 2 tablets by mouth 4 (Four) Times a Day As Needed for Diarrhea., Disp: 120 tablet, Rfl: 0    escitalopram (LEXAPRO) 5 MG tablet, Take 1 tablet by mouth Daily., Disp: 30 tablet, Rfl: 5    finasteride (PROSCAR) 5 MG tablet, Take 1 tablet by mouth Daily., Disp: , Rfl:     gabapentin (NEURONTIN) 300 MG capsule, TAKE 1 CAPSULE BY MOUTH EVERY MORNING AND 2 CAPSULES BY MOUTH EVERY EVENING, Disp: 90 capsule, Rfl: 3    glucagon (GLUCAGEN) 1 MG injection, Inject 1 mg into the appropriate muscle as directed by prescriber 1 (One) Time As Needed for Low Blood Sugar. Follow package directions for low blood sugar., Disp: 1 kit, Rfl: 10    glucose blood (Accu-Chek Guide) test strip, Check 3 times a day on insulin tx, poor control, hypoglycemia. Dx: E 11.22, Disp: 300 each, Rfl: 3    icosapent ethyl (VASCEPA) 1 g capsule capsule, TAKE 2 CAPSULES BY MOUTH TWICE DAILY WITH MEALS, Disp: 360 capsule, Rfl: 1    Insulin Lispro, 1 Unit Dial, (HUMALOG) 100 UNIT/ML solution pen-injector, NONCHEMO DAYS: 10 USE FOR BREAKFAST AND LUNCH, 12 USE AT SUPPER. CHEMO DAYS: 15 UNITS 3X DAILY WITH MEALS, Disp: 54 mL, Rfl: 1    Insulin Pen Needle 32G X 4 MM misc, USING 4 PEN NEEDLES DAILY, Disp: 400 each, Rfl: 3    Lantus SoloStar 100 UNIT/ML injection pen, NONCHEMO DAYS: 16 UNITS EVERY MORNING AND 18 UNITS EVERY EVENING; CHEMO DAYS: 30 UNITS TWICE DAILY; MAX DAILY DOSE: 70 UNITS, Disp: 63 mL, Rfl: 1    omega-3 acid ethyl esters (LOVAZA) 1 g capsule, Take 2 capsules by mouth 2 (Two) Times a Day., Disp: 360 capsule, Rfl: 1    ondansetron (ZOFRAN) 8 MG tablet, Take 1 tablet by mouth 3 (Three) Times a Day As Needed for Nausea or Vomiting.,  "Disp: 30 tablet, Rfl: 5    Phospha 250 Neutral 155-852-130 MG tablet, TAKE 1 TABLET BY MOUTH DAILY, Disp: 30 tablet, Rfl: 1    pomalidomide (POMALYST) 2 MG chemo capsule, Take 1 capsule by mouth Daily., Disp: 21 capsule, Rfl: 0    tamsulosin (FLOMAX) 0.4 MG capsule 24 hr capsule, Take 1 capsule by mouth Daily., Disp: , Rfl:     vitamin D (ERGOCALCIFEROL) 1.25 MG (73205 UT) capsule capsule, TAKE 1 CAPSULE BY MOUTH EVERY 7 DAYS, Disp: 13 capsule, Rfl: 1    carvedilol (COREG) 12.5 MG tablet, Take 0.5 tablets by mouth 2 (Two) Times a Day With Meals. (Patient not taking: Reported on 5/13/2024), Disp: , Rfl:   Objective     VITAL SIGNS:     Vitals:    05/13/24 1017   BP: 152/91   Pulse: 66   Resp: 16   Temp: 97.9 °F (36.6 °C)   TempSrc: Oral   SpO2: 99%   Weight: (!) 142 kg (312 lb 3.2 oz)   Height: 188 cm (74.02\")   PainSc: 0-No pain     Body mass index is 40.07 kg/m².     Wt Readings from Last 5 Encounters:   05/13/24 (!) 142 kg (312 lb 3.2 oz)   04/15/24 (!) 140 kg (307 lb 14.4 oz)   03/18/24 (!) 139 kg (306 lb 4.8 oz)   02/27/24 (!) 142 kg (313 lb 9.6 oz)   02/19/24 (!) 144 kg (317 lb 12.8 oz)     PHYSICAL EXAMINATION:   GENERAL: The patient appears in good general condition, not in acute distress.   SKIN: No ecchymosis.  EYES: No jaundice. Pallor.  CHEST: Normal respiratory effort.  Lungs clear bilaterally.  No added sounds.  CVS: Normal S1-S2.  No murmurs.  ABDOMEN: Protuberant.  EXTREMITIES: Bilateral leg edema.  No calf tenderness.    DIAGNOSTIC DATA:     Results from last 7 days   Lab Units 05/13/24  1010   WBC 10*3/mm3 4.68   NEUTROS ABS 10*3/mm3 2.72   HEMOGLOBIN g/dL 11.2*   HEMATOCRIT % 34.8*   PLATELETS 10*3/mm3 164     Results from last 7 days   Lab Units 05/13/24  1010   SODIUM mmol/L 139   POTASSIUM mmol/L 4.4   CHLORIDE mmol/L 102   CO2 mmol/L 24.5   BUN mg/dL 38*   CREATININE mg/dL 3.21*   CALCIUM mg/dL 8.6   ALBUMIN g/dL 3.9   BILIRUBIN mg/dL 0.4   ALK PHOS U/L 49   ALT (SGPT) U/L 13   AST (SGOT) U/L " 13   GLUCOSE mg/dL 151*   MAGNESIUM mg/dL 1.6     Component      Latest Ref Rng 2/19/2024 3/18/2024 4/15/2024   IgG      603 - 1613 mg/dL 576 (L)  577 (L)  668    IgA      61 - 437 mg/dL 67  68  69    IgM      20 - 172 mg/dL 17 (L)  15 (L)  15 (L)    Total Protein      6.0 - 8.5 g/dL 5.8 (L)  5.5 (L)  6.0    Albumin      2.9 - 4.4 g/dL 3.6  3.2  3.4    Alpha-1-Globulin      0.0 - 0.4 g/dL 0.2  0.2  0.2    Alpha-2-Globulin      0.4 - 1.0 g/dL 0.7  0.8  0.9    Beta Globulin      0.7 - 1.3 g/dL 0.7  0.7  0.8    Gamma Globulin      0.4 - 1.8 g/dL 0.5  0.6  0.7    M-Jurgen      Not Observed g/dL Comment:  Not Observed  Not Observed    Globulin      2.2 - 3.9 g/dL 2.2  2.3  2.6    A/G Ratio      0.7 - 1.7  1.7  1.4  1.4    Immunofixation Reflex, Serum Comment !  Comment:  Comment:    Please note Comment  Comment  Comment    Kappa FLC      3.3 - 19.4 mg/L 50.2 (H)  50.1 (H)  55.2 (H)    Free Lambda Light Chains      5.7 - 26.3 mg/L 35.0 (H)  34.1 (H)  35.1 (H)    Kappa/Lambda Ratio      0.26 - 1.65  1.43  1.47  1.57      Assessment & Plan    *IgG lambda multiple myeloma.  Bone marrow 5/23/2020 hypercellular marrow with 80% involvement of plasma cell myeloma; FISH studies pending  Bone survey 5/26/2020: Diffuse osteoporosis and demineralization.  However, no discrete lytic lesions.  SPEP 5/23/2020: M spike 5.1.  IgG 6629 lambda.  Light chain ratio 0 with free lambda light chains 6400.  Beta-2 microglobulin 16.3.  24-hour urine 12.7 g protein per 24-hour with monoclonal lambda free light chain 33.9%, monoclonal IgG lambda 23.5%  24-hour urine testing from 5/24/2020 free lambda light chains 8.4 g/L  Due to the renal failure, CyBorD regimen was chosen and was started on 5/28/2020.    On 6/18/2020, Velcade was changed to 1.5 mg/m² weekly continuously along with weekly Decadron and oral Cytoxan.  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   Treatment was changed on 8/27/2020 to to Daratumumab subcutaneous injection  weekly along with Revlimid 10 mg daily for 21 out of 28-day cycle and Decadron 40 mg daily.  S/p stem cell transplant on 12/9/2020 at [facility].  Day 100 bone marrow on 3/16/2021 revealed minute  plasma cell population.  PET scan on 3/29/2021 was negative.  Patient enrolled in the  clinical trial randomizing to maintenance lenalidomide versus lenalidomide and daratumumab.  Patient was randomized to the Revlimid arm initiated 4/13/2021 dosed at 5 mg daily (due to renal function).  Patient developed neutropenia requiring dose adjustment to 5 mg daily for 21/28 days.  Patient withdrew from clinical trial after 5 cycles on 8/31/2021 due to worsening diarrhea.    He was switched to Velcade maintenance every other week 9/14/2021 which he discontinued 10/26/2021 due to neuropathy and lower extremity edema.    He was subsequently changed to pomalidomide 2 mg daily 21/28 days on 11/8/2021.    1 year post transplant, bone marrow biopsy showed 5-7% plasma cells and due to the residual disease he was started on DPd (daratumumab, pomalidomide, dexamethasone) on 2/21/2022.    Patient was seen by Dr. Romero on 9/12/2022.  At that time patient was cycle 8-day 7 DPd. Due to chronic side effects, dexamethasone was changed from weekly to monthly.    Patient did continue pomalidomide on his own during admission to the hospital, received 1 dose 4 mg p.o. on 9/24/2022 before was subsequently discontinued due to cytopenias.   Dexamethasone was changed to 12 mg on Mondays, 4 mg on Tuesday and 4 mg on Wednesday.  Pomalyst dose was reduced to 3 mg daily for 21 out of a 28-day cycle starting on 10/31/2022.  Due to neutropenia, the dose of Pomalyst was subsequently reduced to 2 mg daily for 21/28-day cycle.  9/5/2023: Treatment held due to weakness and postural dizziness.  9/5/2023: No M protein.  Kappa FLC 49.6.  Lambda FLC 32.4.  Kappa to lambda ratio 1.53.  He did not notice improvement in his symptoms while off  Darzalex and Pomalyst.  Therefore, his symptoms were not attributed to treatment.  He was restarted on Darzalex and Pomalyst on 10/2/2023.  He is tolerating the treatment well.  4/15/2024: M protein was not detected.    Kappa FLC was 55.2.  Lambda FLC was 35.1.  Kappa to lambda ratio was 1.57.    *Bone health.  He is on Xgeva.   Patient developed severe hypocalcemia following the Xgeva injection.  He required inpatient hospital stay.  Given the duration of Xgeva injections, recommended changing treatment to every 3 months starting December 2022.   Last dose of Xgeva was on 12/26/2023.  Due to hypomagnesemia, Xgeva was held on 3/18/2024 but was given 4/15/2024.  Next dose is due in 2 months.    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  Patient was receiving Procrit at [facility].  Records from [facility] showed that the patient was receiving 70,000 units weekly and received the last dose on 9/27/2022.  Patient was given Procrit 70,000 units on 9/27/2022.  Hemoglobin was 9.9 on 10/17/2022.  He was given Procrit 60,000 units.  Hemoglobin was 9.8 on 11/4/2022.  He was given Procrit 60,000 units.  Labs in March 2023 revealed adequate iron stores.  Last dose of Procrit was 25,000 units on 5/1/2023 for hemoglobin of 9.9.  5/13/2024: Hemoglobin 11.2.    *Neutropenia.  This was attributed to Pomalyst.  Neutrophil count was down to 290 on 9/25/2022.  He was given Neupogen during his hospital stay in September 2022.  Neutrophil count improved afterwards.  However, neutrophil count decreased to 600 on 12/19/2022.  He reports that he was feeling tired and achy at that time.  Neutrophil count was 770 on 1/16/2023.  Due to the recurrent neutropenia, the dose of Pomalyst was reduced to 2 mg in January 2023.  Neutrophil count improved following the dose reduction.  3/18/2024: Neutrophil count 2610.  5/13/2024: Neutrophil count  2720.    *Hypocalcemia.  Patient developed severe hypocalcemia due to Xgeva.  Calcium was 5.9 on 9/23/2022.  Patient was placed on calcium replacement.  Calcium dose was subsequently increased to 3 tablets daily.  8/7/2023: Calcium 8.1. Albumin 3.6.  The dose was increased to 4 tablets daily.  3/18/2024: Calcium 8.4.  Albumin borderline at 3.5.  5/13/2024: Calcium 8.6.  Albumin 3.9.    *Hypomagnesemia.  Magnesium was 1.1 on 10/24/2022.  He was placed on oral magnesium oxide 400 mg twice daily.  He developed diarrhea and it was stopped.  He was started on magnesium chloride on 1/23/2023.  He developed diarrhea about 7 days after starting the magnesium chloride.  Oral magnesium chloride was discontinued.  He receives IV magnesium per protocol based on his magnesium level.  3/18/2024: Magnesium 1.6. He was given IV magnesium sulfate.  5/13/2024: Magnesium 1.6.      *Prophylaxis.  He is on acyclovir 800 mg daily.  He is on ASA 81 mg daily.  No recent infections.    *Episodes of dizziness and falling.  Patient reports that the episodes develop after he stands up and starts walking.  No dizziness while sitting.  No restricted chest pain or shortness of breath.  Blood pressure was 133/80 and heart rate was 66 in the sitting position today.  After standing, blood pressure was 119/76 and heart rate was 69.  No improvement in the symptoms after holding Pomalyst for 2 weeks.  MRI brain on 7/26/2023 showed small vessel ischemic changes.  There were changes of 3 old strokes.  I explained the findings to the patient.  I recommended evaluation by cardiology and neurology.  I also recommended home physical therapy  I recommended that he uses a walker when he ambulates.  His evaluation showed possible changes of postural hypotension.   He is concerned that this is due to his medications. I explained that while postural hypotension is a side effect of medications, it is not a common side effect of Darzalex or Pomalyst.  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  Symptoms did not improve after placing Darzalex and Pomalyst on hold.  On 10/2/2023, we recommended reducing Bumex to 1 mg daily and monitoring his symptoms and the leg swelling.  He did not notice a difference in his postural dizziness after the dose reduction.   Bumex was increased back to 2 mg daily due to worsening of his edema.   This dose is controlling his leg edema.    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  It is controlled with Lexapro 5 mg daily.    *Vitamin D deficiency.  He is on vitamin D 50,000 units weekly.  Vitamin D level was 18 on 10/3/2022.  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  He is on gabapentin 300 mg in the morning and 600 mg in the evening.  Neuropathy symptoms in the feet are better as the swelling improved.     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  S/p right partial nephrectomy on 5/18/2016.    PLAN:    1.  Continue treatment with Darzalex, Pomalyst and dexamethasone.  We will give Darzalex today.   2.  He will continue Pomalyst 2 mg daily for 21 days of a 28-day cycle.   3.  He will continue dexamethasone 20 mg monthly.  He takes 12 mg on the day of Darzalex injection.  He takes 4 mg on days 2 and 3.  4.  We will give magnesium sulfate 2 g IV today.  5.  Continue calcium carbonate 4 tablets daily.  6.  Continue Neutra-Phos 1 tablet daily.  7.  Continue acyclovir 800 mg daily.  8.  Continue aspirin 81 mg daily.  9.  I will see him in follow-up in 4 weeks.  CBC CMP magnesium phosphorus SPEP [place] FLC will be obtained.      Red Del Valle MD  05/13/24

## 2024-05-15 ENCOUNTER — SPECIALTY PHARMACY (OUTPATIENT)
Dept: PHARMACY | Facility: HOSPITAL | Age: 69
End: 2024-05-15
Payer: MEDICARE

## 2024-05-15 LAB
ALBUMIN SERPL ELPH-MCNC: 3.6 G/DL (ref 2.9–4.4)
ALBUMIN/GLOB SERPL: 1.6 {RATIO} (ref 0.7–1.7)
ALPHA1 GLOB SERPL ELPH-MCNC: 0.2 G/DL (ref 0–0.4)
ALPHA2 GLOB SERPL ELPH-MCNC: 0.9 G/DL (ref 0.4–1)
B-GLOBULIN SERPL ELPH-MCNC: 0.8 G/DL (ref 0.7–1.3)
GAMMA GLOB SERPL ELPH-MCNC: 0.6 G/DL (ref 0.4–1.8)
GLOBULIN SER-MCNC: 2.4 G/DL (ref 2.2–3.9)
IGA SERPL-MCNC: 75 MG/DL (ref 61–437)
IGG SERPL-MCNC: 670 MG/DL (ref 603–1613)
IGM SERPL-MCNC: 15 MG/DL (ref 20–172)
INTERPRETATION SERPL IEP-IMP: ABNORMAL
KAPPA LC FREE SER-MCNC: 49.1 MG/L (ref 3.3–19.4)
KAPPA LC FREE/LAMBDA FREE SER: 1.32 {RATIO} (ref 0.26–1.65)
LABORATORY COMMENT REPORT: ABNORMAL
LAMBDA LC FREE SERPL-MCNC: 37.3 MG/L (ref 5.7–26.3)
M PROTEIN SERPL ELPH-MCNC: ABNORMAL G/DL
PROT SERPL-MCNC: 6 G/DL (ref 6–8.5)

## 2024-05-15 NOTE — PROGRESS NOTES
Specialty Pharmacy Note: Pomalyst (pomalidomide)    Contreras Albert is a 68 y.o. male with multiple myeloma was seen 5/13/24 by Dr. Del Valle. Per provider dictation, no changes to oral oncology regimen Pomalyst (pomalidomide).  Labs Review: The CMP and CBC from 5/13/24 have been reviewed.  Labs are stable for this patient at this time. No dose adjustments necessary.    Specialty pharmacy will continue to follow patient.    Carlos Clarke, [place]  Clinical Oncology Pharmacist  5/15/2024  09:44 EDT

## 2024-05-18 DIAGNOSIS — Z79.4 TYPE 2 DIABETES MELLITUS WITH STAGE 4 CHRONIC KIDNEY DISEASE, WITH LONG-TERM CURRENT USE OF INSULIN: ICD-10-CM

## 2024-05-18 DIAGNOSIS — E11.22 TYPE 2 DIABETES MELLITUS WITH STAGE 4 CHRONIC KIDNEY DISEASE, WITH LONG-TERM CURRENT USE OF INSULIN: ICD-10-CM

## 2024-05-18 DIAGNOSIS — N18.4 TYPE 2 DIABETES MELLITUS WITH STAGE 4 CHRONIC KIDNEY DISEASE, WITH LONG-TERM CURRENT USE OF INSULIN: ICD-10-CM

## 2024-05-20 NOTE — TELEPHONE ENCOUNTER
Rx Refill Note  Requested Prescriptions     Pending Prescriptions Disp Refills    Continuous Glucose Sensor (FreeStyle Jin 2 Sensor) misc [Pharmacy Med Name: FREESTYLE JIN 2 SENSOR] 2 each 3     Sig: USE 1 EVERY 14 DAYS      Last office visit with prescribing clinician: 2/27/2024   Last telemedicine visit with prescribing clinician: Visit date not found   Next office visit with prescribing clinician: 6/27/2024                         Would you like a call back once the refill request has been completed: [] Yes [] No    If the office needs to give you a call back, can they leave a voicemail: [] Yes [] No    Amada Davis MA  05/20/24, 07:56 EDT

## 2024-05-22 DIAGNOSIS — F06.31 DEPRESSION DUE TO PHYSICAL ILLNESS: Primary | ICD-10-CM

## 2024-05-22 RX ORDER — ESCITALOPRAM OXALATE 5 MG/1
5 TABLET ORAL DAILY
Qty: 30 TABLET | Refills: 5 | Status: SHIPPED | OUTPATIENT
Start: 2024-05-22

## 2024-05-23 DIAGNOSIS — F06.31 DEPRESSION DUE TO PHYSICAL ILLNESS: ICD-10-CM

## 2024-05-23 RX ORDER — ESCITALOPRAM OXALATE 5 MG/1
5 TABLET ORAL DAILY
Qty: 30 TABLET | Refills: 5 | OUTPATIENT
Start: 2024-05-23

## 2024-05-28 ENCOUNTER — OFFICE VISIT (OUTPATIENT)
Dept: FAMILY MEDICINE CLINIC | Facility: CLINIC | Age: 69
End: 2024-05-28
Payer: MEDICARE

## 2024-05-28 VITALS
HEART RATE: 72 BPM | SYSTOLIC BLOOD PRESSURE: 136 MMHG | WEIGHT: 305 LBS | HEIGHT: 74 IN | BODY MASS INDEX: 39.14 KG/M2 | DIASTOLIC BLOOD PRESSURE: 80 MMHG | OXYGEN SATURATION: 98 %

## 2024-05-28 DIAGNOSIS — I10 PRIMARY HYPERTENSION: ICD-10-CM

## 2024-05-28 DIAGNOSIS — E78.2 MIXED HYPERLIPIDEMIA: ICD-10-CM

## 2024-05-28 DIAGNOSIS — Z00.00 ENCOUNTER FOR MEDICARE ANNUAL WELLNESS EXAM: Primary | ICD-10-CM

## 2024-05-28 DIAGNOSIS — R19.7 DIARRHEA, UNSPECIFIED TYPE: ICD-10-CM

## 2024-05-28 DIAGNOSIS — Z79.4 TYPE 2 DIABETES MELLITUS WITH BOTH EYES AFFECTED BY SEVERE NONPROLIFERATIVE RETINOPATHY AND MACULAR EDEMA, WITH LONG-TERM CURRENT USE OF INSULIN: ICD-10-CM

## 2024-05-28 DIAGNOSIS — E11.3413 TYPE 2 DIABETES MELLITUS WITH BOTH EYES AFFECTED BY SEVERE NONPROLIFERATIVE RETINOPATHY AND MACULAR EDEMA, WITH LONG-TERM CURRENT USE OF INSULIN: ICD-10-CM

## 2024-05-28 DIAGNOSIS — F41.9 ANXIETY: ICD-10-CM

## 2024-05-28 DIAGNOSIS — C90.00 MULTIPLE MYELOMA, REMISSION STATUS UNSPECIFIED: ICD-10-CM

## 2024-05-28 DIAGNOSIS — N18.4 STAGE 4 CHRONIC KIDNEY DISEASE: ICD-10-CM

## 2024-05-28 DIAGNOSIS — Z12.11 ENCOUNTER FOR SCREENING FOR MALIGNANT NEOPLASM OF COLON: ICD-10-CM

## 2024-05-28 PROCEDURE — 3075F SYST BP GE 130 - 139MM HG: CPT | Performed by: NURSE PRACTITIONER

## 2024-05-28 PROCEDURE — 1160F RVW MEDS BY RX/DR IN RCRD: CPT | Performed by: NURSE PRACTITIONER

## 2024-05-28 PROCEDURE — 99213 OFFICE O/P EST LOW 20 MIN: CPT | Performed by: NURSE PRACTITIONER

## 2024-05-28 PROCEDURE — 3079F DIAST BP 80-89 MM HG: CPT | Performed by: NURSE PRACTITIONER

## 2024-05-28 PROCEDURE — 1126F AMNT PAIN NOTED NONE PRSNT: CPT | Performed by: NURSE PRACTITIONER

## 2024-05-28 PROCEDURE — 1159F MED LIST DOCD IN RCRD: CPT | Performed by: NURSE PRACTITIONER

## 2024-05-28 PROCEDURE — G0439 PPPS, SUBSEQ VISIT: HCPCS | Performed by: NURSE PRACTITIONER

## 2024-05-28 NOTE — PROGRESS NOTES
The ABCs of the Annual Wellness Visit  Subsequent Medicare Wellness Visit    Subjective    Contreras Albert is a 68 y.o. male who presents for a Subsequent Medicare Wellness Visit.    The following portions of the patient's history were reviewed and   updated as appropriate: allergies, current medications, past family history, past medical history, past social history, past surgical history, and problem list.    Compared to one year ago, the patient feels his physical   health is better.    Compared to one year ago, the patient feels his mental   health is better.    Recent Hospitalizations:  He was not admitted to the hospital during the last year.       Current Medical Providers:  Patient Care Team:  Brianna Thompson APRN as PCP - General (Family Medicine)  Chad Garcia MD as Consulting Physician (Urology)  Cisco Haro MD as Consulting Physician (Endocrinology)  Marcus Alvares MD (Hematology and Oncology)  Nabil Kendrick DPM as Consulting Physician (Podiatry)  Norm Hutchison MD as Consulting Physician (Nephrology)  Red Del Valle MD as Consulting Physician (Hematology and Oncology)  Yadira Strauss APRN as Referring Physician (Nurse Practitioner)    Outpatient Medications Prior to Visit   Medication Sig Dispense Refill    Accu-Chek Softclix Lancets lancets Check 3 times a day on insulin tx, poor control, hypoglycemia. Dx: E 11.22 300 each 3    acetaminophen (TYLENOL) 325 MG tablet Take 2 tablets by mouth Every 6 (Six) Hours As Needed for Mild Pain. (Patient taking differently: Take 2 tablets by mouth Every 6 (Six) Hours As Needed for Mild Pain. As needed)      acyclovir (ZOVIRAX) 800 MG tablet TAKE 1 TABLET BY MOUTH EVERY DAY 90 tablet 2    ASPIRIN 81 PO Take 81 mg by mouth Daily.      atorvastatin (LIPITOR) 10 MG tablet Take 1 tablet by mouth Every Night. 90 tablet 1    bumetanide (BUMEX) 2 MG tablet Take 1 tablet by mouth Daily.      calcium carbonate (TUMS) 500 MG  chewable tablet Chew 4 tablets Daily.      carvedilol (COREG) 12.5 MG tablet Take 1 tablet by mouth 2 (Two) Times a Day With Meals.      Continuous Blood Gluc  (FreeStyle Jin 2 [place]) device USE 1 FOR 1 DOSE      Continuous Glucose Sensor (FreeStyle Jin 2 Sensor) misc USE 1 EVERY 14 DAYS 2 each 3    DARATUMUMAB IV Infuse  into a venous catheter.      dexAMETHasone (DECADRON) 4 MG tablet Take 3 tablets on day of chemo, 1 tablet on day #2 and 1 tablet on day #3 and repeat monthly. 15 tablet 1    diphenoxylate-atropine (LOMOTIL) 2.5-0.025 MG per tablet Take 2 tablets by mouth 4 (Four) Times a Day As Needed for Diarrhea. 120 tablet 0    escitalopram (LEXAPRO) 5 MG tablet TAKE 1 TABLET BY MOUTH EVERY DAY 30 tablet 5    finasteride (PROSCAR) 5 MG tablet Take 1 tablet by mouth Daily.      gabapentin (NEURONTIN) 300 MG capsule TAKE 1 CAPSULE BY MOUTH EVERY MORNING AND 2 CAPSULES BY MOUTH EVERY EVENING 90 capsule 3    glucagon (GLUCAGEN) 1 MG injection Inject 1 mg into the appropriate muscle as directed by prescriber 1 (One) Time As Needed for Low Blood Sugar. Follow package directions for low blood sugar. 1 kit 10    glucose blood (Accu-Chek Guide) test strip Check 3 times a day on insulin tx, poor control, hypoglycemia. Dx: E 11.22 300 each 3    icosapent ethyl (VASCEPA) 1 g capsule capsule TAKE 2 CAPSULES BY MOUTH TWICE DAILY WITH MEALS 360 capsule 1    Insulin Lispro, 1 Unit Dial, (HUMALOG) 100 UNIT/ML solution pen-injector NONCHEMO DAYS: 10 USE FOR BREAKFAST AND LUNCH, 12 USE AT SUPPER. CHEMO DAYS: 15 UNITS 3X DAILY WITH MEALS 54 mL 1    Insulin Pen Needle 32G X 4 MM misc USING 4 PEN NEEDLES DAILY 400 each 3    Lantus SoloStar 100 UNIT/ML injection pen NONCHEMO DAYS: 16 UNITS EVERY MORNING AND 18 UNITS EVERY EVENING; CHEMO DAYS: 30 UNITS TWICE DAILY; MAX DAILY DOSE: 70 UNITS 63 mL 1    Phospha 250 Neutral [id number] MG tablet TAKE 1 TABLET BY MOUTH DAILY 30 tablet 1    tamsulosin (FLOMAX) 0.4 MG capsule 24  hr capsule Take 1 capsule by mouth Daily.      vitamin D (ERGOCALCIFEROL) 1.25 MG (14232 UT) capsule capsule TAKE 1 CAPSULE BY MOUTH EVERY 7 DAYS 13 capsule 1    pomalidomide (POMALYST) 2 MG chemo capsule Take 1 capsule by mouth Daily. 21 capsule 0    omega-3 acid ethyl esters (LOVAZA) 1 g capsule Take 2 capsules by mouth 2 (Two) Times a Day. (Patient not taking: Reported on 5/28/2024) 360 capsule 1    ondansetron (ZOFRAN) 8 MG tablet Take 1 tablet by mouth 3 (Three) Times a Day As Needed for Nausea or Vomiting. (Patient not taking: Reported on 5/28/2024) 30 tablet 5    carvedilol (COREG) 12.5 MG tablet Take 0.5 tablets by mouth 2 (Two) Times a Day With Meals. (Patient not taking: Reported on 5/13/2024)       No facility-administered medications prior to visit.       No opioid medication identified on active medication list. I have reviewed chart for other potential  high risk medication/s and harmful drug interactions in the elderly.        Aspirin is on active medication list. Aspirin use is indicated based on review of current medical condition/s. Pros and cons of this therapy have been discussed today. Benefits of this medication outweigh potential harm.  Patient has been encouraged to continue taking this medication.  .      Patient Active Problem List   Diagnosis    Renal mass    Anemia    Enlarged prostate    Benign hypertension with CKD (chronic kidney disease) stage IV    History of renal cell cancer    H/O partial nephrectomy - right    Multiple myeloma    Hypomagnesemia    Anemia in stage 3 chronic kidney disease    Cold intolerance    Mixed hyperlipidemia    BPH (benign prostatic hyperplasia)    Class 2 severe obesity due to excess calories with serious comorbidity and body mass index (BMI) of 39.0 to 39.9 in adult    Age-related nuclear cataract of both eyes    Amblyopia of left eye    Bilateral hypertensive retinopathy    Bilateral myopia    History of malignant neoplasm of kidney    Severe  "nonproliferative diabetic retinopathy with clinically significant macular edema    Tinnitus    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, bilateral    Malignant neoplasm of kidney    Anxiety    Type 2 diabetes mellitus with stage 4 chronic kidney disease, with long-term current use of insulin    Other specified postprocedural states    Vitreous hemorrhage of right eye    Primary hypertension    Cataract    Hypocalcemia    Pancytopenia    Metabolic acidosis    Hypervolemia    Epiretinal membrane (ERM) of both eyes    Stage 4 chronic kidney disease    Vitamin D deficiency    Multiple myeloma not having achieved remission    Other secondary cataract, bilateral    Heartburn    Bilateral pseudophakia     Advance Care Planning   Advance Care Planning     Advance Directive is not on file.  ACP discussion was held with the patient during this visit. Patient does not have an advance directive, information provided.     Objective    Vitals:    05/28/24 0808 05/28/24 0903   BP: 136/80    Pulse: 72    SpO2: 98%    Weight: Comment: 305 lbs (!) 138 kg (305 lb)   Height: 188 cm (74\")      Estimated body mass index is 39.16 kg/m² as calculated from the following:    Height as of this encounter: 188 cm (74\").    Weight as of this encounter: 138 kg (305 lb).    Class 3 Severe Obesity (BMI >=40). Obesity-related health conditions include the following: hypertension and dyslipidemias. Obesity is unchanged. BMI is is above average; BMI management plan is completed. We discussed low calorie, low carb based diet program, portion control, and increasing exercise.      Does the patient have evidence of cognitive impairment?  No as evidenced by mini-cog assessment, see results.          HEALTH RISK ASSESSMENT    Smoking Status:  Social History     Tobacco Use   Smoking Status Never   Smokeless Tobacco Never     Alcohol Consumption:  Social History     Substance and Sexual Activity   Alcohol " Use No     Fall Risk Screen:    STEADI Fall Risk Assessment was completed, and patient is at MODERATE risk for falls. Assessment completed on:2024    Depression Screenin/28/2024     8:15 AM   PHQ-2/PHQ-9 Depression Screening   Little Interest or Pleasure in Doing Things 0-->not at all   Feeling Down, Depressed or Hopeless 0-->not at all   PHQ-9: Brief Depression Severity Measure Score 0       Health Habits and Functional and Cognitive Screenin/28/2024     8:15 AM   Functional & Cognitive Status   Do you have difficulty preparing food and eating? No   Do you have difficulty bathing yourself, getting dressed or grooming yourself? No   Do you have difficulty using the toilet? No   Do you have difficulty moving around from place to place? No   Do you have trouble with steps or getting out of a bed or a chair? No   Current Diet Well Balanced Diet   Dental Exam Not up to date   Eye Exam Up to date   Do you need help using the phone?  No   Are you deaf or do you have serious difficulty hearing?  Yes   Do you need help to go to places out of walking distance? Yes   Do you need help shopping? No   Do you need help preparing meals?  No   Do you need help with housework?  No   Do you need help with laundry? No   Do you need help taking your medications? No   Do you need help managing money? No   Do you ever drive or ride in a car without wearing a seat belt? No   Who do you live with? Spouse   If you need help, do you have trouble finding someone available to you? No   Have you been bothered in the last four weeks by sexual problems? No   Do you have difficulty concentrating, remembering or making decisions? No       Age-appropriate Screening Schedule:  Refer to the list below for future screening recommendations based on patient's age, sex and/or medical conditions. Orders for these recommended tests are listed in the plan section. The patient has been provided with a written plan.    Health Maintenance    Topic Date Due    RSV Vaccine - Adults (1 - 1-dose 60+ series) Never done    HEPATITIS C SCREENING  Never done    COLORECTAL CANCER SCREENING  05/22/2021    Pneumococcal Vaccine 65+ (2 of 2 - PPSV23 or PCV20) 05/03/2022    Hepatitis B (3 of 3 - 19+ 3-dose series) 07/04/2022    URINE MICROALBUMIN  01/03/2024    COVID-19 Vaccine (7 - 2023-24 season) 01/16/2024    HEMOGLOBIN A1C  02/29/2024    DIABETIC EYE EXAM  04/26/2024    INFLUENZA VACCINE  08/01/2024    DIABETIC FOOT EXAM  10/05/2024    LIPID PANEL  11/29/2024    ANNUAL WELLNESS VISIT  05/28/2025    BMI FOLLOWUP  05/28/2025    TDAP/TD VACCINES (3 - Td or Tdap) 03/08/2032    ZOSTER VACCINE  Completed     No hearing aids, has had several hearing tests in past and could not afford hearing aids; has new insurance coverage and plans to have checked again.  Spouse can help with transportation if needed.    Will come back for PCV20, not available today.  Will consider RSV, COVID-19 booster at pharmacy.           CMS Preventative Services Quick Reference  Risk Factors Identified During Encounter  Fall Risk-High or Moderate: Discussed Fall Prevention in the home  Immunizations Discussed/Encouraged: Prevnar 20 (Pneumococcal 20-valent conjugate), COVID19, and RSV (Respiratory Syncytial Virus)  The above risks/problems have been discussed with the patient.    Discussed moderate risk for falls and recommended avoiding throw rugs, installing grab bars in bathroom, making sure have handrails on steps, etc.    Pertinent information has been shared with the patient in the After Visit Summary.  An After Visit Summary and PPPS were made available to the patient.    Follow Up:   Next Medicare Wellness visit to be scheduled in 1 year.       Additional E&M Note during same encounter follows:  Patient has multiple medical problems which are significant and separately identifiable that require additional work above and beyond the Medicare Wellness Visit.      Chief Complaint  Annual  Exam and Hypertension    Subjective        HPI  Contreras Albert is also being seen today for follow up HTN/edema: takes Bumex daily and Carvedilol twice daily; has gained some weight over last couple of years and BP was running higher, so increased Carvedilol dose over the last month or so and BP has been good for last 2-3 weeks; sees Dr. Gonzalez renal; swelling in legs has improved; no headaches; no orthostasis recently; tries to change positions slowly.      F/U DM2: takes Lantus twice daily and Humalog with meals; sees Dr. Haro, endo; has follow up next month; last A1c 8.1%; has continuous glucose monitor; blood sugar typically runs 90-110s; takes steroids with chemo monthly and will have elevated blood sugar for 2-3 days after taking steroids and will take about 1 week to get back to normal; watches carbs/sugars in diet; some exercise, has been training puppy and doing more walking; also does some leg lifts when sitting; has numbness in bilateral 4th-5th fingers and from knees down bilaterally; has very decreased sensation in both feet, but gradually improving; symptoms started after initial treatment for multiple myeloma; symptoms affect balance, but has improved; sees podiatry regularly; takes Gabapentin twice daily and helps; last [facility] April 2023 [facility]; has had some blurry vision reading up close; needs to reschedule follow up appointment.     F/U Hyperlipidemia: takes Atorvastatin daily and Vascepa twice daily; no myalgias; watches saturated fats in diet some, plans to do better; fasting today.     F/U heartburn: takes 2 TUMS daily for low calcium and works well; no indigestion or heartburn for long time.     F/U anxiety: takes Escitalopram daily and works well; has been feeling much better over the last 6 months knowing that doing much better as far as multiple myeloma; no problems with sleep most of the time; will sleep longer if has been more active; no SI/HI.     Takes Tamsulosin  "daily; sees urology, Dr. Garcia and monitors PSA; last PSA 5.0     F/U Vitamin D deficiency: takes Vitamin D weekly.     F/U multiple myeloma: stable; takes Pomalyst 21 days on and 7 days off; currently in high state of remission; so continuing decreased dose; has had diarrhea off and on for 4 years, but really improved after changed to lower dose of Pomalyst; wondering if also related to Acyclovir and plans to discuss with oncology; typically takes Lomotil about 1-2 times per week and helps; also gets injection of Daratumumab with high dose steroids every 4 weeks; blood sugar will be elevated for period of time after taking steroids;  sees Dr. Del Valle, oncology; also gets Procrit injections periodically for anemia at this point, previously getting weekly.     Has chronic tinnitus due to hardening of auditory nerves.       Review of Systems   Constitutional:  Negative for appetite change, chills and fever. Fatigue: some in general, but has improved in last couple of years.  HENT:  Negative for ear pain, sore throat (some about 1 month ago, resolved, attributed to allergies) and trouble swallowing.    Eyes:  Negative for discharge. Visual disturbance: see HPI.  Respiratory:  Negative for cough, chest tightness and shortness of breath.    Cardiovascular:  Negative for chest pain and palpitations.   Gastrointestinal:  Negative for abdominal pain, blood in stool and constipation (rare mild constipation). Diarrhea: see HPI.  Endocrine: Positive for cold intolerance (no change). Negative for heat intolerance. Polydipsia: some at times; tries to get more water and less diet soda.  Genitourinary:  Negative for dysuria and frequency.   Musculoskeletal:  Negative for arthralgias (knee pain resolved) and back pain.   Skin:  Negative for rash.   Neurological:  Negative for syncope and weakness.   Hematological:  Does not bruise/bleed easily.       Objective   Vital Signs:  /80   Pulse 72   Ht 188 cm (74\")   Wt (!) 138 " kg (305 lb)   SpO2 98%   BMI 39.16 kg/m²     Physical Exam  Vitals and nursing note reviewed.   Constitutional:       General: He is not in acute distress.     Appearance: He is well-developed and well-groomed. He is not diaphoretic.   HENT:      Head: Normocephalic and atraumatic.      Jaw: No tenderness or pain on movement.      Right Ear: External ear normal. Right ear decreased hearing: able to hear finger rub. Right ear middle ear effusion: mild. Tympanic membrane is not erythematous.      Left Ear: External ear normal. Left ear decreased hearing: able to hear finger rub. Left ear middle ear effusion: mild. Tympanic membrane is not erythematous.      Nose: Nose normal.      Right Sinus: No maxillary sinus tenderness or frontal sinus tenderness.      Left Sinus: No maxillary sinus tenderness or frontal sinus tenderness.      Mouth/Throat:      Mouth: Mucous membranes are moist.      Pharynx: No oropharyngeal exudate or posterior oropharyngeal erythema.   Eyes:      Extraocular Movements: Extraocular movements intact.      Conjunctiva/sclera: Conjunctivae normal.      Pupils: Pupils are equal, round, and reactive to light.   Neck:      Thyroid: No thyromegaly.      Vascular: No carotid bruit.      Trachea: No tracheal deviation.   Cardiovascular:      Rate and Rhythm: Normal rate and regular rhythm.      Pulses: Normal pulses.      Heart sounds: Normal heart sounds.   Pulmonary:      Effort: Pulmonary effort is normal. No respiratory distress.      Breath sounds: Normal breath sounds.   Abdominal:      General: Bowel sounds are normal.      Palpations: Abdomen is soft. There is no hepatomegaly or splenomegaly.      Tenderness: There is no abdominal tenderness. There is no guarding.   Musculoskeletal:         General: Normal range of motion.      Cervical back: Normal range of motion and neck supple. No bony tenderness.      Thoracic back: No bony tenderness.      Lumbar back: No bony tenderness.      Right  lower leg: Right lower leg edema: 1+ nonpitting pretibial/ankle.      Left lower leg: Left lower leg edema: 1+ nonpitting pretibial/ankle.   Lymphadenopathy:      Cervical: No cervical adenopathy.   Skin:     General: Skin is warm and dry.      Findings: No rash.   Neurological:      Mental Status: He is alert and oriented to person, place, and time.      Cranial Nerves: No cranial nerve deficit.      Coordination: Coordination normal.      Gait: Abnormal gait: guarded gait with cane.      Deep Tendon Reflexes: Reflexes are normal and symmetric.   Psychiatric:         Mood and Affect: Mood normal.         Behavior: Behavior normal.         Thought Content: Thought content normal.         Cognition and Memory: Cognition normal.         Judgment: Judgment normal.                Lab Results   Component Value Date    WBC 4.68 05/13/2024    RBC 3.68 (L) 05/13/2024    HGB 11.2 (L) 05/13/2024    HCT 34.8 (L) 05/13/2024    MCV 94.6 05/13/2024    MCH 30.4 05/13/2024    MCHC 32.2 05/13/2024    RDW 15.9 (H) 05/13/2024    RDWSD 54.3 (H) 05/13/2024    MPV 8.7 05/13/2024     05/13/2024    NEUTRORELPCT 58.1 05/13/2024    LYMPHORELPCT 24.4 05/13/2024    MONORELPCT 12.8 (H) 05/13/2024    EOSRELPCT 3.2 05/13/2024    BASORELPCT 1.1 05/13/2024    AUTOIGPER 0.4 05/13/2024    NEUTROABS 2.72 05/13/2024    LYMPHSABS 1.14 05/13/2024    MONOSABS 0.60 05/13/2024    EOSABS 0.15 05/13/2024    BASOSABS 0.05 05/13/2024    AUTOIGNUM 0.02 05/13/2024    NRBC 0.0 05/13/2024     Lab Results   Component Value Date    GLUCOSE 151 (H) 05/13/2024    BUN 38 (H) 05/13/2024    CREATININE 3.21 (C) 05/13/2024    EGFRIFNONA 24 (L) 08/09/2021    EGFRIFAFRI 27 (L) 08/09/2021    BCR 11.8 05/13/2024    K 4.4 05/13/2024    CO2 24.5 05/13/2024    CALCIUM 8.6 05/13/2024    PROTENTOTREF 6.0 05/13/2024    ALBUMIN 3.9 05/13/2024    ALBUMIN 3.6 05/13/2024    LABIL2 1.6 05/13/2024    AST 13 05/13/2024    ALT 13 05/13/2024      Lab Results   Component Value Date     CHLPL 104 11/29/2023    TRIG 406 (H) 11/29/2023    HDL 28 (L) 11/29/2023    VLDL 56 (H) 11/29/2023    LDL 20 11/29/2023     Lab Results   Component Value Date    TSH 3.970 11/29/2023     Lab Results   Component Value Date    HGBA1C 8.10 (H) 08/29/2023            Assessment and Plan   Diagnoses and all orders for this visit:    1. Encounter for Medicare annual wellness exam (Primary)    2. Diarrhea, unspecified type  -     Ambulatory Referral to Gastroenterology    3. Encounter for screening for malignant neoplasm of colon  -     Ambulatory Referral to Gastroenterology    4. Primary hypertension  Assessment & Plan:  Hypertension is stable and controlled  Continue current treatment regimen.  Weight loss.  Regular aerobic exercise.  Ambulatory blood pressure monitoring.  Blood pressure will be reassessed in 6 months.  Continue Carvedilol twice daily and Bumex daily.  Follow up as scheduled with renal.      5. Type 2 diabetes mellitus with both eyes affected by severe nonproliferative retinopathy and macular edema, with long-term current use of insulin  Assessment & Plan:  Diabetes is improving with treatment.   Continue current treatment regimen.  Reminded to get yearly retinal exam.  Diabetes will be reassessed in 6 months  Continue Lantus twice daily and Humalog with meals.  Follow up as scheduled with endocrine.      6. Mixed hyperlipidemia  Assessment & Plan:  Continue Atorvastatin daily and Vascepa twice daily.  Continue to work on healthy diet and exercise as tolerated.      7. Anxiety  Assessment & Plan:  Stable.  Continue Escitalopram daily.      8. Multiple myeloma, remission status unspecified  Assessment & Plan:  Continue current treatment as per oncology.      9. Stage 4 chronic kidney disease  Assessment & Plan:  Follow up as scheduled with renal.               Follow Up   Return in about 6 months (around 11/28/2024) for Recheck.or sooner if symptoms persist or worsen.    Patient was given instructions and  counseling regarding his condition or for health maintenance advice. Please see specific information pulled into the AVS if appropriate.

## 2024-05-28 NOTE — PATIENT INSTRUCTIONS
Continue to monitor your blood sugar once daily before a meal and record results.  Continue to monitor your blood pressure periodically and record results.  Continue to work on healthy diet and exercise.  Follow up pending lab results.  Follow up in 6 months, or sooner if problems or concerns.   Follow up as scheduled with specialists.    Medicare Wellness  Personal Prevention Plan of Service     Date of Office Visit:    Encounter Provider:  IZZY Montes  Place of Service:  [facility]  Patient Name: Contreras Albert  :  1955    As part of the Medicare Wellness portion of your visit today, we are providing you with this personalized preventive plan of services (PPPS). This plan is based upon recommendations of the [place] Preventive Services Task Force (USPSTF) and the Advisory Committee on Immunization Practices (ACIP).    This lists the preventive care services that should be considered, and provides dates of when you are due. Items listed as completed are up-to-date and do not require any further intervention.    Health Maintenance   Topic Date Due    RSV Vaccine - Adults (1 - 1-dose 60+ series) Never done    HEPATITIS C SCREENING  Never done    COLORECTAL CANCER SCREENING  2021    Pneumococcal Vaccine 65+ (2 of 2 - PPSV23 or PCV20) 2022    Hepatitis B (3 of 3 - 19+ 3-dose series) 2022    URINE MICROALBUMIN  2024    COVID-19 Vaccine ( - - season) 2024    HEMOGLOBIN A1C  2024    DIABETIC EYE EXAM  2024    BMI FOLLOWUP  2024    INFLUENZA VACCINE  2024    DIABETIC FOOT EXAM  10/05/2024    LIPID PANEL  2024    ANNUAL WELLNESS VISIT  2025    TDAP/TD VACCINES (3 - Td or Tdap) 2032    ZOSTER VACCINE  Completed       Orders Placed This Encounter   Procedures    Ambulatory Referral to Gastroenterology     Referral Priority:   Routine     Referral Type:   Consultation     Referral Reason:   Specialty  Services Required     Requested Specialty:   Gastroenterology     Number of Visits Requested:   1       Return in about 6 months (around 11/28/2024) for Recheck.

## 2024-05-29 ENCOUNTER — SPECIALTY PHARMACY (OUTPATIENT)
Dept: PHARMACY | Facility: HOSPITAL | Age: 69
End: 2024-05-29
Payer: MEDICARE

## 2024-05-29 DIAGNOSIS — C90.00 MULTIPLE MYELOMA NOT HAVING ACHIEVED REMISSION: ICD-10-CM

## 2024-05-29 NOTE — PROGRESS NOTES
Re: Refills of Oral Specialty Medication - Pomalyst (pomalidomide)    Drug-Drug Interactions: The current medication list was reviewed and there are no relevant drug-drug interactions with the specialty medication.  Medication Allergies: The patient has no relevant allergies as it relates to their oral specialty medication  Review of Labs/Dose Adjustments: NO DOSE CHANGE - I reviewed the most recent note and labs and the patient will continue without any dose changes.  I sent refills as described below.    Drug: Pomalyst (pomalidomide)  Strength: 2 mg  Directions: Take 1 capsule by mouth daily on days 1-21 of each 28 day cycle  Quantity: 21  Refills: 0  Pharmacy prescription sent to: [facility]    Name/Credentials: Carlos Clarke, Evan, BCOP  Clinical Oncology Pharmacist    5/29/2024  16:29 EDT

## 2024-05-30 PROBLEM — R55 POSTURAL DIZZINESS WITH PRESYNCOPE: Status: RESOLVED | Noted: 2023-08-16 | Resolved: 2024-05-30

## 2024-05-30 PROBLEM — R42 POSTURAL DIZZINESS WITH PRESYNCOPE: Status: RESOLVED | Noted: 2023-08-16 | Resolved: 2024-05-30

## 2024-05-30 NOTE — PROGRESS NOTES
Drug: Pomalyst (pomalidomide)  Strength: 2 mg  Directions: Take 1 capsule by mouth daily on days 1-21 of each 28 day cycle  Quantity: 21  Refills: 0  Pharmacy prescription sent to: [facility]    Completed independent double check on medication order/RX.  Yolande Huitron, YumikoD, BCPS

## 2024-05-31 NOTE — ASSESSMENT & PLAN NOTE
Diabetes is improving with treatment.   Continue current treatment regimen.  Reminded to get yearly retinal exam.  Diabetes will be reassessed in 6 months  Continue Lantus twice daily and Humalog with meals.  Follow up as scheduled with endocrine.

## 2024-05-31 NOTE — ASSESSMENT & PLAN NOTE
Hypertension is stable and controlled  Continue current treatment regimen.  Weight loss.  Regular aerobic exercise.  Ambulatory blood pressure monitoring.  Blood pressure will be reassessed in 6 months.  Continue Carvedilol twice daily and Bumex daily.  Follow up as scheduled with renal.

## 2024-06-10 ENCOUNTER — OFFICE VISIT (OUTPATIENT)
Dept: ONCOLOGY | Facility: CLINIC | Age: 69
End: 2024-06-10
Payer: MEDICARE

## 2024-06-10 ENCOUNTER — INFUSION (OUTPATIENT)
Dept: ONCOLOGY | Facility: HOSPITAL | Age: 69
End: 2024-06-10
Payer: MEDICARE

## 2024-06-10 ENCOUNTER — LAB (OUTPATIENT)
Dept: LAB | Facility: HOSPITAL | Age: 69
End: 2024-06-10
Payer: MEDICARE

## 2024-06-10 VITALS
RESPIRATION RATE: 16 BRPM | SYSTOLIC BLOOD PRESSURE: 140 MMHG | HEART RATE: 67 BPM | DIASTOLIC BLOOD PRESSURE: 78 MMHG | OXYGEN SATURATION: 95 % | WEIGHT: 305.6 LBS | HEIGHT: 74 IN | TEMPERATURE: 98.5 F | BODY MASS INDEX: 39.22 KG/M2

## 2024-06-10 DIAGNOSIS — E83.51 HYPOCALCEMIA: ICD-10-CM

## 2024-06-10 DIAGNOSIS — D70.1 CHEMOTHERAPY INDUCED NEUTROPENIA: ICD-10-CM

## 2024-06-10 DIAGNOSIS — C90.00 MULTIPLE MYELOMA NOT HAVING ACHIEVED REMISSION: ICD-10-CM

## 2024-06-10 DIAGNOSIS — Z79.899 HIGH RISK MEDICATION USE: ICD-10-CM

## 2024-06-10 DIAGNOSIS — M89.9 LYTIC BONE LESIONS ON XRAY: ICD-10-CM

## 2024-06-10 DIAGNOSIS — C90.00 MULTIPLE MYELOMA NOT HAVING ACHIEVED REMISSION: Primary | ICD-10-CM

## 2024-06-10 DIAGNOSIS — T45.1X5A CHEMOTHERAPY INDUCED NEUTROPENIA: ICD-10-CM

## 2024-06-10 DIAGNOSIS — D84.9 IMMUNOCOMPROMISED STATE: ICD-10-CM

## 2024-06-10 DIAGNOSIS — E83.42 HYPOMAGNESEMIA: ICD-10-CM

## 2024-06-10 DIAGNOSIS — D63.1 ANEMIA IN STAGE 3B CHRONIC KIDNEY DISEASE: ICD-10-CM

## 2024-06-10 DIAGNOSIS — N18.32 ANEMIA IN STAGE 3B CHRONIC KIDNEY DISEASE: ICD-10-CM

## 2024-06-10 LAB
ALBUMIN SERPL-MCNC: 3.4 G/DL (ref 3.5–5.2)
ALBUMIN/GLOB SERPL: 1.5 G/DL
ALP SERPL-CCNC: 57 U/L (ref 39–117)
ALT SERPL W P-5'-P-CCNC: 9 U/L (ref 1–41)
ANION GAP SERPL CALCULATED.3IONS-SCNC: 11.6 MMOL/L (ref 5–15)
AST SERPL-CCNC: 9 U/L (ref 1–40)
BASOPHILS # BLD AUTO: 0.04 10*3/MM3 (ref 0–0.2)
BASOPHILS NFR BLD AUTO: 1 % (ref 0–1.5)
BILIRUB SERPL-MCNC: 0.4 MG/DL (ref 0–1.2)
BUN SERPL-MCNC: 34 MG/DL (ref 8–23)
BUN/CREAT SERPL: 9.9 (ref 7–25)
CALCIUM SPEC-SCNC: 8.2 MG/DL (ref 8.6–10.5)
CHLORIDE SERPL-SCNC: 101 MMOL/L (ref 98–107)
CO2 SERPL-SCNC: 23.4 MMOL/L (ref 22–29)
CREAT SERPL-MCNC: 3.43 MG/DL (ref 0.76–1.27)
DEPRECATED RDW RBC AUTO: 54.1 FL (ref 37–54)
EGFRCR SERPLBLD CKD-EPI 2021: 18.9 ML/MIN/1.73
EOSINOPHIL # BLD AUTO: 0.16 10*3/MM3 (ref 0–0.4)
EOSINOPHIL NFR BLD AUTO: 3.9 % (ref 0.3–6.2)
ERYTHROCYTE [DISTWIDTH] IN BLOOD BY AUTOMATED COUNT: 15.5 % (ref 12.3–15.4)
FERRITIN SERPL-MCNC: 250 NG/ML (ref 30–400)
GLOBULIN UR ELPH-MCNC: 2.3 GM/DL
GLUCOSE SERPL-MCNC: 223 MG/DL (ref 65–99)
HCT VFR BLD AUTO: 31.3 % (ref 37.5–51)
HGB BLD-MCNC: 9.9 G/DL (ref 13–17.7)
IMM GRANULOCYTES # BLD AUTO: 0.02 10*3/MM3 (ref 0–0.05)
IMM GRANULOCYTES NFR BLD AUTO: 0.5 % (ref 0–0.5)
IRON 24H UR-MRATE: 52 MCG/DL (ref 59–158)
IRON SATN MFR SERPL: 21 % (ref 20–50)
LYMPHOCYTES # BLD AUTO: 1.04 10*3/MM3 (ref 0.7–3.1)
LYMPHOCYTES NFR BLD AUTO: 25.2 % (ref 19.6–45.3)
MAGNESIUM SERPL-MCNC: 1.6 MG/DL (ref 1.6–2.4)
MCH RBC QN AUTO: 30.1 PG (ref 26.6–33)
MCHC RBC AUTO-ENTMCNC: 31.6 G/DL (ref 31.5–35.7)
MCV RBC AUTO: 95.1 FL (ref 79–97)
MONOCYTES # BLD AUTO: 0.69 10*3/MM3 (ref 0.1–0.9)
MONOCYTES NFR BLD AUTO: 16.7 % (ref 5–12)
NEUTROPHILS NFR BLD AUTO: 2.17 10*3/MM3 (ref 1.7–7)
NEUTROPHILS NFR BLD AUTO: 52.7 % (ref 42.7–76)
NRBC BLD AUTO-RTO: 0 /100 WBC (ref 0–0.2)
PHOSPHATE SERPL-MCNC: 2.9 MG/DL (ref 2.5–4.5)
PLATELET # BLD AUTO: 180 10*3/MM3 (ref 140–450)
PMV BLD AUTO: 9.5 FL (ref 6–12)
POTASSIUM SERPL-SCNC: 4.2 MMOL/L (ref 3.5–5.2)
PROT SERPL-MCNC: 5.7 G/DL (ref 6–8.5)
RBC # BLD AUTO: 3.29 10*6/MM3 (ref 4.14–5.8)
SODIUM SERPL-SCNC: 136 MMOL/L (ref 136–145)
TIBC SERPL-MCNC: 250 MCG/DL (ref 298–536)
TRANSFERRIN SERPL-MCNC: 168 MG/DL (ref 200–360)
WBC NRBC COR # BLD AUTO: 4.12 10*3/MM3 (ref 3.4–10.8)

## 2024-06-10 PROCEDURE — 1126F AMNT PAIN NOTED NONE PRSNT: CPT | Performed by: INTERNAL MEDICINE

## 2024-06-10 PROCEDURE — 96401 CHEMO ANTI-NEOPL SQ/IM: CPT

## 2024-06-10 PROCEDURE — 84100 ASSAY OF PHOSPHORUS: CPT

## 2024-06-10 PROCEDURE — 25010000002 MAGNESIUM SULFATE 2 GM/50ML SOLUTION: Performed by: INTERNAL MEDICINE

## 2024-06-10 PROCEDURE — 80053 COMPREHEN METABOLIC PANEL: CPT

## 2024-06-10 PROCEDURE — 83735 ASSAY OF MAGNESIUM: CPT

## 2024-06-10 PROCEDURE — 82728 ASSAY OF FERRITIN: CPT | Performed by: INTERNAL MEDICINE

## 2024-06-10 PROCEDURE — 3078F DIAST BP <80 MM HG: CPT | Performed by: INTERNAL MEDICINE

## 2024-06-10 PROCEDURE — 84466 ASSAY OF TRANSFERRIN: CPT | Performed by: INTERNAL MEDICINE

## 2024-06-10 PROCEDURE — 99214 OFFICE O/P EST MOD 30 MIN: CPT | Performed by: INTERNAL MEDICINE

## 2024-06-10 PROCEDURE — 83540 ASSAY OF IRON: CPT | Performed by: INTERNAL MEDICINE

## 2024-06-10 PROCEDURE — 1160F RVW MEDS BY RX/DR IN RCRD: CPT | Performed by: INTERNAL MEDICINE

## 2024-06-10 PROCEDURE — 96374 THER/PROPH/DIAG INJ IV PUSH: CPT

## 2024-06-10 PROCEDURE — 85025 COMPLETE CBC W/AUTO DIFF WBC: CPT

## 2024-06-10 PROCEDURE — 1159F MED LIST DOCD IN RCRD: CPT | Performed by: INTERNAL MEDICINE

## 2024-06-10 PROCEDURE — 25010000002 DARATUMUMAB-HYALURONIDASE-FIHJ 1800-30000 MG-UT/15ML SOLUTION: Performed by: INTERNAL MEDICINE

## 2024-06-10 PROCEDURE — 3077F SYST BP >= 140 MM HG: CPT | Performed by: INTERNAL MEDICINE

## 2024-06-10 RX ORDER — MAGNESIUM SULFATE HEPTAHYDRATE 40 MG/ML
2 INJECTION, SOLUTION INTRAVENOUS ONCE
Status: COMPLETED | OUTPATIENT
Start: 2024-06-10 | End: 2024-06-10

## 2024-06-10 RX ORDER — FAMOTIDINE 10 MG/ML
20 INJECTION, SOLUTION INTRAVENOUS AS NEEDED
OUTPATIENT
Start: 2024-07-08

## 2024-06-10 RX ORDER — DIPHENHYDRAMINE HYDROCHLORIDE 50 MG/ML
50 INJECTION INTRAMUSCULAR; INTRAVENOUS AS NEEDED
Status: CANCELLED | OUTPATIENT
Start: 2024-06-10

## 2024-06-10 RX ORDER — MEPERIDINE HYDROCHLORIDE 25 MG/ML
12.5 INJECTION INTRAMUSCULAR; INTRAVENOUS; SUBCUTANEOUS
OUTPATIENT
Start: 2024-07-08

## 2024-06-10 RX ORDER — DIPHENHYDRAMINE HYDROCHLORIDE 50 MG/ML
50 INJECTION INTRAMUSCULAR; INTRAVENOUS AS NEEDED
OUTPATIENT
Start: 2024-08-05

## 2024-06-10 RX ORDER — DIPHENHYDRAMINE HYDROCHLORIDE 50 MG/ML
50 INJECTION INTRAMUSCULAR; INTRAVENOUS AS NEEDED
OUTPATIENT
Start: 2024-07-08

## 2024-06-10 RX ORDER — MEPERIDINE HYDROCHLORIDE 25 MG/ML
12.5 INJECTION INTRAMUSCULAR; INTRAVENOUS; SUBCUTANEOUS
OUTPATIENT
Start: 2024-08-05

## 2024-06-10 RX ORDER — FAMOTIDINE 10 MG/ML
20 INJECTION, SOLUTION INTRAVENOUS AS NEEDED
OUTPATIENT
Start: 2024-08-05

## 2024-06-10 RX ORDER — MEPERIDINE HYDROCHLORIDE 25 MG/ML
12.5 INJECTION INTRAMUSCULAR; INTRAVENOUS; SUBCUTANEOUS
Status: CANCELLED | OUTPATIENT
Start: 2024-06-10

## 2024-06-10 RX ORDER — FAMOTIDINE 10 MG/ML
20 INJECTION, SOLUTION INTRAVENOUS AS NEEDED
Status: CANCELLED | OUTPATIENT
Start: 2024-06-10

## 2024-06-10 RX ADMIN — DARATUMUMAB AND HYALURONIDASE-FIHJ (HUMAN RECOMBINANT) 1800 MG: 1800; 30000 INJECTION SUBCUTANEOUS at 12:41

## 2024-06-10 RX ADMIN — MAGNESIUM SULFATE HEPTAHYDRATE 2 G: 40 INJECTION, SOLUTION INTRAVENOUS at 12:29

## 2024-06-10 NOTE — PROGRESS NOTES
Subjective     CHIEF COMPLAINT:      Chief Complaint   Patient presents with    Follow-up     HISTORY OF PRESENT ILLNESS:     Contreras Albert is a 68 y.o. male patient who returns today for follow up on his multiple myeloma.  He returns today for follow-up reporting some worsening of the diarrhea.  He does not remember any specific food items that could have contributed.  Leg swelling is stable.  The legs are not weeping fluid.  He is not having chest pain or shortness of breath.    ROS:  Pertinent ROS is in the HPI.     Past medical, surgical, social and family history were reviewed.     MEDICATIONS:    Current Outpatient Medications:     Accu-Chek Softclix Lancets lancets, Check 3 times a day on insulin tx, poor control, hypoglycemia. Dx: E 11.22, Disp: 300 each, Rfl: 3    acetaminophen (TYLENOL) 325 MG tablet, Take 2 tablets by mouth Every 6 (Six) Hours As Needed for Mild Pain. (Patient taking differently: Take 2 tablets by mouth Every 6 (Six) Hours As Needed for Mild Pain. As needed), Disp: , Rfl:     acyclovir (ZOVIRAX) 800 MG tablet, TAKE 1 TABLET BY MOUTH EVERY DAY, Disp: 90 tablet, Rfl: 2    ASPIRIN 81 PO, Take 81 mg by mouth Daily., Disp: , Rfl:     atorvastatin (LIPITOR) 10 MG tablet, Take 1 tablet by mouth Every Night., Disp: 90 tablet, Rfl: 1    bumetanide (BUMEX) 2 MG tablet, Take 1 tablet by mouth Daily., Disp: , Rfl:     calcium carbonate (TUMS) 500 MG chewable tablet, Chew 4 tablets Daily., Disp: , Rfl:     carvedilol (COREG) 12.5 MG tablet, Take 1 tablet by mouth 2 (Two) Times a Day With Meals., Disp: , Rfl:     Continuous Blood Gluc  (FreeStyle Jin 2 Foster) device, USE 1 FOR 1 DOSE, Disp: , Rfl:     Continuous Glucose Sensor (FreeStyle Jin 2 Sensor) misc, USE 1 EVERY 14 DAYS, Disp: 2 each, Rfl: 3    DARATUMUMAB IV, Infuse  into a venous catheter., Disp: , Rfl:     dexAMETHasone (DECADRON) 4 MG tablet, Take 3 tablets on day of chemo, 1 tablet on day #2 and 1 tablet on day #3 and repeat  monthly., Disp: 15 tablet, Rfl: 1    diphenoxylate-atropine (LOMOTIL) 2.5-0.025 MG per tablet, Take 2 tablets by mouth 4 (Four) Times a Day As Needed for Diarrhea., Disp: 120 tablet, Rfl: 0    escitalopram (LEXAPRO) 5 MG tablet, TAKE 1 TABLET BY MOUTH EVERY DAY, Disp: 30 tablet, Rfl: 5    finasteride (PROSCAR) 5 MG tablet, Take 1 tablet by mouth Daily., Disp: , Rfl:     gabapentin (NEURONTIN) 300 MG capsule, TAKE 1 CAPSULE BY MOUTH EVERY MORNING AND 2 CAPSULES BY MOUTH EVERY EVENING, Disp: 90 capsule, Rfl: 3    glucagon (GLUCAGEN) 1 MG injection, Inject 1 mg into the appropriate muscle as directed by prescriber 1 (One) Time As Needed for Low Blood Sugar. Follow package directions for low blood sugar., Disp: 1 kit, Rfl: 10    glucose blood (Accu-Chek Guide) test strip, Check 3 times a day on insulin tx, poor control, hypoglycemia. Dx: E 11.22, Disp: 300 each, Rfl: 3    icosapent ethyl (VASCEPA) 1 g capsule capsule, TAKE 2 CAPSULES BY MOUTH TWICE DAILY WITH MEALS, Disp: 360 capsule, Rfl: 1    Insulin Lispro, 1 Unit Dial, (HUMALOG) 100 UNIT/ML solution pen-injector, NONCHEMO DAYS: 10 USE FOR BREAKFAST AND LUNCH, 12 USE AT SUPPER. CHEMO DAYS: 15 UNITS 3X DAILY WITH MEALS, Disp: 54 mL, Rfl: 1    Insulin Pen Needle 32G X 4 MM misc, USING 4 PEN NEEDLES DAILY, Disp: 400 each, Rfl: 3    Lantus SoloStar 100 UNIT/ML injection pen, NONCHEMO DAYS: 16 UNITS EVERY MORNING AND 18 UNITS EVERY EVENING; CHEMO DAYS: 30 UNITS TWICE DAILY; MAX DAILY DOSE: 70 UNITS, Disp: 63 mL, Rfl: 1    omega-3 acid ethyl esters (LOVAZA) 1 g capsule, Take 2 capsules by mouth 2 (Two) Times a Day., Disp: 360 capsule, Rfl: 1    ondansetron (ZOFRAN) 8 MG tablet, Take 1 tablet by mouth 3 (Three) Times a Day As Needed for Nausea or Vomiting., Disp: 30 tablet, Rfl: 5    Phospha 250 Neutral 155-852-130 MG tablet, TAKE 1 TABLET BY MOUTH DAILY, Disp: 30 tablet, Rfl: 1    pomalidomide (POMALYST) 2 MG chemo capsule, Take 1 capsule by mouth Daily., Disp: 21 capsule,  "Rfl: 0    tamsulosin (FLOMAX) 0.4 MG capsule 24 hr capsule, Take 1 capsule by mouth Daily., Disp: , Rfl:     vitamin D (ERGOCALCIFEROL) 1.25 MG (97792 UT) capsule capsule, TAKE 1 CAPSULE BY MOUTH EVERY 7 DAYS, Disp: 13 capsule, Rfl: 1  Objective     VITAL SIGNS:     Vitals:    06/10/24 1150   BP: 140/78   Pulse: 67   Resp: 16   Temp: 98.5 °F (36.9 °C)   TempSrc: Oral   SpO2: 95%   Weight: (!) 139 kg (305 lb 9.6 oz)   Height: 188 cm (74\")   PainSc: 0-No pain     Body mass index is 39.24 kg/m².     Wt Readings from Last 5 Encounters:   06/10/24 (!) 139 kg (305 lb 9.6 oz)   05/28/24 (!) 138 kg (305 lb)   05/13/24 (!) 142 kg (312 lb 3.2 oz)   04/15/24 (!) 140 kg (307 lb 14.4 oz)   03/18/24 (!) 139 kg (306 lb 4.8 oz)     PHYSICAL EXAMINATION:   GENERAL: The patient appears in fair general condition, not in acute distress.   SKIN: No ecchymosis.  EYES: No jaundice. Pallor.  CHEST: Normal respiratory effort.  Normal breath sounds bilaterally.  No added sounds.  CVS: Normal S1-S2.  No murmurs.  ABDOMEN: Non-distended.  EXTREMITIES: Chronic edema.  No calf tenderness.    DIAGNOSTIC DATA:     Results from last 7 days   Lab Units 06/10/24  1057   WBC 10*3/mm3 4.12   NEUTROS ABS 10*3/mm3 2.17   HEMOGLOBIN g/dL 9.9*   HEMATOCRIT % 31.3*   PLATELETS 10*3/mm3 180     Results from last 7 days   Lab Units 06/10/24  1057   SODIUM mmol/L 136   POTASSIUM mmol/L 4.2   CHLORIDE mmol/L 101   CO2 mmol/L 23.4   BUN mg/dL 34*   CREATININE mg/dL 3.43*   CALCIUM mg/dL 8.2*   ALBUMIN g/dL 3.4*   BILIRUBIN mg/dL 0.4   ALK PHOS U/L 57   ALT (SGPT) U/L 9   AST (SGOT) U/L 9   GLUCOSE mg/dL 223*   MAGNESIUM mg/dL 1.6         Lab 06/10/24  1057   IRON 52*   IRON SATURATION (TSAT) 21   TIBC 250*   TRANSFERRIN 168*   FERRITIN 250.00      Component      Latest Ref Rng 3/18/2024 4/15/2024 5/13/2024   IgG      603 - 1613 mg/dL 577 (L)  668  670    IgA      61 - 437 mg/dL 68  69  75    IgM      20 - 172 mg/dL 15 (L)  15 (L)  15 (L)    Total Protein      " 6.0 - 8.5 g/dL 5.5 (L)  6.0  6.0    Albumin      2.9 - 4.4 g/dL 3.2  3.4  3.6    Alpha-1-Globulin      0.0 - 0.4 g/dL 0.2  0.2  0.2    Alpha-2-Globulin      0.4 - 1.0 g/dL 0.8  0.9  0.9    Beta Globulin      0.7 - 1.3 g/dL 0.7  0.8  0.8    Gamma Globulin      0.4 - 1.8 g/dL 0.6  0.7  0.6    M-Jurgen      Not Observed g/dL Not Observed  Not Observed  Not Observed    Globulin      2.2 - 3.9 g/dL 2.3  2.6  2.4    A/G Ratio      0.7 - 1.7  1.4  1.4  1.6    Immunofixation Reflex, Serum Comment:  Comment:  Comment:    Please note Comment  Comment  Comment    Kappa FLC      3.3 - 19.4 mg/L 50.1 (H)  55.2 (H)  49.1 (H)    Free Lambda Light Chains      5.7 - 26.3 mg/L 34.1 (H)  35.1 (H)  37.3 (H)    Kappa/Lambda Ratio      0.26 - 1.65  1.47  1.57  1.32      Assessment & Plan    *IgG lambda multiple myeloma.  Bone marrow 5/23/2020 hypercellular marrow with 80% involvement of plasma cell myeloma; FISH studies pending  Bone survey 5/26/2020: Diffuse osteoporosis and demineralization.  However, no discrete lytic lesions.  SPEP 5/23/2020: M spike 5.1.  IgG 6629 lambda.  Light chain ratio 0 with free lambda light chains 6400.  Beta-2 microglobulin 16.3.  24-hour urine 12.7 g protein per 24-hour with monoclonal lambda free light chain 33.9%, monoclonal IgG lambda 23.5%  24-hour urine testing from 5/24/2020 free lambda light chains 8.4 g/L  Due to the renal failure, CyBorD regimen was chosen and was started on 5/28/2020.    On 6/18/2020, Velcade was changed to 1.5 mg/m² weekly continuously along with weekly Decadron and oral Cytoxan.  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   Treatment was changed on 8/27/2020 to to Daratumumab subcutaneous injection weekly along with Revlimid 10 mg daily for 21 out of 28-day cycle and Decadron 40 mg daily.  S/p stem cell transplant on 12/9/2020 at [facility].  Day 100 bone marrow on 3/16/2021 revealed minute  plasma cell population.  PET scan on 3/29/2021 was  negative.  Patient enrolled in the  clinical trial randomizing to maintenance lenalidomide versus lenalidomide and daratumumab.  Patient was randomized to the Revlimid arm initiated 4/13/2021 dosed at 5 mg daily (due to renal function).  Patient developed neutropenia requiring dose adjustment to 5 mg daily for 21/28 days.  Patient withdrew from clinical trial after 5 cycles on 8/31/2021 due to worsening diarrhea.    He was switched to Velcade maintenance every other week 9/14/2021 which he discontinued 10/26/2021 due to neuropathy and lower extremity edema.    He was subsequently changed to pomalidomide 2 mg daily 21/28 days on 11/8/2021.    1 year post transplant, bone marrow biopsy showed 5-7% plasma cells and due to the residual disease he was started on DPd (daratumumab, pomalidomide, dexamethasone) on 2/21/2022.    Patient was seen by Dr. Romero on 9/12/2022.  At that time patient was cycle 8-day 7 DPd. Due to chronic side effects, dexamethasone was changed from weekly to monthly.    Patient did continue pomalidomide on his own during admission to the hospital, received 1 dose 4 mg p.o. on 9/24/2022 before was subsequently discontinued due to cytopenias.   Dexamethasone was changed to 12 mg on Mondays, 4 mg on Tuesday and 4 mg on Wednesday.  Pomalyst dose was reduced to 3 mg daily for 21 out of a 28-day cycle starting on 10/31/2022.  Due to neutropenia, the dose of Pomalyst was subsequently reduced to 2 mg daily for 21/28-day cycle.  9/5/2023: Treatment held due to weakness and postural dizziness.  9/5/2023: No M protein.  Kappa FLC 49.6.  Lambda FLC 32.4.  Kappa to lambda ratio 1.53.  He did not notice improvement in his symptoms while off Darzalex and Pomalyst.  Therefore, his symptoms were not attributed to treatment.  He was restarted on Darzalex and Pomalyst on 10/2/2023.  He is tolerating the treatment well.  4/15/2024: M protein was not detected.    Kappa FLC was 55.2.  Lambda FLC was 35.1.  Kappa  to lambda ratio was 1.57.  5/13/2024: M protein was not detected.  Kappa FLC 49.1.  Lambda FLC 37.1.  Kappa/lambda ratio 1.32.    *Bone health.  He is on Xgeva.   Patient developed severe hypocalcemia following the Xgeva injection.  He required inpatient hospital stay.  Given the duration of Xgeva injections, recommended changing treatment to every 3 months starting December 2022.   Due to hypomagnesemia, Xgeva was held on 3/18/2024 but was given 4/15/2024.  He will be due for Xgeva in 4 weeks.    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  Patient was receiving Procrit at [facility].  Records from [facility] showed that the patient was receiving 70,000 units weekly and received the last dose on 9/27/2022.  Patient was given Procrit 70,000 units on 9/27/2022.  Hemoglobin was 9.9 on 10/17/2022.  He was given Procrit 60,000 units.  Hemoglobin was 9.8 on 11/4/2022.  He was given Procrit 60,000 units.  Labs in March 2023 revealed adequate iron stores.  Last dose of Procrit was 25,000 units on 5/1/2023 for hemoglobin of 9.9.  5/13/2024: Hemoglobin 11.2.  6/10/2024: Hemoglobin decreased to 9.9.    *Neutropenia.  This was attributed to Pomalyst.  Neutrophil count was down to 290 on 9/25/2022.  He was given Neupogen during his hospital stay in September 2022.  Neutrophil count improved afterwards.  However, neutrophil count decreased to 600 on 12/19/2022.  He reports that he was feeling tired and achy at that time.  Neutrophil count was 770 on 1/16/2023.  Due to the recurrent neutropenia, the dose of Pomalyst was reduced to 2 mg in January 2023.  Neutrophil count improved following the dose reduction.  6/10/2024: Neutrophil count 2170.    *Hypocalcemia.  Patient developed severe hypocalcemia due to Xgeva.  Calcium was 5.9 on 9/23/2022.  Patient was placed on calcium replacement.  Calcium dose was subsequently increased to 3 tablets daily.  8/7/2023:  Calcium 8.1. Albumin 3.6.  The dose was increased to 4 tablets daily.  6/10/2024: Calcium 8.2.  Albumin 3.4.    *Hypomagnesemia.  Magnesium was 1.1 on 10/24/2022.  He was placed on oral magnesium oxide 400 mg twice daily.  He developed diarrhea and it was stopped.  He was started on magnesium chloride on 1/23/2023.  He developed diarrhea about 7 days after starting the magnesium chloride.  Oral magnesium chloride was discontinued.  He receives IV magnesium per protocol based on his magnesium level.  6/10/2024: Magnesium 1.6.     *Prophylaxis.  He is on acyclovir 800 mg daily.  He is on ASA 81 mg daily.  No recent infection.    *Episodes of dizziness and falling.  Patient reports that the episodes develop after he stands up and starts walking.  No dizziness while sitting.  No restricted chest pain or shortness of breath.  Blood pressure was 133/80 and heart rate was 66 in the sitting position today.  After standing, blood pressure was 119/76 and heart rate was 69.  No improvement in the symptoms after holding Pomalyst for 2 weeks.  MRI brain on 7/26/2023 showed small vessel ischemic changes.  There were changes of 3 old strokes.  I explained the findings to the patient.  I recommended evaluation by cardiology and neurology.  I also recommended home physical therapy  I recommended that he uses a walker when he ambulates.  His evaluation showed possible changes of postural hypotension.   He is concerned that this is due to his medications. I explained that while postural hypotension is a side effect of medications, it is not a common side effect of Darzalex or Pomalyst.  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  Symptoms did not improve after placing Darzalex and Pomalyst on hold.  On 10/2/2023, we recommended reducing Bumex to 1 mg daily and monitoring his symptoms and the leg swelling.  He did not notice a difference in his postural dizziness after the  dose reduction.   Bumex was increased back to 2 mg daily due to worsening of his edema.   This dose is controlling his leg edema.    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  It is controlled with Lexapro 5 mg daily.    *Vitamin D deficiency.  He is on vitamin D 50,000 units weekly.  Vitamin D level was 18 on 10/3/2022.  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  He is on gabapentin 300 mg in the morning and 600 mg in the evening.  Neuropathy symptoms in the feet are better as the swelling improved.     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  S/p right partial nephrectomy on 5/18/2016.    PLAN:    1.  Continue Darzalex, Pomalyst (2 mg daily x 21/28) and dexamethasone (20 mg total dose every 4 weeks).  He will receive Darzalex today.  2.  We will give magnesium sulfate 2 g IV today.   3.  Continue calcium carbonate 4 tablets daily.  4.  Continue Neutra-Phos 1 tablet daily.  5.  Continue acyclovir 800 mg daily.  6.  Continue aspirin 81 mg daily.  7.  Return in 4, 8 and 12 weeks for Darzalex.  CBC CMP magnesium phosphorus SPEP G[place] FLC will be obtained.  8.  We will schedule him to receive Xgeva in 4 weeks.  9.  If hemoglobin is below 10 in 4 weeks, we will start Procrit 20,000 units every 4 weeks.  10.  I will see him in follow-up in 12 weeks.        Red Del Valle MD  06/10/24

## 2024-06-11 ENCOUNTER — TELEPHONE (OUTPATIENT)
Dept: ONCOLOGY | Facility: CLINIC | Age: 69
End: 2024-06-11
Payer: MEDICARE

## 2024-06-11 ENCOUNTER — SPECIALTY PHARMACY (OUTPATIENT)
Dept: PHARMACY | Facility: HOSPITAL | Age: 69
End: 2024-06-11
Payer: MEDICARE

## 2024-06-11 LAB
ALBUMIN SERPL ELPH-MCNC: 2.8 G/DL (ref 2.9–4.4)
ALBUMIN/GLOB SERPL: 1.1 {RATIO} (ref 0.7–1.7)
ALPHA1 GLOB SERPL ELPH-MCNC: 0.3 G/DL (ref 0–0.4)
ALPHA2 GLOB SERPL ELPH-MCNC: 0.9 G/DL (ref 0.4–1)
B-GLOBULIN SERPL ELPH-MCNC: 0.8 G/DL (ref 0.7–1.3)
GAMMA GLOB SERPL ELPH-MCNC: 0.6 G/DL (ref 0.4–1.8)
GLOBULIN SER-MCNC: 2.6 G/DL (ref 2.2–3.9)
IGA SERPL-MCNC: 67 MG/DL (ref 61–437)
IGG SERPL-MCNC: 566 MG/DL (ref 603–1613)
IGM SERPL-MCNC: 13 MG/DL (ref 20–172)
INTERPRETATION SERPL IEP-IMP: ABNORMAL
KAPPA LC FREE SER-MCNC: 41.9 MG/L (ref 3.3–19.4)
KAPPA LC FREE/LAMBDA FREE SER: 1.22 {RATIO} (ref 0.26–1.65)
LABORATORY COMMENT REPORT: ABNORMAL
LAMBDA LC FREE SERPL-MCNC: 34.4 MG/L (ref 5.7–26.3)
M PROTEIN SERPL ELPH-MCNC: ABNORMAL G/DL
PROT SERPL-MCNC: 5.4 G/DL (ref 6–8.5)

## 2024-06-11 RX ORDER — LEVOFLOXACIN 250 MG/1
TABLET, FILM COATED ORAL
Qty: 9 TABLET | Refills: 0 | Status: SHIPPED | OUTPATIENT
Start: 2024-06-11

## 2024-06-11 NOTE — TELEPHONE ENCOUNTER
"Red Del Valle MD  You4 minutes ago (4:40 PM)       Please order Levaquin 250 mg to take 500 mg on day 1 and 250 mg every other day x 2 weeks    Thank you     You  Red Del Valle MD2 hours ago (2:35 PM)     SN  Pt reports he is experiencing \"lots of sinus drainage\", he does have blood at times when blowing his nose. He has experienced the drainage for about a week, the drainage is causing him to have a cough. He is wondering if we could call in an antibiotic?       Reviewed Dr. Del Valle's note with the patient, he v/u.   "

## 2024-06-11 NOTE — PROGRESS NOTES
Specialty Pharmacy Note: Pomalyst (pomalidomide)    Contreras Albert is a 68 y.o. male with multiple myeloma was seen 6/10/24 by Dr. Del Valle. Per provider dictation, no changes to oral oncology regimen Pomalyst (pomalidomide).  Labs Review: The CMP and CBC from 6/10/24 have been reviewed. No dose adjustments are needed for the oral specialty medication(s) based on the labs.    Specialty pharmacy will continue to follow patient.    Carlos Clarke, PharmD, [facility]  Clinical Oncology Pharmacist     6/11/2024  12:58 EDT

## 2024-06-11 NOTE — TELEPHONE ENCOUNTER
"  Caller: Contreras Albert \"BILL\"    Relationship: Self    Best call back number: [phone]    What is the best time to reach you: ANYTIME    Who are you requesting to speak with (clinical staff, provider,  specific staff member): CLINICAL    What was the call regarding:  PT IS REQUESTING A ANTIBIOTIC CALLED IN FOR A SINUS INFECTION    PLEASE ADVISE    [id number] - [facility] [address] [address] [place] [address] & [place] [address] - [phone] [facility] [phone]               "

## 2024-06-20 ENCOUNTER — SPECIALTY PHARMACY (OUTPATIENT)
Dept: PHARMACY | Facility: HOSPITAL | Age: 69
End: 2024-06-20
Payer: MEDICARE

## 2024-06-20 DIAGNOSIS — C90.00 MULTIPLE MYELOMA NOT HAVING ACHIEVED REMISSION: ICD-10-CM

## 2024-06-20 NOTE — PROGRESS NOTES
Drug: Pomalyst (pomalidomide)  Strength: 2 mg  Directions: Take 1 capsule by mouth daily on days 1-21 of each 28 day cycle  Quantity: 21  Refills: 0  Pharmacy prescription sent to: [facility]    Completed independent double check on medication order/RX.  Yolande Huitron, YumikoD, BCPS

## 2024-06-20 NOTE — PROGRESS NOTES
Re: Refills of Oral Specialty Medication - Pomalyst (pomalidomide)    Drug-Drug Interactions: The current medication list was reviewed and there are no relevant drug-drug interactions with the specialty medication.  Medication Allergies: The patient has no relevant allergies as it relates to their oral specialty medication  Review of Labs/Dose Adjustments: NO DOSE CHANGE - I reviewed the most recent note and labs and the patient will continue without any dose changes.  I sent refills as described below.    Drug: Pomalyst (pomalidomide)  Strength: 2 mg  Directions: Take 1 capsule by mouth daily on days 1-21 of each 28 day cycle  Quantity: 21  Refills: 0  Pharmacy prescription sent to: [facility]    Name/Credentials: Carlos Clarke, YumikoD, BCOP  Clinical Oncology Pharmacist    6/20/2024  13:08 EDT

## 2024-06-27 ENCOUNTER — OFFICE VISIT (OUTPATIENT)
Dept: ENDOCRINOLOGY | Age: 69
End: 2024-06-27
Payer: MEDICARE

## 2024-06-27 ENCOUNTER — LAB (OUTPATIENT)
Facility: HOSPITAL | Age: 69
End: 2024-06-27
Payer: MEDICARE

## 2024-06-27 VITALS
BODY MASS INDEX: 38.78 KG/M2 | TEMPERATURE: 96.9 F | DIASTOLIC BLOOD PRESSURE: 78 MMHG | WEIGHT: 302.2 LBS | HEART RATE: 76 BPM | SYSTOLIC BLOOD PRESSURE: 132 MMHG | OXYGEN SATURATION: 93 % | HEIGHT: 74 IN

## 2024-06-27 DIAGNOSIS — N18.4 TYPE 2 DIABETES MELLITUS WITH STAGE 4 CHRONIC KIDNEY DISEASE, WITH LONG-TERM CURRENT USE OF INSULIN: Primary | ICD-10-CM

## 2024-06-27 DIAGNOSIS — E78.2 MIXED HYPERLIPIDEMIA: ICD-10-CM

## 2024-06-27 DIAGNOSIS — N18.4 BENIGN HYPERTENSION WITH CKD (CHRONIC KIDNEY DISEASE) STAGE IV: ICD-10-CM

## 2024-06-27 DIAGNOSIS — E11.22 TYPE 2 DIABETES MELLITUS WITH STAGE 4 CHRONIC KIDNEY DISEASE, WITH LONG-TERM CURRENT USE OF INSULIN: Primary | ICD-10-CM

## 2024-06-27 DIAGNOSIS — Z79.4 TYPE 2 DIABETES MELLITUS WITH STAGE 4 CHRONIC KIDNEY DISEASE, WITH LONG-TERM CURRENT USE OF INSULIN: Primary | ICD-10-CM

## 2024-06-27 DIAGNOSIS — I12.9 BENIGN HYPERTENSION WITH CKD (CHRONIC KIDNEY DISEASE) STAGE IV: ICD-10-CM

## 2024-06-27 LAB
ALBUMIN SERPL-MCNC: 4 G/DL (ref 3.5–5.2)
ALBUMIN UR-MCNC: 101.8 MG/DL
ALBUMIN/GLOB SERPL: 1.7 G/DL
ALP SERPL-CCNC: 50 U/L (ref 39–117)
ALT SERPL W P-5'-P-CCNC: 9 U/L (ref 1–41)
ANION GAP SERPL CALCULATED.3IONS-SCNC: 11.1 MMOL/L (ref 5–15)
AST SERPL-CCNC: 11 U/L (ref 1–40)
BILIRUB SERPL-MCNC: 0.4 MG/DL (ref 0–1.2)
BUN SERPL-MCNC: 49 MG/DL (ref 8–23)
BUN/CREAT SERPL: 13.7 (ref 7–25)
CALCIUM SPEC-SCNC: 10.4 MG/DL (ref 8.6–10.5)
CHLORIDE SERPL-SCNC: 105 MMOL/L (ref 98–107)
CHOLEST SERPL-MCNC: 135 MG/DL (ref 0–200)
CO2 SERPL-SCNC: 23.9 MMOL/L (ref 22–29)
CREAT SERPL-MCNC: 3.58 MG/DL (ref 0.76–1.27)
CREAT UR-MCNC: 70.5 MG/DL
EGFRCR SERPLBLD CKD-EPI 2021: 18 ML/MIN/1.73
GLOBULIN UR ELPH-MCNC: 2.3 GM/DL
GLUCOSE SERPL-MCNC: 132 MG/DL (ref 65–99)
HBA1C MFR BLD: 8.1 % (ref 4.8–5.6)
HDLC SERPL-MCNC: 28 MG/DL (ref 40–60)
LDL/HDL RATIO NULL: ABNORMAL
LDLC SERPL CALC-MCNC: 28 MG/DL (ref 0–100)
MICROALBUMIN/CREAT UR: 1444 MG/G (ref 0–29)
POTASSIUM SERPL-SCNC: 4 MMOL/L (ref 3.5–5.2)
PROT SERPL-MCNC: 6.3 G/DL (ref 6–8.5)
SODIUM SERPL-SCNC: 140 MMOL/L (ref 136–145)
TRIGL SERPL-MCNC: 586 MG/DL (ref 0–150)
VLDLC SERPL-MCNC: 79 MG/DL (ref 5–40)

## 2024-06-27 PROCEDURE — 82043 UR ALBUMIN QUANTITATIVE: CPT | Performed by: NURSE PRACTITIONER

## 2024-06-27 PROCEDURE — 83036 HEMOGLOBIN GLYCOSYLATED A1C: CPT | Performed by: NURSE PRACTITIONER

## 2024-06-27 PROCEDURE — 82570 ASSAY OF URINE CREATININE: CPT | Performed by: NURSE PRACTITIONER

## 2024-06-27 PROCEDURE — 36415 COLL VENOUS BLD VENIPUNCTURE: CPT | Performed by: NURSE PRACTITIONER

## 2024-06-27 PROCEDURE — 80061 LIPID PANEL: CPT | Performed by: NURSE PRACTITIONER

## 2024-06-27 PROCEDURE — 80053 COMPREHEN METABOLIC PANEL: CPT | Performed by: NURSE PRACTITIONER

## 2024-06-27 NOTE — PROGRESS NOTES
Chief Complaint  Chief Complaint   Patient presents with    Diabetes     Type 2: Pt jin is attached, is not up to date on eye exam, does have hx of retinopathy, mild neuropathy.         Subjective          History of Present Illness    Contreras Albert 68 y.o. presents for a follow-up for Type 2 Diabetes    He was diagnosed with diabetes in 2001 and started insulin in 2009    Pt is on the following medications for their diabetes:     Non-Chemo Days     Lantus 16 units in the morning and 18 units in evening and Novolog 12 units before breakfast, 10 units before lunch and 12 units before dinner plus 1 units per every 50 over 150         Chemo Days     Lantus to 30 units twice a day on the day of chemotherapy and on the morning after chemotherapy.  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 Had previous intraocular eye injections on both eyes.  Last eye exam was 08/2023     Pt does have a history of nephropathy.  Patient is not currently taking ACE/ARB - follows Dr. 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  He is on gabapentin 300 mg 1 in am and 2 in pm  prescribed by his oncologist Dr. 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  Along with the chemo treatments, he is receiving steroids.        Blood glucoses are checked via Jin.    Fasting blood glucoses: 60s- mid 100s    Pre-meal blood glucoses: low 100s- 200s    Pt has episodes of hypoglycemia.        Sensor Data    Time in  "range 69%  High 26%  Very high 3%  Low 2%  Very low 0%      Average Glucose - 158 mg/dL      Sensor Wear - 95 %              Hyperlipidemia     Pt is currently taking atorvastatin 10 mg HS and Lovaza 2 grams twice daily    Last lipid panel in 11/23 showed Total 104, HDL 28, LDL 50 and Triglycerides 406             Hypertension with CKD Stage 4     Pt denies any chest pain, palpitations or headache     Current regimen includes Bumex 1 mg BID and carvedilol 12.5 mg daily  He was taken off amlodipine because of pedal edema                 Other Medical History     He has multiple myeloma and had chemotherapy followed by stem cell transplant.  His cancer has came out of remission and he is on Pomalyst along with steriods monthly.  He transferred his care back to [facility] from [facility].        He had a previous right partial nephrectomy for cancer.           Pt was hospitalized 09/23/22-09/28/22 for hypocalcemia, hypomagnesemia and pancytopenia            I have reviewed the patient's allergies, medicines, past medical hx, family hx and social hx.    Objective   Vital Signs:   /78   Pulse 76   Temp 96.9 °F (36.1 °C) (Temporal)   Ht 188 cm (74.02\")   Wt (!) 137 kg (302 lb 3.2 oz)   SpO2 93%   BMI 38.78 kg/m²       Physical Exam   Physical Exam  Constitutional:       General: He is not in acute distress.     Appearance: Normal appearance. He is obese. He is not diaphoretic.   HENT:      Head: Normocephalic and atraumatic.   Eyes:      General:         Right eye: No discharge.         Left eye: No discharge.   Skin:     General: Skin is warm and dry.   Neurological:      Mental Status: He is alert.   Psychiatric:         Mood and Affect: Mood normal.         Behavior: Behavior normal.                    Results Review:   Hemoglobin A1C   Date Value Ref Range Status   08/29/2023 8.10 (H) 4.80 - 5.60 % Final     Comment:     Hemoglobin A1C Ranges:  Increased Risk for Diabetes  5.7% to 6.4%  Diabetes    "                  >= 6.5%  Diabetic Goal                < 7.0%     05/23/2020 6.60 (H) 4.80 - 5.60 % Final     Triglycerides   Date Value Ref Range Status   11/29/2023 406 (H) 0 - 149 mg/dL Final     HDL Cholesterol   Date Value Ref Range Status   11/29/2023 28 (L) >39 mg/dL Final     LDL Chol Calc (NIH)   Date Value Ref Range Status   11/29/2023 20 0 - 99 mg/dL Final     VLDL Cholesterol Wale   Date Value Ref Range Status   11/29/2023 56 (H) 5 - 40 mg/dL Final     LDL/HDL RATIO   Date Value Ref Range Status   01/15/2021 2.8 0.0 - 3.6 ratio Final     Comment:                                         LDL/HDL Ratio                                              Men  Women                                1/2 Avg.Risk  1.0    1.5                                    Avg.Risk  3.6    3.2                                 2X Avg.Risk  6.2    5.0                                 3X Avg.Risk  8.0    6.1           Assessment and Plan {CC Problem List  Visit Diagnosis  ROS  Review (Popup)  Health Maintenance  Quality  BestPractice  Medications  SmartSets  SnapShot Encounters  Media :23  Diagnoses and all orders for this visit:    1. Type 2 diabetes mellitus with stage 4 chronic kidney disease, with long-term current use of insulin (Primary)  -     Hemoglobin A1c  -     Comprehensive Metabolic Panel  -     Microalbumin / Creatinine Urine Ratio - Urine, Clean Catch    Check labs   Continue with current Lantus and Novolog dosing  Continue with Jin      2. Mixed hyperlipidemia  -     Comprehensive Metabolic Panel  -     Lipid Panel    Check labs   Continue with statin      3. Benign hypertension with CKD (chronic kidney disease) stage IV  -     Comprehensive Metabolic Panel          Stable  Continue with current medication regimen  Defer management to PCP/Nephrology        No refills needed at this time      Labs today  RTC on 09/27/24 with Dr. Haro      Follow Up     Patient was given instructions and counseling regarding her  condition or for health maintenance advice. Please see specific information pulled into the AVS if appropriate.                IZZY Gordillo  06/27/24

## 2024-07-01 RX ORDER — OMEGA-3-ACID ETHYL ESTERS 1 G/1
2 CAPSULE, LIQUID FILLED ORAL 2 TIMES DAILY
Qty: 360 CAPSULE | Refills: 1 | Status: SHIPPED | OUTPATIENT
Start: 2024-07-01

## 2024-07-01 NOTE — TELEPHONE ENCOUNTER
Rx Refill Note  Requested Prescriptions     Pending Prescriptions Disp Refills    omega-3 acid ethyl esters (LOVAZA) 1 g capsule [Pharmacy Med Name: OMEGA-3-ACID 1GM CAPSULES (RX)] 360 capsule 1     Sig: TAKE 2 CAPSULES BY MOUTH TWICE DAILY      Last office visit with prescribing clinician: 6/27/2024   Last telemedicine visit with prescribing clinician: Visit date not found   Next office visit with prescribing clinician: Visit date not found                         Would you like a call back once the refill request has been completed: [] Yes [] No    If the office needs to give you a call back, can they leave a voicemail: [] Yes [] No    Michelle Davis  07/01/24, 07:52 EDT

## 2024-07-08 ENCOUNTER — INFUSION (OUTPATIENT)
Dept: ONCOLOGY | Facility: HOSPITAL | Age: 69
End: 2024-07-08
Payer: MEDICARE

## 2024-07-08 ENCOUNTER — LAB (OUTPATIENT)
Dept: LAB | Facility: HOSPITAL | Age: 69
End: 2024-07-08
Payer: MEDICARE

## 2024-07-08 VITALS
SYSTOLIC BLOOD PRESSURE: 155 MMHG | TEMPERATURE: 97.7 F | RESPIRATION RATE: 18 BRPM | HEART RATE: 89 BPM | DIASTOLIC BLOOD PRESSURE: 89 MMHG | OXYGEN SATURATION: 95 % | BODY MASS INDEX: 39.22 KG/M2 | WEIGHT: 305.6 LBS

## 2024-07-08 DIAGNOSIS — C90.00 MULTIPLE MYELOMA NOT HAVING ACHIEVED REMISSION: Primary | ICD-10-CM

## 2024-07-08 DIAGNOSIS — C90.00 MULTIPLE MYELOMA NOT HAVING ACHIEVED REMISSION: ICD-10-CM

## 2024-07-08 LAB
ALBUMIN SERPL-MCNC: 3.7 G/DL (ref 3.5–5.2)
ALBUMIN/GLOB SERPL: 1.7 G/DL
ALP SERPL-CCNC: 49 U/L (ref 39–117)
ALT SERPL W P-5'-P-CCNC: 10 U/L (ref 1–41)
ANION GAP SERPL CALCULATED.3IONS-SCNC: 13 MMOL/L (ref 5–15)
AST SERPL-CCNC: 13 U/L (ref 1–40)
BASOPHILS # BLD AUTO: 0.05 10*3/MM3 (ref 0–0.2)
BASOPHILS NFR BLD AUTO: 1 % (ref 0–1.5)
BILIRUB SERPL-MCNC: 0.5 MG/DL (ref 0–1.2)
BUN SERPL-MCNC: 41 MG/DL (ref 8–23)
BUN/CREAT SERPL: 12.3 (ref 7–25)
CALCIUM SPEC-SCNC: 8.5 MG/DL (ref 8.6–10.5)
CHLORIDE SERPL-SCNC: 103 MMOL/L (ref 98–107)
CO2 SERPL-SCNC: 25 MMOL/L (ref 22–29)
CREAT SERPL-MCNC: 3.33 MG/DL (ref 0.76–1.27)
DEPRECATED RDW RBC AUTO: 51.8 FL (ref 37–54)
EGFRCR SERPLBLD CKD-EPI 2021: 19.6 ML/MIN/1.73
EOSINOPHIL # BLD AUTO: 0.18 10*3/MM3 (ref 0–0.4)
EOSINOPHIL NFR BLD AUTO: 3.6 % (ref 0.3–6.2)
ERYTHROCYTE [DISTWIDTH] IN BLOOD BY AUTOMATED COUNT: 15.7 % (ref 12.3–15.4)
GLOBULIN UR ELPH-MCNC: 2.2 GM/DL
GLUCOSE SERPL-MCNC: 225 MG/DL (ref 65–99)
HCT VFR BLD AUTO: 33.8 % (ref 37.5–51)
HGB BLD-MCNC: 11 G/DL (ref 13–17.7)
IMM GRANULOCYTES # BLD AUTO: 0.02 10*3/MM3 (ref 0–0.05)
IMM GRANULOCYTES NFR BLD AUTO: 0.4 % (ref 0–0.5)
LYMPHOCYTES # BLD AUTO: 1.26 10*3/MM3 (ref 0.7–3.1)
LYMPHOCYTES NFR BLD AUTO: 24.9 % (ref 19.6–45.3)
MAGNESIUM SERPL-MCNC: 1.8 MG/DL (ref 1.6–2.4)
MCH RBC QN AUTO: 30.1 PG (ref 26.6–33)
MCHC RBC AUTO-ENTMCNC: 32.5 G/DL (ref 31.5–35.7)
MCV RBC AUTO: 92.3 FL (ref 79–97)
MONOCYTES # BLD AUTO: 0.6 10*3/MM3 (ref 0.1–0.9)
MONOCYTES NFR BLD AUTO: 11.8 % (ref 5–12)
NEUTROPHILS NFR BLD AUTO: 2.96 10*3/MM3 (ref 1.7–7)
NEUTROPHILS NFR BLD AUTO: 58.3 % (ref 42.7–76)
NRBC BLD AUTO-RTO: 0 /100 WBC (ref 0–0.2)
PHOSPHATE SERPL-MCNC: 2.2 MG/DL (ref 2.5–4.5)
PLATELET # BLD AUTO: 162 10*3/MM3 (ref 140–450)
PMV BLD AUTO: 9.3 FL (ref 6–12)
POTASSIUM SERPL-SCNC: 3.9 MMOL/L (ref 3.5–5.2)
PROT SERPL-MCNC: 5.9 G/DL (ref 6–8.5)
RBC # BLD AUTO: 3.66 10*6/MM3 (ref 4.14–5.8)
SODIUM SERPL-SCNC: 141 MMOL/L (ref 136–145)
WBC NRBC COR # BLD AUTO: 5.07 10*3/MM3 (ref 3.4–10.8)

## 2024-07-08 PROCEDURE — 85025 COMPLETE CBC W/AUTO DIFF WBC: CPT

## 2024-07-08 PROCEDURE — 25010000002 DENOSUMAB 120 MG/1.7ML SOLUTION: Performed by: INTERNAL MEDICINE

## 2024-07-08 PROCEDURE — 83735 ASSAY OF MAGNESIUM: CPT

## 2024-07-08 PROCEDURE — 25010000002 DARATUMUMAB-HYALURONIDASE-FIHJ 1800-30000 MG-UT/15ML SOLUTION: Performed by: INTERNAL MEDICINE

## 2024-07-08 PROCEDURE — 84100 ASSAY OF PHOSPHORUS: CPT

## 2024-07-08 PROCEDURE — 96372 THER/PROPH/DIAG INJ SC/IM: CPT

## 2024-07-08 PROCEDURE — 96401 CHEMO ANTI-NEOPL SQ/IM: CPT

## 2024-07-08 PROCEDURE — 36415 COLL VENOUS BLD VENIPUNCTURE: CPT

## 2024-07-08 PROCEDURE — 80053 COMPREHEN METABOLIC PANEL: CPT

## 2024-07-08 RX ADMIN — DARATUMUMAB AND HYALURONIDASE-FIHJ (HUMAN RECOMBINANT) 1800 MG: 1800; 30000 INJECTION SUBCUTANEOUS at 13:48

## 2024-07-08 RX ADMIN — DENOSUMAB 120 MG: 120 INJECTION SUBCUTANEOUS at 14:11

## 2024-07-09 LAB
ALBUMIN SERPL ELPH-MCNC: 3.5 G/DL (ref 2.9–4.4)
ALBUMIN/GLOB SERPL: 1.6 {RATIO} (ref 0.7–1.7)
ALPHA1 GLOB SERPL ELPH-MCNC: 0.2 G/DL (ref 0–0.4)
ALPHA2 GLOB SERPL ELPH-MCNC: 0.8 G/DL (ref 0.4–1)
B-GLOBULIN SERPL ELPH-MCNC: 0.8 G/DL (ref 0.7–1.3)
GAMMA GLOB SERPL ELPH-MCNC: 0.6 G/DL (ref 0.4–1.8)
GLOBULIN SER-MCNC: 2.3 G/DL (ref 2.2–3.9)
IGA SERPL-MCNC: 112 MG/DL (ref 61–437)
IGG SERPL-MCNC: 650 MG/DL (ref 603–1613)
IGM SERPL-MCNC: 16 MG/DL (ref 20–172)
INTERPRETATION SERPL IEP-IMP: ABNORMAL
KAPPA LC FREE SER-MCNC: 48.5 MG/L (ref 3.3–19.4)
KAPPA LC FREE/LAMBDA FREE SER: 1.39 {RATIO} (ref 0.26–1.65)
LABORATORY COMMENT REPORT: ABNORMAL
LAMBDA LC FREE SERPL-MCNC: 35 MG/L (ref 5.7–26.3)
M PROTEIN SERPL ELPH-MCNC: ABNORMAL G/DL
PROT SERPL-MCNC: 5.8 G/DL (ref 6–8.5)

## 2024-07-24 ENCOUNTER — SPECIALTY PHARMACY (OUTPATIENT)
Dept: PHARMACY | Facility: HOSPITAL | Age: 69
End: 2024-07-24
Payer: MEDICARE

## 2024-07-24 DIAGNOSIS — C90.00 MULTIPLE MYELOMA NOT HAVING ACHIEVED REMISSION: ICD-10-CM

## 2024-07-24 NOTE — PROGRESS NOTES
Drug: Pomalyst (pomalidomide)  Strength: 2 mg  Directions: Take 1 capsule by mouth daily on days 1-21 of each 28 day cycle  Quantity: 21  Refills: 0  Pharmacy prescription sent to: [facility]    Completed independent double check on medication order/RX.  Yolande Huitron, YumikoD, BCPS

## 2024-07-24 NOTE — PROGRESS NOTES
Re: Refills of Oral Specialty Medication - Pomalyst (pomalidomide)    Drug-Drug Interactions: The current medication list was reviewed and there are no relevant drug-drug interactions with the specialty medication.  Medication Allergies: The patient has no relevant allergies as it relates to their oral specialty medication  Review of Labs/Dose Adjustments: NO DOSE CHANGE - I reviewed the most recent note and labs and the patient will continue without any dose changes.  I sent refills as described below.    Drug: Pomalyst (pomalidomide)  Strength: 2 mg  Directions: Take 1 capsule by mouth daily on days 1-21 of each 28 day cycle  Quantity: 21  Refills: 0  Pharmacy prescription sent to: [facility]    Name/Credentials: Carlos Clarke, Evan, [facility]  Clinical Oncology Pharmacist    7/24/2024  13:34 EDT

## 2024-07-29 DIAGNOSIS — T45.1X5A NEUROPATHY DUE TO CHEMOTHERAPEUTIC DRUG: ICD-10-CM

## 2024-07-29 DIAGNOSIS — G62.0 NEUROPATHY DUE TO CHEMOTHERAPEUTIC DRUG: ICD-10-CM

## 2024-07-29 RX ORDER — GABAPENTIN 300 MG/1
CAPSULE ORAL
Qty: 90 CAPSULE | Refills: 3 | Status: SHIPPED | OUTPATIENT
Start: 2024-07-29

## 2024-07-29 NOTE — TELEPHONE ENCOUNTER
"    Caller: Contreras Albert \"BILL\"    Relationship: Self    Best call back number: [phone]     Requested Prescriptions:   Requested Prescriptions     Pending Prescriptions Disp Refills    gabapentin (NEURONTIN) 300 MG capsule 90 capsule 3     Sig: TAKE 1 CAPSULE BY MOUTH EVERY MORNING AND 2 CAPSULES BY MOUTH EVERY EVENING        Pharmacy where request should be sent: [id number] - [facility] [address]  AT [place] [address] & [place] [address] - [phone] [facility] [phone] FX     Last office visit with prescribing clinician: 6/10/2024   Last telemedicine visit with prescribing clinician: Visit date not found   Next office visit with prescribing clinician: 9/3/2024     Additional details provided by patient: PT IS COMPLETELY OUT.     Does the patient have less than a 3 day supply:  [x] Yes  [] No    Would you like a call back once the refill request has been completed: [] Yes [x] No    If the office needs to give you a call back, can they leave a voicemail: [] Yes [x] No    Rohan Morgan Rep   07/29/24 10:31 EDT           "

## 2024-08-05 ENCOUNTER — INFUSION (OUTPATIENT)
Dept: ONCOLOGY | Facility: HOSPITAL | Age: 69
End: 2024-08-05
Payer: MEDICARE

## 2024-08-05 ENCOUNTER — LAB (OUTPATIENT)
Dept: LAB | Facility: HOSPITAL | Age: 69
End: 2024-08-05
Payer: MEDICARE

## 2024-08-05 VITALS
BODY MASS INDEX: 39.22 KG/M2 | TEMPERATURE: 97.7 F | DIASTOLIC BLOOD PRESSURE: 87 MMHG | WEIGHT: 305.6 LBS | RESPIRATION RATE: 18 BRPM | SYSTOLIC BLOOD PRESSURE: 153 MMHG | HEART RATE: 84 BPM | OXYGEN SATURATION: 94 %

## 2024-08-05 DIAGNOSIS — C90.00 MULTIPLE MYELOMA NOT HAVING ACHIEVED REMISSION: Primary | ICD-10-CM

## 2024-08-05 DIAGNOSIS — C90.00 MULTIPLE MYELOMA NOT HAVING ACHIEVED REMISSION: ICD-10-CM

## 2024-08-05 LAB
ALBUMIN SERPL-MCNC: 4.1 G/DL (ref 3.5–5.2)
ALBUMIN/GLOB SERPL: 2.1 G/DL
ALP SERPL-CCNC: 43 U/L (ref 39–117)
ALT SERPL W P-5'-P-CCNC: <5 U/L (ref 1–41)
ANION GAP SERPL CALCULATED.3IONS-SCNC: 11.6 MMOL/L (ref 5–15)
AST SERPL-CCNC: 13 U/L (ref 1–40)
BASOPHILS # BLD AUTO: 0.06 10*3/MM3 (ref 0–0.2)
BASOPHILS NFR BLD AUTO: 1.2 % (ref 0–1.5)
BILIRUB SERPL-MCNC: 0.6 MG/DL (ref 0–1.2)
BUN SERPL-MCNC: 41 MG/DL (ref 8–23)
BUN/CREAT SERPL: 11.6 (ref 7–25)
CALCIUM SPEC-SCNC: 9.7 MG/DL (ref 8.6–10.5)
CHLORIDE SERPL-SCNC: 101 MMOL/L (ref 98–107)
CO2 SERPL-SCNC: 27.4 MMOL/L (ref 22–29)
CREAT SERPL-MCNC: 3.54 MG/DL (ref 0.76–1.27)
DEPRECATED RDW RBC AUTO: 54.4 FL (ref 37–54)
EGFRCR SERPLBLD CKD-EPI 2021: 18.1 ML/MIN/1.73
EOSINOPHIL # BLD AUTO: 0.18 10*3/MM3 (ref 0–0.4)
EOSINOPHIL NFR BLD AUTO: 3.6 % (ref 0.3–6.2)
ERYTHROCYTE [DISTWIDTH] IN BLOOD BY AUTOMATED COUNT: 15.9 % (ref 12.3–15.4)
GLOBULIN UR ELPH-MCNC: 2 GM/DL
GLUCOSE SERPL-MCNC: 193 MG/DL (ref 65–99)
HCT VFR BLD AUTO: 34.6 % (ref 37.5–51)
HGB BLD-MCNC: 11.1 G/DL (ref 13–17.7)
IMM GRANULOCYTES # BLD AUTO: 0.01 10*3/MM3 (ref 0–0.05)
IMM GRANULOCYTES NFR BLD AUTO: 0.2 % (ref 0–0.5)
LYMPHOCYTES # BLD AUTO: 1.52 10*3/MM3 (ref 0.7–3.1)
LYMPHOCYTES NFR BLD AUTO: 30.1 % (ref 19.6–45.3)
MAGNESIUM SERPL-MCNC: 1.7 MG/DL (ref 1.6–2.4)
MCH RBC QN AUTO: 30.1 PG (ref 26.6–33)
MCHC RBC AUTO-ENTMCNC: 32.1 G/DL (ref 31.5–35.7)
MCV RBC AUTO: 93.8 FL (ref 79–97)
MONOCYTES # BLD AUTO: 0.59 10*3/MM3 (ref 0.1–0.9)
MONOCYTES NFR BLD AUTO: 11.7 % (ref 5–12)
NEUTROPHILS NFR BLD AUTO: 2.69 10*3/MM3 (ref 1.7–7)
NEUTROPHILS NFR BLD AUTO: 53.2 % (ref 42.7–76)
NRBC BLD AUTO-RTO: 0 /100 WBC (ref 0–0.2)
PHOSPHATE SERPL-MCNC: 3.2 MG/DL (ref 2.5–4.5)
PLATELET # BLD AUTO: 182 10*3/MM3 (ref 140–450)
PMV BLD AUTO: 9.6 FL (ref 6–12)
POTASSIUM SERPL-SCNC: 4 MMOL/L (ref 3.5–5.2)
PROT SERPL-MCNC: 6.1 G/DL (ref 6–8.5)
RBC # BLD AUTO: 3.69 10*6/MM3 (ref 4.14–5.8)
SODIUM SERPL-SCNC: 140 MMOL/L (ref 136–145)
WBC NRBC COR # BLD AUTO: 5.05 10*3/MM3 (ref 3.4–10.8)

## 2024-08-05 PROCEDURE — 85025 COMPLETE CBC W/AUTO DIFF WBC: CPT

## 2024-08-05 PROCEDURE — 25010000002 DARATUMUMAB-HYALURONIDASE-FIHJ 1800-30000 MG-UT/15ML SOLUTION: Performed by: INTERNAL MEDICINE

## 2024-08-05 PROCEDURE — 84100 ASSAY OF PHOSPHORUS: CPT

## 2024-08-05 PROCEDURE — 80053 COMPREHEN METABOLIC PANEL: CPT

## 2024-08-05 PROCEDURE — 36415 COLL VENOUS BLD VENIPUNCTURE: CPT

## 2024-08-05 PROCEDURE — 83735 ASSAY OF MAGNESIUM: CPT

## 2024-08-05 PROCEDURE — 96401 CHEMO ANTI-NEOPL SQ/IM: CPT

## 2024-08-05 RX ADMIN — DARATUMUMAB AND HYALURONIDASE-FIHJ (HUMAN RECOMBINANT) 1800 MG: 1800; 30000 INJECTION SUBCUTANEOUS at 14:04

## 2024-08-06 LAB
ALBUMIN SERPL ELPH-MCNC: 3.8 G/DL (ref 2.9–4.4)
ALBUMIN/GLOB SERPL: 1.6 {RATIO} (ref 0.7–1.7)
ALPHA1 GLOB SERPL ELPH-MCNC: 0.2 G/DL (ref 0–0.4)
ALPHA2 GLOB SERPL ELPH-MCNC: 0.8 G/DL (ref 0.4–1)
B-GLOBULIN SERPL ELPH-MCNC: 0.8 G/DL (ref 0.7–1.3)
GAMMA GLOB SERPL ELPH-MCNC: 0.6 G/DL (ref 0.4–1.8)
GLOBULIN SER-MCNC: 2.4 G/DL (ref 2.2–3.9)
IGA SERPL-MCNC: 90 MG/DL (ref 61–437)
IGG SERPL-MCNC: 661 MG/DL (ref 603–1613)
IGM SERPL-MCNC: 17 MG/DL (ref 20–172)
INTERPRETATION SERPL IEP-IMP: ABNORMAL
KAPPA LC FREE SER-MCNC: 55.5 MG/L (ref 3.3–19.4)
KAPPA LC FREE/LAMBDA FREE SER: 1.36 {RATIO} (ref 0.26–1.65)
LABORATORY COMMENT REPORT: ABNORMAL
LAMBDA LC FREE SERPL-MCNC: 40.7 MG/L (ref 5.7–26.3)
M PROTEIN SERPL ELPH-MCNC: ABNORMAL G/DL
PROT SERPL-MCNC: 6.2 G/DL (ref 6–8.5)

## 2024-08-14 ENCOUNTER — OFFICE VISIT (OUTPATIENT)
Dept: GASTROENTEROLOGY | Facility: CLINIC | Age: 69
End: 2024-08-14
Payer: MEDICARE

## 2024-08-14 ENCOUNTER — LAB (OUTPATIENT)
Dept: LAB | Facility: HOSPITAL | Age: 69
End: 2024-08-14
Payer: MEDICARE

## 2024-08-14 VITALS
HEIGHT: 74 IN | DIASTOLIC BLOOD PRESSURE: 94 MMHG | TEMPERATURE: 99.3 F | HEART RATE: 88 BPM | BODY MASS INDEX: 39.39 KG/M2 | SYSTOLIC BLOOD PRESSURE: 150 MMHG | WEIGHT: 306.9 LBS | OXYGEN SATURATION: 93 %

## 2024-08-14 DIAGNOSIS — D64.9 ANEMIA, UNSPECIFIED TYPE: ICD-10-CM

## 2024-08-14 DIAGNOSIS — C90.01 MULTIPLE MYELOMA IN REMISSION: ICD-10-CM

## 2024-08-14 DIAGNOSIS — K52.9 CHRONIC DIARRHEA: ICD-10-CM

## 2024-08-14 LAB
BASOPHILS # BLD AUTO: 0.12 10*3/MM3 (ref 0–0.2)
BASOPHILS NFR BLD AUTO: 1.6 % (ref 0–1.5)
DEPRECATED RDW RBC AUTO: 56.3 FL (ref 37–54)
EOSINOPHIL # BLD AUTO: 0.22 10*3/MM3 (ref 0–0.4)
EOSINOPHIL NFR BLD AUTO: 3 % (ref 0.3–6.2)
ERYTHROCYTE [DISTWIDTH] IN BLOOD BY AUTOMATED COUNT: 16.5 % (ref 12.3–15.4)
HCT VFR BLD AUTO: 35.4 % (ref 37.5–51)
HGB BLD-MCNC: 11.5 G/DL (ref 13–17.7)
IMM GRANULOCYTES # BLD AUTO: 0.03 10*3/MM3 (ref 0–0.05)
IMM GRANULOCYTES NFR BLD AUTO: 0.4 % (ref 0–0.5)
LYMPHOCYTES # BLD AUTO: 1.55 10*3/MM3 (ref 0.7–3.1)
LYMPHOCYTES NFR BLD AUTO: 21 % (ref 19.6–45.3)
MCH RBC QN AUTO: 30.3 PG (ref 26.6–33)
MCHC RBC AUTO-ENTMCNC: 32.5 G/DL (ref 31.5–35.7)
MCV RBC AUTO: 93.4 FL (ref 79–97)
MONOCYTES # BLD AUTO: 0.64 10*3/MM3 (ref 0.1–0.9)
MONOCYTES NFR BLD AUTO: 8.7 % (ref 5–12)
NEUTROPHILS NFR BLD AUTO: 4.81 10*3/MM3 (ref 1.7–7)
NEUTROPHILS NFR BLD AUTO: 65.3 % (ref 42.7–76)
NRBC BLD AUTO-RTO: 0 /100 WBC (ref 0–0.2)
PLATELET # BLD AUTO: 181 10*3/MM3 (ref 140–450)
PMV BLD AUTO: 9.9 FL (ref 6–12)
RBC # BLD AUTO: 3.79 10*6/MM3 (ref 4.14–5.8)
WBC NRBC COR # BLD AUTO: 7.37 10*3/MM3 (ref 3.4–10.8)

## 2024-08-14 PROCEDURE — 36415 COLL VENOUS BLD VENIPUNCTURE: CPT | Performed by: NURSE PRACTITIONER

## 2024-08-14 PROCEDURE — 85025 COMPLETE CBC W/AUTO DIFF WBC: CPT | Performed by: NURSE PRACTITIONER

## 2024-08-14 NOTE — PROGRESS NOTES
Chief Complaint  Diarrhea    Subjective        Contreras Albert is a 69-year-old male,new to our practice.  Past medical history is significant for multiple myeloma followed by chemotherapy, stem cell transplant in remission and currently is on Pomalyst with monthly steroids treatment (dexamethasone) diabetes depression, stage I kidney cancer, BPH, vitamin D deficiency.  Patient presents to [facility] for chronic diarrhea x4 years.     Chronic diarrhea - patient reports having this issue over at least 4 years without abdominal pain. Typical numbers of diarrhea can be 15-20 times a day, no blood in it. Watery diarrhea-like. He has been on quite a few cancer medications that induced this GI issue, among these medications, he has been on Carvedilol for at least 4 years for hypertension control, chosen by his nephrologist, he stated. Due to presyncope, fatigue, and dizziness symptoms when this dosage was increased, he stopped this Carvedilol on his own, then he noticed diarrhea just completely stopped. Now his stool is more formed. He would like to discuss this with nephrologist next visit.  He is not currently on any antihypertensive agent.    Currently he denies abdominal pain.  No blood in stool.  Denies any specific GI concerns.  Despite never had EGD/colonoscopy done and given the complexity of his comorbidities, he prefers not to undergoing GI endoscopy surveillance at this time, unless critically needed.  As mentioned , he was concerning about excessive chronic diarrhea over the years, but now this problem has resolved, he is feeling much better.      Anemia   Patient has multiple myeloma, currently under the care of Dr. Del Valle, oncology/hematology, patient was last seen in [place] 10, 2024 .  Progress note reviewed.  Patient has anemia secondary to multiple myeloma, chronic kidney disease stage IV, and chemotherapy.  Patient has been treated with Procrit, and continue to follow-up  "closely with hematology practice.     Patient also has a history of stage I clear cell carcinoma of the right kidney, status post right partial nephrectomy on 5/18/2016.  He continues to follow-up with Dr. Hutchison.  Patient was on HD in the past, taken off HD in May 2021.     Objective   Vital Signs:  /94   Pulse 88   Temp 99.3 °F (37.4 °C)   Ht 188 cm (74\")   Wt (!) 139 kg (306 lb 14.4 oz)   SpO2 93%   BMI 39.40 kg/m²   Estimated body mass index is 39.4 kg/m² as calculated from the following:    Height as of this encounter: 188 cm (74\").    Weight as of this encounter: 139 kg (306 lb 14.4 oz).     Physical Exam  Vitals and nursing note reviewed.   Constitutional:       General: He is not in acute distress.     Appearance: Normal appearance. He is obese. He is not ill-appearing, toxic-appearing or diaphoretic.   Eyes:      General: No scleral icterus.     Conjunctiva/sclera: Conjunctivae normal.   Cardiovascular:      Rate and Rhythm: Normal rate and regular rhythm.      Pulses: Normal pulses.      Heart sounds: Normal heart sounds.   Pulmonary:      Effort: Pulmonary effort is normal. No respiratory distress.      Breath sounds: Normal breath sounds. No stridor.   Abdominal:      General: Abdomen is flat. Bowel sounds are normal. There is no distension.      Palpations: Abdomen is soft. There is no mass.      Tenderness: There is no abdominal tenderness. There is no right CVA tenderness, left CVA tenderness, guarding or rebound.      Hernia: No hernia is present.   Skin:     Coloration: Skin is not jaundiced or pale.      Findings: No erythema or rash.   Neurological:      Mental Status: He is alert and oriented to person, place, and time. Mental status is at baseline.   Psychiatric:         Mood and Affect: Mood normal.        Result Review :    The following data was reviewed by: IZZY Grijalva on 08/14/2024:    Lab Results   Component Value Date    GLUCOSE 193 (H) 08/05/2024    BUN 41 (H) " 08/05/2024    CREATININE 3.54 (C) 08/05/2024     08/05/2024    K 4.0 08/05/2024     08/05/2024    CALCIUM 9.7 08/05/2024    PROTEINTOT 6.1 08/05/2024    ALBUMIN 4.1 08/05/2024    ALBUMIN 3.8 08/05/2024    ALT <5 08/05/2024    AST 13 08/05/2024    ALKPHOS 43 08/05/2024    BILITOT 0.6 08/05/2024    GLOB 2.0 08/05/2024    AGRATIO 2.1 08/05/2024    BCR 11.6 08/05/2024    ANIONGAP 11.6 08/05/2024    EGFR 18.1 (L) 08/05/2024     Lab Results   Component Value Date    WBC 7.37 08/14/2024    HGB 11.5 (L) 08/14/2024    HCT 35.4 (L) 08/14/2024    MCV 93.4 08/14/2024     08/14/2024     Lab Results   Component Value Date    HGBA1C 8.10 (H) 06/27/2024     Progress Notes by Red Del Valle MD (06/10/2024 11:20)          Assessment and Plan     Diagnoses and all orders for this visit:    1. Chronic diarrhea  -     Fecal Leukocytes - Stool, Per Rectum  -     CBC & Differential  -     CT Abdomen Pelvis Without Contrast    2. Anemia, unspecified type  -     Fecal Leukocytes - Stool, Per Rectum  -     CBC & Differential  -     CT Abdomen Pelvis Without Contrast    3. Multiple myeloma in remission  -     Fecal Leukocytes - Stool, Per Rectum  -     CBC & Differential  -     CT Abdomen Pelvis Without Contrast      Discussion    Patient is a 69 year-old male, complex medical history, presents to our GI practice consults for chronic anemia and for chronic diarrhea - now resolved once off BB.    I have discussed the medical necessity for EGD/CLS as part of anemia workup to rule out possible GI bleed, although this is not urgent.  In regards to his chronic diarrhea, in which the differential diagnosis for his diarrhea can be ulcerative colitis, crohn's disease, AVMs in the colon, colo-rectal cancer, proctitis and many others unnamed -  Patient has expressed his anxiety and concerns, in which he prefers to postpone endoscopy workups until he visits with Dr. Hutchison, nephrologist to discuss about his bp medication regimen,  as well as relaying his other health concerns with other provider team members involving in his care prior to approaching GI endoscopy workups.  At this time, I recommend to proceed with FOBT, and other labs above, plus CT abd/pel at baseline evaluation, he is agreeable to plan of care.     I do not suspect his dizziness or presyncope merely came from the BB, due to multiple other medications on board for his complex medical problems, which is multifactorial. I will defer this discussion with his nephrology specialist about stopping BB, reiterated the importance of medication compliance and to discuss with the provider prior to taken off medication on his own. BP will need to be under control. I also had a lengthy discussion with patient about possible multiple sources responsible for his anemia (high risks for GI bleed with frequent steroid use, CKD, multiple myeloma, and iron deficiency), he will follow up closely with hematology/oncology and let us know if ready for endoscopy workups.        Follow Up     Return for 8 weeks. .    I have reviewed patient's current medications list, relevant clinical information necessary for today's encounter. I discussed the clinical impression and treatment plan with the patient, who verbalized understanding and in agreement.  All questions were answered and support was provided.

## 2024-08-14 NOTE — PATIENT INSTRUCTIONS
Proceed with CT abd/pel scan  Proceed with labs, stool test  Discuss concerns with your nephrology and other healthcare team members, return to follow up with me in 8 weeks or sooner as needed      A team member from [facility] centralized scheduling department will schedule your test(s). If you do not hear from them, please contact them directly at [phone].

## 2024-08-19 ENCOUNTER — SPECIALTY PHARMACY (OUTPATIENT)
Dept: PHARMACY | Facility: HOSPITAL | Age: 69
End: 2024-08-19
Payer: MEDICARE

## 2024-08-19 DIAGNOSIS — C90.00 MULTIPLE MYELOMA NOT HAVING ACHIEVED REMISSION: ICD-10-CM

## 2024-08-19 NOTE — PROGRESS NOTES
Re: Refills of Oral Specialty Medication - Pomalyst (pomalidomide)    Drug-Drug Interactions: The current medication list was reviewed and there are no relevant drug-drug interactions with the specialty medication.  Medication Allergies: The patient has no relevant allergies as it relates to their oral specialty medication  Review of Labs/Dose Adjustments: NO DOSE CHANGE - I reviewed the most recent note and labs and the patient will continue without any dose changes.  I sent refills as described below.    Drug: Pomalyst (pomalidomide)  Strength: 2 mg  Directions: Take 1 capsule by mouth daily on days 1-21 of each 28 day cycle  Quantity: 21  Refills: 0  Pharmacy prescription sent to: [facility]    Name/Credentials: Carlos Clarke, Evan, BCOP  Clinical Oncology Pharmacist    8/19/2024  10:59 EDT

## 2024-08-19 NOTE — PROGRESS NOTES
Drug: Pomalyst (pomalidomide)  Strength: 2 mg  Directions: Take 1 capsule by mouth daily on days 1-21 of each 28 day cycle  Quantity: 21  Refills: 0  Pharmacy prescription sent to: [facility]  Completed independent double check on medication order/RX  Pia Cano RPh, NANCY.

## 2024-08-22 ENCOUNTER — LAB (OUTPATIENT)
Dept: LAB | Facility: HOSPITAL | Age: 69
End: 2024-08-22
Payer: MEDICARE

## 2024-08-22 PROCEDURE — 87205 SMEAR GRAM STAIN: CPT | Performed by: NURSE PRACTITIONER

## 2024-08-27 DIAGNOSIS — Z79.4 TYPE 2 DIABETES MELLITUS WITH STAGE 4 CHRONIC KIDNEY DISEASE, WITH LONG-TERM CURRENT USE OF INSULIN: ICD-10-CM

## 2024-08-27 DIAGNOSIS — N18.4 TYPE 2 DIABETES MELLITUS WITH STAGE 4 CHRONIC KIDNEY DISEASE, WITH LONG-TERM CURRENT USE OF INSULIN: ICD-10-CM

## 2024-08-27 DIAGNOSIS — E11.22 TYPE 2 DIABETES MELLITUS WITH STAGE 4 CHRONIC KIDNEY DISEASE, WITH LONG-TERM CURRENT USE OF INSULIN: ICD-10-CM

## 2024-08-27 NOTE — TELEPHONE ENCOUNTER
Incoming Refill Request      Medication requested (name and dose): freestyle charlotte 2 sensor    Pharmacy where request should be sent: [facility]     Additional details provided by patient:     Best call back number: [phone]    Does the patient have less than a 3 day supply:  [] Yes  [] No    Rohan Lea Rep  08/27/24, 11:27 EDT          
Rx Refill Note  Requested Prescriptions     Pending Prescriptions Disp Refills    Continuous Glucose Sensor (FreeStyle Jin 2 Sensor) misc 2 each 3     Sig: Use 1 each Every 14 (Fourteen) Days.      Last office visit with prescribing clinician: 6/27/2024   Last telemedicine visit with prescribing clinician: Visit date not found   Next office visit with prescribing clinician: Visit date not found                         Would you like a call back once the refill request has been completed: [] Yes [] No    If the office needs to give you a call back, can they leave a voicemail: [] Yes [] No    Michelle Davis  08/27/24, 11:30 EDT    
Clothing

## 2024-08-29 LAB
WBC STL QL MICRO: NORMAL
WBC STL QL MICRO: NORMAL

## 2024-09-03 ENCOUNTER — LAB (OUTPATIENT)
Dept: LAB | Facility: HOSPITAL | Age: 69
End: 2024-09-03
Payer: MEDICARE

## 2024-09-03 ENCOUNTER — OFFICE VISIT (OUTPATIENT)
Dept: ONCOLOGY | Facility: CLINIC | Age: 69
End: 2024-09-03
Payer: MEDICARE

## 2024-09-03 ENCOUNTER — INFUSION (OUTPATIENT)
Dept: ONCOLOGY | Facility: HOSPITAL | Age: 69
End: 2024-09-03
Payer: MEDICARE

## 2024-09-03 VITALS
OXYGEN SATURATION: 99 % | WEIGHT: 306.9 LBS | BODY MASS INDEX: 39.4 KG/M2 | TEMPERATURE: 97.8 F | HEART RATE: 89 BPM | DIASTOLIC BLOOD PRESSURE: 92 MMHG | SYSTOLIC BLOOD PRESSURE: 167 MMHG

## 2024-09-03 DIAGNOSIS — C90.00 MULTIPLE MYELOMA NOT HAVING ACHIEVED REMISSION: Primary | ICD-10-CM

## 2024-09-03 DIAGNOSIS — Z79.899 HIGH RISK MEDICATION USE: ICD-10-CM

## 2024-09-03 DIAGNOSIS — M89.9 LYTIC BONE LESIONS ON XRAY: ICD-10-CM

## 2024-09-03 DIAGNOSIS — D70.1 CHEMOTHERAPY INDUCED NEUTROPENIA: ICD-10-CM

## 2024-09-03 DIAGNOSIS — E83.51 HYPOCALCEMIA: ICD-10-CM

## 2024-09-03 DIAGNOSIS — N18.32 ANEMIA IN STAGE 3B CHRONIC KIDNEY DISEASE: ICD-10-CM

## 2024-09-03 DIAGNOSIS — D63.1 ANEMIA IN STAGE 3B CHRONIC KIDNEY DISEASE: ICD-10-CM

## 2024-09-03 DIAGNOSIS — C90.00 MULTIPLE MYELOMA NOT HAVING ACHIEVED REMISSION: ICD-10-CM

## 2024-09-03 DIAGNOSIS — D84.9 IMMUNOCOMPROMISED STATE: ICD-10-CM

## 2024-09-03 DIAGNOSIS — T45.1X5A CHEMOTHERAPY INDUCED NEUTROPENIA: ICD-10-CM

## 2024-09-03 DIAGNOSIS — E83.42 HYPOMAGNESEMIA: ICD-10-CM

## 2024-09-03 LAB
ALBUMIN SERPL-MCNC: 4 G/DL (ref 3.5–5.2)
ALBUMIN/GLOB SERPL: 1.8 G/DL
ALP SERPL-CCNC: 49 U/L (ref 39–117)
ALT SERPL W P-5'-P-CCNC: 12 U/L (ref 1–41)
ANION GAP SERPL CALCULATED.3IONS-SCNC: 12.7 MMOL/L (ref 5–15)
AST SERPL-CCNC: 13 U/L (ref 1–40)
BASOPHILS # BLD AUTO: 0.08 10*3/MM3 (ref 0–0.2)
BASOPHILS NFR BLD AUTO: 1.5 % (ref 0–1.5)
BILIRUB SERPL-MCNC: 0.6 MG/DL (ref 0–1.2)
BUN SERPL-MCNC: 39 MG/DL (ref 8–23)
BUN/CREAT SERPL: 12.1 (ref 7–25)
CALCIUM SPEC-SCNC: 8.7 MG/DL (ref 8.6–10.5)
CHLORIDE SERPL-SCNC: 102 MMOL/L (ref 98–107)
CO2 SERPL-SCNC: 24.3 MMOL/L (ref 22–29)
CREAT SERPL-MCNC: 3.22 MG/DL (ref 0.76–1.27)
DEPRECATED RDW RBC AUTO: 53.3 FL (ref 37–54)
EGFRCR SERPLBLD CKD-EPI 2021: 20.3 ML/MIN/1.73
EOSINOPHIL # BLD AUTO: 0.2 10*3/MM3 (ref 0–0.4)
EOSINOPHIL NFR BLD AUTO: 3.8 % (ref 0.3–6.2)
ERYTHROCYTE [DISTWIDTH] IN BLOOD BY AUTOMATED COUNT: 15.8 % (ref 12.3–15.4)
GLOBULIN UR ELPH-MCNC: 2.2 GM/DL
GLUCOSE SERPL-MCNC: 99 MG/DL (ref 65–99)
HCT VFR BLD AUTO: 35.9 % (ref 37.5–51)
HGB BLD-MCNC: 11.6 G/DL (ref 13–17.7)
IMM GRANULOCYTES # BLD AUTO: 0.02 10*3/MM3 (ref 0–0.05)
IMM GRANULOCYTES NFR BLD AUTO: 0.4 % (ref 0–0.5)
LYMPHOCYTES # BLD AUTO: 1.42 10*3/MM3 (ref 0.7–3.1)
LYMPHOCYTES NFR BLD AUTO: 27.3 % (ref 19.6–45.3)
MAGNESIUM SERPL-MCNC: 1.8 MG/DL (ref 1.6–2.4)
MCH RBC QN AUTO: 30.4 PG (ref 26.6–33)
MCHC RBC AUTO-ENTMCNC: 32.3 G/DL (ref 31.5–35.7)
MCV RBC AUTO: 94 FL (ref 79–97)
MONOCYTES # BLD AUTO: 0.73 10*3/MM3 (ref 0.1–0.9)
MONOCYTES NFR BLD AUTO: 14 % (ref 5–12)
NEUTROPHILS NFR BLD AUTO: 2.76 10*3/MM3 (ref 1.7–7)
NEUTROPHILS NFR BLD AUTO: 53 % (ref 42.7–76)
NRBC BLD AUTO-RTO: 0 /100 WBC (ref 0–0.2)
PHOSPHATE SERPL-MCNC: 3.3 MG/DL (ref 2.5–4.5)
PLATELET # BLD AUTO: 192 10*3/MM3 (ref 140–450)
PMV BLD AUTO: 9.5 FL (ref 6–12)
POTASSIUM SERPL-SCNC: 4.1 MMOL/L (ref 3.5–5.2)
PROT SERPL-MCNC: 6.2 G/DL (ref 6–8.5)
RBC # BLD AUTO: 3.82 10*6/MM3 (ref 4.14–5.8)
SODIUM SERPL-SCNC: 139 MMOL/L (ref 136–145)
WBC NRBC COR # BLD AUTO: 5.21 10*3/MM3 (ref 3.4–10.8)

## 2024-09-03 PROCEDURE — 1126F AMNT PAIN NOTED NONE PRSNT: CPT | Performed by: INTERNAL MEDICINE

## 2024-09-03 PROCEDURE — 3077F SYST BP >= 140 MM HG: CPT | Performed by: INTERNAL MEDICINE

## 2024-09-03 PROCEDURE — 36415 COLL VENOUS BLD VENIPUNCTURE: CPT

## 2024-09-03 PROCEDURE — 1159F MED LIST DOCD IN RCRD: CPT | Performed by: INTERNAL MEDICINE

## 2024-09-03 PROCEDURE — 1160F RVW MEDS BY RX/DR IN RCRD: CPT | Performed by: INTERNAL MEDICINE

## 2024-09-03 PROCEDURE — 83735 ASSAY OF MAGNESIUM: CPT

## 2024-09-03 PROCEDURE — 80053 COMPREHEN METABOLIC PANEL: CPT

## 2024-09-03 PROCEDURE — 25010000002 DARATUMUMAB-HYALURONIDASE-FIHJ 1800-30000 MG-UT/15ML SOLUTION: Performed by: INTERNAL MEDICINE

## 2024-09-03 PROCEDURE — 96401 CHEMO ANTI-NEOPL SQ/IM: CPT

## 2024-09-03 PROCEDURE — 85025 COMPLETE CBC W/AUTO DIFF WBC: CPT

## 2024-09-03 PROCEDURE — 3080F DIAST BP >= 90 MM HG: CPT | Performed by: INTERNAL MEDICINE

## 2024-09-03 PROCEDURE — 84100 ASSAY OF PHOSPHORUS: CPT

## 2024-09-03 PROCEDURE — 99214 OFFICE O/P EST MOD 30 MIN: CPT | Performed by: INTERNAL MEDICINE

## 2024-09-03 RX ORDER — AMLODIPINE BESYLATE 2.5 MG/1
2.5 TABLET ORAL DAILY
COMMUNITY
Start: 2024-08-15 | End: 2025-08-15

## 2024-09-03 RX ORDER — MEPERIDINE HYDROCHLORIDE 25 MG/ML
12.5 INJECTION INTRAMUSCULAR; INTRAVENOUS; SUBCUTANEOUS
Status: CANCELLED | OUTPATIENT
Start: 2024-09-03

## 2024-09-03 RX ORDER — DEXAMETHASONE 4 MG/1
TABLET ORAL
Qty: 15 TABLET | Refills: 1 | Status: SHIPPED | OUTPATIENT
Start: 2024-09-03

## 2024-09-03 RX ORDER — ACYCLOVIR 800 MG/1
800 TABLET ORAL DAILY
Qty: 90 TABLET | Refills: 2 | Status: SHIPPED | OUTPATIENT
Start: 2024-09-03

## 2024-09-03 RX ORDER — FAMOTIDINE 10 MG/ML
20 INJECTION, SOLUTION INTRAVENOUS AS NEEDED
OUTPATIENT
Start: 2024-09-30

## 2024-09-03 RX ORDER — DIPHENHYDRAMINE HYDROCHLORIDE 50 MG/ML
50 INJECTION INTRAMUSCULAR; INTRAVENOUS AS NEEDED
Status: CANCELLED | OUTPATIENT
Start: 2024-09-03

## 2024-09-03 RX ORDER — DIPHENHYDRAMINE HYDROCHLORIDE 50 MG/ML
50 INJECTION INTRAMUSCULAR; INTRAVENOUS AS NEEDED
OUTPATIENT
Start: 2024-09-30

## 2024-09-03 RX ORDER — MEPERIDINE HYDROCHLORIDE 25 MG/ML
12.5 INJECTION INTRAMUSCULAR; INTRAVENOUS; SUBCUTANEOUS
OUTPATIENT
Start: 2024-09-30

## 2024-09-03 RX ORDER — FAMOTIDINE 10 MG/ML
20 INJECTION, SOLUTION INTRAVENOUS AS NEEDED
Status: CANCELLED | OUTPATIENT
Start: 2024-09-03

## 2024-09-03 RX ADMIN — DARATUMUMAB AND HYALURONIDASE-FIHJ (HUMAN RECOMBINANT) 1800 MG: 1800; 30000 INJECTION SUBCUTANEOUS at 12:03

## 2024-09-03 NOTE — PROGRESS NOTES
Subjective     CHIEF COMPLAINT:      Chief Complaint   Patient presents with    Follow-up     HISTORY OF PRESENT ILLNESS:     Contreras Albert is a 69 y.o. male patient who returns today for follow up on his multiple myeloma.  He returns today for follow-up reporting that he recently had worsening of his dizziness and he had fainting episodes.  He developed after an increase in the dose of carvedilol.  When he reviewed the side effects, he noticed that he was having multiple reported side effects of the medicine-dizziness/fainting, diarrhea, lower extremity swelling and urine encounter months.  He stopped the medicine and felt significantly better afterwards.  He took half a tablet afterwards and had recurrence of the symptoms.  He updated his nephrologist about this.    Patient is tolerating his treatment.  He had a sinus infection at his last visit in June which was likely contributing to the anemia.  We discussed starting back on Procrit.  However, his hemoglobin improved and he did not need to start back on Procrit.    ROS:  Pertinent ROS is in the HPI.     Past medical, surgical, social and family history were reviewed.     MEDICATIONS:    Current Outpatient Medications:     Accu-Chek Softclix Lancets lancets, Check 3 times a day on insulin tx, poor control, hypoglycemia. Dx: E 11.22, Disp: 300 each, Rfl: 3    acetaminophen (TYLENOL) 325 MG tablet, Take 2 tablets by mouth Every 6 (Six) Hours As Needed for Mild Pain. (Patient taking differently: Take 2 tablets by mouth Every 6 (Six) Hours As Needed for Mild Pain. As needed), Disp: , Rfl:     acyclovir (ZOVIRAX) 800 MG tablet, TAKE 1 TABLET BY MOUTH EVERY DAY, Disp: 90 tablet, Rfl: 2    amLODIPine (NORVASC) 2.5 MG tablet, Take 1 tablet by mouth Daily., Disp: , Rfl:     ASPIRIN 81 PO, Take 81 mg by mouth Daily., Disp: , Rfl:     atorvastatin (LIPITOR) 10 MG tablet, Take 1 tablet by mouth Every Night., Disp: 90 tablet, Rfl: 1    bumetanide (BUMEX) 2 MG tablet, Take 1  tablet by mouth Daily., Disp: , Rfl:     calcium carbonate (TUMS) 500 MG chewable tablet, Chew 4 tablets Daily., Disp: , Rfl:     Continuous Blood Gluc  (FreeStyle Jin 2 [place]) device, USE 1 FOR 1 DOSE, Disp: , Rfl:     Continuous Glucose Sensor (FreeStyle Jin 2 Sensor) misc, Use 1 each Every 14 (Fourteen) Days., Disp: 2 each, Rfl: 3    dexAMETHasone (DECADRON) 4 MG tablet, Take 3 tablets on day of chemo, 1 tablet on day #2 and 1 tablet on day #3 and repeat monthly., Disp: 15 tablet, Rfl: 1    escitalopram (LEXAPRO) 5 MG tablet, TAKE 1 TABLET BY MOUTH EVERY DAY, Disp: 30 tablet, Rfl: 5    finasteride (PROSCAR) 5 MG tablet, Take 1 tablet by mouth Daily., Disp: , Rfl:     gabapentin (NEURONTIN) 300 MG capsule, TAKE 1 CAPSULE BY MOUTH EVERY MORNING AND 2 CAPSULES BY MOUTH EVERY EVENING, Disp: 90 capsule, Rfl: 3    glucose blood (Accu-Chek Guide) test strip, Check 3 times a day on insulin tx, poor control, hypoglycemia. Dx: E 11.22, Disp: 300 each, Rfl: 3    icosapent ethyl (VASCEPA) 1 g capsule capsule, TAKE 2 CAPSULES BY MOUTH TWICE DAILY WITH MEALS, Disp: 360 capsule, Rfl: 1    Insulin Lispro, 1 Unit Dial, (HUMALOG) 100 UNIT/ML solution pen-injector, NONCHEMO DAYS: 10 USE FOR BREAKFAST AND LUNCH, 12 USE AT SUPPER. CHEMO DAYS: 15 UNITS 3X DAILY WITH MEALS, Disp: 54 mL, Rfl: 1    Insulin Pen Needle 32G X 4 MM misc, USING 4 PEN NEEDLES DAILY, Disp: 400 each, Rfl: 3    Lantus SoloStar 100 UNIT/ML injection pen, NONCHEMO DAYS: 16 UNITS EVERY MORNING AND 18 UNITS EVERY EVENING; CHEMO DAYS: 30 UNITS TWICE DAILY; MAX DAILY DOSE: 70 UNITS, Disp: 63 mL, Rfl: 1    levoFLOXacin (LEVAQUIN) 250 MG tablet, Take 500 mg (2 tablets) on day 1 and 250 mg (1 tablet) every other day for 14 days., Disp: 9 tablet, Rfl: 0    Phospha 250 Neutral 155-852-130 MG tablet, TAKE 1 TABLET BY MOUTH DAILY, Disp: 30 tablet, Rfl: 1    pomalidomide (POMALYST) 2 MG chemo capsule, Take 1 capsule by mouth Daily. Take for 21 days on, then 7 days  off., Disp: 21 capsule, Rfl: 0    tamsulosin (FLOMAX) 0.4 MG capsule 24 hr capsule, Take 1 capsule by mouth Daily., Disp: , Rfl:     vitamin D (ERGOCALCIFEROL) 1.25 MG (67326 UT) capsule capsule, TAKE 1 CAPSULE BY MOUTH EVERY 7 DAYS, Disp: 13 capsule, Rfl: 1    DARATUMUMAB IV, Infuse  into a venous catheter., Disp: , Rfl:     diphenoxylate-atropine (LOMOTIL) 2.5-0.025 MG per tablet, Take 2 tablets by mouth 4 (Four) Times a Day As Needed for Diarrhea. (Patient not taking: Reported on 9/3/2024), Disp: 120 tablet, Rfl: 0    glucagon (GLUCAGEN) 1 MG injection, Inject 1 mg into the appropriate muscle as directed by prescriber 1 (One) Time As Needed for Low Blood Sugar. Follow package directions for low blood sugar. (Patient not taking: Reported on 9/3/2024), Disp: 1 kit, Rfl: 10    omega-3 acid ethyl esters (LOVAZA) 1 g capsule, TAKE 2 CAPSULES BY MOUTH TWICE DAILY, Disp: 360 capsule, Rfl: 1    ondansetron (ZOFRAN) 8 MG tablet, Take 1 tablet by mouth 3 (Three) Times a Day As Needed for Nausea or Vomiting. (Patient not taking: Reported on 9/3/2024), Disp: 30 tablet, Rfl: 5    Objective     VITAL SIGNS:     Vitals:    09/03/24 1112   BP: 167/92   Pulse: 89   Temp: 97.8 °F (36.6 °C)   TempSrc: Oral   SpO2: 99%   Weight: (!) 139 kg (306 lb 14.4 oz)   PainSc: 0-No pain     Body mass index is 39.4 kg/m².     Wt Readings from Last 5 Encounters:   09/03/24 (!) 139 kg (306 lb 14.4 oz)   08/14/24 (!) 139 kg (306 lb 14.4 oz)   08/05/24 (!) 139 kg (305 lb 9.6 oz)   07/08/24 (!) 139 kg (305 lb 9.6 oz)   06/27/24 (!) 137 kg (302 lb 3.2 oz)     PHYSICAL EXAMINATION:   GENERAL: The patient appears in fair general condition, not in acute distress.   SKIN: No Ecchymosis.  EYES: No jaundice. Pallor.  CHEST: Normal respiratory effort.   ABDOMEN: Mildly distended.  EXTREMITIES: Edema and chronic redness in the distal aspect of the legs.  No calf tenderness..     DIAGNOSTIC DATA:     Results from last 7 days   Lab Units 09/03/24  1024   WBC  10*3/mm3 5.21   NEUTROS ABS 10*3/mm3 2.76   HEMOGLOBIN g/dL 11.6*   HEMATOCRIT % 35.9*   PLATELETS 10*3/mm3 192     Results from last 7 days   Lab Units 09/03/24  1024   SODIUM mmol/L 139   POTASSIUM mmol/L 4.1   CHLORIDE mmol/L 102   CO2 mmol/L 24.3   BUN mg/dL 39*   CREATININE mg/dL 3.22*   CALCIUM mg/dL 8.7   ALBUMIN g/dL 4.0   BILIRUBIN mg/dL 0.6   ALK PHOS U/L 49   ALT (SGPT) U/L 12   AST (SGOT) U/L 13   GLUCOSE mg/dL 99   MAGNESIUM mg/dL 1.8     Component      Latest Ref Rng 6/10/2024 7/8/2024 8/5/2024   IgG      603 - 1613 mg/dL 566 (L)  650  661    IgA      61 - 437 mg/dL 67  112  90    IgM      20 - 172 mg/dL 13 (L)  16 (L)  17 (L)    Total Protein      6.0 - 8.5 g/dL 5.4 (L)  5.8 (L)  6.2    Albumin      2.9 - 4.4 g/dL 2.8 (L)  3.5  3.8    Alpha-1-Globulin      0.0 - 0.4 g/dL 0.3  0.2  0.2    Alpha-2-Globulin      0.4 - 1.0 g/dL 0.9  0.8  0.8    Beta Globulin      0.7 - 1.3 g/dL 0.8  0.8  0.8    Gamma Globulin      0.4 - 1.8 g/dL 0.6  0.6  0.6    M-Jurgen      Not Observed g/dL Not Observed  Not Observed  Comment:    Globulin      2.2 - 3.9 g/dL 2.6  2.3  2.4    A/G Ratio      0.7 - 1.7  1.1  1.6  1.6    Immunofixation Reflex, Serum Comment:  Comment:  Comment !    Please note Comment  Comment  Comment    Kappa FLC      3.3 - 19.4 mg/L 41.9 (H)  48.5 (H)  55.5 (H)    Free Lambda Light Chains      5.7 - 26.3 mg/L 34.4 (H)  35.0 (H)  40.7 (H)    Kappa/Lambda Ratio      0.26 - 1.65  1.22  1.39  1.36       Assessment & Plan    *IgG lambda multiple myeloma.  Bone marrow 5/23/2020 hypercellular marrow with 80% involvement of plasma cell myeloma; FISH studies pending  Bone survey 5/26/2020: Diffuse osteoporosis and demineralization.  However, no discrete lytic lesions.  SPEP 5/23/2020: M spike 5.1.  IgG 6629 lambda.  Light chain ratio 0 with free lambda light chains 6400.  Beta-2 microglobulin 16.3.  24-hour urine 12.7 g protein per 24-hour with monoclonal lambda free light chain 33.9%, monoclonal IgG lambda  23.5%  24-hour urine testing from 5/24/2020 free lambda light chains 8.4 g/L  Due to the renal failure, CyBorD regimen was chosen and was started on 5/28/2020.    On 6/18/2020, Velcade was changed to 1.5 mg/m² weekly continuously along with weekly Decadron and oral Cytoxan.  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   Treatment was changed on 8/27/2020 to to Daratumumab subcutaneous injection weekly along with Revlimid 10 mg daily for 21 out of 28-day cycle and Decadron 40 mg daily.  S/p stem cell transplant on 12/9/2020 at [facility].  Day 100 bone marrow on 3/16/2021 revealed minute  plasma cell population.  PET scan on 3/29/2021 was negative.  Patient enrolled in the  clinical trial randomizing to maintenance lenalidomide versus lenalidomide and daratumumab.  Patient was randomized to the Revlimid arm initiated 4/13/2021 dosed at 5 mg daily (due to renal function).  Patient developed neutropenia requiring dose adjustment to 5 mg daily for 21/28 days.  Patient withdrew from clinical trial after 5 cycles on 8/31/2021 due to worsening diarrhea.    He was switched to Velcade maintenance every other week 9/14/2021 which he discontinued 10/26/2021 due to neuropathy and lower extremity edema.    He was subsequently changed to pomalidomide 2 mg daily 21/28 days on 11/8/2021.    1 year post transplant, bone marrow biopsy showed 5-7% plasma cells and due to the residual disease he was started on DPd (daratumumab, pomalidomide, dexamethasone) on 2/21/2022.    Patient was seen by Dr. Romero on 9/12/2022.  At that time patient was cycle 8-day 7 DPd. Due to chronic side effects, dexamethasone was changed from weekly to monthly.    Patient did continue pomalidomide on his own during admission to the hospital, received 1 dose 4 mg p.o. on 9/24/2022 before was subsequently discontinued due to cytopenias.   Dexamethasone was changed to 12 mg on Mondays, 4 mg on Tuesday and 4 mg on  Wednesday.  Pomalyst dose was reduced to 3 mg daily for 21 out of a 28-day cycle starting on 10/31/2022.  Due to neutropenia, the dose of Pomalyst was subsequently reduced to 2 mg daily for 21/28-day cycle.  9/5/2023: Treatment held due to weakness and postural dizziness.  9/5/2023: No M protein.  Kappa FLC 49.6.  Lambda FLC 32.4.  Kappa to lambda ratio 1.53.  He did not notice improvement in his symptoms while off Darzalex and Pomalyst.  Therefore, his symptoms were not attributed to treatment.  He was restarted on Darzalex and Pomalyst on 10/2/2023.  He is tolerating the treatment well.  4/15/2024: M protein was not detected.    Kappa FLC was 55.2.  Lambda FLC was 35.1.  Kappa to lambda ratio was 1.57.  5/13/2024: M protein was not detected.  Kappa FLC 49.1.  Lambda FLC 37.1.  Kappa/lambda ratio 1.32.  8/5/2024: IgG increased to 661.  M protein not detected.  Kappa FLC 55.5.  Lambda FLC 40.7.  Kappa to lambda ratio 1.56.    *Bone health.  He is on Xgeva.   Patient developed severe hypocalcemia following the Xgeva injection.  He required inpatient hospital stay.  Given the duration of Xgeva injections, recommended changing treatment to every 3 months starting December 2022.   Due to hypomagnesemia, Xgeva was held on 3/18/2024 but was given 4/15/2024.  Last dose of Xgeva was on 7/8/2024.    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  Patient was receiving Procrit at [facility].  Records from [facility] showed that the patient was receiving 70,000 units weekly and received the last dose on 9/27/2022.  Patient was given Procrit 70,000 units on 9/27/2022.  Hemoglobin was 9.9 on 10/17/2022.  He was given Procrit 60,000 units.  Hemoglobin was 9.8 on 11/4/2022.  He was given Procrit 60,000 units.  Labs in March 2023 revealed adequate iron stores.  Last dose of Procrit was 25,000 units on 5/1/2023 for hemoglobin of 9.9.  5/13/2024: Hemoglobin  11.2.  6/10/2024: Hemoglobin decreased to 9.9-?  Secondary to sinus infection.  We discussed starting back on Procrit.  However, hemoglobin improved subsequently.  9/3/2024: Hemoglobin improved to 11.6.    *Neutropenia.  This was attributed to Pomalyst.  Neutrophil count was down to 290 on 9/25/2022.  He was given Neupogen during his hospital stay in September 2022.  Neutrophil count improved afterwards.  However, neutrophil count decreased to 600 on 12/19/2022.  He reports that he was feeling tired and achy at that time.  Neutrophil count was 770 on 1/16/2023.  Due to the recurrent neutropenia, the dose of Pomalyst was reduced to 2 mg in January 2023.  Neutrophil count improved following the dose reduction.  9/3/2024: Neutrophil count 2760.    *Hypocalcemia.  Patient developed severe hypocalcemia due to Xgeva.  Calcium was 5.9 on 9/23/2022.  Patient was placed on calcium replacement.  Calcium dose was subsequently increased to 3 tablets daily.  8/7/2023: Calcium 8.1. Albumin 3.6.  The dose was increased to 4 tablets daily.  6/10/2024: Calcium 8.2.  Albumin 3.4.  9/3/2024: Calcium improved to 8.7.    *Hypomagnesemia.  Magnesium was 1.1 on 10/24/2022.  He was placed on oral magnesium oxide 400 mg twice daily.  He developed diarrhea and it was stopped.  He was started on magnesium chloride on 1/23/2023.  He developed diarrhea about 7 days after starting the magnesium chloride.  Oral magnesium chloride was discontinued.  He receives IV magnesium per protocol based on his magnesium level.  6/10/2024: Magnesium 1.6.  9/3/2024: Magnesium improved to 1.8.     *Prophylaxis.  He is on acyclovir 800 mg daily.  He is on ASA 81 mg daily.  He had a sinus infection in June 2024 but no infection since then.    *Episodes of dizziness and falling.  Patient reports that the episodes develop after he stands up and starts walking.  No dizziness while sitting.  No restricted chest pain or shortness of breath.  Blood pressure was 133/80  and heart rate was 66 in the sitting position today.  After standing, blood pressure was 119/76 and heart rate was 69.  No improvement in the symptoms after holding Pomalyst for 2 weeks.  MRI brain on 7/26/2023 showed small vessel ischemic changes.  There were changes of 3 old strokes.  I explained the findings to the patient.  I recommended evaluation by cardiology and neurology.  I also recommended home physical therapy  I recommended that he uses a walker when he ambulates.  His evaluation showed possible changes of postural hypotension.   He is concerned that this is due to his medications. I explained that while postural hypotension is a side effect of medications, it is not a common side effect of Darzalex or Pomalyst.  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  Symptoms did not improve after placing Darzalex and Pomalyst on hold.  On 10/2/2023, we recommended reducing Bumex to 1 mg daily and monitoring his symptoms and the leg swelling.  He did not notice a difference in his postural dizziness after the dose reduction.   Bumex was increased back to 2 mg daily due to worsening of his edema.   This dose is controlling his leg edema.    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  It is controlled with Lexapro 5 mg daily.    *Vitamin D deficiency.  He is on vitamin D 50,000 units weekly.  Vitamin D level was 18 on 10/3/2022.  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  He is on gabapentin 300 mg in the morning and 600 mg in the evening.  Neuropathy symptoms in the feet are better as the swelling improved.     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  S/p right partial nephrectomy on 5/18/2016.    PLAN:    1.  New Darzalex, Pomalyst and dexamethasone.  He will receive Darzalex today and continue monthly.  Dexamethasone is 20 mg monthly.  Pomalyst is 2 mg daily for 21 days of a 28-day cycle.   2.  Continue  calcium carbonate 4 tablets daily.  3.  Continue Neutra-Phos 1 tablet daily.  4.  Continue acyclovir 800 mg daily.  5.  Continue aspirin 81 mg daily.  6.  Since his hemoglobin improved, he does not need Procrit at this time.  7.  Return in 4 weeks for the next dose of Darzalex.  He will receive Xgeva.  8.  I will see him in follow-up in 8 weeks.  He will be scheduled for Darzalex.  CBC CMP magnesium phosphorus SPEP [place] FLC will be obtained.      Red Del Valle MD  09/03/24

## 2024-09-05 LAB
ALBUMIN SERPL ELPH-MCNC: 3.4 G/DL (ref 2.9–4.4)
ALBUMIN/GLOB SERPL: 1.3 {RATIO} (ref 0.7–1.7)
ALPHA1 GLOB SERPL ELPH-MCNC: 0.3 G/DL (ref 0–0.4)
ALPHA2 GLOB SERPL ELPH-MCNC: 0.9 G/DL (ref 0.4–1)
B-GLOBULIN SERPL ELPH-MCNC: 0.8 G/DL (ref 0.7–1.3)
GAMMA GLOB SERPL ELPH-MCNC: 0.7 G/DL (ref 0.4–1.8)
GLOBULIN SER-MCNC: 2.7 G/DL (ref 2.2–3.9)
IGA SERPL-MCNC: 100 MG/DL (ref 61–437)
IGG SERPL-MCNC: 682 MG/DL (ref 603–1613)
IGM SERPL-MCNC: 19 MG/DL (ref 20–172)
INTERPRETATION SERPL IEP-IMP: ABNORMAL
KAPPA LC FREE SER-MCNC: 53 MG/L (ref 3.3–19.4)
KAPPA LC FREE/LAMBDA FREE SER: 1.55 {RATIO} (ref 0.26–1.65)
LABORATORY COMMENT REPORT: ABNORMAL
LAMBDA LC FREE SERPL-MCNC: 34.3 MG/L (ref 5.7–26.3)
M PROTEIN SERPL ELPH-MCNC: ABNORMAL G/DL
PROT SERPL-MCNC: 6.1 G/DL (ref 6–8.5)

## 2024-09-09 ENCOUNTER — SPECIALTY PHARMACY (OUTPATIENT)
Dept: PHARMACY | Facility: HOSPITAL | Age: 69
End: 2024-09-09
Payer: MEDICARE

## 2024-09-09 NOTE — PROGRESS NOTES
Specialty Pharmacy Note: Pomalyst (pomalidomide)    Contreras Albert is a 69 y.o. male with multiple myeloma was seen 9/3/24 by Dr. Del Valle. Per provider dictation, no changes to oral oncology regimen Pomalyst (pomalidomide).  Labs Review: The CMP and CBC from 9/3/24 have been reviewed. No dose adjustments are needed for the oral specialty medication(s) based on the labs.    Specialty pharmacy will continue to follow patient.    Carlos Clarke, PharmD, [facility]  Clinical Oncology Pharmacist    9/9/2024  08:49 EDT

## 2024-09-10 ENCOUNTER — SPECIALTY PHARMACY (OUTPATIENT)
Dept: PHARMACY | Facility: HOSPITAL | Age: 69
End: 2024-09-10
Payer: MEDICARE

## 2024-09-10 DIAGNOSIS — C90.00 MULTIPLE MYELOMA NOT HAVING ACHIEVED REMISSION: ICD-10-CM

## 2024-09-10 NOTE — PROGRESS NOTES
Specialty Pharmacy Patient Management Program  Per Protocol Prescription Order or Refill       Requested Prescriptions     Signed Prescriptions Disp Refills    pomalidomide (POMALYST) 2 MG chemo capsule 21 capsule 0     Sig: Take 1 capsule by mouth Daily. Take for 21 days on, then 7 days off.     Prescription orders above were sent to \[facility]\"" Specialty Pharmacy per Collaborative Care Agreement Protocol.     Completed independent double check on medication order/RX.    Yolande Huitron, PharmD, [facility]  Clinical Specialty Pharmacist, Oncology  9/10/2024  13:55 EDT

## 2024-09-10 NOTE — PROGRESS NOTES
Specialty Pharmacy Patient Management Program  Per Protocol Prescription Order or Refill     Patient will be filling or currently fills medications at [facility] and is enrolled in the Patient Management Program.    Requested Prescriptions     Signed Prescriptions Disp Refills    pomalidomide (POMALYST) 2 MG chemo capsule 21 capsule 0     Sig: Take 1 capsule by mouth Daily. Take for 21 days on, then 7 days off.     Prescription orders above were sent to the pharmacy per Collaborative Care Agreement Protocol.     Last Office Visit: 9/3/24  Next Office Visit: 10/28/24    Carlos Clarke PharmD, [facility]  Clinical Specialty Pharmacist, Oncology  9/10/2024  13:45 EDT

## 2024-09-29 NOTE — TELEPHONE ENCOUNTER
PT CALLED FOR MED REFILL OF THE BELOW MEDS. PT IS OUT OF MEDS AT THIS TIME.    atorvastatin (LIPITOR) 10 MG tablet [01983]         GOING TO:    [id number] - [facility] [address] [place] [address] & [place] [address] - [phone] [facility] [phone] FX       
Pt informed   
Pt informed refill was sent   
none

## 2024-09-30 ENCOUNTER — LAB (OUTPATIENT)
Dept: LAB | Facility: HOSPITAL | Age: 69
End: 2024-09-30
Payer: MEDICARE

## 2024-09-30 ENCOUNTER — INFUSION (OUTPATIENT)
Dept: ONCOLOGY | Facility: HOSPITAL | Age: 69
End: 2024-09-30
Payer: MEDICARE

## 2024-09-30 VITALS
SYSTOLIC BLOOD PRESSURE: 136 MMHG | WEIGHT: 307.4 LBS | BODY MASS INDEX: 39.47 KG/M2 | OXYGEN SATURATION: 95 % | RESPIRATION RATE: 16 BRPM | HEART RATE: 83 BPM | DIASTOLIC BLOOD PRESSURE: 79 MMHG | TEMPERATURE: 98.2 F

## 2024-09-30 DIAGNOSIS — C90.00 MULTIPLE MYELOMA NOT HAVING ACHIEVED REMISSION: Primary | ICD-10-CM

## 2024-09-30 DIAGNOSIS — C90.00 MULTIPLE MYELOMA NOT HAVING ACHIEVED REMISSION: ICD-10-CM

## 2024-09-30 LAB
ALBUMIN SERPL-MCNC: 3.7 G/DL (ref 3.5–5.2)
ALBUMIN/GLOB SERPL: 1.6 G/DL
ALP SERPL-CCNC: 52 U/L (ref 39–117)
ALT SERPL W P-5'-P-CCNC: 13 U/L (ref 1–41)
ANION GAP SERPL CALCULATED.3IONS-SCNC: 11.4 MMOL/L (ref 5–15)
AST SERPL-CCNC: 13 U/L (ref 1–40)
BASOPHILS # BLD AUTO: 0.04 10*3/MM3 (ref 0–0.2)
BASOPHILS NFR BLD AUTO: 0.8 % (ref 0–1.5)
BILIRUB SERPL-MCNC: 0.4 MG/DL (ref 0–1.2)
BUN SERPL-MCNC: 41 MG/DL (ref 8–23)
BUN/CREAT SERPL: 11.7 (ref 7–25)
CALCIUM SPEC-SCNC: 8.2 MG/DL (ref 8.6–10.5)
CHLORIDE SERPL-SCNC: 105 MMOL/L (ref 98–107)
CO2 SERPL-SCNC: 24.6 MMOL/L (ref 22–29)
CREAT SERPL-MCNC: 3.5 MG/DL (ref 0.76–1.27)
DEPRECATED RDW RBC AUTO: 54.3 FL (ref 37–54)
EGFRCR SERPLBLD CKD-EPI 2021: 18.3 ML/MIN/1.73
EOSINOPHIL # BLD AUTO: 0.16 10*3/MM3 (ref 0–0.4)
EOSINOPHIL NFR BLD AUTO: 3.2 % (ref 0.3–6.2)
ERYTHROCYTE [DISTWIDTH] IN BLOOD BY AUTOMATED COUNT: 15.7 % (ref 12.3–15.4)
GLOBULIN UR ELPH-MCNC: 2.3 GM/DL
GLUCOSE SERPL-MCNC: 142 MG/DL (ref 65–99)
HCT VFR BLD AUTO: 33.4 % (ref 37.5–51)
HGB BLD-MCNC: 10.8 G/DL (ref 13–17.7)
IMM GRANULOCYTES # BLD AUTO: 0.03 10*3/MM3 (ref 0–0.05)
IMM GRANULOCYTES NFR BLD AUTO: 0.6 % (ref 0–0.5)
LYMPHOCYTES # BLD AUTO: 1.15 10*3/MM3 (ref 0.7–3.1)
LYMPHOCYTES NFR BLD AUTO: 23.3 % (ref 19.6–45.3)
MAGNESIUM SERPL-MCNC: 1.6 MG/DL (ref 1.6–2.4)
MCH RBC QN AUTO: 30.4 PG (ref 26.6–33)
MCHC RBC AUTO-ENTMCNC: 32.3 G/DL (ref 31.5–35.7)
MCV RBC AUTO: 94.1 FL (ref 79–97)
MONOCYTES # BLD AUTO: 0.69 10*3/MM3 (ref 0.1–0.9)
MONOCYTES NFR BLD AUTO: 14 % (ref 5–12)
NEUTROPHILS NFR BLD AUTO: 2.87 10*3/MM3 (ref 1.7–7)
NEUTROPHILS NFR BLD AUTO: 58.1 % (ref 42.7–76)
NRBC BLD AUTO-RTO: 0 /100 WBC (ref 0–0.2)
PHOSPHATE SERPL-MCNC: 2.6 MG/DL (ref 2.5–4.5)
PLATELET # BLD AUTO: 146 10*3/MM3 (ref 140–450)
PMV BLD AUTO: 8.7 FL (ref 6–12)
POTASSIUM SERPL-SCNC: 4 MMOL/L (ref 3.5–5.2)
PROT SERPL-MCNC: 6 G/DL (ref 6–8.5)
RBC # BLD AUTO: 3.55 10*6/MM3 (ref 4.14–5.8)
SODIUM SERPL-SCNC: 141 MMOL/L (ref 136–145)
WBC NRBC COR # BLD AUTO: 4.94 10*3/MM3 (ref 3.4–10.8)

## 2024-09-30 PROCEDURE — 96401 CHEMO ANTI-NEOPL SQ/IM: CPT

## 2024-09-30 PROCEDURE — 80053 COMPREHEN METABOLIC PANEL: CPT

## 2024-09-30 PROCEDURE — 25010000002 DARATUMUMAB-HYALURONIDASE-FIHJ 1800-30000 MG-UT/15ML SOLUTION: Performed by: INTERNAL MEDICINE

## 2024-09-30 PROCEDURE — 85025 COMPLETE CBC W/AUTO DIFF WBC: CPT

## 2024-09-30 PROCEDURE — 84100 ASSAY OF PHOSPHORUS: CPT

## 2024-09-30 PROCEDURE — 83735 ASSAY OF MAGNESIUM: CPT

## 2024-09-30 PROCEDURE — 36415 COLL VENOUS BLD VENIPUNCTURE: CPT

## 2024-09-30 RX ADMIN — DARATUMUMAB AND HYALURONIDASE-FIHJ (HUMAN RECOMBINANT) 1800 MG: 1800; 30000 INJECTION SUBCUTANEOUS at 13:44

## 2024-10-01 LAB
ALBUMIN SERPL ELPH-MCNC: 3.1 G/DL (ref 2.9–4.4)
ALBUMIN/GLOB SERPL: 1.3 {RATIO} (ref 0.7–1.7)
ALPHA1 GLOB SERPL ELPH-MCNC: 0.3 G/DL (ref 0–0.4)
ALPHA2 GLOB SERPL ELPH-MCNC: 0.9 G/DL (ref 0.4–1)
B-GLOBULIN SERPL ELPH-MCNC: 0.8 G/DL (ref 0.7–1.3)
GAMMA GLOB SERPL ELPH-MCNC: 0.6 G/DL (ref 0.4–1.8)
GLOBULIN SER-MCNC: 2.5 G/DL (ref 2.2–3.9)
IGA SERPL-MCNC: 79 MG/DL (ref 61–437)
IGG SERPL-MCNC: 597 MG/DL (ref 603–1613)
IGM SERPL-MCNC: 19 MG/DL (ref 20–172)
INTERPRETATION SERPL IEP-IMP: ABNORMAL
KAPPA LC FREE SER-MCNC: 58.1 MG/L (ref 3.3–19.4)
KAPPA LC FREE/LAMBDA FREE SER: 1.45 {RATIO} (ref 0.26–1.65)
LABORATORY COMMENT REPORT: ABNORMAL
LAMBDA LC FREE SERPL-MCNC: 40 MG/L (ref 5.7–26.3)
M PROTEIN SERPL ELPH-MCNC: ABNORMAL G/DL
PROT SERPL-MCNC: 5.6 G/DL (ref 6–8.5)

## 2024-10-09 ENCOUNTER — SPECIALTY PHARMACY (OUTPATIENT)
Dept: PHARMACY | Facility: HOSPITAL | Age: 69
End: 2024-10-09
Payer: MEDICARE

## 2024-10-09 ENCOUNTER — OFFICE VISIT (OUTPATIENT)
Dept: GASTROENTEROLOGY | Facility: CLINIC | Age: 69
End: 2024-10-09
Payer: MEDICARE

## 2024-10-09 VITALS
HEIGHT: 74 IN | WEIGHT: 314.2 LBS | OXYGEN SATURATION: 95 % | TEMPERATURE: 98.3 F | SYSTOLIC BLOOD PRESSURE: 140 MMHG | HEART RATE: 73 BPM | BODY MASS INDEX: 40.32 KG/M2 | DIASTOLIC BLOOD PRESSURE: 80 MMHG

## 2024-10-09 DIAGNOSIS — N18.4 CKD (CHRONIC KIDNEY DISEASE), STAGE IV: ICD-10-CM

## 2024-10-09 DIAGNOSIS — K52.9 CHRONIC DIARRHEA: ICD-10-CM

## 2024-10-09 DIAGNOSIS — C90.01 MULTIPLE MYELOMA IN REMISSION: ICD-10-CM

## 2024-10-09 DIAGNOSIS — D64.9 ANEMIA, UNSPECIFIED TYPE: ICD-10-CM

## 2024-10-09 DIAGNOSIS — Z12.11 COLON CANCER SCREENING: Primary | ICD-10-CM

## 2024-10-09 NOTE — PROGRESS NOTES
Specialty Pharmacy Patient Management Program  Per Protocol Prescription Order or Refill       Requested Prescriptions     Signed Prescriptions Disp Refills    pomalidomide (POMALYST) 2 MG chemo capsule 21 capsule 0     Sig: Take 1 capsule by mouth Daily. Take for 21 days on, then 7 days off.     Prescription orders above were sent to [facility] per Collaborative Care Agreement Protocol.     Completed independent double check on medication order/RX.    Yolande Huitron, PharmD, [facility]  Clinical Specialty Pharmacist, Oncology  10/9/2024  12:03 EDT

## 2024-10-09 NOTE — PROGRESS NOTES
Chief Complaint  Diarrhea and Colon Cancer Screening    Subjective        History of Present Illness  Contreras Albert is a 69-year-old male who presents today for a follow-up from the last visit on 08/14/2024.    His past medical history includes multiple myeloma treated with chemotherapy and stem cell transplant, now in remission and currently on Pomalyst with monthly dexamethasone. He also has diabetes, depression, stage 1 kidney cancer, and vitamin D deficiency. He is under the care of Dr. Holt in oncology and hematology. 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 He has been treated with Procrit for anemia.    During his last visit, he was evaluated for chronic diarrhea persisting for over 4 years, averaging 15 to 20 episodes per day without blood. Significant improvement in diarrhea was noted after stopping carvedilol. EGD and colonoscopy work-ups were offered but patient hesitated, opted out due to his complex comorbidities. A CT abdomen and pelvis, as well as a fecal occult blood test were planned ordered, somehow none was performed. He intended to consult with nephrology about the traditional colonoscopy screening procedure as discussed previous visit, since he has taken himself off the bp medications that he thought they attributed to his diarrhea, as mentioned. If he can be taken off these meds and bp is still well controlled, he would want to bypass the colonoscopy screening, despite in the setting of anemia problem.     Today he reports no abdominal pain, nausea, vomiting, changes in stool, or dark stool. He stopped taking carvedilol, which was causing diarrhea, and was switched to amlodipine. However, within about 10 days, he experienced dizziness and diarrhea, leading to discontinuation of amlodipine. He has not taken any blood pressure medication for over a month and reports that his blood pressure  "has improved compared to when he was on medication. He still experiences occasional diarrhea, which he attributes to the numerous medications he is on, including Darzalex injections once a month. He also experiences slight constipation for a day or two occasionally. Overall, he feels much better and estimates that he experiences diarrhea only one day a week at most.  He recalls having a bout of C. difficile following his stem cell transplant and wonders if this could be contributing to his current symptoms.     9/30/24  CMP (normal AST/ALT, ALK)  CBC (hgb 10.8)  Fecal leukocyte (-)    No recent change in weight or appetite. Patient denies personal or family history of colorectal or upper GI cancer.    Objective   Vital Signs:  /80   Pulse 73   Temp 98.3 °F (36.8 °C)   Ht 188 cm (74\")   Wt (!) 143 kg (314 lb 3.2 oz)   SpO2 95%   BMI 40.34 kg/m²   Estimated body mass index is 40.34 kg/m² as calculated from the following:    Height as of this encounter: 188 cm (74\").    Weight as of this encounter: 143 kg (314 lb 3.2 oz).       Physical Exam  Vitals and nursing note reviewed.   Constitutional:       General: He is not in acute distress.     Appearance: Normal appearance. He is obese. He is not ill-appearing, toxic-appearing or diaphoretic.   Eyes:      General: No scleral icterus.     Conjunctiva/sclera: Conjunctivae normal.   Cardiovascular:      Rate and Rhythm: Normal rate and regular rhythm.      Pulses: Normal pulses.      Heart sounds: Normal heart sounds.   Pulmonary:      Effort: Pulmonary effort is normal. No respiratory distress.      Breath sounds: Normal breath sounds. No stridor.   Abdominal:      General: Abdomen is flat. Bowel sounds are normal. There is no distension.      Palpations: Abdomen is soft. There is no mass.      Tenderness: There is no abdominal tenderness. There is no right CVA tenderness, left CVA tenderness, guarding or rebound.      Hernia: No hernia is present.   Skin:     " Coloration: Skin is not jaundiced or pale.      Findings: No erythema or rash.   Neurological:      Mental Status: He is alert and oriented to person, place, and time. Mental status is at baseline.   Psychiatric:         Mood and Affect: Mood normal.             Assessment and Plan     Diagnoses and all orders for this visit:    1. Colon cancer screening (Primary)  -     CT Virtual Colonoscopy Screening    2. Anemia, unspecified type    3. Chronic diarrhea    4. Multiple myeloma in remission    5. CKD (chronic kidney disease), stage IV       DISCUSSION  Patient is a 69 year-old male, follows up for:  Assessment & Plan  1. Chronic Diarrhea.  The chronic diarrhea has significantly improved after discontinuing carvedilol and amlodipine, which were thought causing the symptoms. He reports only experiencing diarrhea once a week now. Given his history of diarrhea and the potential for conditions such as ulcerative colitis or Crohn's disease, never had CRC screening, a virtual CT colonoscopy will be scheduled as part of the screening process. Risks vs benefits of this procedure discussed, patient verbalized understanding. This is preferred due to his reluctance to undergo general anesthesia. If polyps are detected, further steps will be considered. He is advised to monitor for any recurrence of symptoms and report them.    2. Hypertension.  Hypertension management has been challenging due to adverse reactions to medications. Carvedilol and amlodipine were both discontinued due to causing diarrhea and dizziness. He has not been on any blood pressure medication for over a month, and his blood pressure has stabilized. No new antihypertensive medications will be started at this time. He should continue monitoring his blood pressure and report any significant changes to primary care team and nephrology.    3. Anemia.  Anemia is secondary to multiple myeloma and chronic kidney disease. Hemoglobin levels are stable at 10.8. He is  currently being treated with procrit for anemia. He should continue follow-up with his hematologist for ongoing management of anemia and iron deficiency.    4. Health Maintenance.  Colorectal cancer screening is recommended due to his age and medical history. A virtual CT colonoscopy will be scheduled.     Follow-up  Return in 8 to 10 weeks for follow-up.     Much of this encounter note is an electronic transcription/translation of spoken language to printed text. The electronic translation of spoken language may permit erroneous, or at times, nonsensical words or phrases to be inadvertently transcribed; Although I have reviewed the note for such errors, some may still exist.    Follow Up     Return for 10 weeks or sooner as needed..  Patient was given instructions and counseling regarding his condition or for health maintenance advice. Please see specific information pulled into the AVS if appropriate.     Patient or patient representative verbalized consent for the use of Ambient Listening during the visit with  IZZY Grijalva for chart documentation. 10/9/2024  11:35 EDT

## 2024-10-09 NOTE — PROGRESS NOTES
Specialty Pharmacy Patient Management Program  Per Protocol Prescription Order or Refill     Patient will be filling or currently fills medications at [facility] and is enrolled in the Patient Management Program.    Requested Prescriptions     Signed Prescriptions Disp Refills    pomalidomide (POMALYST) 2 MG chemo capsule 21 capsule 0     Sig: Take 1 capsule by mouth Daily. Take for 21 days on, then 7 days off.     Prescription orders above were sent to the pharmacy per Collaborative Care Agreement Protocol.     Last Office Visit: 9/10/24  Next Office Visit: 10/28/24    Carlos Clarke PharmD, BCOP  Clinical Specialty Pharmacist, Oncology  10/9/2024  11:31 EDT

## 2024-10-16 DIAGNOSIS — E11.22 TYPE 2 DIABETES MELLITUS WITH STAGE 4 CHRONIC KIDNEY DISEASE, WITH LONG-TERM CURRENT USE OF INSULIN: ICD-10-CM

## 2024-10-16 DIAGNOSIS — Z79.4 TYPE 2 DIABETES MELLITUS WITH STAGE 4 CHRONIC KIDNEY DISEASE, WITH LONG-TERM CURRENT USE OF INSULIN: ICD-10-CM

## 2024-10-16 DIAGNOSIS — N18.4 TYPE 2 DIABETES MELLITUS WITH STAGE 4 CHRONIC KIDNEY DISEASE, WITH LONG-TERM CURRENT USE OF INSULIN: ICD-10-CM

## 2024-10-16 RX ORDER — INSULIN GLARGINE 100 [IU]/ML
INJECTION, SOLUTION SUBCUTANEOUS
Qty: 63 ML | Refills: 1 | Status: SHIPPED | OUTPATIENT
Start: 2024-10-16

## 2024-10-16 NOTE — TELEPHONE ENCOUNTER
Rx Refill Note  Requested Prescriptions     Pending Prescriptions Disp Refills    Lantus SoloStar 100 UNIT/ML injection pen [Pharmacy Med Name: LANTUS SOLOSTAR PEN INJ 3ML] 63 mL 1     Sig: NON CHEMO DAYS TAKE 16 UNITS TWICE DAILY. CHEMO DAYS TAKE 30 UNITS TWICE DAILY FOR A MAX DOSE OF 70 UNITS DAILY      Last office visit with prescribing clinician: 6/27/2024   Last telemedicine visit with prescribing clinician: Visit date not found   Next office visit with prescribing clinician: Visit date not found                         Would you like a call back once the refill request has been completed: [] Yes [] No    If the office needs to give you a call back, can they leave a voicemail: [] Yes [] No    Michelle Davis  10/16/24, 08:05 EDT

## 2024-10-18 ENCOUNTER — PREP FOR SURGERY (OUTPATIENT)
Dept: SURGERY | Facility: SURGERY CENTER | Age: 69
End: 2024-10-18
Payer: MEDICARE

## 2024-10-22 ENCOUNTER — TELEPHONE (OUTPATIENT)
Dept: GASTROENTEROLOGY | Facility: CLINIC | Age: 69
End: 2024-10-22

## 2024-10-22 NOTE — TELEPHONE ENCOUNTER
Hub staff attempted to follow warm transfer process and was unsuccessful     Caller: [facility]    Relationship to patient: Provider / BIA    Best call back number: [phone]    Patient is needing: [facility] CALLED TO GET AN CT AUTHORIZATION FOR THE PT// THE PT IS SCHEDULED FOR 10/23/2024 @ 7:40 AM// THE OFFICE NEEDS THE AUTH NO LATER THAT 11:00 AM EST ON 10/22/2024// PLEASE CALL BACK      IT IS NOW AND BIA WITH MELISSA SAID 1:43 AND PT IS SCHEDULED FOR 7:40 CT TOMORROW MORNING.  SHE HAS NOT HEAD BACK FROM [place].  PLEASE CALL ASAP.SHE NEEDS THE AUTH NOW.

## 2024-10-22 NOTE — TELEPHONE ENCOUNTER
Hub staff attempted to follow warm transfer process and was unsuccessful     Caller: [facility]    Relationship to patient: Provider    Best call back number: [phone]    Patient is needing: [facility] CALLED TO GET AN CT AUTHORIZATION FOR THE PT// THE PT IS SCHEDULED FOR 10/23/2024 @ 7:40 AM// THE OFFICE NEEDS THE AUTH NO LATER THAT 11:00 AM EST ON 10/22/2024// PLEASE CALL BACK

## 2024-10-25 ENCOUNTER — TELEPHONE (OUTPATIENT)
Dept: GASTROENTEROLOGY | Facility: CLINIC | Age: 69
End: 2024-10-25
Payer: MEDICARE

## 2024-10-25 NOTE — TELEPHONE ENCOUNTER
I conducted a peer to peer review today at 2 PM on 10/25/2024 but was unable to secure approval for a CT virtual colonoscopy, as there is no contraindication to traditional colonoscopy despite the patient's complex medical history, including multiple myeloma, in remission, and other comorbidities.  I expressed my concern regarding the patient's hemoglobin levels, which have remained low at 9-10.  Although this is likely multifactorial and related to his cancer history, but GI bleed source cannot be entirely ruled out, particularly given his chronic diarrhea over the years.    The patient remains hesitant to proceed with an endoscopy from our standpoint due to general anesthesia.  I attempted to reach the patient twice today but was unable to make contact ([phone]).  I also discussed the situation with medical staff, Sonya, and we will continue to try to reach the patient to discuss the importance of proceeding with endoscopy screening.  Otherwise, I will follow-up with the patient as scheduled to further discuss the plan of care.    Mateo Morrow APRN

## 2024-10-28 ENCOUNTER — INFUSION (OUTPATIENT)
Dept: ONCOLOGY | Facility: HOSPITAL | Age: 69
End: 2024-10-28
Payer: MEDICARE

## 2024-10-28 ENCOUNTER — SPECIALTY PHARMACY (OUTPATIENT)
Dept: ONCOLOGY | Facility: HOSPITAL | Age: 69
End: 2024-10-28
Payer: MEDICARE

## 2024-10-28 ENCOUNTER — LAB (OUTPATIENT)
Dept: LAB | Facility: HOSPITAL | Age: 69
End: 2024-10-28
Payer: MEDICARE

## 2024-10-28 ENCOUNTER — OFFICE VISIT (OUTPATIENT)
Dept: ONCOLOGY | Facility: CLINIC | Age: 69
End: 2024-10-28
Payer: MEDICARE

## 2024-10-28 VITALS
OXYGEN SATURATION: 98 % | WEIGHT: 313 LBS | HEART RATE: 72 BPM | DIASTOLIC BLOOD PRESSURE: 94 MMHG | HEIGHT: 74 IN | SYSTOLIC BLOOD PRESSURE: 174 MMHG | BODY MASS INDEX: 40.17 KG/M2

## 2024-10-28 DIAGNOSIS — T45.1X5A CHEMOTHERAPY INDUCED NEUTROPENIA: ICD-10-CM

## 2024-10-28 DIAGNOSIS — C90.00 MULTIPLE MYELOMA NOT HAVING ACHIEVED REMISSION: Primary | ICD-10-CM

## 2024-10-28 DIAGNOSIS — D70.1 CHEMOTHERAPY INDUCED NEUTROPENIA: ICD-10-CM

## 2024-10-28 DIAGNOSIS — G62.0 NEUROPATHY DUE TO CHEMOTHERAPEUTIC DRUG: ICD-10-CM

## 2024-10-28 DIAGNOSIS — D63.1 ANEMIA IN STAGE 3B CHRONIC KIDNEY DISEASE: ICD-10-CM

## 2024-10-28 DIAGNOSIS — N18.32 ANEMIA IN STAGE 3B CHRONIC KIDNEY DISEASE: ICD-10-CM

## 2024-10-28 DIAGNOSIS — C90.00 MULTIPLE MYELOMA NOT HAVING ACHIEVED REMISSION: ICD-10-CM

## 2024-10-28 DIAGNOSIS — E83.42 HYPOMAGNESEMIA: ICD-10-CM

## 2024-10-28 DIAGNOSIS — T45.1X5A NEUROPATHY DUE TO CHEMOTHERAPEUTIC DRUG: ICD-10-CM

## 2024-10-28 DIAGNOSIS — M89.9 LYTIC BONE LESIONS ON XRAY: ICD-10-CM

## 2024-10-28 DIAGNOSIS — Z79.899 HIGH RISK MEDICATION USE: ICD-10-CM

## 2024-10-28 DIAGNOSIS — D84.9 IMMUNOCOMPROMISED STATE: ICD-10-CM

## 2024-10-28 DIAGNOSIS — E83.51 HYPOCALCEMIA: ICD-10-CM

## 2024-10-28 LAB
ALBUMIN SERPL-MCNC: 3.8 G/DL (ref 3.5–5.2)
ALBUMIN/GLOB SERPL: 1.6 G/DL
ALP SERPL-CCNC: 51 U/L (ref 39–117)
ALT SERPL W P-5'-P-CCNC: 12 U/L (ref 1–41)
ANION GAP SERPL CALCULATED.3IONS-SCNC: 10.5 MMOL/L (ref 5–15)
AST SERPL-CCNC: 12 U/L (ref 1–40)
BASOPHILS # BLD AUTO: 0.06 10*3/MM3 (ref 0–0.2)
BASOPHILS NFR BLD AUTO: 1.2 % (ref 0–1.5)
BILIRUB SERPL-MCNC: 0.4 MG/DL (ref 0–1.2)
BUN SERPL-MCNC: 39 MG/DL (ref 8–23)
BUN/CREAT SERPL: 11.7 (ref 7–25)
CALCIUM SPEC-SCNC: 9.7 MG/DL (ref 8.6–10.5)
CHLORIDE SERPL-SCNC: 103 MMOL/L (ref 98–107)
CO2 SERPL-SCNC: 26.5 MMOL/L (ref 22–29)
CREAT SERPL-MCNC: 3.33 MG/DL (ref 0.76–1.27)
DEPRECATED RDW RBC AUTO: 53.7 FL (ref 37–54)
EGFRCR SERPLBLD CKD-EPI 2021: 19.5 ML/MIN/1.73
EOSINOPHIL # BLD AUTO: 0.15 10*3/MM3 (ref 0–0.4)
EOSINOPHIL NFR BLD AUTO: 2.9 % (ref 0.3–6.2)
ERYTHROCYTE [DISTWIDTH] IN BLOOD BY AUTOMATED COUNT: 15.7 % (ref 12.3–15.4)
GLOBULIN UR ELPH-MCNC: 2.4 GM/DL
GLUCOSE SERPL-MCNC: 128 MG/DL (ref 65–99)
HCT VFR BLD AUTO: 34 % (ref 37.5–51)
HGB BLD-MCNC: 10.9 G/DL (ref 13–17.7)
IMM GRANULOCYTES # BLD AUTO: 0.02 10*3/MM3 (ref 0–0.05)
IMM GRANULOCYTES NFR BLD AUTO: 0.4 % (ref 0–0.5)
LYMPHOCYTES # BLD AUTO: 1.39 10*3/MM3 (ref 0.7–3.1)
LYMPHOCYTES NFR BLD AUTO: 26.7 % (ref 19.6–45.3)
MAGNESIUM SERPL-MCNC: 1.7 MG/DL (ref 1.6–2.4)
MCH RBC QN AUTO: 30 PG (ref 26.6–33)
MCHC RBC AUTO-ENTMCNC: 32.1 G/DL (ref 31.5–35.7)
MCV RBC AUTO: 93.7 FL (ref 79–97)
MONOCYTES # BLD AUTO: 0.71 10*3/MM3 (ref 0.1–0.9)
MONOCYTES NFR BLD AUTO: 13.7 % (ref 5–12)
NEUTROPHILS NFR BLD AUTO: 2.87 10*3/MM3 (ref 1.7–7)
NEUTROPHILS NFR BLD AUTO: 55.1 % (ref 42.7–76)
NRBC BLD AUTO-RTO: 0 /100 WBC (ref 0–0.2)
PHOSPHATE SERPL-MCNC: 4.3 MG/DL (ref 2.5–4.5)
PLATELET # BLD AUTO: 161 10*3/MM3 (ref 140–450)
PMV BLD AUTO: 9.1 FL (ref 6–12)
POTASSIUM SERPL-SCNC: 4.3 MMOL/L (ref 3.5–5.2)
PROT SERPL-MCNC: 6.2 G/DL (ref 6–8.5)
RBC # BLD AUTO: 3.63 10*6/MM3 (ref 4.14–5.8)
SODIUM SERPL-SCNC: 140 MMOL/L (ref 136–145)
WBC NRBC COR # BLD AUTO: 5.2 10*3/MM3 (ref 3.4–10.8)

## 2024-10-28 PROCEDURE — 96401 CHEMO ANTI-NEOPL SQ/IM: CPT

## 2024-10-28 PROCEDURE — 99214 OFFICE O/P EST MOD 30 MIN: CPT | Performed by: INTERNAL MEDICINE

## 2024-10-28 PROCEDURE — 25010000002 DENOSUMAB 120 MG/1.7ML SOLUTION: Performed by: INTERNAL MEDICINE

## 2024-10-28 PROCEDURE — 84100 ASSAY OF PHOSPHORUS: CPT

## 2024-10-28 PROCEDURE — 1160F RVW MEDS BY RX/DR IN RCRD: CPT | Performed by: INTERNAL MEDICINE

## 2024-10-28 PROCEDURE — 3080F DIAST BP >= 90 MM HG: CPT | Performed by: INTERNAL MEDICINE

## 2024-10-28 PROCEDURE — 36415 COLL VENOUS BLD VENIPUNCTURE: CPT

## 2024-10-28 PROCEDURE — 83735 ASSAY OF MAGNESIUM: CPT

## 2024-10-28 PROCEDURE — 1126F AMNT PAIN NOTED NONE PRSNT: CPT | Performed by: INTERNAL MEDICINE

## 2024-10-28 PROCEDURE — 3077F SYST BP >= 140 MM HG: CPT | Performed by: INTERNAL MEDICINE

## 2024-10-28 PROCEDURE — 25010000002 DARATUMUMAB-HYALURONIDASE-FIHJ 1800-30000 MG-UT/15ML SOLUTION: Performed by: INTERNAL MEDICINE

## 2024-10-28 PROCEDURE — 80053 COMPREHEN METABOLIC PANEL: CPT

## 2024-10-28 PROCEDURE — 1159F MED LIST DOCD IN RCRD: CPT | Performed by: INTERNAL MEDICINE

## 2024-10-28 PROCEDURE — 85025 COMPLETE CBC W/AUTO DIFF WBC: CPT

## 2024-10-28 PROCEDURE — 96372 THER/PROPH/DIAG INJ SC/IM: CPT

## 2024-10-28 RX ORDER — FAMOTIDINE 10 MG/ML
20 INJECTION, SOLUTION INTRAVENOUS AS NEEDED
OUTPATIENT
Start: 2024-12-23

## 2024-10-28 RX ORDER — DIPHENHYDRAMINE HYDROCHLORIDE 50 MG/ML
50 INJECTION INTRAMUSCULAR; INTRAVENOUS AS NEEDED
Status: CANCELLED | OUTPATIENT
Start: 2024-10-28

## 2024-10-28 RX ORDER — DIPHENHYDRAMINE HYDROCHLORIDE 50 MG/ML
50 INJECTION INTRAMUSCULAR; INTRAVENOUS AS NEEDED
OUTPATIENT
Start: 2024-11-25

## 2024-10-28 RX ORDER — FAMOTIDINE 10 MG/ML
20 INJECTION, SOLUTION INTRAVENOUS AS NEEDED
Status: CANCELLED | OUTPATIENT
Start: 2024-10-28

## 2024-10-28 RX ORDER — FAMOTIDINE 10 MG/ML
20 INJECTION, SOLUTION INTRAVENOUS AS NEEDED
OUTPATIENT
Start: 2024-11-25

## 2024-10-28 RX ORDER — MEPERIDINE HYDROCHLORIDE 25 MG/ML
12.5 INJECTION INTRAMUSCULAR; INTRAVENOUS; SUBCUTANEOUS
Status: CANCELLED | OUTPATIENT
Start: 2024-10-28

## 2024-10-28 RX ORDER — GABAPENTIN 300 MG/1
600 CAPSULE ORAL 2 TIMES DAILY
Qty: 120 CAPSULE | Refills: 3 | Status: SHIPPED | OUTPATIENT
Start: 2024-10-28

## 2024-10-28 RX ORDER — DIPHENHYDRAMINE HYDROCHLORIDE 50 MG/ML
50 INJECTION INTRAMUSCULAR; INTRAVENOUS AS NEEDED
OUTPATIENT
Start: 2024-12-23

## 2024-10-28 RX ORDER — MEPERIDINE HYDROCHLORIDE 25 MG/ML
12.5 INJECTION INTRAMUSCULAR; INTRAVENOUS; SUBCUTANEOUS
OUTPATIENT
Start: 2024-12-23

## 2024-10-28 RX ORDER — MEPERIDINE HYDROCHLORIDE 25 MG/ML
12.5 INJECTION INTRAMUSCULAR; INTRAVENOUS; SUBCUTANEOUS
OUTPATIENT
Start: 2024-11-25

## 2024-10-28 RX ADMIN — DARATUMUMAB AND HYALURONIDASE-FIHJ (HUMAN RECOMBINANT) 1800 MG: 1800; 30000 INJECTION SUBCUTANEOUS at 09:54

## 2024-10-28 RX ADMIN — DENOSUMAB 120 MG: 120 INJECTION SUBCUTANEOUS at 10:00

## 2024-10-28 NOTE — PROGRESS NOTES
Subjective     CHIEF COMPLAINT:      Chief Complaint   Patient presents with    Follow-up     HISTORY OF PRESENT ILLNESS:     Contreras Albert is a 69 y.o. male patient who returns today for follow up on his multiple myeloma.  He returns today for follow-up reporting that he is feeling good.  His nephrologist recently stopped his blood pressure medication due to fluctuation in his blood pressure.  He is not having dizziness.  He has some swelling of the lower extremities but it is not causing him to have symptoms.  He is able to put his shoes on easily.    Patient reports numbness in the feet that extends occasionally to the knees.  He reports improvement in his symptoms with gabapentin.  He feels that symptoms will improve if the dose is increased to 600 mg twice daily.    ROS:  Pertinent ROS is in the HPI.     Past medical, surgical, social and family history were reviewed.     MEDICATIONS:    Current Outpatient Medications:     Accu-Chek Softclix Lancets lancets, Check 3 times a day on insulin tx, poor control, hypoglycemia. Dx: E 11.22, Disp: 300 each, Rfl: 3    acetaminophen (TYLENOL) 325 MG tablet, Take 2 tablets by mouth Every 6 (Six) Hours As Needed for Mild Pain. (Patient taking differently: Take 2 tablets by mouth Every 6 (Six) Hours As Needed for Mild Pain. As needed), Disp: , Rfl:     acyclovir (ZOVIRAX) 800 MG tablet, Take 1 tablet by mouth Daily., Disp: 90 tablet, Rfl: 2    amLODIPine (NORVASC) 2.5 MG tablet, Take 1 tablet by mouth Daily., Disp: , Rfl:     ASPIRIN 81 PO, Take 81 mg by mouth Daily., Disp: , Rfl:     atorvastatin (LIPITOR) 10 MG tablet, Take 1 tablet by mouth Every Night., Disp: 90 tablet, Rfl: 1    bumetanide (BUMEX) 2 MG tablet, Take 1 tablet by mouth Daily., Disp: , Rfl:     calcium carbonate (TUMS) 500 MG chewable tablet, Chew 4 tablets Daily., Disp: , Rfl:     Continuous Blood Gluc  (FreeStyle Jin 2 Orwell) device, USE 1 FOR 1 DOSE, Disp: , Rfl:     Continuous Glucose Sensor  (FreeStyle Jin 2 Sensor) misc, Use 1 each Every 14 (Fourteen) Days., Disp: 2 each, Rfl: 3    DARATUMUMAB IV, Infuse  into a venous catheter., Disp: , Rfl:     dexAMETHasone (DECADRON) 4 MG tablet, Take 3 tablets on day of chemo, 1 tablet on day #2 and 1 tablet on day #3 and repeat monthly., Disp: 15 tablet, Rfl: 1    diphenoxylate-atropine (LOMOTIL) 2.5-0.025 MG per tablet, Take 2 tablets by mouth 4 (Four) Times a Day As Needed for Diarrhea., Disp: 120 tablet, Rfl: 0    escitalopram (LEXAPRO) 5 MG tablet, TAKE 1 TABLET BY MOUTH EVERY DAY, Disp: 30 tablet, Rfl: 5    finasteride (PROSCAR) 5 MG tablet, Take 1 tablet by mouth Daily., Disp: , Rfl:     gabapentin (NEURONTIN) 300 MG capsule, TAKE 1 CAPSULE BY MOUTH EVERY MORNING AND 2 CAPSULES BY MOUTH EVERY EVENING, Disp: 90 capsule, Rfl: 3    glucagon (GLUCAGEN) 1 MG injection, Inject 1 mg into the appropriate muscle as directed by prescriber 1 (One) Time As Needed for Low Blood Sugar. Follow package directions for low blood sugar., Disp: 1 kit, Rfl: 10    glucose blood (Accu-Chek Guide) test strip, Check 3 times a day on insulin tx, poor control, hypoglycemia. Dx: E 11.22, Disp: 300 each, Rfl: 3    icosapent ethyl (VASCEPA) 1 g capsule capsule, TAKE 2 CAPSULES BY MOUTH TWICE DAILY WITH MEALS, Disp: 360 capsule, Rfl: 1    Insulin Lispro, 1 Unit Dial, (HUMALOG) 100 UNIT/ML solution pen-injector, NONCHEMO DAYS: 10 USE FOR BREAKFAST AND LUNCH, 12 USE AT SUPPER. CHEMO DAYS: 15 UNITS 3X DAILY WITH MEALS, Disp: 54 mL, Rfl: 1    Insulin Pen Needle 32G X 4 MM misc, USING 4 PEN NEEDLES DAILY, Disp: 400 each, Rfl: 3    Lantus SoloStar 100 UNIT/ML injection pen, NON CHEMO DAYS TAKE 16 UNITS TWICE DAILY. CHEMO DAYS TAKE 30 UNITS TWICE DAILY FOR A MAX DOSE OF 70 UNITS DAILY, Disp: 63 mL, Rfl: 1    omega-3 acid ethyl esters (LOVAZA) 1 g capsule, TAKE 2 CAPSULES BY MOUTH TWICE DAILY, Disp: 360 capsule, Rfl: 1    ondansetron (ZOFRAN) 8 MG tablet, Take 1 tablet by mouth 3 (Three) Times  "a Day As Needed for Nausea or Vomiting., Disp: 30 tablet, Rfl: 5    Phospha 250 Neutral 155-852-130 MG tablet, TAKE 1 TABLET BY MOUTH DAILY, Disp: 30 tablet, Rfl: 1    pomalidomide (POMALYST) 2 MG chemo capsule, Take 1 capsule by mouth Daily. Take for 21 days on, then 7 days off., Disp: 21 capsule, Rfl: 0    tamsulosin (FLOMAX) 0.4 MG capsule 24 hr capsule, Take 1 capsule by mouth Daily., Disp: , Rfl:     vitamin D (ERGOCALCIFEROL) 1.25 MG (25961 UT) capsule capsule, TAKE 1 CAPSULE BY MOUTH EVERY 7 DAYS, Disp: 13 capsule, Rfl: 1    Objective     VITAL SIGNS:     Vitals:    10/28/24 0840   BP: 174/94   Pulse: 72   SpO2: 98%   Weight: (!) 142 kg (313 lb)   Height: 188 cm (74.02\")   PainSc: 0-No pain     Body mass index is 40.17 kg/m².     Wt Readings from Last 5 Encounters:   10/28/24 (!) 142 kg (313 lb)   10/09/24 (!) 143 kg (314 lb 3.2 oz)   09/30/24 (!) 139 kg (307 lb 6.4 oz)   09/03/24 (!) 139 kg (306 lb 14.4 oz)   08/14/24 (!) 139 kg (306 lb 14.4 oz)     PHYSICAL EXAMINATION:   GENERAL: The patient appears in fair general condition, not in acute distress.   SKIN: No Ecchymosis.  EYES: No jaundice. Pallor.  CHEST: Normal respiratory effort. Normal breathing sounds bilaterally. No added sounds.  CVS: Normal S1 and S2. No Murmur.  ABDOMEN: Obese.  EXTREMITIES: +1 edema.  Chronic erythema at the distal aspect of the legs.  No calf tenderness.    DIAGNOSTIC DATA:     Results from last 7 days   Lab Units 10/28/24  0827   WBC 10*3/mm3 5.20   NEUTROS ABS 10*3/mm3 2.87   HEMOGLOBIN g/dL 10.9*   HEMATOCRIT % 34.0*   PLATELETS 10*3/mm3 161     Results from last 7 days   Lab Units 10/28/24  0827   SODIUM mmol/L 140   POTASSIUM mmol/L 4.3   CHLORIDE mmol/L 103   CO2 mmol/L 26.5   BUN mg/dL 39*   CREATININE mg/dL 3.33*   CALCIUM mg/dL 9.7   ALBUMIN g/dL 3.8   BILIRUBIN mg/dL 0.4   ALK PHOS U/L 51   ALT (SGPT) U/L 12   AST (SGOT) U/L 12   GLUCOSE mg/dL 128*   MAGNESIUM mg/dL 1.7     Component      Latest Ref Rng 10/28/2024 "   Phosphorus      2.5 - 4.5 mg/dL 4.3      Component      Latest Ref Rng 8/5/2024 9/3/2024 9/30/2024   IgG      603 - 1613 mg/dL 661  682  597 (L)    IgA      61 - 437 mg/dL 90  100  79    IgM      20 - 172 mg/dL 17 (L)  19 (L)  19 (L)    Total Protein      6.0 - 8.5 g/dL 6.2  6.1  5.6 (L)    Albumin      2.9 - 4.4 g/dL 3.8  3.4  3.1    Alpha-1-Globulin      0.0 - 0.4 g/dL 0.2  0.3  0.3    Alpha-2-Globulin      0.4 - 1.0 g/dL 0.8  0.9  0.9    Beta Globulin      0.7 - 1.3 g/dL 0.8  0.8  0.8    Gamma Globulin      0.4 - 1.8 g/dL 0.6  0.7  0.6    M-Jurgen      Not Observed g/dL Comment:  Not Observed  Not Observed    Globulin      2.2 - 3.9 g/dL 2.4  2.7  2.5    A/G Ratio      0.7 - 1.7  1.6  1.3  1.3    Immunofixation Reflex, Serum Comment !  Comment:  Comment    Please note Comment  Comment  Comment    Kappa FLC      3.3 - 19.4 mg/L 55.5 (H)  53.0 (H)  58.1 (H)    Free Lambda Light Chains      5.7 - 26.3 mg/L 40.7 (H)  34.3 (H)  40.0 (H)    Kappa/Lambda Ratio      0.26 - 1.65  1.36  1.55  1.45       Assessment & Plan    *IgG lambda multiple myeloma.  Bone marrow 5/23/2020 hypercellular marrow with 80% involvement of plasma cell myeloma; FISH studies pending  Bone survey 5/26/2020: Diffuse osteoporosis and demineralization.  However, no discrete lytic lesions.  SPEP 5/23/2020: M spike 5.1.  IgG 6629 lambda.  Light chain ratio 0 with free lambda light chains 6400.  Beta-2 microglobulin 16.3.  24-hour urine 12.7 g protein per 24-hour with monoclonal lambda free light chain 33.9%, monoclonal IgG lambda 23.5%  24-hour urine testing from 5/24/2020 free lambda light chains 8.4 g/L  Due to the renal failure, CyBorD regimen was chosen and was started on 5/28/2020.    On 6/18/2020, Velcade was changed to 1.5 mg/m² weekly continuously along with weekly Decadron and oral Cytoxan.  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   Treatment was changed on 8/27/2020 to to Daratumumab subcutaneous injection weekly along with  Revlimid 10 mg daily for 21 out of 28-day cycle and Decadron 40 mg daily.  S/p stem cell transplant on 12/9/2020 at [facility].  Day 100 bone marrow on 3/16/2021 revealed minute  plasma cell population.  PET scan on 3/29/2021 was negative.  Patient enrolled in the  clinical trial randomizing to maintenance lenalidomide versus lenalidomide and daratumumab.  Patient was randomized to the Revlimid arm initiated 4/13/2021 dosed at 5 mg daily (due to renal function).  Patient developed neutropenia requiring dose adjustment to 5 mg daily for 21/28 days.  Patient withdrew from clinical trial after 5 cycles on 8/31/2021 due to worsening diarrhea.    He was switched to Velcade maintenance every other week 9/14/2021 which he discontinued 10/26/2021 due to neuropathy and lower extremity edema.    He was subsequently changed to pomalidomide 2 mg daily 21/28 days on 11/8/2021.    1 year post transplant, bone marrow biopsy showed 5-7% plasma cells and due to the residual disease he was started on DPd (daratumumab, pomalidomide, dexamethasone) on 2/21/2022.    Patient was seen by Dr. Romero on 9/12/2022.  At that time patient was cycle 8-day 7 DPd. Due to chronic side effects, dexamethasone was changed from weekly to monthly.    Patient did continue pomalidomide on his own during admission to the hospital, received 1 dose 4 mg p.o. on 9/24/2022 before was subsequently discontinued due to cytopenias.   Dexamethasone was changed to 12 mg on Mondays, 4 mg on Tuesday and 4 mg on Wednesday.  Pomalyst dose was reduced to 3 mg daily for 21 out of a 28-day cycle starting on 10/31/2022.  Due to neutropenia, the dose of Pomalyst was subsequently reduced to 2 mg daily for 21/28-day cycle.  9/5/2023: Treatment held due to weakness and postural dizziness.  9/5/2023: No M protein.  Kappa FLC 49.6.  Lambda FLC 32.4.  Kappa to lambda ratio 1.53.  He did not notice improvement in his symptoms while off Darzalex and  Pomalyst.  Therefore, his symptoms were not attributed to treatment.  He was restarted on Darzalex and Pomalyst on 10/2/2023.  9/20/2024: No M protein.  Kappa FLC 58.1.  Lambda FLC 40.0.    Kappa/lambda ratio 1.45.  He is tolerating the treatment well.  No evidence of multiple myeloma progression.    *Bone health.  He isgood on Xgeva.   Patient developed severe hypocalcemia following the Xgeva injection.  He required inpatient hospital stay.  Given the duration of Xgeva injections, recommended changing treatment to every 3 months starting December 2022.   Due to hypomagnesemia, Xgeva was held on 3/18/2024 but was given 4/15/2024.  Last dose of Xgeva was on 7/8/2024.    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  Patient was receiving Procrit at [facility].  Records from [facility] showed that the patient was receiving 70,000 units weekly and received the last dose on 9/27/2022.  Patient was given Procrit 70,000 units on 9/27/2022.  Hemoglobin was 9.9 on 10/17/2022.  He was given Procrit 60,000 units.  Hemoglobin was 9.8 on 11/4/2022.  He was given Procrit 60,000 units.  Labs in March 2023 revealed adequate iron stores.  Last dose of Procrit was 25,000 units on 5/1/2023 for hemoglobin of 9.9.  5/13/2024: Hemoglobin 11.2.  6/10/2024: Hemoglobin decreased to 9.9-?  Secondary to sinus infection.  We discussed starting back on Procrit.  However, hemoglobin improved subsequently.  He did not require Procrit at that point.  10/28/2024: Hemoglobin 10.9.    *Neutropenia.  This was attributed to Pomalyst.  Neutrophil count was down to 290 on 9/25/2022.  He was given Neupogen during his hospital stay in September 2022.  Neutrophil count improved afterwards.  However, neutrophil count decreased to 600 on 12/19/2022.  He reports that he was feeling tired and achy at that time.  Neutrophil count was 770 on 1/16/2023.  Due to the recurrent neutropenia, the  dose of Pomalyst was reduced to 2 mg in January 2023.  Neutrophil count improved following the dose reduction.  10/28/2024: Neutrophils 2870.    *Hypocalcemia.  Patient developed severe hypocalcemia due to Xgeva.  Calcium was 5.9 on 9/23/2022.  Patient was placed on calcium replacement.  Calcium dose was subsequently increased to 3 tablets daily.  8/7/2023: Calcium 8.1. Albumin 3.6.  The dose was increased to 4 tablets daily.  2824: Calcium 9.7.    *Hypomagnesemia.  Magnesium was 1.1 on 10/24/2022.  He was placed on oral magnesium oxide 400 mg twice daily.  He developed diarrhea and it was stopped.  He was started on magnesium chloride on 1/23/2023.  He developed diarrhea about 7 days after starting the magnesium chloride.  Oral magnesium chloride was discontinued.  He receives IV magnesium per protocol based on his magnesium level.  10/28/2024: Magnesium 1.7.     *Prophylaxis.  He is on acyclovir 800 mg daily.  He is on ASA 81 mg daily.  No recent infections.    *Episodes of dizziness and falling.  Patient reports that the episodes develop after he stands up and starts walking.  No dizziness while sitting.  No restricted chest pain or shortness of breath.  Blood pressure was 133/80 and heart rate was 66 in the sitting position today.  After standing, blood pressure was 119/76 and heart rate was 69.  No improvement in the symptoms after holding Pomalyst for 2 weeks.  MRI brain on 7/26/2023 showed small vessel ischemic changes.  There were changes of 3 old strokes.  I explained the findings to the patient.  I recommended evaluation by cardiology and neurology.  I also recommended home physical therapy  I recommended that he uses a walker when he ambulates.  His evaluation showed possible changes of postural hypotension.   He is concerned that this is due to his medications. I explained that while postural hypotension is a side effect of medications, it is not a common side effect of Darzalex or Pomalyst.  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  Symptoms did not improve after placing Darzalex and Pomalyst on hold.  On 10/2/2023, we recommended reducing Bumex to 1 mg daily and monitoring his symptoms and the leg swelling.  He did not notice a difference in his postural dizziness after the dose reduction.   Bumex was increased back to 2 mg daily due to worsening of his edema.   This dose is controlling his leg edema.    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  It is controlled with Lexapro 5 mg daily.    *Vitamin D deficiency.  He is on vitamin D 50,000 units weekly.  Vitamin D level was 18 on 10/3/2022.  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  He is on gabapentin 300 mg in the morning and 600 mg in the evening.  Neuropathy symptoms in the feet are better as the swelling improved.     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  S/p right partial nephrectomy on 5/18/2016.    PLAN:    1.  We will give Darzalex today. Patient will start Pomalyst 2 mg daily for 21 days of a 28-day cycle and will continue dexamethasone 20 mg monthly.  2.  We will give Xgeva today.  3.  Increase gabapentin to 600 mg (2 x 300 mg) twice daily.  4.  Continue calcium carbonate 4 tablets daily.  5.  Continue Neutra-Phos 1 tablet daily.  6.  Continue acyclovir 800 mg daily.  7.  Continue aspirin 81 mg daily.  8.  Return in 4 and 8 weeks for Darzalex.  CBC CMP magnesium phosphorus SPEP [place] FLC will be obtained.  9.  I will see him in follow-up in 3 months.  Will schedule him to receive Darzalex and Xgeva with repeat labs.      Red Del Valle MD  10/28/24

## 2024-10-28 NOTE — PROGRESS NOTES
Specialty Pharmacy Patient Management Program  Oncology Reassessment     Contreras Albert was referred by an their provider to the Oncology Patient Management program offered by [facility] for multiple myeloma. A follow-up outreach was conducted, including assessment of continued therapy appropriateness, medication adherence, and side effect incidence and management for Pomalyst (pomalidomide).    Changes to Insurance Coverage or Financial Support  None    Relevant Past Medical History and Comorbidities  Relevant medical history and concomitant health conditions were discussed with the patient. The patient's chart has been reviewed for relevant past medical history and comorbid health conditions and updated as necessary.   Past Medical History:   Diagnosis Date    Acute renal failure syndrome 05/21/2020    NATHANIEL (acute kidney injury) (HCC) 05/22/2020    Anemia     Cataract     Chronic renal insufficiency     CKD (chronic kidney disease) 05/22/2020    Clear cell carcinoma of kidney, right 2016    Stage I.  Right partial nephrectomy.  Dr. Garcia    Clostridium difficile colitis     CVA (cerebral vascular accident)     Diabetes mellitus     Diabetic macular edema(362.07) 05/26/2020    No significant central fluid today OD.  Stable without significant DME OS.  Observe OU today. Possible multiple myeloma and anemia contributing.  Pt noted improvement with plasmapheresis and was gett*    Elevated troponin 05/22/2020    Enlarged prostate     Dr. Garcia    H/O stem cell transplant 12/09/2020    Hematoma of right lower leg     High cholesterol     Hypertension     Multiple myeloma     Multiple rib fractures     on right after fall from 2 story building at age of 30 years    Postural dizziness     Severe nonproliferative diabetic retinopathy of both eyes with macular edema associated with type 2 diabetes mellitus 09/02/2020    Testosterone deficiency     Type 2 diabetes mellitus with retinopathy, with  long-term current use of insulin 05/22/2020    Vitamin D deficiency      Social History     Socioeconomic History    Marital status:      Spouse name: Gabby   Tobacco Use    Smoking status: Never    Smokeless tobacco: Never   Vaping Use    Vaping status: Never Used   Substance and Sexual Activity    Alcohol use: No    Drug use: No    Sexual activity: Not Currently     Partners: Female     Birth control/protection: None     Problem list reviewed by Dariela Clarke RPH on 10/28/2024 at  9:28 AM    Hospitalizations and Urgent Care Since Last Assessment  ED Visits, Admissions, or Hospitalizations: none  Urgent Office Visits: none    Allergies  Known allergies and reactions were discussed with the patient. The patient's chart has been reviewed for allergy information and updated as necessary.   Allergies   Allergen Reactions    Lisinopril Unknown - High Severity     Tickle in throat, cannot take due to kidney disease    Crestor [Rosuvastatin] GI Intolerance     Abdominal cramping    Carvedilol Diarrhea, Swelling and Dizziness     Allergies reviewed by Dariela Clarke RPH on 10/28/2024 at  9:27 AM    Relevant Laboratory Values  Relevant laboratory values were discussed with the patient. The following specialty medication dose adjustment(s) are recommended: No dose adjustments are needed for the oral specialty medication(s) based on the labs.    Lab Results   Component Value Date    GLUCOSE 128 (H) 10/28/2024    CALCIUM 9.7 10/28/2024     10/28/2024    K 4.3 10/28/2024    CO2 26.5 10/28/2024     10/28/2024    BUN 39 (H) 10/28/2024    CREATININE 3.33 (C) 10/28/2024    EGFRIFAFRI 27 (L) 08/09/2021    EGFRIFNONA 24 (L) 08/09/2021    BCR 11.7 10/28/2024    ANIONGAP 10.5 10/28/2024     Lab Results   Component Value Date    WBC 5.20 10/28/2024    RBC 3.63 (L) 10/28/2024    HGB 10.9 (L) 10/28/2024    HCT 34.0 (L) 10/28/2024    MCV 93.7 10/28/2024    MCH 30.0 10/28/2024    MCHC 32.1 10/28/2024    RDW  15.7 (H) 10/28/2024    RDWSD 53.7 10/28/2024    MPV 9.1 10/28/2024     10/28/2024    NEUTRORELPCT 55.1 10/28/2024    LYMPHORELPCT 26.7 10/28/2024    MONORELPCT 13.7 (H) 10/28/2024    EOSRELPCT 2.9 10/28/2024    BASORELPCT 1.2 10/28/2024    AUTOIGPER 0.4 10/28/2024    NEUTROABS 2.87 10/28/2024    LYMPHSABS 1.39 10/28/2024    MONOSABS 0.71 10/28/2024    EOSABS 0.15 10/28/2024    BASOSABS 0.06 10/28/2024    AUTOIGNUM 0.02 10/28/2024    NRBC 0.0 10/28/2024       Current Medication List  This medication list has been reviewed with the patient and evaluated for any interactions or necessary modifications/recommendations, and updated to include all prescription medications, OTC medications, and supplements the patient is currently taking.  This list reflects what is contained in the patient's profile, which has also been marked as reviewed to communicate to other providers it is the most up to date version of the patient's current medication therapy.     Current Outpatient Medications:     Accu-Chek Softclix Lancets lancets, Check 3 times a day on insulin tx, poor control, hypoglycemia. Dx: E 11.22, Disp: 300 each, Rfl: 3    acetaminophen (TYLENOL) 325 MG tablet, Take 2 tablets by mouth Every 6 (Six) Hours As Needed for Mild Pain. (Patient taking differently: Take 2 tablets by mouth Every 6 (Six) Hours As Needed for Mild Pain. As needed), Disp: , Rfl:     acyclovir (ZOVIRAX) 800 MG tablet, Take 1 tablet by mouth Daily., Disp: 90 tablet, Rfl: 2    amLODIPine (NORVASC) 2.5 MG tablet, Take 1 tablet by mouth Daily., Disp: , Rfl:     ASPIRIN 81 PO, Take 81 mg by mouth Daily., Disp: , Rfl:     atorvastatin (LIPITOR) 10 MG tablet, Take 1 tablet by mouth Every Night., Disp: 90 tablet, Rfl: 1    bumetanide (BUMEX) 2 MG tablet, Take 1 tablet by mouth Daily., Disp: , Rfl:     calcium carbonate (TUMS) 500 MG chewable tablet, Chew 4 tablets Daily., Disp: , Rfl:     Continuous Blood Gluc  (FreeStyle Jin 2 [place])  device, USE 1 FOR 1 DOSE, Disp: , Rfl:     Continuous Glucose Sensor (FreeStyle Jin 2 Sensor) misc, Use 1 each Every 14 (Fourteen) Days., Disp: 2 each, Rfl: 3    DARATUMUMAB IV, Infuse  into a venous catheter., Disp: , Rfl:     dexAMETHasone (DECADRON) 4 MG tablet, Take 3 tablets on day of chemo, 1 tablet on day #2 and 1 tablet on day #3 and repeat monthly., Disp: 15 tablet, Rfl: 1    diphenoxylate-atropine (LOMOTIL) 2.5-0.025 MG per tablet, Take 2 tablets by mouth 4 (Four) Times a Day As Needed for Diarrhea., Disp: 120 tablet, Rfl: 0    escitalopram (LEXAPRO) 5 MG tablet, TAKE 1 TABLET BY MOUTH EVERY DAY, Disp: 30 tablet, Rfl: 5    finasteride (PROSCAR) 5 MG tablet, Take 1 tablet by mouth Daily., Disp: , Rfl:     gabapentin (NEURONTIN) 300 MG capsule, Take 2 capsules by mouth 2 (Two) Times a Day., Disp: 120 capsule, Rfl: 3    glucagon (GLUCAGEN) 1 MG injection, Inject 1 mg into the appropriate muscle as directed by prescriber 1 (One) Time As Needed for Low Blood Sugar. Follow package directions for low blood sugar., Disp: 1 kit, Rfl: 10    glucose blood (Accu-Chek Guide) test strip, Check 3 times a day on insulin tx, poor control, hypoglycemia. Dx: E 11.22, Disp: 300 each, Rfl: 3    icosapent ethyl (VASCEPA) 1 g capsule capsule, TAKE 2 CAPSULES BY MOUTH TWICE DAILY WITH MEALS, Disp: 360 capsule, Rfl: 1    Insulin Lispro, 1 Unit Dial, (HUMALOG) 100 UNIT/ML solution pen-injector, NONCHEMO DAYS: 10 USE FOR BREAKFAST AND LUNCH, 12 USE AT SUPPER. CHEMO DAYS: 15 UNITS 3X DAILY WITH MEALS, Disp: 54 mL, Rfl: 1    Insulin Pen Needle 32G X 4 MM misc, USING 4 PEN NEEDLES DAILY, Disp: 400 each, Rfl: 3    Lantus SoloStar 100 UNIT/ML injection pen, NON CHEMO DAYS TAKE 16 UNITS TWICE DAILY. CHEMO DAYS TAKE 30 UNITS TWICE DAILY FOR A MAX DOSE OF 70 UNITS DAILY, Disp: 63 mL, Rfl: 1    omega-3 acid ethyl esters (LOVAZA) 1 g capsule, TAKE 2 CAPSULES BY MOUTH TWICE DAILY, Disp: 360 capsule, Rfl: 1    ondansetron (ZOFRAN) 8 MG  tablet, Take 1 tablet by mouth 3 (Three) Times a Day As Needed for Nausea or Vomiting., Disp: 30 tablet, Rfl: 5    Phospha 250 Neutral [id number] MG tablet, TAKE 1 TABLET BY MOUTH DAILY, Disp: 30 tablet, Rfl: 1    pomalidomide (POMALYST) 2 MG chemo capsule, Take 1 capsule by mouth Daily. Take for 21 days on, then 7 days off., Disp: 21 capsule, Rfl: 0    tamsulosin (FLOMAX) 0.4 MG capsule 24 hr capsule, Take 1 capsule by mouth Daily., Disp: , Rfl:     vitamin D (ERGOCALCIFEROL) 1.25 MG (82380 UT) capsule capsule, TAKE 1 CAPSULE BY MOUTH EVERY 7 DAYS, Disp: 13 capsule, Rfl: 1  No current facility-administered medications for this visit.    Facility-Administered Medications Ordered in Other Visits:     denosumab (XGEVA) injection 120 mg, 120 mg, Subcutaneous, Once, Red Del Valle MD    Medicines reviewed by Dariela Clarke [facility] on 10/28/2024 at  9:28 AM    Drug Interactions  Assessed medication list for interactions, no significant drug interactions noted.   Advised patient to call the clinic if any new medications are started so we can assess for drug-drug interactions.  Drug-food interactions discussed:  none today    Adverse Drug Reactions  Medication tolerability: Tolerating with no to minimal ADRs  Medication plan: Continue therapy with normal follow-up  Plan for ADR Management: N/A    Adherence, Self-Administration, and Current Therapy Problems  Adherence related to the patient's specialty therapy was discussed with the patient. The Adherence segment of this outreach has been reviewed and updated.     Adherence Questions  Linked Medication(s) Assessed: Pomalidomide (POMALYST)  On average, how many doses/injections does the patient miss per month?: 0  What are the identified reasons for non-adherence or missed doses? : no problems identified  What is the estimated medication adherence level?: %  Based on the patient/caregiver response and refill history, does this patient require an MTP to track  adherence improvements?: no    Additional Barriers to Patient Self-Administration: none  Methods for Supporting Patient Self-Administration: none  Patient has had no issues obtaining medication from pharmacy.    Open Medication Therapy Problems  No medication therapy recommendations to display    Goals of Therapy  Goals related to the patient's specialty therapy were discussed with the patient. The Patient Goals segment of this outreach has been reviewed and updated.   Goals Addressed Today        Specialty Pharmacy General Goal      GUANAKITO, SPEP and FLE within normal limits  8/5/2024: IgG increased to 661. M protein not detected. Kappa FLC 55.5. Lambda FLC 40.7. Kappa to lambda ratio 1.56.  10/28/24: Labs from 10/1/24 show continued control of disease.  IgG 597, M protein not detected, Kappa FLC 58.1, Lambda FLC 40, Kappa/lambda ratio 1.45. Patient tolerating treatment well. No changes recommended at this time.               Quality of Life Assessment   Quality of Life related to the patient's enrollment in the patient management program and services provided was discussed with the patient. The QOL segment of this outreach has been reviewed and updated.  Quality of Life Improvement Scale: 5-No change    Discussed aforementioned material with patient in person, face-to-face, in clinic.     Reassessment Plan & Follow-Up  1. Medication Therapy Changes: none  2. Related Plans, Therapy Recommendations, or Issues to Be Addressed: none  3. Pharmacist to perform regular assessments no more than (6) months from the previous assessment.       Attestation  Therapeutic appropriateness: Appropriate   I attest the patient was actively involved in and has agreed to the above plan of care.  If the prescribed therapy is at any point deemed not appropriate based on the current or future assessments, a consultation will be initiated with the patient's specialty care provider to determine the best course of action. The revised plan of  therapy will be documented along with any required assessments and/or additional patient education provided.     Carlos Clarke, Evan, [facility]  Clinical Specialty Pharmacist, Oncology  10/28/2024  09:56 EDT

## 2024-10-29 LAB
ALBUMIN SERPL ELPH-MCNC: 3.6 G/DL (ref 2.9–4.4)
ALBUMIN/GLOB SERPL: 1.6 {RATIO} (ref 0.7–1.7)
ALPHA1 GLOB SERPL ELPH-MCNC: 0.2 G/DL (ref 0–0.4)
ALPHA2 GLOB SERPL ELPH-MCNC: 0.8 G/DL (ref 0.4–1)
B-GLOBULIN SERPL ELPH-MCNC: 0.8 G/DL (ref 0.7–1.3)
GAMMA GLOB SERPL ELPH-MCNC: 0.6 G/DL (ref 0.4–1.8)
GLOBULIN SER-MCNC: 2.4 G/DL (ref 2.2–3.9)
IGA SERPL-MCNC: 79 MG/DL (ref 61–437)
IGG SERPL-MCNC: 699 MG/DL (ref 603–1613)
IGM SERPL-MCNC: 32 MG/DL (ref 20–172)
INTERPRETATION SERPL IEP-IMP: ABNORMAL
KAPPA LC FREE SER-MCNC: 76.8 MG/L (ref 3.3–19.4)
KAPPA LC FREE/LAMBDA FREE SER: 1.36 {RATIO} (ref 0.26–1.65)
LABORATORY COMMENT REPORT: ABNORMAL
LAMBDA LC FREE SERPL-MCNC: 56.5 MG/L (ref 5.7–26.3)
M PROTEIN SERPL ELPH-MCNC: ABNORMAL G/DL
PROT SERPL-MCNC: 6 G/DL (ref 6–8.5)

## 2024-10-29 NOTE — PROGRESS NOTES
Specialty Pharmacy Note: Pomalyst (pomalidomide)    Contreras Albert is a 69 y.o. male with multiple myeloma was seen 10/28/24 by Dr. Del Valle. Per provider dictation, no changes to oral oncology regimen Pomalyst (pomalidomide).  Labs Review: The CMP and CBC from 10/28/24 have been reviewed. No dose adjustments are needed for the oral specialty medication(s) based on the labs.    Specialty pharmacy will continue to follow patient.    Carlos Clarke, PharmD, [facility]  Clinical Oncology Pharmacist    10/29/2024  08:35 EDT

## 2024-11-13 ENCOUNTER — SPECIALTY PHARMACY (OUTPATIENT)
Dept: PHARMACY | Facility: HOSPITAL | Age: 69
End: 2024-11-13
Payer: MEDICARE

## 2024-11-19 DIAGNOSIS — F06.31 DEPRESSION DUE TO PHYSICAL ILLNESS: ICD-10-CM

## 2024-11-19 RX ORDER — ESCITALOPRAM OXALATE 5 MG/1
5 TABLET ORAL DAILY
Qty: 90 TABLET | Refills: 1 | Status: SHIPPED | OUTPATIENT
Start: 2024-11-19

## 2024-11-25 ENCOUNTER — TELEPHONE (OUTPATIENT)
Dept: ONCOLOGY | Facility: HOSPITAL | Age: 69
End: 2024-11-25
Payer: MEDICARE

## 2024-11-25 ENCOUNTER — INFUSION (OUTPATIENT)
Dept: ONCOLOGY | Facility: HOSPITAL | Age: 69
End: 2024-11-25
Payer: MEDICARE

## 2024-11-25 ENCOUNTER — LAB (OUTPATIENT)
Dept: LAB | Facility: HOSPITAL | Age: 69
End: 2024-11-25
Payer: MEDICARE

## 2024-11-25 VITALS
TEMPERATURE: 98 F | BODY MASS INDEX: 39.81 KG/M2 | RESPIRATION RATE: 16 BRPM | WEIGHT: 310.2 LBS | SYSTOLIC BLOOD PRESSURE: 168 MMHG | HEART RATE: 82 BPM | DIASTOLIC BLOOD PRESSURE: 81 MMHG | OXYGEN SATURATION: 95 %

## 2024-11-25 DIAGNOSIS — C90.00 MULTIPLE MYELOMA NOT HAVING ACHIEVED REMISSION: ICD-10-CM

## 2024-11-25 DIAGNOSIS — C90.00 MULTIPLE MYELOMA NOT HAVING ACHIEVED REMISSION: Primary | ICD-10-CM

## 2024-11-25 LAB
ALBUMIN SERPL-MCNC: 3.8 G/DL (ref 3.5–5.2)
ALBUMIN/GLOB SERPL: 1.7 G/DL
ALP SERPL-CCNC: 47 U/L (ref 39–117)
ALT SERPL W P-5'-P-CCNC: 10 U/L (ref 1–41)
ANION GAP SERPL CALCULATED.3IONS-SCNC: 9.8 MMOL/L (ref 5–15)
AST SERPL-CCNC: 9 U/L (ref 1–40)
BASOPHILS # BLD AUTO: 0.09 10*3/MM3 (ref 0–0.2)
BASOPHILS NFR BLD AUTO: 1.7 % (ref 0–1.5)
BILIRUB SERPL-MCNC: 0.4 MG/DL (ref 0–1.2)
BUN SERPL-MCNC: 38 MG/DL (ref 8–23)
BUN/CREAT SERPL: 11.1 (ref 7–25)
CALCIUM SPEC-SCNC: 9.2 MG/DL (ref 8.6–10.5)
CHLORIDE SERPL-SCNC: 105 MMOL/L (ref 98–107)
CO2 SERPL-SCNC: 25.2 MMOL/L (ref 22–29)
CREAT SERPL-MCNC: 3.41 MG/DL (ref 0.76–1.27)
DEPRECATED RDW RBC AUTO: 55.4 FL (ref 37–54)
EGFRCR SERPLBLD CKD-EPI 2021: 18.9 ML/MIN/1.73
EOSINOPHIL # BLD AUTO: 0.16 10*3/MM3 (ref 0–0.4)
EOSINOPHIL NFR BLD AUTO: 3 % (ref 0.3–6.2)
ERYTHROCYTE [DISTWIDTH] IN BLOOD BY AUTOMATED COUNT: 16.1 % (ref 12.3–15.4)
GLOBULIN UR ELPH-MCNC: 2.2 GM/DL
GLUCOSE SERPL-MCNC: 172 MG/DL (ref 65–99)
HCT VFR BLD AUTO: 34.6 % (ref 37.5–51)
HGB BLD-MCNC: 11 G/DL (ref 13–17.7)
IMM GRANULOCYTES # BLD AUTO: 0.02 10*3/MM3 (ref 0–0.05)
IMM GRANULOCYTES NFR BLD AUTO: 0.4 % (ref 0–0.5)
LYMPHOCYTES # BLD AUTO: 1.51 10*3/MM3 (ref 0.7–3.1)
LYMPHOCYTES NFR BLD AUTO: 28 % (ref 19.6–45.3)
MAGNESIUM SERPL-MCNC: 1.5 MG/DL (ref 1.6–2.4)
MCH RBC QN AUTO: 29.8 PG (ref 26.6–33)
MCHC RBC AUTO-ENTMCNC: 31.8 G/DL (ref 31.5–35.7)
MCV RBC AUTO: 93.8 FL (ref 79–97)
MONOCYTES # BLD AUTO: 0.68 10*3/MM3 (ref 0.1–0.9)
MONOCYTES NFR BLD AUTO: 12.6 % (ref 5–12)
NEUTROPHILS NFR BLD AUTO: 2.93 10*3/MM3 (ref 1.7–7)
NEUTROPHILS NFR BLD AUTO: 54.3 % (ref 42.7–76)
NRBC BLD AUTO-RTO: 0 /100 WBC (ref 0–0.2)
PHOSPHATE SERPL-MCNC: 3.3 MG/DL (ref 2.5–4.5)
PLATELET # BLD AUTO: 154 10*3/MM3 (ref 140–450)
PMV BLD AUTO: 10.4 FL (ref 6–12)
POTASSIUM SERPL-SCNC: 4.3 MMOL/L (ref 3.5–5.2)
PROT SERPL-MCNC: 6 G/DL (ref 6–8.5)
RBC # BLD AUTO: 3.69 10*6/MM3 (ref 4.14–5.8)
SODIUM SERPL-SCNC: 140 MMOL/L (ref 136–145)
WBC NRBC COR # BLD AUTO: 5.39 10*3/MM3 (ref 3.4–10.8)

## 2024-11-25 PROCEDURE — 96401 CHEMO ANTI-NEOPL SQ/IM: CPT

## 2024-11-25 PROCEDURE — 83735 ASSAY OF MAGNESIUM: CPT

## 2024-11-25 PROCEDURE — 36415 COLL VENOUS BLD VENIPUNCTURE: CPT

## 2024-11-25 PROCEDURE — 80053 COMPREHEN METABOLIC PANEL: CPT

## 2024-11-25 PROCEDURE — 84100 ASSAY OF PHOSPHORUS: CPT

## 2024-11-25 PROCEDURE — 25010000002 DARATUMUMAB-HYALURONIDASE-FIHJ 1800-30000 MG-UT/15ML SOLUTION: Performed by: INTERNAL MEDICINE

## 2024-11-25 PROCEDURE — 85025 COMPLETE CBC W/AUTO DIFF WBC: CPT

## 2024-11-25 RX ADMIN — DARATUMUMAB AND HYALURONIDASE-FIHJ (HUMAN RECOMBINANT) 1800 MG: 1800; 30000 INJECTION SUBCUTANEOUS at 13:27

## 2024-11-25 NOTE — TELEPHONE ENCOUNTER
Left Message - Pt was here today for his Darzalex injection. Magnesium lab did not result until after pt had left the building. Magnesium resulted as 1.5. Pt called and voicemail left for pt to call office to schedule him to come in tomorrow 11/26 for 2gm IV Magnesium. Dr. Ivon Santillan's clinic RN, notified of this voicemail.

## 2024-11-26 ENCOUNTER — INFUSION (OUTPATIENT)
Dept: ONCOLOGY | Facility: HOSPITAL | Age: 69
End: 2024-11-26
Payer: MEDICARE

## 2024-11-26 VITALS
OXYGEN SATURATION: 95 % | SYSTOLIC BLOOD PRESSURE: 187 MMHG | HEART RATE: 89 BPM | TEMPERATURE: 98 F | WEIGHT: 312.2 LBS | DIASTOLIC BLOOD PRESSURE: 90 MMHG | BODY MASS INDEX: 40.07 KG/M2

## 2024-11-26 DIAGNOSIS — E83.42 HYPOMAGNESEMIA: Primary | ICD-10-CM

## 2024-11-26 PROCEDURE — 96365 THER/PROPH/DIAG IV INF INIT: CPT

## 2024-11-26 PROCEDURE — 25010000002 MAGNESIUM SULFATE 2 GM/50ML SOLUTION: Performed by: INTERNAL MEDICINE

## 2024-11-26 RX ORDER — MAGNESIUM SULFATE HEPTAHYDRATE 40 MG/ML
2 INJECTION, SOLUTION INTRAVENOUS ONCE
Status: COMPLETED | OUTPATIENT
Start: 2024-11-26 | End: 2024-11-26

## 2024-11-26 RX ADMIN — MAGNESIUM SULFATE HEPTAHYDRATE 2 G: 40 INJECTION, SOLUTION INTRAVENOUS at 11:27

## 2024-11-27 LAB
ALBUMIN SERPL ELPH-MCNC: 3.6 G/DL (ref 2.9–4.4)
ALBUMIN/GLOB SERPL: 1.9 {RATIO} (ref 0.7–1.7)
ALPHA1 GLOB SERPL ELPH-MCNC: 0.2 G/DL (ref 0–0.4)
ALPHA2 GLOB SERPL ELPH-MCNC: 0.7 G/DL (ref 0.4–1)
B-GLOBULIN SERPL ELPH-MCNC: 0.7 G/DL (ref 0.7–1.3)
GAMMA GLOB SERPL ELPH-MCNC: 0.5 G/DL (ref 0.4–1.8)
GLOBULIN SER-MCNC: 2 G/DL (ref 2.2–3.9)
IGA SERPL-MCNC: 73 MG/DL (ref 61–437)
IGG SERPL-MCNC: 646 MG/DL (ref 603–1613)
IGM SERPL-MCNC: 23 MG/DL (ref 20–172)
INTERPRETATION SERPL IEP-IMP: ABNORMAL
KAPPA LC FREE SER-MCNC: 67.3 MG/L (ref 3.3–19.4)
KAPPA LC FREE/LAMBDA FREE SER: 1.3 {RATIO} (ref 0.26–1.65)
LABORATORY COMMENT REPORT: ABNORMAL
LAMBDA LC FREE SERPL-MCNC: 51.9 MG/L (ref 5.7–26.3)
M PROTEIN SERPL ELPH-MCNC: ABNORMAL G/DL
PROT SERPL-MCNC: 5.6 G/DL (ref 6–8.5)

## 2024-12-23 ENCOUNTER — INFUSION (OUTPATIENT)
Dept: ONCOLOGY | Facility: HOSPITAL | Age: 69
End: 2024-12-23
Payer: MEDICARE

## 2024-12-23 ENCOUNTER — LAB (OUTPATIENT)
Dept: LAB | Facility: HOSPITAL | Age: 69
End: 2024-12-23
Payer: MEDICARE

## 2024-12-23 VITALS
OXYGEN SATURATION: 95 % | WEIGHT: 311.2 LBS | DIASTOLIC BLOOD PRESSURE: 99 MMHG | RESPIRATION RATE: 16 BRPM | BODY MASS INDEX: 39.94 KG/M2 | TEMPERATURE: 98 F | HEART RATE: 85 BPM | SYSTOLIC BLOOD PRESSURE: 184 MMHG

## 2024-12-23 DIAGNOSIS — C90.00 MULTIPLE MYELOMA NOT HAVING ACHIEVED REMISSION: Primary | ICD-10-CM

## 2024-12-23 DIAGNOSIS — C90.00 MULTIPLE MYELOMA NOT HAVING ACHIEVED REMISSION: ICD-10-CM

## 2024-12-23 LAB
ALBUMIN SERPL-MCNC: 3.6 G/DL (ref 3.5–5.2)
ALBUMIN/GLOB SERPL: 1.5 G/DL
ALP SERPL-CCNC: 53 U/L (ref 39–117)
ALT SERPL W P-5'-P-CCNC: 15 U/L (ref 1–41)
ANION GAP SERPL CALCULATED.3IONS-SCNC: 11.5 MMOL/L (ref 5–15)
AST SERPL-CCNC: 11 U/L (ref 1–40)
BASOPHILS # BLD AUTO: 0.07 10*3/MM3 (ref 0–0.2)
BASOPHILS NFR BLD AUTO: 1.2 % (ref 0–1.5)
BILIRUB SERPL-MCNC: 0.4 MG/DL (ref 0–1.2)
BUN SERPL-MCNC: 29 MG/DL (ref 8–23)
BUN/CREAT SERPL: 9.1 (ref 7–25)
CALCIUM SPEC-SCNC: 9.2 MG/DL (ref 8.6–10.5)
CHLORIDE SERPL-SCNC: 104 MMOL/L (ref 98–107)
CO2 SERPL-SCNC: 24.5 MMOL/L (ref 22–29)
CREAT SERPL-MCNC: 3.19 MG/DL (ref 0.76–1.27)
DEPRECATED RDW RBC AUTO: 52.6 FL (ref 37–54)
EGFRCR SERPLBLD CKD-EPI 2021: 20.5 ML/MIN/1.73
EOSINOPHIL # BLD AUTO: 0.2 10*3/MM3 (ref 0–0.4)
EOSINOPHIL NFR BLD AUTO: 3.5 % (ref 0.3–6.2)
ERYTHROCYTE [DISTWIDTH] IN BLOOD BY AUTOMATED COUNT: 15.6 % (ref 12.3–15.4)
GLOBULIN UR ELPH-MCNC: 2.4 GM/DL
GLUCOSE SERPL-MCNC: 232 MG/DL (ref 65–99)
HCT VFR BLD AUTO: 37 % (ref 37.5–51)
HGB BLD-MCNC: 11.7 G/DL (ref 13–17.7)
IMM GRANULOCYTES # BLD AUTO: 0.03 10*3/MM3 (ref 0–0.05)
IMM GRANULOCYTES NFR BLD AUTO: 0.5 % (ref 0–0.5)
LYMPHOCYTES # BLD AUTO: 1.6 10*3/MM3 (ref 0.7–3.1)
LYMPHOCYTES NFR BLD AUTO: 28.2 % (ref 19.6–45.3)
MAGNESIUM SERPL-MCNC: 1.5 MG/DL (ref 1.6–2.4)
MCH RBC QN AUTO: 29.3 PG (ref 26.6–33)
MCHC RBC AUTO-ENTMCNC: 31.6 G/DL (ref 31.5–35.7)
MCV RBC AUTO: 92.5 FL (ref 79–97)
MONOCYTES # BLD AUTO: 0.6 10*3/MM3 (ref 0.1–0.9)
MONOCYTES NFR BLD AUTO: 10.6 % (ref 5–12)
NEUTROPHILS NFR BLD AUTO: 3.18 10*3/MM3 (ref 1.7–7)
NEUTROPHILS NFR BLD AUTO: 56 % (ref 42.7–76)
NRBC BLD AUTO-RTO: 0 /100 WBC (ref 0–0.2)
PHOSPHATE SERPL-MCNC: 4.4 MG/DL (ref 2.5–4.5)
PLATELET # BLD AUTO: 166 10*3/MM3 (ref 140–450)
PMV BLD AUTO: 9 FL (ref 6–12)
POTASSIUM SERPL-SCNC: 4.6 MMOL/L (ref 3.5–5.2)
PROT SERPL-MCNC: 6 G/DL (ref 6–8.5)
RBC # BLD AUTO: 4 10*6/MM3 (ref 4.14–5.8)
SODIUM SERPL-SCNC: 140 MMOL/L (ref 136–145)
WBC NRBC COR # BLD AUTO: 5.68 10*3/MM3 (ref 3.4–10.8)

## 2024-12-23 PROCEDURE — 80053 COMPREHEN METABOLIC PANEL: CPT

## 2024-12-23 PROCEDURE — 83735 ASSAY OF MAGNESIUM: CPT

## 2024-12-23 PROCEDURE — 25010000002 MAGNESIUM SULFATE 2 GM/50ML SOLUTION: Performed by: INTERNAL MEDICINE

## 2024-12-23 PROCEDURE — 36415 COLL VENOUS BLD VENIPUNCTURE: CPT

## 2024-12-23 PROCEDURE — 96365 THER/PROPH/DIAG IV INF INIT: CPT

## 2024-12-23 PROCEDURE — 96401 CHEMO ANTI-NEOPL SQ/IM: CPT

## 2024-12-23 PROCEDURE — 85025 COMPLETE CBC W/AUTO DIFF WBC: CPT

## 2024-12-23 PROCEDURE — 25010000002 DARATUMUMAB-HYALURONIDASE-FIHJ 1800-30000 MG-UT/15ML SOLUTION: Performed by: INTERNAL MEDICINE

## 2024-12-23 PROCEDURE — 84100 ASSAY OF PHOSPHORUS: CPT

## 2024-12-23 RX ORDER — MAGNESIUM SULFATE HEPTAHYDRATE 40 MG/ML
2 INJECTION, SOLUTION INTRAVENOUS ONCE
Status: COMPLETED | OUTPATIENT
Start: 2024-12-23 | End: 2024-12-23

## 2024-12-23 RX ORDER — ACYCLOVIR 800 MG/1
800 TABLET ORAL DAILY
Qty: 90 TABLET | Refills: 2 | Status: SHIPPED | OUTPATIENT
Start: 2024-12-23

## 2024-12-23 RX ORDER — DEXAMETHASONE 4 MG/1
TABLET ORAL
Qty: 15 TABLET | Refills: 1 | Status: SHIPPED | OUTPATIENT
Start: 2024-12-23

## 2024-12-23 RX ADMIN — MAGNESIUM SULFATE HEPTAHYDRATE 2 G: 40 INJECTION, SOLUTION INTRAVENOUS at 14:21

## 2024-12-23 RX ADMIN — DARATUMUMAB AND HYALURONIDASE-FIHJ (HUMAN RECOMBINANT) 1800 MG: 1800; 30000 INJECTION SUBCUTANEOUS at 14:21

## 2024-12-23 NOTE — NURSING NOTE
"Pt's BP elevated today, pt stated he has not taken his BP medication today because he doesn't take it unless he \"absolutely needs to\", recommended pt his BP medication today. Pt stated he will check BP again when he gets home and take his medication.   "

## 2024-12-26 LAB
ALBUMIN SERPL ELPH-MCNC: 3.3 G/DL (ref 2.9–4.4)
ALBUMIN/GLOB SERPL: 1.3 {RATIO} (ref 0.7–1.7)
ALPHA1 GLOB SERPL ELPH-MCNC: 0.2 G/DL (ref 0–0.4)
ALPHA2 GLOB SERPL ELPH-MCNC: 0.9 G/DL (ref 0.4–1)
B-GLOBULIN SERPL ELPH-MCNC: 0.9 G/DL (ref 0.7–1.3)
GAMMA GLOB SERPL ELPH-MCNC: 0.6 G/DL (ref 0.4–1.8)
GLOBULIN SER-MCNC: 2.6 G/DL (ref 2.2–3.9)
IGA SERPL-MCNC: 82 MG/DL (ref 61–437)
IGG SERPL-MCNC: 655 MG/DL (ref 603–1613)
IGM SERPL-MCNC: 23 MG/DL (ref 20–172)
INTERPRETATION SERPL IEP-IMP: ABNORMAL
KAPPA LC FREE SER-MCNC: 57.4 MG/L (ref 3.3–19.4)
KAPPA LC FREE/LAMBDA FREE SER: 1.26 {RATIO} (ref 0.26–1.65)
LABORATORY COMMENT REPORT: ABNORMAL
LAMBDA LC FREE SERPL-MCNC: 45.5 MG/L (ref 5.7–26.3)
M PROTEIN SERPL ELPH-MCNC: ABNORMAL G/DL
PROT SERPL-MCNC: 5.9 G/DL (ref 6–8.5)

## 2024-12-31 ENCOUNTER — SPECIALTY PHARMACY (OUTPATIENT)
Dept: PHARMACY | Facility: HOSPITAL | Age: 69
End: 2024-12-31
Payer: MEDICARE

## 2024-12-31 NOTE — PROGRESS NOTES
Specialty Pharmacy Patient Management Program  Per Protocol Prescription Order or Refill     Patient will be filling or currently fills medications at [facility] and is enrolled in the Patient Management Program.    Requested Prescriptions     Signed Prescriptions Disp Refills    pomalidomide (POMALYST) 2 MG chemo capsule 21 capsule 0     Sig: Take 1 capsule by mouth Daily. Take for 21 days on, then 7 days off.     Prescription orders above were sent to the pharmacy per Collaborative Care Agreement Protocol.     Last Office Visit: 10/28/24  Next Office Visit: 1/20/25    Carlos Clarke PharmD, BCOP  Clinical Specialty Pharmacist, Oncology  12/31/2024  12:54 EST

## 2024-12-31 NOTE — PROGRESS NOTES
Specialty Pharmacy Patient Management Program  Per Protocol Prescription Order or Refill       Requested Prescriptions     Signed Prescriptions Disp Refills    pomalidomide (POMALYST) 2 MG chemo capsule 21 capsule 0     Sig: Take 1 capsule by mouth Daily. Take for 21 days on, then 7 days off.     Prescription orders above were sent to [facility] per Collaborative Care Agreement Protocol.     Completed independent double check on medication order/RX.    Yumiko EsparzaD, [facility]  Clinical Specialty Pharmacist, Oncology  12/31/2024  14:26 EST

## 2025-01-08 DIAGNOSIS — E11.22 TYPE 2 DIABETES MELLITUS WITH STAGE 4 CHRONIC KIDNEY DISEASE, WITH LONG-TERM CURRENT USE OF INSULIN: ICD-10-CM

## 2025-01-08 DIAGNOSIS — Z79.4 TYPE 2 DIABETES MELLITUS WITH STAGE 4 CHRONIC KIDNEY DISEASE, WITH LONG-TERM CURRENT USE OF INSULIN: ICD-10-CM

## 2025-01-08 DIAGNOSIS — N18.4 TYPE 2 DIABETES MELLITUS WITH STAGE 4 CHRONIC KIDNEY DISEASE, WITH LONG-TERM CURRENT USE OF INSULIN: ICD-10-CM

## 2025-01-08 NOTE — TELEPHONE ENCOUNTER
Rx Refill Note  Requested Prescriptions     Pending Prescriptions Disp Refills    Continuous Glucose Sensor (FreeStyle Jin 2 Sensor) misc [Pharmacy Med Name: FREESTYLE JIN 2 SENSOR] 2 each 3     Sig: USE 1 EVERY 14 DAYS      Last office visit with prescribing clinician: 6/27/2024   Last telemedicine visit with prescribing clinician: Visit date not found   Next office visit with prescribing clinician: Visit date not found                         Would you like a call back once the refill request has been completed: [] Yes [] No    If the office needs to give you a call back, can they leave a voicemail: [] Yes [] No    Cherri Sorto MA  01/08/25, 09:36 EST

## 2025-01-14 RX ORDER — BLOOD SUGAR DIAGNOSTIC
1 STRIP MISCELLANEOUS 3 TIMES DAILY
Qty: 300 EACH | Refills: 0 | Status: SHIPPED | OUTPATIENT
Start: 2025-01-14

## 2025-01-14 NOTE — TELEPHONE ENCOUNTER
Rx Refill Note  Requested Prescriptions     Pending Prescriptions Disp Refills    glucose blood (Accu-Chek Guide Test) test strip [Pharmacy Med Name: ACCU-CHEK GUIDE TEST STRIPS 100S] 300 each 0     Sig: USE TO CHECK BLOOD SUGAR THREE TIMES DAILY      Last office visit with prescribing clinician: 11/29/2023   Last telemedicine visit with prescribing clinician: Visit date not found   Next office visit with prescribing clinician: 1/29/2025                         Would you like a call back once the refill request has been completed: [] Yes [] No    If the office needs to give you a call back, can they leave a voicemail: [] Yes [] No    Michelle Davis  01/14/25, 13:15 EST

## 2025-01-20 ENCOUNTER — LAB (OUTPATIENT)
Dept: LAB | Facility: HOSPITAL | Age: 70
End: 2025-01-20
Payer: MEDICARE

## 2025-01-20 ENCOUNTER — OFFICE VISIT (OUTPATIENT)
Dept: ONCOLOGY | Facility: CLINIC | Age: 70
End: 2025-01-20
Payer: MEDICARE

## 2025-01-20 ENCOUNTER — INFUSION (OUTPATIENT)
Dept: ONCOLOGY | Facility: HOSPITAL | Age: 70
End: 2025-01-20
Payer: MEDICARE

## 2025-01-20 VITALS
DIASTOLIC BLOOD PRESSURE: 100 MMHG | HEART RATE: 89 BPM | RESPIRATION RATE: 17 BRPM | HEIGHT: 74 IN | WEIGHT: 314.2 LBS | TEMPERATURE: 98.1 F | SYSTOLIC BLOOD PRESSURE: 197 MMHG | OXYGEN SATURATION: 96 % | BODY MASS INDEX: 40.32 KG/M2

## 2025-01-20 DIAGNOSIS — Z79.899 HIGH RISK MEDICATION USE: ICD-10-CM

## 2025-01-20 DIAGNOSIS — D70.1 CHEMOTHERAPY INDUCED NEUTROPENIA: ICD-10-CM

## 2025-01-20 DIAGNOSIS — N18.32 ANEMIA IN STAGE 3B CHRONIC KIDNEY DISEASE: ICD-10-CM

## 2025-01-20 DIAGNOSIS — C90.00 MULTIPLE MYELOMA NOT HAVING ACHIEVED REMISSION: Primary | ICD-10-CM

## 2025-01-20 DIAGNOSIS — E83.42 HYPOMAGNESEMIA: ICD-10-CM

## 2025-01-20 DIAGNOSIS — T45.1X5A CHEMOTHERAPY INDUCED NEUTROPENIA: ICD-10-CM

## 2025-01-20 DIAGNOSIS — M89.9 LYTIC BONE LESIONS ON XRAY: ICD-10-CM

## 2025-01-20 DIAGNOSIS — C90.00 MULTIPLE MYELOMA NOT HAVING ACHIEVED REMISSION: ICD-10-CM

## 2025-01-20 DIAGNOSIS — E83.51 HYPOCALCEMIA: ICD-10-CM

## 2025-01-20 DIAGNOSIS — D84.9 IMMUNOCOMPROMISED STATE: ICD-10-CM

## 2025-01-20 DIAGNOSIS — T45.1X5A NEUROPATHY DUE TO CHEMOTHERAPEUTIC DRUG: ICD-10-CM

## 2025-01-20 DIAGNOSIS — G62.0 NEUROPATHY DUE TO CHEMOTHERAPEUTIC DRUG: ICD-10-CM

## 2025-01-20 DIAGNOSIS — D63.1 ANEMIA IN STAGE 3B CHRONIC KIDNEY DISEASE: ICD-10-CM

## 2025-01-20 LAB
ALBUMIN SERPL-MCNC: 3.6 G/DL (ref 3.5–5.2)
ALBUMIN/GLOB SERPL: 1.5 G/DL
ALP SERPL-CCNC: 57 U/L (ref 39–117)
ALT SERPL W P-5'-P-CCNC: 15 U/L (ref 1–41)
ANION GAP SERPL CALCULATED.3IONS-SCNC: 12.3 MMOL/L (ref 5–15)
AST SERPL-CCNC: 11 U/L (ref 1–40)
BASOPHILS # BLD AUTO: 0.05 10*3/MM3 (ref 0–0.2)
BASOPHILS NFR BLD AUTO: 1.3 % (ref 0–1.5)
BILIRUB SERPL-MCNC: 0.4 MG/DL (ref 0–1.2)
BUN SERPL-MCNC: 22 MG/DL (ref 8–23)
BUN/CREAT SERPL: 7.2 (ref 7–25)
CALCIUM SPEC-SCNC: 7.2 MG/DL (ref 8.6–10.5)
CHLORIDE SERPL-SCNC: 105 MMOL/L (ref 98–107)
CO2 SERPL-SCNC: 23.7 MMOL/L (ref 22–29)
CREAT SERPL-MCNC: 3.06 MG/DL (ref 0.76–1.27)
DEPRECATED RDW RBC AUTO: 54 FL (ref 37–54)
EGFRCR SERPLBLD CKD-EPI 2021: 21.6 ML/MIN/1.73
EOSINOPHIL # BLD AUTO: 0.14 10*3/MM3 (ref 0–0.4)
EOSINOPHIL NFR BLD AUTO: 3.6 % (ref 0.3–6.2)
ERYTHROCYTE [DISTWIDTH] IN BLOOD BY AUTOMATED COUNT: 15.9 % (ref 12.3–15.4)
GLOBULIN UR ELPH-MCNC: 2.4 GM/DL
GLUCOSE SERPL-MCNC: 240 MG/DL (ref 65–99)
HCT VFR BLD AUTO: 36 % (ref 37.5–51)
HGB BLD-MCNC: 11.1 G/DL (ref 13–17.7)
IMM GRANULOCYTES # BLD AUTO: 0.01 10*3/MM3 (ref 0–0.05)
IMM GRANULOCYTES NFR BLD AUTO: 0.3 % (ref 0–0.5)
LYMPHOCYTES # BLD AUTO: 0.89 10*3/MM3 (ref 0.7–3.1)
LYMPHOCYTES NFR BLD AUTO: 22.6 % (ref 19.6–45.3)
MAGNESIUM SERPL-MCNC: 1.5 MG/DL (ref 1.6–2.4)
MCH RBC QN AUTO: 28.6 PG (ref 26.6–33)
MCHC RBC AUTO-ENTMCNC: 30.8 G/DL (ref 31.5–35.7)
MCV RBC AUTO: 92.8 FL (ref 79–97)
MONOCYTES # BLD AUTO: 0.44 10*3/MM3 (ref 0.1–0.9)
MONOCYTES NFR BLD AUTO: 11.2 % (ref 5–12)
NEUTROPHILS NFR BLD AUTO: 2.4 10*3/MM3 (ref 1.7–7)
NEUTROPHILS NFR BLD AUTO: 61 % (ref 42.7–76)
NRBC BLD AUTO-RTO: 0 /100 WBC (ref 0–0.2)
PHOSPHATE SERPL-MCNC: 3.8 MG/DL (ref 2.5–4.5)
PLATELET # BLD AUTO: 195 10*3/MM3 (ref 140–450)
PMV BLD AUTO: 9.5 FL (ref 6–12)
POTASSIUM SERPL-SCNC: 4.1 MMOL/L (ref 3.5–5.2)
PROT SERPL-MCNC: 6 G/DL (ref 6–8.5)
RBC # BLD AUTO: 3.88 10*6/MM3 (ref 4.14–5.8)
SODIUM SERPL-SCNC: 141 MMOL/L (ref 136–145)
WBC NRBC COR # BLD AUTO: 3.93 10*3/MM3 (ref 3.4–10.8)

## 2025-01-20 PROCEDURE — 3077F SYST BP >= 140 MM HG: CPT | Performed by: INTERNAL MEDICINE

## 2025-01-20 PROCEDURE — 96365 THER/PROPH/DIAG IV INF INIT: CPT

## 2025-01-20 PROCEDURE — 84100 ASSAY OF PHOSPHORUS: CPT

## 2025-01-20 PROCEDURE — 1126F AMNT PAIN NOTED NONE PRSNT: CPT | Performed by: INTERNAL MEDICINE

## 2025-01-20 PROCEDURE — 96401 CHEMO ANTI-NEOPL SQ/IM: CPT

## 2025-01-20 PROCEDURE — 85025 COMPLETE CBC W/AUTO DIFF WBC: CPT

## 2025-01-20 PROCEDURE — 25010000002 DARATUMUMAB-HYALURONIDASE-FIHJ 1800-30000 MG-UT/15ML SOLUTION: Performed by: INTERNAL MEDICINE

## 2025-01-20 PROCEDURE — 99214 OFFICE O/P EST MOD 30 MIN: CPT | Performed by: INTERNAL MEDICINE

## 2025-01-20 PROCEDURE — 83735 ASSAY OF MAGNESIUM: CPT

## 2025-01-20 PROCEDURE — 1160F RVW MEDS BY RX/DR IN RCRD: CPT | Performed by: INTERNAL MEDICINE

## 2025-01-20 PROCEDURE — 36415 COLL VENOUS BLD VENIPUNCTURE: CPT

## 2025-01-20 PROCEDURE — 25010000002 MAGNESIUM SULFATE 2 GM/50ML SOLUTION: Performed by: INTERNAL MEDICINE

## 2025-01-20 PROCEDURE — 1159F MED LIST DOCD IN RCRD: CPT | Performed by: INTERNAL MEDICINE

## 2025-01-20 PROCEDURE — 80053 COMPREHEN METABOLIC PANEL: CPT

## 2025-01-20 PROCEDURE — 3080F DIAST BP >= 90 MM HG: CPT | Performed by: INTERNAL MEDICINE

## 2025-01-20 RX ORDER — HYDROCORTISONE SODIUM SUCCINATE 100 MG/2ML
100 INJECTION INTRAMUSCULAR; INTRAVENOUS AS NEEDED
OUTPATIENT
Start: 2025-02-17

## 2025-01-20 RX ORDER — FAMOTIDINE 10 MG/ML
20 INJECTION, SOLUTION INTRAVENOUS AS NEEDED
Status: CANCELLED | OUTPATIENT
Start: 2025-01-20

## 2025-01-20 RX ORDER — DIPHENHYDRAMINE HYDROCHLORIDE 50 MG/ML
50 INJECTION INTRAMUSCULAR; INTRAVENOUS AS NEEDED
OUTPATIENT
Start: 2025-03-17

## 2025-01-20 RX ORDER — MEPERIDINE HYDROCHLORIDE 25 MG/ML
12.5 INJECTION INTRAMUSCULAR; INTRAVENOUS; SUBCUTANEOUS
Status: CANCELLED | OUTPATIENT
Start: 2025-01-20

## 2025-01-20 RX ORDER — DIPHENHYDRAMINE HYDROCHLORIDE 50 MG/ML
50 INJECTION INTRAMUSCULAR; INTRAVENOUS AS NEEDED
Status: CANCELLED | OUTPATIENT
Start: 2025-01-20

## 2025-01-20 RX ORDER — MAGNESIUM SULFATE HEPTAHYDRATE 40 MG/ML
2 INJECTION, SOLUTION INTRAVENOUS ONCE
Status: COMPLETED | OUTPATIENT
Start: 2025-01-20 | End: 2025-01-20

## 2025-01-20 RX ORDER — FAMOTIDINE 10 MG/ML
20 INJECTION, SOLUTION INTRAVENOUS AS NEEDED
OUTPATIENT
Start: 2025-03-17

## 2025-01-20 RX ORDER — MEPERIDINE HYDROCHLORIDE 25 MG/ML
12.5 INJECTION INTRAMUSCULAR; INTRAVENOUS; SUBCUTANEOUS
OUTPATIENT
Start: 2025-03-17

## 2025-01-20 RX ORDER — HYDROCORTISONE SODIUM SUCCINATE 100 MG/2ML
100 INJECTION INTRAMUSCULAR; INTRAVENOUS AS NEEDED
OUTPATIENT
Start: 2025-03-17

## 2025-01-20 RX ORDER — HYDROCORTISONE SODIUM SUCCINATE 100 MG/2ML
100 INJECTION INTRAMUSCULAR; INTRAVENOUS AS NEEDED
Status: CANCELLED | OUTPATIENT
Start: 2025-01-20

## 2025-01-20 RX ORDER — FAMOTIDINE 10 MG/ML
20 INJECTION, SOLUTION INTRAVENOUS AS NEEDED
OUTPATIENT
Start: 2025-02-17

## 2025-01-20 RX ORDER — MEPERIDINE HYDROCHLORIDE 25 MG/ML
12.5 INJECTION INTRAMUSCULAR; INTRAVENOUS; SUBCUTANEOUS
OUTPATIENT
Start: 2025-02-17

## 2025-01-20 RX ORDER — DIPHENHYDRAMINE HYDROCHLORIDE 50 MG/ML
50 INJECTION INTRAMUSCULAR; INTRAVENOUS AS NEEDED
OUTPATIENT
Start: 2025-02-17

## 2025-01-20 RX ADMIN — MAGNESIUM SULFATE HEPTAHYDRATE 2 G: 40 INJECTION, SOLUTION INTRAVENOUS at 10:49

## 2025-01-20 RX ADMIN — DARATUMUMAB AND HYALURONIDASE-FIHJ (HUMAN RECOMBINANT) 1800 MG: 1800; 30000 INJECTION SUBCUTANEOUS at 11:04

## 2025-01-20 NOTE — NURSING NOTE
Xgeva was held due to calcium being low. Per Dr. Del Valle have pt take 2 tums daily. Pt verbalized understandings.

## 2025-01-20 NOTE — PROGRESS NOTES
Subjective     CHIEF COMPLAINT:      Chief Complaint   Patient presents with    Follow-up     HISTORY OF PRESENT ILLNESS:     Contreras Albert is a 69 y.o. male patient who returns today for follow up on his multiple myeloma.  He returns today for follow-up accompanied by his wife.  He reports that he had a problem not being able to refill his Bumex.  At the last visit with his nephrologist, he was advised to stop lisinopril due to the concern that it was worsening the kidney function.  Blood pressure increased since that time.  It is today at 197/100.  He has follow-up with nephrology coming soon.    Patient reports lower extremity swelling.  It has been gradually worsening over the past month and a half.  He noticed that he gained about 6 pounds over the same time period.    ROS:  Pertinent ROS is in the HPI.     Past medical, surgical, social and family history were reviewed.     MEDICATIONS:    Current Outpatient Medications:     Accu-Chek Guide Test test strip, USE TO CHECK BLOOD SUGAR THREE TIMES DAILY, Disp: 300 each, Rfl: 0    Accu-Chek Softclix Lancets lancets, Check 3 times a day on insulin tx, poor control, hypoglycemia. Dx: E 11.22, Disp: 300 each, Rfl: 3    acetaminophen (TYLENOL) 325 MG tablet, Take 2 tablets by mouth Every 6 (Six) Hours As Needed for Mild Pain. (Patient taking differently: Take 2 tablets by mouth Every 6 (Six) Hours As Needed for Mild Pain. As needed), Disp: , Rfl:     acyclovir (ZOVIRAX) 800 MG tablet, Take 1 tablet by mouth Daily., Disp: 90 tablet, Rfl: 2    amLODIPine (NORVASC) 2.5 MG tablet, Take 1 tablet by mouth Daily., Disp: , Rfl:     ASPIRIN 81 PO, Take 81 mg by mouth Daily., Disp: , Rfl:     atorvastatin (LIPITOR) 10 MG tablet, Take 1 tablet by mouth Every Night., Disp: 90 tablet, Rfl: 1    bumetanide (BUMEX) 2 MG tablet, Take 1 tablet by mouth Daily., Disp: , Rfl:     calcium carbonate (TUMS) 500 MG chewable tablet, Chew 4 tablets Daily., Disp: , Rfl:     Continuous Blood Gluc   (FreeStyle Jin 2 [place]) device, USE 1 FOR 1 DOSE, Disp: , Rfl:     Continuous Glucose Sensor (FreeStyle Jin 2 Sensor) misc, USE 1 EVERY 14 DAYS, Disp: 2 each, Rfl: 0    DARATUMUMAB IV, Infuse  into a venous catheter., Disp: , Rfl:     dexAMETHasone (DECADRON) 4 MG tablet, Take 3 tablets on day of chemo, 1 tablet on day #2 and 1 tablet on day #3 and repeat monthly., Disp: 15 tablet, Rfl: 1    diphenoxylate-atropine (LOMOTIL) 2.5-0.025 MG per tablet, Take 2 tablets by mouth 4 (Four) Times a Day As Needed for Diarrhea., Disp: 120 tablet, Rfl: 0    escitalopram (LEXAPRO) 5 MG tablet, TAKE 1 TABLET BY MOUTH EVERY DAY, Disp: 90 tablet, Rfl: 1    finasteride (PROSCAR) 5 MG tablet, Take 1 tablet by mouth Daily., Disp: , Rfl:     gabapentin (NEURONTIN) 300 MG capsule, Take 2 capsules by mouth 2 (Two) Times a Day., Disp: 120 capsule, Rfl: 3    glucagon (GLUCAGEN) 1 MG injection, Inject 1 mg into the appropriate muscle as directed by prescriber 1 (One) Time As Needed for Low Blood Sugar. Follow package directions for low blood sugar., Disp: 1 kit, Rfl: 10    icosapent ethyl (VASCEPA) 1 g capsule capsule, TAKE 2 CAPSULES BY MOUTH TWICE DAILY WITH MEALS, Disp: 360 capsule, Rfl: 1    Insulin Lispro, 1 Unit Dial, (HUMALOG) 100 UNIT/ML solution pen-injector, NONCHEMO DAYS: 10 USE FOR BREAKFAST AND LUNCH, 12 USE AT SUPPER. CHEMO DAYS: 15 UNITS 3X DAILY WITH MEALS, Disp: 54 mL, Rfl: 1    Insulin Pen Needle 32G X 4 MM misc, USING 4 PEN NEEDLES DAILY, Disp: 400 each, Rfl: 3    Lantus SoloStar 100 UNIT/ML injection pen, NON CHEMO DAYS TAKE 16 UNITS TWICE DAILY. CHEMO DAYS TAKE 30 UNITS TWICE DAILY FOR A MAX DOSE OF 70 UNITS DAILY, Disp: 63 mL, Rfl: 1    omega-3 acid ethyl esters (LOVAZA) 1 g capsule, TAKE 2 CAPSULES BY MOUTH TWICE DAILY, Disp: 360 capsule, Rfl: 1    ondansetron (ZOFRAN) 8 MG tablet, Take 1 tablet by mouth 3 (Three) Times a Day As Needed for Nausea or Vomiting., Disp: 30 tablet, Rfl: 5    Phospha 250  "Neutral 155-852-130 MG tablet, TAKE 1 TABLET BY MOUTH DAILY, Disp: 30 tablet, Rfl: 1    pomalidomide (POMALYST) 2 MG chemo capsule, Take 1 capsule by mouth Daily. Take for 21 days on, then 7 days off., Disp: 21 capsule, Rfl: 0    tamsulosin (FLOMAX) 0.4 MG capsule 24 hr capsule, Take 1 capsule by mouth Daily., Disp: , Rfl:     vitamin D (ERGOCALCIFEROL) 1.25 MG (51527 UT) capsule capsule, TAKE 1 CAPSULE BY MOUTH EVERY 7 DAYS, Disp: 13 capsule, Rfl: 1  Objective     VITAL SIGNS:     Vitals:    01/20/25 1001   BP: (!) 197/100   Pulse: 89   Resp: 17   Temp: 98.1 °F (36.7 °C)   TempSrc: Oral   SpO2: 96%   Weight: (!) 143 kg (314 lb 3.2 oz)   Height: 188 cm (74.02\")   PainSc: 0-No pain     Body mass index is 40.32 kg/m².     Wt Readings from Last 5 Encounters:   01/20/25 (!) 143 kg (314 lb 3.2 oz)   12/23/24 (!) 141 kg (311 lb 3.2 oz)   11/26/24 (!) 142 kg (312 lb 3.2 oz)   11/25/24 (!) 141 kg (310 lb 3.2 oz)   10/28/24 (!) 142 kg (313 lb)     PHYSICAL EXAMINATION:   GENERAL: The patient appears in fair general condition, not in acute distress.   SKIN: No Ecchymosis.  EYES: No jaundice. No Pallor.  CHEST: Normal respiratory effort. Normal breathing sounds bilaterally. No added sounds.  CVS: Normal S1 and S2. No Murmur.  ABDOMEN: Soft. No tenderness.   EXTREMITIES: Bilateral leg edema.    DIAGNOSTIC DATA:     Results from last 7 days   Lab Units 01/20/25  0938   WBC 10*3/mm3 3.93   NEUTROS ABS 10*3/mm3 2.40   HEMOGLOBIN g/dL 11.1*   HEMATOCRIT % 36.0*   PLATELETS 10*3/mm3 195     Results from last 7 days   Lab Units 01/20/25  0938   SODIUM mmol/L 141   POTASSIUM mmol/L 4.1   CHLORIDE mmol/L 105   CO2 mmol/L 23.7   BUN mg/dL 22   CREATININE mg/dL 3.06*   CALCIUM mg/dL 7.2*   ALBUMIN g/dL 3.6   BILIRUBIN mg/dL 0.4   ALK PHOS U/L 57   ALT (SGPT) U/L 15   AST (SGOT) U/L 11   GLUCOSE mg/dL 240*     Component      Latest Ref Rng 10/28/2024 11/25/2024 12/23/2024   IgG      603 - 1613 mg/dL 699  646  655    IgA      61 - 437 " mg/dL 79  73  82    IgM      20 - 172 mg/dL 32  23  23    Total Protein      6.0 - 8.5 g/dL 6.0  5.6 (L)  5.9 (L)    Albumin      2.9 - 4.4 g/dL 3.6  3.6  3.3    Alpha-1-Globulin      0.0 - 0.4 g/dL 0.2  0.2  0.2    Alpha-2-Globulin      0.4 - 1.0 g/dL 0.8  0.7  0.9    Beta Globulin      0.7 - 1.3 g/dL 0.8  0.7  0.9    Gamma Globulin      0.4 - 1.8 g/dL 0.6  0.5  0.6    M-Jurgen      Not Observed g/dL Comment:  Not Observed  Not Observed    Globulin      2.2 - 3.9 g/dL 2.4  2.0 (L)  2.6    A/G Ratio      0.7 - 1.7  1.6  1.9 (H)  1.3    Immunofixation Reflex, Serum Comment !  Comment  Comment    Please note Comment  Comment  Comment    Kappa FLC      3.3 - 19.4 mg/L 76.8 (H)  67.3 (H)  57.4 (H)    Free Lambda Light Chains      5.7 - 26.3 mg/L 56.5 (H)  51.9 (H)  45.5 (H)    Kappa/Lambda Ratio      0.26 - 1.65  1.36  1.30  1.26       Assessment & Plan    *IgG lambda multiple myeloma.  Bone marrow 5/23/2020 hypercellular marrow with 80% involvement of plasma cell myeloma; FISH studies pending  Bone survey 5/26/2020: Diffuse osteoporosis and demineralization.  However, no discrete lytic lesions.  SPEP 5/23/2020: M spike 5.1.  IgG 6629 lambda.  Light chain ratio 0 with free lambda light chains 6400.  Beta-2 microglobulin 16.3.  24-hour urine 12.7 g protein per 24-hour with monoclonal lambda free light chain 33.9%, monoclonal IgG lambda 23.5%  24-hour urine testing from 5/24/2020 free lambda light chains 8.4 g/L  Due to the renal failure, CyBorD regimen was chosen and was started on 5/28/2020.    On 6/18/2020, Velcade was changed to 1.5 mg/m² weekly continuously along with weekly Decadron and oral Cytoxan.  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   Treatment was changed on 8/27/2020 to to Daratumumab subcutaneous injection weekly along with Revlimid 10 mg daily for 21 out of 28-day cycle and Decadron 40 mg daily.  S/p stem cell transplant on 12/9/2020 at [facility].  Day 100 bone marrow on  3/16/2021 revealed minute  plasma cell population.  PET scan on 3/29/2021 was negative.  Patient enrolled in the  clinical trial randomizing to maintenance lenalidomide versus lenalidomide and daratumumab.  Patient was randomized to the Revlimid arm initiated 4/13/2021 dosed at 5 mg daily (due to renal function).  Patient developed neutropenia requiring dose adjustment to 5 mg daily for 21/28 days.  Patient withdrew from clinical trial after 5 cycles on 8/31/2021 due to worsening diarrhea.    He was switched to Velcade maintenance every other week 9/14/2021 which he discontinued 10/26/2021 due to neuropathy and lower extremity edema.    He was subsequently changed to pomalidomide 2 mg daily 21/28 days on 11/8/2021.    1 year post transplant, bone marrow biopsy showed 5-7% plasma cells and due to the residual disease he was started on DPd (daratumumab, pomalidomide, dexamethasone) on 2/21/2022.    Patient was seen by Dr. Romero on 9/12/2022.  At that time patient was cycle 8-day 7 DPd. Due to chronic side effects, dexamethasone was changed from weekly to monthly.    Patient did continue pomalidomide on his own during admission to the hospital, received 1 dose 4 mg p.o. on 9/24/2022 before was subsequently discontinued due to cytopenias.   Dexamethasone was changed to 12 mg on Mondays, 4 mg on Tuesday and 4 mg on Wednesday.  Pomalyst dose was reduced to 3 mg daily for 21 out of a 28-day cycle starting on 10/31/2022.  Due to neutropenia, the dose of Pomalyst was subsequently reduced to 2 mg daily for 21/28-day cycle.  9/5/2023: Treatment held due to weakness and postural dizziness.  9/5/2023: No M protein.  Kappa FLC 49.6.  Lambda FLC 32.4.  Kappa to lambda ratio 1.53.  He did not notice improvement in his symptoms while off Darzalex and Pomalyst.  Therefore, his symptoms were not attributed to treatment.  He was restarted on Darzalex and Pomalyst on 10/2/2023.  9/20/2024: No M protein.  Kappa FLC 58.1.   Lambda FLC 40.0.    Kappa/lambda ratio 1.45.  12/23/2024: No M protein. Kappa FLC 57.4. Lambda FLC 45.5.  Kappa/Lambda ratio 1.26.  He is tolerating treatment except for diarrhea.    The diarrhea does not improve when he is off Pomalyst.    *Bone health.  He isgood on Xgeva.   Patient developed severe hypocalcemia following the Xgeva injection.  He required inpatient hospital stay.  Given the duration of Xgeva injections, recommended changing treatment to every 3 months starting December 2022.   Due to hypomagnesemia, Xgeva was held on 3/18/2024 but was given 4/15/2024.  Last dose of Xgeva was on 10/28/2024.  He is due for Xgeva today but it will be held due to hypocalcemia.    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  Patient was receiving Procrit at [facility].  Records from [facility] showed that the patient was receiving 70,000 units weekly and received the last dose on 9/27/2022.  Patient was given Procrit 70,000 units on 9/27/2022.  Hemoglobin was 9.9 on 10/17/2022.  He was given Procrit 60,000 units.  Hemoglobin was 9.8 on 11/4/2022.  He was given Procrit 60,000 units.  Labs in March 2023 revealed adequate iron stores.  Last dose of Procrit was 25,000 units on 5/1/2023 for hemoglobin of 9.9.  5/13/2024: Hemoglobin 11.2.  6/10/2024: Hemoglobin decreased to 9.9-?  Secondary to sinus infection.  We discussed starting back on Procrit.  However, hemoglobin improved subsequently.  1/20/2025: Hemoglobin 11.1.  He does not require Procrit.     *Neutropenia.  This was attributed to Pomalyst.  Neutrophil count was down to 290 on 9/25/2022.  He was given Neupogen during his hospital stay in September 2022.  Neutrophil count improved afterwards.  However, neutrophil count decreased to 600 on 12/19/2022.  He reports that he was feeling tired and achy at that time.  Neutrophil count was 770 on 1/16/2023.  Due to the recurrent neutropenia, the dose  of Pomalyst was reduced to 2 mg in January 2023.  Neutrophil count improved following the dose reduction.  1/20/2025: Neutrophils 2400.    *Hypocalcemia.  Patient developed severe hypocalcemia due to Xgeva.  Calcium was 5.9 on 9/23/2022.  Patient was placed on calcium replacement.  Calcium dose was subsequently increased to 3 tablets daily.  8/7/2023: Calcium 8.1. Albumin 3.6.  The dose was increased to 4 tablets daily.  10/28/2024: Calcium 9.7.  1/20/2025: Calcium decreased to 7.2.  Albumin 3.6.    *Hypomagnesemia.  Magnesium was 1.1 on 10/24/2022.  He was placed on oral magnesium oxide 400 mg twice daily.  He developed diarrhea and it was stopped.  He was started on magnesium chloride on 1/23/2023.  He developed diarrhea about 7 days after starting the magnesium chloride.  Oral magnesium chloride was discontinued.  He receives IV magnesium per protocol based on his magnesium level.  12/23/2024: Magnesium 1.5. S/P 2 gm IV magnesium.  1/20/2025: Magnesium 1.5.     *Prophylaxis.  He is on acyclovir 800 mg daily.  He is on ASA 81 mg daily.  He did not have any recent infection.    *Episodes of dizziness and falling.  Patient reports that the episodes develop after he stands up and starts walking.  No dizziness while sitting.  No restricted chest pain or shortness of breath.  Blood pressure was 133/80 and heart rate was 66 in the sitting position today.  After standing, blood pressure was 119/76 and heart rate was 69.  No improvement in the symptoms after holding Pomalyst for 2 weeks.  MRI brain on 7/26/2023 showed small vessel ischemic changes.  There were changes of 3 old strokes.  I explained the findings to the patient.  I recommended evaluation by cardiology and neurology.  I also recommended home physical therapy  I recommended that he uses a walker when he ambulates.  His evaluation showed possible changes of postural hypotension.   He is concerned that this is due to his medications. I explained that while  postural hypotension is a side effect of medications, it is not a common side effect of Darzalex or Pomalyst.  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  Symptoms did not improve after placing Darzalex and Pomalyst on hold.  On 10/2/2023, we recommended reducing Bumex to 1 mg daily and monitoring his symptoms and the leg swelling.  He did not notice a difference in his postural dizziness after the dose reduction.   Bumex was increased back to 2 mg daily due to worsening of his edema.   This dose is controlling his leg edema.    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  It is controlled with Lexapro 5 mg daily.    *Vitamin D deficiency.  He is on vitamin D 50,000 units weekly.  Vitamin D level was 18 on 10/3/2022.  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  He is on gabapentin 300 mg in the morning and 600 mg in the evening.  Neuropathy symptoms in the feet are better as the swelling improved.     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  S/p right partial nephrectomy on 5/18/2016.    PLAN:    1.  We will give Darzalex Faspro today.  2.  Start new cycle of Pomalyst 2 mg daily for 21 days followed by 7 days off.  3.  Continue dexamethasone 20 mg monthly.  4.  We will give IV magnesium 2 g today.   5.  Due to the hypocalcemia, we will hold Xgeva today.  6.  Patient was advised to take Tums 2 tablets daily.    7.  Continue gabapentin 600 mg twice daily.  8.  Continue Neutra-Phos 1 tablet daily.  9.  Continue acyclovir 800 mg daily.  10.  Continue aspirin 81 mg daily.  11.  He will return in 4 weeks for the next dose of Darzalex.  CBC CMP magnesium phosphorus and SPEP G[place] FLC will be obtained.  Xgeva will be given if electrolytes improved.  12.  I will see him in follow-up in 8 weeks.  He will be scheduled for Darzalex.      Red Del Valle MD  01/20/25

## 2025-01-21 ENCOUNTER — SPECIALTY PHARMACY (OUTPATIENT)
Dept: PHARMACY | Facility: HOSPITAL | Age: 70
End: 2025-01-21
Payer: MEDICARE

## 2025-01-21 NOTE — PROGRESS NOTES
Specialty Pharmacy Note: Pomalyst (pomalidomide)    Contreras Albert is a 69 y.o. male with multiple myeloma was seen 1/20/25 by Dr. Del Valle. Per provider dictation, no changes to oral oncology regimen  Pomalyst 2 mg daily for 21 days followed by 7 days off. .  Labs Review: The CMP and CBC from 1/20/25 have been reviewed. No dose adjustments are needed for the oral specialty medication(s) based on the labs.    Specialty pharmacy will continue to follow patient.    Carlos Clarke PharmD, [facility]  Clinical Oncology Pharmacist    1/21/2025  08:09 EST

## 2025-01-23 LAB
ALBUMIN SERPL ELPH-MCNC: 3.2 G/DL (ref 2.9–4.4)
ALBUMIN/GLOB SERPL: 1.3 {RATIO} (ref 0.7–1.7)
ALPHA1 GLOB SERPL ELPH-MCNC: 0.3 G/DL (ref 0–0.4)
ALPHA2 GLOB SERPL ELPH-MCNC: 0.9 G/DL (ref 0.4–1)
B-GLOBULIN SERPL ELPH-MCNC: 0.9 G/DL (ref 0.7–1.3)
GAMMA GLOB SERPL ELPH-MCNC: 0.6 G/DL (ref 0.4–1.8)
GLOBULIN SER-MCNC: 2.6 G/DL (ref 2.2–3.9)
IGA SERPL-MCNC: 80 MG/DL (ref 61–437)
IGG SERPL-MCNC: 632 MG/DL (ref 603–1613)
IGM SERPL-MCNC: 28 MG/DL (ref 20–172)
INTERPRETATION SERPL IEP-IMP: ABNORMAL
KAPPA LC FREE SER-MCNC: 66.2 MG/L (ref 3.3–19.4)
KAPPA LC FREE/LAMBDA FREE SER: 1.29 {RATIO} (ref 0.26–1.65)
LABORATORY COMMENT REPORT: ABNORMAL
LAMBDA LC FREE SERPL-MCNC: 51.5 MG/L (ref 5.7–26.3)
M PROTEIN SERPL ELPH-MCNC: ABNORMAL G/DL
PROT SERPL-MCNC: 5.8 G/DL (ref 6–8.5)

## 2025-01-28 NOTE — PROGRESS NOTES
Specialty Pharmacy Patient Management Program  Per Protocol Prescription Order or Refill     Patient will be filling or currently fills medications at [facility] and is enrolled in the Patient Management Program.    Requested Prescriptions     Signed Prescriptions Disp Refills    pomalidomide (POMALYST) 2 MG chemo capsule 21 capsule 0     Sig: Take 1 capsule by mouth Daily. Take for 21 days on, then 7 days off.     Prescription orders above were sent to the pharmacy per Collaborative Care Agreement Protocol.     Last Office Visit: 1/20/25  Next Office Visit: 3/17/25    Pia Cano Rph, BCOP  Clinical Specialty Pharmacist, Oncology  1/28/2025  11:33 EST

## 2025-01-28 NOTE — PHARMACY RECOMMENDATION
Specialty Pharmacy Patient Management Program  Per Protocol Prescription Order or Refill       Requested Prescriptions     Signed Prescriptions Disp Refills    pomalidomide (POMALYST) 2 MG chemo capsule 21 capsule 0     Sig: Take 1 capsule by mouth Daily. Take for 21 days on, then 7 days off.     Prescription orders above were sent to \[facility]\"" [facility] per Collaborative Care Agreement Protocol.     Completed independent double check on medication order/RX.    Carlos Clarke PharmD, BCOP  Clinical Specialty Pharmacist, Oncology  1/28/2025  11:43 EST

## 2025-01-29 ENCOUNTER — OFFICE VISIT (OUTPATIENT)
Dept: ENDOCRINOLOGY | Age: 70
End: 2025-01-29
Payer: MEDICARE

## 2025-01-29 VITALS
SYSTOLIC BLOOD PRESSURE: 140 MMHG | HEIGHT: 74 IN | HEART RATE: 79 BPM | OXYGEN SATURATION: 97 % | BODY MASS INDEX: 40.43 KG/M2 | DIASTOLIC BLOOD PRESSURE: 84 MMHG | WEIGHT: 315 LBS | TEMPERATURE: 98.2 F

## 2025-01-29 DIAGNOSIS — E78.5 HYPERLIPIDEMIA, UNSPECIFIED HYPERLIPIDEMIA TYPE: ICD-10-CM

## 2025-01-29 DIAGNOSIS — T45.1X5A PERIPHERAL NEUROPATHY DUE TO CHEMOTHERAPY: ICD-10-CM

## 2025-01-29 DIAGNOSIS — Z79.4 TYPE 2 DIABETES MELLITUS WITH STAGE 4 CHRONIC KIDNEY DISEASE, WITH LONG-TERM CURRENT USE OF INSULIN: Primary | ICD-10-CM

## 2025-01-29 DIAGNOSIS — N18.4 TYPE 2 DIABETES MELLITUS WITH STAGE 4 CHRONIC KIDNEY DISEASE, WITH LONG-TERM CURRENT USE OF INSULIN: Primary | ICD-10-CM

## 2025-01-29 DIAGNOSIS — I10 PRIMARY HYPERTENSION: ICD-10-CM

## 2025-01-29 DIAGNOSIS — E11.22 TYPE 2 DIABETES MELLITUS WITH STAGE 4 CHRONIC KIDNEY DISEASE, WITH LONG-TERM CURRENT USE OF INSULIN: Primary | ICD-10-CM

## 2025-01-29 DIAGNOSIS — G62.0 PERIPHERAL NEUROPATHY DUE TO CHEMOTHERAPY: ICD-10-CM

## 2025-01-29 DIAGNOSIS — Z85.528 HISTORY OF KIDNEY CANCER: ICD-10-CM

## 2025-01-29 DIAGNOSIS — E78.2 MIXED HYPERLIPIDEMIA: ICD-10-CM

## 2025-01-29 DIAGNOSIS — Z90.5 HISTORY OF NEPHRECTOMY: ICD-10-CM

## 2025-01-29 DIAGNOSIS — Z85.79 HISTORY OF MULTIPLE MYELOMA: ICD-10-CM

## 2025-01-29 RX ORDER — ATORVASTATIN CALCIUM 10 MG/1
10 TABLET, FILM COATED ORAL NIGHTLY
Qty: 90 TABLET | Refills: 2 | Status: SHIPPED | OUTPATIENT
Start: 2025-01-29

## 2025-01-29 NOTE — LETTER
January 29, 2025     IZZY Montes  [address]  [facility] [place] [address]    Patient: Contreras Albert   YOB: 1955   Date of Visit: 1/29/2025     Dear IZZY Montes:       Thank you for referring Contreras Albert to me for evaluation. Below are the relevant portions of my assessment and plan of care.    If you have questions, please do not hesitate to call me. I look forward to following Contreras along with you.         Sincerely,        Cisco Haro MD        CC: MD Nabil Booth DPM Henry Nicholas Meiers, MD Khalid Z. Ghosheh, MD Yson, Cisco PUTNAM MD  01/29/25 1508  Sign when Signing Visit  Subjective  Contreras Albert is a 69 y.o. male.     History of Present Illness     He has known diabetes mellitus since 2001 and started on insulin in 2009.  He takes dexamethasone 4 mg 3 tablets on the day of chemo every fourth Monday.  His last chemotherapy day was January 20, 2025.  On off chemotherapy days:  Lantus 20 units every morning and 20 units every evening and NovoLog 30 to 35 units with each meal +1 unit per 50 greater than 150.  On chemotherapy days and day after: Lantus 35 units twice daily and on the morning after.  NovoLog 35-40 units with each meal +2 units per 50 greater than 150.  He has gained 14 pounds since June 2024.     Sensor data from January 16, 2025 to January 29, 2025 reviewed.  Average glucose 147 mg per DL.  GMI 6.8%.  Time in target 79%.  Time above target 20%.  Time below target 1%.    He has no early morning hypoglycemia.  Fasting glucose are in the 90s or higher.  Blood sugar tends to run in the 206 on the day of chemotherapy.    His last eye examination was done by Dr. De La Cruz in 1/24 .  He has retinopathy and had previous intraocular eye injections on both eyes and retinal laser therapy.  He has chronic kidney disease with albuminuria and has been following Dr. Norm Hutchison.  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  He  "is on gabapentin 200 mg/day prescribed by his oncologist Dr. Del Valle.  He was having diarrhea when he was taking a higher dose of gabapentin.     He has hypertension and is off carvedilol 6.25 mg BID and Bumex 2 mg/day.  He was taken off amlodipine because of pedal edema.  He has no history of heart attack or stroke.  He denies chest pain or shortness of breath.     He has hyperlipidemia and is on atorvastatin 10 mg/day and Vascepa 2 grams BID.  His last meal at 12 PM     He has multiple myeloma and had chemotherapy followed by stem cell transplant.  He is on Darzalex, dexamethasone and Pomalyst.  He is on Xgeva every 3 months, Tums 3-4 tabs/day, and Procrit as needed.     He had a previous right partial nephrectomy for cancer.  He has no known recurrence.     He has chronic diarrhea and takes Lomotil and Imodium as needed.  He has history of C. difficile colitis which was treated.  He denies melena or hematochezia.  He has never had a colonoscopy.     The following portions of the patient's history were reviewed and updated as appropriate: allergies, current medications, past family history, past medical history, past social history, past surgical history, and problem list.    Review of Systems   Eyes:  Negative for visual disturbance.   Respiratory:  Negative for shortness of breath and wheezing.    Cardiovascular:  Negative for chest pain and palpitations.   Gastrointestinal:  Positive for diarrhea. Negative for anal bleeding, blood in stool and constipation.   Genitourinary: Negative.    Musculoskeletal:  Negative for myalgias.   Neurological:  Negative for numbness.     Vitals:    01/29/25 1407   BP: 140/84   Pulse: 79   Temp: 98.2 °F (36.8 °C)   TempSrc: Oral   SpO2: 97%   Weight: (!) 143 kg (316 lb 3.2 oz)   Height: 188 cm (74.02\")      Objective  Physical Exam  Constitutional:       Appearance: Normal appearance.   Eyes:      General: No scleral icterus.        Right eye: No discharge.         Left eye: No " discharge.   Cardiovascular:      Rate and Rhythm: Normal rate and regular rhythm.   Pulmonary:      Breath sounds: Normal breath sounds. No rales.   Chest:      Chest wall: No tenderness.   Abdominal:      General: Bowel sounds are normal.      Palpations: Abdomen is soft.      Tenderness: There is no right CVA tenderness or left CVA tenderness.   Musculoskeletal:      Comments: Feet warm.  No cyanosis or clubbing.  No plantar ulcers.  Chronic venous stasis changes in the distal lower extremities.   Neurological:      Mental Status: He is alert and oriented to person, place, and time.      Comments: Decreased light touch in both forefeet.       Lab on 01/20/2025   Component Date Value Ref Range Status   • Glucose 01/20/2025 240 (H)  65 - 99 mg/dL Final   • BUN 01/20/2025 22  8 - 23 mg/dL Final   • Creatinine 01/20/2025 3.06 (C)  0.76 - 1.27 mg/dL Final   • Sodium 01/20/2025 141  136 - 145 mmol/L Final   • Potassium 01/20/2025 4.1  3.5 - 5.2 mmol/L Final   • Chloride 01/20/2025 105  98 - 107 mmol/L Final   • CO2 01/20/2025 23.7  22.0 - 29.0 mmol/L Final   • Calcium 01/20/2025 7.2 (L)  8.6 - 10.5 mg/dL Final   • Total Protein 01/20/2025 6.0  6.0 - 8.5 g/dL Final   • Albumin 01/20/2025 3.6  3.5 - 5.2 g/dL Final   • ALT (SGPT) 01/20/2025 15  1 - 41 U/L Final   • AST (SGOT) 01/20/2025 11  1 - 40 U/L Final   • Alkaline Phosphatase 01/20/2025 57  39 - 117 U/L Final   • Total Bilirubin 01/20/2025 0.4  0.0 - 1.2 mg/dL Final   • Globulin 01/20/2025 2.4  gm/dL Final   • A/G Ratio 01/20/2025 1.5  g/dL Final   • BUN/Creatinine Ratio 01/20/2025 7.2  7.0 - 25.0 Final   • Anion Gap 01/20/2025 12.3  5.0 - 15.0 mmol/L Final   • eGFR 01/20/2025 21.6 (L)  >60.0 mL/min/1.73 Final   • Magnesium 01/20/2025 1.5 (C)  1.6 - 2.4 mg/dL Final   • Phosphorus 01/20/2025 3.8  2.5 - 4.5 mg/dL Final   • IgG 01/20/2025 632  603 - 1613 mg/dL Final   • IgA 01/20/2025 80  61 - 437 mg/dL Final   • IgM 01/20/2025 28  20 - 172 mg/dL Final   • Total  Protein 01/20/2025 5.8 (L)  6.0 - 8.5 g/dL Final   • Albumin 01/20/2025 3.2  2.9 - 4.4 g/dL Final   • Alpha-1-Globulin 01/20/2025 0.3  0.0 - 0.4 g/dL Final   • Alpha-2-Globulin 01/20/2025 0.9  0.4 - 1.0 g/dL Final   • Beta Globulin 01/20/2025 0.9  0.7 - 1.3 g/dL Final   • Gamma Globulin 01/20/2025 0.6  0.4 - 1.8 g/dL Final   • M-Jurgen 01/20/2025 Not Observed  Not Observed g/dL Final   • Globulin 01/20/2025 2.6  2.2 - 3.9 g/dL Final   • A/G Ratio 01/20/2025 1.3  0.7 - 1.7 Final   • Immunofixation Reflex, Serum 01/20/2025 Comment:   Final    Presence of monoclonal protein is unclear at this time. Suggest  repeat in 3 to 6 months if clinically indicated.   • Please note 01/20/2025 Comment   Final    Protein electrophoresis scan will follow via computer, mail, or   delivery.   • Free Light Chain, [place] 01/20/2025 66.2 (H)  3.3 - 19.4 mg/L Final   • Free Lambda Light Chains 01/20/2025 51.5 (H)  5.7 - 26.3 mg/L Final   • Kappa/Lambda Ratio 01/20/2025 1.29  0.26 - 1.65 Final   • WBC 01/20/2025 3.93  3.40 - 10.80 10*3/mm3 Final   • RBC 01/20/2025 3.88 (L)  4.14 - 5.80 10*6/mm3 Final   • Hemoglobin 01/20/2025 11.1 (L)  13.0 - 17.7 g/dL Final   • Hematocrit 01/20/2025 36.0 (L)  37.5 - 51.0 % Final   • MCV 01/20/2025 92.8  79.0 - 97.0 fL Final   • MCH 01/20/2025 28.6  26.6 - 33.0 pg Final   • MCHC 01/20/2025 30.8 (L)  31.5 - 35.7 g/dL Final   • RDW 01/20/2025 15.9 (H)  12.3 - 15.4 % Final   • RDW-SD 01/20/2025 54.0  37.0 - 54.0 fl Final   • MPV 01/20/2025 9.5  6.0 - 12.0 fL Final   • Platelets 01/20/2025 195  140 - 450 10*3/mm3 Final   • Neutrophil % 01/20/2025 61.0  42.7 - 76.0 % Final   • Lymphocyte % 01/20/2025 22.6  19.6 - 45.3 % Final   • Monocyte % 01/20/2025 11.2  5.0 - 12.0 % Final   • Eosinophil % 01/20/2025 3.6  0.3 - 6.2 % Final   • Basophil % 01/20/2025 1.3  0.0 - 1.5 % Final   • Immature Grans % 01/20/2025 0.3  0.0 - 0.5 % Final   • Neutrophils, Absolute 01/20/2025 2.40  1.70 - 7.00 10*3/mm3 Final   •  Lymphocytes, Absolute 01/20/2025 0.89  0.70 - 3.10 10*3/mm3 Final   • Monocytes, Absolute 01/20/2025 0.44  0.10 - 0.90 10*3/mm3 Final   • Eosinophils, Absolute 01/20/2025 0.14  0.00 - 0.40 10*3/mm3 Final   • Basophils, Absolute 01/20/2025 0.05  0.00 - 0.20 10*3/mm3 Final   • Immature Grans, Absolute 01/20/2025 0.01  0.00 - 0.05 10*3/mm3 Final   • nRBC 01/20/2025 0.0  0.0 - 0.2 /100 WBC Final     Assessment & Plan  Diagnoses and all orders for this visit:    1. Type 2 diabetes mellitus with stage 4 chronic kidney disease, with long-term current use of insulin (Primary)  -     Comprehensive Metabolic Panel  -     Hemoglobin A1c  -     TSH Rfx On Abnormal To Free T4  -     Fructosamine  -     Comprehensive Metabolic Panel  -     Hemoglobin A1c  -     Lipid Panel  -     TSH Rfx On Abnormal To Free T4  -     Vitamin B12  -     Fructosamine    2. Peripheral neuropathy due to chemotherapy  -     TSH Rfx On Abnormal To Free T4  -     Vitamin B12  -     Vitamin B12    3. Primary hypertension  -     Comprehensive Metabolic Panel  -     Comprehensive Metabolic Panel    4. Hyperlipidemia, unspecified hyperlipidemia type  -     Lipid Panel  -     Lipid Panel  -     TSH Rfx On Abnormal To Free T4    5. History of multiple myeloma    6. History of kidney cancer    7. History of nephrectomy      Continue Lantus and NovoLog.  Will defer decision on gabapentin to Dr. Del Valle.  Follow-up with Dr. Norm Hutchison as scheduled.    BP checks at home.  Will defer blood pressure management to Dr. Hutchison.    Continue atorvastatin 10 mg/day and Vascepa 2 g twice a day.    Follow-up with Dr. Del Valle and Dr. Garcia as scheduled.    RTC 4 mos. With ETELVINA Haskins NP    Copy of my note sent to Brianna Thompson NP, Dr. Del Valle, Dr. Norm Hutchison, and Dr. Garcia.

## 2025-01-29 NOTE — PROGRESS NOTES
Subjective   Contreras Albert is a 69 y.o. male.     History of Present Illness     He has known diabetes mellitus since 2001 and started on insulin in 2009.  He takes dexamethasone 4 mg 3 tablets on the day of chemo every fourth Monday.  His last chemotherapy day was January 20, 2025.  On off chemotherapy days:  Lantus 20 units every morning and 20 units every evening and NovoLog 30 to 35 units with each meal +1 unit per 50 greater than 150.  On chemotherapy days and day after: Lantus 35 units twice daily and on the morning after.  NovoLog 35-40 units with each meal +2 units per 50 greater than 150.  He has gained 14 pounds since June 2024.     Sensor data from January 16, 2025 to January 29, 2025 reviewed.  Average glucose 147 mg per DL.  GMI 6.8%.  Time in target 79%.  Time above target 20%.  Time below target 1%.    He has no early morning hypoglycemia.  Fasting glucose are in the 90s or higher.  Blood sugar tends to run in the 206 on the day of chemotherapy.    His last eye examination was done by Dr. De La Cruz in 1/24 .  He has retinopathy and had previous intraocular eye injections on both eyes and retinal laser therapy.  He has chronic kidney disease with albuminuria and has been following Dr. Norm Hutchison.  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  He is on gabapentin 200 mg/day prescribed by his oncologist Dr. Del Valle.  He was having diarrhea when he was taking a higher dose of gabapentin.     He has hypertension and is off carvedilol 6.25 mg BID and Bumex 2 mg/day.  He was taken off amlodipine because of pedal edema.  He has no history of heart attack or stroke.  He denies chest pain or shortness of breath.     He has hyperlipidemia and is on atorvastatin 10 mg/day and Vascepa 2 grams BID.  His last meal at 12 PM     He has multiple myeloma and had chemotherapy followed by stem cell transplant.  He is on Darzalex, dexamethasone and Pomalyst.  He is on Xgeva every 3 months, Tums 3-4  "tabs/day, and Procrit as needed.     He had a previous right partial nephrectomy for cancer.  He has no known recurrence.     He has chronic diarrhea and takes Lomotil and Imodium as needed.  He has history of C. difficile colitis which was treated.  He denies melena or hematochezia.  He has never had a colonoscopy.     The following portions of the patient's history were reviewed and updated as appropriate: allergies, current medications, past family history, past medical history, past social history, past surgical history, and problem list.    Review of Systems   Eyes:  Negative for visual disturbance.   Respiratory:  Negative for shortness of breath and wheezing.    Cardiovascular:  Negative for chest pain and palpitations.   Gastrointestinal:  Positive for diarrhea. Negative for anal bleeding, blood in stool and constipation.   Genitourinary: Negative.    Musculoskeletal:  Negative for myalgias.   Neurological:  Negative for numbness.     Vitals:    01/29/25 1407   BP: 140/84   Pulse: 79   Temp: 98.2 °F (36.8 °C)   TempSrc: Oral   SpO2: 97%   Weight: (!) 143 kg (316 lb 3.2 oz)   Height: 188 cm (74.02\")      Objective   Physical Exam  Constitutional:       Appearance: Normal appearance.   Eyes:      General: No scleral icterus.        Right eye: No discharge.         Left eye: No discharge.   Cardiovascular:      Rate and Rhythm: Normal rate and regular rhythm.   Pulmonary:      Breath sounds: Normal breath sounds. No rales.   Chest:      Chest wall: No tenderness.   Abdominal:      General: Bowel sounds are normal.      Palpations: Abdomen is soft.      Tenderness: There is no right CVA tenderness or left CVA tenderness.   Musculoskeletal:      Comments: Feet warm.  No cyanosis or clubbing.  No plantar ulcers.  Chronic venous stasis changes in the distal lower extremities.   Neurological:      Mental Status: He is alert and oriented to person, place, and time.      Comments: Decreased light touch in both " kyliefeet.       Lab on 01/20/2025   Component Date Value Ref Range Status    Glucose 01/20/2025 240 (H)  65 - 99 mg/dL Final    BUN 01/20/2025 22  8 - 23 mg/dL Final    Creatinine 01/20/2025 3.06 (C)  0.76 - 1.27 mg/dL Final    Sodium 01/20/2025 141  136 - 145 mmol/L Final    Potassium 01/20/2025 4.1  3.5 - 5.2 mmol/L Final    Chloride 01/20/2025 105  98 - 107 mmol/L Final    CO2 01/20/2025 23.7  22.0 - 29.0 mmol/L Final    Calcium 01/20/2025 7.2 (L)  8.6 - 10.5 mg/dL Final    Total Protein 01/20/2025 6.0  6.0 - 8.5 g/dL Final    Albumin 01/20/2025 3.6  3.5 - 5.2 g/dL Final    ALT (SGPT) 01/20/2025 15  1 - 41 U/L Final    AST (SGOT) 01/20/2025 11  1 - 40 U/L Final    Alkaline Phosphatase 01/20/2025 57  39 - 117 U/L Final    Total Bilirubin 01/20/2025 0.4  0.0 - 1.2 mg/dL Final    Globulin 01/20/2025 2.4  gm/dL Final    A/G Ratio 01/20/2025 1.5  g/dL Final    BUN/Creatinine Ratio 01/20/2025 7.2  7.0 - 25.0 Final    Anion Gap 01/20/2025 12.3  5.0 - 15.0 mmol/L Final    eGFR 01/20/2025 21.6 (L)  >60.0 mL/min/1.73 Final    Magnesium 01/20/2025 1.5 (C)  1.6 - 2.4 mg/dL Final    Phosphorus 01/20/2025 3.8  2.5 - 4.5 mg/dL Final    IgG 01/20/2025 632  603 - 1613 mg/dL Final    IgA 01/20/2025 80  61 - 437 mg/dL Final    IgM 01/20/2025 28  20 - 172 mg/dL Final    Total Protein 01/20/2025 5.8 (L)  6.0 - 8.5 g/dL Final    Albumin 01/20/2025 3.2  2.9 - 4.4 g/dL Final    Alpha-1-Globulin 01/20/2025 0.3  0.0 - 0.4 g/dL Final    Alpha-2-Globulin 01/20/2025 0.9  0.4 - 1.0 g/dL Final    Beta Globulin 01/20/2025 0.9  0.7 - 1.3 g/dL Final    Gamma Globulin 01/20/2025 0.6  0.4 - 1.8 g/dL Final    M-Jurgen 01/20/2025 Not Observed  Not Observed g/dL Final    Globulin 01/20/2025 2.6  2.2 - 3.9 g/dL Final    A/G Ratio 01/20/2025 1.3  0.7 - 1.7 Final    Immunofixation Reflex, Serum 01/20/2025 Comment:   Final    Presence of monoclonal protein is unclear at this time. Suggest  repeat in 3 to 6 months if clinically indicated.    Please note  01/20/2025 Comment   Final    Protein electrophoresis scan will follow via computer, mail, or   delivery.    Free Light Chain, [place] 01/20/2025 66.2 (H)  3.3 - 19.4 mg/L Final    Free Lambda Light Chains 01/20/2025 51.5 (H)  5.7 - 26.3 mg/L Final    Kappa/Lambda Ratio 01/20/2025 1.29  0.26 - 1.65 Final    WBC 01/20/2025 3.93  3.40 - 10.80 10*3/mm3 Final    RBC 01/20/2025 3.88 (L)  4.14 - 5.80 10*6/mm3 Final    Hemoglobin 01/20/2025 11.1 (L)  13.0 - 17.7 g/dL Final    Hematocrit 01/20/2025 36.0 (L)  37.5 - 51.0 % Final    MCV 01/20/2025 92.8  79.0 - 97.0 fL Final    MCH 01/20/2025 28.6  26.6 - 33.0 pg Final    MCHC 01/20/2025 30.8 (L)  31.5 - 35.7 g/dL Final    RDW 01/20/2025 15.9 (H)  12.3 - 15.4 % Final    RDW-SD 01/20/2025 54.0  37.0 - 54.0 fl Final    MPV 01/20/2025 9.5  6.0 - 12.0 fL Final    Platelets 01/20/2025 195  140 - 450 10*3/mm3 Final    Neutrophil % 01/20/2025 61.0  42.7 - 76.0 % Final    Lymphocyte % 01/20/2025 22.6  19.6 - 45.3 % Final    Monocyte % 01/20/2025 11.2  5.0 - 12.0 % Final    Eosinophil % 01/20/2025 3.6  0.3 - 6.2 % Final    Basophil % 01/20/2025 1.3  0.0 - 1.5 % Final    Immature Grans % 01/20/2025 0.3  0.0 - 0.5 % Final    Neutrophils, Absolute 01/20/2025 2.40  1.70 - 7.00 10*3/mm3 Final    Lymphocytes, Absolute 01/20/2025 0.89  0.70 - 3.10 10*3/mm3 Final    Monocytes, Absolute 01/20/2025 0.44  0.10 - 0.90 10*3/mm3 Final    Eosinophils, Absolute 01/20/2025 0.14  0.00 - 0.40 10*3/mm3 Final    Basophils, Absolute 01/20/2025 0.05  0.00 - 0.20 10*3/mm3 Final    Immature Grans, Absolute 01/20/2025 0.01  0.00 - 0.05 10*3/mm3 Final    nRBC 01/20/2025 0.0  0.0 - 0.2 /100 WBC Final     Assessment & Plan   Diagnoses and all orders for this visit:    1. Type 2 diabetes mellitus with stage 4 chronic kidney disease, with long-term current use of insulin (Primary)  -     Comprehensive Metabolic Panel  -     Hemoglobin A1c  -     TSH Rfx On Abnormal To Free T4  -     Fructosamine  -      Comprehensive Metabolic Panel  -     Hemoglobin A1c  -     Lipid Panel  -     TSH Rfx On Abnormal To Free T4  -     Vitamin B12  -     Fructosamine    2. Peripheral neuropathy due to chemotherapy  -     TSH Rfx On Abnormal To Free T4  -     Vitamin B12  -     Vitamin B12    3. Primary hypertension  -     Comprehensive Metabolic Panel  -     Comprehensive Metabolic Panel    4. Hyperlipidemia, unspecified hyperlipidemia type  -     Lipid Panel  -     Lipid Panel  -     TSH Rfx On Abnormal To Free T4    5. History of multiple myeloma    6. History of kidney cancer    7. History of nephrectomy      Continue Lantus and NovoLog.  Will defer decision on gabapentin to Dr. Del Valle.  Follow-up with Dr. Norm Hutchison as scheduled.    BP checks at home.  Will defer blood pressure management to Dr. Hutchison.    Continue atorvastatin 10 mg/day and Vascepa 2 g twice a day.    Follow-up with Dr. Del Valle and Dr. Garcia as scheduled.    RTC 4 mos. With ETELVINA Haskins NP    Copy of my note sent to Brianna Thompson NP, Dr. Del Valle, Dr. Norm Hutchison, and Dr. Garcia.

## 2025-01-30 LAB
ALBUMIN SERPL-MCNC: 3.5 G/DL (ref 3.5–5.2)
ALBUMIN/GLOB SERPL: 1.8 G/DL
ALP SERPL-CCNC: 54 U/L (ref 39–117)
ALT SERPL-CCNC: 12 U/L (ref 1–41)
AST SERPL-CCNC: 9 U/L (ref 1–40)
BILIRUB SERPL-MCNC: 0.5 MG/DL (ref 0–1.2)
BUN SERPL-MCNC: 33 MG/DL (ref 8–23)
BUN/CREAT SERPL: 9.1 (ref 7–25)
CALCIUM SERPL-MCNC: 8.2 MG/DL (ref 8.6–10.5)
CHLORIDE SERPL-SCNC: 108 MMOL/L (ref 98–107)
CHOLEST SERPL-MCNC: 119 MG/DL (ref 0–200)
CO2 SERPL-SCNC: 24.5 MMOL/L (ref 22–29)
CREAT SERPL-MCNC: 3.64 MG/DL (ref 0.76–1.27)
EGFRCR SERPLBLD CKD-EPI 2021: 17.5 ML/MIN/1.73
FRUCTOSAMINE SERPL-SCNC: 190 UMOL/L (ref 0–285)
GLOBULIN SER CALC-MCNC: 1.9 GM/DL
GLUCOSE SERPL-MCNC: 101 MG/DL (ref 65–99)
HBA1C MFR BLD: 7.8 % (ref 4.8–5.6)
HDLC SERPL-MCNC: 28 MG/DL (ref 40–60)
IMP & REVIEW OF LAB RESULTS: NORMAL
LDLC SERPL CALC-MCNC: 46 MG/DL (ref 0–100)
POTASSIUM SERPL-SCNC: 5.1 MMOL/L (ref 3.5–5.2)
PROT SERPL-MCNC: 5.4 G/DL (ref 6–8.5)
REPORT: NORMAL
SODIUM SERPL-SCNC: 143 MMOL/L (ref 136–145)
TRIGL SERPL-MCNC: 292 MG/DL (ref 0–150)
TSH SERPL DL<=0.005 MIU/L-ACNC: 4.16 UIU/ML (ref 0.27–4.2)
VIT B12 SERPL-MCNC: 503 PG/ML (ref 211–946)
VLDLC SERPL CALC-MCNC: 45 MG/DL (ref 5–40)

## 2025-02-01 NOTE — PROGRESS NOTES
Hemoglobin A1c slightly improved at 7.8%.  LDL 46.  HDL 28.  Nonfasting triglycerides 292.  Continue atorvastatin 10 mg/day, Vascepa 2 g twice a day and low-fat diet.  Serum creatinine 3.64 mg per DL.  Follow-up with Dr. Norm Hutchison as scheduled.  Normal thyroid function test.  Normal vitamin B12.  Copy of labs sent to Brianna Thompson, Dr. Norm Hutchison, Dr. Jose Del Valle  Copy of labs sent to patient through [facility].

## 2025-02-03 DIAGNOSIS — E11.22 TYPE 2 DIABETES MELLITUS WITH STAGE 4 CHRONIC KIDNEY DISEASE, WITH LONG-TERM CURRENT USE OF INSULIN: ICD-10-CM

## 2025-02-03 DIAGNOSIS — N18.4 TYPE 2 DIABETES MELLITUS WITH STAGE 4 CHRONIC KIDNEY DISEASE, WITH LONG-TERM CURRENT USE OF INSULIN: ICD-10-CM

## 2025-02-03 DIAGNOSIS — Z79.4 TYPE 2 DIABETES MELLITUS WITH STAGE 4 CHRONIC KIDNEY DISEASE, WITH LONG-TERM CURRENT USE OF INSULIN: ICD-10-CM

## 2025-02-03 NOTE — TELEPHONE ENCOUNTER
PT CALLED AND STATED THAT HE IS NEED NEED OF THE FREE STYLE OXANA 2 SENSOR BECAUSE HE IS OUT AT THIS TIME.

## 2025-02-03 NOTE — TELEPHONE ENCOUNTER
Rx Refill Note  Requested Prescriptions     Pending Prescriptions Disp Refills    Continuous Glucose Sensor (FreeStyle Jin 2 Sensor) misc [Pharmacy Med Name: FREESTYLE JIN 2 SENSOR] 2 each 0     Sig: USE 1 EVERY 14 DAYS      Last office visit with prescribing clinician: 6/27/2024   Last telemedicine visit with prescribing clinician: Visit date not found   Next office visit with prescribing clinician: Visit date not found                         Would you like a call back once the refill request has been completed: [] Yes [] No    If the office needs to give you a call back, can they leave a voicemail: [] Yes [] No    Michelle Davis  02/03/25, 09:24 EST

## 2025-02-13 ENCOUNTER — TELEMEDICINE (OUTPATIENT)
Dept: FAMILY MEDICINE CLINIC | Facility: CLINIC | Age: 70
End: 2025-02-13
Payer: MEDICARE

## 2025-02-13 ENCOUNTER — TELEPHONE (OUTPATIENT)
Dept: FAMILY MEDICINE CLINIC | Facility: CLINIC | Age: 70
End: 2025-02-13

## 2025-02-13 DIAGNOSIS — J10.1 INFLUENZA A: Primary | ICD-10-CM

## 2025-02-13 PROCEDURE — 1126F AMNT PAIN NOTED NONE PRSNT: CPT | Performed by: NURSE PRACTITIONER

## 2025-02-13 PROCEDURE — 1160F RVW MEDS BY RX/DR IN RCRD: CPT | Performed by: NURSE PRACTITIONER

## 2025-02-13 PROCEDURE — 99213 OFFICE O/P EST LOW 20 MIN: CPT | Performed by: NURSE PRACTITIONER

## 2025-02-13 PROCEDURE — 3051F HG A1C>EQUAL 7.0%<8.0%: CPT | Performed by: NURSE PRACTITIONER

## 2025-02-13 PROCEDURE — 1159F MED LIST DOCD IN RCRD: CPT | Performed by: NURSE PRACTITIONER

## 2025-02-13 RX ORDER — OSELTAMIVIR PHOSPHATE 30 MG/1
30 CAPSULE ORAL DAILY
Qty: 5 CAPSULE | Refills: 0 | Status: SHIPPED | OUTPATIENT
Start: 2025-02-13 | End: 2025-02-17 | Stop reason: HOSPADM

## 2025-02-13 NOTE — TELEPHONE ENCOUNTER
Caller: Gabby Albert    Relationship to patient: Emergency Contact    Best call back number:     Patient is needing: WIFE STATES THAT THE PATIENT WAS NOT DIAGNOSED WITH THE FLU, BUT HAS THE FLU AND IS WANTING SOMETHING CALLED INTO HIS PHARMACY TO TREAT IT.

## 2025-02-13 NOTE — PROGRESS NOTES
Subjective   Contreras Albert is a 69 y.o. male.     No chief complaint on file.      History of Present Illness   Patient is concerned for flu symptoms that started yesterday. He is experiencing upset stomach, throwing up, sinus congestion, body aches, low grade fever, coughing, sneezing. He was recently around family members who had flu A. He has tried tylenol.     He has multiple myeloma and has chemo on Monday.     You have chosen to receive care through a telehealth visit.  Do you consent to use a video/audio connection for your medical care today? Yes  Video visit completed utilizing "[employer].  Patient location home.  Physician location [facility].      The following portions of the patient's history were reviewed and updated as appropriate: allergies, current medications, past family history, past medical history, past social history, past surgical history and problem list.    Depression Screen:      1/20/2025     9:55 AM   PHQ-2/PHQ-9 Depression Screening   Little interest or pleasure in doing things Not at all   Feeling down, depressed, or hopeless Not at all       Past Medical History:   Diagnosis Date    Acute renal failure syndrome 05/21/2020    NATHANIEL (acute kidney injury) (HCC) 05/22/2020    Anemia     Cataract     Chronic renal insufficiency     CKD (chronic kidney disease) 05/22/2020    Clear cell carcinoma of kidney, right 2016    Stage I.  Right partial nephrectomy.  Dr. Garcia    Clostridium difficile colitis     CVA (cerebral vascular accident)     Diabetes mellitus     Diabetic macular edema(362.07) 05/26/2020    No significant central fluid today OD.  Stable without significant DME OS.  Observe OU today. Possible multiple myeloma and anemia contributing.  Pt noted improvement with plasmapheresis and was gett*    Elevated troponin 05/22/2020    Enlarged prostate     Dr. Garcia    H/O stem cell transplant 12/09/2020    Hematoma of right lower leg     High cholesterol     Hypertension     Multiple  myeloma     Multiple rib fractures     on right after fall from 2 story building at age of 30 years    Postural dizziness     Severe nonproliferative diabetic retinopathy of both eyes with macular edema associated with type 2 diabetes mellitus 09/02/2020    Testosterone deficiency     Type 2 diabetes mellitus with retinopathy, with long-term current use of insulin 05/22/2020    Vitamin D deficiency        Past Surgical History:   Procedure Laterality Date    CATARACT EXTRACTION Left 12/17/2021    CATARACT EXTRACTION Right 01/21/2022    INSERTION HEMODIALYSIS CATHETER N/A 06/02/2020    Procedure: HEMODIALYSIS CATHETER INSERTION;  Surgeon: Sumanth Hernandez MD;  Location: [facility];  Service: Vascular;  Laterality: N/A;    LIMBAL STEM CELL TRANSPLANT  12/09/2020    NEPHRECTOMY PARTIAL Right 05/18/2016    Procedure: RT NEPHRECTOMY PARTIAL LAPAROSCOPIC WITH DAVINCI ROBOT;  Surgeon: Chad Garcia MD;  Location: [facility] OR;  Service:     PROSTATE SURGERY         Family History   Problem Relation Age of Onset    Parkinsonism Mother     Arrhythmia Father     Heart failure Father 84    Macular degeneration Sister     Diabetes Maternal Uncle     Colon cancer Neg Hx     Colon polyps Neg Hx     Crohn's disease Neg Hx     Irritable bowel syndrome Neg Hx     Ulcerative colitis Neg Hx        Social History     Socioeconomic History    Marital status:      Spouse name: Gabby   Tobacco Use    Smoking status: Never    Smokeless tobacco: Never   Vaping Use    Vaping status: Never Used   Substance and Sexual Activity    Alcohol use: No    Drug use: No    Sexual activity: Not Currently     Partners: Female     Birth control/protection: None       Current Outpatient Medications   Medication Sig Dispense Refill    Accu-Chek Guide Test test strip USE TO CHECK BLOOD SUGAR THREE TIMES DAILY 300 each 0    Accu-Chek Softclix Lancets lancets Check 3 times a day on insulin tx, poor control, hypoglycemia. Dx: E 11.22  300 each 3    acetaminophen (TYLENOL) 325 MG tablet Take 2 tablets by mouth Every 6 (Six) Hours As Needed for Mild Pain. (Patient taking differently: Take 2 tablets by mouth Every 6 (Six) Hours As Needed for Mild Pain. As needed)      acyclovir (ZOVIRAX) 800 MG tablet Take 1 tablet by mouth Daily. 90 tablet 2    ASPIRIN 81 PO Take 81 mg by mouth Daily.      atorvastatin (LIPITOR) 10 MG tablet Take 1 tablet by mouth Every Night. 90 tablet 2    calcium carbonate (TUMS) 500 MG chewable tablet Chew 4 tablets Daily.      Continuous Blood Gluc  (FreeStyle Jin 2 Richards) device USE 1 FOR 1 DOSE      Continuous Glucose Sensor (FreeStyle Jin 2 Sensor) misc USE 1 EVERY 14 DAYS 2 each 6    DARATUMUMAB IV Infuse  into a venous catheter.      dexAMETHasone (DECADRON) 4 MG tablet Take 3 tablets on day of chemo, 1 tablet on day #2 and 1 tablet on day #3 and repeat monthly. 15 tablet 1    diphenoxylate-atropine (LOMOTIL) 2.5-0.025 MG per tablet Take 2 tablets by mouth 4 (Four) Times a Day As Needed for Diarrhea. 120 tablet 0    escitalopram (LEXAPRO) 5 MG tablet TAKE 1 TABLET BY MOUTH EVERY DAY 90 tablet 1    finasteride (PROSCAR) 5 MG tablet Take 1 tablet by mouth Daily.      gabapentin (NEURONTIN) 300 MG capsule Take 2 capsules by mouth 2 (Two) Times a Day. 120 capsule 3    glucagon (GLUCAGEN) 1 MG injection Inject 1 mg into the appropriate muscle as directed by prescriber 1 (One) Time As Needed for Low Blood Sugar. Follow package directions for low blood sugar. 1 kit 10    icosapent ethyl (VASCEPA) 1 g capsule capsule TAKE 2 CAPSULES BY MOUTH TWICE DAILY WITH MEALS 360 capsule 1    Insulin Lispro, 1 Unit Dial, (HUMALOG) 100 UNIT/ML solution pen-injector NONCHEMO DAYS: 10 USE FOR BREAKFAST AND LUNCH, 12 USE AT SUPPER. CHEMO DAYS: 15 UNITS 3X DAILY WITH MEALS 54 mL 1    Insulin Pen Needle 32G X 4 MM misc USING 4 PEN NEEDLES DAILY 400 each 3    Lantus SoloStar 100 UNIT/ML injection pen NON CHEMO DAYS TAKE 16 UNITS TWICE  DAILY. CHEMO DAYS TAKE 30 UNITS TWICE DAILY FOR A MAX DOSE OF 70 UNITS DAILY 63 mL 1    omega-3 acid ethyl esters (LOVAZA) 1 g capsule TAKE 2 CAPSULES BY MOUTH TWICE DAILY 360 capsule 1    ondansetron (ZOFRAN) 8 MG tablet Take 1 tablet by mouth 3 (Three) Times a Day As Needed for Nausea or Vomiting. 30 tablet 5    oseltamivir (Tamiflu) 30 MG capsule Take 1 capsule by mouth Daily. 5 capsule 0    Phospha 250 Neutral 155-852-130 MG tablet TAKE 1 TABLET BY MOUTH DAILY 30 tablet 1    pomalidomide (POMALYST) 2 MG chemo capsule Take 1 capsule by mouth Daily. Take for 21 days on, then 7 days off. 21 capsule 0    tamsulosin (FLOMAX) 0.4 MG capsule 24 hr capsule Take 1 capsule by mouth Daily.       No current facility-administered medications for this visit.       Review of Systems    Objective   There were no vitals taken for this visit.    Physical Exam   Constitutional: He appears well-developed and well-nourished.   HENT:   Head: Normocephalic.   Eyes: Pupils are equal, round, and reactive to light. EOM are normal.   Pulmonary/Chest: Effort normal.   Neurological: He is alert.   Psychiatric: He has a normal mood and affect.       Recent Results (from the past 12 weeks)   Comprehensive metabolic panel    Collection Time: 11/25/24 12:41 PM    Specimen: Blood   Result Value Ref Range    Glucose 172 (H) 65 - 99 mg/dL    BUN 38 (H) 8 - 23 mg/dL    Creatinine 3.41 (C) 0.76 - 1.27 mg/dL    Sodium 140 136 - 145 mmol/L    Potassium 4.3 3.5 - 5.2 mmol/L    Chloride 105 98 - 107 mmol/L    CO2 25.2 22.0 - 29.0 mmol/L    Calcium 9.2 8.6 - 10.5 mg/dL    Total Protein 6.0 6.0 - 8.5 g/dL    Albumin 3.8 3.5 - 5.2 g/dL    ALT (SGPT) 10 1 - 41 U/L    AST (SGOT) 9 1 - 40 U/L    Alkaline Phosphatase 47 39 - 117 U/L    Total Bilirubin 0.4 0.0 - 1.2 mg/dL    Globulin 2.2 gm/dL    A/G Ratio 1.7 g/dL    BUN/Creatinine Ratio 11.1 7.0 - 25.0    Anion Gap 9.8 5.0 - 15.0 mmol/L    eGFR 18.9 (L) >60.0 mL/min/1.73   Magnesium    Collection Time:  11/25/24 12:41 PM    Specimen: Blood   Result Value Ref Range    Magnesium 1.5 (C) 1.6 - 2.4 mg/dL   Phosphorus    Collection Time: 11/25/24 12:41 PM    Specimen: Blood   Result Value Ref Range    Phosphorus 3.3 2.5 - 4.5 mg/dL   GUANAKITO,PE and FLC, Serum    Collection Time: 11/25/24 12:41 PM    Specimen: Blood   Result Value Ref Range    IgG 646 603 - 1613 mg/dL    IgA 73 61 - 437 mg/dL    IgM 23 20 - 172 mg/dL    Total Protein 5.6 (L) 6.0 - 8.5 g/dL    Albumin 3.6 2.9 - 4.4 g/dL    Alpha-1-Globulin 0.2 0.0 - 0.4 g/dL    Alpha-2-Globulin 0.7 0.4 - 1.0 g/dL    Beta Globulin 0.7 0.7 - 1.3 g/dL    Gamma Globulin 0.5 0.4 - 1.8 g/dL    M-Jurgen Not Observed Not Observed g/dL    Globulin 2.0 (L) 2.2 - 3.9 g/dL    A/G Ratio 1.9 (H) 0.7 - 1.7    Immunofixation Reflex, Serum Comment     Please note Comment     Free Light Chain, Kappa 67.3 (H) 3.3 - 19.4 mg/L    Free Lambda Light Chains 51.9 (H) 5.7 - 26.3 mg/L    Kappa/Lambda Ratio 1.30 0.26 - 1.65   CBC Auto Differential    Collection Time: 11/25/24 12:41 PM    Specimen: Blood   Result Value Ref Range    WBC 5.39 3.40 - 10.80 10*3/mm3    RBC 3.69 (L) 4.14 - 5.80 10*6/mm3    Hemoglobin 11.0 (L) 13.0 - 17.7 g/dL    Hematocrit 34.6 (L) 37.5 - 51.0 %    MCV 93.8 79.0 - 97.0 fL    MCH 29.8 26.6 - 33.0 pg    MCHC 31.8 31.5 - 35.7 g/dL    RDW 16.1 (H) 12.3 - 15.4 %    RDW-SD 55.4 (H) 37.0 - 54.0 fl    MPV 10.4 6.0 - 12.0 fL    Platelets 154 140 - 450 10*3/mm3    Neutrophil % 54.3 42.7 - 76.0 %    Lymphocyte % 28.0 19.6 - 45.3 %    Monocyte % 12.6 (H) 5.0 - 12.0 %    Eosinophil % 3.0 0.3 - 6.2 %    Basophil % 1.7 (H) 0.0 - 1.5 %    Immature Grans % 0.4 0.0 - 0.5 %    Neutrophils, Absolute 2.93 1.70 - 7.00 10*3/mm3    Lymphocytes, Absolute 1.51 0.70 - 3.10 10*3/mm3    Monocytes, Absolute 0.68 0.10 - 0.90 10*3/mm3    Eosinophils, Absolute 0.16 0.00 - 0.40 10*3/mm3    Basophils, Absolute 0.09 0.00 - 0.20 10*3/mm3    Immature Grans, Absolute 0.02 0.00 - 0.05 10*3/mm3    nRBC 0.0 0.0 - 0.2  /100 WBC   Comprehensive metabolic panel    Collection Time: 12/23/24  1:21 PM    Specimen: Blood   Result Value Ref Range    Glucose 232 (H) 65 - 99 mg/dL    BUN 29 (H) 8 - 23 mg/dL    Creatinine 3.19 (C) 0.76 - 1.27 mg/dL    Sodium 140 136 - 145 mmol/L    Potassium 4.6 3.5 - 5.2 mmol/L    Chloride 104 98 - 107 mmol/L    CO2 24.5 22.0 - 29.0 mmol/L    Calcium 9.2 8.6 - 10.5 mg/dL    Total Protein 6.0 6.0 - 8.5 g/dL    Albumin 3.6 3.5 - 5.2 g/dL    ALT (SGPT) 15 1 - 41 U/L    AST (SGOT) 11 1 - 40 U/L    Alkaline Phosphatase 53 39 - 117 U/L    Total Bilirubin 0.4 0.0 - 1.2 mg/dL    Globulin 2.4 gm/dL    A/G Ratio 1.5 g/dL    BUN/Creatinine Ratio 9.1 7.0 - 25.0    Anion Gap 11.5 5.0 - 15.0 mmol/L    eGFR 20.5 (L) >60.0 mL/min/1.73   Magnesium    Collection Time: 12/23/24  1:21 PM    Specimen: Blood   Result Value Ref Range    Magnesium 1.5 (C) 1.6 - 2.4 mg/dL   Phosphorus    Collection Time: 12/23/24  1:21 PM    Specimen: Blood   Result Value Ref Range    Phosphorus 4.4 2.5 - 4.5 mg/dL   GUANAKITO,PE and FLC, Serum    Collection Time: 12/23/24  1:21 PM    Specimen: Blood   Result Value Ref Range    IgG 655 603 - 1613 mg/dL    IgA 82 61 - 437 mg/dL    IgM 23 20 - 172 mg/dL    Total Protein 5.9 (L) 6.0 - 8.5 g/dL    Albumin 3.3 2.9 - 4.4 g/dL    Alpha-1-Globulin 0.2 0.0 - 0.4 g/dL    Alpha-2-Globulin 0.9 0.4 - 1.0 g/dL    Beta Globulin 0.9 0.7 - 1.3 g/dL    Gamma Globulin 0.6 0.4 - 1.8 g/dL    M-Jurgen Not Observed Not Observed g/dL    Globulin 2.6 2.2 - 3.9 g/dL    A/G Ratio 1.3 0.7 - 1.7    Immunofixation Reflex, Serum Comment     Please note Comment     Free Light Chain, [place] 57.4 (H) 3.3 - 19.4 mg/L    Free Lambda Light Chains 45.5 (H) 5.7 - 26.3 mg/L    Kappa/Lambda Ratio 1.26 0.26 - 1.65   CBC Auto Differential    Collection Time: 12/23/24  1:21 PM    Specimen: Blood   Result Value Ref Range    WBC 5.68 3.40 - 10.80 10*3/mm3    RBC 4.00 (L) 4.14 - 5.80 10*6/mm3    Hemoglobin 11.7 (L) 13.0 - 17.7 g/dL    Hematocrit 37.0  (L) 37.5 - 51.0 %    MCV 92.5 79.0 - 97.0 fL    MCH 29.3 26.6 - 33.0 pg    MCHC 31.6 31.5 - 35.7 g/dL    RDW 15.6 (H) 12.3 - 15.4 %    RDW-SD 52.6 37.0 - 54.0 fl    MPV 9.0 6.0 - 12.0 fL    Platelets 166 140 - 450 10*3/mm3    Neutrophil % 56.0 42.7 - 76.0 %    Lymphocyte % 28.2 19.6 - 45.3 %    Monocyte % 10.6 5.0 - 12.0 %    Eosinophil % 3.5 0.3 - 6.2 %    Basophil % 1.2 0.0 - 1.5 %    Immature Grans % 0.5 0.0 - 0.5 %    Neutrophils, Absolute 3.18 1.70 - 7.00 10*3/mm3    Lymphocytes, Absolute 1.60 0.70 - 3.10 10*3/mm3    Monocytes, Absolute 0.60 0.10 - 0.90 10*3/mm3    Eosinophils, Absolute 0.20 0.00 - 0.40 10*3/mm3    Basophils, Absolute 0.07 0.00 - 0.20 10*3/mm3    Immature Grans, Absolute 0.03 0.00 - 0.05 10*3/mm3    nRBC 0.0 0.0 - 0.2 /100 WBC   Comprehensive metabolic panel    Collection Time: 01/20/25  9:38 AM    Specimen: Blood   Result Value Ref Range    Glucose 240 (H) 65 - 99 mg/dL    BUN 22 8 - 23 mg/dL    Creatinine 3.06 (C) 0.76 - 1.27 mg/dL    Sodium 141 136 - 145 mmol/L    Potassium 4.1 3.5 - 5.2 mmol/L    Chloride 105 98 - 107 mmol/L    CO2 23.7 22.0 - 29.0 mmol/L    Calcium 7.2 (L) 8.6 - 10.5 mg/dL    Total Protein 6.0 6.0 - 8.5 g/dL    Albumin 3.6 3.5 - 5.2 g/dL    ALT (SGPT) 15 1 - 41 U/L    AST (SGOT) 11 1 - 40 U/L    Alkaline Phosphatase 57 39 - 117 U/L    Total Bilirubin 0.4 0.0 - 1.2 mg/dL    Globulin 2.4 gm/dL    A/G Ratio 1.5 g/dL    BUN/Creatinine Ratio 7.2 7.0 - 25.0    Anion Gap 12.3 5.0 - 15.0 mmol/L    eGFR 21.6 (L) >60.0 mL/min/1.73   Magnesium    Collection Time: 01/20/25  9:38 AM    Specimen: Blood   Result Value Ref Range    Magnesium 1.5 (C) 1.6 - 2.4 mg/dL   Phosphorus    Collection Time: 01/20/25  9:38 AM    Specimen: Blood   Result Value Ref Range    Phosphorus 3.8 2.5 - 4.5 mg/dL   GUANAKITO,PE and FLC, Serum    Collection Time: 01/20/25  9:38 AM    Specimen: Blood   Result Value Ref Range    IgG 632 603 - 1613 mg/dL    IgA 80 61 - 437 mg/dL    IgM 28 20 - 172 mg/dL    Total Protein  5.8 (L) 6.0 - 8.5 g/dL    Albumin 3.2 2.9 - 4.4 g/dL    Alpha-1-Globulin 0.3 0.0 - 0.4 g/dL    Alpha-2-Globulin 0.9 0.4 - 1.0 g/dL    Beta Globulin 0.9 0.7 - 1.3 g/dL    Gamma Globulin 0.6 0.4 - 1.8 g/dL    M-Jurgen Not Observed Not Observed g/dL    Globulin 2.6 2.2 - 3.9 g/dL    A/G Ratio 1.3 0.7 - 1.7    Immunofixation Reflex, Serum Comment:     Please note Comment     Free Light Chain, Kappa 66.2 (H) 3.3 - 19.4 mg/L    Free Lambda Light Chains 51.5 (H) 5.7 - 26.3 mg/L    Kappa/Lambda Ratio 1.29 0.26 - 1.65   CBC Auto Differential    Collection Time: 01/20/25  9:38 AM    Specimen: Blood   Result Value Ref Range    WBC 3.93 3.40 - 10.80 10*3/mm3    RBC 3.88 (L) 4.14 - 5.80 10*6/mm3    Hemoglobin 11.1 (L) 13.0 - 17.7 g/dL    Hematocrit 36.0 (L) 37.5 - 51.0 %    MCV 92.8 79.0 - 97.0 fL    MCH 28.6 26.6 - 33.0 pg    MCHC 30.8 (L) 31.5 - 35.7 g/dL    RDW 15.9 (H) 12.3 - 15.4 %    RDW-SD 54.0 37.0 - 54.0 fl    MPV 9.5 6.0 - 12.0 fL    Platelets 195 140 - 450 10*3/mm3    Neutrophil % 61.0 42.7 - 76.0 %    Lymphocyte % 22.6 19.6 - 45.3 %    Monocyte % 11.2 5.0 - 12.0 %    Eosinophil % 3.6 0.3 - 6.2 %    Basophil % 1.3 0.0 - 1.5 %    Immature Grans % 0.3 0.0 - 0.5 %    Neutrophils, Absolute 2.40 1.70 - 7.00 10*3/mm3    Lymphocytes, Absolute 0.89 0.70 - 3.10 10*3/mm3    Monocytes, Absolute 0.44 0.10 - 0.90 10*3/mm3    Eosinophils, Absolute 0.14 0.00 - 0.40 10*3/mm3    Basophils, Absolute 0.05 0.00 - 0.20 10*3/mm3    Immature Grans, Absolute 0.01 0.00 - 0.05 10*3/mm3    nRBC 0.0 0.0 - 0.2 /100 WBC   Comprehensive Metabolic Panel    Collection Time: 01/29/25  3:16 PM    Specimen: Blood   Result Value Ref Range    Glucose 101 (H) 65 - 99 mg/dL    BUN 33 (H) 8 - 23 mg/dL    Creatinine 3.64 (H) 0.76 - 1.27 mg/dL    EGFR Result 17.5 (L) >60.0 mL/min/1.73    BUN/Creatinine Ratio 9.1 7.0 - 25.0    Sodium 143 136 - 145 mmol/L    Potassium 5.1 3.5 - 5.2 mmol/L    Chloride 108 (H) 98 - 107 mmol/L    Total CO2 24.5 22.0 - 29.0 mmol/L     Calcium 8.2 (L) 8.6 - 10.5 mg/dL    Total Protein 5.4 (L) 6.0 - 8.5 g/dL    Albumin 3.5 3.5 - 5.2 g/dL    Globulin 1.9 gm/dL    A/G Ratio 1.8 g/dL    Total Bilirubin 0.5 0.0 - 1.2 mg/dL    Alkaline Phosphatase 54 39 - 117 U/L    AST (SGOT) 9 1 - 40 U/L    ALT (SGPT) 12 1 - 41 U/L   Hemoglobin A1c    Collection Time: 01/29/25  3:16 PM    Specimen: Blood   Result Value Ref Range    Hemoglobin A1C 7.80 (H) 4.80 - 5.60 %   Lipid Panel    Collection Time: 01/29/25  3:16 PM    Specimen: Blood   Result Value Ref Range    Total Cholesterol 119 0 - 200 mg/dL    Triglycerides 292 (H) 0 - 150 mg/dL    HDL Cholesterol 28 (L) 40 - 60 mg/dL    VLDL Cholesterol [place] 45 (H) 5 - 40 mg/dL    LDL Chol Calc (NIH) 46 0 - 100 mg/dL   TSH Rfx On Abnormal To Free T4    Collection Time: 01/29/25  3:16 PM    Specimen: Blood   Result Value Ref Range    TSH 4.160 0.270 - 4.200 uIU/mL   Vitamin B12    Collection Time: 01/29/25  3:16 PM    Specimen: Blood   Result Value Ref Range    Vitamin B-12 503 211 - 946 pg/mL   Fructosamine    Collection Time: 01/29/25  3:16 PM    Specimen: Blood   Result Value Ref Range    Fructosamine 190 0 - 285 umol/L   [facility] CKD Program    Collection Time: 01/29/25  3:16 PM   Result Value Ref Range    Interpretation Note    Cardiovascular Risk Assessment    Collection Time: 01/29/25  3:16 PM   Result Value Ref Range    Interpretation Note      Assessment & Plan   Diagnoses and all orders for this visit:    1. Influenza A (Primary)  -     oseltamivir (Tamiflu) 30 MG capsule; Take 1 capsule by mouth Daily.  Dispense: 5 capsule; Refill: 0      Discussed the reduced dosage due to decreased renal function.     Video visit completed.       I spent 8 minutes caring for Contreras on this date of service. This time includes time spent by me in the following activities:obtaining and/or reviewing a separately obtained history, performing a medically appropriate examination and/or evaluation , counseling and educating the  patient/family/caregiver, ordering medications, tests, or procedures, and documenting information in the medical record

## 2025-02-14 ENCOUNTER — TELEPHONE (OUTPATIENT)
Dept: ONCOLOGY | Facility: CLINIC | Age: 70
End: 2025-02-14
Payer: MEDICARE

## 2025-02-14 NOTE — TELEPHONE ENCOUNTER
Caller: RocalejandraGabby    Relationship: Emergency Contact    Best call back number: [phone]    What is the best time to reach you: ANYTIME        Who are you requesting to speak with (clinical staff, provider,  specific staff member): CLINICAL        What was the call regarding:     PANCHITO HAS THE FLU AND STARTS TAMAFLU TODAY , HIS OTHER PROVIDER SUGGESTED TO SEE WHAT ELSE HE CAN TAKE OVER THE COUNTER WITH IT     ALSO WANTED TO CHECK TO MAKE SURE HE IS SAFE TO COME IN FOR APPOINTMENT MONDAY 02/17

## 2025-02-14 NOTE — TELEPHONE ENCOUNTER
I called Gabby, she that he began having body aches, sneezing, productive cough, vomiting, sore throat and low grade fever 3 days ago. Symptoms are on going and he had a video visit with Pcp and they prescribed him a kidney friendly dosing of tamiflu. They are wanting to know if they should keep appt on Monday and if there is anything else they can take OTC.

## 2025-02-14 NOTE — TELEPHONE ENCOUNTER
I called Gabby, I let her know per Dr. Del Valle that the patient should go to UC or PCP to get tested for flu prior to taking the tamilfu. If positive, start tamiflu if negative to take OTC medications. Dr. Del Valle wants the patient to call us on Monday morning to see how he is doing prior to canceling the appt. Nidia states that he had the telehealth visit because he was to weak to go to UC/PCP. I let them know that if he is too weak to go to the UC/PCP office than I would recommend going to the ER. Nidia stats that she will speak to the patient when he wakes up.

## 2025-02-15 ENCOUNTER — HOSPITAL ENCOUNTER (OUTPATIENT)
Facility: HOSPITAL | Age: 70
Setting detail: OBSERVATION
Discharge: HOME | End: 2025-02-17
Attending: EMERGENCY MEDICINE | Admitting: STUDENT IN AN ORGANIZED HEALTH CARE EDUCATION/TRAINING PROGRAM
Payer: MEDICARE

## 2025-02-15 ENCOUNTER — APPOINTMENT (OUTPATIENT)
Dept: GENERAL RADIOLOGY | Facility: HOSPITAL | Age: 70
End: 2025-02-15
Payer: MEDICARE

## 2025-02-15 DIAGNOSIS — E83.51 HYPOCALCEMIA: ICD-10-CM

## 2025-02-15 DIAGNOSIS — R53.1 GENERALIZED WEAKNESS: ICD-10-CM

## 2025-02-15 DIAGNOSIS — N18.4 CHRONIC RENAL FAILURE, STAGE 4 (SEVERE): ICD-10-CM

## 2025-02-15 DIAGNOSIS — J10.1 INFLUENZA A: Primary | ICD-10-CM

## 2025-02-15 LAB
ALBUMIN SERPL-MCNC: 3.1 G/DL (ref 3.5–5.2)
ALBUMIN/GLOB SERPL: 1.1 G/DL
ALP SERPL-CCNC: 48 U/L (ref 39–117)
ALT SERPL W P-5'-P-CCNC: 14 U/L (ref 1–41)
ANION GAP SERPL CALCULATED.3IONS-SCNC: 12.6 MMOL/L (ref 5–15)
AST SERPL-CCNC: 19 U/L (ref 1–40)
BASOPHILS # BLD MANUAL: 0.08 10*3/MM3 (ref 0–0.2)
BASOPHILS NFR BLD MANUAL: 3 % (ref 0–1.5)
BILIRUB SERPL-MCNC: 0.4 MG/DL (ref 0–1.2)
BUN SERPL-MCNC: 47 MG/DL (ref 8–23)
BUN/CREAT SERPL: 13.8 (ref 7–25)
CALCIUM SPEC-SCNC: 7.5 MG/DL (ref 8.6–10.5)
CHLORIDE SERPL-SCNC: 100 MMOL/L (ref 98–107)
CO2 SERPL-SCNC: 21.4 MMOL/L (ref 22–29)
CREAT SERPL-MCNC: 3.4 MG/DL (ref 0.76–1.27)
DEPRECATED RDW RBC AUTO: 53.5 FL (ref 37–54)
EGFRCR SERPLBLD CKD-EPI 2021: 19 ML/MIN/1.73
ERYTHROCYTE [DISTWIDTH] IN BLOOD BY AUTOMATED COUNT: 15.9 % (ref 12.3–15.4)
GLOBULIN UR ELPH-MCNC: 2.8 GM/DL
GLUCOSE SERPL-MCNC: 151 MG/DL (ref 65–99)
HCT VFR BLD AUTO: 34.2 % (ref 37.5–51)
HGB BLD-MCNC: 10.7 G/DL (ref 13–17.7)
LYMPHOCYTES # BLD MANUAL: 0.47 10*3/MM3 (ref 0.7–3.1)
LYMPHOCYTES NFR BLD MANUAL: 15 % (ref 5–12)
MCH RBC QN AUTO: 28.4 PG (ref 26.6–33)
MCHC RBC AUTO-ENTMCNC: 31.3 G/DL (ref 31.5–35.7)
MCV RBC AUTO: 90.7 FL (ref 79–97)
MONOCYTES # BLD: 0.39 10*3/MM3 (ref 0.1–0.9)
NEUTROPHILS # BLD AUTO: 1.68 10*3/MM3 (ref 1.7–7)
NEUTROPHILS NFR BLD MANUAL: 64 % (ref 42.7–76)
NRBC BLD AUTO-RTO: 0 /100 WBC (ref 0–0.2)
PLAT MORPH BLD: NORMAL
PLATELET # BLD AUTO: 138 10*3/MM3 (ref 140–450)
PMV BLD AUTO: 9.6 FL (ref 6–12)
POTASSIUM SERPL-SCNC: 3.2 MMOL/L (ref 3.5–5.2)
PROCALCITONIN SERPL-MCNC: 0.47 NG/ML (ref 0–0.25)
PROT SERPL-MCNC: 5.9 G/DL (ref 6–8.5)
RBC # BLD AUTO: 3.77 10*6/MM3 (ref 4.14–5.8)
RBC MORPH BLD: NORMAL
SODIUM SERPL-SCNC: 134 MMOL/L (ref 136–145)
VARIANT LYMPHS NFR BLD MANUAL: 18 % (ref 19.6–45.3)
WBC MORPH BLD: NORMAL
WBC NRBC COR # BLD AUTO: 2.63 10*3/MM3 (ref 3.4–10.8)

## 2025-02-15 PROCEDURE — 85007 BL SMEAR W/DIFF WBC COUNT: CPT | Performed by: EMERGENCY MEDICINE

## 2025-02-15 PROCEDURE — 96375 TX/PRO/DX INJ NEW DRUG ADDON: CPT

## 2025-02-15 PROCEDURE — 83735 ASSAY OF MAGNESIUM: CPT | Performed by: STUDENT IN AN ORGANIZED HEALTH CARE EDUCATION/TRAINING PROGRAM

## 2025-02-15 PROCEDURE — 85025 COMPLETE CBC W/AUTO DIFF WBC: CPT | Performed by: EMERGENCY MEDICINE

## 2025-02-15 PROCEDURE — G0378 HOSPITAL OBSERVATION PER HR: HCPCS

## 2025-02-15 PROCEDURE — 25810000003 SODIUM CHLORIDE 0.9 % SOLUTION: Performed by: EMERGENCY MEDICINE

## 2025-02-15 PROCEDURE — 84145 PROCALCITONIN (PCT): CPT | Performed by: EMERGENCY MEDICINE

## 2025-02-15 PROCEDURE — 99285 EMERGENCY DEPT VISIT HI MDM: CPT

## 2025-02-15 PROCEDURE — 80053 COMPREHEN METABOLIC PANEL: CPT | Performed by: EMERGENCY MEDICINE

## 2025-02-15 PROCEDURE — 71045 X-RAY EXAM CHEST 1 VIEW: CPT

## 2025-02-15 PROCEDURE — 25010000002 ONDANSETRON PER 1 MG: Performed by: EMERGENCY MEDICINE

## 2025-02-15 RX ORDER — ONDANSETRON 2 MG/ML
4 INJECTION INTRAMUSCULAR; INTRAVENOUS ONCE
Status: COMPLETED | OUTPATIENT
Start: 2025-02-15 | End: 2025-02-15

## 2025-02-15 RX ORDER — SODIUM CHLORIDE 0.9 % (FLUSH) 0.9 %
10 SYRINGE (ML) INJECTION AS NEEDED
Status: DISCONTINUED | OUTPATIENT
Start: 2025-02-15 | End: 2025-02-17 | Stop reason: HOSPADM

## 2025-02-15 RX ADMIN — SODIUM CHLORIDE 500 ML: 9 INJECTION, SOLUTION INTRAVENOUS at 21:54

## 2025-02-15 RX ADMIN — ONDANSETRON 4 MG: 2 INJECTION, SOLUTION INTRAMUSCULAR; INTRAVENOUS at 22:11

## 2025-02-16 PROBLEM — N18.32 STAGE 3B CHRONIC KIDNEY DISEASE: Status: ACTIVE | Noted: 2025-02-16

## 2025-02-16 PROBLEM — E87.6 HYPOKALEMIA: Status: ACTIVE | Noted: 2025-02-16

## 2025-02-16 PROBLEM — J96.01 ACUTE RESPIRATORY FAILURE WITH HYPOXIA: Status: ACTIVE | Noted: 2025-02-16

## 2025-02-16 PROBLEM — J18.9 CAP (COMMUNITY ACQUIRED PNEUMONIA): Status: ACTIVE | Noted: 2025-02-16

## 2025-02-16 LAB
ANION GAP SERPL CALCULATED.3IONS-SCNC: 13 MMOL/L (ref 5–15)
B PARAPERT DNA SPEC QL NAA+PROBE: NOT DETECTED
B PERT DNA SPEC QL NAA+PROBE: NOT DETECTED
BUN SERPL-MCNC: 40 MG/DL (ref 8–23)
BUN/CREAT SERPL: 12.8 (ref 7–25)
C PNEUM DNA NPH QL NAA+NON-PROBE: NOT DETECTED
CALCIUM SPEC-SCNC: 7.4 MG/DL (ref 8.6–10.5)
CHLORIDE SERPL-SCNC: 103 MMOL/L (ref 98–107)
CO2 SERPL-SCNC: 17 MMOL/L (ref 22–29)
CREAT SERPL-MCNC: 3.12 MG/DL (ref 0.76–1.27)
DEPRECATED RDW RBC AUTO: 51 FL (ref 37–54)
EGFRCR SERPLBLD CKD-EPI 2021: 21.1 ML/MIN/1.73
ERYTHROCYTE [DISTWIDTH] IN BLOOD BY AUTOMATED COUNT: 16 % (ref 12.3–15.4)
FLUAV H1 2009 PAND RNA NPH QL NAA+PROBE: DETECTED
FLUBV RNA ISLT QL NAA+PROBE: NOT DETECTED
GLUCOSE BLDC GLUCOMTR-MCNC: 124 MG/DL (ref 70–130)
GLUCOSE BLDC GLUCOMTR-MCNC: 132 MG/DL (ref 70–130)
GLUCOSE BLDC GLUCOMTR-MCNC: 132 MG/DL (ref 70–130)
GLUCOSE BLDC GLUCOMTR-MCNC: 141 MG/DL (ref 70–130)
GLUCOSE BLDC GLUCOMTR-MCNC: 155 MG/DL (ref 70–130)
GLUCOSE SERPL-MCNC: 128 MG/DL (ref 65–99)
HADV DNA SPEC NAA+PROBE: NOT DETECTED
HCOV 229E RNA SPEC QL NAA+PROBE: NOT DETECTED
HCOV HKU1 RNA SPEC QL NAA+PROBE: NOT DETECTED
HCOV NL63 RNA SPEC QL NAA+PROBE: NOT DETECTED
HCOV OC43 RNA SPEC QL NAA+PROBE: NOT DETECTED
HCT VFR BLD AUTO: 30.9 % (ref 37.5–51)
HGB BLD-MCNC: 10.4 G/DL (ref 13–17.7)
HMPV RNA NPH QL NAA+NON-PROBE: NOT DETECTED
HPIV1 RNA ISLT QL NAA+PROBE: NOT DETECTED
HPIV2 RNA SPEC QL NAA+PROBE: NOT DETECTED
HPIV3 RNA NPH QL NAA+PROBE: NOT DETECTED
HPIV4 P GENE NPH QL NAA+PROBE: NOT DETECTED
L PNEUMO1 AG UR QL IA: NEGATIVE
M PNEUMO IGG SER IA-ACNC: NOT DETECTED
MAGNESIUM SERPL-MCNC: 1.7 MG/DL (ref 1.6–2.4)
MAGNESIUM SERPL-MCNC: 1.7 MG/DL (ref 1.6–2.4)
MCH RBC QN AUTO: 29.6 PG (ref 26.6–33)
MCHC RBC AUTO-ENTMCNC: 33.7 G/DL (ref 31.5–35.7)
MCV RBC AUTO: 88 FL (ref 79–97)
PLATELET # BLD AUTO: 136 10*3/MM3 (ref 140–450)
PMV BLD AUTO: 10.8 FL (ref 6–12)
POTASSIUM SERPL-SCNC: 3.2 MMOL/L (ref 3.5–5.2)
POTASSIUM SERPL-SCNC: 3.9 MMOL/L (ref 3.5–5.2)
RBC # BLD AUTO: 3.51 10*6/MM3 (ref 4.14–5.8)
RHINOVIRUS RNA SPEC NAA+PROBE: NOT DETECTED
RSV RNA NPH QL NAA+NON-PROBE: NOT DETECTED
S PNEUM AG SPEC QL LA: NEGATIVE
SARS-COV-2 RNA NPH QL NAA+NON-PROBE: NOT DETECTED
SODIUM SERPL-SCNC: 133 MMOL/L (ref 136–145)
WBC NRBC COR # BLD AUTO: 2.41 10*3/MM3 (ref 3.4–10.8)

## 2025-02-16 PROCEDURE — 94760 N-INVAS EAR/PLS OXIMETRY 1: CPT

## 2025-02-16 PROCEDURE — 94640 AIRWAY INHALATION TREATMENT: CPT

## 2025-02-16 PROCEDURE — 96365 THER/PROPH/DIAG IV INF INIT: CPT

## 2025-02-16 PROCEDURE — 80048 BASIC METABOLIC PNL TOTAL CA: CPT | Performed by: STUDENT IN AN ORGANIZED HEALTH CARE EDUCATION/TRAINING PROGRAM

## 2025-02-16 PROCEDURE — 94799 UNLISTED PULMONARY SVC/PX: CPT

## 2025-02-16 PROCEDURE — G0378 HOSPITAL OBSERVATION PER HR: HCPCS

## 2025-02-16 PROCEDURE — 0202U NFCT DS 22 TRGT SARS-COV-2: CPT | Performed by: STUDENT IN AN ORGANIZED HEALTH CARE EDUCATION/TRAINING PROGRAM

## 2025-02-16 PROCEDURE — 96372 THER/PROPH/DIAG INJ SC/IM: CPT

## 2025-02-16 PROCEDURE — 83735 ASSAY OF MAGNESIUM: CPT | Performed by: STUDENT IN AN ORGANIZED HEALTH CARE EDUCATION/TRAINING PROGRAM

## 2025-02-16 PROCEDURE — 82948 REAGENT STRIP/BLOOD GLUCOSE: CPT

## 2025-02-16 PROCEDURE — 85027 COMPLETE CBC AUTOMATED: CPT | Performed by: STUDENT IN AN ORGANIZED HEALTH CARE EDUCATION/TRAINING PROGRAM

## 2025-02-16 PROCEDURE — 94761 N-INVAS EAR/PLS OXIMETRY MLT: CPT

## 2025-02-16 PROCEDURE — 63710000001 INSULIN LISPRO (HUMAN) PER 5 UNITS: Performed by: STUDENT IN AN ORGANIZED HEALTH CARE EDUCATION/TRAINING PROGRAM

## 2025-02-16 PROCEDURE — 87040 BLOOD CULTURE FOR BACTERIA: CPT | Performed by: STUDENT IN AN ORGANIZED HEALTH CARE EDUCATION/TRAINING PROGRAM

## 2025-02-16 PROCEDURE — 87449 NOS EACH ORGANISM AG IA: CPT | Performed by: STUDENT IN AN ORGANIZED HEALTH CARE EDUCATION/TRAINING PROGRAM

## 2025-02-16 PROCEDURE — 25010000002 HEPARIN (PORCINE) PER 1000 UNITS: Performed by: STUDENT IN AN ORGANIZED HEALTH CARE EDUCATION/TRAINING PROGRAM

## 2025-02-16 PROCEDURE — 25010000002 CEFTRIAXONE PER 250 MG: Performed by: STUDENT IN AN ORGANIZED HEALTH CARE EDUCATION/TRAINING PROGRAM

## 2025-02-16 PROCEDURE — 84132 ASSAY OF SERUM POTASSIUM: CPT | Performed by: STUDENT IN AN ORGANIZED HEALTH CARE EDUCATION/TRAINING PROGRAM

## 2025-02-16 RX ORDER — ASPIRIN 81 MG/1
81 TABLET, CHEWABLE ORAL DAILY
Status: DISCONTINUED | OUTPATIENT
Start: 2025-02-16 | End: 2025-02-17 | Stop reason: HOSPADM

## 2025-02-16 RX ORDER — SODIUM CHLORIDE 0.9 % (FLUSH) 0.9 %
10 SYRINGE (ML) INJECTION AS NEEDED
Status: DISCONTINUED | OUTPATIENT
Start: 2025-02-16 | End: 2025-02-17 | Stop reason: HOSPADM

## 2025-02-16 RX ORDER — ACETAMINOPHEN 325 MG/1
650 TABLET ORAL EVERY 6 HOURS PRN
Status: DISCONTINUED | OUTPATIENT
Start: 2025-02-16 | End: 2025-02-17 | Stop reason: HOSPADM

## 2025-02-16 RX ORDER — OSELTAMIVIR PHOSPHATE 30 MG/1
30 CAPSULE ORAL EVERY 12 HOURS SCHEDULED
Status: DISCONTINUED | OUTPATIENT
Start: 2025-02-16 | End: 2025-02-17 | Stop reason: HOSPADM

## 2025-02-16 RX ORDER — DEXTROSE MONOHYDRATE 25 G/50ML
25 INJECTION, SOLUTION INTRAVENOUS
Status: DISCONTINUED | OUTPATIENT
Start: 2025-02-16 | End: 2025-02-17 | Stop reason: HOSPADM

## 2025-02-16 RX ORDER — INSULIN LISPRO 100 [IU]/ML
2-9 INJECTION, SOLUTION INTRAVENOUS; SUBCUTANEOUS
Status: DISCONTINUED | OUTPATIENT
Start: 2025-02-16 | End: 2025-02-17 | Stop reason: HOSPADM

## 2025-02-16 RX ORDER — POTASSIUM CHLORIDE 1500 MG/1
40 TABLET, EXTENDED RELEASE ORAL EVERY 4 HOURS
Status: COMPLETED | OUTPATIENT
Start: 2025-02-16 | End: 2025-02-16

## 2025-02-16 RX ORDER — TAMSULOSIN HYDROCHLORIDE 0.4 MG/1
0.4 CAPSULE ORAL DAILY
Status: DISCONTINUED | OUTPATIENT
Start: 2025-02-16 | End: 2025-02-17 | Stop reason: HOSPADM

## 2025-02-16 RX ORDER — NICOTINE POLACRILEX 4 MG
15 LOZENGE BUCCAL
Status: DISCONTINUED | OUTPATIENT
Start: 2025-02-16 | End: 2025-02-17 | Stop reason: HOSPADM

## 2025-02-16 RX ORDER — ESCITALOPRAM OXALATE 5 MG/1
5 TABLET ORAL DAILY
Status: DISCONTINUED | OUTPATIENT
Start: 2025-02-16 | End: 2025-02-17 | Stop reason: HOSPADM

## 2025-02-16 RX ORDER — BISACODYL 5 MG/1
5 TABLET, DELAYED RELEASE ORAL DAILY PRN
Status: DISCONTINUED | OUTPATIENT
Start: 2025-02-16 | End: 2025-02-17 | Stop reason: HOSPADM

## 2025-02-16 RX ORDER — BISACODYL 10 MG
10 SUPPOSITORY, RECTAL RECTAL DAILY PRN
Status: DISCONTINUED | OUTPATIENT
Start: 2025-02-16 | End: 2025-02-17 | Stop reason: HOSPADM

## 2025-02-16 RX ORDER — ONDANSETRON 8 MG/1
8 TABLET, FILM COATED ORAL 3 TIMES DAILY PRN
Status: DISCONTINUED | OUTPATIENT
Start: 2025-02-16 | End: 2025-02-17 | Stop reason: HOSPADM

## 2025-02-16 RX ORDER — BUMETANIDE 2 MG/1
2 TABLET ORAL DAILY
COMMUNITY

## 2025-02-16 RX ORDER — HEPARIN SODIUM 5000 [USP'U]/ML
5000 INJECTION, SOLUTION INTRAVENOUS; SUBCUTANEOUS EVERY 8 HOURS SCHEDULED
Status: DISCONTINUED | OUTPATIENT
Start: 2025-02-16 | End: 2025-02-17 | Stop reason: HOSPADM

## 2025-02-16 RX ORDER — AMLODIPINE BESYLATE 5 MG/1
5 TABLET ORAL DAILY
COMMUNITY

## 2025-02-16 RX ORDER — IBUPROFEN 600 MG/1
1 TABLET ORAL
Status: DISCONTINUED | OUTPATIENT
Start: 2025-02-16 | End: 2025-02-17 | Stop reason: HOSPADM

## 2025-02-16 RX ORDER — SODIUM CHLORIDE 0.9 % (FLUSH) 0.9 %
10 SYRINGE (ML) INJECTION EVERY 12 HOURS SCHEDULED
Status: DISCONTINUED | OUTPATIENT
Start: 2025-02-16 | End: 2025-02-17 | Stop reason: HOSPADM

## 2025-02-16 RX ORDER — BUDESONIDE AND FORMOTEROL FUMARATE DIHYDRATE 160; 4.5 UG/1; UG/1
2 AEROSOL RESPIRATORY (INHALATION)
Status: DISCONTINUED | OUTPATIENT
Start: 2025-02-16 | End: 2025-02-17 | Stop reason: HOSPADM

## 2025-02-16 RX ORDER — AZITHROMYCIN 250 MG/1
500 TABLET, FILM COATED ORAL NIGHTLY
Status: DISCONTINUED | OUTPATIENT
Start: 2025-02-16 | End: 2025-02-17 | Stop reason: HOSPADM

## 2025-02-16 RX ORDER — POTASSIUM CHLORIDE 1500 MG/1
40 TABLET, EXTENDED RELEASE ORAL ONCE
Status: COMPLETED | OUTPATIENT
Start: 2025-02-16 | End: 2025-02-16

## 2025-02-16 RX ORDER — AMLODIPINE BESYLATE 5 MG/1
2.5 TABLET ORAL
Status: DISCONTINUED | OUTPATIENT
Start: 2025-02-16 | End: 2025-02-17 | Stop reason: HOSPADM

## 2025-02-16 RX ORDER — POLYETHYLENE GLYCOL 3350 17 G/17G
17 POWDER, FOR SOLUTION ORAL DAILY PRN
Status: DISCONTINUED | OUTPATIENT
Start: 2025-02-16 | End: 2025-02-17 | Stop reason: HOSPADM

## 2025-02-16 RX ORDER — AMOXICILLIN 250 MG
2 CAPSULE ORAL 2 TIMES DAILY PRN
Status: DISCONTINUED | OUTPATIENT
Start: 2025-02-16 | End: 2025-02-17 | Stop reason: HOSPADM

## 2025-02-16 RX ORDER — ATORVASTATIN CALCIUM 20 MG/1
10 TABLET, FILM COATED ORAL NIGHTLY
Status: DISCONTINUED | OUTPATIENT
Start: 2025-02-16 | End: 2025-02-17 | Stop reason: HOSPADM

## 2025-02-16 RX ORDER — IPRATROPIUM BROMIDE AND ALBUTEROL SULFATE 2.5; .5 MG/3ML; MG/3ML
3 SOLUTION RESPIRATORY (INHALATION) EVERY 6 HOURS PRN
Status: DISCONTINUED | OUTPATIENT
Start: 2025-02-16 | End: 2025-02-17 | Stop reason: HOSPADM

## 2025-02-16 RX ORDER — SODIUM CHLORIDE 9 MG/ML
40 INJECTION, SOLUTION INTRAVENOUS AS NEEDED
Status: DISCONTINUED | OUTPATIENT
Start: 2025-02-16 | End: 2025-02-17 | Stop reason: HOSPADM

## 2025-02-16 RX ORDER — HYDRALAZINE HYDROCHLORIDE 20 MG/ML
5 INJECTION INTRAMUSCULAR; INTRAVENOUS EVERY 4 HOURS PRN
Status: DISCONTINUED | OUTPATIENT
Start: 2025-02-16 | End: 2025-02-17 | Stop reason: HOSPADM

## 2025-02-16 RX ADMIN — ASPIRIN 81 MG CHEWABLE TABLET 81 MG: 81 TABLET CHEWABLE at 09:55

## 2025-02-16 RX ADMIN — POTASSIUM CHLORIDE 40 MEQ: 1500 TABLET, EXTENDED RELEASE ORAL at 00:35

## 2025-02-16 RX ADMIN — CEFTRIAXONE 2000 MG: 2 INJECTION, POWDER, FOR SOLUTION INTRAMUSCULAR; INTRAVENOUS at 23:49

## 2025-02-16 RX ADMIN — HEPARIN SODIUM 5000 UNITS: 5000 INJECTION INTRAVENOUS; SUBCUTANEOUS at 06:25

## 2025-02-16 RX ADMIN — HEPARIN SODIUM 5000 UNITS: 5000 INJECTION INTRAVENOUS; SUBCUTANEOUS at 14:24

## 2025-02-16 RX ADMIN — TAMSULOSIN HYDROCHLORIDE 0.4 MG: 0.4 CAPSULE ORAL at 09:54

## 2025-02-16 RX ADMIN — Medication 10 ML: at 21:27

## 2025-02-16 RX ADMIN — Medication 2.5 MG: at 21:13

## 2025-02-16 RX ADMIN — OSELTAMIVIR PHOSPHATE 30 MG: 30 CAPSULE ORAL at 09:54

## 2025-02-16 RX ADMIN — Medication 10 ML: at 00:36

## 2025-02-16 RX ADMIN — ATORVASTATIN CALCIUM 10 MG: 20 TABLET, FILM COATED ORAL at 21:13

## 2025-02-16 RX ADMIN — AZITHROMYCIN 500 MG: 250 TABLET, FILM COATED ORAL at 21:13

## 2025-02-16 RX ADMIN — OSELTAMIVIR PHOSPHATE 30 MG: 30 CAPSULE ORAL at 21:13

## 2025-02-16 RX ADMIN — BUDESONIDE AND FORMOTEROL FUMARATE DIHYDRATE 2 PUFF: 160; 4.5 AEROSOL RESPIRATORY (INHALATION) at 19:18

## 2025-02-16 RX ADMIN — Medication 10 ML: at 09:00

## 2025-02-16 RX ADMIN — HEPARIN SODIUM 5000 UNITS: 5000 INJECTION INTRAVENOUS; SUBCUTANEOUS at 21:14

## 2025-02-16 RX ADMIN — INSULIN LISPRO 2 UNITS: 100 INJECTION, SOLUTION INTRAVENOUS; SUBCUTANEOUS at 21:21

## 2025-02-16 RX ADMIN — ATORVASTATIN CALCIUM 10 MG: 20 TABLET, FILM COATED ORAL at 00:36

## 2025-02-16 RX ADMIN — POTASSIUM CHLORIDE 40 MEQ: 1500 TABLET, EXTENDED RELEASE ORAL at 09:54

## 2025-02-16 RX ADMIN — AZITHROMYCIN 500 MG: 250 TABLET, FILM COATED ORAL at 00:55

## 2025-02-16 RX ADMIN — CEFTRIAXONE 2000 MG: 2 INJECTION, POWDER, FOR SOLUTION INTRAMUSCULAR; INTRAVENOUS at 01:06

## 2025-02-16 RX ADMIN — AMLODIPINE BESYLATE 2.5 MG: 5 TABLET ORAL at 09:55

## 2025-02-16 RX ADMIN — ESCITALOPRAM 5 MG: 5 TABLET, FILM COATED ORAL at 09:55

## 2025-02-16 RX ADMIN — POTASSIUM CHLORIDE 40 MEQ: 1500 TABLET, EXTENDED RELEASE ORAL at 14:24

## 2025-02-16 NOTE — PROGRESS NOTES
Pharmacy Consult: Oseltamivir Dosing    Pharmacy has received consult from Dr. Erazo regarding oseltamivir dosing for influenza.  Patient will be started on oseltamivir 30mg every 12 hours based on current renal function (creatinine clearance 34.8 mL/min).  Pharmacy will continue to follow and adjust dosing as needed if renal function changes.  Thank you for the consult.    Ronnie Varner, PharmD

## 2025-02-16 NOTE — PROGRESS NOTES
Name: Contreras Albert ADMIT: 2/15/2025   : 1955  PCP: Brianna Thompson APRN    MRN: [id number] LOS: 0 days   AGE/SEX: 69 y.o. male  ROOM: [facility]     Subjective   Subjective   No acute events overnight.  Patient states he is feeling little bit better this morning.  Remains on 3 L nasal cannula.  No chest pain.  Shortness of breath is improved.  Sputum sample collection cup at bedside but patient states he has not been able to cough much up.    Objective   Objective     Vital Signs  Temp:  [97.7 °F (36.5 °C)-98.9 °F (37.2 °C)] 97.7 °F (36.5 °C)  Heart Rate:  [70-80] 70  Resp:  [18-22] 20  BP: (148-190)/(76-95) 148/85  SpO2:  [90 %-95 %] 94 %  on  Flow (L/min) (Oxygen Therapy):  [2-3] 3;   Device (Oxygen Therapy): nasal cannula  Body mass index is 37.13 kg/m².    Physical Exam  General: Alert, no acute distress.  Sitting up in bed.  Answers questions appropriately.  ENT: No conjunctival injection or scleral icterus. Moist mucous membranes.   Neuro: Eyes open and moving in all directions, generalized weakness, face symmetric, no focal deficits.   Lungs: Diminished aeration, occasional crackle.  No wheezing.  No distress.  Nasal cannula in place.  Heart: RRR, no murmurs. No edema.  Abdomen: Soft, non-tender, non-distended. Normal bowel sounds.   Ext: Warm and well-perfused. No edema.   Skin: No rashes or lesions. IV site without swelling or erythema.     Results Review     I reviewed the patient's new clinical results:  Results from last 7 days   Lab Units 25  0705 02/15/25  2153   WBC 10*3/mm3 2.41* 2.63*   HEMOGLOBIN g/dL 10.4* 10.7*   PLATELETS 10*3/mm3 136* 138*     Results from last 7 days   Lab Units 25  0705 02/15/25  2153   SODIUM mmol/L 133* 134*   POTASSIUM mmol/L 3.2* 3.2*   CHLORIDE mmol/L 103 100   CO2 mmol/L 17.0* 21.4*   BUN mg/dL 40* 47*   CREATININE mg/dL 3.12* 3.40*   GLUCOSE mg/dL 128* 151*   EGFR mL/min/1.73 21.1* 19.0*     Results from last 7 days   Lab Units 02/15/25  5531    ALBUMIN g/dL 3.1*   BILIRUBIN mg/dL 0.4   ALK PHOS U/L 48   AST (SGOT) U/L 19   ALT (SGPT) U/L 14     Results from last 7 days   Lab Units 02/16/25  0705 02/15/25  2153   CALCIUM mg/dL 7.4* 7.5*   ALBUMIN g/dL  --  3.1*   MAGNESIUM mg/dL  --  1.7     Results from last 7 days   Lab Units 02/15/25  2153   PROCALCITONIN ng/mL 0.47*     Glucose   Date/Time Value Ref Range Status   02/16/2025 0738 132 (H) 70 - 130 mg/dL Final   02/16/2025 0011 132 (H) 70 - 130 mg/dL Final       XR Chest 1 View    Result Date: 2/15/2025  Patchy nonspecific bibasilar consolidation. Pneumonia is not excluded.  This report was finalized on 2/15/2025 9:37 PM by Dr. Erika Hernandez M.D on Workstation: [id number]       I have personally reviewed all medications:  Scheduled Medications  amLODIPine, 2.5 mg, Oral, Q24H  aspirin, 81 mg, Oral, Daily  atorvastatin, 10 mg, Oral, Nightly  azithromycin, 500 mg, Oral, Nightly  budesonide-formoterol, 2 puff, Inhalation, BID - RT  cefTRIAXone, 2,000 mg, Intravenous, Q24H  escitalopram, 5 mg, Oral, Daily  heparin (porcine), 5,000 Units, Subcutaneous, Q8H  insulin lispro, 2-9 Units, Subcutaneous, 4x Daily AC & at Bedtime  oseltamivir, 30 mg, Oral, Q12H  potassium chloride ER, 40 mEq, Oral, Q4H  sodium chloride, 10 mL, Intravenous, Q12H  tamsulosin, 0.4 mg, Oral, Daily    Infusions   Diet  Diet: Cardiac, Diabetic, Renal; Healthy Heart (2-3 Na+); Consistent Carbohydrate; Low Sodium (2-3g), Low Potassium; Texture: Regular (IDDSI 7); Fluid Consistency: Thin (IDDSI 0)      Intake/Output Summary (Last 24 hours) at 2/16/2025 1008  Last data filed at 2/16/2025 0136  Gross per 24 hour   Intake 340 ml   Output --   Net 340 ml       Assessment/Plan     Active Hospital Problems    Diagnosis  POA    CAP (community acquired pneumonia) [J18.9]  Unknown    Acute respiratory failure with hypoxia [J96.01]  Unknown    Stage 3b chronic kidney disease [N18.32]  Unknown    Hypokalemia [E87.6]  Unknown    HTN (hypertension)  [I10]  Unknown    Class 2 severe obesity due to excess calories with serious comorbidity and body mass index (BMI) of 39.0 to 39.9 in adult [E66.812, E66.01, Z68.39]  Not Applicable    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       69 y.o. male with shortness of breath found to have pneumonia.    Acute hypoxic respiratory failure  Unspecified bacterial pneumonia  Influenza A  -RPP positive for flu A  -CXR with patchy nonspecific bibasilar consolidations, procalcitonin elevated  -Ceftriaxone x 5 days  -Azithromycin 500 mg x 3 days  -Strep pneumo, Legionella pending.  Sputum culture if able to produce sputum.  -Blood cultures pending  -Currently on 3 L nasal cannula, wean as able  -Tamiflu, renally dosed  -Continue home Symbicort.  No wheezing on send no indication for steroids at this time but will monitor.    CKD  Metabolic acidosis  Hyponatremia  -S/p right partial nephrectomy due to renal cancer  -Creatinine 3.12 today, actually improved from baseline  -Follows with Dr. Hutchison as an outpatient  -Avoid nephrotoxic agents including NSAIDs and contrast dyes  -Repeat BMP with morning labs  -Should creatinine become elevated above baseline, consider nephrology consult    Multiple myeloma   Pancytopenia  -WBC, Hgb, PLT all decreased but stable today  -Likely component of viral suppression secondary to influenza on top of his MM  -Follows with Dr. Del Valle as an outpatient, currently undergoing treatment  -Repeat CBC with morning labs, transfuse for Hgb less than 7    Hypertension  CAD  -BP trends acceptable at this time  -Continue amlodipine  -Titrate meds as needed  -ASA and statin    Type 2 diabetes  mellitus  -Blood glucose trend acceptable at this time  -Continue SSI  -Can add reduced dose of home Lantus if needed based on blood glucose trends    Hypokalemia  -Potassium low at 3.2  -Check mag level  -Replace as needed, repeat BMP with morning labs    Heparin for DVT prophylaxis.  Full code.  Discussed with patient and nursing staff.  Anticipate discharge TBD      Vanessa Erazo MD  [facility]  02/16/25  10:08 EST

## 2025-02-16 NOTE — ED PROVIDER NOTES
ED Course as of 02/16/25 0614   Sat Feb 15, 2025   2209 CBC does show some pancytopenia, slightly worse than when compared to prior.  Hemoglobin near prior values [DB]   2210 Chest x-ray independently interpreted by me shows no obvious acute infiltrate.  Official reading by Dr. Hernandez suggest some bibasilar patchy densities. [DB]   2243 Chemistries show BUN/creatinine of 47 and 3.4 which are similar to prior values and represent chronic renal failure, similar to baseline.  Patient also with chronic hypocalcemia and calcium of 7.5 which is exacerbated by low protein stores. [DB]   2302 I spoke with Dr. Bhatia with [facility].  Discussed patient presentation and ED findings.  He agrees to admit the patient to a telemetry observation bed. [MP]   2306 Patient feeling somewhat better after IV fluids but continues to require 1 L of oxygen to maintain O2 saturations. [DB]      ED Course User Index  [DB] Sumanth Camacho MD  [MP] Yelena Castro PA-C Pleiss, Melanie M, PA-C  02/16/25 0614

## 2025-02-16 NOTE — ED NOTES
"Nursing report ED to floor  Contreras Albert  69 y.o.  male    HPI :  HPI  Stated Reason for Visit: PT PRESENTS TO THE ER FROM HOME VIA EMS WITH C/O TESTING POSITIVE FOR THE FLU 3 DAYS AGO, PT STILL DOES NOT FEEL WELL; C/O FATIGUE AND GENERALIZED WEAKNESS.  PT IS VERY IRRITABLE IN TRAIGE.  History Obtained From: patient, EMS  Precipitating Event(s): recent exposure to contagious illness  Onset of Symptoms: gradual  Duration (Days): 3  Associated Signs/Symptoms: fatigue, weakness  Medications/Treatments Administered by EMS Prior to Presentation: see EMS record  Medications/Treatments Prior to Arrival: none    Chief Complaint  Chief Complaint   Patient presents with    Flu+    Flu Symptoms       Admitting doctor:   Kayleen Bhatia DO    Admitting diagnosis:   The primary encounter diagnosis was Influenza A. Diagnoses of Generalized weakness, Chronic renal failure, stage 4 (severe), and Hypocalcemia were also pertinent to this visit.    Code status:   Current Code Status       Date Active Code Status Order ID Comments User Context       Prior            Allergies:   Gabapentin, Lisinopril, Crestor [rosuvastatin], and Carvedilol    Isolation:   Droplet    Intake and Output  No intake or output data in the 24 hours ending 02/15/25 2341    Weight:       02/15/25  2048   Weight: (!) 138 kg (305 lb)       Most recent vitals:   Vitals:    02/15/25 2048 02/15/25 2201 02/15/25 2259 02/15/25 2331   BP: (!) 190/95 150/76 152/90 164/85   BP Location: Right arm      Patient Position: Lying      Pulse: 80 75 76 79   Resp: 18      Temp: 98.9 °F (37.2 °C)      TempSrc: Oral      SpO2: 95% 90% 92% 94%   Weight: (!) 138 kg (305 lb)      Height: 193 cm (76\")          Active LDAs/IV Access:   Lines, Drains & Airways       Active LDAs       Name Placement date Placement time Site Days    Peripheral IV 02/15/25 2153 Anterior;Distal;Right;Upper Arm 02/15/25  2153  Arm  less than 1                    Labs (abnormal labs have a star):   Labs " "Reviewed   COMPREHENSIVE METABOLIC PANEL - Abnormal; Notable for the following components:       Result Value    Glucose 151 (*)     BUN 47 (*)     Creatinine 3.40 (*)     Sodium 134 (*)     Potassium 3.2 (*)     CO2 21.4 (*)     Calcium 7.5 (*)     Total Protein 5.9 (*)     Albumin 3.1 (*)     eGFR 19.0 (*)     All other components within normal limits    Narrative:     GFR Categories in Chronic Kidney Disease (CKD)      GFR Category          GFR (mL/min/1.73)    Interpretation  G1                     90 or greater         Normal or high (1)  G2                      60-89                Mild decrease (1)  G3a                   45-59                Mild to moderate decrease  G3b                   30-44                Moderate to severe decrease  G4                    15-29                Severe decrease  G5                    14 or less           Kidney failure          (1)In the absence of evidence of kidney disease, neither GFR category G1 or G2 fulfill the criteria for CKD.    eGFR calculation 2021 CKD-EPI creatinine equation, which does not include race as a factor   PROCALCITONIN - Abnormal; Notable for the following components:    Procalcitonin 0.47 (*)     All other components within normal limits    Narrative:     As a Marker for Sepsis (Non-Neonates):    1. <0.5 ng/mL represents a low risk of severe sepsis and/or septic shock.  2. >2 ng/mL represents a high risk of severe sepsis and/or septic shock.    As a Marker for Lower Respiratory Tract Infections that require antibiotic therapy:    PCT on Admission    Antibiotic Therapy       6-12 Hrs later    >0.5                Strongly Recommended  >0.25 - <0.5        Recommended   0.1 - 0.25          Discouraged              Remeasure/reassess PCT  <0.1                Strongly Discouraged     Remeasure/reassess PCT    As 28 day mortality risk marker: \"Change in Procalcitonin Result\" (>80% or <=80%) if Day 0 (or Day 1) and Day 4 values are available. Refer to " http://[email or web address]-pct-[email or web address]    Change in PCT <=80%  A decrease of PCT levels below or equal to 80% defines a positive change in PCT test result representing a higher risk for 28-day all-cause mortality of patients diagnosed with severe sepsis for septic shock.    Change in PCT >80%  A decrease of PCT levels of more than 80% defines a negative change in PCT result representing a lower risk for 28-day all-cause mortality of patients diagnosed with severe sepsis or septic shock.      CBC WITH AUTO DIFFERENTIAL - Abnormal; Notable for the following components:    WBC 2.63 (*)     RBC 3.77 (*)     Hemoglobin 10.7 (*)     Hematocrit 34.2 (*)     MCHC 31.3 (*)     RDW 15.9 (*)     Platelets 138 (*)     All other components within normal limits   MANUAL DIFFERENTIAL - Abnormal; Notable for the following components:    Lymphocyte % 18.0 (*)     Monocyte % 15.0 (*)     Basophil % 3.0 (*)     Neutrophils Absolute 1.68 (*)     Lymphocytes Absolute 0.47 (*)     All other components within normal limits   CBC AND DIFFERENTIAL    Narrative:     The following orders were created for panel order CBC & Differential.  Procedure                               Abnormality         Status                     ---------                               -----------         ------                     CBC Auto Differential[[id number]]        Abnormal            Final result                 Please view results for these tests on the individual orders.       EKG:   No orders to display       Meds given in ED:   Medications   sodium chloride 0.9 % flush 10 mL (has no administration in time range)   sodium chloride 0.9 % bolus 500 mL (500 mL Intravenous New Bag 2/15/25 2610)   ondansetron (ZOFRAN) injection 4 mg (4 mg Intravenous Given 2/15/25 2211)       Imaging results:  XR Chest 1 View    Result Date: 2/15/2025  Patchy nonspecific bibasilar consolidation. Pneumonia is not excluded.  This report was finalized on 2/15/2025 9:37 PM by Dr. James  DANILO Hernandez on Workstation: [id number]       Ambulatory status:   - assist x 1     Social issues:   Social History     Socioeconomic History    Marital status:      Spouse name: Gabby   Tobacco Use    Smoking status: Never    Smokeless tobacco: Never   Vaping Use    Vaping status: Never Used   Substance and Sexual Activity    Alcohol use: No    Drug use: No    Sexual activity: Not Currently     Partners: Female     Birth control/protection: None       Peripheral Neurovascular  Peripheral Neurovascular (Adult)  Peripheral Neurovascular WDL: WDL    Neuro Cognitive  Neuro Cognitive (Adult)  Cognitive/Neuro/Behavioral WDL: WDL    Learning  Learning Assessment  Learning Readiness and Ability: no barriers identified    Respiratory  Respiratory WDL  Respiratory WDL: .WDL except, cough  Cough Frequency: infrequent  Cough Type: productive    Abdominal Pain       Pain Assessments  Pain (Adult)  (0-10) Pain Rating: Rest: 0  (0-10) Pain Rating: Activity: 0    NIH Stroke Scale       Stefanie Chen RN  02/15/25 23:41 EST

## 2025-02-16 NOTE — H&P
Patient Name:  Contreras Albert  YOB: 1955  MRN:  [id number]  Admit Date:  2/15/2025  Patient Care Team:  Brianna Thompson APRN as PCP - General (Family Medicine)  Chad Garcia MD as Consulting Physician (Urology)  Cisco Haro MD as Consulting Physician (Endocrinology)  Marcus Alvares MD (Hematology and Oncology)  Nabil Kendrick DPM as Consulting Physician (Podiatry)  Norm Hutchison MD as Consulting Physician (Nephrology)  Red Del Valle MD as Consulting Physician (Hematology and Oncology)  Yadira Strauss APRN as Referring Physician (Nurse Practitioner)  Mateo Morrow APRN as Nurse Practitioner (Gastroenterology)      Subjective   History Present Illness     Chief Complaint   Patient presents with    Flu+    Flu Symptoms       Mr. Albert is a 69 y.o.  with a history of hypertension, hyperlipidemia, multiple myeloma, BPH, DM, history of CVA, renal cell carcinoma status post right partial nephrectomy, CKD that presents to [facility] complaining of dyspnea.  Admits to worsening dyspnea and productive cough ongoing for the past 3 days complicated by fever along with some nausea and vomiting.  States she was recently diagnosed with influenza by telehealth, did not have a test/swab, and was started on Tamiflu for 1 day but had minimal improvement.  Presented to [facility] for evaluation    In the ER, vitals 90 8.9F, HR 75, RR 18, /76, 90% on 2 L nasal cannula.  Labs notable for sodium 134, potassium 3.2, BUN 47, creatinine 3.4, glucose 151, procalcitonin 0.47, WBC 2.63, hemoglobin 10.7, platelets 138.  Chest x-ray shows patchy nonspecific bibasilar consolidation    History of Present Illness    Review of Systems   12 point ROS reviewed and negative except as mentioned above    Personal History     Past Medical History:   Diagnosis Date    Acute renal failure syndrome 05/21/2020    NATHANIEL (acute kidney injury) (HCC) 05/22/2020     Anemia     Cataract     Chronic renal insufficiency     CKD (chronic kidney disease) 05/22/2020    Clear cell carcinoma of kidney, right 2016    Stage I.  Right partial nephrectomy.  Dr. Garcia    Clostridium difficile colitis     CVA (cerebral vascular accident)     Diabetes mellitus     Diabetic macular edema(362.07) 05/26/2020    No significant central fluid today OD.  Stable without significant DME OS.  Observe OU today. Possible multiple myeloma and anemia contributing.  Pt noted improvement with plasmapheresis and was gett*    Elevated troponin 05/22/2020    Enlarged prostate     Dr. Garcia    H/O stem cell transplant 12/09/2020    Hematoma of right lower leg     High cholesterol     Hypertension     Multiple myeloma     Multiple rib fractures     on right after fall from 2 story building at age of 30 years    Postural dizziness     Severe nonproliferative diabetic retinopathy of both eyes with macular edema associated with type 2 diabetes mellitus 09/02/2020    Testosterone deficiency     Type 2 diabetes mellitus with retinopathy, with long-term current use of insulin 05/22/2020    Vitamin D deficiency      Past Surgical History:   Procedure Laterality Date    CATARACT EXTRACTION Left 12/17/2021    CATARACT EXTRACTION Right 01/21/2022    INSERTION HEMODIALYSIS CATHETER N/A 06/02/2020    Procedure: HEMODIALYSIS CATHETER INSERTION;  Surgeon: Sumanth Hernandez MD;  Location: [facility];  Service: Vascular;  Laterality: N/A;    LIMBAL STEM CELL TRANSPLANT  12/09/2020    NEPHRECTOMY PARTIAL Right 05/18/2016    Procedure: RT NEPHRECTOMY PARTIAL LAPAROSCOPIC WITH DAVINCI ROBOT;  Surgeon: Chad Garcia MD;  Location: [facility] OR;  Service:     PROSTATE SURGERY       Family History   Problem Relation Age of Onset    Parkinsonism Mother     Arrhythmia Father     Heart failure Father 84    Macular degeneration Sister     Diabetes Maternal Uncle     Colon cancer Neg Hx     Colon polyps Neg Hx      Crohn's disease Neg Hx     Irritable bowel syndrome Neg Hx     Ulcerative colitis Neg Hx      Social History     Tobacco Use    Smoking status: Never    Smokeless tobacco: Never   Vaping Use    Vaping status: Never Used   Substance Use Topics    Alcohol use: No    Drug use: No     No current facility-administered medications on file prior to encounter.     Current Outpatient Medications on File Prior to Encounter   Medication Sig Dispense Refill    Accu-Chek Guide Test test strip USE TO CHECK BLOOD SUGAR THREE TIMES DAILY 300 each 0    Accu-Chek Softclix Lancets lancets Check 3 times a day on insulin tx, poor control, hypoglycemia. Dx: E 11.22 300 each 3    acetaminophen (TYLENOL) 325 MG tablet Take 2 tablets by mouth Every 6 (Six) Hours As Needed for Mild Pain. (Patient taking differently: Take 2 tablets by mouth Every 6 (Six) Hours As Needed for Mild Pain. As needed)      acyclovir (ZOVIRAX) 800 MG tablet Take 1 tablet by mouth Daily. 90 tablet 2    ASPIRIN 81 PO Take 81 mg by mouth Daily.      atorvastatin (LIPITOR) 10 MG tablet Take 1 tablet by mouth Every Night. 90 tablet 2    calcium carbonate (TUMS) 500 MG chewable tablet Chew 4 tablets Daily.      Continuous Blood Gluc  (FreeStyle Jin 2 [place]) device USE 1 FOR 1 DOSE      Continuous Glucose Sensor (FreeStyle Jin 2 Sensor) misc USE 1 EVERY 14 DAYS 2 each 6    DARATUMUMAB IV Infuse  into a venous catheter.      dexAMETHasone (DECADRON) 4 MG tablet Take 3 tablets on day of chemo, 1 tablet on day #2 and 1 tablet on day #3 and repeat monthly. 15 tablet 1    diphenoxylate-atropine (LOMOTIL) 2.5-0.025 MG per tablet Take 2 tablets by mouth 4 (Four) Times a Day As Needed for Diarrhea. 120 tablet 0    escitalopram (LEXAPRO) 5 MG tablet TAKE 1 TABLET BY MOUTH EVERY DAY 90 tablet 1    finasteride (PROSCAR) 5 MG tablet Take 1 tablet by mouth Daily.      gabapentin (NEURONTIN) 300 MG capsule Take 2 capsules by mouth 2 (Two) Times a Day. 120 capsule 3     glucagon (GLUCAGEN) 1 MG injection Inject 1 mg into the appropriate muscle as directed by prescriber 1 (One) Time As Needed for Low Blood Sugar. Follow package directions for low blood sugar. 1 kit 10    icosapent ethyl (VASCEPA) 1 g capsule capsule TAKE 2 CAPSULES BY MOUTH TWICE DAILY WITH MEALS 360 capsule 1    Insulin Lispro, 1 Unit Dial, (HUMALOG) 100 UNIT/ML solution pen-injector NONCHEMO DAYS: 10 USE FOR BREAKFAST AND LUNCH, 12 USE AT SUPPER. CHEMO DAYS: 15 UNITS 3X DAILY WITH MEALS 54 mL 1    Insulin Pen Needle 32G X 4 MM misc USING 4 PEN NEEDLES DAILY 400 each 3    Lantus SoloStar 100 UNIT/ML injection pen NON CHEMO DAYS TAKE 16 UNITS TWICE DAILY. CHEMO DAYS TAKE 30 UNITS TWICE DAILY FOR A MAX DOSE OF 70 UNITS DAILY 63 mL 1    omega-3 acid ethyl esters (LOVAZA) 1 g capsule TAKE 2 CAPSULES BY MOUTH TWICE DAILY 360 capsule 1    ondansetron (ZOFRAN) 8 MG tablet Take 1 tablet by mouth 3 (Three) Times a Day As Needed for Nausea or Vomiting. 30 tablet 5    oseltamivir (Tamiflu) 30 MG capsule Take 1 capsule by mouth Daily. 5 capsule 0    Phospha 250 Neutral 155-852-130 MG tablet TAKE 1 TABLET BY MOUTH DAILY 30 tablet 1    pomalidomide (POMALYST) 2 MG chemo capsule Take 1 capsule by mouth Daily. Take for 21 days on, then 7 days off. 21 capsule 0    tamsulosin (FLOMAX) 0.4 MG capsule 24 hr capsule Take 1 capsule by mouth Daily.       Allergies   Allergen Reactions    Gabapentin Diarrhea    Lisinopril Unknown - High Severity     Tickle in throat, cannot take due to kidney disease    Crestor [Rosuvastatin] GI Intolerance     Abdominal cramping    Carvedilol Diarrhea, Swelling and Dizziness       Objective    Objective     Vital Signs  Temp:  [98.9 °F (37.2 °C)] 98.9 °F (37.2 °C)  Heart Rate:  [75-80] 79  Resp:  [18] 18  BP: (150-190)/(76-95) 164/85  SpO2:  [90 %-95 %] 94 %  on  Flow (L/min) (Oxygen Therapy):  [2] 2;   Device (Oxygen Therapy): nasal cannula  Body mass index is 37.13 kg/m².    Physical  Exam  Constitutional:       General: He is not in acute distress.     Appearance: He is obese. He is not toxic-appearing.   HENT:      Head: Normocephalic and atraumatic.      Nose: Nose normal. No congestion.      Mouth/Throat:      Pharynx: No oropharyngeal exudate.   Eyes:      General: No scleral icterus.  Cardiovascular:      Rate and Rhythm: Normal rate and regular rhythm.      Heart sounds: No murmur heard.     No friction rub. No gallop.   Pulmonary:      Effort: No respiratory distress.      Breath sounds: Rales present. No wheezing.      Comments: Patient on 2 L nasal cannula  Abdominal:      General: There is no distension.      Tenderness: There is no abdominal tenderness. There is no guarding.   Musculoskeletal:         General: No swelling, deformity or signs of injury.      Cervical back: Normal range of motion. No rigidity.   Skin:     Coloration: Skin is not jaundiced.      Findings: No bruising or lesion.   Neurological:      General: No focal deficit present.      Mental Status: He is alert and oriented to person, place, and time.      Motor: No weakness.         Results Review:  I reviewed the patient's new clinical results.  I reviewed the patient's new imaging results and agree with the interpretation.  I reviewed the patient's other test results and agree with the interpretation  I personally viewed and interpreted the patient's EKG/Telemetry data  Discussed with ED provider.    Lab Results (last 24 hours)       Procedure Component Value Units Date/Time    CBC & Differential [[id number]]  (Abnormal) Collected: 02/15/25 2153    Specimen: Blood from Arm, Right Updated: 02/15/25 2208    Narrative:      The following orders were created for panel order CBC & Differential.  Procedure                               Abnormality         Status                     ---------                               -----------         ------                     CBC Auto Differential[[id number]]        Abnormal             Final result                 Please view results for these tests on the individual orders.    Comprehensive Metabolic Panel [[id number]]  (Abnormal) Collected: 02/15/25 2153    Specimen: Blood from Arm, Right Updated: 02/15/25 2225     Glucose 151 mg/dL      BUN 47 mg/dL      Creatinine 3.40 mg/dL      Sodium 134 mmol/L      Potassium 3.2 mmol/L      Chloride 100 mmol/L      CO2 21.4 mmol/L      Calcium 7.5 mg/dL      Total Protein 5.9 g/dL      Albumin 3.1 g/dL      ALT (SGPT) 14 U/L      AST (SGOT) 19 U/L      Alkaline Phosphatase 48 U/L      Total Bilirubin 0.4 mg/dL      Globulin 2.8 gm/dL      A/G Ratio 1.1 g/dL      BUN/Creatinine Ratio 13.8     Anion Gap 12.6 mmol/L      eGFR 19.0 mL/min/1.73     Narrative:      GFR Categories in Chronic Kidney Disease (CKD)      GFR Category          GFR (mL/min/1.73)    Interpretation  G1                     90 or greater         Normal or high (1)  G2                      60-89                Mild decrease (1)  G3a                   45-59                Mild to moderate decrease  G3b                   30-44                Moderate to severe decrease  G4                    15-29                Severe decrease  G5                    14 or less           Kidney failure          (1)In the absence of evidence of kidney disease, neither GFR category G1 or G2 fulfill the criteria for CKD.    eGFR calculation 2021 CKD-EPI creatinine equation, which does not include race as a factor    Procalcitonin [[id number]]  (Abnormal) Collected: 02/15/25 2153    Specimen: Blood from Arm, Right Updated: 02/15/25 2232     Procalcitonin 0.47 ng/mL     Narrative:      As a Marker for Sepsis (Non-Neonates):    1. <0.5 ng/mL represents a low risk of severe sepsis and/or septic shock.  2. >2 ng/mL represents a high risk of severe sepsis and/or septic shock.    As a Marker for Lower Respiratory Tract Infections that require antibiotic therapy:    PCT on Admission    Antibiotic Therapy       6-12  "Hrs later    >0.5                Strongly Recommended  >0.25 - <0.5        Recommended   0.1 - 0.25          Discouraged              Remeasure/reassess PCT  <0.1                Strongly Discouraged     Remeasure/reassess PCT    As 28 day mortality risk marker: \"Change in Procalcitonin Result\" (>80% or <=80%) if Day 0 (or Day 1) and Day 4 values are available. Refer to http://www.[facility]-pct-calculator.com    Change in PCT <=80%  A decrease of PCT levels below or equal to 80% defines a positive change in PCT test result representing a higher risk for 28-day all-cause mortality of patients diagnosed with severe sepsis for septic shock.    Change in PCT >80%  A decrease of PCT levels of more than 80% defines a negative change in PCT result representing a lower risk for 28-day all-cause mortality of patients diagnosed with severe sepsis or septic shock.       CBC Auto Differential [[id number]]  (Abnormal) Collected: 02/15/25 2153    Specimen: Blood from Arm, Right Updated: 02/15/25 2208     WBC 2.63 10*3/mm3      RBC 3.77 10*6/mm3      Hemoglobin 10.7 g/dL      Hematocrit 34.2 %      MCV 90.7 fL      MCH 28.4 pg      MCHC 31.3 g/dL      RDW 15.9 %      RDW-SD 53.5 fl      MPV 9.6 fL      Platelets 138 10*3/mm3      nRBC 0.0 /100 WBC     Manual Differential [[id number]]  (Abnormal) Collected: 02/15/25 2153    Specimen: Blood from Arm, Right Updated: 02/15/25 2238     Neutrophil % 64.0 %      Lymphocyte % 18.0 %      Monocyte % 15.0 %      Basophil % 3.0 %      Neutrophils Absolute 1.68 10*3/mm3      Lymphocytes Absolute 0.47 10*3/mm3      Monocytes Absolute 0.39 10*3/mm3      Basophils Absolute 0.08 10*3/mm3      RBC Morphology Normal     WBC Morphology Normal     Platelet Morphology Normal            Imaging Results (Last 24 Hours)       Procedure Component Value Units Date/Time    XR Chest 1 View [[id number]] Collected: 02/15/25 2136     Updated: 02/15/25 2140    Narrative:      SINGLE VIEW OF THE CHEST     HISTORY: Cough " and fever     COMPARISON: June 2, 2020     FINDINGS:  There is cardiomegaly. There is no vascular congestion. No pneumothorax  or pleural effusion is seen. There is patchy nonspecific bibasilar  consolidation.       Impression:      Patchy nonspecific bibasilar consolidation. Pneumonia is not excluded.     This report was finalized on 2/15/2025 9:37 PM by Dr. Erika Hernandez M.D on Workstation: [id number]               Results for orders placed during the hospital encounter of 08/30/23    Adult Transthoracic Echo Complete W/ Cont if Necessary Per Protocol    Interpretation Summary    Left ventricular systolic function is hyperdynamic (EF > 70%). Calculated left ventricular EF = 71.5%    The left ventricular cavity is small in size.    Left ventricular wall thickness is consistent with moderate concentric hypertrophy.    Left ventricular diastolic function is consistent with (grade II w/high LAP) pseudonormalization.    The left atrial cavity is moderately dilated.    Estimated right ventricular systolic pressure from tricuspid regurgitation is normal (<35 mmHg).    Mild dilation of the aortic root is present. The aortic root measures 4.6 cm.      No orders to display        Assessment/Plan     Active Hospital Problems    Diagnosis  POA    CAP (community acquired pneumonia) [J18.9]  Unknown    Acute respiratory failure with hypoxia [J96.01]  Unknown    Stage 3b chronic kidney disease [N18.32]  Unknown    Hypokalemia [E87.6]  Unknown    HTN (hypertension) [I10]  Unknown    Class 2 severe obesity due to excess calories with serious comorbidity and body mass index (BMI) of 39.0 to 39.9 in adult [E66.812, E66.01, Z68.39]  Not Applicable    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, bilateral [E11.3413]  Yes    BPH (benign prostatic hyperplasia) [N40.0]  Yes    History of renal cell cancer [Z85.528]  Not Applicable    Enlarged prostate [N40.0]  Yes    Anemia [D64.9]   Yes      Resolved Hospital Problems   No resolved problems to display.         #Pneumonia  #Hypoxia    -Patient states recently diagnosed with influenza at [facility] urgent care and was started on Tamiflu, will check RVP    -If RVP positive for influenza start Tamiflu    -Chest x-ray shows patchy nonspecific bibasilar consolidations    -Will cover for bacterial pneumonia     -Procalcitonin 0.47    -Rocephin 2 g IV daily    -Azithromycin 500 mg p.o. daily x 3 days    -Strep, Legionella, sputum    -Blood cultures    -Placed on 2 L nasal cannula, wean as tolerated    -DuoNeb as needed    -Symbicort twice daily      #CKD    -Status post right partial nephrectomy due to renal cancer    -Creatinine 3.4 on admission, appears to be near base    -Continue to monitor      #Hypokalemia    -Replace K     -Check magnesium    #DM    -ISS    #CAD    -Resume home aspirin and statin    #Multiple myeloma  #Pancytopenia    -Suspect pancytopenia due to multiple myeloma and possibly exacerbated by influenza.    -WBC 2.63, trend    -Hemoglobin 10.7, appears near base, no overt bleeding, follow labs    -Platelets 138, close to base, monitor    #Anxiety    -Resume home Lexapro    #Obesity    -BMI 37.13, complicates all aspects of care    #Hypertension    - Norvasc 2.5mg PO daily    #Hyperlipidemia    - statin    VTE Prophylaxis - Heparin SC.  Code Status - Full code.       DO [facility]  02/16/25  00:11 EST

## 2025-02-16 NOTE — ED PROVIDER NOTES
EMERGENCY DEPARTMENT ENCOUNTER  Room Number:  32/32  PCP: Brianna Thompson APRN  Independent Historians: Patient, EMS who brought patient      HPI:  Chief Complaint: had concerns including Flu+ and Flu Symptoms.     A complete HPI/ROS/PMH/PSH/SH/FH are unobtainable due to:   Chronic or social conditions impacting patient care (Social Determinants of Health):       Context: Contreras Albert is a 69 y.o. male with a medical history of multiple myeloma, chronic renal failure, hypertension who presents to the ED c/o acute flu symptoms.  Patient said symptoms ongoing for 3 days.  He has had cough which is occasionally productive of clear sputum.  He does complain of shortness of breath.  Denies chest pain.  He does report low-grade fever.  He is also had nausea vomiting and some diarrhea.  He has had 4 episodes of vomiting today.  He was prescribed Tamiflu but feels like he is not getting any better.  Patient was brought by EMS and placed on 3 L of nasal oxygen upon ED arrival.      Review of prior external notes (non-ED) -and- Review of prior external test results outside of this encounter:   I reviewed prior medical records including most recent nephrology office note.  Patient has history of multiple myeloma, stage IV renal disease and hypertension.  Review of prior records showed the patient was diagnosed with influenza and started on Tamiflu on 2/13.  Last hospitalization in 2022 with hypocalcemia and hypervolemia.      Prescription drug monitoring program review:         PAST MEDICAL HISTORY  Active Ambulatory Problems     Diagnosis Date Noted   • Renal mass 05/18/2016   • Anemia 05/22/2020   • Enlarged prostate 05/22/2020   • Benign hypertension with CKD (chronic kidney disease) stage IV 05/22/2020   • History of renal cell cancer 05/22/2020   • H/O partial nephrectomy - right 05/22/2020   • Multiple myeloma 05/26/2020   • Hypomagnesemia 05/31/2020   • Anemia in stage 3 chronic kidney disease 08/20/2020   • Cold  intolerance 09/03/2020   • Mixed hyperlipidemia 10/30/2020   • BPH (benign prostatic hyperplasia) 10/30/2020   • Class 2 severe obesity due to excess calories with serious comorbidity and body mass index (BMI) of 39.0 to 39.9 in adult 05/18/2021   • Age-related nuclear cataract of both eyes 04/27/2021   • Amblyopia of left eye 04/27/2021   • Bilateral hypertensive retinopathy 04/27/2021   • Bilateral myopia 04/27/2021   • History of malignant neoplasm of kidney 05/22/2020   • Severe nonproliferative diabetic retinopathy with clinically significant macular edema 09/02/2020   • Tinnitus 05/14/2021   • 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, bilateral 04/27/2021   • Malignant neoplasm of kidney 05/18/2016   • Anxiety 11/20/2021   • Type 2 diabetes mellitus with stage 4 chronic kidney disease, with long-term current use of insulin    • Other specified postprocedural states 09/17/2021   • Vitreous hemorrhage of right eye 07/27/2021   • Primary hypertension 05/20/2022   • Cataract 03/02/2022   • Hypocalcemia 09/23/2022   • Pancytopenia 09/24/2022   • Metabolic acidosis 09/27/2022   • Hypervolemia 09/28/2022   • Epiretinal membrane (ERM) of both eyes 12/16/2021   • Stage 4 chronic kidney disease 05/22/2020   • Vitamin D deficiency 11/30/2022   • Multiple myeloma not having achieved remission 11/30/2022   • Other secondary cataract, bilateral 04/26/2023   • Heartburn 05/25/2023   • Bilateral pseudophakia 08/22/2023     Resolved Ambulatory Problems     Diagnosis Date Noted   • Type 2 diabetes mellitus with retinopathy, with long-term current use of insulin 05/22/2020   • NATHANIEL (acute kidney injury) 05/22/2020   • CKD (chronic kidney disease) 05/22/2020   • Elevated troponin 05/22/2020   • Severe nonproliferative diabetic retinopathy of both eyes with macular edema associated with type 2 diabetes mellitus 09/02/2020   • Kidney disorder 01/15/2021   • Acute renal failure  syndrome 05/21/2020   • Diabetic macular edema(362.07) 05/26/2020   • Type 2 diabetes mellitus with unspecified diabetic retinopathy with macular edema 05/26/2020   • Diabetic macular edema(362.07) 05/26/2020   • Type 2 diabetes mellitus with stage 3 chronic kidney disease, with long-term current use of insulin 11/20/2021   • Chronic diarrhea 11/29/2021   • Other specified postprocedural states 09/17/2021   • Postural dizziness with presyncope 08/16/2023     Past Medical History:   Diagnosis Date   • Chronic renal insufficiency    • Clear cell carcinoma of kidney, right 2016   • Clostridium difficile colitis    • CVA (cerebral vascular accident)    • Diabetes mellitus    • H/O stem cell transplant 12/09/2020   • Hematoma of right lower leg    • High cholesterol    • Hypertension    • Multiple rib fractures    • Postural dizziness    • Testosterone deficiency          PAST SURGICAL HISTORY  Past Surgical History:   Procedure Laterality Date   • CATARACT EXTRACTION Left 12/17/2021   • CATARACT EXTRACTION Right 01/21/2022   • INSERTION HEMODIALYSIS CATHETER N/A 06/02/2020    Procedure: HEMODIALYSIS CATHETER INSERTION;  Surgeon: Sumanth Hernandez MD;  Location: [facility];  Service: Vascular;  Laterality: N/A;   • LIMBAL STEM CELL TRANSPLANT  12/09/2020   • NEPHRECTOMY PARTIAL Right 05/18/2016    Procedure: RT NEPHRECTOMY PARTIAL LAPAROSCOPIC WITH DAVINCI ROBOT;  Surgeon: Chad Garcia MD;  Location: [facility];  Service:    • PROSTATE SURGERY           FAMILY HISTORY  Family History   Problem Relation Age of Onset   • Parkinsonism Mother    • Arrhythmia Father    • Heart failure Father 84   • Macular degeneration Sister    • Diabetes Maternal Uncle    • Colon cancer Neg Hx    • Colon polyps Neg Hx    • Crohn's disease Neg Hx    • Irritable bowel syndrome Neg Hx    • Ulcerative colitis Neg Hx          SOCIAL HISTORY  Social History     Socioeconomic History   • Marital status:      Spouse  name: Gabby   Tobacco Use   • Smoking status: Never   • Smokeless tobacco: Never   Vaping Use   • Vaping status: Never Used   Substance and Sexual Activity   • Alcohol use: No   • Drug use: No   • Sexual activity: Not Currently     Partners: Female     Birth control/protection: None         ALLERGIES  Gabapentin, Lisinopril, Crestor [rosuvastatin], and Carvedilol      REVIEW OF SYSTEMS  Review of Systems   Constitutional:  Positive for fatigue and fever.   Respiratory:  Positive for cough and shortness of breath.    Cardiovascular:  Negative for chest pain.   Gastrointestinal:  Positive for diarrhea, nausea and vomiting.   Musculoskeletal:  Positive for myalgias.   All other systems reviewed and are negative.    Included in HPI  All systems reviewed and negative except for those discussed in HPI.      PHYSICAL EXAM    I have reviewed the triage vital signs and nursing notes.    ED Triage Vitals [02/15/25 2048]   Temp Heart Rate Resp BP SpO2   98.9 °F (37.2 °C) 80 18 (!) 190/95 95 %      Temp src Heart Rate Source Patient Position BP Location FiO2 (%)   Oral Monitor Lying Right arm --       Physical Exam  GENERAL: Alert male in mild distress.  Triage vitals notable for temperature 98.9.  Heart rate 80.  Blood pressure 190/95.  O2 sats 95% on 3 L nasal cannula  SKIN: Warm, dry  HENT: Normocephalic, atraumatic  EYES: no scleral icterus  CV: regular rhythm, regular rate-no murmur  RESPIRATORY: normal effort, coarse rhonchi bilaterally-O2 sats 94% on 1 L nasal cannula  ABDOMEN: soft, nontender, nondistended  MUSCULOSKELETAL: no deformity-mild peripheral edema  NEURO: alert, moves all extremities, follows commands      LAB RESULTS  Recent Results (from the past 24 hours)   Comprehensive Metabolic Panel    Collection Time: 02/15/25  9:53 PM    Specimen: Arm, Right; Blood   Result Value Ref Range    Glucose 151 (H) 65 - 99 mg/dL    BUN 47 (H) 8 - 23 mg/dL    Creatinine 3.40 (H) 0.76 - 1.27 mg/dL    Sodium 134 (L) 136 - 145  mmol/L    Potassium 3.2 (L) 3.5 - 5.2 mmol/L    Chloride 100 98 - 107 mmol/L    CO2 21.4 (L) 22.0 - 29.0 mmol/L    Calcium 7.5 (L) 8.6 - 10.5 mg/dL    Total Protein 5.9 (L) 6.0 - 8.5 g/dL    Albumin 3.1 (L) 3.5 - 5.2 g/dL    ALT (SGPT) 14 1 - 41 U/L    AST (SGOT) 19 1 - 40 U/L    Alkaline Phosphatase 48 39 - 117 U/L    Total Bilirubin 0.4 0.0 - 1.2 mg/dL    Globulin 2.8 gm/dL    A/G Ratio 1.1 g/dL    BUN/Creatinine Ratio 13.8 7.0 - 25.0    Anion Gap 12.6 5.0 - 15.0 mmol/L    eGFR 19.0 (L) >60.0 mL/min/1.73   Procalcitonin    Collection Time: 02/15/25  9:53 PM    Specimen: Arm, Right; Blood   Result Value Ref Range    Procalcitonin 0.47 (H) 0.00 - 0.25 ng/mL   CBC Auto Differential    Collection Time: 02/15/25  9:53 PM    Specimen: Arm, Right; Blood   Result Value Ref Range    WBC 2.63 (L) 3.40 - 10.80 10*3/mm3    RBC 3.77 (L) 4.14 - 5.80 10*6/mm3    Hemoglobin 10.7 (L) 13.0 - 17.7 g/dL    Hematocrit 34.2 (L) 37.5 - 51.0 %    MCV 90.7 79.0 - 97.0 fL    MCH 28.4 26.6 - 33.0 pg    MCHC 31.3 (L) 31.5 - 35.7 g/dL    RDW 15.9 (H) 12.3 - 15.4 %    RDW-SD 53.5 37.0 - 54.0 fl    MPV 9.6 6.0 - 12.0 fL    Platelets 138 (L) 140 - 450 10*3/mm3    nRBC 0.0 0.0 - 0.2 /100 WBC   Manual Differential    Collection Time: 02/15/25  9:53 PM    Specimen: Arm, Right; Blood   Result Value Ref Range    Neutrophil % 64.0 42.7 - 76.0 %    Lymphocyte % 18.0 (L) 19.6 - 45.3 %    Monocyte % 15.0 (H) 5.0 - 12.0 %    Basophil % 3.0 (H) 0.0 - 1.5 %    Neutrophils Absolute 1.68 (L) 1.70 - 7.00 10*3/mm3    Lymphocytes Absolute 0.47 (L) 0.70 - 3.10 10*3/mm3    Monocytes Absolute 0.39 0.10 - 0.90 10*3/mm3    Basophils Absolute 0.08 0.00 - 0.20 10*3/mm3    RBC Morphology Normal Normal    WBC Morphology Normal Normal    Platelet Morphology Normal Normal         RADIOLOGY  XR Chest 1 View    Result Date: 2/15/2025  SINGLE VIEW OF THE CHEST  HISTORY: Cough and fever  COMPARISON: June 2, 2020  FINDINGS: There is cardiomegaly. There is no vascular  congestion. No pneumothorax or pleural effusion is seen. There is patchy nonspecific bibasilar consolidation.      Patchy nonspecific bibasilar consolidation. Pneumonia is not excluded.  This report was finalized on 2/15/2025 9:37 PM by Dr. Erika Hernandez M.D on Workstation: [id number]         MEDICATIONS GIVEN IN ER  Medications   sodium chloride 0.9 % flush 10 mL (has no administration in time range)   sodium chloride 0.9 % bolus 500 mL (500 mL Intravenous New Bag 2/15/25 2154)   ondansetron (ZOFRAN) injection 4 mg (4 mg Intravenous Given 2/15/25 2211)         ORDERS PLACED DURING THIS VISIT:  Orders Placed This Encounter   Procedures   • XR Chest 1 View   • Comprehensive Metabolic Panel   • Procalcitonin   • CBC Auto Differential   • Manual Differential   • [facility] (on-call MD unless specified) Details   • Insert Peripheral IV   • CBC & Differential         OUTPATIENT MEDICATION MANAGEMENT:  Current Facility-Administered Medications Ordered in Epic   Medication Dose Route Frequency Provider Last Rate Last Admin   • sodium chloride 0.9 % flush 10 mL  10 mL Intravenous PRN Sumanth aCmacho MD         Current Outpatient Medications Ordered in Epic   Medication Sig Dispense Refill   • Accu-Chek Guide Test test strip USE TO CHECK BLOOD SUGAR THREE TIMES DAILY 300 each 0   • Accu-Chek Softclix Lancets lancets Check 3 times a day on insulin tx, poor control, hypoglycemia. Dx: E 11.22 300 each 3   • acetaminophen (TYLENOL) 325 MG tablet Take 2 tablets by mouth Every 6 (Six) Hours As Needed for Mild Pain. (Patient taking differently: Take 2 tablets by mouth Every 6 (Six) Hours As Needed for Mild Pain. As needed)     • acyclovir (ZOVIRAX) 800 MG tablet Take 1 tablet by mouth Daily. 90 tablet 2   • ASPIRIN 81 PO Take 81 mg by mouth Daily.     • atorvastatin (LIPITOR) 10 MG tablet Take 1 tablet by mouth Every Night. 90 tablet 2   • calcium carbonate (TUMS) 500 MG chewable tablet Chew 4 tablets Daily.     • Continuous Blood  Gluc  (FreeStyle Jin 2 [place]) device USE 1 FOR 1 DOSE     • Continuous Glucose Sensor (FreeStyle Jin 2 Sensor) misc USE 1 EVERY 14 DAYS 2 each 6   • DARATUMUMAB IV Infuse  into a venous catheter.     • dexAMETHasone (DECADRON) 4 MG tablet Take 3 tablets on day of chemo, 1 tablet on day #2 and 1 tablet on day #3 and repeat monthly. 15 tablet 1   • diphenoxylate-atropine (LOMOTIL) 2.5-0.025 MG per tablet Take 2 tablets by mouth 4 (Four) Times a Day As Needed for Diarrhea. 120 tablet 0   • escitalopram (LEXAPRO) 5 MG tablet TAKE 1 TABLET BY MOUTH EVERY DAY 90 tablet 1   • finasteride (PROSCAR) 5 MG tablet Take 1 tablet by mouth Daily.     • gabapentin (NEURONTIN) 300 MG capsule Take 2 capsules by mouth 2 (Two) Times a Day. 120 capsule 3   • glucagon (GLUCAGEN) 1 MG injection Inject 1 mg into the appropriate muscle as directed by prescriber 1 (One) Time As Needed for Low Blood Sugar. Follow package directions for low blood sugar. 1 kit 10   • icosapent ethyl (VASCEPA) 1 g capsule capsule TAKE 2 CAPSULES BY MOUTH TWICE DAILY WITH MEALS 360 capsule 1   • Insulin Lispro, 1 Unit Dial, (HUMALOG) 100 UNIT/ML solution pen-injector NONCHEMO DAYS: 10 USE FOR BREAKFAST AND LUNCH, 12 USE AT SUPPER. CHEMO DAYS: 15 UNITS 3X DAILY WITH MEALS 54 mL 1   • Insulin Pen Needle 32G X 4 MM misc USING 4 PEN NEEDLES DAILY 400 each 3   • Lantus SoloStar 100 UNIT/ML injection pen NON CHEMO DAYS TAKE 16 UNITS TWICE DAILY. CHEMO DAYS TAKE 30 UNITS TWICE DAILY FOR A MAX DOSE OF 70 UNITS DAILY 63 mL 1   • omega-3 acid ethyl esters (LOVAZA) 1 g capsule TAKE 2 CAPSULES BY MOUTH TWICE DAILY 360 capsule 1   • ondansetron (ZOFRAN) 8 MG tablet Take 1 tablet by mouth 3 (Three) Times a Day As Needed for Nausea or Vomiting. 30 tablet 5   • oseltamivir (Tamiflu) 30 MG capsule Take 1 capsule by mouth Daily. 5 capsule 0   • Phospha 250 Neutral 155-852-130 MG tablet TAKE 1 TABLET BY MOUTH DAILY 30 tablet 1   • pomalidomide (POMALYST) 2 MG chemo  capsule Take 1 capsule by mouth Daily. Take for 21 days on, then 7 days off. 21 capsule 0   • tamsulosin (FLOMAX) 0.4 MG capsule 24 hr capsule Take 1 capsule by mouth Daily.           PROCEDURES  Procedures            PROGRESS, DATA ANALYSIS, CONSULTS, AND MEDICAL DECISION MAKING  All labs have been independently interpreted by me.  All radiology studies have been reviewed by me. All EKG's have been independently viewed and interpreted by me.  Discussion below represents my analysis of pertinent findings related to patient's condition, differential diagnosis, treatment plan and final disposition.    Differential diagnosis includes but is not limited to Influenza, dehydration, pneumonia, electrolyte disturbance.      ED Course as of 02/15/25 2309   Sat Feb 15, 2025   2209 CBC does show some pancytopenia, slightly worse than when compared to prior.  Hemoglobin near prior values [DB]   2210 Chest x-ray independently interpreted by me shows no obvious acute infiltrate.  Official reading by Dr. Hernandez suggest some bibasilar patchy densities. [DB]   2243 Chemistries show BUN/creatinine of 47 and 3.4 which are similar to prior values and represent chronic renal failure, similar to baseline.  Patient also with chronic hypocalcemia and calcium of 7.5 which is exacerbated by low protein stores. [DB]   2302 I spoke with Dr. Bhatia with [facility].  Discussed patient presentation and ED findings.  He agrees to admit the patient to a telemetry observation bed. [MP]   2309 Patient feeling somewhat better after IV fluids but continues to require 1 L of oxygen to maintain O2 saturations. [DB]      ED Course User Index  [DB] Sumanth Camacho MD  [MP] Yelena Castro PA-C             AS OF 22:46 EST VITALS:    BP - 150/76  HR - 75  TEMP - 98.9 °F (37.2 °C) (Oral)  O2 SATS - 90%    COMPLEXITY OF CARE  69-year-old male with history of stage IV renal disease, diabetes presents with flulike symptoms including shortness of breath, nausea  vomiting and generalized weakness    Please see ED course above my independent evaluation and interpretation of ED testing notable for the following:    Chest x-ray without obvious large infiltrate  CBC with chronic anemia, some worsening of leukopenia and thrombocytopenia  Chemistries show BUN/creatinine of 47 and 3.4 which are similar to prior values and represents severe chronic renal failure.  Potassium slightly subtherapeutic at 3.2.  Calcium levels 7.5 and open chronically low in the past.    Patient treated with IV fluids and IV antiemetics.  Patient was requiring 1 L of oxygen to maintain O2 saturations even at rest.    Given his numerous comorbidities including chronic renal failure, diabetes and age 69 will admit for further evaluation and treatment of influenza and generalized weakness.      DIAGNOSIS  Final diagnoses:   Influenza A   Generalized weakness   Chronic renal failure, stage 4 (severe)   Hypocalcemia         DISPOSITION  ED Disposition       ED Disposition   Decision to Admit    Condition   --    Comment   --                Please note that portions of this document were completed with a voice recognition program.    Note Disclaimer: At [facility], we believe that sharing information builds trust and better relationships. You are receiving this note because you recently visited [facility]. It is possible you will see health information before a provider has talked with you about it. This kind of information can be easy to misunderstand. To help you fully understand what it means for your health, we urge you to discuss this note with your provider.         Sumanth Camacho MD  02/15/25 4334

## 2025-02-16 NOTE — SIGNIFICANT NOTE
02/16/25 1432   OTHER   Discipline physical therapist;other (see comments)  (Pt reports he is near baseline w mobility, has been ambulatory to bathroom, has spouse at home for assist and appropriate DME. Pt endorses he is hopeful to DC soon to make his 1230 chemo appt tmrw. will follow peripherally.)

## 2025-02-17 ENCOUNTER — TELEPHONE (OUTPATIENT)
Dept: ONCOLOGY | Facility: CLINIC | Age: 70
End: 2025-02-17
Payer: MEDICARE

## 2025-02-17 ENCOUNTER — READMISSION MANAGEMENT (OUTPATIENT)
Dept: CALL CENTER | Facility: HOSPITAL | Age: 70
End: 2025-02-17
Payer: MEDICARE

## 2025-02-17 VITALS
RESPIRATION RATE: 20 BRPM | TEMPERATURE: 97.9 F | BODY MASS INDEX: 33.56 KG/M2 | HEART RATE: 73 BPM | HEIGHT: 76 IN | WEIGHT: 275.57 LBS | OXYGEN SATURATION: 97 % | DIASTOLIC BLOOD PRESSURE: 81 MMHG | SYSTOLIC BLOOD PRESSURE: 152 MMHG

## 2025-02-17 LAB
ANION GAP SERPL CALCULATED.3IONS-SCNC: 12.9 MMOL/L (ref 5–15)
BUN SERPL-MCNC: 44 MG/DL (ref 8–23)
BUN/CREAT SERPL: 14.7 (ref 7–25)
CALCIUM SPEC-SCNC: 7.7 MG/DL (ref 8.6–10.5)
CHLORIDE SERPL-SCNC: 104 MMOL/L (ref 98–107)
CO2 SERPL-SCNC: 19.1 MMOL/L (ref 22–29)
CREAT SERPL-MCNC: 2.99 MG/DL (ref 0.76–1.27)
DEPRECATED RDW RBC AUTO: 52 FL (ref 37–54)
EGFRCR SERPLBLD CKD-EPI 2021: 22.2 ML/MIN/1.73
ERYTHROCYTE [DISTWIDTH] IN BLOOD BY AUTOMATED COUNT: 15.9 % (ref 12.3–15.4)
GLUCOSE SERPL-MCNC: 141 MG/DL (ref 65–99)
HCT VFR BLD AUTO: 30.4 % (ref 37.5–51)
HGB BLD-MCNC: 10 G/DL (ref 13–17.7)
MAGNESIUM SERPL-MCNC: 1.8 MG/DL (ref 1.6–2.4)
MCH RBC QN AUTO: 29.7 PG (ref 26.6–33)
MCHC RBC AUTO-ENTMCNC: 32.9 G/DL (ref 31.5–35.7)
MCV RBC AUTO: 90.2 FL (ref 79–97)
PLATELET # BLD AUTO: 125 10*3/MM3 (ref 140–450)
PMV BLD AUTO: 10.2 FL (ref 6–12)
POTASSIUM SERPL-SCNC: 3.7 MMOL/L (ref 3.5–5.2)
RBC # BLD AUTO: 3.37 10*6/MM3 (ref 4.14–5.8)
SODIUM SERPL-SCNC: 136 MMOL/L (ref 136–145)
WBC NRBC COR # BLD AUTO: 2.68 10*3/MM3 (ref 3.4–10.8)

## 2025-02-17 PROCEDURE — 96372 THER/PROPH/DIAG INJ SC/IM: CPT

## 2025-02-17 PROCEDURE — 83735 ASSAY OF MAGNESIUM: CPT | Performed by: STUDENT IN AN ORGANIZED HEALTH CARE EDUCATION/TRAINING PROGRAM

## 2025-02-17 PROCEDURE — 96366 THER/PROPH/DIAG IV INF ADDON: CPT

## 2025-02-17 PROCEDURE — 94799 UNLISTED PULMONARY SVC/PX: CPT

## 2025-02-17 PROCEDURE — G0378 HOSPITAL OBSERVATION PER HR: HCPCS

## 2025-02-17 PROCEDURE — 94664 DEMO&/EVAL PT USE INHALER: CPT

## 2025-02-17 PROCEDURE — 85027 COMPLETE CBC AUTOMATED: CPT | Performed by: STUDENT IN AN ORGANIZED HEALTH CARE EDUCATION/TRAINING PROGRAM

## 2025-02-17 PROCEDURE — 80048 BASIC METABOLIC PNL TOTAL CA: CPT | Performed by: STUDENT IN AN ORGANIZED HEALTH CARE EDUCATION/TRAINING PROGRAM

## 2025-02-17 PROCEDURE — 94761 N-INVAS EAR/PLS OXIMETRY MLT: CPT

## 2025-02-17 PROCEDURE — 25010000002 HEPARIN (PORCINE) PER 1000 UNITS: Performed by: STUDENT IN AN ORGANIZED HEALTH CARE EDUCATION/TRAINING PROGRAM

## 2025-02-17 RX ORDER — OSELTAMIVIR PHOSPHATE 30 MG/1
30 CAPSULE ORAL EVERY 12 HOURS SCHEDULED
Qty: 7 CAPSULE | Refills: 0 | Status: SHIPPED | OUTPATIENT
Start: 2025-02-17 | End: 2025-02-21

## 2025-02-17 RX ORDER — AZITHROMYCIN 500 MG/1
500 TABLET, FILM COATED ORAL NIGHTLY
Qty: 1 TABLET | Refills: 0 | Status: SHIPPED | OUTPATIENT
Start: 2025-02-17 | End: 2025-02-18

## 2025-02-17 RX ADMIN — TAMSULOSIN HYDROCHLORIDE 0.4 MG: 0.4 CAPSULE ORAL at 08:57

## 2025-02-17 RX ADMIN — AMLODIPINE BESYLATE 2.5 MG: 5 TABLET ORAL at 08:57

## 2025-02-17 RX ADMIN — HEPARIN SODIUM 5000 UNITS: 5000 INJECTION INTRAVENOUS; SUBCUTANEOUS at 05:49

## 2025-02-17 RX ADMIN — ASPIRIN 81 MG CHEWABLE TABLET 81 MG: 81 TABLET CHEWABLE at 08:57

## 2025-02-17 RX ADMIN — BUDESONIDE AND FORMOTEROL FUMARATE DIHYDRATE 2 PUFF: 160; 4.5 AEROSOL RESPIRATORY (INHALATION) at 06:59

## 2025-02-17 RX ADMIN — Medication 10 ML: at 08:57

## 2025-02-17 RX ADMIN — OSELTAMIVIR PHOSPHATE 30 MG: 30 CAPSULE ORAL at 08:57

## 2025-02-17 RX ADMIN — ESCITALOPRAM 5 MG: 5 TABLET, FILM COATED ORAL at 08:57

## 2025-02-17 NOTE — NURSING NOTE
"Pt called this RN into the room asking to leave AMA because his wife \"has come to pick me up and has called me 21 times in 45mins. She is very frustrated I am not there.She is drunk in my truck waiting on me.\" This RN explained to the patient that he was on 4L of oxygen and he was on no oxygen at home and he has a significant risk of further injury and possible death if he were to head home AMA. The patient proceeded to say he wants to leave to take his wife home and this is not the first time it has happened and it'll get worse if he doesn't leave. MD notified and MD counseled patient. HM notified. This RN will coordinate with  to see if we can provide Oxygen to patient.   After removing IV and monitoring equipment, Patient was at the door without oxygen on appearing pale and short of breath. This RN explained to the patient that he appears pale, diaphoretic and short of breath. Patient agreed but ultimately said he still needs to go. This RN reiterated his risk of death and patient still adamant on leaving despite such severe risk to his health and life.     Patient left before  was able to provide oxygen. This RN once again educated patient on severity of situation especially leaving without oxygen. Patient said \"we need to leave before this one (alluding to his wife in the room) implodes on us\" and then asked this RN to leave \"now\". Patient left AMA.   "

## 2025-02-17 NOTE — OUTREACH NOTE
Prep Survey      Flowsheet Row Responses   [facility] facility patient discharged from? [facility]   Is LACE score < 7 ? No   Eligibility Not Eligible   What are the reasons patient is not eligible? Other  [AMA]   Does the patient have one of the following disease processes/diagnoses(primary or secondary)? Pneumonia   Prep survey completed? Yes            Lyndsey MANZANARES - Registered Nurse

## 2025-02-17 NOTE — TELEPHONE ENCOUNTER
Received: 02/16/2025 01:42 PM  I'm calling because Contreras is in the hospital, he's at [facility], and he won't be at his appointment tomorrow at 12:30. Thank you.

## 2025-02-17 NOTE — NURSING NOTE
Paged Dr. Dobson with A to request sleep aid medication for patient.  Pt says he normally doesn't need anything for sleep but is having a hard time here and is anxious and agitated in appearance, fidgeting and losing track of time in thinking he has been here longer than he has as well as verbalizing being frustrated and agitated with having necessary medical equipment he is hooked up to such as O2 sensor, telemetry leads and an IV access site as well as O2.  Pt wants to move to an assigned bed in the main hospital as he feels it will make his stay better.  Addressed patient's concerns and advised no available beds at this time so unable to transfer patient until this changes and we have orders.    Received call back from IZZY Pittman and reported the above.  She advised she will input sleep aid medication.

## 2025-02-17 NOTE — PROGRESS NOTES
Name: Contreras Albert ADMIT: 2/15/2025   : 1955  PCP: Brianna Thompson APRN    MRN: [id number] LOS: 0 days   AGE/SEX: 69 y.o. male  ROOM: [facility]     Subjective   Subjective   No acute events overnight.  Patient did have increased oxygen requirements up to 4 L nasal cannula.  Nursing notes that he was snoring with episodes of apnea and likely has undiagnosed AP.  This morning, he states he is feeling well.  No chest pain or shortness of breath.  Currently on 2 L nasal cannula.    Objective   Objective     Vital Signs  Temp:  [97.9 °F (36.6 °C)-98.8 °F (37.1 °C)] 97.9 °F (36.6 °C)  Heart Rate:  [68-75] 73  Resp:  [16-20] 20  BP: (135-152)/(80-91) 152/81  SpO2:  [92 %-97 %] 97 %  on  Flow (L/min) (Oxygen Therapy):  [1-4] 3.5;   Device (Oxygen Therapy): nasal cannula  Body mass index is 33.54 kg/m².    Physical Exam  General: Alert, no acute distress.  Sitting up in bed.  Answers questions appropriately.  ENT: No conjunctival injection or scleral icterus. Moist mucous membranes.   Neuro: Eyes open and moving in all directions, generalized weakness, face symmetric, no focal deficits.   Lungs: Diminished aeration, occasional rhonchi.  No distress.  Nasal cannula in place.  Heart: RRR, no murmurs. No edema.  Abdomen: Soft, non-tender, non-distended. Normal bowel sounds.   Ext: Warm and well-perfused. No edema.   Skin: No rashes or lesions. IV site without swelling or erythema.     Results Review     I reviewed the patient's new clinical results:  Results from last 7 days   Lab Units 25  0723 25  0705 02/15/25  2153   WBC 10*3/mm3 2.68* 2.41* 2.63*   HEMOGLOBIN g/dL 10.0* 10.4* 10.7*   PLATELETS 10*3/mm3 125* 136* 138*     Results from last 7 days   Lab Units 25  0723 25  1732 25  0705 02/15/25  2153   SODIUM mmol/L 136  --  133* 134*   POTASSIUM mmol/L 3.7 3.9 3.2* 3.2*   CHLORIDE mmol/L 104  --  103 100   CO2 mmol/L 19.1*  --  17.0* 21.4*   BUN mg/dL 44*  --  40* 47*   CREATININE mg/dL  2.99*  --  3.12* 3.40*   GLUCOSE mg/dL 141*  --  128* 151*   EGFR mL/min/1.73 22.2*  --  21.1* 19.0*     Results from last 7 days   Lab Units 02/15/25  2153   ALBUMIN g/dL 3.1*   BILIRUBIN mg/dL 0.4   ALK PHOS U/L 48   AST (SGOT) U/L 19   ALT (SGPT) U/L 14     Results from last 7 days   Lab Units 02/17/25  0723 02/16/25  0705 02/15/25  2153   CALCIUM mg/dL 7.7* 7.4* 7.5*   ALBUMIN g/dL  --   --  3.1*   MAGNESIUM mg/dL 1.8 1.7 1.7     Results from last 7 days   Lab Units 02/15/25  2153   PROCALCITONIN ng/mL 0.47*     Glucose   Date/Time Value Ref Range Status   02/16/2025 2019 155 (H) 70 - 130 mg/dL Final   02/16/2025 1657 141 (H) 70 - 130 mg/dL Final   02/16/2025 1153 124 70 - 130 mg/dL Final   02/16/2025 0738 132 (H) 70 - 130 mg/dL Final   02/16/2025 0011 132 (H) 70 - 130 mg/dL Final       XR Chest 1 View    Result Date: 2/15/2025  Patchy nonspecific bibasilar consolidation. Pneumonia is not excluded.  This report was finalized on 2/15/2025 9:37 PM by Dr. Erika Hernandez M.D on Workstation: [id number]       I have personally reviewed all medications:  Scheduled Medications  amLODIPine, 2.5 mg, Oral, Q24H  aspirin, 81 mg, Oral, Daily  atorvastatin, 10 mg, Oral, Nightly  azithromycin, 500 mg, Oral, Nightly  budesonide-formoterol, 2 puff, Inhalation, BID - RT  cefTRIAXone, 2,000 mg, Intravenous, Q24H  escitalopram, 5 mg, Oral, Daily  heparin (porcine), 5,000 Units, Subcutaneous, Q8H  insulin lispro, 2-9 Units, Subcutaneous, 4x Daily AC & at Bedtime  oseltamivir, 30 mg, Oral, Q12H  sodium chloride, 10 mL, Intravenous, Q12H  tamsulosin, 0.4 mg, Oral, Daily    Infusions   Diet  Diet: Cardiac, Diabetic, Renal; Healthy Heart (2-3 Na+); Consistent Carbohydrate; Low Sodium (2-3g), Low Potassium; Texture: Regular (IDDSI 7); Fluid Consistency: Thin (IDDSI 0)      Intake/Output Summary (Last 24 hours) at 2/17/2025 1109  Last data filed at 2/17/2025 0913  Gross per 24 hour   Intake 1760 ml   Output 500 ml   Net 1260 ml        Assessment/Plan     Active Hospital Problems    Diagnosis  POA    CAP (community acquired pneumonia) [J18.9]  Unknown    Acute respiratory failure with hypoxia [J96.01]  Unknown    Stage 3b chronic kidney disease [N18.32]  Unknown    Hypokalemia [E87.6]  Unknown    HTN (hypertension) [I10]  Unknown    Class 2 severe obesity due to excess calories with serious comorbidity and body mass index (BMI) of 39.0 to 39.9 in adult [E66.812, E66.01, Z68.39]  Not Applicable    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     69 y.o. male with shortness of breath found to have pneumonia.    Acute hypoxic respiratory failure  Unspecified bacterial pneumonia  Influenza A  -RPP positive for flu A  -CXR with patchy nonspecific bibasilar consolidations, procalcitonin elevated  -Ceftriaxone x 5 days  -Azithromycin 500 mg x 3 days  -Strep pneumo, Legionella pending.  Sputum culture if able to produce sputum.  -Blood cultures pending  -Currently on 2 L nasal cannula, wean as able  -Tamiflu, renally dosed  -Continue home Symbicort.  No wheezing on send no indication for steroids at this time but will monitor.    CKD  Metabolic acidosis  Hyponatremia  -S/p right partial nephrectomy due to renal cancer  -Creatinine 2.99 today, actually improved from baseline  -Follows with Dr. Hutchison as an outpatient  -Avoid nephrotoxic agents including NSAIDs and contrast dyes  -Repeat BMP with morning labs  -Should creatinine become elevated above baseline, consider nephrology consult    Multiple myeloma   Pancytopenia  -WBC, Hgb, PLT remain low but fairly stable from yesterday  -Likely component of viral suppression secondary to influenza on  top of his MM  -Follows with Dr. Del Valle as an outpatient, currently undergoing treatment  -Repeat CBC with morning labs, transfuse for Hgb less than 7  -Plan to consult oncology today per patient request given that he has an appointment with them    Hypertension  CAD  -BP trends acceptable at this time  -Continue amlodipine  -Titrate meds as needed  -ASA and statin    Type 2 diabetes mellitus  -Blood glucose trend acceptable at this time  -Continue SSI  -Can add reduced dose of home Lantus if needed based on blood glucose trends    Hypokalemia  -Potassium normal today  -Replace as needed, repeat BMP with morning labs    Heparin for DVT prophylaxis.  Full code.  Discussed with patient and nursing staff.  Anticipate discharge TBD    Vanessa Erazo MD  [facility]  02/17/25  11:09 EST    Addendum:  Was notified by nursing after I rounded on this patient that his wife arrived at the hospital and was reportedly intoxicated.  The patient was adamant that he needed to drive his wife at home.  An Uber was suggested but the patient declined.  See nursing note for further details.  Spoke with the patient and urged him to remain hospitalized.  I also offered to assist in any way that we could socially to make it easier for him to stay but he declined.  Told him my medical recommendation was that he remain in the hospital given his oxygen requirements.  Discussed with the patient's the risks of leaving AMA which include but are not limited to worsening hypoxia, respiratory failure, cardiac arrest, and death.  He did express understanding.  Despite these risks, he is still electing to leave AMA.  Did send his oral antibiotics to complete his course for pneumonia to his preferred pharmacy.  Nursing is working with [facility] at this time to see if it is possible for us to arrange home oxygen for him.  If we are able to get this for him, I will order.

## 2025-02-17 NOTE — PROGRESS NOTES
Case Management Discharge Note      Final Note: Pt left AMA before CCP could get to eval pt for home O2.         Selected Continued Care - Discharged on 2/17/2025 Admission date: 2/15/2025 - Discharge disposition: Home or Self Care      Destination    No services have been selected for the patient.                Durable Medical Equipment    No services have been selected for the patient.                Dialysis/Infusion    No services have been selected for the patient.                Home Medical Care    No services have been selected for the patient.                Therapy    No services have been selected for the patient.                Community Resources    No services have been selected for the patient.                Community & DME    No services have been selected for the patient.                    Selected Continued Care - Episodes Includes continued care and service providers with selected services from the active episodes listed below      Oncology- External Fill Episode start date: 10/9/2023   There are no active outsourced providers for this episode.                      Final Discharge Disposition Code: 07 - left AMA

## 2025-02-17 NOTE — PLAN OF CARE
Goal Outcome Evaluation:         Pt A&Ox4, started shift at 1L n/c, pt requested sleep aid and it was administered per orders but pt had apnea during HS and was snoring, required increased O2 during HS so is currently on 4L.  SR-ST on heart monitor, pt agitated and frustrated this shift with being hooked up to so many wires/cords.  Explained purposes of them and pt wants to be in the main hospital but I advised no beds available at this time.  Pt denies pain.  Two BM this shift, urinal and toilet to void.  Pt advises plans to be d/c home today.        Problem: Adult Inpatient Plan of Care  Goal: Plan of Care Review  Outcome: Progressing  Goal: Optimal Comfort and Wellbeing  Outcome: Progressing  Intervention: Monitor Pain and Promote Comfort  Description: Assess pain level, treatment efficacy and patient response at regular intervals using a consistent pain scale.  Consider the presence and impact of preexisting chronic pain.  Encourage patient and caregiver involvement in pain assessment, interventions and safety measures.  Promote activity; balance with sleep and rest to enhance healing.  Recent Flowsheet Documentation  Taken 2/16/2025 2349 by Yessenia Robin, RN  Pain Management Interventions:   care clustered   no interventions per patient request  Intervention: Provide Person-Centered Care  Description: Use a family-focused approach to care; encourage support system presence and participation.  Develop trust and rapport by proactively providing information, encouraging questions, addressing concerns and offering reassurance.  Acknowledge emotional response to hospitalization.  Recognize and utilize personal coping strategies and strengths; develop goals via shared decision-making.  Honor spiritual and cultural preferences.  Recent Flowsheet Documentation  Taken 2/16/2025 2349 by Yessenia Robin, RN  Trust Relationship/Rapport:   care explained   questions encouraged   reassurance provided   thoughts/feelings  acknowledged   questions answered  Taken 2/16/2025 1942 by Yessenia Robin, RN  Trust Relationship/Rapport:   care explained   choices provided   empathic listening provided   questions encouraged   reassurance provided   questions answered   emotional support provided   thoughts/feelings acknowledged  Goal: Readiness for Transition of Care  Outcome: Progressing     Problem: Violence Risk or Actual  Goal: Anger and Impulse Control  Outcome: Progressing  Intervention: Minimize Safety Risk  Description: Listen actively, observing verbal and nonverbal cues (e.g., irritability, confusion, lack of cooperation, demanding behavior, body posture, expression); take threats seriously.  Maintain a therapeutic presence; utilize calm, empathetic tone of voice, nonjudgmental attitude and nonthreatening body language; listen carefully.  Assess and provide for unmet needs, including nutrition, comfort, hydration, hygiene, companionship and appropriate rest.  Utilize empathetic but firm and concise communication; set limits, offer choices and propose alternatives.  Remove stimuli and objects that may lead to harming self or others.  Maintain clear path to room exit; keep door open during care.  Ask directly about homicidal and suicidal intent; provide additional safety measures based on level of risk (e.g., one-on-one observation, duty to warn).  Implement least restrictive measures if attempts to de-escalate violent or injurious behaviors are unsuccessful.  Recent Flowsheet Documentation  Taken 2/17/2025 0409 by Yessenia Robin, RN  Enhanced Safety Measures:   bed alarm set   room near unit station  Taken 2/17/2025 0204 by Yessenia Robin, RN  Enhanced Safety Measures:   bed alarm set   room near unit station  Taken 2/16/2025 2349 by Yessenia Robin, RN  Sensory Stimulation Regulation:   care clustered   lighting decreased  Enhanced Safety Measures:   bed alarm set   room near unit station  Taken 2/16/2025 2207 by Lobito  Yessenia, RN  Enhanced Safety Measures:   bed alarm set   room near unit station  Taken 2/16/2025 1942 by Yessenia Robin, RN  Sensory Stimulation Regulation:   care clustered   lighting decreased  Enhanced Safety Measures:   bed alarm set   room near unit station  Intervention: Promote Self-Control  Description: Explore perception of the situation; acknowledge feelings; provide information and reassurance.  Maintain a calm and reassuring environment; minimize noise; respect personal space.  Identify and reduce causes of stress, triggers or precipitating factors of unsafe behavior.  Involve support system and family members, if helpful.  Advocate for maintaining previously established self-management plan, including medication regimen for the treatment of an underlying disorder.  Discuss and implement methods to recognize and manage emotions (e.g., verbalization, calming techniques, physical activity).  Assess and monitor for signs and symptoms of behavioral health concerns (e.g., substance use, psychosis).  Establish or reconnect linkage with social supports and community-based services.  Recent Flowsheet Documentation  Taken 2/16/2025 2349 by Yessenia Robin, RN  Supportive Measures:   active listening utilized   decision-making supported  Environmental Support:   calm environment promoted   rest periods encouraged  Taken 2/16/2025 1942 by Yessenia Robin, RN  Supportive Measures:   active listening utilized   decision-making supported  Environmental Support:   calm environment promoted   rest periods encouraged     Problem: Fall Injury Risk  Goal: Absence of Fall and Fall-Related Injury  Outcome: Progressing  Intervention: Identify and Manage Contributors  Description: Develop a fall prevention plan, considering patient-centered interventions and family/caregiver involvement; identify and address patient's facilitators and barriers.  Provide reorientation, appropriate sensory stimulation and routines with  changes in mental status to decrease risk of fall.  Promote use of personal vision and auditory aids.  Assess assistance level required for safe and effective self-care; provide support as needed, such as toileting and mobilization. For age 65 and older, implement timed toileting with assistance.  Encourage physical activity, such as performance of mobility and self-care at highest level of patient ability, multicomponent exercise program and provision of appropriate assistive devices.  If fall occurs, assess the severity of injury; implement fall injury protocol. Determine the cause and revise fall injury prevention plan.  Regularly review and advocate for medication adjustment to decrease fall risk; consider administration times, polypharmacy and age.  Balance adequate pain management with potential for oversedation.  Recent Flowsheet Documentation  Taken 2/17/2025 0409 by Yessenia Robin, RN  Medication Review/Management:   medications reviewed   high-risk medications identified  Taken 2/17/2025 0204 by Yessenia Robin, RN  Medication Review/Management:   high-risk medications identified   medications reviewed  Taken 2/16/2025 2349 by Yessenia Robin, RN  Medication Review/Management:   medications reviewed   high-risk medications identified  Self-Care Promotion:   independence encouraged   BADL personal objects within reach   adaptive equipment use encouraged  Taken 2/16/2025 2207 by Yessenia Robin, RN  Medication Review/Management:   medications reviewed   high-risk medications identified  Taken 2/16/2025 1942 by Yessenia Robin, RN  Medication Review/Management:   medications reviewed   high-risk medications identified  Self-Care Promotion:   independence encouraged   BADL personal objects within reach   adaptive equipment use encouraged  Intervention: Promote Injury-Free Environment  Description: Provide a safe, barrier-free environment that encourages independent activity.  Keep care area uncluttered  and well-lighted.  Determine need for increased observation or monitoring.  Avoid use of devices that minimize mobility, such as restraints or indwelling urinary catheter.  Recent Flowsheet Documentation  Taken 2/17/2025 0409 by Yessenia Robin RN  Safety Promotion/Fall Prevention: safety round/check completed  Taken 2/17/2025 0204 by Yessenia Robin RN  Safety Promotion/Fall Prevention: safety round/check completed  Taken 2/16/2025 2349 by Yessenia Robin RN  Safety Promotion/Fall Prevention: safety round/check completed  Taken 2/16/2025 2207 by Yessenia Robin RN  Safety Promotion/Fall Prevention: safety round/check completed  Taken 2/16/2025 1942 by Yessenia Robin RN  Safety Promotion/Fall Prevention: safety round/check completed     Problem: Comorbidity Management  Goal: Blood Glucose Level Within Target Range  Outcome: Progressing  Intervention: Monitor and Manage Glycemia  Description: Establish target blood glucose levels based on patient-specific factors, such as age, diabetes-related complications and illness severity.  Document blood glucose levels and monitor trend.  Provide antihyperglycemic pharmacologic therapy to maintain blood glucose levels within targeted range.  Advocate for patient use of home devices such as insulin pump, continuous glucose monitor; assess for safe and competent participation in care.  Check blood glucose level if there is a change in mental or cognitive status.  Recognize, treat and document hypoglycemia event and potential cause.  Assess current lifestyle patterns; acknowledging positive patterns supporting wellbeing.  Evaluate effectiveness of coping skills; encourage expression of feelings, expectations and concerns related to disease management and quality of life; reinforce education to enhance management plan and wellbeing.  Recent Flowsheet Documentation  Taken 2/17/2025 0409 by Yessenia Robin, RN  Medication Review/Management:   medications reviewed    high-risk medications identified  Taken 2/17/2025 0204 by Yessenia Robin RN  Medication Review/Management:   high-risk medications identified   medications reviewed  Taken 2/16/2025 2349 by Yessenia Robin RN  Medication Review/Management:   medications reviewed   high-risk medications identified  Taken 2/16/2025 2207 by Yessenia Robin RN  Medication Review/Management:   medications reviewed   high-risk medications identified  Taken 2/16/2025 1942 by Yessenia Robin RN  Medication Review/Management:   medications reviewed   high-risk medications identified  Goal: Blood Pressure in Desired Range  Outcome: Progressing  Intervention: Maintain Blood Pressure Management  Description: Evaluate adherence to home antihypertensive regimen (e.g., exercise and activity, diet modification, medication).  Provide scheduled antihypertensive medication; consider administration time and effects (e.g., avoid giving diuretic prior to bedtime).  Monitor response to antihypertensive medication therapy (e.g., blood pressure, electrolyte levels, medication effects).  Minimize risk of orthostatic hypotension; encourage caution with position changes, particularly if elderly.  Recent Flowsheet Documentation  Taken 2/17/2025 0409 by Yessenia Robin RN  Medication Review/Management:   medications reviewed   high-risk medications identified  Taken 2/17/2025 0204 by Yessenia Robin RN  Medication Review/Management:   high-risk medications identified   medications reviewed  Taken 2/16/2025 2349 by Yessenia Robin RN  Medication Review/Management:   medications reviewed   high-risk medications identified  Taken 2/16/2025 2207 by Yessenia Robin RN  Medication Review/Management:   medications reviewed   high-risk medications identified  Taken 2/16/2025 1942 by Yessenia Robin RN  Medication Review/Management:   medications reviewed   high-risk medications identified

## 2025-02-21 LAB
BACTERIA SPEC AEROBE CULT: NORMAL
BACTERIA SPEC AEROBE CULT: NORMAL

## 2025-02-23 DIAGNOSIS — Z79.4 TYPE 2 DIABETES MELLITUS WITH STAGE 4 CHRONIC KIDNEY DISEASE, WITH LONG-TERM CURRENT USE OF INSULIN: ICD-10-CM

## 2025-02-23 DIAGNOSIS — E11.22 TYPE 2 DIABETES MELLITUS WITH STAGE 4 CHRONIC KIDNEY DISEASE, WITH LONG-TERM CURRENT USE OF INSULIN: ICD-10-CM

## 2025-02-23 DIAGNOSIS — N18.4 TYPE 2 DIABETES MELLITUS WITH STAGE 4 CHRONIC KIDNEY DISEASE, WITH LONG-TERM CURRENT USE OF INSULIN: ICD-10-CM

## 2025-02-24 RX ORDER — INSULIN LISPRO 100 [IU]/ML
INJECTION, SOLUTION INTRAVENOUS; SUBCUTANEOUS
Qty: 135 ML | Refills: 2 | Status: SHIPPED | OUTPATIENT
Start: 2025-02-24

## 2025-02-24 NOTE — TELEPHONE ENCOUNTER
Rx Refill Note  Requested Prescriptions     Pending Prescriptions Disp Refills    Insulin Lispro, 1 Unit Dial, (HUMALOG) 100 UNIT/ML solution pen-injector [Pharmacy Med Name: INSULIN LISPRO 100U/ML KWIKPEN 3ML] 54 mL 1     Sig: NONCHEMO DAYS: 10 UNITS FOR BREAKFAST AND LUNCH, 12 UNITS AT SUPPER. CHEMO DAYS: 15 UNITS 3X DAILY WITH MEALS      Last office visit with prescribing clinician: 1/29/2025   Last telemedicine visit with prescribing clinician: Visit date not found   Next office visit with prescribing clinician: 6/4/2025                         Would you like a call back once the refill request has been completed: [] Yes [] No    If the office needs to give you a call back, can they leave a voicemail: [] Yes [] No    Michelle Davis  02/24/25, 07:48 EST

## 2025-02-24 NOTE — TELEPHONE ENCOUNTER
[facility] CALLED BACK THEY HAVE NOT RECEIVED ANY CALL BACK. DOES THE DOCTOR JUST WANT TO SKIP FEBRUARY OR RESCHEDULE

## 2025-02-26 NOTE — PROGRESS NOTES
Specialty Pharmacy Patient Management Program  Per Protocol Prescription Order or Refill       Requested Prescriptions     Signed Prescriptions Disp Refills    pomalidomide (POMALYST) 2 MG chemo capsule 21 capsule 0     Sig: Take 1 capsule by mouth Daily. Take for 21 days on, then 7 days off.     Prescription orders above were sent to [facility] per Collaborative Care Agreement Protocol.     Completed independent double check on medication order/RX.    Yumiko EsparzaD, [facility]  Clinical Specialty Pharmacist, Oncology  11/13/2024  15:26 EST  
 Specialty Pharmacy Patient Management Program  Per Protocol Prescription Order or Refill     Patient will be filling or currently fills medications at [facility] and is enrolled in the Patient Management Program.    Requested Prescriptions     Signed Prescriptions Disp Refills    pomalidomide (POMALYST) 2 MG chemo capsule 21 capsule 0     Sig: Take 1 capsule by mouth Daily. Take for 21 days on, then 7 days off.     Prescription orders above were sent to the pharmacy per Collaborative Care Agreement Protocol.     Last Office Visit: 10/28/24  Next Office Visit: 1/20/25    Carlos Clarke PharmD, BCOP  Clinical Specialty Pharmacist, Oncology  11/13/2024  12:01 EST      
Left arm;

## 2025-03-04 ENCOUNTER — SPECIALTY PHARMACY (OUTPATIENT)
Dept: PHARMACY | Facility: HOSPITAL | Age: 70
End: 2025-03-04
Payer: MEDICARE

## 2025-03-04 NOTE — PROGRESS NOTES
Specialty Pharmacy Patient Management Program  Per Protocol Prescription Order or Refill     Patient will be filling or currently fills medications at [facility] and is enrolled in the Patient Management Program.    Requested Prescriptions     Signed Prescriptions Disp Refills    pomalidomide (POMALYST) 2 MG chemo capsule 21 capsule 0     Sig: Take 1 capsule by mouth Daily. Take for 21 days on, then 7 days off.  Indications: Multiple Myeloma     Prescription orders above were sent to the pharmacy per Collaborative Care Agreement Protocol.     Last Office Visit: 1/20/25  Next Office Visit: 3/17/25    Carlos Clarke PharmD, BCOP  Clinical Specialty Pharmacist, Oncology  3/4/2025  09:42 EST

## 2025-03-04 NOTE — PROGRESS NOTES
Specialty Pharmacy Patient Management Program  Per Protocol Prescription Order or Refill       Requested Prescriptions     Signed Prescriptions Disp Refills    pomalidomide (POMALYST) 2 MG chemo capsule 21 capsule 0     Sig: Take 1 capsule by mouth Daily. Take for 21 days on, then 7 days off.  Indications: Multiple Myeloma     Prescription orders above were sent to [facility] per Collaborative Care Agreement Protocol.     Completed independent double check on medication order/RX.    Yumiko EsparzaD, [facility]  Clinical Specialty Pharmacist, Oncology  3/4/2025  10:39 EST

## 2025-03-17 ENCOUNTER — LAB (OUTPATIENT)
Dept: LAB | Facility: HOSPITAL | Age: 70
End: 2025-03-17
Payer: MEDICARE

## 2025-03-17 ENCOUNTER — INFUSION (OUTPATIENT)
Dept: ONCOLOGY | Facility: HOSPITAL | Age: 70
End: 2025-03-17
Payer: MEDICARE

## 2025-03-17 ENCOUNTER — OFFICE VISIT (OUTPATIENT)
Dept: ONCOLOGY | Facility: CLINIC | Age: 70
End: 2025-03-17
Payer: MEDICARE

## 2025-03-17 VITALS
OXYGEN SATURATION: 98 % | BODY MASS INDEX: 35.7 KG/M2 | DIASTOLIC BLOOD PRESSURE: 80 MMHG | TEMPERATURE: 98.2 F | WEIGHT: 293.2 LBS | SYSTOLIC BLOOD PRESSURE: 150 MMHG | HEART RATE: 78 BPM | RESPIRATION RATE: 17 BRPM | HEIGHT: 76 IN

## 2025-03-17 DIAGNOSIS — C90.00 MULTIPLE MYELOMA NOT HAVING ACHIEVED REMISSION: Primary | ICD-10-CM

## 2025-03-17 DIAGNOSIS — D63.1 ANEMIA IN STAGE 3B CHRONIC KIDNEY DISEASE: ICD-10-CM

## 2025-03-17 DIAGNOSIS — Z79.899 HIGH RISK MEDICATION USE: ICD-10-CM

## 2025-03-17 DIAGNOSIS — T45.1X5A NEUROPATHY DUE TO CHEMOTHERAPEUTIC DRUG: ICD-10-CM

## 2025-03-17 DIAGNOSIS — M89.9 LYTIC BONE LESIONS ON XRAY: ICD-10-CM

## 2025-03-17 DIAGNOSIS — C90.00 MULTIPLE MYELOMA NOT HAVING ACHIEVED REMISSION: ICD-10-CM

## 2025-03-17 DIAGNOSIS — N18.32 ANEMIA IN STAGE 3B CHRONIC KIDNEY DISEASE: ICD-10-CM

## 2025-03-17 DIAGNOSIS — D70.1 CHEMOTHERAPY INDUCED NEUTROPENIA: ICD-10-CM

## 2025-03-17 DIAGNOSIS — T45.1X5A CHEMOTHERAPY INDUCED NEUTROPENIA: ICD-10-CM

## 2025-03-17 DIAGNOSIS — G62.0 NEUROPATHY DUE TO CHEMOTHERAPEUTIC DRUG: ICD-10-CM

## 2025-03-17 DIAGNOSIS — E83.42 HYPOMAGNESEMIA: ICD-10-CM

## 2025-03-17 LAB
ALBUMIN SERPL-MCNC: 3.5 G/DL (ref 3.5–5.2)
ALBUMIN/GLOB SERPL: 1.4 G/DL
ALP SERPL-CCNC: 63 U/L (ref 39–117)
ALT SERPL W P-5'-P-CCNC: 16 U/L (ref 1–41)
ANION GAP SERPL CALCULATED.3IONS-SCNC: 13 MMOL/L (ref 5–15)
AST SERPL-CCNC: 12 U/L (ref 1–40)
BASOPHILS # BLD AUTO: 0.04 10*3/MM3 (ref 0–0.2)
BASOPHILS NFR BLD AUTO: 0.8 % (ref 0–1.5)
BILIRUB SERPL-MCNC: 0.4 MG/DL (ref 0–1.2)
BUN SERPL-MCNC: 32 MG/DL (ref 8–23)
BUN/CREAT SERPL: 10.1 (ref 7–25)
CALCIUM SPEC-SCNC: 9.2 MG/DL (ref 8.6–10.5)
CHLORIDE SERPL-SCNC: 103 MMOL/L (ref 98–107)
CO2 SERPL-SCNC: 25 MMOL/L (ref 22–29)
CREAT SERPL-MCNC: 3.17 MG/DL (ref 0.76–1.27)
DEPRECATED RDW RBC AUTO: 53.6 FL (ref 37–54)
EGFRCR SERPLBLD CKD-EPI 2021: 20.7 ML/MIN/1.73
EOSINOPHIL # BLD AUTO: 0.23 10*3/MM3 (ref 0–0.4)
EOSINOPHIL NFR BLD AUTO: 4.6 % (ref 0.3–6.2)
ERYTHROCYTE [DISTWIDTH] IN BLOOD BY AUTOMATED COUNT: 15.8 % (ref 12.3–15.4)
GLOBULIN UR ELPH-MCNC: 2.5 GM/DL
GLUCOSE SERPL-MCNC: 185 MG/DL (ref 65–99)
HCT VFR BLD AUTO: 35.1 % (ref 37.5–51)
HGB BLD-MCNC: 11 G/DL (ref 13–17.7)
IMM GRANULOCYTES # BLD AUTO: 0.03 10*3/MM3 (ref 0–0.05)
IMM GRANULOCYTES NFR BLD AUTO: 0.6 % (ref 0–0.5)
LYMPHOCYTES # BLD AUTO: 1.91 10*3/MM3 (ref 0.7–3.1)
LYMPHOCYTES NFR BLD AUTO: 37.9 % (ref 19.6–45.3)
MAGNESIUM SERPL-MCNC: 1.5 MG/DL (ref 1.6–2.4)
MCH RBC QN AUTO: 29.4 PG (ref 26.6–33)
MCHC RBC AUTO-ENTMCNC: 31.3 G/DL (ref 31.5–35.7)
MCV RBC AUTO: 93.9 FL (ref 79–97)
MONOCYTES # BLD AUTO: 0.46 10*3/MM3 (ref 0.1–0.9)
MONOCYTES NFR BLD AUTO: 9.1 % (ref 5–12)
NEUTROPHILS NFR BLD AUTO: 2.37 10*3/MM3 (ref 1.7–7)
NEUTROPHILS NFR BLD AUTO: 47 % (ref 42.7–76)
NRBC BLD AUTO-RTO: 0 /100 WBC (ref 0–0.2)
PHOSPHATE SERPL-MCNC: 3 MG/DL (ref 2.5–4.5)
PLATELET # BLD AUTO: 134 10*3/MM3 (ref 140–450)
PMV BLD AUTO: 9 FL (ref 6–12)
POTASSIUM SERPL-SCNC: 3.9 MMOL/L (ref 3.5–5.2)
PROT SERPL-MCNC: 6 G/DL (ref 6–8.5)
RBC # BLD AUTO: 3.74 10*6/MM3 (ref 4.14–5.8)
SODIUM SERPL-SCNC: 141 MMOL/L (ref 136–145)
WBC NRBC COR # BLD AUTO: 5.04 10*3/MM3 (ref 3.4–10.8)

## 2025-03-17 PROCEDURE — 80053 COMPREHEN METABOLIC PANEL: CPT

## 2025-03-17 PROCEDURE — 25010000002 DARATUMUMAB-HYALURONIDASE-FIHJ 1800-30000 MG-UT/15ML SOLUTION: Performed by: INTERNAL MEDICINE

## 2025-03-17 PROCEDURE — 84100 ASSAY OF PHOSPHORUS: CPT

## 2025-03-17 PROCEDURE — 85025 COMPLETE CBC W/AUTO DIFF WBC: CPT

## 2025-03-17 PROCEDURE — 96365 THER/PROPH/DIAG IV INF INIT: CPT

## 2025-03-17 PROCEDURE — 25010000002 MAGNESIUM SULFATE 2 GM/50ML SOLUTION: Performed by: INTERNAL MEDICINE

## 2025-03-17 PROCEDURE — 25010000002 DENOSUMAB 120 MG/1.7ML SOLUTION: Performed by: INTERNAL MEDICINE

## 2025-03-17 PROCEDURE — 83735 ASSAY OF MAGNESIUM: CPT

## 2025-03-17 PROCEDURE — 96401 CHEMO ANTI-NEOPL SQ/IM: CPT

## 2025-03-17 PROCEDURE — 36415 COLL VENOUS BLD VENIPUNCTURE: CPT

## 2025-03-17 PROCEDURE — 96372 THER/PROPH/DIAG INJ SC/IM: CPT

## 2025-03-17 RX ORDER — MAGNESIUM SULFATE HEPTAHYDRATE 40 MG/ML
2 INJECTION, SOLUTION INTRAVENOUS ONCE
Status: COMPLETED | OUTPATIENT
Start: 2025-03-17 | End: 2025-03-17

## 2025-03-17 RX ADMIN — DENOSUMAB 120 MG: 120 INJECTION SUBCUTANEOUS at 11:02

## 2025-03-17 RX ADMIN — MAGNESIUM SULFATE HEPTAHYDRATE 2 G: 40 INJECTION, SOLUTION INTRAVENOUS at 10:39

## 2025-03-17 RX ADMIN — DARATUMUMAB AND HYALURONIDASE-FIHJ (HUMAN RECOMBINANT) 1800 MG: 1800; 30000 INJECTION SUBCUTANEOUS at 11:03

## 2025-03-17 NOTE — PROGRESS NOTES
Subjective     CHIEF COMPLAINT:      Chief Complaint   Patient presents with    Follow-up     HISTORY OF PRESENT ILLNESS:     Contreras Albert is a 69 y.o. male patient who returns today for follow up on his multiple myeloma.  He is on Darzalex monthly and Xgeva every 3 months.  He is on Pomalyst daily for 21/28-day cycle and on Decadron [place].  He was hospitalized x 3 days for influenza A and pneumonia.  He was short of breath and feeling weak at that time.    Patient reports that he stopped taking gabapentin.  However noticed resolution of the diarrhea.  In addition, he noticed improvement in the leg swelling.  He only noticed minimal change in the tingling in his fingers but was not significant for him to go back on the medicine.    ROS:  Pertinent ROS is in the HPI.     Past medical, surgical, social and family history were reviewed.     MEDICATIONS:    Current Outpatient Medications:     Accu-Chek Guide Test test strip, USE TO CHECK BLOOD SUGAR THREE TIMES DAILY, Disp: 300 each, Rfl: 0    Accu-Chek Softclix Lancets lancets, Check 3 times a day on insulin tx, poor control, hypoglycemia. Dx: E 11.22, Disp: 300 each, Rfl: 3    acetaminophen (TYLENOL) 325 MG tablet, Take 2 tablets by mouth Every 6 (Six) Hours As Needed for Mild Pain. (Patient taking differently: Take 2 tablets by mouth Every 6 (Six) Hours As Needed for Mild Pain. As needed), Disp: , Rfl:     acyclovir (ZOVIRAX) 800 MG tablet, Take 1 tablet by mouth Daily., Disp: 90 tablet, Rfl: 2    amLODIPine (NORVASC) 5 MG tablet, Take 1 tablet by mouth Daily., Disp: , Rfl:     ASPIRIN 81 PO, Take 81 mg by mouth Daily., Disp: , Rfl:     atorvastatin (LIPITOR) 10 MG tablet, Take 1 tablet by mouth Every Night., Disp: 90 tablet, Rfl: 2    bumetanide (BUMEX) 2 MG tablet, Take 1 tablet by mouth Daily., Disp: , Rfl:     calcium carbonate (TUMS) 500 MG chewable tablet, Chew 4 tablets Daily., Disp: , Rfl:     Continuous Blood Gluc  (FreeStyle Jin 2 Sidney)  device, USE 1 FOR 1 DOSE, Disp: , Rfl:     Continuous Glucose Sensor (FreeStyle Jin 2 Sensor) misc, USE 1 EVERY 14 DAYS, Disp: 2 each, Rfl: 6    DARATUMUMAB IV, Infuse  into a venous catheter., Disp: , Rfl:     dexAMETHasone (DECADRON) 4 MG tablet, Take 3 tablets on day of chemo, 1 tablet on day #2 and 1 tablet on day #3 and repeat monthly., Disp: 15 tablet, Rfl: 1    diphenoxylate-atropine (LOMOTIL) 2.5-0.025 MG per tablet, Take 2 tablets by mouth 4 (Four) Times a Day As Needed for Diarrhea., Disp: 120 tablet, Rfl: 0    escitalopram (LEXAPRO) 5 MG tablet, TAKE 1 TABLET BY MOUTH EVERY DAY, Disp: 90 tablet, Rfl: 1    finasteride (PROSCAR) 5 MG tablet, Take 1 tablet by mouth Daily., Disp: , Rfl:     glucagon (GLUCAGEN) 1 MG injection, Inject 1 mg into the appropriate muscle as directed by prescriber 1 (One) Time As Needed for Low Blood Sugar. Follow package directions for low blood sugar., Disp: 1 kit, Rfl: 10    icosapent ethyl (VASCEPA) 1 g capsule capsule, TAKE 2 CAPSULES BY MOUTH TWICE DAILY WITH MEALS, Disp: 360 capsule, Rfl: 1    Insulin Lispro, 1 Unit Dial, (HUMALOG) 100 UNIT/ML solution pen-injector, On NONCHEMO DAYS: 30 to 35 units with each meal +1 unit per 50 greater than 150.  Maximum of 40 units per dose.  On CHEMO DAYS: 35 to 40 units 3 times daily with meals +2 units per 50 greater than 150.  Maximum of 50 units per dose., Disp: 135 mL, Rfl: 2    Insulin Pen Needle 32G X 4 MM misc, USING 4 PEN NEEDLES DAILY, Disp: 400 each, Rfl: 3    Lantus SoloStar 100 UNIT/ML injection pen, NON CHEMO DAYS TAKE 16 UNITS TWICE DAILY. CHEMO DAYS TAKE 30 UNITS TWICE DAILY FOR A MAX DOSE OF 70 UNITS DAILY, Disp: 63 mL, Rfl: 1    omega-3 acid ethyl esters (LOVAZA) 1 g capsule, TAKE 2 CAPSULES BY MOUTH TWICE DAILY, Disp: 360 capsule, Rfl: 1    ondansetron (ZOFRAN) 8 MG tablet, Take 1 tablet by mouth 3 (Three) Times a Day As Needed for Nausea or Vomiting., Disp: 30 tablet, Rfl: 5    pomalidomide (POMALYST) 2 MG chemo  "capsule, Take 1 capsule by mouth Daily. Take for 21 days on, then 7 days off.  Indications: Multiple Myeloma, Disp: 21 capsule, Rfl: 0    tamsulosin (FLOMAX) 0.4 MG capsule 24 hr capsule, Take 1 capsule by mouth Daily., Disp: , Rfl:     gabapentin (NEURONTIN) 300 MG capsule, Take 2 capsules by mouth 2 (Two) Times a Day. (Patient not taking: Reported on 3/17/2025), Disp: 120 capsule, Rfl: 3    Objective     VITAL SIGNS:     Vitals:    03/17/25 0915   BP: 150/80   Pulse: 78   Resp: 17   Temp: 98.2 °F (36.8 °C)   TempSrc: Oral   SpO2: 98%   Weight: 133 kg (293 lb 3.2 oz)   Height: 193 cm (75.98\")   PainSc: 0-No pain     Body mass index is 33.27 kg/m².     Wt Readings from Last 5 Encounters:   03/17/25 133 kg (293 lb 3.2 oz)   02/17/25 125 kg (275 lb 9.2 oz)   01/29/25 (!) 143 kg (316 lb 3.2 oz)   01/20/25 (!) 143 kg (314 lb 3.2 oz)   12/23/24 (!) 141 kg (311 lb 3.2 oz)     PHYSICAL EXAMINATION:   GENERAL: The patient appears in fair general condition, not in acute distress.   SKIN: No Ecchymosis.  EYES: No jaundice. Pallor.  CHEST: Normal respiratory effort. Normal breathing sounds bilaterally. No added sounds.  CVS: Normal S1 and S2. No Murmur.  ABDOMEN: Nondistended.  EXTREMITIES: Bilateral leg edema, right larger than left.  No calf tenderness.  Chronic hyperpigmentation of the distal aspect of the legs.    DIAGNOSTIC DATA:     Results from last 7 days   Lab Units 03/17/25  0849   WBC 10*3/mm3 5.04   NEUTROS ABS 10*3/mm3 2.37   HEMOGLOBIN g/dL 11.0*   HEMATOCRIT % 35.1*   PLATELETS 10*3/mm3 134*     Results from last 7 days   Lab Units 03/17/25  0849   SODIUM mmol/L 141   POTASSIUM mmol/L 3.9   CHLORIDE mmol/L 103   CO2 mmol/L 25.0   BUN mg/dL 32*   CREATININE mg/dL 3.17*   CALCIUM mg/dL 9.2   ALBUMIN g/dL 3.5   BILIRUBIN mg/dL 0.4   ALK PHOS U/L 63   ALT (SGPT) U/L 16   AST (SGOT) U/L 12   GLUCOSE mg/dL 185*   MAGNESIUM mg/dL 1.5*     Component      Latest Ref Rng 11/25/2024 12/23/2024 1/20/2025   IgG      603 - " 1613 mg/dL 646  655  632    IgA      61 - 437 mg/dL 73  82  80    IgM      20 - 172 mg/dL 23  23  28    Total Protein      6.0 - 8.5 g/dL 5.6 (L)  5.9 (L)  5.8 (L)    Albumin      2.9 - 4.4 g/dL 3.6  3.3  3.2    Alpha-1-Globulin      0.0 - 0.4 g/dL 0.2  0.2  0.3    Alpha-2-Globulin      0.4 - 1.0 g/dL 0.7  0.9  0.9    Beta Globulin      0.7 - 1.3 g/dL 0.7  0.9  0.9    Gamma Globulin      0.4 - 1.8 g/dL 0.5  0.6  0.6    M-Jurgen      Not Observed g/dL Not Observed  Not Observed  Not Observed    Globulin      2.2 - 3.9 g/dL 2.0 (L)  2.6  2.6    A/G Ratio      0.7 - 1.7  1.9 (H)  1.3  1.3    Immunofixation Reflex, Serum Comment  Comment  Comment:    Please note Comment  Comment  Comment    Kappa FLC      3.3 - 19.4 mg/L 67.3 (H)  57.4 (H)  66.2 (H)    Free Lambda Light Chains      5.7 - 26.3 mg/L 51.9 (H)  45.5 (H)  51.5 (H)    Kappa/Lambda Ratio      0.26 - 1.65  1.30  1.26  1.29       Assessment & Plan    *IgG lambda multiple myeloma.  Bone marrow 5/23/2020 hypercellular marrow with 80% involvement of plasma cell myeloma; FISH studies pending  Bone survey 5/26/2020: Diffuse osteoporosis and demineralization.  However, no discrete lytic lesions.  SPEP 5/23/2020: M spike 5.1.  IgG 6629 lambda.  Light chain ratio 0 with free lambda light chains 6400.  Beta-2 microglobulin 16.3.  24-hour urine 12.7 g protein per 24-hour with monoclonal lambda free light chain 33.9%, monoclonal IgG lambda 23.5%  24-hour urine testing from 5/24/2020 free lambda light chains 8.4 g/L  Due to the renal failure, CyBorD regimen was chosen and was started on 5/28/2020.    On 6/18/2020, Velcade was changed to 1.5 mg/m² weekly continuously along with weekly Decadron and oral Cytoxan.  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   Treatment was changed on 8/27/2020 to to Daratumumab subcutaneous injection weekly along with Revlimid 10 mg daily for 21 out of 28-day cycle and Decadron 40 mg daily.  S/p stem cell transplant on 12/9/2020 at  [facility].  Day 100 bone marrow on 3/16/2021 revealed minute  plasma cell population.  PET scan on 3/29/2021 was negative.  Patient enrolled in the  clinical trial randomizing to maintenance lenalidomide versus lenalidomide and daratumumab.  Patient was randomized to the Revlimid arm initiated 4/13/2021 dosed at 5 mg daily (due to renal function).  Patient developed neutropenia requiring dose adjustment to 5 mg daily for 21/28 days.  Patient withdrew from clinical trial after 5 cycles on 8/31/2021 due to worsening diarrhea.    He was switched to Velcade maintenance every other week 9/14/2021 which he discontinued 10/26/2021 due to neuropathy and lower extremity edema.    He was subsequently changed to pomalidomide 2 mg daily 21/28 days on 11/8/2021.    1 year post transplant, bone marrow biopsy showed 5-7% plasma cells and due to the residual disease he was started on DPd (daratumumab, pomalidomide, dexamethasone) on 2/21/2022.    Patient was seen by Dr. Romero on 9/12/2022.  At that time patient was cycle 8-day 7 DPd. Due to chronic side effects, dexamethasone was changed from weekly to monthly.    Patient did continue pomalidomide on his own during admission to the hospital, received 1 dose 4 mg p.o. on 9/24/2022 before was subsequently discontinued due to cytopenias.   Dexamethasone was changed to 12 mg on Mondays, 4 mg on Tuesday and 4 mg on Wednesday.  Pomalyst dose was reduced to 3 mg daily for 21 out of a 28-day cycle starting on 10/31/2022.  Due to neutropenia, the dose of Pomalyst was subsequently reduced to 2 mg daily for 21/28-day cycle.  9/5/2023: Treatment held due to weakness and postural dizziness.  9/5/2023: No M protein.  Kappa FLC 49.6.  Lambda FLC 32.4.  Kappa to lambda ratio 1.53.  He did not notice improvement in his symptoms while off Darzalex and Pomalyst.  Therefore, his symptoms were not attributed to treatment.  He was restarted on Darzalex and Pomalyst on  10/2/2023.  9/20/2024: No M protein.  Kappa FLC 58.1.  Lambda FLC 40.0.    Kappa/lambda ratio 1.45.  12/23/2024: No M protein. Kappa FLC 57.4. Lambda FLC 45.5.  Kappa/Lambda ratio 1.26.  1/20/2025: M protein was not detected.  Kappa FLC 66.2.  Lambda FLC 51.5.  Kappa/lambda ratio 1.29.    *Bone health.  He is on Xgeva.   Patient developed severe hypocalcemia following the Xgeva injection.  He required inpatient hospital stay.  Given the duration of Xgeva injections, recommended changing treatment to every 3 months starting December 2022.   Due to hypomagnesemia, Xgeva was held on 3/18/2024 but was given 4/15/2024.  Last dose of Xgeva was on 10/28/2024.  He is due for Xgeva today but it will be held due to hypocalcemia.    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  Patient was receiving Procrit at [facility].  Records from [facility] showed that the patient was receiving 70,000 units weekly and received the last dose on 9/27/2022.  Patient was given Procrit 70,000 units on 9/27/2022.  Hemoglobin was 9.9 on 10/17/2022.  He was given Procrit 60,000 units.  Hemoglobin was 9.8 on 11/4/2022.  He was given Procrit 60,000 units.  Labs in March 2023 revealed adequate iron stores.  Last dose of Procrit was 25,000 units on 5/1/2023 for hemoglobin of 9.9.  5/13/2024: Hemoglobin 11.2.  6/10/2024: Hemoglobin decreased to 9.9-?  Secondary to sinus infection.  We discussed starting back on Procrit.  However, hemoglobin improved subsequently.  3/17/2025: Hemoglobin 11.0.    *Neutropenia.  This was attributed to Pomalyst.  Neutrophil count was down to 290 on 9/25/2022.  He was given Neupogen during his hospital stay in September 2022.  Neutrophil count improved afterwards.  However, neutrophil count decreased to 600 on 12/19/2022.  He reports that he was feeling tired and achy at that time.  Neutrophil count was 770 on 1/16/2023.  Due to the recurrent  neutropenia, the dose of Pomalyst was reduced to 2 mg in January 2023.  Neutrophil count improved following the dose reduction.  1/20/2025: Neutrophils 2400.  2/15/2025: Neutrophils 1680.  3/17/2025: Neutrophil count improved to 2370.    *Hypocalcemia.  Patient developed severe hypocalcemia due to Xgeva.  Calcium was 5.9 on 9/23/2022.  Patient was placed on calcium replacement.  Calcium dose was subsequently increased to 3 tablets daily.  8/7/2023: Calcium 8.1. Albumin 3.6.  The dose was increased to 4 tablets daily.  10/28/2024: Calcium 9.7.  1/20/2025: Calcium decreased to 7.2.  Albumin 3.6.  3/17/2025: Calcium improved to 9.2.  Albumin 3.5.    *Hypomagnesemia.  Magnesium was 1.1 on 10/24/2022.  He was placed on oral magnesium oxide 400 mg twice daily.  He developed diarrhea and it was stopped.  He was started on magnesium chloride on 1/23/2023.  He developed diarrhea about 7 days after starting the magnesium chloride.  Oral magnesium chloride was discontinued.  He receives IV magnesium per protocol based on his magnesium level.  12/23/2024: Magnesium 1.5. S/P 2 gm IV magnesium.  1/20/2025: Magnesium 1.5.  3/17/2025: Magnesium 1.5.     *Prophylaxis.  He is on acyclovir 800 mg daily.  He is on ASA 81 mg daily.  Patient developed influenza A in February 2025.  He is hospitalized with acute hypoxic respiratory failure and secondary pneumonia.  He was treated with Tamiflu, ceftriaxone and azithromycin.    He is no longer having shortness of breath.    *Episodes of dizziness and falling.  Patient reports that the episodes develop after he stands up and starts walking.  No dizziness while sitting.  No restricted chest pain or shortness of breath.  Blood pressure was 133/80 and heart rate was 66 in the sitting position today.  After standing, blood pressure was 119/76 and heart rate was 69.  No improvement in the symptoms after holding Pomalyst for 2 weeks.  MRI brain on 7/26/2023 showed small vessel ischemic  changes.  There were changes of 3 old strokes.  I explained the findings to the patient.  I recommended evaluation by cardiology and neurology.  I also recommended home physical therapy  I recommended that he uses a walker when he ambulates.  His evaluation showed possible changes of postural hypotension.   He is concerned that this is due to his medications. I explained that while postural hypotension is a side effect of medications, it is not a common side effect of Darzalex or Pomalyst.  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  Symptoms did not improve after placing Darzalex and Pomalyst on hold.  On 10/2/2023, we recommended reducing Bumex to 1 mg daily and monitoring his symptoms and the leg swelling.  He did not notice a difference in his postural dizziness after the dose reduction.   Bumex was increased back to 2 mg daily due to worsening of his edema.   This dose is controlling his leg edema.    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  It is controlled with Lexapro 5 mg daily.    *Vitamin D deficiency.  He is on vitamin D 50,000 units weekly.  Vitamin D level was 18 on 10/3/2022.  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  He is on gabapentin 300 mg in the morning and 600 mg in the evening.  Neuropathy symptoms in the feet are better as the swelling improved.     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  S/p right partial nephrectomy on 5/18/2016.    PLAN:    1.  Proceed with Darzalex Faspro today.   2.  We will give Xgeva today.  3.  Will give magnesium sulfate IV 2 g IV today.  4.  Discontinue Gabapentin.  5.  Continue Pomalyst 2 mg daily for 21 days followed by 7 days off.  6.  Continue dexamethasone 20 mg monthly.  7.  Continue Neutra-Phos 1 tablet daily.  8.  Continue acyclovir 800 mg daily and aspirin 81 mg daily.  9.  Return in 4 weeks for the next dose of Darzalex.  10.  I will see him in  follow-up in 2 months.  He will be scheduled to receive Darzalex.  We will obtain CBC CMP magnesium phosphorus and SPEP rife FLC.    I spent 45 minutes caring for Contreras on this date of service. This time includes time spent by me in the following activities: preparing for the visit, reviewing tests, performing a medically appropriate examination and/or evaluation, counseling and educating the patient/family/caregiver, documenting information in the medical record, independently interpreting results and communicating that information with the patient/family/caregiver, care coordination, ordering medications, and ordering test(s)     Red Del Valle MD  03/17/25

## 2025-03-18 ENCOUNTER — SPECIALTY PHARMACY (OUTPATIENT)
Dept: PHARMACY | Facility: HOSPITAL | Age: 70
End: 2025-03-18
Payer: MEDICARE

## 2025-03-18 NOTE — PROGRESS NOTES
Specialty Pharmacy Note: Pomalyst (pomalidomide)    Contreras Albert is a 69 y.o. male with multiple myeloma was seen 3/17/25 by Dr. Del Valle. Per provider dictation, no changes to oral oncology regimen   Pomalyst 2 mg daily for 21 days followed by 7 days off. .  Labs Review: The CMP and CBC from 3/17/25 have been reviewed. No dose adjustments are needed for the oral specialty medication(s) based on the labs.    Specialty pharmacy will continue to follow patient.    Carlos Clarke, Evan, [facility]  Clinical Oncology Pharmacist    3/18/2025  09:15 EDT

## 2025-03-19 LAB
ALBUMIN SERPL ELPH-MCNC: 3.3 G/DL (ref 2.9–4.4)
ALBUMIN/GLOB SERPL: 1.5 {RATIO} (ref 0.7–1.7)
ALPHA1 GLOB SERPL ELPH-MCNC: 0.2 G/DL (ref 0–0.4)
ALPHA2 GLOB SERPL ELPH-MCNC: 0.8 G/DL (ref 0.4–1)
B-GLOBULIN SERPL ELPH-MCNC: 0.7 G/DL (ref 0.7–1.3)
GAMMA GLOB SERPL ELPH-MCNC: 0.6 G/DL (ref 0.4–1.8)
GLOBULIN SER-MCNC: 2.3 G/DL (ref 2.2–3.9)
IGA SERPL-MCNC: 109 MG/DL (ref 61–437)
IGG SERPL-MCNC: 757 MG/DL (ref 603–1613)
IGM SERPL-MCNC: 64 MG/DL (ref 20–172)
INTERPRETATION SERPL IEP-IMP: ABNORMAL
KAPPA LC FREE SER-MCNC: 64.1 MG/L (ref 3.3–19.4)
KAPPA LC FREE/LAMBDA FREE SER: 1.08 {RATIO} (ref 0.26–1.65)
LABORATORY COMMENT REPORT: ABNORMAL
LAMBDA LC FREE SERPL-MCNC: 59.1 MG/L (ref 5.7–26.3)
M PROTEIN SERPL ELPH-MCNC: ABNORMAL G/DL
PROT SERPL-MCNC: 5.6 G/DL (ref 6–8.5)

## 2025-03-25 ENCOUNTER — SPECIALTY PHARMACY (OUTPATIENT)
Dept: PHARMACY | Facility: HOSPITAL | Age: 70
End: 2025-03-25
Payer: MEDICARE

## 2025-03-25 NOTE — PROGRESS NOTES
Specialty Pharmacy Patient Management Program  Per Protocol Prescription Order or Refill       Requested Prescriptions     Signed Prescriptions Disp Refills    pomalidomide (POMALYST) 2 MG chemo capsule 21 capsule 0     Sig: Take 1 capsule by mouth Daily. Take for 21 days on, then 7 days off.  Indications: Multiple Myeloma     Prescription orders above were sent to [facility] per Collaborative Care Agreement Protocol.     Completed independent double check on medication order/RX.    Pia Cano Rph, [facility]OP  Clinical Specialty Pharmacist, Oncology  3/25/2025  10:27 EDT

## 2025-03-25 NOTE — PROGRESS NOTES
Specialty Pharmacy Patient Management Program  Per Protocol Prescription Order or Refill     Patient will be filling or currently fills medications at [facility] and is enrolled in the Patient Management Program.    Requested Prescriptions     Signed Prescriptions Disp Refills    pomalidomide (POMALYST) 2 MG chemo capsule 21 capsule 0     Sig: Take 1 capsule by mouth Daily. Take for 21 days on, then 7 days off.  Indications: Multiple Myeloma     Prescription orders above were sent to the pharmacy per Collaborative Care Agreement Protocol.     Last Office Visit: 3/17/25  Next Office Visit: 5/12/25    Carlos Clarke, Evan, BCOP  Clinical Specialty Pharmacist, Oncology  3/25/2025  09:58 EDT

## 2025-04-14 ENCOUNTER — INFUSION (OUTPATIENT)
Dept: ONCOLOGY | Facility: HOSPITAL | Age: 70
End: 2025-04-14
Payer: MEDICARE

## 2025-04-14 ENCOUNTER — LAB (OUTPATIENT)
Dept: LAB | Facility: HOSPITAL | Age: 70
End: 2025-04-14
Payer: MEDICARE

## 2025-04-14 VITALS
BODY MASS INDEX: 35.56 KG/M2 | SYSTOLIC BLOOD PRESSURE: 134 MMHG | DIASTOLIC BLOOD PRESSURE: 88 MMHG | WEIGHT: 292 LBS | RESPIRATION RATE: 16 BRPM | TEMPERATURE: 97.5 F | HEART RATE: 97 BPM | OXYGEN SATURATION: 97 %

## 2025-04-14 DIAGNOSIS — C90.00 MULTIPLE MYELOMA NOT HAVING ACHIEVED REMISSION: ICD-10-CM

## 2025-04-14 DIAGNOSIS — C90.00 MULTIPLE MYELOMA NOT HAVING ACHIEVED REMISSION: Primary | ICD-10-CM

## 2025-04-14 LAB
ALBUMIN SERPL-MCNC: 3.8 G/DL (ref 3.5–5.2)
ALBUMIN/GLOB SERPL: 1.5 G/DL
ALP SERPL-CCNC: 58 U/L (ref 39–117)
ALT SERPL W P-5'-P-CCNC: 17 U/L (ref 1–41)
ANION GAP SERPL CALCULATED.3IONS-SCNC: 14.1 MMOL/L (ref 5–15)
AST SERPL-CCNC: 15 U/L (ref 1–40)
BASOPHILS # BLD AUTO: 0.06 10*3/MM3 (ref 0–0.2)
BASOPHILS NFR BLD AUTO: 1.1 % (ref 0–1.5)
BILIRUB SERPL-MCNC: 0.9 MG/DL (ref 0–1.2)
BUN SERPL-MCNC: 36 MG/DL (ref 8–23)
BUN/CREAT SERPL: 9.5 (ref 7–25)
CALCIUM SPEC-SCNC: 8.9 MG/DL (ref 8.6–10.5)
CHLORIDE SERPL-SCNC: 104 MMOL/L (ref 98–107)
CO2 SERPL-SCNC: 20.9 MMOL/L (ref 22–29)
CREAT SERPL-MCNC: 3.79 MG/DL (ref 0.76–1.27)
DEPRECATED RDW RBC AUTO: 55.3 FL (ref 37–54)
EGFRCR SERPLBLD CKD-EPI 2021: 16.7 ML/MIN/1.73
EOSINOPHIL # BLD AUTO: 0.11 10*3/MM3 (ref 0–0.4)
EOSINOPHIL NFR BLD AUTO: 2 % (ref 0.3–6.2)
ERYTHROCYTE [DISTWIDTH] IN BLOOD BY AUTOMATED COUNT: 16.3 % (ref 12.3–15.4)
GLOBULIN UR ELPH-MCNC: 2.5 GM/DL
GLUCOSE SERPL-MCNC: 190 MG/DL (ref 65–99)
HCT VFR BLD AUTO: 38.3 % (ref 37.5–51)
HGB BLD-MCNC: 12.1 G/DL (ref 13–17.7)
IMM GRANULOCYTES # BLD AUTO: 0.03 10*3/MM3 (ref 0–0.05)
IMM GRANULOCYTES NFR BLD AUTO: 0.5 % (ref 0–0.5)
LYMPHOCYTES # BLD AUTO: 1.85 10*3/MM3 (ref 0.7–3.1)
LYMPHOCYTES NFR BLD AUTO: 33.2 % (ref 19.6–45.3)
MAGNESIUM SERPL-MCNC: 1.5 MG/DL (ref 1.6–2.4)
MCH RBC QN AUTO: 29.4 PG (ref 26.6–33)
MCHC RBC AUTO-ENTMCNC: 31.6 G/DL (ref 31.5–35.7)
MCV RBC AUTO: 93 FL (ref 79–97)
MONOCYTES # BLD AUTO: 0.61 10*3/MM3 (ref 0.1–0.9)
MONOCYTES NFR BLD AUTO: 11 % (ref 5–12)
NEUTROPHILS NFR BLD AUTO: 2.91 10*3/MM3 (ref 1.7–7)
NEUTROPHILS NFR BLD AUTO: 52.2 % (ref 42.7–76)
NRBC BLD AUTO-RTO: 0 /100 WBC (ref 0–0.2)
PHOSPHATE SERPL-MCNC: 3.3 MG/DL (ref 2.5–4.5)
PLATELET # BLD AUTO: 157 10*3/MM3 (ref 140–450)
PMV BLD AUTO: 9.4 FL (ref 6–12)
POTASSIUM SERPL-SCNC: 4.3 MMOL/L (ref 3.5–5.2)
PROT SERPL-MCNC: 6.3 G/DL (ref 6–8.5)
RBC # BLD AUTO: 4.12 10*6/MM3 (ref 4.14–5.8)
SODIUM SERPL-SCNC: 139 MMOL/L (ref 136–145)
WBC NRBC COR # BLD AUTO: 5.57 10*3/MM3 (ref 3.4–10.8)

## 2025-04-14 PROCEDURE — 25010000002 DARATUMUMAB-HYALURONIDASE-FIHJ 1800-30000 MG-UT/15ML SOLUTION: Performed by: INTERNAL MEDICINE

## 2025-04-14 PROCEDURE — 96365 THER/PROPH/DIAG IV INF INIT: CPT

## 2025-04-14 PROCEDURE — 83735 ASSAY OF MAGNESIUM: CPT

## 2025-04-14 PROCEDURE — 96401 CHEMO ANTI-NEOPL SQ/IM: CPT

## 2025-04-14 PROCEDURE — 85025 COMPLETE CBC W/AUTO DIFF WBC: CPT

## 2025-04-14 PROCEDURE — 84100 ASSAY OF PHOSPHORUS: CPT

## 2025-04-14 PROCEDURE — 25010000002 MAGNESIUM SULFATE 2 GM/50ML SOLUTION: Performed by: INTERNAL MEDICINE

## 2025-04-14 PROCEDURE — 36415 COLL VENOUS BLD VENIPUNCTURE: CPT

## 2025-04-14 PROCEDURE — 80053 COMPREHEN METABOLIC PANEL: CPT

## 2025-04-14 RX ORDER — MAGNESIUM SULFATE HEPTAHYDRATE 40 MG/ML
2 INJECTION, SOLUTION INTRAVENOUS ONCE
Status: COMPLETED | OUTPATIENT
Start: 2025-04-14 | End: 2025-04-14

## 2025-04-14 RX ADMIN — DARATUMUMAB AND HYALURONIDASE-FIHJ (HUMAN RECOMBINANT) 1800 MG: 1800; 30000 INJECTION SUBCUTANEOUS at 13:24

## 2025-04-14 RX ADMIN — MAGNESIUM SULFATE HEPTAHYDRATE 2 G: 40 INJECTION, SOLUTION INTRAVENOUS at 13:24

## 2025-04-14 NOTE — NURSING NOTE
Dr. Del Rio (Doctor #2) notified of elevated creatinine of 3.79. No new orders. Creatinine lab routed to the patients nephrologist    Magnesium came back at 1.5- per protocol 2g administered

## 2025-04-17 LAB
ALBUMIN SERPL ELPH-MCNC: 3.2 G/DL (ref 2.9–4.4)
ALBUMIN/GLOB SERPL: 1.2 {RATIO} (ref 0.7–1.7)
ALPHA1 GLOB SERPL ELPH-MCNC: 0.2 G/DL (ref 0–0.4)
ALPHA2 GLOB SERPL ELPH-MCNC: 0.9 G/DL (ref 0.4–1)
B-GLOBULIN SERPL ELPH-MCNC: 0.8 G/DL (ref 0.7–1.3)
GAMMA GLOB SERPL ELPH-MCNC: 0.8 G/DL (ref 0.4–1.8)
GLOBULIN SER-MCNC: 2.7 G/DL (ref 2.2–3.9)
IGA SERPL-MCNC: 111 MG/DL (ref 61–437)
IGG SERPL-MCNC: 742 MG/DL (ref 603–1613)
IGM SERPL-MCNC: 47 MG/DL (ref 20–172)
INTERPRETATION SERPL IEP-IMP: ABNORMAL
KAPPA LC FREE SER-MCNC: 59.4 MG/L (ref 3.3–19.4)
KAPPA LC FREE/LAMBDA FREE SER: 1.13 {RATIO} (ref 0.26–1.65)
LABORATORY COMMENT REPORT: ABNORMAL
LAMBDA LC FREE SERPL-MCNC: 52.4 MG/L (ref 5.7–26.3)
M PROTEIN SERPL ELPH-MCNC: ABNORMAL G/DL
PROT SERPL-MCNC: 5.9 G/DL (ref 6–8.5)

## 2025-04-25 ENCOUNTER — SPECIALTY PHARMACY (OUTPATIENT)
Dept: PHARMACY | Facility: HOSPITAL | Age: 70
End: 2025-04-25
Payer: MEDICARE

## 2025-04-25 NOTE — PROGRESS NOTES
Specialty Pharmacy Patient Management Program  Per Protocol Prescription Order or Refill       Requested Prescriptions     Signed Prescriptions Disp Refills    pomalidomide (POMALYST) 2 MG chemo capsule 21 capsule 0     Sig: Take 1 capsule by mouth Daily. Take for 21 days on, then 7 days off.  Indications: Multiple Myeloma     Prescription orders above were sent to [facility] per Collaborative Care Agreement Protocol.     Completed independent double check on medication order/RX.    Yadira Porter, PharmD, [facility]  Specialty Clinical Pharmacist  04/25/25  13:07 EDT

## 2025-04-25 NOTE — PROGRESS NOTES
Specialty Pharmacy Patient Management Program  Per Protocol Prescription Order or Refill     Patient will be filling or currently fills medications at [facility] and is enrolled in the Patient Management Program.    Requested Prescriptions     Signed Prescriptions Disp Refills    pomalidomide (POMALYST) 2 MG chemo capsule 21 capsule 0     Sig: Take 1 capsule by mouth Daily. Take for 21 days on, then 7 days off.  Indications: Multiple Myeloma     Prescription orders above were sent to the pharmacy per Collaborative Care Agreement Protocol.     Last Office Visit: 3/17/25  Next Office Visit: 5/12/25    Yumiko MainD, BCOP  Clinical Specialty Pharmacist, Oncology  4/25/2025  12:49 EDT

## 2025-05-12 ENCOUNTER — OFFICE VISIT (OUTPATIENT)
Dept: ONCOLOGY | Facility: CLINIC | Age: 70
End: 2025-05-12
Payer: MEDICARE

## 2025-05-12 ENCOUNTER — INFUSION (OUTPATIENT)
Dept: ONCOLOGY | Facility: HOSPITAL | Age: 70
End: 2025-05-12
Payer: MEDICARE

## 2025-05-12 ENCOUNTER — LAB (OUTPATIENT)
Dept: LAB | Facility: HOSPITAL | Age: 70
End: 2025-05-12
Payer: MEDICARE

## 2025-05-12 ENCOUNTER — SPECIALTY PHARMACY (OUTPATIENT)
Dept: ONCOLOGY | Facility: HOSPITAL | Age: 70
End: 2025-05-12
Payer: MEDICARE

## 2025-05-12 VITALS
DIASTOLIC BLOOD PRESSURE: 88 MMHG | HEIGHT: 76 IN | BODY MASS INDEX: 34.3 KG/M2 | OXYGEN SATURATION: 98 % | SYSTOLIC BLOOD PRESSURE: 153 MMHG | TEMPERATURE: 97.9 F | HEART RATE: 76 BPM | WEIGHT: 281.7 LBS

## 2025-05-12 DIAGNOSIS — G62.0 NEUROPATHY DUE TO CHEMOTHERAPEUTIC DRUG: ICD-10-CM

## 2025-05-12 DIAGNOSIS — D70.1 CHEMOTHERAPY INDUCED NEUTROPENIA: ICD-10-CM

## 2025-05-12 DIAGNOSIS — D84.9 IMMUNOCOMPROMISED STATE: ICD-10-CM

## 2025-05-12 DIAGNOSIS — C90.00 MULTIPLE MYELOMA NOT HAVING ACHIEVED REMISSION: ICD-10-CM

## 2025-05-12 DIAGNOSIS — T45.1X5A CHEMOTHERAPY INDUCED NEUTROPENIA: ICD-10-CM

## 2025-05-12 DIAGNOSIS — C90.00 MULTIPLE MYELOMA NOT HAVING ACHIEVED REMISSION: Primary | ICD-10-CM

## 2025-05-12 DIAGNOSIS — T45.1X5A NEUROPATHY DUE TO CHEMOTHERAPEUTIC DRUG: ICD-10-CM

## 2025-05-12 DIAGNOSIS — D63.1 ANEMIA IN STAGE 3B CHRONIC KIDNEY DISEASE: ICD-10-CM

## 2025-05-12 DIAGNOSIS — Z79.899 HIGH RISK MEDICATION USE: ICD-10-CM

## 2025-05-12 DIAGNOSIS — N18.32 ANEMIA IN STAGE 3B CHRONIC KIDNEY DISEASE: ICD-10-CM

## 2025-05-12 DIAGNOSIS — E83.51 HYPOCALCEMIA: ICD-10-CM

## 2025-05-12 DIAGNOSIS — M89.8X9 LYTIC BONE LESIONS ON XRAY: ICD-10-CM

## 2025-05-12 DIAGNOSIS — E83.42 HYPOMAGNESEMIA: ICD-10-CM

## 2025-05-12 LAB
ALBUMIN SERPL-MCNC: 3.7 G/DL (ref 3.5–5.2)
ALBUMIN/GLOB SERPL: 1.7 G/DL
ALP SERPL-CCNC: 51 U/L (ref 39–117)
ALT SERPL W P-5'-P-CCNC: 14 U/L (ref 1–41)
ANION GAP SERPL CALCULATED.3IONS-SCNC: 12.6 MMOL/L (ref 5–15)
AST SERPL-CCNC: 12 U/L (ref 1–40)
BASOPHILS # BLD AUTO: 0.09 10*3/MM3 (ref 0–0.2)
BASOPHILS NFR BLD AUTO: 1.6 % (ref 0–1.5)
BILIRUB SERPL-MCNC: 0.5 MG/DL (ref 0–1.2)
BUN SERPL-MCNC: 39 MG/DL (ref 8–23)
BUN/CREAT SERPL: 10.9 (ref 7–25)
CALCIUM SPEC-SCNC: 8.4 MG/DL (ref 8.6–10.5)
CHLORIDE SERPL-SCNC: 106 MMOL/L (ref 98–107)
CO2 SERPL-SCNC: 20.4 MMOL/L (ref 22–29)
CREAT SERPL-MCNC: 3.57 MG/DL (ref 0.76–1.27)
DEPRECATED RDW RBC AUTO: 52.2 FL (ref 37–54)
EGFRCR SERPLBLD CKD-EPI 2021: 17.9 ML/MIN/1.73
EOSINOPHIL # BLD AUTO: 0.22 10*3/MM3 (ref 0–0.4)
EOSINOPHIL NFR BLD AUTO: 3.8 % (ref 0.3–6.2)
ERYTHROCYTE [DISTWIDTH] IN BLOOD BY AUTOMATED COUNT: 15.6 % (ref 12.3–15.4)
GLOBULIN UR ELPH-MCNC: 2.2 GM/DL
GLUCOSE SERPL-MCNC: 203 MG/DL (ref 65–99)
HCT VFR BLD AUTO: 35.5 % (ref 37.5–51)
HGB BLD-MCNC: 11.5 G/DL (ref 13–17.7)
IMM GRANULOCYTES # BLD AUTO: 0.02 10*3/MM3 (ref 0–0.05)
IMM GRANULOCYTES NFR BLD AUTO: 0.3 % (ref 0–0.5)
LYMPHOCYTES # BLD AUTO: 2 10*3/MM3 (ref 0.7–3.1)
LYMPHOCYTES NFR BLD AUTO: 34.6 % (ref 19.6–45.3)
MAGNESIUM SERPL-MCNC: 1.6 MG/DL (ref 1.6–2.4)
MCH RBC QN AUTO: 29.9 PG (ref 26.6–33)
MCHC RBC AUTO-ENTMCNC: 32.4 G/DL (ref 31.5–35.7)
MCV RBC AUTO: 92.2 FL (ref 79–97)
MONOCYTES # BLD AUTO: 0.7 10*3/MM3 (ref 0.1–0.9)
MONOCYTES NFR BLD AUTO: 12.1 % (ref 5–12)
NEUTROPHILS NFR BLD AUTO: 2.75 10*3/MM3 (ref 1.7–7)
NEUTROPHILS NFR BLD AUTO: 47.6 % (ref 42.7–76)
NRBC BLD AUTO-RTO: 0 /100 WBC (ref 0–0.2)
PHOSPHATE SERPL-MCNC: 3.3 MG/DL (ref 2.5–4.5)
PLATELET # BLD AUTO: 155 10*3/MM3 (ref 140–450)
PMV BLD AUTO: 9.4 FL (ref 6–12)
POTASSIUM SERPL-SCNC: 4.1 MMOL/L (ref 3.5–5.2)
PROT SERPL-MCNC: 5.9 G/DL (ref 6–8.5)
RBC # BLD AUTO: 3.85 10*6/MM3 (ref 4.14–5.8)
SODIUM SERPL-SCNC: 139 MMOL/L (ref 136–145)
WBC NRBC COR # BLD AUTO: 5.78 10*3/MM3 (ref 3.4–10.8)

## 2025-05-12 PROCEDURE — G2211 COMPLEX E/M VISIT ADD ON: HCPCS | Performed by: INTERNAL MEDICINE

## 2025-05-12 PROCEDURE — 1126F AMNT PAIN NOTED NONE PRSNT: CPT | Performed by: INTERNAL MEDICINE

## 2025-05-12 PROCEDURE — 1160F RVW MEDS BY RX/DR IN RCRD: CPT | Performed by: INTERNAL MEDICINE

## 2025-05-12 PROCEDURE — 85025 COMPLETE CBC W/AUTO DIFF WBC: CPT

## 2025-05-12 PROCEDURE — 36415 COLL VENOUS BLD VENIPUNCTURE: CPT

## 2025-05-12 PROCEDURE — 80053 COMPREHEN METABOLIC PANEL: CPT

## 2025-05-12 PROCEDURE — 25010000002 DARATUMUMAB-HYALURONIDASE-FIHJ 1800-30000 MG-UT/15ML SOLUTION: Performed by: INTERNAL MEDICINE

## 2025-05-12 PROCEDURE — 99214 OFFICE O/P EST MOD 30 MIN: CPT | Performed by: INTERNAL MEDICINE

## 2025-05-12 PROCEDURE — 1159F MED LIST DOCD IN RCRD: CPT | Performed by: INTERNAL MEDICINE

## 2025-05-12 PROCEDURE — 3079F DIAST BP 80-89 MM HG: CPT | Performed by: INTERNAL MEDICINE

## 2025-05-12 PROCEDURE — 83735 ASSAY OF MAGNESIUM: CPT

## 2025-05-12 PROCEDURE — 96401 CHEMO ANTI-NEOPL SQ/IM: CPT

## 2025-05-12 PROCEDURE — 84100 ASSAY OF PHOSPHORUS: CPT

## 2025-05-12 PROCEDURE — 3077F SYST BP >= 140 MM HG: CPT | Performed by: INTERNAL MEDICINE

## 2025-05-12 RX ORDER — MEPERIDINE HYDROCHLORIDE 25 MG/ML
12.5 INJECTION INTRAMUSCULAR; INTRAVENOUS; SUBCUTANEOUS
Status: CANCELLED | OUTPATIENT
Start: 2025-05-12

## 2025-05-12 RX ORDER — FAMOTIDINE 10 MG/ML
20 INJECTION, SOLUTION INTRAVENOUS AS NEEDED
Status: CANCELLED | OUTPATIENT
Start: 2025-05-12

## 2025-05-12 RX ORDER — DIPHENHYDRAMINE HYDROCHLORIDE 50 MG/ML
50 INJECTION, SOLUTION INTRAMUSCULAR; INTRAVENOUS AS NEEDED
Status: CANCELLED | OUTPATIENT
Start: 2025-05-12

## 2025-05-12 RX ORDER — HYDROCORTISONE SODIUM SUCCINATE 100 MG/2ML
100 INJECTION INTRAMUSCULAR; INTRAVENOUS AS NEEDED
Status: CANCELLED | OUTPATIENT
Start: 2025-05-12

## 2025-05-12 RX ADMIN — DARATUMUMAB AND HYALURONIDASE-FIHJ (HUMAN RECOMBINANT) 1800 MG: 1800; 30000 INJECTION SUBCUTANEOUS at 10:20

## 2025-05-12 NOTE — PROGRESS NOTES
Specialty Pharmacy Patient Management Program  Oncology Reassessment     Contreras Albert was referred by an their provider to the Oncology Patient Management program offered by [facility] for multiple myeloma. A follow-up outreach was conducted, including assessment of continued therapy appropriateness, medication adherence, and side effect incidence and management for Pomalyst.     Updated CCA signed today    Changes to Insurance Coverage or Financial Support  none    Relevant Past Medical History and Comorbidities  Relevant medical history and concomitant health conditions were discussed with the patient. The patient's chart has been reviewed for relevant past medical history and comorbid health conditions and updated as necessary.   Past Medical History:   Diagnosis Date    Acute renal failure syndrome 05/21/2020    NATHANIEL (acute kidney injury) (HCC) 05/22/2020    Anemia     Cataract     Chronic renal insufficiency     CKD (chronic kidney disease) 05/22/2020    Clear cell carcinoma of kidney, right 2016    Stage I.  Right partial nephrectomy.  Dr. Garcia    Clostridium difficile colitis     CVA (cerebral vascular accident)     Diabetes mellitus     Diabetic macular edema(362.07) 05/26/2020    No significant central fluid today OD.  Stable without significant DME OS.  Observe OU today. Possible multiple myeloma and anemia contributing.  Pt noted improvement with plasmapheresis and was gett*    Elevated troponin 05/22/2020    Enlarged prostate     Dr. Garcia    H/O stem cell transplant 12/09/2020    Hematoma of right lower leg     High cholesterol     Hypertension     Multiple myeloma     Multiple rib fractures     on right after fall from 2 story building at age of 30 years    Postural dizziness     Severe nonproliferative diabetic retinopathy of both eyes with macular edema associated with type 2 diabetes mellitus 09/02/2020    Testosterone deficiency     Type 2 diabetes mellitus with  retinopathy, with long-term current use of insulin 05/22/2020    Vitamin D deficiency      Social History     Socioeconomic History    Marital status:      Spouse name: Gabby   Tobacco Use    Smoking status: Never    Smokeless tobacco: Never   Vaping Use    Vaping status: Never Used   Substance and Sexual Activity    Alcohol use: No    Drug use: No    Sexual activity: Not Currently     Partners: Female     Birth control/protection: None     Problem list reviewed by Yolande Huitron RPH on 5/12/2025 at  9:53 AM    Hospitalizations and Urgent Care Since Last Assessment  ED Visits, Admissions, or Hospitalizations: no  Urgent Office Visits: no    Allergies  Known allergies and reactions were discussed with the patient. The patient's chart has been reviewed for allergy information and updated as necessary.   Allergies   Allergen Reactions    Gabapentin Diarrhea    Lisinopril Unknown - High Severity     Tickle in throat, cannot take due to kidney disease    Crestor [Rosuvastatin] GI Intolerance     Abdominal cramping    Carvedilol Diarrhea, Swelling and Dizziness     Allergies reviewed by Yolande Huitron RPH on 5/12/2025 at  9:52 AM    Relevant Laboratory Values  Relevant laboratory values were discussed with the patient. The following specialty medication dose adjustment(s) are recommended: No dose adjustments are needed for the oral specialty medication(s) based on the labs.    Lab Results   Component Value Date    GLUCOSE 203 (H) 05/12/2025    CALCIUM 8.4 (L) 05/12/2025     05/12/2025    K 4.1 05/12/2025    CO2 20.4 (L) 05/12/2025     05/12/2025    BUN 39 (H) 05/12/2025    CREATININE 3.57 (C) 05/12/2025    EGFRIFAFRI 27 (L) 08/09/2021    EGFRIFNONA 24 (L) 08/09/2021    BCR 10.9 05/12/2025    ANIONGAP 12.6 05/12/2025     Lab Results   Component Value Date    WBC 5.78 05/12/2025    RBC 3.85 (L) 05/12/2025    HGB 11.5 (L) 05/12/2025    HCT 35.5 (L) 05/12/2025    MCV 92.2 05/12/2025    MCH 29.9 05/12/2025     MCHC 32.4 05/12/2025    RDW 15.6 (H) 05/12/2025    RDWSD 52.2 05/12/2025    MPV 9.4 05/12/2025     05/12/2025    NEUTRORELPCT 47.6 05/12/2025    LYMPHORELPCT 34.6 05/12/2025    MONORELPCT 12.1 (H) 05/12/2025    EOSRELPCT 3.8 05/12/2025    BASORELPCT 1.6 (H) 05/12/2025    AUTOIGPER 0.3 05/12/2025    NEUTROABS 2.75 05/12/2025    LYMPHSABS 2.00 05/12/2025    MONOSABS 0.70 05/12/2025    EOSABS 0.22 05/12/2025    BASOSABS 0.09 05/12/2025    AUTOIGNUM 0.02 05/12/2025    NRBC 0.0 05/12/2025       Current Medication List  This medication list has been reviewed with the patient and evaluated for any interactions or necessary modifications/recommendations, and updated to include all prescription medications, OTC medications, and supplements the patient is currently taking.  This list reflects what is contained in the patient's profile, which has also been marked as reviewed to communicate to other providers it is the most up to date version of the patient's current medication therapy.     Current Outpatient Medications:     Accu-Chek Guide Test test strip, USE TO CHECK BLOOD SUGAR THREE TIMES DAILY (Patient not taking: Reported on 5/12/2025), Disp: 300 each, Rfl: 0    Accu-Chek Softclix Lancets lancets, Check 3 times a day on insulin tx, poor control, hypoglycemia. Dx: E 11.22 (Patient not taking: Reported on 5/12/2025), Disp: 300 each, Rfl: 3    acetaminophen (TYLENOL) 325 MG tablet, Take 2 tablets by mouth Every 6 (Six) Hours As Needed for Mild Pain. (Patient taking differently: Take 2 tablets by mouth Every 6 (Six) Hours As Needed for Mild Pain. As needed), Disp: , Rfl:     acyclovir (ZOVIRAX) 800 MG tablet, Take 1 tablet by mouth Daily., Disp: 90 tablet, Rfl: 2    amLODIPine (NORVASC) 5 MG tablet, Take 1 tablet by mouth Daily., Disp: , Rfl:     ASPIRIN 81 PO, Take 81 mg by mouth Daily., Disp: , Rfl:     atorvastatin (LIPITOR) 10 MG tablet, Take 1 tablet by mouth Every Night., Disp: 90 tablet, Rfl: 2    calcium  carbonate (TUMS) 500 MG chewable tablet, Chew 4 tablets Daily., Disp: , Rfl:     Continuous Blood Gluc  (FreeStyle Jin 2 Holland) device, USE 1 FOR 1 DOSE, Disp: , Rfl:     Continuous Glucose Sensor (FreeStyle Jin 2 Sensor) misc, USE 1 EVERY 14 DAYS, Disp: 2 each, Rfl: 6    DARATUMUMAB IV, Infuse  into a venous catheter., Disp: , Rfl:     dexAMETHasone (DECADRON) 4 MG tablet, Take 3 tablets on day of chemo, 1 tablet on day #2 and 1 tablet on day #3 and repeat monthly., Disp: 15 tablet, Rfl: 1    diphenoxylate-atropine (LOMOTIL) 2.5-0.025 MG per tablet, Take 2 tablets by mouth 4 (Four) Times a Day As Needed for Diarrhea., Disp: 120 tablet, Rfl: 0    escitalopram (LEXAPRO) 5 MG tablet, TAKE 1 TABLET BY MOUTH EVERY DAY, Disp: 90 tablet, Rfl: 1    finasteride (PROSCAR) 5 MG tablet, Take 1 tablet by mouth Daily., Disp: , Rfl:     glucagon (GLUCAGEN) 1 MG injection, Inject 1 mg into the appropriate muscle as directed by prescriber 1 (One) Time As Needed for Low Blood Sugar. Follow package directions for low blood sugar., Disp: 1 kit, Rfl: 10    icosapent ethyl (VASCEPA) 1 g capsule capsule, TAKE 2 CAPSULES BY MOUTH TWICE DAILY WITH MEALS, Disp: 360 capsule, Rfl: 1    Insulin Lispro, 1 Unit Dial, (HUMALOG) 100 UNIT/ML solution pen-injector, On NONCHEMO DAYS: 30 to 35 units with each meal +1 unit per 50 greater than 150.  Maximum of 40 units per dose.  On CHEMO DAYS: 35 to 40 units 3 times daily with meals +2 units per 50 greater than 150.  Maximum of 50 units per dose., Disp: 135 mL, Rfl: 2    Insulin Pen Needle 32G X 4 MM misc, USING 4 PEN NEEDLES DAILY, Disp: 400 each, Rfl: 3    Lantus SoloStar 100 UNIT/ML injection pen, NON CHEMO DAYS TAKE 16 UNITS TWICE DAILY. CHEMO DAYS TAKE 30 UNITS TWICE DAILY FOR A MAX DOSE OF 70 UNITS DAILY, Disp: 63 mL, Rfl: 1    omega-3 acid ethyl esters (LOVAZA) 1 g capsule, TAKE 2 CAPSULES BY MOUTH TWICE DAILY (Patient not taking: Reported on 5/12/2025), Disp: 360 capsule, Rfl: 1     ondansetron (ZOFRAN) 8 MG tablet, Take 1 tablet by mouth 3 (Three) Times a Day As Needed for Nausea or Vomiting., Disp: 30 tablet, Rfl: 5    pomalidomide (POMALYST) 2 MG chemo capsule, Take 1 capsule by mouth Daily. Take for 21 days on, then 7 days off.  Indications: Multiple Myeloma, Disp: 21 capsule, Rfl: 0    tamsulosin (FLOMAX) 0.4 MG capsule 24 hr capsule, Take 1 capsule by mouth Daily., Disp: , Rfl:   No current facility-administered medications for this visit.    Facility-Administered Medications Ordered in Other Visits:     daratumumab-hyaluronidase-fihj (DARZALEX FASPRO) 1800-14056 MG-UT/15ML injection 1,800 mg, 1,800 mg, Subcutaneous, Once, Red Del Valle MD    Medicines reviewed by Yolande Huitron [facility] on 5/12/2025 at  9:52 AM    Drug Interactions  Assessed medication list for interactions, no significant drug interactions noted.   Advised patient to call the clinic if any new medications are started so we can assess for drug-drug interactions.  Drug-food interactions discussed:  none    Adverse Drug Reactions  Medication tolerability: Tolerating with no to minimal ADRs  Medication plan: Continue therapy with normal follow-up  Plan for ADR Management: not needed    Adherence, Self-Administration, and Current Therapy Problems  Adherence related to the patient's specialty therapy was discussed with the patient. The Adherence segment of this outreach has been reviewed and updated.     Adherence Questions  Linked Medication(s) Assessed: Pomalidomide (POMALYST)  On average, how many doses/injections does the patient miss per month?: 0  What are the identified reasons for non-adherence or missed doses? : no problems identified  What is the estimated medication adherence level?: %  Based on the patient/caregiver response and refill history, does this patient require an MTP to track adherence improvements?: no    Additional Barriers to Patient Self-Administration: none  Methods for Supporting Patient  Self-Administration: none  Patient has had no issues obtaining medication from pharmacy.    Open Medication Therapy Problems  No medication therapy recommendations to display    Goals of Therapy  Goals related to the patient's specialty therapy were discussed with the patient. The Patient Goals segment of this outreach has been reviewed and updated.   Goals Addressed Today        Specialty Pharmacy General Goal      GUANAKITO, SPEP and FLE within normal limits  8/5/2024: IgG increased to 661. M protein not detected. Kappa FLC 55.5. Lambda FLC 40.7. Kappa to lambda ratio 1.56.  10/28/24: Labs from 10/1/24 show continued control of disease.  IgG 597, M protein not detected, Kappa FLC 58.1, Lambda FLC 40, Kappa/lambda ratio 1.45. Patient tolerating treatment well. No changes recommended at this time.               Quality of Life Assessment   Quality of Life related to the patient's enrollment in the patient management program and services provided was discussed with the patient. The QOL segment of this outreach has been reviewed and updated.  Quality of Life Improvement Scale: 6-A little better    Discussed aforementioned material with patient in person, face-to-face, in clinic.     Reassessment Plan & Follow-Up  1. Medication Therapy Changes: none  2. Related Plans, Therapy Recommendations, or Issues to Be Addressed: none  3. Pharmacist to perform regular assessments no more than (6) months from the previous assessment.   4. Care Coordinator to set up future refill outreaches, coordinate prescription delivery, and escalate clinical questions to pharmacist.    Attestation  Therapeutic appropriateness: Appropriate   I attest the patient was actively involved in and has agreed to the above plan of care.  If the prescribed therapy is at any point deemed not appropriate based on the current or future assessments, a consultation will be initiated with the patient's specialty care provider to determine the best course of action.  The revised plan of therapy will be documented along with any required assessments and/or additional patient education provided.     Yumiko EsparzaD, [facility]  Clinical Specialty Pharmacist, Oncology  5/12/2025  09:53 EDT

## 2025-05-12 NOTE — PROGRESS NOTES
Subjective     CHIEF COMPLAINT:      Chief Complaint   Patient presents with    Follow-up     Lab review     HISTORY OF PRESENT ILLNESS:     Contreras Albert is a 69 y.o. male patient who returns today for follow up on his multiple myeloma and hypomagnesemia.  He returns today for follow-up reporting that he is feeling good.  Diarrhea improved over the past few months.  He noticed improvement in the leg swelling after coming off gabapentin and he is no longer taking Bumex.  He continues to follow-up closely with his nephrologist.  No recent infections.    ROS:  Pertinent ROS is in the HPI.     Past medical, surgical, social and family history were reviewed.     MEDICATIONS:    Current Outpatient Medications:     acetaminophen (TYLENOL) 325 MG tablet, Take 2 tablets by mouth Every 6 (Six) Hours As Needed for Mild Pain. (Patient taking differently: Take 2 tablets by mouth Every 6 (Six) Hours As Needed for Mild Pain. As needed), Disp: , Rfl:     acyclovir (ZOVIRAX) 800 MG tablet, Take 1 tablet by mouth Daily., Disp: 90 tablet, Rfl: 2    amLODIPine (NORVASC) 5 MG tablet, Take 1 tablet by mouth Daily., Disp: , Rfl:     ASPIRIN 81 PO, Take 81 mg by mouth Daily., Disp: , Rfl:     atorvastatin (LIPITOR) 10 MG tablet, Take 1 tablet by mouth Every Night., Disp: 90 tablet, Rfl: 2    calcium carbonate (TUMS) 500 MG chewable tablet, Chew 4 tablets Daily., Disp: , Rfl:     Continuous Blood Gluc  (FreeStyle Jin 2 Rampart) device, USE 1 FOR 1 DOSE, Disp: , Rfl:     Continuous Glucose Sensor (FreeStyle Jin 2 Sensor) misc, USE 1 EVERY 14 DAYS, Disp: 2 each, Rfl: 6    dexAMETHasone (DECADRON) 4 MG tablet, Take 3 tablets on day of chemo, 1 tablet on day #2 and 1 tablet on day #3 and repeat monthly., Disp: 15 tablet, Rfl: 1    diphenoxylate-atropine (LOMOTIL) 2.5-0.025 MG per tablet, Take 2 tablets by mouth 4 (Four) Times a Day As Needed for Diarrhea., Disp: 120 tablet, Rfl: 0    escitalopram (LEXAPRO) 5 MG tablet, TAKE 1 TABLET  BY MOUTH EVERY DAY, Disp: 90 tablet, Rfl: 1    finasteride (PROSCAR) 5 MG tablet, Take 1 tablet by mouth Daily., Disp: , Rfl:     glucagon (GLUCAGEN) 1 MG injection, Inject 1 mg into the appropriate muscle as directed by prescriber 1 (One) Time As Needed for Low Blood Sugar. Follow package directions for low blood sugar., Disp: 1 kit, Rfl: 10    icosapent ethyl (VASCEPA) 1 g capsule capsule, TAKE 2 CAPSULES BY MOUTH TWICE DAILY WITH MEALS, Disp: 360 capsule, Rfl: 1    Insulin Lispro, 1 Unit Dial, (HUMALOG) 100 UNIT/ML solution pen-injector, On NONCHEMO DAYS: 30 to 35 units with each meal +1 unit per 50 greater than 150.  Maximum of 40 units per dose.  On CHEMO DAYS: 35 to 40 units 3 times daily with meals +2 units per 50 greater than 150.  Maximum of 50 units per dose., Disp: 135 mL, Rfl: 2    Insulin Pen Needle 32G X 4 MM misc, USING 4 PEN NEEDLES DAILY, Disp: 400 each, Rfl: 3    Lantus SoloStar 100 UNIT/ML injection pen, NON CHEMO DAYS TAKE 16 UNITS TWICE DAILY. CHEMO DAYS TAKE 30 UNITS TWICE DAILY FOR A MAX DOSE OF 70 UNITS DAILY, Disp: 63 mL, Rfl: 1    ondansetron (ZOFRAN) 8 MG tablet, Take 1 tablet by mouth 3 (Three) Times a Day As Needed for Nausea or Vomiting., Disp: 30 tablet, Rfl: 5    pomalidomide (POMALYST) 2 MG chemo capsule, Take 1 capsule by mouth Daily. Take for 21 days on, then 7 days off.  Indications: Multiple Myeloma, Disp: 21 capsule, Rfl: 0    tamsulosin (FLOMAX) 0.4 MG capsule 24 hr capsule, Take 1 capsule by mouth Daily., Disp: , Rfl:     Accu-Chek Guide Test test strip, USE TO CHECK BLOOD SUGAR THREE TIMES DAILY (Patient not taking: Reported on 5/12/2025), Disp: 300 each, Rfl: 0    Accu-Chek Softclix Lancets lancets, Check 3 times a day on insulin tx, poor control, hypoglycemia. Dx: E 11.22 (Patient not taking: Reported on 5/12/2025), Disp: 300 each, Rfl: 3    bumetanide (BUMEX) 2 MG tablet, Take 1 tablet by mouth Daily. (Patient not taking: Reported on 5/12/2025), Disp: , Rfl:      "DARATUMUMAB IV, Infuse  into a venous catheter., Disp: , Rfl:     omega-3 acid ethyl esters (LOVAZA) 1 g capsule, TAKE 2 CAPSULES BY MOUTH TWICE DAILY (Patient not taking: Reported on 5/12/2025), Disp: 360 capsule, Rfl: 1  Objective     VITAL SIGNS:     Vitals:    05/12/25 0855   BP: 153/88   Pulse: 76   Temp: 97.9 °F (36.6 °C)   TempSrc: Oral   SpO2: 98%   Weight: 128 kg (281 lb 11.2 oz)   Height: 193 cm (75.98\")   PainSc: 0-No pain     Body mass index is 34.3 kg/m².     Wt Readings from Last 5 Encounters:   05/12/25 128 kg (281 lb 11.2 oz)   04/14/25 132 kg (292 lb)   03/17/25 133 kg (293 lb 3.2 oz)   02/17/25 125 kg (275 lb 9.2 oz)   01/29/25 (!) 143 kg (316 lb 3.2 oz)     PHYSICAL EXAMINATION:   GENERAL: The patient appears in fair general condition, not in acute distress.   SKIN: No Ecchymosis.  EYES: No jaundice. Pallor.  CHEST: Normal respiratory effort. Normal breathing sounds bilaterally. No added sounds.  CVS: Normal S1 and S2. No Murmur.  ABDOMEN: Mildly distended.  EXTREMITIES: Chronic edema of the lower extremities.  No calf tenderness.  Chronic erythema of the skin.    DIAGNOSTIC DATA:     Results from last 7 days   Lab Units 05/12/25  0836   WBC 10*3/mm3 5.78   NEUTROS ABS 10*3/mm3 2.75   HEMOGLOBIN g/dL 11.5*   HEMATOCRIT % 35.5*   PLATELETS 10*3/mm3 155     Results from last 7 days   Lab Units 05/12/25  0836   SODIUM mmol/L 139   POTASSIUM mmol/L 4.1   CHLORIDE mmol/L 106   CO2 mmol/L 20.4*   BUN mg/dL 39*   CREATININE mg/dL 3.57*   CALCIUM mg/dL 8.4*   ALBUMIN g/dL 3.7   BILIRUBIN mg/dL 0.5   ALK PHOS U/L 51   ALT (SGPT) U/L 14   AST (SGOT) U/L 12   GLUCOSE mg/dL 203*   MAGNESIUM mg/dL 1.6     Component      Latest Ref Rng 1/20/2025 3/17/2025 4/14/2025   IgG      603 - 1613 mg/dL 632  757  742    IgA      61 - 437 mg/dL 80  109  111    IgM      20 - 172 mg/dL 28  64  47    Total Protein      6.0 - 8.5 g/dL 5.8 (L)  5.6 (L)  5.9 (L)    Albumin      2.9 - 4.4 g/dL 3.2  3.3  3.2    Alpha-1-Globulin    "   0.0 - 0.4 g/dL 0.3  0.2  0.2    Alpha-2-Globulin      0.4 - 1.0 g/dL 0.9  0.8  0.9    Beta Globulin      0.7 - 1.3 g/dL 0.9  0.7  0.8    Gamma Globulin      0.4 - 1.8 g/dL 0.6  0.6  0.8    M-Jurgen      Not Observed g/dL Not Observed  Not Observed  Not Observed    Globulin      2.2 - 3.9 g/dL 2.6  2.3  2.7    A/G Ratio      0.7 - 1.7  1.3  1.5  1.2    Immunofixation Reflex, Serum Comment:  Comment:  Comment:    Please note Comment  Comment  Comment    Kappa FLC      3.3 - 19.4 mg/L 66.2 (H)  64.1 (H)  59.4 (H)    Free Lambda Light Chains      5.7 - 26.3 mg/L 51.5 (H)  59.1 (H)  52.4 (H)    Kappa/Lambda Ratio      0.26 - 1.65  1.29  1.08  1.13       Assessment & Plan    *IgG lambda multiple myeloma.  Bone marrow 5/23/2020 hypercellular marrow with 80% involvement of plasma cell myeloma; FISH studies pending  Bone survey 5/26/2020: Diffuse osteoporosis and demineralization.  However, no discrete lytic lesions.  SPEP 5/23/2020: M spike 5.1.  IgG 6629 lambda.  Light chain ratio 0 with free lambda light chains 6400.  Beta-2 microglobulin 16.3.  24-hour urine 12.7 g protein per 24-hour with monoclonal lambda free light chain 33.9%, monoclonal IgG lambda 23.5%  24-hour urine testing from 5/24/2020 free lambda light chains 8.4 g/L  Due to the renal failure, CyBorD regimen was chosen and was started on 5/28/2020.    On 6/18/2020, Velcade was changed to 1.5 mg/m² weekly continuously along with weekly Decadron and oral Cytoxan.  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   Treatment was changed on 8/27/2020 to to Daratumumab subcutaneous injection weekly along with Revlimid 10 mg daily for 21 out of 28-day cycle and Decadron 40 mg daily.  S/p stem cell transplant on 12/9/2020 at [facility].  Day 100 bone marrow on 3/16/2021 revealed minute  plasma cell population.  PET scan on 3/29/2021 was negative.  Patient enrolled in the  clinical trial randomizing to maintenance lenalidomide versus  lenalidomide and daratumumab.  Patient was randomized to the Revlimid arm initiated 4/13/2021 dosed at 5 mg daily (due to renal function).  Patient developed neutropenia requiring dose adjustment to 5 mg daily for 21/28 days.  Patient withdrew from clinical trial after 5 cycles on 8/31/2021 due to worsening diarrhea.    He was switched to Velcade maintenance every other week 9/14/2021 which he discontinued 10/26/2021 due to neuropathy and lower extremity edema.    He was subsequently changed to pomalidomide 2 mg daily 21/28 days on 11/8/2021.    1 year post transplant, bone marrow biopsy showed 5-7% plasma cells and due to the residual disease he was started on DPd (daratumumab, pomalidomide, dexamethasone) on 2/21/2022.    Patient was seen by Dr. Romero on 9/12/2022.  At that time patient was cycle 8-day 7 DPd. Due to chronic side effects, dexamethasone was changed from weekly to monthly.    Patient did continue pomalidomide on his own during admission to the hospital, received 1 dose 4 mg p.o. on 9/24/2022 before was subsequently discontinued due to cytopenias.   Dexamethasone was changed to 12 mg on Mondays, 4 mg on Tuesday and 4 mg on Wednesday.  Pomalyst dose was reduced to 3 mg daily for 21 out of a 28-day cycle starting on 10/31/2022.  Due to neutropenia, the dose of Pomalyst was subsequently reduced to 2 mg daily for 21/28-day cycle.  9/5/2023: Treatment held due to weakness and postural dizziness.  9/5/2023: No M protein.  Kappa FLC 49.6.  Lambda FLC 32.4.  Kappa to lambda ratio 1.53.  He did not notice improvement in his symptoms while off Darzalex and Pomalyst.  Therefore, his symptoms were not attributed to treatment.  He was restarted on Darzalex and Pomalyst on 10/2/2023.  9/20/2024: No M protein.  Kappa FLC 58.1.  Lambda FLC 40.0.    Kappa/lambda ratio 1.45.  12/23/2024: No M protein. Kappa FLC 57.4. Lambda FLC 45.5.  Kappa/Lambda ratio 1.26.  1/20/2025: M protein was not detected.  Kappa FLC 66.2.   Lambda FLC 51.5.  Kappa/lambda ratio 1.29.  4/14/2025: M protein was not detected.  Kappa FLC 59.4.  Lambda FLC 52.4.  Kappa/lambda ratio 1.13.  He is tolerating the treatment well.     *Bone health.  He is on Xgeva.   Patient developed severe hypocalcemia following the Xgeva injection.  He required inpatient hospital stay.  Given the duration of Xgeva injections, recommended changing treatment to every 3 months starting December 2022.   Last dose of Xgeva was on 3/17/2025.  No new areas of bone pain.    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  Patient was receiving Procrit at [facility].  Records from [facility] showed that the patient was receiving 70,000 units weekly and received the last dose on 9/27/2022.  Patient was given Procrit 70,000 units on 9/27/2022.  Hemoglobin was 9.9 on 10/17/2022.  He was given Procrit 60,000 units.  Hemoglobin was 9.8 on 11/4/2022.  He was given Procrit 60,000 units.  Labs in March 2023 revealed adequate iron stores.  Last dose of Procrit was 25,000 units on 5/1/2023 for hemoglobin of 9.9.  5/13/2024: Hemoglobin 11.2.  6/10/2024: Hemoglobin decreased to 9.9-?  Secondary to sinus infection.  We discussed starting back on Procrit.  However, hemoglobin improved subsequently.  3/17/2025: Hemoglobin 11.0.  5/12/2025: Hemoglobin improved to 11.5.    *Neutropenia.  This was attributed to Pomalyst.  Neutrophil count was down to 290 on 9/25/2022.  He was given Neupogen during his hospital stay in September 2022.  Neutrophil count improved afterwards.  However, neutrophil count decreased to 600 on 12/19/2022.  He reports that he was feeling tired and achy at that time.  Neutrophil count was 770 on 1/16/2023.  Due to the recurrent neutropenia, the dose of Pomalyst was reduced to 2 mg in January 2023.  Neutrophil count improved following the dose reduction.  5/12/2025: Neutrophil count 2750.    *Hypocalcemia.  Patient  developed severe hypocalcemia due to Xgeva.  Calcium was 5.9 on 9/23/2022.  Patient was placed on calcium replacement.  Calcium dose was subsequently increased to 3 tablets daily.  8/7/2023: Calcium 8.1. Albumin 3.6.  The dose was increased to 4 tablets daily.  10/28/2024: Calcium 9.7.  1/20/2025: Calcium decreased to 7.2.  Albumin 3.6.  3/17/2025: Calcium improved to 9.2.  Albumin 3.5.  5/12/2025: Calcium decreased to 8.4.  Albumin 3.7.    *Hypomagnesemia.  Magnesium was 1.1 on 10/24/2022.  He was placed on oral magnesium oxide 400 mg twice daily.  He developed diarrhea and it was stopped.  He was started on magnesium chloride on 1/23/2023.  He developed diarrhea about 7 days after starting the magnesium chloride.  Oral magnesium chloride was discontinued.  He receives IV magnesium per protocol based on his magnesium level.  5/12/2025: Magnesium improved to 1.6.     *Prophylaxis.  He is on acyclovir 800 mg daily.  He is on ASA 81 mg daily.  Patient developed influenza A in February 2025.  He is hospitalized with acute hypoxic respiratory failure and secondary pneumonia.  He was treated with Tamiflu, ceftriaxone and azithromycin.    No new infections since that time.    *Episodes of dizziness and falling.  Patient reports that the episodes develop after he stands up and starts walking.  No dizziness while sitting.  No restricted chest pain or shortness of breath.  Blood pressure was 133/80 and heart rate was 66 in the sitting position today.  After standing, blood pressure was 119/76 and heart rate was 69.  No improvement in the symptoms after holding Pomalyst for 2 weeks.  MRI brain on 7/26/2023 showed small vessel ischemic changes.  There were changes of 3 old strokes.  I explained the findings to the patient.  I recommended evaluation by cardiology and neurology.  I also recommended home physical therapy  I recommended that he uses a walker when he ambulates.  His evaluation showed possible changes of postural  hypotension.   He is concerned that this is due to his medications. I explained that while postural hypotension is a side effect of medications, it is not a common side effect of Darzalex or Pomalyst.  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  Symptoms did not improve after placing Darzalex and Pomalyst on hold.  On 10/2/2023, we recommended reducing Bumex to 1 mg daily and monitoring his symptoms and the leg swelling.  He did not notice a difference in his postural dizziness after the dose reduction.   Bumex was increased back to 2 mg daily due to worsening of his edema.   This dose is controlling his leg edema.    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  It is controlled with Lexapro 5 mg daily.    *Vitamin D deficiency.  He is on vitamin D 50,000 units weekly.  Vitamin D level was 18 on 10/3/2022.  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  He is on gabapentin 300 mg in the morning and 600 mg in the evening.  Neuropathy symptoms in the feet are better as the swelling improved.     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  S/p right partial nephrectomy on 5/18/2016.    PLAN:    1.  Proceed with Darzalex Faspro today.   2.  Continue Pomalyst 2 mg daily for 21 days of a 28-day cycle.   3.  Continue dexamethasone 20 mg monthly.   4.  Continue Neutra-Phos 1 tablet daily.   5.  Continue acyclovir 800 mg daily.   6.  Continue aspirin 81 mg daily.   7.  He will return in 4 and 8 weeks for the next doses of Darzalex.  Xgeva will be scheduled to be given in 4 weeks.   8.  I will see him in follow-up in 12 weeks.       Red Del Valle MD  05/12/25

## 2025-05-13 NOTE — PROGRESS NOTES
Specialty Pharmacy Note: Pomalyst (pomalidomide)    Contreras Albert is a 69 y.o. male with multiple myeloma was seen 5/12/25 by Dr. Del Valle. Per provider dictation, no changes to oral oncology regimen  Pomalyst 2 mg daily for 21 days of a 28-day cycle .  Labs Review: The CMP and CBC from 5/12/25 have been reviewed. The following labs are outside of normal limits: Hgb, SCR. No dose adjustments are needed for the oral specialty medication(s) based on the labs.    Specialty pharmacy will continue to follow patient.    Carlos Clarke, Evan, [facility]  Clinical Oncology Pharmacist    5/13/2025  09:43 EDT

## 2025-05-14 LAB
ALBUMIN SERPL ELPH-MCNC: 3.4 G/DL (ref 2.9–4.4)
ALBUMIN/GLOB SERPL: 1.5 {RATIO} (ref 0.7–1.7)
ALPHA1 GLOB SERPL ELPH-MCNC: 0.2 G/DL (ref 0–0.4)
ALPHA2 GLOB SERPL ELPH-MCNC: 0.8 G/DL (ref 0.4–1)
B-GLOBULIN SERPL ELPH-MCNC: 0.8 G/DL (ref 0.7–1.3)
GAMMA GLOB SERPL ELPH-MCNC: 0.6 G/DL (ref 0.4–1.8)
GLOBULIN SER-MCNC: 2.4 G/DL (ref 2.2–3.9)
IGA SERPL-MCNC: 87 MG/DL (ref 61–437)
IGG SERPL-MCNC: 623 MG/DL (ref 603–1613)
IGM SERPL-MCNC: 30 MG/DL (ref 20–172)
INTERPRETATION SERPL IEP-IMP: ABNORMAL
KAPPA LC FREE SER-MCNC: 52.3 MG/L (ref 3.3–19.4)
KAPPA LC FREE/LAMBDA FREE SER: 1.31 {RATIO} (ref 0.26–1.65)
LABORATORY COMMENT REPORT: ABNORMAL
LAMBDA LC FREE SERPL-MCNC: 40 MG/L (ref 5.7–26.3)
M PROTEIN SERPL ELPH-MCNC: ABNORMAL G/DL
PROT SERPL-MCNC: 5.8 G/DL (ref 6–8.5)

## 2025-05-20 ENCOUNTER — SPECIALTY PHARMACY (OUTPATIENT)
Dept: PHARMACY | Facility: HOSPITAL | Age: 70
End: 2025-05-20
Payer: MEDICARE

## 2025-05-20 NOTE — PROGRESS NOTES
Specialty Pharmacy Patient Management Program  Per Protocol Prescription Order or Refill       Requested Prescriptions     Signed Prescriptions Disp Refills    pomalidomide (POMALYST) 2 MG chemo capsule 21 capsule 0     Sig: Take 1 capsule by mouth Daily. Take for 21 days on, then 7 days off.  Indications: Multiple Myeloma     Prescription orders above were sent to [facility] per Collaborative Care Agreement Protocol.     Completed independent double check on medication order/RX.    Pia Cano Rph, BCOP  Clinical Specialty Pharmacist, Oncology  5/20/2025  10:10 EDT

## 2025-05-20 NOTE — PROGRESS NOTES
Specialty Pharmacy Patient Management Program  Per Protocol Prescription Order or Refill     Patient will be filling or currently fills medications at [facility] and is enrolled in the Patient Management Program.    Requested Prescriptions     Signed Prescriptions Disp Refills    pomalidomide (POMALYST) 2 MG chemo capsule 21 capsule 0     Sig: Take 1 capsule by mouth Daily. Take for 21 days on, then 7 days off.  Indications: Multiple Myeloma     Prescription orders above were sent to the pharmacy per Collaborative Care Agreement Protocol.     Last Office Visit: 5/12/25  Next Office Visit: 8/4/25    Carlos Clarke, Evan, BCOP  Clinical Specialty Pharmacist, Oncology  5/20/2025  09:37 EDT

## 2025-06-04 ENCOUNTER — OFFICE VISIT (OUTPATIENT)
Dept: ENDOCRINOLOGY | Age: 70
End: 2025-06-04
Payer: MEDICARE

## 2025-06-04 VITALS
SYSTOLIC BLOOD PRESSURE: 122 MMHG | HEIGHT: 76 IN | WEIGHT: 297 LBS | TEMPERATURE: 97.8 F | HEART RATE: 73 BPM | BODY MASS INDEX: 36.17 KG/M2 | DIASTOLIC BLOOD PRESSURE: 84 MMHG | OXYGEN SATURATION: 97 %

## 2025-06-04 DIAGNOSIS — I10 PRIMARY HYPERTENSION: ICD-10-CM

## 2025-06-04 DIAGNOSIS — E78.5 HYPERLIPIDEMIA, UNSPECIFIED HYPERLIPIDEMIA TYPE: ICD-10-CM

## 2025-06-04 DIAGNOSIS — E11.21 TYPE 2 DIABETES MELLITUS WITH NEPHROPATHY: Primary | ICD-10-CM

## 2025-06-04 DIAGNOSIS — G62.0 PERIPHERAL NEUROPATHY DUE TO CHEMOTHERAPY: ICD-10-CM

## 2025-06-04 DIAGNOSIS — N18.4 TYPE 2 DIABETES MELLITUS WITH STAGE 4 CHRONIC KIDNEY DISEASE, WITH LONG-TERM CURRENT USE OF INSULIN: ICD-10-CM

## 2025-06-04 DIAGNOSIS — T45.1X5A PERIPHERAL NEUROPATHY DUE TO CHEMOTHERAPY: ICD-10-CM

## 2025-06-04 DIAGNOSIS — E11.22 TYPE 2 DIABETES MELLITUS WITH STAGE 4 CHRONIC KIDNEY DISEASE, WITH LONG-TERM CURRENT USE OF INSULIN: ICD-10-CM

## 2025-06-04 DIAGNOSIS — Z85.528 HISTORY OF KIDNEY CANCER: ICD-10-CM

## 2025-06-04 DIAGNOSIS — Z79.4 TYPE 2 DIABETES MELLITUS WITH STAGE 4 CHRONIC KIDNEY DISEASE, WITH LONG-TERM CURRENT USE OF INSULIN: ICD-10-CM

## 2025-06-04 RX ORDER — KETOROLAC TROMETHAMINE 30 MG/ML
1 INJECTION, SOLUTION INTRAMUSCULAR; INTRAVENOUS DAILY
Qty: 1 EACH | Refills: 1 | Status: SHIPPED | OUTPATIENT
Start: 2025-06-04

## 2025-06-04 RX ORDER — INSULIN GLARGINE 100 [IU]/ML
INJECTION, SOLUTION SUBCUTANEOUS
Qty: 63 ML | Refills: 2 | Status: SHIPPED | OUTPATIENT
Start: 2025-06-04

## 2025-06-04 RX ORDER — HYDROCHLOROTHIAZIDE 12.5 MG/1
CAPSULE ORAL
Qty: 6 EACH | Refills: 3 | Status: SHIPPED | OUTPATIENT
Start: 2025-06-04

## 2025-06-04 NOTE — PROGRESS NOTES
Subjective   Contreras Albert is a 69 y.o. male.     History of Present Illness     He has known diabetes mellitus since 2001 and started on insulin in 2009.  He takes dexamethasone 4 mg 3 tablets on the day of chemo every fourth Monday.  His last chemotherapy day was January 20, 2025.  On off chemotherapy days:  Lantus 20 units every morning and 20 units every evening and NovoLog 30 to 35 units with each meal +1 unit per 50 greater than 150.  On chemotherapy days and day after: Lantus 35 units twice daily and on the morning after.  NovoLog 35-40 units with each meal +2 units per 50 greater than 150.  He has lost 19 pounds since January 2025.  His last meal was at 11 AM.     Sensor data from May 22, 2025 to June 4, 2025 reviewed.  Average glucose 177 mg per DL.  GMI 7.5%.  Time in range 56%.  Time above range 44%.  Time below range 0     His last eye examination was done by Dr. De La Cruz in 1/24 .  He has retinopathy and had previous intraocular eye injections on both eyes and retinal laser therapy.  He has chronic kidney disease with albuminuria and has been following Dr. Norm Hutchison.  He has numbness in hands and feet thought to be due to Velcade.  He went off gabapentin because of diarrhea.     He has hypertension and was restarted on amlodipine 5 mg daily by Dr. Hutchison.  He is off carvedilol 6.25 mg BID and Bumex 2 mg/day.  He has no history of heart attack or stroke.  He denies chest pain or shortness of breath.     He has hyperlipidemia and is on atorvastatin 10 mg/day and Vascepa 2 grams BID.       He has multiple myeloma and had chemotherapy followed by stem cell transplant.  He is on Darzalex, dexamethasone and Pomalyst.  He is on Xgeva every 3 months, Tums 3-4 tabs/day, and Procrit as needed.  He follows with Dr. Del Valle     He had a previous right partial nephrectomy for cancer.  He has no known recurrence.     He had intermittent diarrhea and takes Lomotil and Imodium as needed.  He has history of C. difficile  "colitis which was treated.  He denies melena or hematochezia.  He has never had a colonoscopy.     The following portions of the patient's history were reviewed and updated as appropriate: allergies, current medications, past family history, past medical history, past social history, past surgical history, and problem list.    Review of Systems   Eyes:  Negative for visual disturbance.   Respiratory:  Negative for shortness of breath and wheezing.    Cardiovascular:  Negative for chest pain and palpitations.   Genitourinary: Negative.    Musculoskeletal:  Negative for myalgias.   Neurological:  Positive for numbness.     Vitals:    06/04/25 1439   BP: 122/84   Pulse: 73   Temp: 97.8 °F (36.6 °C)   TempSrc: Oral   SpO2: 97%   Weight: 135 kg (297 lb)   Height: 193 cm (75.98\")      Objective   Physical Exam  Constitutional:       General: He is not in acute distress.     Appearance: Normal appearance. He is not ill-appearing, toxic-appearing or diaphoretic.   Eyes:      General:         Right eye: No discharge.         Left eye: No discharge.      Extraocular Movements: Extraocular movements intact.   Neck:      Vascular: No carotid bruit.   Cardiovascular:      Rate and Rhythm: Normal rate and regular rhythm.      Pulses: Normal pulses.      Heart sounds: Normal heart sounds. No murmur heard.     No friction rub. No gallop.   Pulmonary:      Breath sounds: Normal breath sounds. No rales.   Chest:      Chest wall: No tenderness.   Abdominal:      General: Bowel sounds are normal.      Palpations: Abdomen is soft.      Tenderness: There is no right CVA tenderness or left CVA tenderness.   Musculoskeletal:      Right lower leg: Edema present.      Left lower leg: Edema present.   Lymphadenopathy:      Cervical: No cervical adenopathy.   Neurological:      Mental Status: He is alert and oriented to person, place, and time.       Lab on 05/12/2025   Component Date Value Ref Range Status    Glucose 05/12/2025 203 (H)  65 - " 99 mg/dL Final    BUN 05/12/2025 39 (H)  8 - 23 mg/dL Final    Creatinine 05/12/2025 3.57 (C)  0.76 - 1.27 mg/dL Final    Sodium 05/12/2025 139  136 - 145 mmol/L Final    Potassium 05/12/2025 4.1  3.5 - 5.2 mmol/L Final    Chloride 05/12/2025 106  98 - 107 mmol/L Final    CO2 05/12/2025 20.4 (L)  22.0 - 29.0 mmol/L Final    Calcium 05/12/2025 8.4 (L)  8.6 - 10.5 mg/dL Final    Total Protein 05/12/2025 5.9 (L)  6.0 - 8.5 g/dL Final    Albumin 05/12/2025 3.7  3.5 - 5.2 g/dL Final    ALT (SGPT) 05/12/2025 14  1 - 41 U/L Final    AST (SGOT) 05/12/2025 12  1 - 40 U/L Final    Alkaline Phosphatase 05/12/2025 51  39 - 117 U/L Final    Total Bilirubin 05/12/2025 0.5  0.0 - 1.2 mg/dL Final    Globulin 05/12/2025 2.2  gm/dL Final    A/G Ratio 05/12/2025 1.7  g/dL Final    BUN/Creatinine Ratio 05/12/2025 10.9  7.0 - 25.0 Final    Anion Gap 05/12/2025 12.6  5.0 - 15.0 mmol/L Final    eGFR 05/12/2025 17.9 (L)  >60.0 mL/min/1.73 Final    Magnesium 05/12/2025 1.6  1.6 - 2.4 mg/dL Final    Phosphorus 05/12/2025 3.3  2.5 - 4.5 mg/dL Final    IgG 05/12/2025 623  603 - 1613 mg/dL Final    IgA 05/12/2025 87  61 - 437 mg/dL Final    IgM 05/12/2025 30  20 - 172 mg/dL Final    Total Protein 05/12/2025 5.8 (L)  6.0 - 8.5 g/dL Final    Albumin 05/12/2025 3.4  2.9 - 4.4 g/dL Final    Alpha-1-Globulin 05/12/2025 0.2  0.0 - 0.4 g/dL Final    Alpha-2-Globulin 05/12/2025 0.8  0.4 - 1.0 g/dL Final    Beta Globulin 05/12/2025 0.8  0.7 - 1.3 g/dL Final    Gamma Globulin 05/12/2025 0.6  0.4 - 1.8 g/dL Final    M-Jurgen 05/12/2025 Not Observed  Not Observed g/dL Final    Globulin 05/12/2025 2.4  2.2 - 3.9 g/dL Final    A/G Ratio 05/12/2025 1.5  0.7 - 1.7 Final    Immunofixation Reflex, Serum 05/12/2025 Comment   Final    No monoclonality detected.    Please note 05/12/2025 Comment   Final    Protein electrophoresis scan will follow via computer, mail, or   delivery.    Free Light Chain, [place] 05/12/2025 52.3 (H)  3.3 - 19.4 mg/L Final    Free  Lambda Light Chains 05/12/2025 40.0 (H)  5.7 - 26.3 mg/L Final    Kappa/Lambda Ratio 05/12/2025 1.31  0.26 - 1.65 Final    WBC 05/12/2025 5.78  3.40 - 10.80 10*3/mm3 Final    RBC 05/12/2025 3.85 (L)  4.14 - 5.80 10*6/mm3 Final    Hemoglobin 05/12/2025 11.5 (L)  13.0 - 17.7 g/dL Final    Hematocrit 05/12/2025 35.5 (L)  37.5 - 51.0 % Final    MCV 05/12/2025 92.2  79.0 - 97.0 fL Final    MCH 05/12/2025 29.9  26.6 - 33.0 pg Final    MCHC 05/12/2025 32.4  31.5 - 35.7 g/dL Final    RDW 05/12/2025 15.6 (H)  12.3 - 15.4 % Final    RDW-SD 05/12/2025 52.2  37.0 - 54.0 fl Final    MPV 05/12/2025 9.4  6.0 - 12.0 fL Final    Platelets 05/12/2025 155  140 - 450 10*3/mm3 Final    Neutrophil % 05/12/2025 47.6  42.7 - 76.0 % Final    Lymphocyte % 05/12/2025 34.6  19.6 - 45.3 % Final    Monocyte % 05/12/2025 12.1 (H)  5.0 - 12.0 % Final    Eosinophil % 05/12/2025 3.8  0.3 - 6.2 % Final    Basophil % 05/12/2025 1.6 (H)  0.0 - 1.5 % Final    Immature Grans % 05/12/2025 0.3  0.0 - 0.5 % Final    Neutrophils, Absolute 05/12/2025 2.75  1.70 - 7.00 10*3/mm3 Final    Lymphocytes, Absolute 05/12/2025 2.00  0.70 - 3.10 10*3/mm3 Final    Monocytes, Absolute 05/12/2025 0.70  0.10 - 0.90 10*3/mm3 Final    Eosinophils, Absolute 05/12/2025 0.22  0.00 - 0.40 10*3/mm3 Final    Basophils, Absolute 05/12/2025 0.09  0.00 - 0.20 10*3/mm3 Final    Immature Grans, Absolute 05/12/2025 0.02  0.00 - 0.05 10*3/mm3 Final    nRBC 05/12/2025 0.0  0.0 - 0.2 /100 WBC Final     Assessment & Plan   Diagnoses and all orders for this visit:    1. Type 2 diabetes mellitus with nephropathy (Primary)  -     Comprehensive Metabolic Panel  -     Fructosamine  -     Hemoglobin A1c  -     Lipid Panel  -     TSH Rfx On Abnormal To Free T4  -     Vitamin B12    2. Primary hypertension  -     Comprehensive Metabolic Panel    3. Hyperlipidemia, unspecified hyperlipidemia type  -     Lipid Panel    4. Peripheral neuropathy due to chemotherapy  -     Vitamin B12    5. History of  kidney cancer    Other orders  -     Continuous Glucose Sensor (FreeStyle Jin 3 Plus Sensor); Use Every 15 (Fifteen) Days.  Dispense: 6 each; Refill: 3  -     Continuous Glucose  (FreeStyle Jin 3 [place]) device; Use 1 each Daily.  Dispense: 1 each; Refill: 1      Continue Lantus and mealtime NovoLog.  Have discussed with patient how to make mealtime NovoLog adjustments.  Follow-up with Dr. Norm Hutchison as scheduled.  Follow-up with Dr. De La Cruz.    Continue amlodipine.  Will defer blood pressure control to Dr. Hutchison.    Continue Lipitor 10 mg/day and Vascepa 2 g twice a day.    Follow-up with Dr. Del Valle.    Do not copy of my note sent to Dr. Norm Hutchison, Dr. De La Cruz, Dr. Del Valle and Brianna Thompson NP.    RTC 4 mos.

## 2025-06-05 LAB
ALBUMIN SERPL-MCNC: 3.7 G/DL (ref 3.5–5.2)
ALBUMIN/GLOB SERPL: 1.8 G/DL
ALP SERPL-CCNC: 50 U/L (ref 39–117)
ALT SERPL-CCNC: 9 U/L (ref 1–41)
AST SERPL-CCNC: 10 U/L (ref 1–40)
BILIRUB SERPL-MCNC: 0.4 MG/DL (ref 0–1.2)
BUN SERPL-MCNC: 32 MG/DL (ref 8–23)
BUN/CREAT SERPL: 8.4 (ref 7–25)
CALCIUM SERPL-MCNC: 7.9 MG/DL (ref 8.6–10.5)
CHLORIDE SERPL-SCNC: 109 MMOL/L (ref 98–107)
CHOLEST SERPL-MCNC: 132 MG/DL (ref 0–200)
CO2 SERPL-SCNC: 22 MMOL/L (ref 22–29)
CREAT SERPL-MCNC: 3.82 MG/DL (ref 0.76–1.27)
EGFRCR SERPLBLD CKD-EPI 2021: 16.5 ML/MIN/1.73
FRUCTOSAMINE SERPL-SCNC: 213 UMOL/L (ref 0–285)
GLOBULIN SER CALC-MCNC: 2.1 GM/DL
GLUCOSE SERPL-MCNC: 136 MG/DL (ref 65–99)
HBA1C MFR BLD: 7.5 % (ref 4.8–5.6)
HDLC SERPL-MCNC: 34 MG/DL (ref 40–60)
IMP & REVIEW OF LAB RESULTS: NORMAL
LDLC SERPL CALC-MCNC: 55 MG/DL (ref 0–100)
POTASSIUM SERPL-SCNC: 4.3 MMOL/L (ref 3.5–5.2)
PROT SERPL-MCNC: 5.8 G/DL (ref 6–8.5)
SODIUM SERPL-SCNC: 143 MMOL/L (ref 136–145)
TRIGL SERPL-MCNC: 270 MG/DL (ref 0–150)
TSH SERPL DL<=0.005 MIU/L-ACNC: 2.83 UIU/ML (ref 0.27–4.2)
VIT B12 SERPL-MCNC: 470 PG/ML (ref 211–946)
VLDLC SERPL CALC-MCNC: 43 MG/DL (ref 5–40)

## 2025-06-09 ENCOUNTER — LAB (OUTPATIENT)
Dept: LAB | Facility: HOSPITAL | Age: 70
End: 2025-06-09
Payer: MEDICARE

## 2025-06-09 ENCOUNTER — INFUSION (OUTPATIENT)
Dept: ONCOLOGY | Facility: HOSPITAL | Age: 70
End: 2025-06-09
Payer: MEDICARE

## 2025-06-09 VITALS
SYSTOLIC BLOOD PRESSURE: 146 MMHG | BODY MASS INDEX: 36.34 KG/M2 | OXYGEN SATURATION: 95 % | RESPIRATION RATE: 16 BRPM | DIASTOLIC BLOOD PRESSURE: 90 MMHG | TEMPERATURE: 98 F | WEIGHT: 298.4 LBS | HEART RATE: 89 BPM

## 2025-06-09 DIAGNOSIS — C90.00 MULTIPLE MYELOMA NOT HAVING ACHIEVED REMISSION: Primary | ICD-10-CM

## 2025-06-09 DIAGNOSIS — C90.00 MULTIPLE MYELOMA NOT HAVING ACHIEVED REMISSION: ICD-10-CM

## 2025-06-09 LAB
ALBUMIN SERPL-MCNC: 3.7 G/DL (ref 3.5–5.2)
ALBUMIN/GLOB SERPL: 1.7 G/DL
ALP SERPL-CCNC: 48 U/L (ref 39–117)
ALT SERPL W P-5'-P-CCNC: 12 U/L (ref 1–41)
ANION GAP SERPL CALCULATED.3IONS-SCNC: 9.8 MMOL/L (ref 5–15)
AST SERPL-CCNC: 11 U/L (ref 1–40)
BASOPHILS # BLD AUTO: 0.06 10*3/MM3 (ref 0–0.2)
BASOPHILS NFR BLD AUTO: 1.2 % (ref 0–1.5)
BILIRUB SERPL-MCNC: 0.5 MG/DL (ref 0–1.2)
BUN SERPL-MCNC: 33.4 MG/DL (ref 8–23)
BUN/CREAT SERPL: 9.4 (ref 7–25)
CALCIUM SPEC-SCNC: 8 MG/DL (ref 8.6–10.5)
CHLORIDE SERPL-SCNC: 105 MMOL/L (ref 98–107)
CO2 SERPL-SCNC: 23.2 MMOL/L (ref 22–29)
CREAT SERPL-MCNC: 3.54 MG/DL (ref 0.76–1.27)
DEPRECATED RDW RBC AUTO: 53.3 FL (ref 37–54)
EGFRCR SERPLBLD CKD-EPI 2021: 18.1 ML/MIN/1.73
EOSINOPHIL # BLD AUTO: 0.19 10*3/MM3 (ref 0–0.4)
EOSINOPHIL NFR BLD AUTO: 3.7 % (ref 0.3–6.2)
ERYTHROCYTE [DISTWIDTH] IN BLOOD BY AUTOMATED COUNT: 15.7 % (ref 12.3–15.4)
GLOBULIN UR ELPH-MCNC: 2.2 GM/DL
GLUCOSE SERPL-MCNC: 202 MG/DL (ref 65–99)
HCT VFR BLD AUTO: 33.8 % (ref 37.5–51)
HGB BLD-MCNC: 10.8 G/DL (ref 13–17.7)
IMM GRANULOCYTES # BLD AUTO: 0.03 10*3/MM3 (ref 0–0.05)
IMM GRANULOCYTES NFR BLD AUTO: 0.6 % (ref 0–0.5)
LYMPHOCYTES # BLD AUTO: 1.43 10*3/MM3 (ref 0.7–3.1)
LYMPHOCYTES NFR BLD AUTO: 27.7 % (ref 19.6–45.3)
MAGNESIUM SERPL-MCNC: 1.5 MG/DL (ref 1.6–2.4)
MCH RBC QN AUTO: 29.6 PG (ref 26.6–33)
MCHC RBC AUTO-ENTMCNC: 32 G/DL (ref 31.5–35.7)
MCV RBC AUTO: 92.6 FL (ref 79–97)
MONOCYTES # BLD AUTO: 0.59 10*3/MM3 (ref 0.1–0.9)
MONOCYTES NFR BLD AUTO: 11.4 % (ref 5–12)
NEUTROPHILS NFR BLD AUTO: 2.87 10*3/MM3 (ref 1.7–7)
NEUTROPHILS NFR BLD AUTO: 55.4 % (ref 42.7–76)
NRBC BLD AUTO-RTO: 0 /100 WBC (ref 0–0.2)
PHOSPHATE SERPL-MCNC: 3.3 MG/DL (ref 2.5–4.5)
PLATELET # BLD AUTO: 138 10*3/MM3 (ref 140–450)
PMV BLD AUTO: 9.1 FL (ref 6–12)
POTASSIUM SERPL-SCNC: 4.4 MMOL/L (ref 3.5–5.2)
PROT SERPL-MCNC: 5.9 G/DL (ref 6–8.5)
RBC # BLD AUTO: 3.65 10*6/MM3 (ref 4.14–5.8)
SODIUM SERPL-SCNC: 138 MMOL/L (ref 136–145)
WBC NRBC COR # BLD AUTO: 5.17 10*3/MM3 (ref 3.4–10.8)

## 2025-06-09 PROCEDURE — 96365 THER/PROPH/DIAG IV INF INIT: CPT

## 2025-06-09 PROCEDURE — 83735 ASSAY OF MAGNESIUM: CPT

## 2025-06-09 PROCEDURE — 25010000002 MAGNESIUM SULFATE 2 GM/50ML SOLUTION: Performed by: INTERNAL MEDICINE

## 2025-06-09 PROCEDURE — 25010000002 DARATUMUMAB-HYALURONIDASE-FIHJ 1800-30000 MG-UT/15ML SOLUTION: Performed by: NURSE PRACTITIONER

## 2025-06-09 PROCEDURE — 80053 COMPREHEN METABOLIC PANEL: CPT

## 2025-06-09 PROCEDURE — 85025 COMPLETE CBC W/AUTO DIFF WBC: CPT

## 2025-06-09 PROCEDURE — 84100 ASSAY OF PHOSPHORUS: CPT

## 2025-06-09 PROCEDURE — 36415 COLL VENOUS BLD VENIPUNCTURE: CPT

## 2025-06-09 PROCEDURE — 96401 CHEMO ANTI-NEOPL SQ/IM: CPT

## 2025-06-09 RX ORDER — FAMOTIDINE 10 MG/ML
20 INJECTION, SOLUTION INTRAVENOUS AS NEEDED
Status: CANCELLED | OUTPATIENT
Start: 2025-06-09

## 2025-06-09 RX ORDER — MAGNESIUM SULFATE HEPTAHYDRATE 40 MG/ML
2 INJECTION, SOLUTION INTRAVENOUS ONCE
Status: COMPLETED | OUTPATIENT
Start: 2025-06-09 | End: 2025-06-09

## 2025-06-09 RX ORDER — HYDROCORTISONE SODIUM SUCCINATE 100 MG/2ML
100 INJECTION INTRAMUSCULAR; INTRAVENOUS AS NEEDED
Status: CANCELLED | OUTPATIENT
Start: 2025-06-09

## 2025-06-09 RX ORDER — DIPHENHYDRAMINE HYDROCHLORIDE 50 MG/ML
50 INJECTION, SOLUTION INTRAMUSCULAR; INTRAVENOUS AS NEEDED
Status: CANCELLED | OUTPATIENT
Start: 2025-06-09

## 2025-06-09 RX ORDER — MAGNESIUM SULFATE HEPTAHYDRATE 40 MG/ML
2 INJECTION, SOLUTION INTRAVENOUS ONCE
Status: DISCONTINUED | OUTPATIENT
Start: 2025-06-09 | End: 2025-06-09 | Stop reason: HOSPADM

## 2025-06-09 RX ADMIN — DARATUMUMAB AND HYALURONIDASE-FIHJ (HUMAN RECOMBINANT) 1800 MG: 1800; 30000 INJECTION SUBCUTANEOUS at 14:13

## 2025-06-09 RX ADMIN — MAGNESIUM SULFATE HEPTAHYDRATE 2 G: 40 INJECTION, SOLUTION INTRAVENOUS at 14:24

## 2025-06-09 NOTE — NURSING NOTE
Mag 1.5.  2 grams of IV Mag given IV per protocol.  Pt cannot take oral Mag due to diarrhea.  Xgeva held today as ordered by Dr. Del Valle.  Message sent to Jacque to defer treatment.  Per Dr. Del Valle, pt to get Xgeva with next tx.

## 2025-06-11 DIAGNOSIS — C90.00 MULTIPLE MYELOMA NOT HAVING ACHIEVED REMISSION: Primary | ICD-10-CM

## 2025-06-11 LAB
ALBUMIN SERPL ELPH-MCNC: 2.8 G/DL (ref 2.9–4.4)
ALBUMIN/GLOB SERPL: 1.2 {RATIO} (ref 0.7–1.7)
ALPHA1 GLOB SERPL ELPH-MCNC: 0.2 G/DL (ref 0–0.4)
ALPHA2 GLOB SERPL ELPH-MCNC: 0.9 G/DL (ref 0.4–1)
B-GLOBULIN SERPL ELPH-MCNC: 0.7 G/DL (ref 0.7–1.3)
GAMMA GLOB SERPL ELPH-MCNC: 0.6 G/DL (ref 0.4–1.8)
GLOBULIN SER-MCNC: 2.5 G/DL (ref 2.2–3.9)
IGA SERPL-MCNC: 87 MG/DL (ref 61–437)
IGG SERPL-MCNC: 632 MG/DL (ref 603–1613)
IGM SERPL-MCNC: 25 MG/DL (ref 20–172)
INTERPRETATION SERPL IEP-IMP: ABNORMAL
KAPPA LC FREE SER-MCNC: 62.7 MG/L (ref 3.3–19.4)
KAPPA LC FREE/LAMBDA FREE SER: 1.1 {RATIO} (ref 0.26–1.65)
LABORATORY COMMENT REPORT: ABNORMAL
LAMBDA LC FREE SERPL-MCNC: 57.1 MG/L (ref 5.7–26.3)
M PROTEIN SERPL ELPH-MCNC: ABNORMAL G/DL
PROT SERPL-MCNC: 5.3 G/DL (ref 6–8.5)

## 2025-06-18 ENCOUNTER — SPECIALTY PHARMACY (OUTPATIENT)
Dept: PHARMACY | Facility: HOSPITAL | Age: 70
End: 2025-06-18
Payer: MEDICARE

## 2025-06-18 NOTE — PROGRESS NOTES
Specialty Pharmacy Patient Management Program  Per Protocol Prescription Order or Refill       Requested Prescriptions     Signed Prescriptions Disp Refills    pomalidomide (POMALYST) 2 MG chemo capsule 21 capsule 0     Sig: Take 1 capsule by mouth Daily. Take for 21 days on, then 7 days off.  Indications: Multiple Myeloma     Prescription orders above were sent to [facility] per Collaborative Care Agreement Protocol.     Completed independent double check on medication order/RX.    Yolande Huitron, PharmD, [facility]  Clinical Specialty Pharmacist, Oncology  6/18/2025  13:53 EDT

## 2025-06-18 NOTE — PROGRESS NOTES
Specialty Pharmacy Patient Management Program  Per Protocol Prescription Order or Refill     Patient will be filling or currently fills medications at [facility] and is enrolled in the Patient Management Program.    Requested Prescriptions     Signed Prescriptions Disp Refills    pomalidomide (POMALYST) 2 MG chemo capsule 21 capsule 0     Sig: Take 1 capsule by mouth Daily. Take for 21 days on, then 7 days off.  Indications: Multiple Myeloma     Prescription orders above were sent to the pharmacy per Collaborative Care Agreement Protocol.     Last Office Visit: 5/12/25  Next Office Visit: 8/4/25    Carlos Clarke PharmD, BCOP  Clinical Specialty Pharmacist, Oncology  6/18/2025  12:43 EDT

## 2025-07-07 ENCOUNTER — INFUSION (OUTPATIENT)
Dept: ONCOLOGY | Facility: HOSPITAL | Age: 70
End: 2025-07-07
Payer: MEDICARE

## 2025-07-07 ENCOUNTER — LAB (OUTPATIENT)
Dept: LAB | Facility: HOSPITAL | Age: 70
End: 2025-07-07
Payer: MEDICARE

## 2025-07-07 VITALS
SYSTOLIC BLOOD PRESSURE: 130 MMHG | WEIGHT: 298.6 LBS | DIASTOLIC BLOOD PRESSURE: 77 MMHG | HEART RATE: 89 BPM | TEMPERATURE: 97 F | OXYGEN SATURATION: 95 % | RESPIRATION RATE: 16 BRPM | BODY MASS INDEX: 36.36 KG/M2

## 2025-07-07 DIAGNOSIS — C90.00 MULTIPLE MYELOMA, REMISSION STATUS UNSPECIFIED: Primary | ICD-10-CM

## 2025-07-07 DIAGNOSIS — C90.00 MULTIPLE MYELOMA NOT HAVING ACHIEVED REMISSION: Primary | ICD-10-CM

## 2025-07-07 DIAGNOSIS — C90.00 MULTIPLE MYELOMA NOT HAVING ACHIEVED REMISSION: ICD-10-CM

## 2025-07-07 LAB
ALBUMIN SERPL-MCNC: 3.8 G/DL (ref 3.5–5.2)
ALBUMIN/GLOB SERPL: 1.7 G/DL
ALP SERPL-CCNC: 51 U/L (ref 39–117)
ALT SERPL W P-5'-P-CCNC: 14 U/L (ref 1–41)
ANION GAP SERPL CALCULATED.3IONS-SCNC: 11.8 MMOL/L (ref 5–15)
AST SERPL-CCNC: 11 U/L (ref 1–40)
BASOPHILS # BLD AUTO: 0.08 10*3/MM3 (ref 0–0.2)
BASOPHILS NFR BLD AUTO: 1.5 % (ref 0–1.5)
BILIRUB SERPL-MCNC: 0.6 MG/DL (ref 0–1.2)
BUN SERPL-MCNC: 27.6 MG/DL (ref 8–23)
BUN/CREAT SERPL: 8.1 (ref 7–25)
CALCIUM SPEC-SCNC: 9.1 MG/DL (ref 8.6–10.5)
CHLORIDE SERPL-SCNC: 107 MMOL/L (ref 98–107)
CO2 SERPL-SCNC: 24.2 MMOL/L (ref 22–29)
CREAT SERPL-MCNC: 3.4 MG/DL (ref 0.76–1.27)
DEPRECATED RDW RBC AUTO: 51.7 FL (ref 37–54)
EGFRCR SERPLBLD CKD-EPI 2021: 19 ML/MIN/1.73
EOSINOPHIL # BLD AUTO: 0.21 10*3/MM3 (ref 0–0.4)
EOSINOPHIL NFR BLD AUTO: 3.8 % (ref 0.3–6.2)
ERYTHROCYTE [DISTWIDTH] IN BLOOD BY AUTOMATED COUNT: 15.3 % (ref 12.3–15.4)
GLOBULIN UR ELPH-MCNC: 2.3 GM/DL
GLUCOSE SERPL-MCNC: 200 MG/DL (ref 65–99)
HCT VFR BLD AUTO: 35.9 % (ref 37.5–51)
HGB BLD-MCNC: 11.5 G/DL (ref 13–17.7)
IMM GRANULOCYTES # BLD AUTO: 0.03 10*3/MM3 (ref 0–0.05)
IMM GRANULOCYTES NFR BLD AUTO: 0.5 % (ref 0–0.5)
LYMPHOCYTES # BLD AUTO: 1.6 10*3/MM3 (ref 0.7–3.1)
LYMPHOCYTES NFR BLD AUTO: 29 % (ref 19.6–45.3)
MAGNESIUM SERPL-MCNC: 1.6 MG/DL (ref 1.6–2.4)
MCH RBC QN AUTO: 29.7 PG (ref 26.6–33)
MCHC RBC AUTO-ENTMCNC: 32 G/DL (ref 31.5–35.7)
MCV RBC AUTO: 92.8 FL (ref 79–97)
MONOCYTES # BLD AUTO: 0.63 10*3/MM3 (ref 0.1–0.9)
MONOCYTES NFR BLD AUTO: 11.4 % (ref 5–12)
NEUTROPHILS NFR BLD AUTO: 2.96 10*3/MM3 (ref 1.7–7)
NEUTROPHILS NFR BLD AUTO: 53.8 % (ref 42.7–76)
NRBC BLD AUTO-RTO: 0 /100 WBC (ref 0–0.2)
PHOSPHATE SERPL-MCNC: 3.1 MG/DL (ref 2.5–4.5)
PLATELET # BLD AUTO: 163 10*3/MM3 (ref 140–450)
PMV BLD AUTO: 9.4 FL (ref 6–12)
POTASSIUM SERPL-SCNC: 4.3 MMOL/L (ref 3.5–5.2)
PROT SERPL-MCNC: 6.1 G/DL (ref 6–8.5)
RBC # BLD AUTO: 3.87 10*6/MM3 (ref 4.14–5.8)
SODIUM SERPL-SCNC: 143 MMOL/L (ref 136–145)
WBC NRBC COR # BLD AUTO: 5.51 10*3/MM3 (ref 3.4–10.8)

## 2025-07-07 PROCEDURE — 83735 ASSAY OF MAGNESIUM: CPT

## 2025-07-07 PROCEDURE — 80053 COMPREHEN METABOLIC PANEL: CPT

## 2025-07-07 PROCEDURE — 96372 THER/PROPH/DIAG INJ SC/IM: CPT

## 2025-07-07 PROCEDURE — 84100 ASSAY OF PHOSPHORUS: CPT

## 2025-07-07 PROCEDURE — 36415 COLL VENOUS BLD VENIPUNCTURE: CPT

## 2025-07-07 PROCEDURE — 85025 COMPLETE CBC W/AUTO DIFF WBC: CPT

## 2025-07-07 PROCEDURE — 25010000002 DENOSUMAB 120 MG/1.7ML SOLUTION: Performed by: INTERNAL MEDICINE

## 2025-07-07 PROCEDURE — 96401 CHEMO ANTI-NEOPL SQ/IM: CPT

## 2025-07-07 PROCEDURE — 25010000002 DARATUMUMAB-HYALURONIDASE-FIHJ 1800-30000 MG-UT/15ML SOLUTION: Performed by: INTERNAL MEDICINE

## 2025-07-07 RX ORDER — DEXAMETHASONE 4 MG/1
TABLET ORAL
Qty: 15 TABLET | Refills: 1 | Status: SHIPPED | OUTPATIENT
Start: 2025-07-07

## 2025-07-07 RX ORDER — HYDROCORTISONE SODIUM SUCCINATE 100 MG/2ML
100 INJECTION INTRAMUSCULAR; INTRAVENOUS AS NEEDED
Status: CANCELLED | OUTPATIENT
Start: 2025-07-07

## 2025-07-07 RX ORDER — DIPHENHYDRAMINE HYDROCHLORIDE 50 MG/ML
50 INJECTION, SOLUTION INTRAMUSCULAR; INTRAVENOUS AS NEEDED
Status: CANCELLED | OUTPATIENT
Start: 2025-07-07

## 2025-07-07 RX ORDER — FAMOTIDINE 10 MG/ML
20 INJECTION, SOLUTION INTRAVENOUS AS NEEDED
Status: CANCELLED | OUTPATIENT
Start: 2025-07-07

## 2025-07-07 RX ORDER — MEPERIDINE HYDROCHLORIDE 25 MG/ML
12.5 INJECTION INTRAMUSCULAR; INTRAVENOUS; SUBCUTANEOUS
Status: CANCELLED | OUTPATIENT
Start: 2025-07-07

## 2025-07-07 RX ADMIN — DENOSUMAB 120 MG: 120 INJECTION SUBCUTANEOUS at 14:21

## 2025-07-07 RX ADMIN — DARATUMUMAB AND HYALURONIDASE-FIHJ (HUMAN RECOMBINANT) 1800 MG: 1800; 30000 INJECTION SUBCUTANEOUS at 14:23

## 2025-07-09 LAB
ALBUMIN SERPL ELPH-MCNC: 3.2 G/DL (ref 2.9–4.4)
ALBUMIN/GLOB SERPL: 1.3 {RATIO} (ref 0.7–1.7)
ALPHA1 GLOB SERPL ELPH-MCNC: 0.2 G/DL (ref 0–0.4)
ALPHA2 GLOB SERPL ELPH-MCNC: 0.8 G/DL (ref 0.4–1)
B-GLOBULIN SERPL ELPH-MCNC: 0.8 G/DL (ref 0.7–1.3)
GAMMA GLOB SERPL ELPH-MCNC: 0.7 G/DL (ref 0.4–1.8)
GLOBULIN SER-MCNC: 2.5 G/DL (ref 2.2–3.9)
IGA SERPL-MCNC: 95 MG/DL (ref 61–437)
IGG SERPL-MCNC: 709 MG/DL (ref 603–1613)
IGM SERPL-MCNC: 26 MG/DL (ref 20–172)
INTERPRETATION SERPL IEP-IMP: ABNORMAL
KAPPA LC FREE SER-MCNC: 60.3 MG/L (ref 3.3–19.4)
KAPPA LC FREE/LAMBDA FREE SER: 1.19 {RATIO} (ref 0.26–1.65)
LABORATORY COMMENT REPORT: ABNORMAL
LAMBDA LC FREE SERPL-MCNC: 50.6 MG/L (ref 5.7–26.3)
M PROTEIN SERPL ELPH-MCNC: ABNORMAL G/DL
PROT SERPL-MCNC: 5.7 G/DL (ref 6–8.5)

## 2025-07-14 RX ORDER — HYDROCHLOROTHIAZIDE 12.5 MG/1
CAPSULE ORAL
Qty: 6 EACH | Refills: 0 | Status: SHIPPED | OUTPATIENT
Start: 2025-07-14

## 2025-07-17 ENCOUNTER — SPECIALTY PHARMACY (OUTPATIENT)
Dept: PHARMACY | Facility: HOSPITAL | Age: 70
End: 2025-07-17
Payer: MEDICARE

## 2025-07-17 NOTE — PROGRESS NOTES
Specialty Pharmacy Patient Management Program  Per Protocol Prescription Order or Refill     Patient will be filling or currently fills medications at [facility] and is enrolled in the Patient Management Program.    Requested Prescriptions     Signed Prescriptions Disp Refills    pomalidomide (POMALYST) 2 MG chemo capsule 21 capsule 0     Sig: Take 1 capsule by mouth Daily. Take for 21 days on, then 7 days off.  Indications: Multiple Myeloma     Prescription orders above were sent to the pharmacy per Collaborative Care Agreement Protocol.     Last Office Visit: 5/12/25  Next Office Visit: 8/4/25    Carlos Clarke PharmD, BCOP  Clinical Specialty Pharmacist, Oncology  7/17/2025  12:05 EDT

## 2025-07-17 NOTE — PROGRESS NOTES
Specialty Pharmacy Patient Management Program  Per Protocol Prescription Order or Refill       Requested Prescriptions     Signed Prescriptions Disp Refills    pomalidomide (POMALYST) 2 MG chemo capsule 21 capsule 0     Sig: Take 1 capsule by mouth Daily. Take for 21 days on, then 7 days off.  Indications: Multiple Myeloma     Prescription orders above were sent to \[facility]\"" [facility] per Collaborative Care Agreement Protocol.     Completed independent double check on medication order/RX.    Yolande Huitron, PharmD, BCPS  Clinical Specialty Pharmacist, Oncology  7/17/2025  15:48 EDT

## 2025-08-04 ENCOUNTER — INFUSION (OUTPATIENT)
Dept: ONCOLOGY | Facility: HOSPITAL | Age: 70
End: 2025-08-04
Payer: MEDICARE

## 2025-08-04 ENCOUNTER — OFFICE VISIT (OUTPATIENT)
Dept: ONCOLOGY | Facility: CLINIC | Age: 70
End: 2025-08-04
Payer: MEDICARE

## 2025-08-04 ENCOUNTER — LAB (OUTPATIENT)
Dept: LAB | Facility: HOSPITAL | Age: 70
End: 2025-08-04
Payer: MEDICARE

## 2025-08-04 VITALS
HEART RATE: 90 BPM | DIASTOLIC BLOOD PRESSURE: 70 MMHG | SYSTOLIC BLOOD PRESSURE: 142 MMHG | OXYGEN SATURATION: 96 % | RESPIRATION RATE: 17 BRPM | HEIGHT: 77 IN | BODY MASS INDEX: 35.26 KG/M2 | TEMPERATURE: 98.2 F

## 2025-08-04 DIAGNOSIS — Z79.899 HIGH RISK MEDICATION USE: ICD-10-CM

## 2025-08-04 DIAGNOSIS — C90.00 MULTIPLE MYELOMA NOT HAVING ACHIEVED REMISSION: ICD-10-CM

## 2025-08-04 DIAGNOSIS — C90.00 MULTIPLE MYELOMA NOT HAVING ACHIEVED REMISSION: Primary | ICD-10-CM

## 2025-08-04 DIAGNOSIS — M89.8X9 LYTIC BONE LESIONS ON XRAY: ICD-10-CM

## 2025-08-04 DIAGNOSIS — E83.51 HYPOCALCEMIA: ICD-10-CM

## 2025-08-04 DIAGNOSIS — D70.1 CHEMOTHERAPY INDUCED NEUTROPENIA: ICD-10-CM

## 2025-08-04 DIAGNOSIS — E83.42 HYPOMAGNESEMIA: ICD-10-CM

## 2025-08-04 DIAGNOSIS — D84.9 IMMUNOCOMPROMISED STATE: ICD-10-CM

## 2025-08-04 DIAGNOSIS — N18.32 ANEMIA IN STAGE 3B CHRONIC KIDNEY DISEASE: ICD-10-CM

## 2025-08-04 DIAGNOSIS — D63.1 ANEMIA IN STAGE 3B CHRONIC KIDNEY DISEASE: ICD-10-CM

## 2025-08-04 DIAGNOSIS — C90.00 MULTIPLE MYELOMA, REMISSION STATUS UNSPECIFIED: Primary | ICD-10-CM

## 2025-08-04 DIAGNOSIS — T45.1X5A CHEMOTHERAPY INDUCED NEUTROPENIA: ICD-10-CM

## 2025-08-04 LAB
ALBUMIN SERPL-MCNC: 3.9 G/DL (ref 3.5–5.2)
ALBUMIN/GLOB SERPL: 1.8 G/DL
ALP SERPL-CCNC: 52 U/L (ref 39–117)
ALT SERPL W P-5'-P-CCNC: 15 U/L (ref 1–41)
ANION GAP SERPL CALCULATED.3IONS-SCNC: 15.1 MMOL/L (ref 5–15)
AST SERPL-CCNC: 14 U/L (ref 1–40)
BASOPHILS # BLD AUTO: 0.05 10*3/MM3 (ref 0–0.2)
BASOPHILS NFR BLD AUTO: 0.8 % (ref 0–1.5)
BILIRUB SERPL-MCNC: 0.5 MG/DL (ref 0–1.2)
BUN SERPL-MCNC: 34.5 MG/DL (ref 8–23)
BUN/CREAT SERPL: 10.1 (ref 7–25)
CALCIUM SPEC-SCNC: 6 MG/DL (ref 8.6–10.5)
CHLORIDE SERPL-SCNC: 109 MMOL/L (ref 98–107)
CO2 SERPL-SCNC: 18.9 MMOL/L (ref 22–29)
CREAT SERPL-MCNC: 3.41 MG/DL (ref 0.76–1.27)
DEPRECATED RDW RBC AUTO: 54.4 FL (ref 37–54)
EGFRCR SERPLBLD CKD-EPI 2021: 18.8 ML/MIN/1.73
EOSINOPHIL # BLD AUTO: 0.09 10*3/MM3 (ref 0–0.4)
EOSINOPHIL NFR BLD AUTO: 1.4 % (ref 0.3–6.2)
ERYTHROCYTE [DISTWIDTH] IN BLOOD BY AUTOMATED COUNT: 16.2 % (ref 12.3–15.4)
GLOBULIN UR ELPH-MCNC: 2.2 GM/DL
GLUCOSE SERPL-MCNC: 170 MG/DL (ref 65–99)
HCT VFR BLD AUTO: 35 % (ref 37.5–51)
HGB BLD-MCNC: 11.2 G/DL (ref 13–17.7)
IMM GRANULOCYTES # BLD AUTO: 0.03 10*3/MM3 (ref 0–0.05)
IMM GRANULOCYTES NFR BLD AUTO: 0.5 % (ref 0–0.5)
LYMPHOCYTES # BLD AUTO: 1.36 10*3/MM3 (ref 0.7–3.1)
LYMPHOCYTES NFR BLD AUTO: 21.6 % (ref 19.6–45.3)
MAGNESIUM SERPL-MCNC: 1.4 MG/DL (ref 1.6–2.4)
MCH RBC QN AUTO: 29.3 PG (ref 26.6–33)
MCHC RBC AUTO-ENTMCNC: 32 G/DL (ref 31.5–35.7)
MCV RBC AUTO: 91.6 FL (ref 79–97)
MONOCYTES # BLD AUTO: 0.61 10*3/MM3 (ref 0.1–0.9)
MONOCYTES NFR BLD AUTO: 9.7 % (ref 5–12)
NEUTROPHILS NFR BLD AUTO: 4.16 10*3/MM3 (ref 1.7–7)
NEUTROPHILS NFR BLD AUTO: 66 % (ref 42.7–76)
NRBC BLD AUTO-RTO: 0 /100 WBC (ref 0–0.2)
PHOSPHATE SERPL-MCNC: 3.9 MG/DL (ref 2.5–4.5)
PLATELET # BLD AUTO: 177 10*3/MM3 (ref 140–450)
PMV BLD AUTO: 8.9 FL (ref 6–12)
POTASSIUM SERPL-SCNC: 4 MMOL/L (ref 3.5–5.2)
PROT SERPL-MCNC: 6.1 G/DL (ref 6–8.5)
RBC # BLD AUTO: 3.82 10*6/MM3 (ref 4.14–5.8)
SODIUM SERPL-SCNC: 143 MMOL/L (ref 136–145)
WBC NRBC COR # BLD AUTO: 6.3 10*3/MM3 (ref 3.4–10.8)

## 2025-08-04 PROCEDURE — 96401 CHEMO ANTI-NEOPL SQ/IM: CPT

## 2025-08-04 PROCEDURE — 1126F AMNT PAIN NOTED NONE PRSNT: CPT | Performed by: INTERNAL MEDICINE

## 2025-08-04 PROCEDURE — 80053 COMPREHEN METABOLIC PANEL: CPT

## 2025-08-04 PROCEDURE — 99214 OFFICE O/P EST MOD 30 MIN: CPT | Performed by: INTERNAL MEDICINE

## 2025-08-04 PROCEDURE — 85025 COMPLETE CBC W/AUTO DIFF WBC: CPT

## 2025-08-04 PROCEDURE — 3078F DIAST BP <80 MM HG: CPT | Performed by: INTERNAL MEDICINE

## 2025-08-04 PROCEDURE — G2211 COMPLEX E/M VISIT ADD ON: HCPCS | Performed by: INTERNAL MEDICINE

## 2025-08-04 PROCEDURE — 25010000002 MAGNESIUM SULFATE IN D5W 1G/100ML (PREMIX) 1-5 GM/100ML-% SOLUTION: Performed by: INTERNAL MEDICINE

## 2025-08-04 PROCEDURE — 83735 ASSAY OF MAGNESIUM: CPT

## 2025-08-04 PROCEDURE — 96367 TX/PROPH/DG ADDL SEQ IV INF: CPT

## 2025-08-04 PROCEDURE — 25010000002 MAGNESIUM SULFATE 2 GM/50ML SOLUTION: Performed by: INTERNAL MEDICINE

## 2025-08-04 PROCEDURE — 3077F SYST BP >= 140 MM HG: CPT | Performed by: INTERNAL MEDICINE

## 2025-08-04 PROCEDURE — 96365 THER/PROPH/DIAG IV INF INIT: CPT

## 2025-08-04 PROCEDURE — 84100 ASSAY OF PHOSPHORUS: CPT

## 2025-08-04 PROCEDURE — 1159F MED LIST DOCD IN RCRD: CPT | Performed by: INTERNAL MEDICINE

## 2025-08-04 PROCEDURE — 25010000002 DARATUMUMAB-HYALURONIDASE-FIHJ 1800-30000 MG-UT/15ML SOLUTION: Performed by: INTERNAL MEDICINE

## 2025-08-04 PROCEDURE — 1160F RVW MEDS BY RX/DR IN RCRD: CPT | Performed by: INTERNAL MEDICINE

## 2025-08-04 PROCEDURE — 36415 COLL VENOUS BLD VENIPUNCTURE: CPT

## 2025-08-04 RX ORDER — MAGNESIUM SULFATE 1 G/100ML
1 INJECTION INTRAVENOUS ONCE
Status: COMPLETED | OUTPATIENT
Start: 2025-08-04 | End: 2025-08-04

## 2025-08-04 RX ORDER — DEXAMETHASONE 4 MG/1
TABLET ORAL
Start: 2025-08-04

## 2025-08-04 RX ORDER — FAMOTIDINE 10 MG/ML
20 INJECTION, SOLUTION INTRAVENOUS AS NEEDED
Status: CANCELLED | OUTPATIENT
Start: 2025-08-04

## 2025-08-04 RX ORDER — MAGNESIUM SULFATE HEPTAHYDRATE 40 MG/ML
2 INJECTION, SOLUTION INTRAVENOUS ONCE
Status: COMPLETED | OUTPATIENT
Start: 2025-08-04 | End: 2025-08-04

## 2025-08-04 RX ORDER — HYDROCORTISONE SODIUM SUCCINATE 100 MG/2ML
100 INJECTION INTRAMUSCULAR; INTRAVENOUS AS NEEDED
Status: CANCELLED | OUTPATIENT
Start: 2025-08-04

## 2025-08-04 RX ORDER — MEPERIDINE HYDROCHLORIDE 25 MG/ML
12.5 INJECTION INTRAMUSCULAR; INTRAVENOUS; SUBCUTANEOUS
Status: CANCELLED | OUTPATIENT
Start: 2025-08-04

## 2025-08-04 RX ORDER — PHENOL 1.4 %
1200 AEROSOL, SPRAY (ML) MUCOUS MEMBRANE 2 TIMES DAILY WITH MEALS
Start: 2025-08-04 | End: 2025-08-05 | Stop reason: SDUPTHER

## 2025-08-04 RX ORDER — DIPHENHYDRAMINE HYDROCHLORIDE 50 MG/ML
50 INJECTION, SOLUTION INTRAMUSCULAR; INTRAVENOUS AS NEEDED
Status: CANCELLED | OUTPATIENT
Start: 2025-08-04

## 2025-08-04 RX ADMIN — MAGNESIUM SULFATE 1 G: 1 INJECTION INTRAVENOUS at 11:10

## 2025-08-04 RX ADMIN — DARATUMUMAB AND HYALURONIDASE-FIHJ (HUMAN RECOMBINANT) 1800 MG: 1800; 30000 INJECTION SUBCUTANEOUS at 12:10

## 2025-08-04 RX ADMIN — MAGNESIUM SULFATE HEPTAHYDRATE 2 G: 40 INJECTION, SOLUTION INTRAVENOUS at 11:40

## 2025-08-05 ENCOUNTER — TELEPHONE (OUTPATIENT)
Dept: ONCOLOGY | Facility: CLINIC | Age: 70
End: 2025-08-05
Payer: MEDICARE

## 2025-08-05 ENCOUNTER — SPECIALTY PHARMACY (OUTPATIENT)
Dept: PHARMACY | Facility: HOSPITAL | Age: 70
End: 2025-08-05
Payer: MEDICARE

## 2025-08-05 RX ORDER — PHENOL 1.4 %
1200 AEROSOL, SPRAY (ML) MUCOUS MEMBRANE 2 TIMES DAILY WITH MEALS
Qty: 120 TABLET | Refills: 0 | Status: SHIPPED | OUTPATIENT
Start: 2025-08-05

## 2025-08-06 LAB
ALBUMIN SERPL ELPH-MCNC: 3.2 G/DL (ref 2.9–4.4)
ALBUMIN/GLOB SERPL: 1.2 {RATIO} (ref 0.7–1.7)
ALPHA1 GLOB SERPL ELPH-MCNC: 0.3 G/DL (ref 0–0.4)
ALPHA2 GLOB SERPL ELPH-MCNC: 1 G/DL (ref 0.4–1)
B-GLOBULIN SERPL ELPH-MCNC: 0.9 G/DL (ref 0.7–1.3)
GAMMA GLOB SERPL ELPH-MCNC: 0.7 G/DL (ref 0.4–1.8)
GLOBULIN SER-MCNC: 2.8 G/DL (ref 2.2–3.9)
IGA SERPL-MCNC: 91 MG/DL (ref 61–437)
IGG SERPL-MCNC: 665 MG/DL (ref 603–1613)
IGM SERPL-MCNC: 22 MG/DL (ref 20–172)
INTERPRETATION SERPL IEP-IMP: ABNORMAL
KAPPA LC FREE SER-MCNC: 56.4 MG/L (ref 3.3–19.4)
KAPPA LC FREE/LAMBDA FREE SER: 1.16 {RATIO} (ref 0.26–1.65)
LABORATORY COMMENT REPORT: ABNORMAL
LAMBDA LC FREE SERPL-MCNC: 48.6 MG/L (ref 5.7–26.3)
M PROTEIN SERPL ELPH-MCNC: ABNORMAL G/DL
PROT SERPL-MCNC: 6 G/DL (ref 6–8.5)

## 2025-08-13 ENCOUNTER — OFFICE VISIT (OUTPATIENT)
Dept: FAMILY MEDICINE CLINIC | Facility: CLINIC | Age: 70
End: 2025-08-13
Payer: MEDICARE

## 2025-08-13 VITALS
TEMPERATURE: 97.7 F | SYSTOLIC BLOOD PRESSURE: 136 MMHG | BODY MASS INDEX: 36.15 KG/M2 | OXYGEN SATURATION: 97 % | HEART RATE: 77 BPM | WEIGHT: 306.2 LBS | HEIGHT: 77 IN | DIASTOLIC BLOOD PRESSURE: 84 MMHG

## 2025-08-13 DIAGNOSIS — I10 PRIMARY HYPERTENSION: Primary | ICD-10-CM

## 2025-08-13 DIAGNOSIS — N18.4 STAGE 4 CHRONIC KIDNEY DISEASE: ICD-10-CM

## 2025-08-13 DIAGNOSIS — Z12.11 SCREEN FOR COLON CANCER: ICD-10-CM

## 2025-08-13 DIAGNOSIS — C90.00 MULTIPLE MYELOMA, REMISSION STATUS UNSPECIFIED: ICD-10-CM

## 2025-08-13 DIAGNOSIS — F41.9 ANXIETY: ICD-10-CM

## 2025-08-13 DIAGNOSIS — Z79.4 TYPE 2 DIABETES MELLITUS WITH BOTH EYES AFFECTED BY SEVERE NONPROLIFERATIVE RETINOPATHY AND MACULAR EDEMA, WITH LONG-TERM CURRENT USE OF INSULIN: ICD-10-CM

## 2025-08-13 DIAGNOSIS — E11.3413 TYPE 2 DIABETES MELLITUS WITH BOTH EYES AFFECTED BY SEVERE NONPROLIFERATIVE RETINOPATHY AND MACULAR EDEMA, WITH LONG-TERM CURRENT USE OF INSULIN: ICD-10-CM

## 2025-08-13 DIAGNOSIS — R12 HEARTBURN: ICD-10-CM

## 2025-08-13 DIAGNOSIS — E78.2 MIXED HYPERLIPIDEMIA: ICD-10-CM

## 2025-08-13 RX ORDER — HYDRALAZINE HYDROCHLORIDE 25 MG/1
25 TABLET, FILM COATED ORAL 2 TIMES DAILY
COMMUNITY
Start: 2025-07-24

## (undated) DEVICE — NDL HYPO PRECISIONGLIDE REG 25G 1 1/2

## (undated) DEVICE — Device

## (undated) DEVICE — GLV SURG SIGNATURE ESSENTIAL PF LTX SZ7.5

## (undated) DEVICE — SUT ETHLN 2/0 PS 18IN 585H

## (undated) DEVICE — SYR LUERLOK 20CC BX/50

## (undated) DEVICE — ST. SORBAVIEW ULTIMATE IJ SYSTEM A,C: Brand: CENTURION

## (undated) DEVICE — ANTIBACTERIAL UNDYED BRAIDED (POLYGLACTIN 910), SYNTHETIC ABSORBABLE SUTURE: Brand: COATED VICRYL

## (undated) DEVICE — BIOPATCH™ ANTIMICROBIAL DRESSING WITH CHLORHEXIDINE GLUCONATE IS A HYDROPHILLIC POLYURETHANE ABSORPTIVE FOAM WITH CHLORHEXIDINE GLUCONATE (CHG) WHICH INHIBITS BACTERIAL GROWTH UNDER THE DRESSING. THE DRESSING IS INTENDED TO BE USED TO ABSORB EXUDATE, COVER A WOUND CAUSED BY VASCULAR AND NONVASCULAR PERCUTANEOUS MEDICAL DEVICES DURING SURGERY, AS WELL AS REDUCE LOCAL INFECTION AND COLONIZATION OF MICROORGANISMS.: Brand: BIOPATCH

## (undated) DEVICE — SYR LUERLOK 5CC

## (undated) DEVICE — KT CATH TAL PALINDROME SAPPHIRE 14.5F23CM

## (undated) DEVICE — CVR PROB 96IN LF STRL

## (undated) DEVICE — ADHS SKIN DERMABOND TOP ADVANCED

## (undated) DEVICE — DECANT BG O JET

## (undated) DEVICE — LOU MINOR PROCEDURE: Brand: MEDLINE INDUSTRIES, INC.